# Patient Record
Sex: MALE | Race: WHITE | NOT HISPANIC OR LATINO | Employment: OTHER | URBAN - METROPOLITAN AREA
[De-identification: names, ages, dates, MRNs, and addresses within clinical notes are randomized per-mention and may not be internally consistent; named-entity substitution may affect disease eponyms.]

---

## 2017-02-26 DIAGNOSIS — Z76.0 ENCOUNTER FOR MEDICATION REFILL: Primary | ICD-10-CM

## 2017-02-27 ENCOUNTER — TELEPHONE (OUTPATIENT)
Dept: DERMATOLOGY | Facility: CLINIC | Age: 57
End: 2017-02-27

## 2017-02-27 NOTE — TELEPHONE ENCOUNTER
Pending Prescriptions:                       Disp   Refills    zolpidem (AMBIEN) 10 MG tablet [Pharmacy M*30 tab*         Sig: TAKE ONE TABLET BY MOUTH NIGHTLY AT BEDTIME AS NEEDED    Last OV 12/21/16  BMP 12/22/16

## 2017-02-27 NOTE — TELEPHONE ENCOUNTER
Reason for Call:  Other call back    Detailed comments: Wife is calling about the MOHS procedure scheduled for her huband 3/2.  The pt is saying that the spot on the top of his hand has healed over and is questioning whether he needs the appt.  He doesn't want to pay for the appt if nothing is done.  Please call the wife back.    Phone Number Patient can be reached at: Other phone number: [phone]  Carmina, wife     Best Time: anytime    Can we leave a detailed message on this number? YES    Call taken on 2/27/2017 at 9:17 AM by JUANITO BOWERS

## 2017-02-28 DIAGNOSIS — I25.83 CORONARY ARTERY DISEASE DUE TO LIPID RICH PLAQUE: ICD-10-CM

## 2017-02-28 DIAGNOSIS — I10 ESSENTIAL HYPERTENSION: ICD-10-CM

## 2017-02-28 DIAGNOSIS — I25.10 CORONARY ARTERY DISEASE DUE TO LIPID RICH PLAQUE: ICD-10-CM

## 2017-02-28 RX ORDER — ZOLPIDEM TARTRATE 10 MG/1
TABLET ORAL
Qty: 30 TABLET | Refills: 0 | Status: SHIPPED | OUTPATIENT
Start: 2017-02-28 | End: 2017-03-18

## 2017-02-28 RX ORDER — METOPROLOL SUCCINATE 50 MG/1
50 TABLET, EXTENDED RELEASE ORAL 2 TIMES DAILY
Qty: 180 TABLET | Refills: 3 | Status: SHIPPED | OUTPATIENT
Start: 2017-02-28 | End: 2017-04-25

## 2017-02-28 RX ORDER — LISINOPRIL 10 MG/1
10 TABLET ORAL DAILY
Qty: 90 TABLET | Refills: 3 | Status: SHIPPED | OUTPATIENT
Start: 2017-02-28 | End: 2017-12-11

## 2017-03-02 ENCOUNTER — OFFICE VISIT (OUTPATIENT)
Dept: DERMATOLOGY | Facility: CLINIC | Age: 57
End: 2017-03-02
Payer: COMMERCIAL

## 2017-03-02 VITALS — SYSTOLIC BLOOD PRESSURE: 130 MMHG | DIASTOLIC BLOOD PRESSURE: 75 MMHG | OXYGEN SATURATION: 98 % | HEART RATE: 63 BPM

## 2017-03-02 DIAGNOSIS — C44.619 BASAL CELL CARCINOMA OF LEFT HAND: ICD-10-CM

## 2017-03-02 DIAGNOSIS — Z85.828 HISTORY OF SKIN CANCER: Primary | ICD-10-CM

## 2017-03-02 DIAGNOSIS — L82.1 SK (SEBORRHEIC KERATOSIS): ICD-10-CM

## 2017-03-02 DIAGNOSIS — L81.4 LENTIGO: ICD-10-CM

## 2017-03-02 PROCEDURE — 17311 MOHS 1 STAGE H/N/HF/G: CPT | Performed by: DERMATOLOGY

## 2017-03-02 PROCEDURE — 99243 OFF/OP CNSLTJ NEW/EST LOW 30: CPT | Mod: 25 | Performed by: DERMATOLOGY

## 2017-03-02 NOTE — PROGRESS NOTES
Vikash Burgos is a 57 year old year old male patient here today in consultation for basal cell carcinoma on left hand by Dr. Ospina.  He has a hx of non-melanoma skin cancer.  Patient states this has been present for a while.  Location l hand.  Patient reports the following symptoms:  growing .  Patient reports the following previous treatments none.  Patient reports the following modifying factors none.  Associated symptoms: none.  Patient has no other skin complaints today.  Remainder of the HPI, Meds, PMH, Allergies, FH, and SH was reviewed in chart.    Pertinent Hx:   Non-melanoma skin cancer   Past Medical History   Diagnosis Date     Basal cell carcinoma      Carotid stenosis, left      Coronary artery disease      4 vessel bypass November 2010; LIMA ->LAD, SVG-> OM3, SVG -> D2, SVG -> PDA     Diabetes mellitus (H) 2005     neuropathy     Generalized anxiety disorder 5/2/2014     Hepatitis C, chronic (H) 2005     Hyperlipidemia LDL goal < 70      Hypertension      Liver diseases      9/15 Liver is 10 cm in span without left lobe enlargement     Persistent microalbuminuria associated with type 2 diabetes mellitus (H) 5/6/2015       Past Surgical History   Procedure Laterality Date     Hernia repair, inguinal rt/lt       left     Arthroscopy knee rt/lt       left     Coronary artery bypass  11/18/201     Coronary artery bypass grafting x 4 with placement of the left internal mammary artery to the distal midportion of the left anterior descending artery, saphenous vein graft from aorta to the third obtuse marginal branch of circumflex coronary artery, saphenous vein graft from aorta to the second diagonal branch, saphenous vein graft from aorta to the posterior descending artery.     Esophagoscopy, gastroscopy, duodenoscopy (egd), combined  10/31/2011     Procedure:COMBINED ESOPHAGOSCOPY, GASTROSCOPY, DUODENOSCOPY (EGD); Surgeon:ALONSO ALDANA; Location: GI     Colonoscopy       Heart cath coronary  angiogram w/lv gram  9-11-10     CV Surgery recommended     Heart cath coronary angiogram w/lv gram  2-28-14     Medical management        Family History   Problem Relation Age of Onset     C.A.D. Father      CANCER Father      bladder     Heart Surgery Father      bypass surgery     HEART DISEASE Mother        Social History     Social History     Marital status:      Spouse name: N/A     Number of children: N/A     Years of education: N/A     Occupational History     Not on file.     Social History Main Topics     Smoking status: Former Smoker     Packs/day: 0.50     Years: 12.00     Quit date: 1/26/2005     Smokeless tobacco: Never Used     Alcohol use No     Drug use: No     Sexual activity: Not on file     Other Topics Concern     Caffeine Concern Yes     2 cups coffee per day     Special Diet Yes     low carb diet, low sugar     Exercise Yes     treadmill 40 minutes, walk, daily, bike 30 minutes every other day     Social History Narrative       Outpatient Encounter Prescriptions as of 3/2/2017   Medication Sig Dispense Refill     zolpidem (AMBIEN) 10 MG tablet TAKE ONE TABLET BY MOUTH NIGHTLY AT BEDTIME AS NEEDED 30 tablet 0     metoprolol (TOPROL-XL) 50 MG 24 hr tablet Take 1 tablet (50 mg) by mouth 2 times daily 180 tablet 3     lisinopril (PRINIVIL/ZESTRIL) 10 MG tablet Take 1 tablet (10 mg) by mouth daily 90 tablet 3     LEVEMIR FLEXTOUCH 100 UNIT/ML injection INJECT 41 UNITS SUBCUTANEOUSLY ONCE A DAY 45 mL 0     DULoxetine (CYMBALTA) 60 MG EC capsule Take 1 capsule (60 mg) by mouth daily 30 capsule 1     cyanocobalamin (VITAMIN B12) 1000 MCG/ML injection Inject 1 mL into the muscle once One time injection per Dr. PENN       atorvastatin (LIPITOR) 20 MG tablet TAKE ONE TABLET BY MOUTH ONE TIME DAILY 90 tablet 2     insulin pen needle (B-D U/F) 31G X 8 MM TEST TWICE DAILY AS DIRECTED 100 each 6     insulin detemir (LEVEMIR FLEXTOUCH) 100 UNIT/ML soln INJECT 41 UNITS SUBCUTANEOUSLY ONCE DAILY. 15 mL 3      insulin pen needle (BD HEIKE U/F) 32G X 4 MM Use 1 daily or as directed. 100 each prn     VITAMIN D, CHOLECALCIFEROL, PO Take 1,000 Units by mouth daily       aspirin 81 MG tablet Take 1 tablet by mouth daily.       No facility-administered encounter medications on file as of 3/2/2017.              Review Of Systems  Skin: As above  Eyes: negative  Ears/Nose/Throat: negative  Respiratory: No shortness of breath, dyspnea on exertion, cough, or hemoptysis  Cardiovascular: negative  Gastrointestinal: negative  Genitourinary: negative  Musculoskeletal: negative  Neurologic: negative  Psychiatric: negative  Hematologic/Lymphatic/Immunologic: negative  Endocrine: negative      O:   NAD, WDWN, Alert & Oriented, Mood & Affect wnl, Vitals stable   Here today alone   /75  Pulse 63  SpO2 98%   General appearance rufina ii   Vitals stable    Alert, oriented and in no acute distress      Stuck on papules and brown macules on trunk and ext    L dorsal hand 1cm red plaque           The remainder of expanded problem focused exam was unremarkable; the following areas were examined:  scalp/hair, conjunctiva/lids, face, neck, lips, chest, digits/nails, RUE, LUE.      Eyes: Conjunctivae/lids:Normal     ENT: Lips, buccal mucosa, tongue: normal    MSK:Normal    Cardiovascular: peripheral edema none    Pulm: Breathing Normal    Lymph Nodes: No Head and Neck Lymphadenopathy     Neuro/Psych: Orientation:Normal; Mood/Affect:Normal      A/P:  1. Hx of non-melanoma skin cancer, seborrheic keratosis, lentigo  2.  l hand basal cell carcinoma   BENIGN LESIONS DISCUSSED WITH PATIENT:  I discussed the specifics of tumor, prognosis, and genetics of benign lesions.  I explained that treatment of these lesions would be purely cosmetic and not medically neccessary.  I discussed with patient different removal options including excision, cautery and /or laser.      Nature and genetics of benign skin lesions dicussed with patient.  Signs and  Symptoms of skin cancer discussed with patient.  Patient encouraged to perform monthly skin exams.  UV precautions reviewed with patient.  Skin care regimen reviewed with patient: Eliminate harsh soaps, i.e. Dial, zest, irsih spring; Mild soaps such as Cetaphil or Dove sensitive skin, avoid hot or cold showers, aggressive use of emollients including vanicream, cetaphil or cerave discussed with patient.    Risks of non-melanoma skin cancer discussed with patient   Return to clinic 6 months      PROCEDURE NOTE  L dorsal hand basal cell carcinoma   MOHS:   Location    After PGACAC discussed with patient, decision for Mohs surgery was made. Indication for Mohs was Location. Patient confirmed the site with Dr. Chávez.  After anesthesia with LEC, the tumor was excised using standard Mohs technique in 1 stages(s).  CLEAR MARGINS OBTAINED and Final defect size was 1.5 cm.       REPAIR SECOND INTENT: We discussed the options for wound management in full with the patient including risks/benefits/possible outcomes. Decision made to allow the wound to heal by second intention. EBL minimal; complications none; wound care routine.  The patient was discharged in good condition and will return in one month or prn for wound evaluation.

## 2017-03-02 NOTE — NURSING NOTE
Surgical Office Location:  [facility]  [address] [place] [id number]

## 2017-03-02 NOTE — MR AVS SNAPSHOT
After Visit Summary   3/2/2017    Vikash Burgos    MRN: [id number]           Patient Information     Date Of Birth          1960        Visit Information        Provider Department      3/2/2017 7:00 AM Bony Chávez MD [facility]        Today's Diagnoses     History of skin cancer    -  1    Basal cell carcinoma of left hand        SK (seborrheic keratosis)        Lentigo          Care Instructions    Open Wound Care     For HAND        ? No strenuous activity for 48 hours    ? Take Tylenol as needed for discomfort.                                                .         ? Do not drink alcoholic beverages for 48 hours.    ? Keep the pressure bandage in place for 24 hours. If the bandage becomes blood tinged or loose, reinforce it with gauze and tape.        (Refer to the reverse side of this page for management of bleeding).    ? Remove bandage in 24 hours and begin wound care as follows:     1. Clean area with tap water using a Q tip or gauze pad, (shower / bathe normally)  2. Dry wound with Q tip or gauze pad  3. Apply Aquaphor, Vaseline, Polysporin or Bacitracin Ointment with a Q tip    Do NOT use Neosporin Ointment *  4. Cover the wound with a band-aid or nonstick gauze pad and paper tape.  5. Repeat wound care once a day until wound is completely healed.    It is an old wives tale that a wound heals better when it is exposed to air and allowed to dry out. The wound will heal faster with a better cosmetic result if it is kept moist with ointment and covered with a bandage.  Do not let the wound dry out.      Supplies Needed:                Qtips or gauze pads                Polysporin or Bacitracin Ointment                Bandaids or nonstick gauze pads and paper tape    Wound care kits and brown paper tape are available for purchase at   the pharmacy.    BLEEDIN. Use tightly rolled up gauze or cloth to apply direct pressure over the bandage for  20   minutes.  2. Reapply pressure for an additional 20 minutes if necessary  3. Call the office or go to the nearest emergency room if pressure fails to stop the bleeding.  4. Use additional gauze and tape to maintain pressure once the bleeding has stopped.  5. Begin wound care 24 hours after surgery as directed.                  WOUND HEALING    1. One week after surgery a pink / red halo will form around the outside of the wound.   This is new skin.  2. The center of the wound will appear yellowish white and produce some drainage.  3. The pink halo will slowly migrate in toward the center of the wound until the wound is covered with new shiny pink skin.  4. There will be no more drainage when the wound is completely healed.  5. It will take six months to one year for the redness to fade.  6. The scar may be itchy, tight and sensitive to extreme temperatures for a year after the surgery.  7. Massaging the area several times a day for several minutes after the wound is completely healed will help the scar soften and normalize faster. Begin massage only after healing is complete.      In case of emergency call: Dr Chávez: [phone]     [facility]: [phone]            Follow-ups after your visit        Your next 10 appointments already scheduled     Mar 10, 2017  7:45 AM CST   LAB with  LAB    Health Lab ([facility])    [address] [place] [id number] [phone]           Patient must bring picture ID.  Patient should be prepared to give a urine specimen  Please do not eat 10-12 hours before your appointment if you are coming in fasting for labs on lipids, cholesterol, or glucose (sugar).  Pregnant women should follow their Care Team instructions. Water with medications is okay. Do not drink coffee or other fluids.   If you have concerns about taking  your medications, please ask at office or if scheduling via [employer], send a message by  clicking on Secure Messaging, Message Your Care Team.            Mar 10, 2017  8:30 AM CST   US ABDOMEN COMPLETE with UCUS1   [facility] US ([facility])    [address] [place] [address]  [facility] [id number] [phone]           Please bring a list of your medicines (including vitamins, minerals and over-the-counter drugs). Also, tell your doctor about any allergies you may have. Wear comfortable clothes and leave your valuables at home.  Adults: No eating or drinking for 8 hours before the exam. You may take medicine with a small sip of water.  Children: - Children 6+ years: No food or drink for 6 hours before exam. - Children 1-5 years: No food or drink for 4 hours before exam. - Infants, breast-fed: may have breast milk up to 2 hours before exam. - Infants, formula: may have bottle until 4 hours before exam.  Please call the Imaging Department at your exam site with any questions.            Mar 10, 2017  9:30 AM CST   (Arrive by 9:15 AM)   Return Liver Transplant with Kevin Bedolla MD   [facility] Hepatology ([facility])    [address] [place] [id number] [phone]              Who to contact     If you have questions or need follow up information about today's clinic visit or your schedule please contact [facility] directly at [phone].  Normal or non-critical lab and imaging results will be communicated to you by MyChart, letter or phone within 4 business days after the clinic has received the results. If you do not hear from us within 7 days, please contact the clinic through MyChart or phone. If you have a critical or abnormal lab result, we will notify you by phone as soon as possible.  Submit refill requests through [employer] or call your pharmacy and they will forward the refill request to us. Please allow 3 business days for your refill to be completed.          Additional  Information About Your Visit        [email or web address] Information     [employer] gives you secure access to your electronic health record. If you see a primary care provider, you can also send messages to your care team and make appointments. If you have questions, please call your primary care clinic.  If you do not have a primary care provider, please call [phone] and they will assist you.        Care EveryWhere ID     This is your Care EveryWhere ID. This could be used by other organizations to access your [facility] medical records  [id number]        Your Vitals Were     Pulse Pulse Oximetry                63 98%           Blood Pressure from Last 3 Encounters:   03/02/17 130/75   12/22/16 110/64   12/21/16 114/70    Weight from Last 3 Encounters:   12/22/16 88.5 kg (195 lb)   12/21/16 88.5 kg (195 lb)   09/09/16 88.4 kg (194 lb 14.4 oz)              We Performed the Following     MOHS HEAD/NCK/HND/FT/GEN 1ST STAGE UP T0 5 BLOCKS        Primary Care Provider Office Phone # Fax #    Jace Mayo [phone]       [facility] [address] [facility] [address]        Thank you!     Thank you for choosing [facility]  for your care. Our goal is always to provide you with excellent care. Hearing back from our patients is one way we can continue to improve our services. Please take a few minutes to complete the written survey that you may receive in the mail after your visit with us. Thank you!             Your Updated Medication List - Protect others around you: Learn how to safely use, store and throw away your medicines at www.disposemymeds.org.          This list is accurate as of: 3/2/17  7:55 AM.  Always use your most recent med list.                   Brand Name Dispense Instructions for use    aspirin 81 MG tablet      Take 1 tablet by mouth daily.       atorvastatin 20 MG tablet    LIPITOR    90 tablet    TAKE ONE TABLET BY MOUTH ONE TIME DAILY        cyanocobalamin 1000 MCG/ML injection    VITAMIN B12     Inject 1 mL into the muscle once One time injection per Dr. PENN       DULoxetine 60 MG EC capsule    CYMBALTA    30 capsule    Take 1 capsule (60 mg) by mouth daily       * insulin detemir 100 UNIT/ML injection    LEVEMIR FLEXTOUCH    15 mL    INJECT 41 UNITS SUBCUTANEOUSLY ONCE DAILY.       * LEVEMIR FLEXTOUCH 100 UNIT/ML injection   Generic drug:  insulin detemir     45 mL    INJECT 41 UNITS SUBCUTANEOUSLY ONCE A DAY       * insulin pen needle 32G X 4 MM    BD HEIKE U/F    100 each    Use 1 daily or as directed.       * insulin pen needle 31G X 8 MM    B-D U/F    100 each    TEST TWICE DAILY AS DIRECTED       lisinopril 10 MG tablet    PRINIVIL/ZESTRIL    90 tablet    Take 1 tablet (10 mg) by mouth daily       metoprolol 50 MG 24 hr tablet    TOPROL-XL    180 tablet    Take 1 tablet (50 mg) by mouth 2 times daily       VITAMIN D (CHOLECALCIFEROL) PO      Take 1,000 Units by mouth daily       zolpidem 10 MG tablet    AMBIEN    30 tablet    TAKE ONE TABLET BY MOUTH NIGHTLY AT BEDTIME AS NEEDED       * Notice:  This list has 4 medication(s) that are the same as other medications prescribed for you. Read the directions carefully, and ask your doctor or other care provider to review them with you.

## 2017-03-02 NOTE — PATIENT INSTRUCTIONS
Open Wound Care     For HAND        ? No strenuous activity for 48 hours    ? Take Tylenol as needed for discomfort.                                                .         ? Do not drink alcoholic beverages for 48 hours.    ? Keep the pressure bandage in place for 24 hours. If the bandage becomes blood tinged or loose, reinforce it with gauze and tape.        (Refer to the reverse side of this page for management of bleeding).    ? Remove bandage in 24 hours and begin wound care as follows:     1. Clean area with tap water using a Q tip or gauze pad, (shower / bathe normally)  2. Dry wound with Q tip or gauze pad  3. Apply Aquaphor, Vaseline, Polysporin or Bacitracin Ointment with a Q tip    Do NOT use Neosporin Ointment *  4. Cover the wound with a band-aid or nonstick gauze pad and paper tape.  5. Repeat wound care once a day until wound is completely healed.    It is an old wives tale that a wound heals better when it is exposed to air and allowed to dry out. The wound will heal faster with a better cosmetic result if it is kept moist with ointment and covered with a bandage.  Do not let the wound dry out.      Supplies Needed:                Qtips or gauze pads                Polysporin or Bacitracin Ointment                Bandaids or nonstick gauze pads and paper tape    Wound care kits and brown paper tape are available for purchase at   the pharmacy.    BLEEDIN. Use tightly rolled up gauze or cloth to apply direct pressure over the bandage for 20   minutes.  2. Reapply pressure for an additional 20 minutes if necessary  3. Call the office or go to the nearest emergency room if pressure fails to stop the bleeding.  4. Use additional gauze and tape to maintain pressure once the bleeding has stopped.  5. Begin wound care 24 hours after surgery as directed.                  WOUND HEALING    1. One week after surgery a pink / red halo will form around the outside of the wound.   This is new skin.  2. The  center of the wound will appear yellowish white and produce some drainage.  3. The pink halo will slowly migrate in toward the center of the wound until the wound is covered with new shiny pink skin.  4. There will be no more drainage when the wound is completely healed.  5. It will take six months to one year for the redness to fade.  6. The scar may be itchy, tight and sensitive to extreme temperatures for a year after the surgery.  7. Massaging the area several times a day for several minutes after the wound is completely healed will help the scar soften and normalize faster. Begin massage only after healing is complete.      In case of emergency call: Dr Chávez: [phone]     [facility]: [phone]

## 2017-03-02 NOTE — NURSING NOTE
Initial /75  Pulse 63  SpO2 98% Estimated body mass index is 26.45 kg/(m^2) as calculated from the following:    Height as of 12/22/16: 1.829 m (6').    Weight as of 12/22/16: 88.5 kg (195 lb). .

## 2017-03-10 ENCOUNTER — OFFICE VISIT (OUTPATIENT)
Dept: GASTROENTEROLOGY | Facility: CLINIC | Age: 57
End: 2017-03-10
Attending: INTERNAL MEDICINE
Payer: COMMERCIAL

## 2017-03-10 VITALS
SYSTOLIC BLOOD PRESSURE: 126 MMHG | DIASTOLIC BLOOD PRESSURE: 88 MMHG | WEIGHT: 193 LBS | HEART RATE: 69 BPM | HEIGHT: 72 IN | OXYGEN SATURATION: 100 % | TEMPERATURE: 97.4 F | BODY MASS INDEX: 26.14 KG/M2

## 2017-03-10 DIAGNOSIS — K74.60 CIRRHOSIS OF LIVER WITHOUT ASCITES, UNSPECIFIED HEPATIC CIRRHOSIS TYPE (H): ICD-10-CM

## 2017-03-10 DIAGNOSIS — K74.60 CIRRHOSIS OF LIVER WITHOUT ASCITES, UNSPECIFIED HEPATIC CIRRHOSIS TYPE (H): Primary | ICD-10-CM

## 2017-03-10 LAB
AFP SERPL-MCNC: 3.4 UG/L (ref 0–8)
ALBUMIN SERPL-MCNC: 4.1 G/DL (ref 3.4–5)
ALP SERPL-CCNC: 64 U/L (ref 40–150)
ALT SERPL W P-5'-P-CCNC: 29 U/L (ref 0–70)
ANION GAP SERPL CALCULATED.3IONS-SCNC: 9 MMOL/L (ref 3–14)
AST SERPL W P-5'-P-CCNC: 14 U/L (ref 0–45)
BILIRUB DIRECT SERPL-MCNC: 0.3 MG/DL (ref 0–0.2)
BILIRUB SERPL-MCNC: 1.4 MG/DL (ref 0.2–1.3)
BUN SERPL-MCNC: 17 MG/DL (ref 7–30)
CALCIUM SERPL-MCNC: 9.2 MG/DL (ref 8.5–10.1)
CHLORIDE SERPL-SCNC: 104 MMOL/L (ref 94–109)
CO2 SERPL-SCNC: 27 MMOL/L (ref 20–32)
CREAT SERPL-MCNC: 0.94 MG/DL (ref 0.66–1.25)
ERYTHROCYTE [DISTWIDTH] IN BLOOD BY AUTOMATED COUNT: 12.7 % (ref 10–15)
GFR SERPL CREATININE-BSD FRML MDRD: 83 ML/MIN/1.7M2
GLUCOSE SERPL-MCNC: 168 MG/DL (ref 70–99)
HCT VFR BLD AUTO: 46.3 % (ref 40–53)
HGB BLD-MCNC: 15.9 G/DL (ref 13.3–17.7)
INR PPP: 1.08 (ref 0.86–1.14)
MCH RBC QN AUTO: 31 PG (ref 26.5–33)
MCHC RBC AUTO-ENTMCNC: 34.3 G/DL (ref 31.5–36.5)
MCV RBC AUTO: 90 FL (ref 78–100)
PLATELET # BLD AUTO: 171 10E9/L (ref 150–450)
POTASSIUM SERPL-SCNC: 5 MMOL/L (ref 3.4–5.3)
PROT SERPL-MCNC: 7.4 G/DL (ref 6.8–8.8)
RBC # BLD AUTO: 5.13 10E12/L (ref 4.4–5.9)
SODIUM SERPL-SCNC: 139 MMOL/L (ref 133–144)
WBC # BLD AUTO: 5.2 10E9/L (ref 4–11)

## 2017-03-10 PROCEDURE — 80048 BASIC METABOLIC PNL TOTAL CA: CPT | Performed by: INTERNAL MEDICINE

## 2017-03-10 PROCEDURE — 99212 OFFICE O/P EST SF 10 MIN: CPT | Mod: ZF

## 2017-03-10 PROCEDURE — 85610 PROTHROMBIN TIME: CPT | Performed by: INTERNAL MEDICINE

## 2017-03-10 PROCEDURE — 85027 COMPLETE CBC AUTOMATED: CPT | Performed by: INTERNAL MEDICINE

## 2017-03-10 PROCEDURE — 82105 ALPHA-FETOPROTEIN SERUM: CPT | Performed by: INTERNAL MEDICINE

## 2017-03-10 PROCEDURE — 36415 COLL VENOUS BLD VENIPUNCTURE: CPT | Performed by: INTERNAL MEDICINE

## 2017-03-10 PROCEDURE — 80076 HEPATIC FUNCTION PANEL: CPT | Performed by: INTERNAL MEDICINE

## 2017-03-10 ASSESSMENT — PAIN SCALES - GENERAL: PAINLEVEL: NO PAIN (0)

## 2017-03-10 NOTE — NURSING NOTE
Chief Complaint   Patient presents with     RECHECK     Cirrhosis of Liver with ascites   Pt roomed, vitals, meds, and allergies reviewed with pt. Pt ready for provider.  Lazaro Hernández, CMA

## 2017-03-10 NOTE — MR AVS SNAPSHOT
After Visit Summary   3/10/2017    Vikash Burgos    MRN: [id number]           Patient Information     Date Of Birth          1960        Visit Information        Provider Department      3/10/2017 9:30 AM Kevin Bedolla MD [facility] Hepatology        Today's Diagnoses     Cirrhosis of liver without ascites, unspecified hepatic cirrhosis type (H)    -  1       Follow-ups after your visit        Follow-up notes from your care team     Return in about 6 months (around 9/10/2017).      Your next 10 appointments already scheduled     Sep 15, 2017  6:45 AM CDT   Lab with  LAB   [facility] Lab ([facility])    [address] [place] [address] [phone]            Sep 15, 2017  7:00 AM CDT   US ABDOMEN COMPLETE with US[address] [place] [address] Imaging Center US ([facility])    [address] [place] [address] [phone]           Please bring a list of your medicines (including vitamins, minerals and over-the-counter drugs). Also, tell your doctor about any allergies you may have. Wear comfortable clothes and leave your valuables at home.  Adults: No eating or drinking for 8 hours before the exam. You may take medicine with a small sip of water.  Children: - Children 6+ years: No food or drink for 6 hours before exam. - Children 1-5 years: No food or drink for 4 hours before exam. - Infants, breast-fed: may have breast milk up to 2 hours before exam. - Infants, formula: may have bottle until 4 hours before exam.  Please call the Imaging Department at your exam site with any questions.            Sep 15, 2017  8:15 AM CDT   (Arrive by 8:00 AM)   Return Liver Transplant with Kevin Bedolla MD   [facility] Hepatology ([facility])    [address] [place] [address] [phone]              Future tests that were ordered for you today     Open Standing Orders         Priority Remaining Interval Expires Ordered    US abdomen complete [ATC443] Routine 2/2 Every 6 Months 4/9/2018 3/10/2017          Open Future Orders        Priority Expected Expires Ordered     Hepatic Panel [LAB20] Routine 9/6/2017 4/9/2018 3/10/2017    Basic metabolic panel [LAB15] Routine 9/6/2017 4/9/2018 3/10/2017    CBC with platelets [CLE160] Routine 9/6/2017 4/9/2018 3/10/2017    INR [EUR7311] Routine 9/6/2017 4/9/2018 3/10/2017    AFP tumor marker [RNM359] Routine 9/6/2017 4/9/2018 3/10/2017            Who to contact     If you have questions or need follow up information about today's clinic visit or your schedule please contact [facility] HEPATOLOGY directly at [phone].  Normal or non-critical lab and imaging results will be communicated to you by [employer], letter or phone within 4 business days after the clinic has received the results. If you do not hear from us within 7 days, please contact the clinic through [employer] or phone. If you have a critical or abnormal lab result, we will notify you by phone as soon as possible.  Submit refill requests through [employer] or call your pharmacy and they will forward the refill request to us. Please allow 3 business days for your refill to be completed.          Additional Information About Your Visit        [employer] Information     [employer] gives you secure access to your electronic health record. If you see a primary care provider, you can also send messages to your care team and make appointments. If you have questions, please call your primary care clinic.  If you do not have a primary care provider, please call [phone] and they will assist you.        Care EveryWhere ID     This is your Care EveryWhere ID. This could be used by other organizations to access your [facility] medical records  [id number]        Your Vitals Were     Pulse Temperature Height Pulse Oximetry BMI (Body Mass Index)       69 97.4  F (36.3  C) (Oral) 1.829 m (6') 100% 26.18  kg/m2        Blood Pressure from Last 3 Encounters:   03/10/17 126/88   03/02/17 130/75   12/22/16 110/64    Weight from Last 3 Encounters:   03/10/17 87.5 kg (193 lb)   12/22/16 88.5 kg (195 lb)   12/21/16 88.5 kg (195 lb)              We Performed the Following     Schedule follow up appointments        Primary Care Provider Office Phone # Fax #    Jace Mayo [phone]       [facility] [address] [facility] [address]        Thank you!     Thank you for choosing [facility] HEPATOLOGY  for your care. Our goal is always to provide you with excellent care. Hearing back from our patients is one way we can continue to improve our services. Please take a few minutes to complete the written survey that you may receive in the mail after your visit with us. Thank you!             Your Updated Medication List - Protect others around you: Learn how to safely use, store and throw away your medicines at www.disposemymeds.org.          This list is accurate as of: 3/10/17 10:13 AM.  Always use your most recent med list.                   Brand Name Dispense Instructions for use    aspirin 81 MG tablet      Take 1 tablet by mouth daily.       atorvastatin 20 MG tablet    LIPITOR    90 tablet    TAKE ONE TABLET BY MOUTH ONE TIME DAILY       cyanocobalamin 1000 MCG/ML injection    VITAMIN B12     Inject 1 mL into the muscle once One time injection per Dr. PENN       DULoxetine 60 MG EC capsule    CYMBALTA    30 capsule    Take 1 capsule (60 mg) by mouth daily       * insulin detemir 100 UNIT/ML injection    LEVEMIR FLEXTOUCH    15 mL    INJECT 41 UNITS SUBCUTANEOUSLY ONCE DAILY.       * LEVEMIR FLEXTOUCH 100 UNIT/ML injection   Generic drug:  insulin detemir     45 mL    INJECT 41 UNITS SUBCUTANEOUSLY ONCE A DAY       * insulin pen needle 32G X 4 MM    BD HEIKE U/F    100 each    Use 1 daily or as directed.       * insulin pen needle 31G X 8 MM    B-D U/F    100 each    TEST TWICE DAILY AS  DIRECTED       lisinopril 10 MG tablet    PRINIVIL/ZESTRIL    90 tablet    Take 1 tablet (10 mg) by mouth daily       metoprolol 50 MG 24 hr tablet    TOPROL-XL    180 tablet    Take 1 tablet (50 mg) by mouth 2 times daily       VITAMIN D (CHOLECALCIFEROL) PO      Take 1,000 Units by mouth daily       zolpidem 10 MG tablet    AMBIEN    30 tablet    TAKE ONE TABLET BY MOUTH NIGHTLY AT BEDTIME AS NEEDED       * Notice:  This list has 4 medication(s) that are the same as other medications prescribed for you. Read the directions carefully, and ask your doctor or other care provider to review them with you.

## 2017-03-10 NOTE — LETTER
3/10/2017      RE: Vikash Burgos  [address]  [place] [address]       HISTORY OF PRESENT ILLNESS:  I had the pleasure of seeing Vikash Burgos for followup in the Liver Clinic at the [facility] on 03/10/2017.  Mr. Burgos returns for followup of cirrhosis caused by nonalcoholic fatty liver disease.        He is feeling fairly well at this point in time.  He does still complain of some mild right upper quadrant discomfort but this is really unchanged.  He denies any itching, skin rash or fatigue.  He does have some difficulty sleeping at night.  He denies any increased abdominal girth or lower extremity edema.  He denies any fevers or chills, cough or shortness of breath.  He denies any nausea, vomiting, diarrhea or constipation.  He has not had any gastrointestinal bleeding and no overt signs of encephalopathy.  In fact, he has not been hospitalized since he was last seen.        The only thing new with regard to his diabetes is he now has some retinopathy and is going to be getting some laser treatments.  He also recently had a basal cell cancer removed from his hand.       Current Outpatient Prescriptions   Medication     zolpidem (AMBIEN) 10 MG tablet     metoprolol (TOPROL-XL) 50 MG 24 hr tablet     lisinopril (PRINIVIL/ZESTRIL) 10 MG tablet     LEVEMIR FLEXTOUCH 100 UNIT/ML injection     DULoxetine (CYMBALTA) 60 MG EC capsule     cyanocobalamin (VITAMIN B12) 1000 MCG/ML injection     atorvastatin (LIPITOR) 20 MG tablet     insulin pen needle (B-D U/F) 31G X 8 MM     insulin detemir (LEVEMIR FLEXTOUCH) 100 UNIT/ML soln     insulin pen needle (BD HEIKE U/F) 32G X 4 MM     VITAMIN D, CHOLECALCIFEROL, PO     aspirin 81 MG tablet     No current facility-administered medications for this visit.      B/P: 126/88, T: 97.4, P: 69, R: Data Unavailable    HEENT exam shows no scleral icterus and no temporal muscle wasting.  Chest is clear.  Abdominal exam shows no  increase in girth.  No masses or tenderness to palpation are present.  His liver is 10 cm in span without left lobe enlargement.  No spleen tip is palpable.  Extremity exam shows no edema.  Skin exam shows the open sore from his MOHS surgery for his basal cell and otherwise there are no skin abnormalities.  Neurologic exam shows no asterixis.       Recent Results (from the past 168 hour(s))    Hepatic Panel [LAB20]    Collection Time: 03/10/17  7:24 AM   Result Value Ref Range    Bilirubin Direct 0.3 (H) 0.0 - 0.2 mg/dL    Bilirubin Total 1.4 (H) 0.2 - 1.3 mg/dL    Albumin 4.1 3.4 - 5.0 g/dL    Protein Total 7.4 6.8 - 8.8 g/dL    Alkaline Phosphatase 64 40 - 150 U/L    ALT 29 0 - 70 U/L    AST 14 0 - 45 U/L   Basic metabolic panel [LAB15]    Collection Time: 03/10/17  7:24 AM   Result Value Ref Range    Sodium 139 133 - 144 mmol/L    Potassium 5.0 3.4 - 5.3 mmol/L    Chloride 104 94 - 109 mmol/L    Carbon Dioxide 27 20 - 32 mmol/L    Anion Gap 9 3 - 14 mmol/L    Glucose 168 (H) 70 - 99 mg/dL    Urea Nitrogen 17 7 - 30 mg/dL    Creatinine 0.94 0.66 - 1.25 mg/dL    GFR Estimate 83 >60 mL/min/1.7m2    GFR Estimate If Black >90   GFR Calc   >60 mL/min/1.7m2    Calcium 9.2 8.5 - 10.1 mg/dL   CBC with platelets [SGU863]    Collection Time: 03/10/17  7:24 AM   Result Value Ref Range    WBC 5.2 4.0 - 11.0 10e9/L    RBC Count 5.13 4.4 - 5.9 10e12/L    Hemoglobin 15.9 13.3 - 17.7 g/dL    Hematocrit 46.3 40.0 - 53.0 %    MCV 90 78 - 100 fl    MCH 31.0 26.5 - 33.0 pg    MCHC 34.3 31.5 - 36.5 g/dL    RDW 12.7 10.0 - 15.0 %    Platelet Count 171 150 - 450 10e9/L   INR [[id number]]    Collection Time: 03/10/17  7:24 AM   Result Value Ref Range    INR 1.08 0.86 - 1.14      I did review his ultrasound, which shows no mass lesions and no ascites and no significant change.      My impression is that Mr. Hilliard is doing well.  He has extremely well-compensated cirrhosis caused by nonalcoholic fatty liver disease and his  liver enzymes are normal, which indicates he probably has little activity with his disease.  He is up-to-date with regard to vaccines and cancer screening.  I will not be making any change to his medical regimen.  I will simply see him back in the clinic again in 6 months.      Thank you very much for allowing me to participate in the care of this patient.  If you have any questions regarding my recommendations, please do not hesitate to contact me.       Kevin Bedolla MD      Professor of Medicine  [employer]      Executive Medical Director, Solid Organ Transplant Program  [facility]

## 2017-03-10 NOTE — PROGRESS NOTES
HISTORY OF PRESENT ILLNESS:  I had the pleasure of seeing Vikash Burgos for followup in the Liver Clinic at the [facility] on 03/10/2017.  Mr. Burgos returns for followup of cirrhosis caused by nonalcoholic fatty liver disease.        He is feeling fairly well at this point in time.  He does still complain of some mild right upper quadrant discomfort but this is really unchanged.  He denies any itching, skin rash or fatigue.  He does have some difficulty sleeping at night.  He denies any increased abdominal girth or lower extremity edema.  He denies any fevers or chills, cough or shortness of breath.  He denies any nausea, vomiting, diarrhea or constipation.  He has not had any gastrointestinal bleeding and no overt signs of encephalopathy.  In fact, he has not been hospitalized since he was last seen.        The only thing new with regard to his diabetes is he now has some retinopathy and is going to be getting some laser treatments.  He also recently had a basal cell cancer removed from his hand.       Current Outpatient Prescriptions   Medication     zolpidem (AMBIEN) 10 MG tablet     metoprolol (TOPROL-XL) 50 MG 24 hr tablet     lisinopril (PRINIVIL/ZESTRIL) 10 MG tablet     LEVEMIR FLEXTOUCH 100 UNIT/ML injection     DULoxetine (CYMBALTA) 60 MG EC capsule     cyanocobalamin (VITAMIN B12) 1000 MCG/ML injection     atorvastatin (LIPITOR) 20 MG tablet     insulin pen needle (B-D U/F) 31G X 8 MM     insulin detemir (LEVEMIR FLEXTOUCH) 100 UNIT/ML soln     insulin pen needle (BD HEIKE U/F) 32G X 4 MM     VITAMIN D, CHOLECALCIFEROL, PO     aspirin 81 MG tablet     No current facility-administered medications for this visit.      B/P: 126/88, T: 97.4, P: 69, R: Data Unavailable    HEENT exam shows no scleral icterus and no temporal muscle wasting.  Chest is clear.  Abdominal exam shows no increase in girth.  No masses or tenderness to palpation are present.  His liver is 10 cm in span  without left lobe enlargement.  No spleen tip is palpable.  Extremity exam shows no edema.  Skin exam shows the open sore from his MOHS surgery for his basal cell and otherwise there are no skin abnormalities.  Neurologic exam shows no asterixis.       Recent Results (from the past 168 hour(s))    Hepatic Panel [LAB20]    Collection Time: 03/10/17  7:24 AM   Result Value Ref Range    Bilirubin Direct 0.3 (H) 0.0 - 0.2 mg/dL    Bilirubin Total 1.4 (H) 0.2 - 1.3 mg/dL    Albumin 4.1 3.4 - 5.0 g/dL    Protein Total 7.4 6.8 - 8.8 g/dL    Alkaline Phosphatase 64 40 - 150 U/L    ALT 29 0 - 70 U/L    AST 14 0 - 45 U/L   Basic metabolic panel [LAB15]    Collection Time: 03/10/17  7:24 AM   Result Value Ref Range    Sodium 139 133 - 144 mmol/L    Potassium 5.0 3.4 - 5.3 mmol/L    Chloride 104 94 - 109 mmol/L    Carbon Dioxide 27 20 - 32 mmol/L    Anion Gap 9 3 - 14 mmol/L    Glucose 168 (H) 70 - 99 mg/dL    Urea Nitrogen 17 7 - 30 mg/dL    Creatinine 0.94 0.66 - 1.25 mg/dL    GFR Estimate 83 >60 mL/min/1.7m2    GFR Estimate If Black >90   GFR Calc   >60 mL/min/1.7m2    Calcium 9.2 8.5 - 10.1 mg/dL   CBC with platelets [[id number]]    Collection Time: 03/10/17  7:24 AM   Result Value Ref Range    WBC 5.2 4.0 - 11.0 10e9/L    RBC Count 5.13 4.4 - 5.9 10e12/L    Hemoglobin 15.9 13.3 - 17.7 g/dL    Hematocrit 46.3 40.0 - 53.0 %    MCV 90 78 - 100 fl    MCH 31.0 26.5 - 33.0 pg    MCHC 34.3 31.5 - 36.5 g/dL    RDW 12.7 10.0 - 15.0 %    Platelet Count 171 150 - 450 10e9/L   INR [[id number]]    Collection Time: 03/10/17  7:24 AM   Result Value Ref Range    INR 1.08 0.86 - 1.14      I did review his ultrasound, which shows no mass lesions and no ascites and no significant change.      My impression is that Mr. Hilliard is doing well.  He has extremely well-compensated cirrhosis caused by nonalcoholic fatty liver disease and his liver enzymes are normal, which indicates he probably has little activity with his disease.  He is  up-to-date with regard to vaccines and cancer screening.  I will not be making any change to his medical regimen.  I will simply see him back in the clinic again in 6 months.      Thank you very much for allowing me to participate in the care of this patient.  If you have any questions regarding my recommendations, please do not hesitate to contact me.       Kevin Bedolla MD      Professor of Medicine  [employer]      Executive Medical Director, Solid Organ Transplant Program  [facility]

## 2017-04-25 ENCOUNTER — TELEPHONE (OUTPATIENT)
Dept: CARDIOLOGY | Facility: CLINIC | Age: 57
End: 2017-04-25

## 2017-04-25 DIAGNOSIS — I10 ESSENTIAL HYPERTENSION: ICD-10-CM

## 2017-04-25 DIAGNOSIS — I25.83 CORONARY ARTERY DISEASE DUE TO LIPID RICH PLAQUE: ICD-10-CM

## 2017-04-25 DIAGNOSIS — I25.10 CORONARY ARTERY DISEASE DUE TO LIPID RICH PLAQUE: ICD-10-CM

## 2017-04-25 RX ORDER — METOPROLOL SUCCINATE 100 MG/1
50 TABLET, EXTENDED RELEASE ORAL 2 TIMES DAILY
Qty: 90 TABLET | Refills: 1 | Status: SHIPPED | OUTPATIENT
Start: 2017-04-25 | End: 2017-11-03

## 2017-04-25 NOTE — TELEPHONE ENCOUNTER
Request from Pharmacy stating PA needed for quantity exception for Metoprolol succinate 50 mg tablets 1 tablet twice daily.   Reviewed with Dr. Roberts, Metoprolol succinate tablet to be changed to 100 mg tablets and take 1/2 tablet twice daily.   New prescription e scribed. Reviewed tablets size and directions  with patient's wife.

## 2017-08-03 DIAGNOSIS — Z76.0 ENCOUNTER FOR MEDICATION REFILL: ICD-10-CM

## 2017-08-03 NOTE — TELEPHONE ENCOUNTER
atorvastatin (LIPITOR) 20 MG tablet; LAST OV: 2/21/2016    Component      Latest Ref Rng & Units 12/22/2016   Cholesterol      <200 mg/dL 106   Triglycerides      <150 mg/dL 94   HDL Cholesterol      >39 mg/dL 35 (L)   LDL Cholesterol Calculated      <100 mg/dL 52   Non HDL Cholesterol      <130 mg/dL 71

## 2017-08-04 RX ORDER — ATORVASTATIN CALCIUM 20 MG/1
TABLET, FILM COATED ORAL
Qty: 90 TABLET | Refills: 2 | Status: SHIPPED | OUTPATIENT
Start: 2017-08-04 | End: 2017-09-15

## 2017-08-31 ENCOUNTER — TELEPHONE (OUTPATIENT)
Dept: GASTROENTEROLOGY | Facility: CLINIC | Age: 57
End: 2017-08-31

## 2017-08-31 NOTE — TELEPHONE ENCOUNTER
Patient contacted and reminded of upcoming appointment.  Patient confirmed they will be attending.  Patient instructed to bring updated medications list to appointment.  Instructed pt to arrive an hour and a half to two hours prior to appt time for labs.  Lazaro Hernández, CMA

## 2017-09-15 ENCOUNTER — OFFICE VISIT (OUTPATIENT)
Dept: GASTROENTEROLOGY | Facility: CLINIC | Age: 57
End: 2017-09-15
Attending: INTERNAL MEDICINE
Payer: COMMERCIAL

## 2017-09-15 ENCOUNTER — TELEPHONE (OUTPATIENT)
Dept: GASTROENTEROLOGY | Facility: CLINIC | Age: 57
End: 2017-09-15

## 2017-09-15 VITALS
TEMPERATURE: 97.8 F | DIASTOLIC BLOOD PRESSURE: 77 MMHG | BODY MASS INDEX: 26.01 KG/M2 | HEIGHT: 72 IN | WEIGHT: 192 LBS | OXYGEN SATURATION: 98 % | SYSTOLIC BLOOD PRESSURE: 138 MMHG | HEART RATE: 68 BPM

## 2017-09-15 DIAGNOSIS — K74.60 CIRRHOSIS OF LIVER WITHOUT ASCITES, UNSPECIFIED HEPATIC CIRRHOSIS TYPE (H): ICD-10-CM

## 2017-09-15 DIAGNOSIS — K74.60 CIRRHOSIS OF LIVER WITHOUT ASCITES, UNSPECIFIED HEPATIC CIRRHOSIS TYPE (H): Primary | ICD-10-CM

## 2017-09-15 LAB
AFP SERPL-MCNC: <1.5 UG/L (ref 0–8)
ALBUMIN SERPL-MCNC: 4 G/DL (ref 3.4–5)
ALP SERPL-CCNC: 60 U/L (ref 40–150)
ALT SERPL W P-5'-P-CCNC: 36 U/L (ref 0–70)
ANION GAP SERPL CALCULATED.3IONS-SCNC: 6 MMOL/L (ref 3–14)
AST SERPL W P-5'-P-CCNC: 16 U/L (ref 0–45)
BILIRUB DIRECT SERPL-MCNC: 0.4 MG/DL (ref 0–0.2)
BILIRUB SERPL-MCNC: 1.7 MG/DL (ref 0.2–1.3)
BUN SERPL-MCNC: 15 MG/DL (ref 7–30)
CALCIUM SERPL-MCNC: 9.1 MG/DL (ref 8.5–10.1)
CHLORIDE SERPL-SCNC: 104 MMOL/L (ref 94–109)
CO2 SERPL-SCNC: 28 MMOL/L (ref 20–32)
CREAT SERPL-MCNC: 0.94 MG/DL (ref 0.66–1.25)
ERYTHROCYTE [DISTWIDTH] IN BLOOD BY AUTOMATED COUNT: 12.6 % (ref 10–15)
GFR SERPL CREATININE-BSD FRML MDRD: 83 ML/MIN/1.7M2
GLUCOSE SERPL-MCNC: 169 MG/DL (ref 70–99)
HCT VFR BLD AUTO: 44 % (ref 40–53)
HGB BLD-MCNC: 15.3 G/DL (ref 13.3–17.7)
INR PPP: 1.08 (ref 0.86–1.14)
MCH RBC QN AUTO: 31.1 PG (ref 26.5–33)
MCHC RBC AUTO-ENTMCNC: 34.8 G/DL (ref 31.5–36.5)
MCV RBC AUTO: 89 FL (ref 78–100)
PLATELET # BLD AUTO: 150 10E9/L (ref 150–450)
POTASSIUM SERPL-SCNC: 4.7 MMOL/L (ref 3.4–5.3)
PROT SERPL-MCNC: 7.5 G/DL (ref 6.8–8.8)
RBC # BLD AUTO: 4.92 10E12/L (ref 4.4–5.9)
SODIUM SERPL-SCNC: 138 MMOL/L (ref 133–144)
WBC # BLD AUTO: 5.9 10E9/L (ref 4–11)

## 2017-09-15 PROCEDURE — 25000128 H RX IP 250 OP 636: Mod: ZF | Performed by: INTERNAL MEDICINE

## 2017-09-15 PROCEDURE — G0009 ADMIN PNEUMOCOCCAL VACCINE: HCPCS | Mod: ZF

## 2017-09-15 PROCEDURE — 85027 COMPLETE CBC AUTOMATED: CPT | Performed by: INTERNAL MEDICINE

## 2017-09-15 PROCEDURE — 80076 HEPATIC FUNCTION PANEL: CPT | Performed by: INTERNAL MEDICINE

## 2017-09-15 PROCEDURE — 36415 COLL VENOUS BLD VENIPUNCTURE: CPT | Performed by: INTERNAL MEDICINE

## 2017-09-15 PROCEDURE — 82105 ALPHA-FETOPROTEIN SERUM: CPT | Performed by: INTERNAL MEDICINE

## 2017-09-15 PROCEDURE — 99212 OFFICE O/P EST SF 10 MIN: CPT | Mod: 25,ZF

## 2017-09-15 PROCEDURE — 80048 BASIC METABOLIC PNL TOTAL CA: CPT | Performed by: INTERNAL MEDICINE

## 2017-09-15 PROCEDURE — 85610 PROTHROMBIN TIME: CPT | Performed by: INTERNAL MEDICINE

## 2017-09-15 PROCEDURE — 90670 PCV13 VACCINE IM: CPT | Mod: ZF | Performed by: INTERNAL MEDICINE

## 2017-09-15 RX ORDER — AMLODIPINE BESYLATE AND ATORVASTATIN CALCIUM 10; 20 MG/1; MG/1
1 TABLET, FILM COATED ORAL DAILY
COMMUNITY
End: 2018-02-07

## 2017-09-15 RX ADMIN — PNEUMOCOCCAL 13-VALENT CONJUGATE VACCINE 0.5 ML: 2.2; 2.2; 2.2; 2.2; 2.2; 4.4; 2.2; 2.2; 2.2; 2.2; 2.2; 2.2; 2.2 INJECTION, SUSPENSION INTRAMUSCULAR at 08:48

## 2017-09-15 ASSESSMENT — PAIN SCALES - GENERAL: PAINLEVEL: NO PAIN (0)
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
## 2017-09-15 NOTE — NURSING NOTE
Chief Complaint   Patient presents with     RECHECK     Post Liver TXP   Pt roomed, vitals, meds, and allergies reviewed with pt. Pt ready for provider.  Lazaro Hernández, CMA

## 2017-11-03 DIAGNOSIS — I10 ESSENTIAL HYPERTENSION: ICD-10-CM

## 2017-11-03 DIAGNOSIS — I25.10 CORONARY ARTERY DISEASE DUE TO LIPID RICH PLAQUE: ICD-10-CM

## 2017-11-03 DIAGNOSIS — I25.83 CORONARY ARTERY DISEASE DUE TO LIPID RICH PLAQUE: ICD-10-CM

## 2017-11-03 RX ORDER — METOPROLOL SUCCINATE 100 MG/1
50 TABLET, EXTENDED RELEASE ORAL 2 TIMES DAILY
Qty: 90 TABLET | Refills: 0 | Status: SHIPPED | OUTPATIENT
Start: 2017-11-03 | End: 2018-02-07

## 2017-11-20 ENCOUNTER — OFFICE VISIT (OUTPATIENT)
Dept: FAMILY MEDICINE | Facility: CLINIC | Age: 57
End: 2017-11-20

## 2017-11-20 VITALS
TEMPERATURE: 97.6 F | WEIGHT: 202 LBS | HEART RATE: 61 BPM | DIASTOLIC BLOOD PRESSURE: 82 MMHG | SYSTOLIC BLOOD PRESSURE: 130 MMHG | BODY MASS INDEX: 27.4 KG/M2 | OXYGEN SATURATION: 98 %

## 2017-11-20 DIAGNOSIS — E11.49 DM TYPE 2 CAUSING NEUROLOGICAL DISEASE (H): Primary | ICD-10-CM

## 2017-11-20 DIAGNOSIS — J06.9 VIRAL UPPER RESPIRATORY TRACT INFECTION: ICD-10-CM

## 2017-11-20 PROCEDURE — 99213 OFFICE O/P EST LOW 20 MIN: CPT | Performed by: FAMILY MEDICINE

## 2017-11-20 PROCEDURE — 36415 COLL VENOUS BLD VENIPUNCTURE: CPT | Performed by: FAMILY MEDICINE

## 2017-11-20 RX ORDER — ATORVASTATIN CALCIUM 20 MG/1
20 TABLET, FILM COATED ORAL DAILY
Refills: 2 | COMMUNITY
Start: 2017-11-03 | End: 2018-04-28

## 2017-11-20 RX ORDER — FLUOROURACIL 50 MG/G
CREAM TOPICAL
Refills: 4 | COMMUNITY
Start: 2017-11-08 | End: 2018-08-21

## 2017-11-20 RX ORDER — CHLORHEXIDINE GLUCONATE ORAL RINSE 1.2 MG/ML
15 SOLUTION DENTAL 2 TIMES DAILY PRN
Refills: 5 | COMMUNITY
Start: 2017-11-02 | End: 2024-05-28

## 2017-11-20 NOTE — PROGRESS NOTES
7 days of runny nose, mostly NP cough and initial sore throat. No fever or chills. No SOB or chest pain.  Nonsmoker.  ROS: no nausea or vomiting  OBJECTIVE: /82  Pulse 61  Temp 97.6  F (36.4  C) (Oral)  Wt 91.6 kg (202 lb)  SpO2 98%  BMI 27.4 kg/m2   NAD except fatigue. TM's OK. Turbinates red and swollen. Pharynx injected. No nodes. Lungs are clear, and cor RRR.   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

## 2017-11-21 LAB — HBA1C MFR BLD: 7.2 % (ref 4.8–5.6)

## 2017-11-21 NOTE — PROGRESS NOTES
Kelton,  The A1c is orbiting 7, so good. Let me know if you have questions, otherwise see you early next year.  Dr. Mayo

## 2017-12-05 ENCOUNTER — TELEPHONE (OUTPATIENT)
Dept: FAMILY MEDICINE | Facility: CLINIC | Age: 57
End: 2017-12-05

## 2017-12-11 DIAGNOSIS — I25.10 CORONARY ARTERY DISEASE DUE TO LIPID RICH PLAQUE: ICD-10-CM

## 2017-12-11 DIAGNOSIS — I25.83 CORONARY ARTERY DISEASE DUE TO LIPID RICH PLAQUE: ICD-10-CM

## 2017-12-11 DIAGNOSIS — I10 ESSENTIAL HYPERTENSION: ICD-10-CM

## 2017-12-11 RX ORDER — LISINOPRIL 10 MG/1
10 TABLET ORAL DAILY
Qty: 30 TABLET | Refills: 0 | Status: SHIPPED | OUTPATIENT
Start: 2017-12-11 | End: 2018-01-08

## 2017-12-18 ENCOUNTER — TELEPHONE (OUTPATIENT)
Dept: FAMILY MEDICINE | Facility: CLINIC | Age: 57
End: 2017-12-18

## 2017-12-18 DIAGNOSIS — R05.9 COUGH: Primary | ICD-10-CM

## 2017-12-18 RX ORDER — CEFPROZIL 500 MG/1
500 TABLET, FILM COATED ORAL 2 TIMES DAILY
Qty: 20 TABLET | Refills: 0 | Status: SHIPPED | OUTPATIENT
Start: 2017-12-18 | End: 2018-02-07

## 2017-12-18 NOTE — TELEPHONE ENCOUNTER
Call from patient that he saw the Dr in Nov and has not really gotten better.  Cough is now worse.  Wants an antibiotic.  Is in [place] and will be gone for the rest of Dec.  Per Dr Alcantara-on call- patient can have rx for Cefzil 500 mg TID for 10 days.   Called and let patient know. Sent rx to [facility] pharmacy.

## 2018-01-08 DIAGNOSIS — I25.10 CORONARY ARTERY DISEASE DUE TO LIPID RICH PLAQUE: ICD-10-CM

## 2018-01-08 DIAGNOSIS — I25.83 CORONARY ARTERY DISEASE DUE TO LIPID RICH PLAQUE: ICD-10-CM

## 2018-01-08 DIAGNOSIS — I10 ESSENTIAL HYPERTENSION: ICD-10-CM

## 2018-01-08 RX ORDER — LISINOPRIL 10 MG/1
10 TABLET ORAL DAILY
Qty: 30 TABLET | Refills: 0 | Status: SHIPPED | OUTPATIENT
Start: 2018-01-08 | End: 2018-02-05

## 2018-01-12 ENCOUNTER — PRE VISIT (OUTPATIENT)
Dept: CARDIOLOGY | Facility: CLINIC | Age: 58
End: 2018-01-12

## 2018-02-05 DIAGNOSIS — I10 ESSENTIAL HYPERTENSION: ICD-10-CM

## 2018-02-05 DIAGNOSIS — I25.10 CORONARY ARTERY DISEASE DUE TO LIPID RICH PLAQUE: ICD-10-CM

## 2018-02-05 DIAGNOSIS — I25.83 CORONARY ARTERY DISEASE DUE TO LIPID RICH PLAQUE: ICD-10-CM

## 2018-02-05 RX ORDER — LISINOPRIL 10 MG/1
10 TABLET ORAL DAILY
Qty: 30 TABLET | Refills: 0 | Status: SHIPPED | OUTPATIENT
Start: 2018-02-05 | End: 2018-03-05

## 2018-02-06 DIAGNOSIS — I25.10 CORONARY ARTERY DISEASE INVOLVING NATIVE CORONARY ARTERY OF NATIVE HEART WITHOUT ANGINA PECTORIS: Chronic | ICD-10-CM

## 2018-02-06 LAB
ANION GAP SERPL CALCULATED.3IONS-SCNC: 15.7 MMOL/L (ref 6–17)
BUN SERPL-MCNC: 12 MG/DL (ref 7–30)
CALCIUM SERPL-MCNC: 9.7 MG/DL (ref 8.5–10.5)
CHLORIDE SERPL-SCNC: 101 MMOL/L (ref 98–107)
CHOLEST SERPL-MCNC: 105 MG/DL
CO2 SERPL-SCNC: 26 MMOL/L (ref 23–29)
CREAT SERPL-MCNC: 0.95 MG/DL (ref 0.7–1.3)
GFR SERPL CREATININE-BSD FRML MDRD: 82 ML/MIN/1.7M2
GLUCOSE SERPL-MCNC: 192 MG/DL (ref 70–105)
HDLC SERPL-MCNC: 36 MG/DL
LDLC SERPL CALC-MCNC: 44 MG/DL
NONHDLC SERPL-MCNC: 69 MG/DL
POTASSIUM SERPL-SCNC: 4.7 MMOL/L (ref 3.5–5.1)
SODIUM SERPL-SCNC: 138 MMOL/L (ref 136–145)
TRIGL SERPL-MCNC: 127 MG/DL

## 2018-02-06 PROCEDURE — 80061 LIPID PANEL: CPT | Performed by: INTERNAL MEDICINE

## 2018-02-06 PROCEDURE — 80048 BASIC METABOLIC PNL TOTAL CA: CPT | Performed by: INTERNAL MEDICINE

## 2018-02-06 PROCEDURE — 36415 COLL VENOUS BLD VENIPUNCTURE: CPT | Performed by: INTERNAL MEDICINE

## 2018-02-07 ENCOUNTER — OFFICE VISIT (OUTPATIENT)
Dept: CARDIOLOGY | Facility: CLINIC | Age: 58
End: 2018-02-07
Attending: INTERNAL MEDICINE
Payer: COMMERCIAL

## 2018-02-07 VITALS
HEIGHT: 72 IN | SYSTOLIC BLOOD PRESSURE: 128 MMHG | BODY MASS INDEX: 26.95 KG/M2 | HEART RATE: 64 BPM | WEIGHT: 199 LBS | DIASTOLIC BLOOD PRESSURE: 76 MMHG

## 2018-02-07 DIAGNOSIS — I25.10 CORONARY ARTERY DISEASE INVOLVING NATIVE CORONARY ARTERY OF NATIVE HEART WITHOUT ANGINA PECTORIS: Chronic | ICD-10-CM

## 2018-02-07 DIAGNOSIS — I25.10 CORONARY ARTERY DISEASE DUE TO LIPID RICH PLAQUE: ICD-10-CM

## 2018-02-07 DIAGNOSIS — R07.89 ATYPICAL CHEST PAIN: Primary | Chronic | ICD-10-CM

## 2018-02-07 DIAGNOSIS — I25.83 CORONARY ARTERY DISEASE DUE TO LIPID RICH PLAQUE: ICD-10-CM

## 2018-02-07 DIAGNOSIS — E78.5 HYPERLIPIDEMIA WITH TARGET LDL LESS THAN 70: ICD-10-CM

## 2018-02-07 DIAGNOSIS — R06.09 DOE (DYSPNEA ON EXERTION): ICD-10-CM

## 2018-02-07 DIAGNOSIS — I10 BENIGN ESSENTIAL HYPERTENSION: ICD-10-CM

## 2018-02-07 DIAGNOSIS — I10 ESSENTIAL HYPERTENSION: ICD-10-CM

## 2018-02-07 PROCEDURE — 99214 OFFICE O/P EST MOD 30 MIN: CPT | Performed by: INTERNAL MEDICINE

## 2018-02-07 RX ORDER — METOPROLOL SUCCINATE 100 MG/1
50 TABLET, EXTENDED RELEASE ORAL 2 TIMES DAILY
Qty: 90 TABLET | Refills: 3 | Status: SHIPPED | OUTPATIENT
Start: 2018-02-07 | End: 2018-03-14

## 2018-02-07 NOTE — LETTER
2/7/2018      Jace Mayo MD  [address]  [facility] [address]      RE: Vikash Reevesmackenzie       Dear Colleague,    I had the pleasure of seeing Vikash Burgos in the [facility].    Service Date: 02/07/2018      HISTORY OF PRESENT ILLNESS:  Kelton is a very nice 58-year-old gentleman with past medical history significant for coronary artery bypass grafting x4 in 2010 by Dr. Marshal Pruett.  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  He does not like having a T-shirt on and does not like having a seatbelt across his chest.  It does wax and wane from time to time and has no relationship to physical activity.        His angiogram demonstrates severe diffuse diabetic native coronary artery disease.  He followed at the [place] for a while and due to persistent chest pain and underwent angiography in 2014 demonstrating all grafts to be widely patent and again demonstrating his diffuse underlying native coronary artery disease.  Stress nuclear scan at that time was also negative for ischemia.        After frustration, he came to meet me 2 years ago at which time I unexplained how the chest pain is a neuropathic pain.  He also has neuropathic pain in his leg related to the harvest of his saphenous vein graft and ultimately he gets this, although he comes in again today and goes through the same complaint and dissertation, but clearly it is unchanged as it has been over the years.      He continues to ride his bike 9 miles 3 times a day when in [place].  He is spending quite a bit of time up in [place] as his son has moved to [place] as a .  Up there, he will do 45 minutes on his recumbent bike 3 times a day.  He states on the flat, he never has any symptoms, but if he goes for ride here in [place] and is up and down hills, he notes he was more short of  breath when he does the hills.  He does not have a gear bike intentionally because he wants to work harder, but states his symptoms previously were also shortness of breath with running.  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  These were his only symptoms; he never had chest pain syndrome.      He is frustrated because he has not lost any weight.  He notes no side effects or problems from a regular medical regimen.      ASSESSMENT AND PLAN:  Kelton has no clear anginal symptoms, although his dyspnea on exertion going up hills may be an anginal equivalent, or could also be chronotropic incompetence, given the fact that he is on Toprol 50 mg twice a day with a resting heart rate of 64.  My first step is to have him go through his day with a Holter monitor on to see what his heart rate does.  I have him to do his treadmill, add a slope to it and see if we can determine whether he has chronotropic incompetence.  Once we sort that out, I will adjust his beta blockers accordingly, we will see how his symptoms do and eventually I will do a stress test with a stress echo or stress nuclear scan again to evaluate for underlying ischemia.      Blood pressure is very well controlled at 128/76 with a pulse of 61.      Weight is 199 pounds, which is actually down 3 pounds from November, but up 7 pounds from last fall.      Despite this, I have pointed out to him that his cholesterol profile continues to get better every year.  Total cholesterol is 105, HDL up to 36, which is highest he has ever had.  LDL is 44, which is the lowest he has ever had and triglycerides are 127.      We reviewed his medications and will continue them as is.      I will follow up on the studies.  If they are okay, we will plan on seeing him back in 1 year.  If his stress test is significantly  abnormal, we may have to do a repeat angiogram.         BRITT DUNLAP MD, [facility]             D: 2018   T: 2018   MT: LATOSHA      Name:     IHSAN SANCHEZ   MRN:      [id number]        Account:      [id number]   :      1960           Service Date: 2018      Document: [id number]         Outpatient Encounter Prescriptions as of 2018   Medication Sig Dispense Refill     lisinopril (PRINIVIL/ZESTRIL) 10 MG tablet Take 1 tablet (10 mg) by mouth daily 30 tablet 0     zolpidem (AMBIEN) 10 MG tablet TAKE 1 TABLET BY MOUTH NIGHTLY AT BEDTIME AS NEEDED 30 tablet 3     atorvastatin (LIPITOR) 20 MG tablet Take 20 mg by mouth daily  2     fluorouracil (EFUDEX) 5 % cream APPLY TO THE AFFECTED AREAS OF THE FACE TWICE A DAY FOR 2 WEEKS.  4     chlorhexidine (PERIDEX) 0.12 % solution SWISH 1/2 OZ FOR 30 SECONDS THEN SPIT TWICE A DAY  5     [DISCONTINUED] metoprolol (TOPROL-XL) 100 MG 24 hr tablet Take 0.5 tablets (50 mg) by mouth 2 times daily 90 tablet 0     B-D U/F 31G X 8 MM insulin pen needle TEST TWICE DAILY AS DIRECTED 100 each 6     cyanocobalamin (VITAMIN B12) 1000 MCG/ML injection Inject 1 mL into the muscle once One time injection per Dr. PENN       insulin detemir (LEVEMIR FLEXTOUCH) 100 UNIT/ML soln INJECT 41 UNITS SUBCUTANEOUSLY ONCE DAILY. 15 mL 3     insulin pen needle (BD HEIKE U/F) 32G X 4 MM Use 1 daily or as directed. 100 each prn     VITAMIN D, CHOLECALCIFEROL, PO Take 1,000 Units by mouth daily       aspirin 81 MG tablet Take 1 tablet by mouth daily.       [DISCONTINUED] cefPROZIL (CEFZIL) 500 MG tablet Take 1 tablet (500 mg) by mouth 2 times daily 20 tablet 0     [DISCONTINUED] amLODIPine-atorvastatin (CADUET) 10-20 MG per tablet Take 1 tablet by mouth daily       [DISCONTINUED] LEVEMIR FLEXTOUCH 100 UNIT/ML injection INJECT 41 UNITS SUBCUTANEOUSLY ONCE A DAY 45 mL 1     [DISCONTINUED] DULoxetine (CYMBALTA) 60 MG EC capsule Take 1 capsule (60 mg) by mouth daily 30 capsule 1      No facility-administered encounter medications on file as of 2/7/2018.        Again, thank you for allowing me to participate in the care of your patient.      Sincerely,    Zeb Roberts MD     [facility]

## 2018-02-07 NOTE — MR AVS SNAPSHOT
After Visit Summary   2/7/2018    Vikash Burgos    MRN: [id number]           Patient Information     Date Of Birth          1960        Visit Information        Provider Department      2/7/2018 10:15 AM Zeb Roberts MD [facility]   [place]        Today's Diagnoses     Atypical chest pain    -  1    Coronary artery disease involving native coronary artery of native heart without angina pectoris        Benign essential hypertension        Hyperlipidemia with target LDL less than 70        ALEXANDER (dyspnea on exertion)           Follow-ups after your visit        Additional Services     Follow-Up with Cardiologist                 Your next 10 appointments already scheduled     Feb 19, 2018  1:15 PM CST   [facility]   [facility]    [address] [place] [address] [phone]            Feb 20, 2018  9:00 AM CST   Holter Monitor with KP   [facility] Radiology - [facility] Heart Imaging ([facility])    [address]  [place] [address]   [phone]            Mar 08, 2018   Procedure with Angel Luis Ndiaye MD   [facility] [place] Endoscopy ([facility])    [address]  [place] [address] [phone]           The [facility] is located on the corner of [facility] and [facility] on the [facility]. It is easily accessible from virtually any point in the [facility] area, via [address] and [address].            Mar 09, 2018  6:45 AM CST   Lab with  LAB    Health Lab ([facility])    [address] [place] [id number] [phone]            Mar 09, 2018  7:00 AM CST   US ABDOMEN COMPLETE with [address] [place] [address] Imaging Center US ([facility])    [address] [place] [address]  [id number] [phone]           Please bring a list of your medicines (including vitamins, minerals and over-the-counter drugs). Also, tell your doctor about any allergies you may have. Wear comfortable clothes and leave your valuables at home.  Adults: No eating or drinking for 8 hours before the exam. You may take medicine with a small sip of water.  Children: - Children 6+ years: No food or drink for 6 hours before exam. - Children 1-5 years: No food or drink for 4 hours before exam. - Infants, breast-fed: may have breast milk up to 2 hours before exam. - Infants, formula: may have bottle until 4 hours before exam.  Please call the Imaging Department at your exam site with any questions.            Mar 09, 2018  8:15 AM CST   (Arrive by 8:00 AM)   Return Liver Transplant with Kevin Bedolla MD   [facility] Hepatology ([facility])    [address] [place] [address]  [address] [place] [id number] [phone]              Future tests that were ordered for you today     Open Future Orders        Priority Expected Expires Ordered    Basic metabolic panel Routine 2/7/2019 2/8/2019 2/7/2018    Lipid Profile Routine 2/7/2019 2/8/2019 2/7/2018    Follow-Up with Cardiologist Routine 2/7/2019 2/8/2019 2/7/2018    Holter Monitor 24 hour - Adult Routine 2/7/2018 2/7/2019 2/7/2018            Who to contact     If you have questions or need follow up information about today's clinic visit or your schedule please contact [facility] HEART [facility] directly at [phone].  Normal or non-critical lab and imaging results will be communicated to you by MyChart, letter or phone within 4 business days after the clinic has received the results. If you do not hear from us within 7 days, please contact the clinic through MyChart or phone. If you have a critical or abnormal lab result, we will notify you by phone as soon as possible.  Submit refill requests through [employer] or call your  "pharmacy and they will forward the refill request to us. Please allow 3 business days for your refill to be completed.          Additional Information About Your Visit        MyChart Information     [employer] gives you secure access to your electronic health record. If you see a primary care provider, you can also send messages to your care team and make appointments. If you have questions, please call your primary care clinic.  If you do not have a primary care provider, please call [phone] and they will assist you.        Care EveryWhere ID     This is your Care EveryWhere ID. This could be used by other organizations to access your [facility] medical records  [id number]        Your Vitals Were     Pulse Height BMI (Body Mass Index)             64 1.829 m (6' 0.01\") 26.98 kg/m2          Blood Pressure from Last 3 Encounters:   02/07/18 128/76   11/20/17 130/82   09/15/17 138/77    Weight from Last 3 Encounters:   02/07/18 90.3 kg (199 lb)   11/20/17 91.6 kg (202 lb)   09/15/17 87.1 kg (192 lb)              We Performed the Following     Follow-Up with Cardiologist        Primary Care Provider Office Phone # Fax #    Jace Mayo [phone] [address] [facility] [address]        Equal Access to Services     [facility]: Hadii aad ku hadasho Soomaali, waaxda luqadaha, qaybta kaalmada adeegyada, waxay idiin hayaan melissa kharaemily la'shira . So [facility] [phone].    ATENCIÓN: Si habla español, tiene a stubbs disposición servicios gratuitos de asistencia lingüística. Llame al [phone].    We comply with applicable federal civil rights laws and [place] laws. We do not discriminate on the basis of race, color, national origin, age, disability, sex, sexual orientation, or gender identity.            Thank you!     Thank you for choosing [facility]  for your care. Our goal is always to provide you with excellent care. Hearing back from our " patients is one way we can continue to improve our services. Please take a few minutes to complete the written survey that you may receive in the mail after your visit with us. Thank you!             Your Updated Medication List - Protect others around you: Learn how to safely use, store and throw away your medicines at www.disposemymeds.org.          This list is accurate as of 2/7/18 11:00 AM.  Always use your most recent med list.                   Brand Name Dispense Instructions for use Diagnosis    aspirin 81 MG tablet      Take 1 tablet by mouth daily.        atorvastatin 20 MG tablet    LIPITOR     Take 20 mg by mouth daily        chlorhexidine 0.12 % solution    PERIDEX     SWISH 1/2 OZ FOR 30 SECONDS THEN SPIT TWICE A DAY        cyanocobalamin 1000 MCG/ML injection    VITAMIN B12     Inject 1 mL into the muscle once One time injection per Dr. PENN        fluorouracil 5 % cream    EFUDEX     APPLY TO THE AFFECTED AREAS OF THE FACE TWICE A DAY FOR 2 WEEKS.        insulin detemir 100 UNIT/ML injection    LEVEMIR FLEXTOUCH    15 mL    INJECT 41 UNITS SUBCUTANEOUSLY ONCE DAILY.    Encounter for medication refill       * insulin pen needle 32G X 4 MM    BD HEIKE U/F    100 each    Use 1 daily or as directed.    Diabetes mellitus (H)       * B-D U/F 31G X 8 MM   Generic drug:  insulin pen needle     100 each    TEST TWICE DAILY AS DIRECTED    Encounter for medication refill       lisinopril 10 MG tablet    PRINIVIL/ZESTRIL    30 tablet    Take 1 tablet (10 mg) by mouth daily    Essential hypertension, Coronary artery disease due to lipid rich plaque       metoprolol succinate 100 MG 24 hr tablet    TOPROL-XL    90 tablet    Take 0.5 tablets (50 mg) by mouth 2 times daily    Coronary artery disease due to lipid rich plaque, Essential hypertension       VITAMIN D (CHOLECALCIFEROL) PO      Take 1,000 Units by mouth daily        zolpidem 10 MG tablet    AMBIEN    30 tablet    TAKE 1 TABLET BY MOUTH NIGHTLY AT BEDTIME AS  NEEDED    Encounter for medication refill       * Notice:  This list has 2 medication(s) that are the same as other medications prescribed for you. Read the directions carefully, and ask your doctor or other care provider to review them with you.

## 2018-02-07 NOTE — LETTER
2/7/2018    Jace Mayo MD  [address]  [facility] [address]    RE: Vikash Burgos       Dear Colleague,    I had the pleasure of seeing Vikash Burgos in the [facility].    HPI and Plan:   See dictation    Orders Placed This Encounter   Procedures     Basic metabolic panel     Lipid Profile     Follow-Up with Cardiologist     Holter Monitor 24 hour - Adult       No orders of the defined types were placed in this encounter.      Medications Discontinued During This Encounter   Medication Reason     amLODIPine-atorvastatin (CADUET) 10-20 MG per tablet Discontinued by another Health Care Provider     cefPROZIL (CEFZIL) 500 MG tablet Discontinued by another Health Care Provider     DULoxetine (CYMBALTA) 60 MG EC capsule Discontinued by another Health Care Provider     LEVEMIR FLEXTOUCH 100 UNIT/ML injection Erroneous Entry         Encounter Diagnoses   Name Primary?     Coronary artery disease involving native coronary artery of native heart without angina pectoris      Atypical chest pain Yes     Benign essential hypertension      Hyperlipidemia with target LDL less than 70      ALEXANDER (dyspnea on exertion)        CURRENT MEDICATIONS:  Current Outpatient Prescriptions   Medication Sig Dispense Refill     lisinopril (PRINIVIL/ZESTRIL) 10 MG tablet Take 1 tablet (10 mg) by mouth daily 30 tablet 0     zolpidem (AMBIEN) 10 MG tablet TAKE 1 TABLET BY MOUTH NIGHTLY AT BEDTIME AS NEEDED 30 tablet 3     atorvastatin (LIPITOR) 20 MG tablet Take 20 mg by mouth daily  2     fluorouracil (EFUDEX) 5 % cream APPLY TO THE AFFECTED AREAS OF THE FACE TWICE A DAY FOR 2 WEEKS.  4     chlorhexidine (PERIDEX) 0.12 % solution SWISH 1/2 OZ FOR 30 SECONDS THEN SPIT TWICE A DAY  5     metoprolol (TOPROL-XL) 100 MG 24 hr tablet Take 0.5 tablets (50 mg) by mouth 2 times daily 90 tablet 0     B-D U/F 31G X 8 MM insulin pen needle TEST TWICE DAILY AS DIRECTED 100 each 6     cyanocobalamin (VITAMIN  B12) 1000 MCG/ML injection Inject 1 mL into the muscle once One time injection per Dr. PENN       insulin detemir (LEVEMIR FLEXTOUCH) 100 UNIT/ML soln INJECT 41 UNITS SUBCUTANEOUSLY ONCE DAILY. 15 mL 3     insulin pen needle (BD HEIKE U/F) 32G X 4 MM Use 1 daily or as directed. 100 each prn     VITAMIN D, CHOLECALCIFEROL, PO Take 1,000 Units by mouth daily       aspirin 81 MG tablet Take 1 tablet by mouth daily.       [DISCONTINUED] LEVEMIR FLEXTOUCH 100 UNIT/ML injection INJECT 41 UNITS SUBCUTANEOUSLY ONCE A DAY 45 mL 1       ALLERGIES     Allergies   Allergen Reactions     Atorvastatin Calcium Cough     cough       PAST MEDICAL HISTORY:  Past Medical History:   Diagnosis Date     Basal cell carcinoma      Carotid stenosis, left      Coronary artery disease     4 vessel bypass November 2010; LIMA ->LAD, SVG-> OM3, SVG -> D2, SVG -> PDA     Diabetes mellitus (H) 2005    neuropathy     Generalized anxiety disorder 5/2/2014     Hepatitis C, chronic (H) 2005     Hyperlipidemia LDL goal < 70      Hypertension      Liver diseases     9/15 Liver is 10 cm in span without left lobe enlargement     Persistent microalbuminuria associated with type 2 diabetes mellitus (H) 5/6/2015       PAST SURGICAL HISTORY:  Past Surgical History:   Procedure Laterality Date     ARTHROSCOPY KNEE RT/LT      left     COLONOSCOPY       CORONARY ARTERY BYPASS  11/18/201    Coronary artery bypass grafting x 4 with placement of the left internal mammary artery to the distal midportion of the left anterior descending artery, saphenous vein graft from aorta to the third obtuse marginal branch of circumflex coronary artery, saphenous vein graft from aorta to the second diagonal branch, saphenous vein graft from aorta to the posterior descending artery.     ESOPHAGOSCOPY, GASTROSCOPY, DUODENOSCOPY (EGD), COMBINED  10/31/2011    Procedure:COMBINED ESOPHAGOSCOPY, GASTROSCOPY, DUODENOSCOPY (EGD); Surgeon:ALONSO ALDANA; Location: GI     HEART CATH  "CORONARY ANGIOGRAM W/LV GRAM  9-11-10    CV Surgery recommended     HEART CATH CORONARY ANGIOGRAM W/LV GRAM  2-28-14    Medical management     HERNIA REPAIR, INGUINAL RT/LT      left       FAMILY HISTORY:  Family History   Problem Relation Age of Onset     C.A.D. Father      CANCER Father      bladder     Heart Surgery Father      bypass surgery     HEART DISEASE Mother        SOCIAL HISTORY:  Social History     Social History     Marital status:      Spouse name: N/A     Number of children: N/A     Years of education: N/A     Social History Main Topics     Smoking status: Former Smoker     Packs/day: 0.50     Years: 12.00     Quit date: 1/26/2005     Smokeless tobacco: Never Used     Alcohol use No     Drug use: No     Sexual activity: Not Asked     Other Topics Concern     Caffeine Concern Yes     2 cups coffee per day     Special Diet Yes     low carb diet, low sugar     Exercise Yes     treadmill 40 minutes, walk, daily, bike 30 minutes every other day     Social History Narrative       Review of Systems:  Skin:  Positive for   baso cell being treated by derm    Eyes:  Positive for glasses right eye retnal neuropathy beginning stages   ENT:  Negative      Respiratory:  Positive for dyspnea on exertion sob upon walking on higher elevation    Cardiovascular:    lightheadedness;Positive for;chest pain;fatigue shooting pain in the abiola   Gastroenterology: Negative      Genitourinary:  Negative      Musculoskeletal:  Positive for arthritis    Neurologic:  Positive for numbness or tingling of feet;numbness or tingling of hands neuropathy in feet and left hand   Psychiatric:  Negative      Heme/Lymph/Imm:  Negative      Endocrine:  Positive for diabetes      Physical Exam:  Vitals: /76  Pulse 64  Ht 1.829 m (6' 0.01\")  Wt 90.3 kg (199 lb)  BMI 26.98 kg/m2    Constitutional:  cooperative, alert and oriented, well developed, well nourished, in no acute distress        Skin:  warm and dry to the touch, no " apparent skin lesions or masses noted          Head:  normocephalic, no masses or lesions        Eyes:  pupils equal and round;conjunctivae and lids unremarkable;sclera white;no xanthalasma;no nystagmus        Lymph:      ENT:  no pallor or cyanosis, dentition good        Neck:  carotid pulses are full and equal bilaterally;no carotid bruit        Respiratory:  normal breath sounds, clear to auscultation, normal A-P diameter, normal symmetry, normal respiratory excursion, no use of accessory muscles         Cardiac: regular rhythm;normal S1 and S2;no S3 or S4;no murmurs, gallops or rubs detected                pulses full and equal                                        GI:           Extremities and Muscular Skeletal:  no edema;no spinal abnormalities noted;normal muscle strength and tone              Neurological:  no gross motor deficits        Psych:  affect appropriate, oriented to time, person and place        CC  Zeb Roberts MD  [address] [place] [phone]                Thank you for allowing me to participate in the care of your patient.      Sincerely,     Zeb Roberts MD     [facility]    cc:   Zeb Roberts MD  [address] [address]

## 2018-02-07 NOTE — PROGRESS NOTES
HPI and Plan:   See dictation    Orders Placed This Encounter   Procedures     Basic metabolic panel     Lipid Profile     Follow-Up with Cardiologist     Holter Monitor 24 hour - Adult       No orders of the defined types were placed in this encounter.      Medications Discontinued During This Encounter   Medication Reason     amLODIPine-atorvastatin (CADUET) 10-20 MG per tablet Discontinued by another Health Care Provider     cefPROZIL (CEFZIL) 500 MG tablet Discontinued by another Health Care Provider     DULoxetine (CYMBALTA) 60 MG EC capsule Discontinued by another Health Care Provider     LEVEMIR FLEXTOUCH 100 UNIT/ML injection Erroneous Entry         Encounter Diagnoses   Name Primary?     Coronary artery disease involving native coronary artery of native heart without angina pectoris      Atypical chest pain Yes     Benign essential hypertension      Hyperlipidemia with target LDL less than 70      ALEXANDER (dyspnea on exertion)        CURRENT MEDICATIONS:  Current Outpatient Prescriptions   Medication Sig Dispense Refill     lisinopril (PRINIVIL/ZESTRIL) 10 MG tablet Take 1 tablet (10 mg) by mouth daily 30 tablet 0     zolpidem (AMBIEN) 10 MG tablet TAKE 1 TABLET BY MOUTH NIGHTLY AT BEDTIME AS NEEDED 30 tablet 3     atorvastatin (LIPITOR) 20 MG tablet Take 20 mg by mouth daily  2     fluorouracil (EFUDEX) 5 % cream APPLY TO THE AFFECTED AREAS OF THE FACE TWICE A DAY FOR 2 WEEKS.  4     chlorhexidine (PERIDEX) 0.12 % solution SWISH 1/2 OZ FOR 30 SECONDS THEN SPIT TWICE A DAY  5     metoprolol (TOPROL-XL) 100 MG 24 hr tablet Take 0.5 tablets (50 mg) by mouth 2 times daily 90 tablet 0     B-D U/F 31G X 8 MM insulin pen needle TEST TWICE DAILY AS DIRECTED 100 each 6     cyanocobalamin (VITAMIN B12) 1000 MCG/ML injection Inject 1 mL into the muscle once One time injection per Dr. PENN       insulin detemir (LEVEMIR FLEXTOUCH) 100 UNIT/ML soln INJECT 41 UNITS SUBCUTANEOUSLY ONCE DAILY. 15 mL 3     insulin pen needle (BD  HEIKE U/F) 32G X 4 MM Use 1 daily or as directed. 100 each prn     VITAMIN D, CHOLECALCIFEROL, PO Take 1,000 Units by mouth daily       aspirin 81 MG tablet Take 1 tablet by mouth daily.       [DISCONTINUED] LEVEMIR FLEXTOUCH 100 UNIT/ML injection INJECT 41 UNITS SUBCUTANEOUSLY ONCE A DAY 45 mL 1       ALLERGIES     Allergies   Allergen Reactions     Atorvastatin Calcium Cough     cough       PAST MEDICAL HISTORY:  Past Medical History:   Diagnosis Date     Basal cell carcinoma      Carotid stenosis, left      Coronary artery disease     4 vessel bypass November 2010; LIMA ->LAD, SVG-> OM3, SVG -> D2, SVG -> PDA     Diabetes mellitus (H) 2005    neuropathy     Generalized anxiety disorder 5/2/2014     Hepatitis C, chronic (H) 2005     Hyperlipidemia LDL goal < 70      Hypertension      Liver diseases     9/15 Liver is 10 cm in span without left lobe enlargement     Persistent microalbuminuria associated with type 2 diabetes mellitus (H) 5/6/2015       PAST SURGICAL HISTORY:  Past Surgical History:   Procedure Laterality Date     ARTHROSCOPY KNEE RT/LT      left     COLONOSCOPY       CORONARY ARTERY BYPASS  11/18/201    Coronary artery bypass grafting x 4 with placement of the left internal mammary artery to the distal midportion of the left anterior descending artery, saphenous vein graft from aorta to the third obtuse marginal branch of circumflex coronary artery, saphenous vein graft from aorta to the second diagonal branch, saphenous vein graft from aorta to the posterior descending artery.     ESOPHAGOSCOPY, GASTROSCOPY, DUODENOSCOPY (EGD), COMBINED  10/31/2011    Procedure:COMBINED ESOPHAGOSCOPY, GASTROSCOPY, DUODENOSCOPY (EGD); Surgeon:ALONSO ALDANA; Location: GI     HEART CATH CORONARY ANGIOGRAM W/LV GRAM  9-11-10    CV Surgery recommended     HEART CATH CORONARY ANGIOGRAM W/LV GRAM  2-28-14    Medical management     HERNIA REPAIR, INGUINAL RT/LT      left       FAMILY HISTORY:  Family History   Problem  "Relation Age of Onset     CHARLES Father      CANCER Father      bladder     Heart Surgery Father      bypass surgery     HEART DISEASE Mother        SOCIAL HISTORY:  Social History     Social History     Marital status:      Spouse name: N/A     Number of children: N/A     Years of education: N/A     Social History Main Topics     Smoking status: Former Smoker     Packs/day: 0.50     Years: 12.00     Quit date: 1/26/2005     Smokeless tobacco: Never Used     Alcohol use No     Drug use: No     Sexual activity: Not Asked     Other Topics Concern     Caffeine Concern Yes     2 cups coffee per day     Special Diet Yes     low carb diet, low sugar     Exercise Yes     treadmill 40 minutes, walk, daily, bike 30 minutes every other day     Social History Narrative       Review of Systems:  Skin:  Positive for   baso cell being treated by derm    Eyes:  Positive for glasses right eye retnal neuropathy beginning stages   ENT:  Negative      Respiratory:  Positive for dyspnea on exertion sob upon walking on higher elevation    Cardiovascular:    lightheadedness;Positive for;chest pain;fatigue shooting pain in the abiola   Gastroenterology: Negative      Genitourinary:  Negative      Musculoskeletal:  Positive for arthritis    Neurologic:  Positive for numbness or tingling of feet;numbness or tingling of hands neuropathy in feet and left hand   Psychiatric:  Negative      Heme/Lymph/Imm:  Negative      Endocrine:  Positive for diabetes      Physical Exam:  Vitals: /76  Pulse 64  Ht 1.829 m (6' 0.01\")  Wt 90.3 kg (199 lb)  BMI 26.98 kg/m2    Constitutional:  cooperative, alert and oriented, well developed, well nourished, in no acute distress        Skin:  warm and dry to the touch, no apparent skin lesions or masses noted          Head:  normocephalic, no masses or lesions        Eyes:  pupils equal and round;conjunctivae and lids unremarkable;sclera white;no xanthalasma;no nystagmus        Lymph:      ENT:  no " pallor or cyanosis, dentition good        Neck:  carotid pulses are full and equal bilaterally;no carotid bruit        Respiratory:  normal breath sounds, clear to auscultation, normal A-P diameter, normal symmetry, normal respiratory excursion, no use of accessory muscles         Cardiac: regular rhythm;normal S1 and S2;no S3 or S4;no murmurs, gallops or rubs detected                pulses full and equal                                        GI:           Extremities and Muscular Skeletal:  no edema;no spinal abnormalities noted;normal muscle strength and tone              Neurological:  no gross motor deficits        Psych:  affect appropriate, oriented to time, person and place        CC  Zeb Roberts MD  [address]  [place] [id number]

## 2018-02-08 ENCOUNTER — TRANSFERRED RECORDS (OUTPATIENT)
Dept: FAMILY MEDICINE | Facility: CLINIC | Age: 58
End: 2018-02-08

## 2018-02-09 NOTE — PROGRESS NOTES
Service Date: 02/07/2018      HISTORY OF PRESENT ILLNESS:  Kelton is a very nice 58-year-old gentleman with past medical history significant for coronary artery bypass grafting x4 in 2010 by Dr. Marshal Pruett.  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  He does not like having a T-shirt on and does not like having a seatbelt across his chest.  It does wax and wane from time to time and has no relationship to physical activity.        His angiogram demonstrates severe diffuse diabetic native coronary artery disease.  He followed at the [facility] for a while and due to persistent chest pain and underwent angiography in 2014 demonstrating all grafts to be widely patent and again demonstrating his diffuse underlying native coronary artery disease.  Stress nuclear scan at that time was also negative for ischemia.        After frustration, he came to meet me 2 years ago at which time I unexplained how the chest pain is a neuropathic pain.  He also has neuropathic pain in his leg related to the harvest of his saphenous vein graft and ultimately he gets this, although he comes in again today and goes through the same complaint and dissertation, but clearly it is unchanged as it has been over the years.      He continues to ride his bike 9 miles 3 times a day when in [place].  He is spending quite a bit of time up in [place] as his son has moved to [place] as a .  Up there, he will do 45 minutes on his recumbent bike 3 times a day.  He states on the flat, he never has any symptoms, but if he goes for ride here in [place] and is up and down [place], he notes he was more short of breath when he does the hills.  He does not have a gear bike intentionally because he wants to work harder, but states his symptoms previously were also shortness of breath with running.  The example he gave is that [place]  [place] and his boss was 13 floors up, he would run up to 13 floors in less than a minute and then he started realizing he could only get to the 11th floor.  These were his only symptoms; he never had chest pain syndrome.      He is frustrated because he has not lost any weight.  He notes no side effects or problems from a regular medical regimen.      ASSESSMENT AND PLAN:  Kelton has no clear anginal symptoms, although his dyspnea on exertion going up hills may be an anginal equivalent, or could also be chronotropic incompetence, given the fact that he is on Toprol 50 mg twice a day with a resting heart rate of 64.  My first step is to have him go through his day with a Holter monitor on to see what his heart rate does.  I have him to do his treadmill, add a slope to it and see if we can determine whether he has chronotropic incompetence.  Once we sort that out, I will adjust his beta blockers accordingly, we will see how his symptoms do and eventually I will do a stress test with a stress echo or stress nuclear scan again to evaluate for underlying ischemia.      Blood pressure is very well controlled at 128/76 with a pulse of 61.      Weight is 199 pounds, which is actually down 3 pounds from November, but up 7 pounds from last fall.      Despite this, I have pointed out to him that his cholesterol profile continues to get better every year.  Total cholesterol is 105, HDL up to 36, which is highest he has ever had.  LDL is 44, which is the lowest he has ever had and triglycerides are 127.      We reviewed his medications and will continue them as is.      I will follow up on the studies.  If they are okay, we will plan on seeing him back in 1 year.  If his stress test is significantly abnormal, we may have to do a repeat angiogram.         BRITT DUNLAP MD, [facility]             D: 02/07/2018   T: 02/09/2018   MT: LATOSHA      Name:     IHSAN SANCHEZ   MRN:      [id number]        Account:      [id number]    :      1960           Service Date: 2018      Document: [id number]

## 2018-02-12 ENCOUNTER — TRANSFERRED RECORDS (OUTPATIENT)
Dept: FAMILY MEDICINE | Facility: CLINIC | Age: 58
End: 2018-02-12

## 2018-02-12 DIAGNOSIS — Z76.0 ENCOUNTER FOR MEDICATION REFILL: ICD-10-CM

## 2018-02-28 ENCOUNTER — TELEPHONE (OUTPATIENT)
Dept: GASTROENTEROLOGY | Facility: CLINIC | Age: 58
End: 2018-02-28

## 2018-03-01 ENCOUNTER — HOSPITAL ENCOUNTER (OUTPATIENT)
Dept: CARDIOLOGY | Facility: CLINIC | Age: 58
Discharge: HOME | End: 2018-03-01
Attending: INTERNAL MEDICINE | Admitting: INTERNAL MEDICINE
Payer: COMMERCIAL

## 2018-03-01 DIAGNOSIS — I25.10 CORONARY ARTERY DISEASE INVOLVING NATIVE CORONARY ARTERY OF NATIVE HEART WITHOUT ANGINA PECTORIS: Chronic | ICD-10-CM

## 2018-03-01 PROCEDURE — 93227 XTRNL ECG REC<48 HR R&I: CPT | Performed by: INTERNAL MEDICINE

## 2018-03-01 PROCEDURE — 93226 XTRNL ECG REC<48 HR SCAN A/R: CPT

## 2018-03-05 ENCOUNTER — TELEPHONE (OUTPATIENT)
Dept: CARDIOLOGY | Facility: CLINIC | Age: 58
End: 2018-03-05

## 2018-03-05 DIAGNOSIS — I25.83 CORONARY ARTERY DISEASE DUE TO LIPID RICH PLAQUE: ICD-10-CM

## 2018-03-05 DIAGNOSIS — I10 ESSENTIAL HYPERTENSION: ICD-10-CM

## 2018-03-05 DIAGNOSIS — I25.10 CORONARY ARTERY DISEASE INVOLVING NATIVE CORONARY ARTERY OF NATIVE HEART WITHOUT ANGINA PECTORIS: Primary | ICD-10-CM

## 2018-03-05 DIAGNOSIS — I25.10 CORONARY ARTERY DISEASE DUE TO LIPID RICH PLAQUE: ICD-10-CM

## 2018-03-05 RX ORDER — LISINOPRIL 10 MG/1
10 TABLET ORAL DAILY
Qty: 90 TABLET | Refills: 3 | Status: SHIPPED | OUTPATIENT
Start: 2018-03-05 | End: 2019-02-27

## 2018-03-05 NOTE — TELEPHONE ENCOUNTER
Attempted to reach patient to review holter results and Dr. Roberts's recommendation for nuclear study off BB.  Left message for patient to call back

## 2018-03-05 NOTE — TELEPHONE ENCOUNTER
Holter monitor results noted 3/5/18:  Sinus with 1st degree AV block and occasional episodes of 2nd degree AV block, type 1 Wenckebach. 84 isolated PVCs. 1624 supraventricular ectopics. 1612 of these were isolated PACs. There were 6 pairs. 28 pauses greater than 2.0 seconds. Longest pause was 2.265 seconds. These pauses were a results of 2nd degree AV block, type 1 Wenckebach. Patient event button was pressed 18 times. Rhythm during these times was sinus with 1st degree AV block and frequent PACs, HR ranging from  BPM.   Will message Dr. Roberts for review.

## 2018-03-05 NOTE — TELEPHONE ENCOUNTER
Spoke with patient, he is ready to set up the exercise nuclear study, knows to hold metoprolol the day before and day of testing. Contacted scheduling to reach out to patient and set up testing.

## 2018-03-08 ENCOUNTER — HOSPITAL ENCOUNTER (OUTPATIENT)
Facility: CLINIC | Age: 58
Discharge: HOME | End: 2018-03-08
Attending: INTERNAL MEDICINE | Admitting: INTERNAL MEDICINE
Payer: COMMERCIAL

## 2018-03-08 ENCOUNTER — SURGERY (OUTPATIENT)
Age: 58
End: 2018-03-08

## 2018-03-08 VITALS
SYSTOLIC BLOOD PRESSURE: 153 MMHG | HEART RATE: 58 BPM | OXYGEN SATURATION: 96 % | DIASTOLIC BLOOD PRESSURE: 90 MMHG | RESPIRATION RATE: 13 BRPM

## 2018-03-08 LAB
GLUCOSE BLDC GLUCOMTR-MCNC: 175 MG/DL (ref 70–99)
UPPER GI ENDOSCOPY: NORMAL

## 2018-03-08 PROCEDURE — 25000125 ZZHC RX 250: Performed by: INTERNAL MEDICINE

## 2018-03-08 PROCEDURE — G0500 MOD SEDAT ENDO SERVICE >5YRS: HCPCS | Performed by: INTERNAL MEDICINE

## 2018-03-08 PROCEDURE — 25000128 H RX IP 250 OP 636: Performed by: INTERNAL MEDICINE

## 2018-03-08 PROCEDURE — 82962 GLUCOSE BLOOD TEST: CPT

## 2018-03-08 PROCEDURE — 43235 EGD DIAGNOSTIC BRUSH WASH: CPT | Performed by: INTERNAL MEDICINE

## 2018-03-08 RX ORDER — FENTANYL CITRATE 50 UG/ML
INJECTION, SOLUTION INTRAMUSCULAR; INTRAVENOUS PRN
Status: DISCONTINUED | OUTPATIENT
Start: 2018-03-08 | End: 2018-03-08 | Stop reason: HOSPADM

## 2018-03-08 RX ORDER — ONDANSETRON 2 MG/ML
4 INJECTION INTRAMUSCULAR; INTRAVENOUS
Status: COMPLETED | OUTPATIENT
Start: 2018-03-08 | End: 2018-03-08

## 2018-03-08 RX ADMIN — MIDAZOLAM 1 MG: 1 INJECTION INTRAMUSCULAR; INTRAVENOUS at 08:08

## 2018-03-08 RX ADMIN — MIDAZOLAM 2 MG: 1 INJECTION INTRAMUSCULAR; INTRAVENOUS at 08:01

## 2018-03-08 RX ADMIN — FENTANYL CITRATE 100 MCG: 50 INJECTION, SOLUTION INTRAMUSCULAR; INTRAVENOUS at 08:01

## 2018-03-08 RX ADMIN — ONDANSETRON 4 MG: 2 INJECTION INTRAMUSCULAR; INTRAVENOUS at 08:41

## 2018-03-08 RX ADMIN — FENTANYL CITRATE 50 MCG: 50 INJECTION, SOLUTION INTRAMUSCULAR; INTRAVENOUS at 08:08

## 2018-03-08 RX ADMIN — BENZOCAINE 1 APPLICATOR: 220 SPRAY, METERED PERIODONTAL at 08:01

## 2018-03-09 ENCOUNTER — RADIANT APPOINTMENT (OUTPATIENT)
Dept: ULTRASOUND IMAGING | Facility: CLINIC | Age: 58
End: 2018-03-09
Attending: INTERNAL MEDICINE
Payer: COMMERCIAL

## 2018-03-09 ENCOUNTER — OFFICE VISIT (OUTPATIENT)
Dept: GASTROENTEROLOGY | Facility: CLINIC | Age: 58
End: 2018-03-09
Attending: INTERNAL MEDICINE
Payer: COMMERCIAL

## 2018-03-09 ENCOUNTER — DOCUMENTATION ONLY (OUTPATIENT)
Dept: CARDIOLOGY | Facility: CLINIC | Age: 58
End: 2018-03-09

## 2018-03-09 VITALS
DIASTOLIC BLOOD PRESSURE: 75 MMHG | HEART RATE: 58 BPM | OXYGEN SATURATION: 99 % | BODY MASS INDEX: 26.17 KG/M2 | SYSTOLIC BLOOD PRESSURE: 147 MMHG | HEIGHT: 72 IN | WEIGHT: 193.2 LBS | TEMPERATURE: 97.6 F

## 2018-03-09 DIAGNOSIS — K74.60 CIRRHOSIS OF LIVER WITHOUT ASCITES, UNSPECIFIED HEPATIC CIRRHOSIS TYPE (H): ICD-10-CM

## 2018-03-09 DIAGNOSIS — I25.10 CORONARY ARTERY DISEASE INVOLVING NATIVE CORONARY ARTERY OF NATIVE HEART WITHOUT ANGINA PECTORIS: Chronic | ICD-10-CM

## 2018-03-09 DIAGNOSIS — I10 BENIGN ESSENTIAL HYPERTENSION: ICD-10-CM

## 2018-03-09 DIAGNOSIS — K74.60 CIRRHOSIS OF LIVER WITHOUT ASCITES, UNSPECIFIED HEPATIC CIRRHOSIS TYPE (H): Primary | ICD-10-CM

## 2018-03-09 DIAGNOSIS — E78.2 MIXED HYPERLIPIDEMIA: Primary | ICD-10-CM

## 2018-03-09 LAB
AFP SERPL-MCNC: <1.5 UG/L (ref 0–8)
ALBUMIN SERPL-MCNC: 4.2 G/DL (ref 3.4–5)
ALP SERPL-CCNC: 70 U/L (ref 40–150)
ALT SERPL W P-5'-P-CCNC: 38 U/L (ref 0–70)
ANION GAP SERPL CALCULATED.3IONS-SCNC: 4 MMOL/L (ref 3–14)
AST SERPL W P-5'-P-CCNC: 22 U/L (ref 0–45)
BILIRUB DIRECT SERPL-MCNC: 0.3 MG/DL (ref 0–0.2)
BILIRUB SERPL-MCNC: 1.5 MG/DL (ref 0.2–1.3)
BUN SERPL-MCNC: 16 MG/DL (ref 7–30)
CALCIUM SERPL-MCNC: 9.3 MG/DL (ref 8.5–10.1)
CHLORIDE SERPL-SCNC: 101 MMOL/L (ref 94–109)
CHOLEST SERPL-MCNC: 92 MG/DL
CO2 SERPL-SCNC: 30 MMOL/L (ref 20–32)
CREAT SERPL-MCNC: 0.9 MG/DL (ref 0.66–1.25)
ERYTHROCYTE [DISTWIDTH] IN BLOOD BY AUTOMATED COUNT: 12.9 % (ref 10–15)
GFR SERPL CREATININE-BSD FRML MDRD: 87 ML/MIN/1.7M2
GLUCOSE SERPL-MCNC: 162 MG/DL (ref 70–99)
HCT VFR BLD AUTO: 45 % (ref 40–53)
HDLC SERPL-MCNC: 40 MG/DL
HGB BLD-MCNC: 15.3 G/DL (ref 13.3–17.7)
INR PPP: 1.08 (ref 0.86–1.14)
LDLC SERPL CALC-MCNC: 36 MG/DL
MCH RBC QN AUTO: 30.8 PG (ref 26.5–33)
MCHC RBC AUTO-ENTMCNC: 34 G/DL (ref 31.5–36.5)
MCV RBC AUTO: 91 FL (ref 78–100)
NONHDLC SERPL-MCNC: 53 MG/DL
PLATELET # BLD AUTO: 160 10E9/L (ref 150–450)
POTASSIUM SERPL-SCNC: 4.4 MMOL/L (ref 3.4–5.3)
PROT SERPL-MCNC: 7.6 G/DL (ref 6.8–8.8)
RBC # BLD AUTO: 4.96 10E12/L (ref 4.4–5.9)
SODIUM SERPL-SCNC: 136 MMOL/L (ref 133–144)
TRIGL SERPL-MCNC: 82 MG/DL
WBC # BLD AUTO: 6.2 10E9/L (ref 4–11)

## 2018-03-09 PROCEDURE — G0463 HOSPITAL OUTPT CLINIC VISIT: HCPCS | Mod: ZF

## 2018-03-09 PROCEDURE — 80061 LIPID PANEL: CPT | Performed by: INTERNAL MEDICINE

## 2018-03-09 PROCEDURE — 36415 COLL VENOUS BLD VENIPUNCTURE: CPT | Performed by: INTERNAL MEDICINE

## 2018-03-09 PROCEDURE — 80076 HEPATIC FUNCTION PANEL: CPT | Performed by: INTERNAL MEDICINE

## 2018-03-09 PROCEDURE — 82105 ALPHA-FETOPROTEIN SERUM: CPT | Performed by: INTERNAL MEDICINE

## 2018-03-09 PROCEDURE — 85027 COMPLETE CBC AUTOMATED: CPT | Performed by: INTERNAL MEDICINE

## 2018-03-09 PROCEDURE — 80048 BASIC METABOLIC PNL TOTAL CA: CPT | Performed by: INTERNAL MEDICINE

## 2018-03-09 PROCEDURE — 85610 PROTHROMBIN TIME: CPT | Performed by: INTERNAL MEDICINE

## 2018-03-09 ASSESSMENT — PAIN SCALES - GENERAL: PAINLEVEL: NO PAIN (0)

## 2018-03-09 NOTE — LETTER
3/9/2018      RE: Vikash Burgos  [address]  [place] [address]       I had the pleasure of seeing Vikash Burgos for followup in the Liver Clinic at the [facility] on 03/09/2018.  Mr. Burgos returns for followup of cirrhosis caused by nonalcoholic fatty liver disease.        For the most part, he is doing well.  He denies any abdominal pain, itching or skin rash.  He has mild fatigue.  He denies any increased abdominal girth or lower extremity edema.  He denies any fevers or chills, cough or shortness of breath.  He denies any nausea or vomiting, diarrhea or constipation.  His appetite has been good, and his weight is actually down 10 pounds, which he disputes because he does not think he has actually lost any weight.  While he has been trying to do so, he has not had much luck.  There, otherwise, have been no other events since he was last seen.  He did have an upper GI endoscopy that showed no esophageal varices, and the plan is to follow up in 3 years.     Current Outpatient Prescriptions   Medication     lisinopril (PRINIVIL/ZESTRIL) 10 MG tablet     insulin detemir (LEVEMIR FLEXTOUCH) 100 UNIT/ML injection     metoprolol succinate (TOPROL-XL) 100 MG 24 hr tablet     atorvastatin (LIPITOR) 20 MG tablet     fluorouracil (EFUDEX) 5 % cream     chlorhexidine (PERIDEX) 0.12 % solution     B-D U/F 31G X 8 MM insulin pen needle     cyanocobalamin (VITAMIN B12) 1000 MCG/ML injection     insulin pen needle (BD HEIKE U/F) 32G X 4 MM     VITAMIN D, CHOLECALCIFEROL, PO     aspirin 81 MG tablet     No current facility-administered medications for this visit.      B/P: 147/75, T: 97.6, P: 58, R: Data Unavailable    HEENT exam shows no scleral icterus or temporal muscle wasting.  His chest is clear.  His abdominal exam shows no increase in girth.  No masses or tenderness to palpation are present.  His liver is 10 cm in span without left lobe enlargement.  No spleen tip is  palpable, and extremity exam shows no edema.  Skin exam shows no stigmata of chronic liver disease.  Neurologic exam shows no asterixis.     Recent Results (from the past 168 hour(s))   UPPER GI ENDOSCOPY    Collection Time: 03/08/18  7:12 AM   Result Value Ref Range    Upper GI Endoscopy       [facility], [address] [place] [address] [phone]     Endoscopy Department  _______________________________________________________________________________  Patient Name: Vikash Burgos        Procedure Date: 3/8/2018 7:12 AM  MRN: [id number]                       Account Number: [id number]  YOB: 1960               Admit Type: Outpatient  Age: 58                               Room: [facility]  Gender: Male                          Note Status: Finalized  Attending MD: Angel Luis Mo MD        Total Sedation Time:   _______________________________________________________________________________     Procedure:           Upper GI endoscopy  Indications:         Cirrhosis rule out esophageal varices  Providers:           Angel Luis Mo MD, Reji Andrade RN  Referring MD:        Kevin Bedolla MD  Medicines:           Midazolam 3 mg IV, Fentanyl 150 micrograms IV  Complications:       No immediate complications.  _______________________________________ ________________________________________  Procedure:           Pre-Anesthesia Assessment:                       - Prior to the procedure, a History and Physical was                        performed, and patient medications and allergies were                        reviewed. The patient is competent. The risks and                        benefits of the procedure and the sedation options and                        risks were discussed with the patient. All questions                        were answered and informed consent was obtained. Patient                        identification and proposed procedure were verified by                         the physician and the nurse in the pre-procedure area in                        the procedure room. Mental Status Examination: alert and                        oriented. Airway Examination: normal oropharyngeal                        airway and neck mobility. Respiratory Examination: clear                        to auscultation. CV Examination: nor mal. Prophylactic                        Antibiotics: The patient does not require prophylactic                        antibiotics. Prior Anticoagulants: The patient has taken                        no previous anticoagulant or antiplatelet agents. ASA                        Grade Assessment: II - A patient with mild systemic                        disease. After reviewing the risks and benefits, the                        patient was deemed in satisfactory condition to undergo                        the procedure. The anesthesia plan was to use moderate                        sedation / analgesia (conscious sedation). Immediately                        prior to administration of medications, the patient was                        re-assessed for adequacy to receive sedatives. The heart                        rate, respiratory rate, oxygen saturations, blood                        pressure, adequacy of pulmonary ventilation, and                        response to care were monitored th roughout the                        procedure. The physical status of the patient was                        re-assessed after the procedure.                       After obtaining informed consent, the endoscope was                        passed under direct vision. Throughout the procedure,                        the patient's blood pressure, pulse, and oxygen                        saturations were monitored continuously. The Endoscope                        was introduced through the mouth, and advanced to the                        second part of duodenum. The upper GI endoscopy was                         accomplished without difficulty. The patient tolerated                        the procedure well.                                                                                   Findings:       The examined esophagus was normal.       There is no endoscopic evidence of varices in the entire esophagus.       The entire examined stomach was normal.       There is no endoscopic ofe dence of portal hypertension gastropathy in        the entire examined stomach.       The examined duodenum was normal.                                                                                   Moderate Sedation:       Moderate (conscious) sedation was administered by the endoscopy nurse        and supervised by the endoscopist. The following parameters were        monitored: oxygen saturation, heart rate, blood pressure, and response        to care. Total physician intraservice time was 13 minutes.  Impression:          - Normal esophagus.                       - Normal stomach.                       - Normal examined duodenum.                       - No specimens collected.  Recommendation:      - Discharge patient to home.                       - Resume previous diet.                       - Repeat upper endoscopy in 3 years for screening                        purposes.                                                                                     ________________  Gee rozina Mo MD  3/8/2018 8:19:19 AM  I was physically present for the entire viewing portion of the exam.  __________________________  Signature of teaching physician  [id number] Mo MD  Number of Addenda: 0    Note Initiated On: 3/8/2018 7:12 AM  Scope In:  Scope Out:     Glucose by meter    Collection Time: 03/08/18  8:48 AM   Result Value Ref Range    Glucose 175 (H) 70 - 99 mg/dL   Hepatic Panel [LAB20]    Collection Time: 03/09/18  6:34 AM   Result Value Ref Range    Bilirubin Direct 0.3 (H) 0.0 - 0.2 mg/dL    Bilirubin Total  1.5 (H) 0.2 - 1.3 mg/dL    Albumin 4.2 3.4 - 5.0 g/dL    Protein Total 7.6 6.8 - 8.8 g/dL    Alkaline Phosphatase 70 40 - 150 U/L    ALT 38 0 - 70 U/L    AST 22 0 - 45 U/L   Basic metabolic panel [LAB15]    Collection Time: 03/09/18  6:34 AM   Result Value Ref Range    Sodium 136 133 - 144 mmol/L    Potassium 4.4 3.4 - 5.3 mmol/L    Chloride 101 94 - 109 mmol/L    Carbon Dioxide 30 20 - 32 mmol/L    Anion Gap 4 3 - 14 mmol/L    Glucose 162 (H) 70 - 99 mg/dL    Urea Nitrogen 16 7 - 30 mg/dL    Creatinine 0.90 0.66 - 1.25 mg/dL    GFR Estimate 87 >60 mL/min/1.7m2    GFR Estimate If Black >90 >60 mL/min/1.7m2    Calcium 9.3 8.5 - 10.1 mg/dL   CBC with platelets [KFS535]    Collection Time: 03/09/18  6:34 AM   Result Value Ref Range    WBC 6.2 4.0 - 11.0 10e9/L    RBC Count 4.96 4.4 - 5.9 10e12/L    Hemoglobin 15.3 13.3 - 17.7 g/dL    Hematocrit 45.0 40.0 - 53.0 %    MCV 91 78 - 100 fl    MCH 30.8 26.5 - 33.0 pg    MCHC 34.0 31.5 - 36.5 g/dL    RDW 12.9 10.0 - 15.0 %    Platelet Count 160 150 - 450 10e9/L   INR [[id number]]    Collection Time: 03/09/18  6:34 AM   Result Value Ref Range    INR 1.08 0.86 - 1.14   Lipid Profile    Collection Time: 03/09/18  6:34 AM   Result Value Ref Range    Cholesterol 92 <200 mg/dL    Triglycerides 82 <150 mg/dL    HDL Cholesterol 40 >39 mg/dL    LDL Cholesterol Calculated 36 <100 mg/dL    Non HDL Cholesterol 53 <130 mg/dL      I did review his ultrasound which shows some focal fatty sparing, but no other abnormalities.      My impression is that Mr. Burgos has cirrhosis caused by nonalcoholic fatty liver disease.  He is doing very well at this point in time.  I will not be making any change to his medical regimen.  I will simply see him back in the clinic again in 6 months with repeat ultrasound and blood work.  He is otherwise up-to-date with regard to vaccines, variceal and cancer screening.      Thank you very much for allowing me to participate in the care of this patient.  If you  have any questions regarding my recommendations, please do not hesitate to contact me.       Kevin Bedlola MD      Professor of Medicine  [employer]      Executive Medical Director, Solid Organ Transplant Program  [facility]

## 2018-03-09 NOTE — LETTER
3/9/2018       RE: Vikash Burgos  [address]  [place] [address]     Dear Colleague,    Thank you for referring your patient, Vikash Burgos, to the [facility] HEPATOLOGY at [facility]. Please see a copy of my visit note below.    I had the pleasure of seeing Vikash Burgos for followup in the Liver Clinic at the [facility] on 03/09/2018.  Mr. Burgos returns for followup of cirrhosis caused by nonalcoholic fatty liver disease.        For the most part, he is doing well.  He denies any abdominal pain, itching or skin rash.  He has mild fatigue.  He denies any increased abdominal girth or lower extremity edema.  He denies any fevers or chills, cough or shortness of breath.  He denies any nausea or vomiting, diarrhea or constipation.  His appetite has been good, and his weight is actually down 10 pounds, which he disputes because he does not think he has actually lost any weight.  While he has been trying to do so, he has not had much luck.  There, otherwise, have been no other events since he was last seen.  He did have an upper GI endoscopy that showed no esophageal varices, and the plan is to follow up in 3 years.     Current Outpatient Prescriptions   Medication     lisinopril (PRINIVIL/ZESTRIL) 10 MG tablet     insulin detemir (LEVEMIR FLEXTOUCH) 100 UNIT/ML injection     metoprolol succinate (TOPROL-XL) 100 MG 24 hr tablet     atorvastatin (LIPITOR) 20 MG tablet     fluorouracil (EFUDEX) 5 % cream     chlorhexidine (PERIDEX) 0.12 % solution     B-D U/F 31G X 8 MM insulin pen needle     cyanocobalamin (VITAMIN B12) 1000 MCG/ML injection     insulin pen needle (BD HEIKE U/F) 32G X 4 MM     VITAMIN D, CHOLECALCIFEROL, PO     aspirin 81 MG tablet     No current facility-administered medications for this visit.      B/P: 147/75, T: 97.6, P: 58, R: Data Unavailable    HEENT exam shows no scleral icterus or temporal muscle  wasting.  His chest is clear.  His abdominal exam shows no increase in girth.  No masses or tenderness to palpation are present.  His liver is 10 cm in span without left lobe enlargement.  No spleen tip is palpable, and extremity exam shows no edema.  Skin exam shows no stigmata of chronic liver disease.  Neurologic exam shows no asterixis.     Recent Results (from the past 168 hour(s))   UPPER GI ENDOSCOPY    Collection Time: 03/08/18  7:12 AM   Result Value Ref Range    Upper GI Endoscopy       [facility], [place]  [address] [place] [address] [phone]     Endoscopy Department  _______________________________________________________________________________  Patient Name: Vikash Burgos        Procedure Date: 3/8/2018 7:12 AM  MRN: [id number]                       Account Number: [id number]  YOB: 1960               Admit Type: Outpatient  Age: 58                               Room: [facility]  Gender: Male                          Note Status: Finalized  Attending MD: Angel Luis Mo MD        Total Sedation Time:   _______________________________________________________________________________     Procedure:           Upper GI endoscopy  Indications:         Cirrhosis rule out esophageal varices  Providers:           Angel Luis Mo MD, Reji Andrade RN  Referring MD:        Kevin Bedolla MD  Medicines:           Midazolam 3 mg IV, Fentanyl 150 micrograms IV  Complications:       No immediate complications.  _______________________________________ ________________________________________  Procedure:           Pre-Anesthesia Assessment:                       - Prior to the procedure, a History and Physical was                        performed, and patient medications and allergies were                        reviewed. The patient is competent. The risks and                        benefits of the procedure and the sedation options and                        risks were discussed with the  patient. All questions                        were answered and informed consent was obtained. Patient                        identification and proposed procedure were verified by                        the physician and the nurse in the pre-procedure area in                        the procedure room. Mental Status Examination: alert and                        oriented. Airway Examination: normal oropharyngeal                        airway and neck mobility. Respiratory Examination: clear                        to auscultation. CV Examination: nor mal. Prophylactic                        Antibiotics: The patient does not require prophylactic                        antibiotics. Prior Anticoagulants: The patient has taken                        no previous anticoagulant or antiplatelet agents. ASA                        Grade Assessment: II - A patient with mild systemic                        disease. After reviewing the risks and benefits, the                        patient was deemed in satisfactory condition to undergo                        the procedure. The anesthesia plan was to use moderate                        sedation / analgesia (conscious sedation). Immediately                        prior to administration of medications, the patient was                        re-assessed for adequacy to receive sedatives. The heart                        rate, respiratory rate, oxygen saturations, blood                        pressure, adequacy of pulmonary ventilation, and                        response to care were monitored th roughout the                        procedure. The physical status of the patient was                        re-assessed after the procedure.                       After obtaining informed consent, the endoscope was                        passed under direct vision. Throughout the procedure,                        the patient's blood pressure, pulse, and oxygen                         saturations were monitored continuously. The Endoscope                        was introduced through the mouth, and advanced to the                        second part of duodenum. The upper GI endoscopy was                        accomplished without difficulty. The patient tolerated                        the procedure well.                                                                                   Findings:       The examined esophagus was normal.       There is no endoscopic evidence of varices in the entire esophagus.       The entire examined stomach was normal.       There is no endoscopic ofe dence of portal hypertension gastropathy in        the entire examined stomach.       The examined duodenum was normal.                                                                                   Moderate Sedation:       Moderate (conscious) sedation was administered by the endoscopy nurse        and supervised by the endoscopist. The following parameters were        monitored: oxygen saturation, heart rate, blood pressure, and response        to care. Total physician intraservice time was 13 minutes.  Impression:          - Normal esophagus.                       - Normal stomach.                       - Normal examined duodenum.                       - No specimens collected.  Recommendation:      - Discharge patient to home.                       - Resume previous diet.                       - Repeat upper endoscopy in 3 years for screening                        purposes.                                                                                     ________________  Gee rozina Mo MD  3/8/2018 8:19:19 AM  I was physically present for the entire viewing portion of the exam.  __________________________  Signature of teaching physician  [id number] Mo MD  Number of Addenda: 0    Note Initiated On: 3/8/2018 7:12 AM  Scope In:  Scope Out:     Glucose by meter    Collection Time: 03/08/18  8:48 AM    Result Value Ref Range    Glucose 175 (H) 70 - 99 mg/dL   Hepatic Panel [LAB20]    Collection Time: 03/09/18  6:34 AM   Result Value Ref Range    Bilirubin Direct 0.3 (H) 0.0 - 0.2 mg/dL    Bilirubin Total 1.5 (H) 0.2 - 1.3 mg/dL    Albumin 4.2 3.4 - 5.0 g/dL    Protein Total 7.6 6.8 - 8.8 g/dL    Alkaline Phosphatase 70 40 - 150 U/L    ALT 38 0 - 70 U/L    AST 22 0 - 45 U/L   Basic metabolic panel [LAB15]    Collection Time: 03/09/18  6:34 AM   Result Value Ref Range    Sodium 136 133 - 144 mmol/L    Potassium 4.4 3.4 - 5.3 mmol/L    Chloride 101 94 - 109 mmol/L    Carbon Dioxide 30 20 - 32 mmol/L    Anion Gap 4 3 - 14 mmol/L    Glucose 162 (H) 70 - 99 mg/dL    Urea Nitrogen 16 7 - 30 mg/dL    Creatinine 0.90 0.66 - 1.25 mg/dL    GFR Estimate 87 >60 mL/min/1.7m2    GFR Estimate If Black >90 >60 mL/min/1.7m2    Calcium 9.3 8.5 - 10.1 mg/dL   CBC with platelets [UIJ834]    Collection Time: 03/09/18  6:34 AM   Result Value Ref Range    WBC 6.2 4.0 - 11.0 10e9/L    RBC Count 4.96 4.4 - 5.9 10e12/L    Hemoglobin 15.3 13.3 - 17.7 g/dL    Hematocrit 45.0 40.0 - 53.0 %    MCV 91 78 - 100 fl    MCH 30.8 26.5 - 33.0 pg    MCHC 34.0 31.5 - 36.5 g/dL    RDW 12.9 10.0 - 15.0 %    Platelet Count 160 150 - 450 10e9/L   INR [[id number]]    Collection Time: 03/09/18  6:34 AM   Result Value Ref Range    INR 1.08 0.86 - 1.14   Lipid Profile    Collection Time: 03/09/18  6:34 AM   Result Value Ref Range    Cholesterol 92 <200 mg/dL    Triglycerides 82 <150 mg/dL    HDL Cholesterol 40 >39 mg/dL    LDL Cholesterol Calculated 36 <100 mg/dL    Non HDL Cholesterol 53 <130 mg/dL      I did review his ultrasound which shows some focal fatty sparing, but no other abnormalities.      My impression is that Mr. Burgos has cirrhosis caused by nonalcoholic fatty liver disease.  He is doing very well at this point in time.  I will not be making any change to his medical regimen.  I will simply see him back in the clinic again in 6 months with  repeat ultrasound and blood work.  He is otherwise up-to-date with regard to vaccines, variceal and cancer screening.      Thank you very much for allowing me to participate in the care of this patient.  If you have any questions regarding my recommendations, please do not hesitate to contact me.       Kevin Bedolla MD      Professor of Medicine  [employer]      Executive Medical Director, Solid Organ Transplant Program  [facility]

## 2018-03-09 NOTE — MR AVS SNAPSHOT
After Visit Summary   3/9/2018    Vikash Burgos    MRN: [id number]           Patient Information     Date Of Birth          1960        Visit Information        Provider Department      3/9/2018 8:15 AM Kevin Bedolla MD  [facility]        Today's Diagnoses     Cirrhosis of liver without ascites, unspecified hepatic cirrhosis type (H)    -  1       Follow-ups after your visit        Your next 10 appointments already scheduled     Mar 13, 2018  8:00 AM CDT   NM SH CV MPI MULT RST ST 1 DAY with SCINM1   [facility] CV Nuclear Medicine (Cardiovascular Imaging at [facility])    [address]  [address]  [facility] [address] [phone]           For a ONE day exam: Allow 3-4 hours for test. For a TWO day exam: Allow 2 hours PER day for test.  You may need to stop some medicines before the test. Follow your doctor s orders. - If you take a beta blocker: Follow your doctor s specific instructions on taking it prior to and on the day of your exam. - If you take Aggrenox or dipyridamole (Persantine, Permole), stop taking it 48 hours before your test. - If you take Viagra, Cialis or Levitra, stop taking it 48 hours before your test. - If you take theophylline or aminophylline, stop taking it 12 hours before your test.  For patients with diabetes: - If you take insulin, call your diabetes care team. Ask if you should take a 1/2 dose the morning of your test. - If you take diabetes medicine by mouth, don t take it on the morning of your test. Bring it with you to take after the test. (If you have questions, call your diabetes care team.)  Do not take nitrates on the day of your test. Do not wear your Nitro-Patch.  Stop all caffeine 12 hours before the test. This includes coffee, tea, soda pop, chocolate and certain medicines (such as Anacin, Excedrin and NoDoz). Also avoid decaf coffee and tea, as these contain small amounts of caffeine.  No alcohol, smoking or  other tobacco for 12 hours before the test.  Stop eating 3 hours before the test. You may drink water.  Please wear a loose two-piece outfit. If you will have an exercise test, bring rubber-soled walking shoes.  When you arrive, please tell us if you: - Have diabetes - Are breastfeeding - May be pregnant - Have a pacemaker of ICD (implantable defibrillator).  Please call your Imaging Department at your exam site with any questions.            Mar 19, 2018  2:15 PM CDT   PHYSICAL with Jace Mayo MD   [facility] ([facility])    [address] [place] [address] [phone]            Sep 07, 2018  7:15 AM CDT   Lab with  LAB   [facility] ([facility])    [address] [place] [address] [phone]            Sep 07, 2018  7:30 AM CDT   US ABDOMEN COMPLETE with UCUS2   [facility] US ([facility])    [address] [place] [address] [phone]           Please bring a list of your medicines (including vitamins, minerals and over-the-counter drugs). Also, tell your doctor about any allergies you may have. Wear comfortable clothes and leave your valuables at home.  Adults: No eating or drinking for 8 hours before the exam. You may take medicine with a small sip of water.  Children: - Children 6+ years: No food or drink for 6 hours before exam. - Children 1-5 years: No food or drink for 4 hours before exam. - Infants, breast-fed: may have breast milk up to 2 hours before exam. - Infants, formula: may have bottle until 4 hours before exam.  Please call the Imaging Department at your exam site with any questions.            Sep 07, 2018  8:30 AM CDT   (Arrive by 8:15 AM)   Return Liver Transplant with Kevin Bedolla MD   [facility] Hepatology ([facility])    [address] [place] [address]  [address] [place] [address] [phone]               Future tests that were ordered for you today     Open Standing Orders        Priority Remaining Interval Expires Ordered    US abdomen complete [CON917] Routine 2/2 Every 6 Months 3/9/2019 3/9/2018          Open Future Orders        Priority Expected Expires Ordered    Hepatic Panel [LAB20] Routine 9/5/2018 3/9/2019 3/9/2018    Basic metabolic panel [LAB15] Routine 9/5/2018 3/9/2019 3/9/2018    CBC with platelets [CSI039] Routine 9/5/2018 3/9/2019 3/9/2018    INR [ABX8866] Routine 9/5/2018 3/9/2019 3/9/2018    AFP tumor marker [KYA045] Routine 9/5/2018 3/9/2019 3/9/2018    Basic metabolic panel Routine 2/7/2019 3/10/2019 3/9/2018    Lipid Profile Routine 2/7/2019 3/9/2019 3/9/2018    ALT Routine 2/7/2019 3/9/2019 3/9/2018            Who to contact     If you have questions or need follow up information about today's clinic visit or your schedule please contact [facility] HEPAT[facility] directly at [phone].  Normal or non-critical lab and imaging results will be communicated to you by [email or web address], letter or phone within 4 business days after the clinic has received the results. If you do not hear from us within 7 days, please contact the clinic through VarVeet or phone. If you have a critical or abnormal lab result, we will notify you by phone as soon as possible.  Submit refill requests through [employer]FIN[employer] or call your pharmacy and they will forward the refill request to us. Please allow 3 business days for your refill to be completed.          Additional Information About Your Visit        [email or web address] Information     [employer] gives you secure access to your electronic health record. If you see a primary care provider, you can also send messages to your care team and make appointments. If you have questions, please call your primary care clinic.  If you do not have a primary care provider, please call [phone] and they will assist you.        Care EveryWhere ID     This is your Care EveryWhere ID. This could be used  by other organizations to access your [place] medical records  [id number]        Your Vitals Were     Pulse Temperature Height Pulse Oximetry BMI (Body Mass Index)       58 97.6  F (36.4  C) (Oral) 1.829 m (6') 99% 26.2 kg/m2        Blood Pressure from Last 3 Encounters:   03/09/18 147/75   03/08/18 153/90   02/07/18 128/76    Weight from Last 3 Encounters:   03/09/18 87.6 kg (193 lb 3.2 oz)   02/07/18 90.3 kg (199 lb)   11/20/17 91.6 kg (202 lb)              We Performed the Following     Schedule follow up appointments        Primary Care Provider Office Phone # Fax #    Jace Mayo [phone] [address] [facility] [address]        Equal Access to Services     [facility]: Hadii ben wasserman hadasho Soomaali, waaxda luqadaha, qaybta kaalmada adeegyada, navdeep nuñez hayshira eng . So [facility] [phone].    ATENCIÓN: Si habla español, tiene a stubbs disposición servicios gratuitos de asistencia lingüística. Llame al [phone].    We comply with applicable federal civil rights laws and [place] laws. We do not discriminate on the basis of race, color, national origin, age, disability, sex, sexual orientation, or gender identity.            Thank you!     Thank you for choosing [facility] HEPATOLOG[facility]  for your care. Our goal is always to provide you with excellent care. Hearing back from our patients is one way we can continue to improve our services. Please take a few minutes to complete the written survey that you may receive in the mail after your visit with us. Thank you!             Your Updated Medication List - Protect others around you: Learn how to safely use, store and throw away your medicines at www.disposemymeds.org.          This list is accurate as of 3/9/18  8:52 AM.  Always use your most recent med list.                   Brand Name Dispense Instructions for use Diagnosis    aspirin 81 MG tablet      Take 1 tablet by mouth daily.        atorvastatin 20  MG tablet    LIPITOR     Take 20 mg by mouth daily        chlorhexidine 0.12 % solution    PERIDEX     SWISH 1/2 OZ FOR 30 SECONDS THEN SPIT TWICE A DAY        cyanocobalamin 1000 MCG/ML injection    VITAMIN B12     Inject 1 mL into the muscle once One time injection per Dr. PENN        fluorouracil 5 % cream    EFUDEX     APPLY TO THE AFFECTED AREAS OF THE FACE TWICE A DAY FOR 2 WEEKS.        insulin detemir 100 UNIT/ML injection    LEVEMIR FLEXTOUCH    15 mL    INJECT 41 UNITS SUBCUTANEOUSLY ONCE DAILY.    Encounter for medication refill       * insulin pen needle 32G X 4 MM    BD HEIKE U/F    100 each    Use 1 daily or as directed.    Diabetes mellitus (H)       * B-D U/F 31G X 8 MM   Generic drug:  insulin pen needle     100 each    TEST TWICE DAILY AS DIRECTED    Encounter for medication refill       lisinopril 10 MG tablet    PRINIVIL/ZESTRIL    90 tablet    Take 1 tablet (10 mg) by mouth daily    Essential hypertension, Coronary artery disease due to lipid rich plaque       metoprolol succinate 100 MG 24 hr tablet    TOPROL-XL    90 tablet    Take 0.5 tablets (50 mg) by mouth 2 times daily    Coronary artery disease due to lipid rich plaque, Essential hypertension       VITAMIN D (CHOLECALCIFEROL) PO      Take 1,000 Units by mouth daily        * Notice:  This list has 2 medication(s) that are the same as other medications prescribed for you. Read the directions carefully, and ask your doctor or other care provider to review them with you.

## 2018-03-09 NOTE — PROGRESS NOTES
I had the pleasure of seeing Vikash Burgos for followup in the Liver Clinic at the [facility] on 03/09/2018.  Mr. Burgos returns for followup of cirrhosis caused by nonalcoholic fatty liver disease.        For the most part, he is doing well.  He denies any abdominal pain, itching or skin rash.  He has mild fatigue.  He denies any increased abdominal girth or lower extremity edema.  He denies any fevers or chills, cough or shortness of breath.  He denies any nausea or vomiting, diarrhea or constipation.  His appetite has been good, and his weight is actually down 10 pounds, which he disputes because he does not think he has actually lost any weight.  While he has been trying to do so, he has not had much luck.  There, otherwise, have been no other events since he was last seen.  He did have an upper GI endoscopy that showed no esophageal varices, and the plan is to follow up in 3 years.     Current Outpatient Prescriptions   Medication     lisinopril (PRINIVIL/ZESTRIL) 10 MG tablet     insulin detemir (LEVEMIR FLEXTOUCH) 100 UNIT/ML injection     metoprolol succinate (TOPROL-XL) 100 MG 24 hr tablet     atorvastatin (LIPITOR) 20 MG tablet     fluorouracil (EFUDEX) 5 % cream     chlorhexidine (PERIDEX) 0.12 % solution     B-D U/F 31G X 8 MM insulin pen needle     cyanocobalamin (VITAMIN B12) 1000 MCG/ML injection     insulin pen needle (BD HEIKE U/F) 32G X 4 MM     VITAMIN D, CHOLECALCIFEROL, PO     aspirin 81 MG tablet     No current facility-administered medications for this visit.      B/P: 147/75, T: 97.6, P: 58, R: Data Unavailable    HEENT exam shows no scleral icterus or temporal muscle wasting.  His chest is clear.  His abdominal exam shows no increase in girth.  No masses or tenderness to palpation are present.  His liver is 10 cm in span without left lobe enlargement.  No spleen tip is palpable, and extremity exam shows no edema.  Skin exam shows no stigmata of chronic liver  disease.  Neurologic exam shows no asterixis.     Recent Results (from the past 168 hour(s))   UPPER GI ENDOSCOPY    Collection Time: 03/08/18  7:12 AM   Result Value Ref Range    Upper GI Endoscopy       [facility], [address] [place] [address] [phone]     Endoscopy Department  _______________________________________________________________________________  Patient Name: Vikash Burgos        Procedure Date: 3/8/2018 7:12 AM  MRN: [id number]                       Account Number: [id number]  YOB: 1960               Admit Type: Outpatient  Age: 58                               Room: [facility]  Gender: Male                          Note Status: Finalized  Attending MD: Angel Luis Mo MD        Total Sedation Time:   _______________________________________________________________________________     Procedure:           Upper GI endoscopy  Indications:         Cirrhosis rule out esophageal varices  Providers:           Angel Luis Mo MD, Reji Andrade RN  Referring MD:        Kevin Bedolla MD  Medicines:           Midazolam 3 mg IV, Fentanyl 150 micrograms IV  Complications:       No immediate complications.  _______________________________________ ________________________________________  Procedure:           Pre-Anesthesia Assessment:                       - Prior to the procedure, a History and Physical was                        performed, and patient medications and allergies were                        reviewed. The patient is competent. The risks and                        benefits of the procedure and the sedation options and                        risks were discussed with the patient. All questions                        were answered and informed consent was obtained. Patient                        identification and proposed procedure were verified by                        the physician and the nurse in the pre-procedure area in                        the procedure  room. Mental Status Examination: alert and                        oriented. Airway Examination: normal oropharyngeal                        airway and neck mobility. Respiratory Examination: clear                        to auscultation. CV Examination: nor mal. Prophylactic                        Antibiotics: The patient does not require prophylactic                        antibiotics. Prior Anticoagulants: The patient has taken                        no previous anticoagulant or antiplatelet agents. ASA                        Grade Assessment: II - A patient with mild systemic                        disease. After reviewing the risks and benefits, the                        patient was deemed in satisfactory condition to undergo                        the procedure. The anesthesia plan was to use moderate                        sedation / analgesia (conscious sedation). Immediately                        prior to administration of medications, the patient was                        re-assessed for adequacy to receive sedatives. The heart                        rate, respiratory rate, oxygen saturations, blood                        pressure, adequacy of pulmonary ventilation, and                        response to care were monitored th roughout the                        procedure. The physical status of the patient was                        re-assessed after the procedure.                       After obtaining informed consent, the endoscope was                        passed under direct vision. Throughout the procedure,                        the patient's blood pressure, pulse, and oxygen                        saturations were monitored continuously. The Endoscope                        was introduced through the mouth, and advanced to the                        second part of duodenum. The upper GI endoscopy was                        accomplished without difficulty. The patient tolerated                         the procedure well.                                                                                   Findings:       The examined esophagus was normal.       There is no endoscopic evidence of varices in the entire esophagus.       The entire examined stomach was normal.       There is no endoscopic ofe dence of portal hypertension gastropathy in        the entire examined stomach.       The examined duodenum was normal.                                                                                   Moderate Sedation:       Moderate (conscious) sedation was administered by the endoscopy nurse        and supervised by the endoscopist. The following parameters were        monitored: oxygen saturation, heart rate, blood pressure, and response        to care. Total physician intraservice time was 13 minutes.  Impression:          - Normal esophagus.                       - Normal stomach.                       - Normal examined duodenum.                       - No specimens collected.  Recommendation:      - Discharge patient to home.                       - Resume previous diet.                       - Repeat upper endoscopy in 3 years for screening                        purposes.                                                                                     ________________  Gee rozina Mo MD  3/8/2018 8:19:19 AM  I was physically present for the entire viewing portion of the exam.  __________________________  Signature of teaching physician  [id number] Mo MD  Number of Addenda: 0    Note Initiated On: 3/8/2018 7:12 AM  Scope In:  Scope Out:     Glucose by meter    Collection Time: 03/08/18  8:48 AM   Result Value Ref Range    Glucose 175 (H) 70 - 99 mg/dL   Hepatic Panel [LAB20]    Collection Time: 03/09/18  6:34 AM   Result Value Ref Range    Bilirubin Direct 0.3 (H) 0.0 - 0.2 mg/dL    Bilirubin Total 1.5 (H) 0.2 - 1.3 mg/dL    Albumin 4.2 3.4 - 5.0 g/dL    Protein Total 7.6 6.8 - 8.8 g/dL     Alkaline Phosphatase 70 40 - 150 U/L    ALT 38 0 - 70 U/L    AST 22 0 - 45 U/L   Basic metabolic panel [LAB15]    Collection Time: 03/09/18  6:34 AM   Result Value Ref Range    Sodium 136 133 - 144 mmol/L    Potassium 4.4 3.4 - 5.3 mmol/L    Chloride 101 94 - 109 mmol/L    Carbon Dioxide 30 20 - 32 mmol/L    Anion Gap 4 3 - 14 mmol/L    Glucose 162 (H) 70 - 99 mg/dL    Urea Nitrogen 16 7 - 30 mg/dL    Creatinine 0.90 0.66 - 1.25 mg/dL    GFR Estimate 87 >60 mL/min/1.7m2    GFR Estimate If Black >90 >60 mL/min/1.7m2    Calcium 9.3 8.5 - 10.1 mg/dL   CBC with platelets [QCR962]    Collection Time: 03/09/18  6:34 AM   Result Value Ref Range    WBC 6.2 4.0 - 11.0 10e9/L    RBC Count 4.96 4.4 - 5.9 10e12/L    Hemoglobin 15.3 13.3 - 17.7 g/dL    Hematocrit 45.0 40.0 - 53.0 %    MCV 91 78 - 100 fl    MCH 30.8 26.5 - 33.0 pg    MCHC 34.0 31.5 - 36.5 g/dL    RDW 12.9 10.0 - 15.0 %    Platelet Count 160 150 - 450 10e9/L   INR [UFC9947]    Collection Time: 03/09/18  6:34 AM   Result Value Ref Range    INR 1.08 0.86 - 1.14   Lipid Profile    Collection Time: 03/09/18  6:34 AM   Result Value Ref Range    Cholesterol 92 <200 mg/dL    Triglycerides 82 <150 mg/dL    HDL Cholesterol 40 >39 mg/dL    LDL Cholesterol Calculated 36 <100 mg/dL    Non HDL Cholesterol 53 <130 mg/dL      I did review his ultrasound which shows some focal fatty sparing, but no other abnormalities.      My impression is that Mr. Burgos has cirrhosis caused by nonalcoholic fatty liver disease.  He is doing very well at this point in time.  I will not be making any change to his medical regimen.  I will simply see him back in the clinic again in 6 months with repeat ultrasound and blood work.  He is otherwise up-to-date with regard to vaccines, variceal and cancer screening.      Thank you very much for allowing me to participate in the care of this patient.  If you have any questions regarding my recommendations, please do not hesitate to contact me.        Kevin Bedolla MD      Professor of Medicine  [employer]      Executive Medical Director, Solid Organ Transplant Program  [facility]

## 2018-03-09 NOTE — NURSING NOTE
Chief Complaint   Patient presents with     RECHECK     Cirrhosis of Liver/Fatty Liver Disease   Pt roomed, vitals, meds, and allergies reviewed with pt. Pt ready for provider.  Lazaro Hernández, CMA

## 2018-03-09 NOTE — PROGRESS NOTES
Lipid panel and BMP intended for February 2019 drawn at another clinic today, 3/9/18. Orders replaced.

## 2018-03-13 ENCOUNTER — HOSPITAL ENCOUNTER (OUTPATIENT)
Dept: CARDIOLOGY | Facility: CLINIC | Age: 58
Discharge: HOME | End: 2018-03-13
Attending: INTERNAL MEDICINE | Admitting: INTERNAL MEDICINE
Payer: COMMERCIAL

## 2018-03-13 DIAGNOSIS — I25.10 CORONARY ARTERY DISEASE INVOLVING NATIVE CORONARY ARTERY OF NATIVE HEART WITHOUT ANGINA PECTORIS: ICD-10-CM

## 2018-03-13 PROCEDURE — 34300033 ZZH RX 343: Performed by: INTERNAL MEDICINE

## 2018-03-13 PROCEDURE — A9502 TC99M TETROFOSMIN: HCPCS | Performed by: INTERNAL MEDICINE

## 2018-03-13 PROCEDURE — 78452 HT MUSCLE IMAGE SPECT MULT: CPT

## 2018-03-13 PROCEDURE — 78452 HT MUSCLE IMAGE SPECT MULT: CPT | Mod: 26 | Performed by: INTERNAL MEDICINE

## 2018-03-13 PROCEDURE — 93018 CV STRESS TEST I&R ONLY: CPT | Performed by: INTERNAL MEDICINE

## 2018-03-13 PROCEDURE — 93016 CV STRESS TEST SUPVJ ONLY: CPT | Performed by: INTERNAL MEDICINE

## 2018-03-13 RX ADMIN — TETROFOSMIN 3.6 MCI.: 1.38 INJECTION, POWDER, LYOPHILIZED, FOR SOLUTION INTRAVENOUS at 08:26

## 2018-03-13 RX ADMIN — TETROFOSMIN 9.4 MCI.: 1.38 INJECTION, POWDER, LYOPHILIZED, FOR SOLUTION INTRAVENOUS at 09:48

## 2018-03-14 ENCOUNTER — DOCUMENTATION ONLY (OUTPATIENT)
Dept: CARDIOLOGY | Facility: CLINIC | Age: 58
End: 2018-03-14

## 2018-03-14 DIAGNOSIS — I25.10 CORONARY ARTERY DISEASE DUE TO LIPID RICH PLAQUE: ICD-10-CM

## 2018-03-14 DIAGNOSIS — I10 BENIGN ESSENTIAL HYPERTENSION: Primary | ICD-10-CM

## 2018-03-14 DIAGNOSIS — I25.83 CORONARY ARTERY DISEASE DUE TO LIPID RICH PLAQUE: ICD-10-CM

## 2018-03-14 DIAGNOSIS — I10 ESSENTIAL HYPERTENSION: ICD-10-CM

## 2018-03-14 RX ORDER — CHLORTHALIDONE 25 MG/1
25 TABLET ORAL DAILY
Qty: 90 TABLET | Refills: 3 | Status: SHIPPED | OUTPATIENT
Start: 2018-03-14 | End: 2018-03-28

## 2018-03-14 RX ORDER — METOPROLOL SUCCINATE 100 MG/1
50 TABLET, EXTENDED RELEASE ORAL DAILY
Qty: 1 TABLET | Refills: 0 | COMMUNITY
Start: 2018-03-14 | End: 2018-05-14 | Stop reason: DRUGHIGH

## 2018-03-14 NOTE — TELEPHONE ENCOUNTER
No significant area of ischemia noted on his stress nuclear scan. Try starting chlorthalidone 25 mg daily. DC around for a follow-up appointment or as he return to [place]. If so bring him in and about 2-4 weeks with a BMP and a BNP

## 2018-03-14 NOTE — PROGRESS NOTES
"Nuclear study 3/13/18 noted, will message Dr. Roberts to review    Per Dr. Roberts's recommendation \"No significant area of ischemia noted on his stress nuclear scan. Try starting chlorthalidone 25 mg daily. If he is around for a follow-up appointment (unless he returns to [place]). If so bring him in and about 2-4 weeks with a BMP and a BNP\"    Contacted patient with Dr. Robrets's recommendations. Patient states he is willing to try the chlorthalidone 25mg daily. Rx escripted. Patient will be in [place] for another month and is willing to set up REAGAN visit with labs. He will call back to set this up.    Patient also asks about his toprol XL 100mg (1/2 tab) BID. He had discussed decreasing this dose at the Feb visit and wonders now that he is post-stress test if Dr. Roberts is willing to cut back on the dose.  Will message Dr. Roberts to review    Per Dr. Roberts's recommendation \"He can try half a tablet once a day of his metoprolol 100 and we'll see how he feels.\"  Contacted patient with Dr. Roberts's recommendation to decrease toprol XL to 50mg qAM. Patient verbalized understanding and agreed with plan. Med list updated.  "

## 2018-03-19 ENCOUNTER — OFFICE VISIT (OUTPATIENT)
Dept: FAMILY MEDICINE | Facility: CLINIC | Age: 58
End: 2018-03-19

## 2018-03-19 VITALS
HEART RATE: 64 BPM | SYSTOLIC BLOOD PRESSURE: 120 MMHG | WEIGHT: 192.2 LBS | OXYGEN SATURATION: 98 % | DIASTOLIC BLOOD PRESSURE: 78 MMHG | BODY MASS INDEX: 26.07 KG/M2 | RESPIRATION RATE: 16 BRPM

## 2018-03-19 DIAGNOSIS — I25.10 CORONARY ARTERY DISEASE INVOLVING NATIVE CORONARY ARTERY OF NATIVE HEART WITHOUT ANGINA PECTORIS: Primary | Chronic | ICD-10-CM

## 2018-03-19 DIAGNOSIS — E11.49 DM TYPE 2 CAUSING NEUROLOGICAL DISEASE (H): ICD-10-CM

## 2018-03-19 PROBLEM — R06.09 DOE (DYSPNEA ON EXERTION): Status: RESOLVED | Noted: 2018-02-07 | Resolved: 2018-03-19

## 2018-03-19 PROCEDURE — 99213 OFFICE O/P EST LOW 20 MIN: CPT | Performed by: FAMILY MEDICINE

## 2018-03-19 RX ORDER — CEFPROZIL 500 MG/1
TABLET, FILM COATED ORAL
Refills: 0 | COMMUNITY
Start: 2017-12-19 | End: 2018-03-19

## 2018-03-19 RX ORDER — ZOLPIDEM TARTRATE 10 MG/1
TABLET ORAL
Refills: 3 | COMMUNITY
Start: 2017-12-04 | End: 2018-08-21

## 2018-03-19 NOTE — MR AVS SNAPSHOT
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   o   Discuss preventive care.    o   Review your medicines if your doctor has prescribed any.     Have a cholesterol test every 5 years, or more frequently if you are at risk for high cholesterol/heart disease.     Have a diabetes test (fasting glucose) every three years. If you are at risk for diabetes, you should have this test more often.     Have a colonoscopy at age 50, or have a yearly FIT test (stool test). These exams will check for colon cancer.      Talk with your health care provider about whether or not a prostate cancer screening test (PSA) is right for you.    You should be tested each year for STDs (sexually transmitted diseases), if you re at risk.     Shots: Get a flu shot each year. Get a tetanus shot every 10 years.     Nutrition:    Eat at least 5 servings of fruits and vegetables daily.     Eat whole-grain bread, whole-wheat pasta and brown rice instead of white grains and rice.     Talk to your provider about Calcium and Vitamin D.     Lifestyle    Exercise for at least 150 minutes a week (30 minutes a day, 5 days a week). This will help you control your weight and prevent disease.     Limit alcohol to one drink per day.     No smoking.     Wear sunscreen to prevent skin cancer.     See your dentist every six months for an exam and cleaning.     See your eye  doctor every 1 to 2 years.            Follow-ups after your visit        Your next 10 appointments already scheduled     Apr 04, 2018 11:30 AM CDT   LAB with [facility]   [facility] AT [place] ([facility])    [address] [place] [address]  [place]  [place] [address]   [phone]           Please do not eat 10-12 hours before your appointment if you are coming in fasting for labs on lipids, cholesterol, or glucose (sugar). This does not apply to pregnant women. Water, hot tea and black coffee (with nothing added) are okay. Do not drink other fluids, diet soda or chew gum.            Apr 04, 2018 12:30 PM CDT   Return Visit with Johana Sewell PA-C   [facility] ([facility])    [address] [place], [place] [id number]  [place] [address] [phone]            Sep 07, 2018  7:15 AM CDT   Lab with [facility]    [facility] ([facility])    [address] [place] [id number] [phone]            Sep 07, 2018  7:30 AM CDT   US ABDOMEN COMPLETE with UCUS2   [facility] ([facility])    [address] [place] [id number] [phone]           Please bring a list of your medicines (including vitamins, minerals and over-the-counter drugs). Also, tell your doctor about any allergies you may have. Wear comfortable clothes and leave your valuables at home.  Adults: No eating or drinking for 8 hours before the exam. You may take medicine with a small sip of water.  Children: - Children 6+ years: No food or drink for 6 hours before exam. - Children 1-5 years: No food or drink for 4 hours before exam. - Infants, breast-fed: may have breast milk up to 2 hours before exam. - Infants, formula: may have bottle until 4 hours before exam.  Please call the Imaging Department at your exam site with any questions.            Sep 07, 2018  8:30  AM CDT   (Arrive by 8:15 AM)   Return Liver Transplant with Kevin Bedolla MD   [facility] ([facility] [place])    [address]  [address]  [facility] [address] [phone]              Who to contact     If you have questions or need follow up information about today's clinic visit or your schedule please contact [facility] directly at [phone].  Normal or non-critical lab and imaging results will be communicated to you by MeetDoctorhart, letter or phone within 4 business days after the clinic has received the results. If you do not hear from us within 7 days, please contact the clinic through Zuvvu or phone. If you have a critical or abnormal lab result, we will notify you by phone as soon as possible.  Submit refill requests through Zuvvu or call your pharmacy and they will forward the refill request to us. Please allow 3 business days for your refill to be completed.          Additional Information About Your Visit        Zuvvu Information     Zuvvu gives you secure access to your electronic health record. If you see a primary care provider, you can also send messages to your care team and make appointments. If you have questions, please call your primary care clinic.  If you do not have a primary care provider, please call [phone] and they will assist you.        Care EveryWhere ID     This is your Care EveryWhere ID. This could be used by other organizations to access your Exchange medical records  [id number]        Your Vitals Were     Pulse Respirations Pulse Oximetry BMI (Body Mass Index)          64 16 98% 26.07 kg/m2         Blood Pressure from Last 3 Encounters:   03/19/18 120/78   03/09/18 147/75   03/08/18 153/90    Weight from Last 3 Encounters:   03/19/18 87.2 kg (192 lb 3.2 oz)   03/09/18 87.6 kg (193 lb 3.2 oz)   02/07/18 90.3 kg (199 lb)              Today, you had the following     No orders found for display         Today's  Medication Changes          These changes are accurate as of 3/19/18  5:19 PM.  If you have any questions, ask your nurse or doctor.               Stop taking these medicines if you haven't already. Please contact your care team if you have questions.     cefPROZIL 500 MG tablet   Commonly known as:  CEFZIL   Stopped by:  Jace Mayo MD                    Primary Care Provider Office Phone # Fax #    Jace Mayo [phone] [address] [facility] [address]        Equal Access to Services     [facility]: Hadii aad ku hadasho Soomaali, waaxda luqadaha, qaybta kaalmada adeegyada, waxay idiin hayaan adeeg kharaemily eng . So [facility] [phone].    ATENCIÓN: Si habla español, tiene a stubbs disposición servicios gratuitos de asistencia lingüística. [facility] [phone].    We comply with applicable federal civil rights laws and [place] laws. We do not discriminate on the basis of race, color, national origin, age, disability, sex, sexual orientation, or gender identity.            Thank you!     Thank you for choosing [facility]  for your care. Our goal is always to provide you with excellent care. Hearing back from our patients is one way we can continue to improve our services. Please take a few minutes to complete the written survey that you may receive in the mail after your visit with us. Thank you!             Your Updated Medication List - Protect others around you: Learn how to safely use, store and throw away your medicines at www.disposemymeds.org.          This list is accurate as of 3/19/18  5:19 PM.  Always use your most recent med list.                   Brand Name Dispense Instructions for use Diagnosis    aspirin 81 MG tablet      Take 1 tablet by mouth daily.        atorvastatin 20 MG tablet    LIPITOR     Take 20 mg by mouth daily        chlorhexidine 0.12 % solution    PERIDEX     SWISH 1/2 OZ FOR 30 SECONDS THEN SPIT TWICE A DAY         chlorthalidone 25 MG tablet    HYGROTON    90 tablet    Take 1 tablet (25 mg) by mouth daily    Benign essential hypertension       cyanocobalamin 1000 MCG/ML injection    VITAMIN B12     Inject 1 mL into the muscle once One time injection per Dr. PENN        fluorouracil 5 % cream    EFUDEX     APPLY TO THE AFFECTED AREAS OF THE FACE TWICE A DAY FOR 2 WEEKS.        insulin detemir 100 UNIT/ML injection    LEVEMIR FLEXTOUCH    15 mL    INJECT 41 UNITS SUBCUTANEOUSLY ONCE DAILY.    Encounter for medication refill       * insulin pen needle 32G X 4 MM    BD HEIKE U/F    100 each    Use 1 daily or as directed.    Diabetes mellitus (H)       * B-D U/F 31G X 8 MM   Generic drug:  insulin pen needle     100 each    TEST TWICE DAILY AS DIRECTED    Encounter for medication refill       lisinopril 10 MG tablet    PRINIVIL/ZESTRIL    90 tablet    Take 1 tablet (10 mg) by mouth daily    Essential hypertension, Coronary artery disease due to lipid rich plaque       metoprolol succinate 100 MG 24 hr tablet    TOPROL-XL    1 tablet    Take 0.5 tablets (50 mg) by mouth daily    Coronary artery disease due to lipid rich plaque, Essential hypertension       VITAMIN D (CHOLECALCIFEROL) PO      Take 1,000 Units by mouth daily        zolpidem 10 MG tablet    AMBIEN     TAKE 1 TABLET BY MOUTH AT BEDTIME AS NEEED        * Notice:  This list has 2 medication(s) that are the same as other medications prescribed for you. Read the directions carefully, and ask your doctor or other care provider to review them with you.

## 2018-03-19 NOTE — PROGRESS NOTES
Kelton is here to review his cardiac history, and my opinion of his prognosis. He had CABG at age 50, DM 2 with initial poor control, and resultant neuropathy in the left leg, as well as some retinal issues. He had a very stressful job until jail 2.5 years ago. His DM2 has been in much better control since.   Current Outpatient Prescriptions   Medication     zolpidem (AMBIEN) 10 MG tablet     chlorthalidone (HYGROTON) 25 MG tablet     metoprolol succinate (TOPROL-XL) 100 MG 24 hr tablet     lisinopril (PRINIVIL/ZESTRIL) 10 MG tablet     insulin detemir (LEVEMIR FLEXTOUCH) 100 UNIT/ML injection     atorvastatin (LIPITOR) 20 MG tablet     chlorhexidine (PERIDEX) 0.12 % solution     B-D U/F 31G X 8 MM insulin pen needle     cyanocobalamin (VITAMIN B12) 1000 MCG/ML injection     insulin pen needle (BD HEIKE U/F) 32G X 4 MM     VITAMIN D, CHOLECALCIFEROL, PO     aspirin 81 MG tablet     fluorouracil (EFUDEX) 5 % cream     No current facility-administered medications for this visit.      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 He just did 12 minutes on a exercise stress test. His last A1c was 7.2, and his chronic microalbuminuria has resolved.   Plan: he is frustrated about not losing weight. I suggested he may try a ketogenic diet, being careful to monitor  sugars.    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

## 2018-03-19 NOTE — PROGRESS NOTES
"  SUBJECTIVE:   CC: Vikash Burgos is an 58 year old male who presents for preventative health visit.     Healthy Habits:    Do you get at least three servings of calcium containing foods daily (dairy, green leafy vegetables, etc.)? {YES/NO, DAIRY INTAKE:169824::\"yes\"}    Amount of exercise or daily activities, outside of work: {AMOUNT EXERCISE:434782}    Problems taking medications regularly {Yes /No default:882668::\"No\"}    Medication side effects: {Yes /No default.:146983::\"No\"}    Have you had an eye exam in the past two years? {YESNOBLANK:891962}    Do you see a dentist twice per year? {YESNOBLANK:202028}    Do you have sleep apnea, excessive snoring or daytime drowsiness?{YESNOBLANK:956575}  {Outside tests to abstract? :030700}     {additional problems to add (Optional):137807}    Today's PHQ-2 Score:   PHQ-2 ( 1999 [employer]) 3/9/2018   Q1: Little interest or pleasure in doing things 0   Q2: Feeling down, depressed or hopeless 0   PHQ-2 Score 0     {PHQ-2 LOOK IN ASSESSMENTS :663601}  Abuse: Current or Past(Physical, Sexual or Emotional)- {YES/NO/NA:903880}  Do you feel safe in your environment - {YES/NO/NA:610711}    Social History   Substance Use Topics     Smoking status: Former Smoker     Packs/day: 0.50     Years: 12.00     Quit date: 1/26/2005     Smokeless tobacco: Never Used     Alcohol use No      If you drink alcohol do you typically have >3 drinks per day or >7 drinks per week? {ETOH :422935}                      Last PSA: No results found for: PSA    Reviewed orders with patient. Reviewed health maintenance and updated orders accordingly - {Yes/No:466700::\"Yes\"}  {Chronicprobdata (Optional):579867}    Reviewed and updated as needed this visit by clinical staff         Reviewed and updated as needed this visit by Provider        {HISTORY OPTIONS (Optional):972959}    ROS:  {MALE ROS-adult preventive care package:305621::\"C: NEGATIVE for fever, chills, change in weight\",\"I: NEGATIVE for worrisome " "rashes, moles or lesions\",\"E: NEGATIVE for vision changes or irritation\",\"ENT: NEGATIVE for ear, mouth and throat problems\",\"R: NEGATIVE for significant cough or SOB\",\"CV: NEGATIVE for chest pain, palpitations or peripheral edema\",\"GI: NEGATIVE for nausea, abdominal pain, heartburn, or change in bowel habits\",\" male: negative for dysuria, hematuria, decreased urinary stream, erectile dysfunction, urethral discharge\",\"M: NEGATIVE for significant arthralgias or myalgia\",\"N: NEGATIVE for weakness, dizziness or paresthesias\",\"P: NEGATIVE for changes in mood or affect\"}    OBJECTIVE:   There were no vitals taken for this visit.  EXAM:  {Exam Choices:790403}    ASSESSMENT/PLAN:   {Diag Picklist:147908}    COUNSELING:  {MALE COUNSELING MESSAGES:324089::\"Reviewed preventive health counseling, as reflected in patient instructions\"}    {BP Counseling- Complete if BP >= 120/80  (Optional):657029}   reports that he quit smoking about 13 years ago. He has a 6.00 pack-year smoking history. He has never used smokeless tobacco.  {Tobacco Cessation -- Complete if patient is a smoker (Optional):476024}  Estimated body mass index is 26.2 kg/(m^2) as calculated from the following:    Height as of 3/9/18: 1.829 m (6').    Weight as of 3/9/18: 87.6 kg (193 lb 3.2 oz).   {Weight Management Plan (ACO) Complete if BMI is abnormal-  Ages 18-64  BMI >24.9.  Age 65+ with BMI <23 or >30 (Optional):517338}    Counseling Resources:  ATP IV Guidelines  Pooled Cohorts Equation Calculator  FRAX Risk Assessment  ICSI Preventive Guidelines  Dietary Guidelines for Americans, 2010  USDA's MyPlate  ASA Prophylaxis  Lung CA Screening    Jace Mayo MD  [facility]  "

## 2018-03-26 ENCOUNTER — DOCUMENTATION ONLY (OUTPATIENT)
Dept: CARDIOLOGY | Facility: CLINIC | Age: 58
End: 2018-03-26

## 2018-03-26 NOTE — PROGRESS NOTES
Patient's wife stopped at Team 2 desk with update: states patient tried the chlorthalidone for a few days. However, he felt lightheaded most of the time and had very poor balance. Wife states patient fell into the shelving at the grocery store and she had to grab him multiple times at home to prevent a fall. Patient's BP readings while using the chlorthalidone were in the 122-132/70-76 range. He did not check a BP immediately following a balance episode as she states he was lightheaded all the time.  Patient decided to stop the chlorthalidone and restarted his metoprolol at the same dose as previously. He move up his REAGAN visit to 3/28/18 to discuss further.  Will message Dr. Roberts to review

## 2018-03-28 ENCOUNTER — OFFICE VISIT (OUTPATIENT)
Dept: CARDIOLOGY | Facility: CLINIC | Age: 58
End: 2018-03-28
Attending: INTERNAL MEDICINE
Payer: COMMERCIAL

## 2018-03-28 VITALS
BODY MASS INDEX: 25.73 KG/M2 | HEIGHT: 72 IN | DIASTOLIC BLOOD PRESSURE: 68 MMHG | WEIGHT: 190 LBS | HEART RATE: 60 BPM | SYSTOLIC BLOOD PRESSURE: 118 MMHG

## 2018-03-28 DIAGNOSIS — I10 BENIGN ESSENTIAL HYPERTENSION: ICD-10-CM

## 2018-03-28 DIAGNOSIS — R68.89 EXERCISE INTOLERANCE: Primary | ICD-10-CM

## 2018-03-28 DIAGNOSIS — I25.10 CORONARY ARTERY DISEASE INVOLVING NATIVE CORONARY ARTERY OF NATIVE HEART WITHOUT ANGINA PECTORIS: ICD-10-CM

## 2018-03-28 DIAGNOSIS — I10 ESSENTIAL HYPERTENSION: ICD-10-CM

## 2018-03-28 LAB
ANION GAP SERPL CALCULATED.3IONS-SCNC: 12.7 MMOL/L (ref 6–17)
BUN SERPL-MCNC: 19 MG/DL (ref 7–30)
CALCIUM SERPL-MCNC: 9.7 MG/DL (ref 8.5–10.5)
CHLORIDE SERPL-SCNC: 99 MMOL/L (ref 98–107)
CO2 SERPL-SCNC: 27 MMOL/L (ref 23–29)
CREAT SERPL-MCNC: 0.96 MG/DL (ref 0.7–1.3)
GFR SERPL CREATININE-BSD FRML MDRD: 80 ML/MIN/1.7M2
GLUCOSE SERPL-MCNC: 182 MG/DL (ref 70–105)
NT-PROBNP SERPL-MCNC: 118 PG/ML (ref 0–125)
POTASSIUM SERPL-SCNC: 4.7 MMOL/L (ref 3.5–5.1)
SODIUM SERPL-SCNC: 134 MMOL/L (ref 136–145)

## 2018-03-28 PROCEDURE — 99214 OFFICE O/P EST MOD 30 MIN: CPT | Performed by: PHYSICIAN ASSISTANT

## 2018-03-28 PROCEDURE — 83880 ASSAY OF NATRIURETIC PEPTIDE: CPT | Performed by: INTERNAL MEDICINE

## 2018-03-28 PROCEDURE — 80048 BASIC METABOLIC PNL TOTAL CA: CPT | Performed by: INTERNAL MEDICINE

## 2018-03-28 PROCEDURE — 36415 COLL VENOUS BLD VENIPUNCTURE: CPT | Performed by: INTERNAL MEDICINE

## 2018-03-28 NOTE — PATIENT INSTRUCTIONS
Today's Plan:   1) Your heart monitor and stress study are normal.   2) Try decreasing metoprolol to 75 mg (25 mg in the morning and 50 mg in the evening). Give this a week and call us if your symptoms are not better.   3) Check your blood pressure daily.     If you have questions or concerns please call my nurse team at [phone].     Scheduling phone number: [phone]  Reminder: Please bring in all current medications, over the counter supplements and vitamin bottles to your next appointment.    It was a pleasure seeing you today!     Johana Sewell  3/28/2018

## 2018-03-28 NOTE — PROGRESS NOTES
Primary Cardiologist: Dr. Roberts    History of Present Illness:   This is a pleasant 58-year-old gentleman who presents to cardiology clinic for one-month follow-up.  His past medical history is notable for coronary artery disease (CABG ×4 in 2010 with subsequent persistent chest discomfort for which he underwent coronary angiogram in 2014 demonstrating all grafts to be widely patent with diffuse underlying native coronary artery disease, his chest discomfort was felt to be neuropathic in etiology), hypertension, hyperlipidemia, and diabetes mellitus type 2 (insulin dependent).    He returns to clinic today to discuss the results of recent nuclear stress study, Holter monitor, and medication adjustments.  He was seen by Dr. Cowart for routine follow-up a month ago at which time he had reports of out of proportion shortness of breath on inclined surfaces and exercise intolerance.  He had no chest discomfort, palpitations, lightheadedness, or dizziness.  It was felt that he may have chronotropic incompetence.  He was set up for Holter monitor which showed no evidence of chronotropic incompetence.  He was noted to have low burden PVCs and his principal rhythm was first-degree AV block with intermittent second-degree AV block.  Nuclear stress study showed 2 defects 1 very small slightly reversible distal lateral apical defect consistent with small area of possible mild ischemia and second area with small partly reversible inferior inferolateral defect at the base consistent with possible mild ischemia which could suggest significant diaphragm attenuation or bowel uptake.  Overall there were no significant areas of ischemia or infarct noted.  During exercise his resting heart rate is 64 bpm went up to 153 bpm (off of beta-blockers).  He reported that he had no significant shortness of breath or chest discomfort during  the exercise portion.    These results were communicated to the patient.  Given patient's persistent  shortness of breath and exercise intolerance he was recommended to do a trial of reduced dose of metoprolol.  And he was started on chlorthalidone 25 mg daily.  Unfortunately a few days after initiating chlorthalidone he had significant lightheadedness dizziness and feeling off balance.  He discontinued this medication on his own and increase his metoprolol back to 50 mg twice daily.  He feels much better today though he continues to have slight shortness of breath.  No other symptoms.    Assessment and plan:  This is a pleasant 58-year-old gentleman who presents to cardiology clinic for one-month follow-up.  His past medical history is notable for coronary artery disease (CABG ×4 in 2010 with subsequent persistent chest discomfort for which he underwent coronary angiogram in 2014 demonstrating all grafts to be widely patent with diffuse underlying native coronary artery disease, his chest discomfort was felt to be neuropathic in etiology), hypertension, hyperlipidemia, and diabetes mellitus type 2 (insulin dependent).    We review the results of his recent studies in great detail.  We will continue without chlorthalidone at this time.  I suspect this because possible hypertension or electrolytes imbalance such as hyponatremia.  His basic metabolic panel does show borderline hyponatremia.  He discontinued chlorthalidone 3 days ago.  Overall his studies do not show any abnormalities.  We did talk about possibly doing a trial of low-dose metoprolol to see if this improves his exercise intolerance.  We will try 75 mg daily (25 mg in the morning and 50 mg in the evening daily).  He will contact us if  his blood pressure goes above 135/85 consistently.  Of note his N-terminal BNP was completely normal.  I am hopeful that with the decrease in metoprolol his exercise intolerance will improve.    Thank you for allowing me to participate in the care of this pleasant patient today.        This note was completed in part using  [employer] voice recognition software. Although reviewed after completion, some word and grammatical errors may occur.    Orders this Visit:  No orders of the defined types were placed in this encounter.    No orders of the defined types were placed in this encounter.    Medications Discontinued During This Encounter   Medication Reason     chlorthalidone (HYGROTON) 25 MG tablet Stopped by Patient         Encounter Diagnosis   Name Primary?     Benign essential hypertension        CURRENT MEDICATIONS:  Current Outpatient Prescriptions   Medication Sig Dispense Refill     zolpidem (AMBIEN) 10 MG tablet TAKE 1 TABLET BY MOUTH AT BEDTIME AS NEEED  3     metoprolol succinate (TOPROL-XL) 100 MG 24 hr tablet Take 0.5 tablets (50 mg) by mouth daily 1 tablet 0     lisinopril (PRINIVIL/ZESTRIL) 10 MG tablet Take 1 tablet (10 mg) by mouth daily 90 tablet 3     insulin detemir (LEVEMIR FLEXTOUCH) 100 UNIT/ML injection INJECT 41 UNITS SUBCUTANEOUSLY ONCE DAILY. 15 mL 3     atorvastatin (LIPITOR) 20 MG tablet Take 20 mg by mouth daily  2     fluorouracil (EFUDEX) 5 % cream APPLY TO THE AFFECTED AREAS OF THE FACE TWICE A DAY FOR 2 WEEKS.  4     chlorhexidine (PERIDEX) 0.12 % solution SWISH 1/2 OZ FOR 30 SECONDS THEN SPIT TWICE A DAY  5     B-D U/F 31G X 8 MM insulin pen needle TEST TWICE DAILY AS DIRECTED 100 each 6     cyanocobalamin (VITAMIN B12) 1000 MCG/ML injection Inject 1 mL into the muscle once One time injection per Dr. PENN       insulin pen needle (BD HEIKE U/F) 32G X 4 MM Use 1 daily or as directed. 100 each prn     VITAMIN D, CHOLECALCIFEROL, PO Take 1,000 Units by mouth daily       aspirin 81 MG tablet Take 1 tablet by mouth daily.         ALLERGIES     Allergies   Allergen Reactions     Chlorthalidone Nausea and Vomiting     dizziness     Penicillins Rash     Rash with PCN only. Patient has taken amoxicillin with no rash.       PAST MEDICAL HISTORY:  Past Medical History:   Diagnosis Date     Basal cell carcinoma       Carotid stenosis, left      Coronary artery disease     4 vessel bypass November 2010; LIMA ->LAD, SVG-> OM3, SVG -> D2, SVG -> PDA     Diabetes mellitus (H) 2005    neuropathy     Generalized anxiety disorder 5/2/2014     Hepatitis C, chronic (H) 2005     Hyperlipidemia LDL goal < 70      Hypertension      Liver diseases     9/15 Liver is 10 cm in span without left lobe enlargement     Persistent microalbuminuria associated with type 2 diabetes mellitus (H) 5/6/2015       PAST SURGICAL HISTORY:  Past Surgical History:   Procedure Laterality Date     ARTHROSCOPY KNEE RT/LT      left     COLONOSCOPY       CORONARY ARTERY BYPASS  11/18/201    Coronary artery bypass grafting x 4 with placement of the left internal mammary artery to the distal midportion of the left anterior descending artery, saphenous vein graft from aorta to the third obtuse marginal branch of circumflex coronary artery, saphenous vein graft from aorta to the second diagonal branch, saphenous vein graft from aorta to the posterior descending artery.     ESOPHAGOSCOPY, GASTROSCOPY, DUODENOSCOPY (EGD), COMBINED  10/31/2011    Procedure:COMBINED ESOPHAGOSCOPY, GASTROSCOPY, DUODENOSCOPY (EGD); Surgeon:ALONSO ALDANA; Location: GI     ESOPHAGOSCOPY, GASTROSCOPY, DUODENOSCOPY (EGD), COMBINED N/A 3/8/2018    Procedure: COMBINED ESOPHAGOSCOPY, GASTROSCOPY, DUODENOSCOPY (EGD);  EGD;  Surgeon: Angel Luis Justice MD;  Location: [facility]     HEART CATH CORONARY ANGIOGRAM W/LV GRAM  9-11-10    CV Surgery recommended     HEART CATH CORONARY ANGIOGRAM W/LV GRAM  2-28-14    Medical management     HERNIA REPAIR, INGUINAL RT/LT      left       FAMILY HISTORY:  Family History   Problem Relation Age of Onset     C.A.D. Father      CANCER Father      bladder     Heart Surgery Father      bypass surgery     HEART DISEASE Mother        SOCIAL HISTORY:  Social History     Social History     Marital status:      Spouse name: N/A     Number of children: N/A      Years of education: N/A     Social History Main Topics     Smoking status: Former Smoker     Packs/day: 0.50     Years: 12.00     Quit date: 1/26/2005     Smokeless tobacco: Never Used     Alcohol use No     Drug use: No     Sexual activity: Not Asked     Other Topics Concern     Caffeine Concern Yes     2 cups coffee per day     Special Diet Yes     low carb diet, low sugar     Exercise Yes     treadmill 40 minutes, walk, daily, bike 30 minutes every other day     Social History Narrative       Review of Systems:  Skin:  Positive for     Eyes:  Positive for glasses  ENT:  Negative    Respiratory:  Positive for dyspnea on exertion  Cardiovascular:    lightheadedness;Positive for;chest pain;fatigue  Gastroenterology: Negative    Genitourinary:  Negative    Musculoskeletal:  Positive for arthritis  Neurologic:  Positive for numbness or tingling of feet;numbness or tingling of hands  Psychiatric:  Negative    Heme/Lymph/Imm:  Negative    Endocrine:  Positive for diabetes    Physical Exam:  Vitals: /68  Pulse 60  Ht 1.829 m (6')  Wt 86.2 kg (190 lb)  BMI 25.77 kg/m2     GEN:  NAD  NECK: No JVD  C/V:  Regular rate and rhythm, no murmur, rub or gallop.  RESP: Clear to auscultation bilaterally without wheezing, rales, or rhonchi.  GI: Abdomen soft, nontender, nondistended.   EXTREM: No LE edema.   NEURO: Alert and oriented, cooperative. No obvious focal deficits.   PSYCH: Normal affect.  SKIN: Warm and dry.       Recent Lab Results:  LIPID RESULTS:  Lab Results   Component Value Date    CHOL 92 03/09/2018    HDL 40 03/09/2018    LDL 36 03/09/2018    TRIG 82 03/09/2018    CHOLHDLRATIO 3.4 08/02/2013       LIVER ENZYME RESULTS:  Lab Results   Component Value Date    AST 22 03/09/2018    ALT 38 03/09/2018       CBC RESULTS:  Lab Results   Component Value Date    WBC 6.2 03/09/2018    RBC 4.96 03/09/2018    HGB 15.3 03/09/2018    HCT 45.0 03/09/2018    MCV 91 03/09/2018    MCH 30.8 03/09/2018    MCHC 34.0 03/09/2018     RDW 12.9 03/09/2018     03/09/2018       BMP RESULTS:  Lab Results   Component Value Date     (L) 03/28/2018    POTASSIUM 4.7 03/28/2018    CHLORIDE 99 03/28/2018    CO2 27 03/28/2018    ANIONGAP 12.7 03/28/2018     (H) 03/28/2018    BUN 19 03/28/2018    CR 0.96 03/28/2018    GFRESTIMATED 80 03/28/2018    GFRESTBLACK >90 03/28/2018    LIA 9.7 03/28/2018        A1C RESULTS:  Lab Results   Component Value Date    A1C 7.2 (H) 11/20/2017       INR RESULTS:  Lab Results   Component Value Date    INR 1.08 03/09/2018    INR 1.08 09/15/2017           Johana Sewell PA-C   March 28, 2018

## 2018-03-28 NOTE — MR AVS SNAPSHOT
After Visit Summary   3/28/2018    Vikash Burgos    MRN: [id number]           Patient Information     Date Of Birth          1960        Visit Information        Provider Department      3/28/2018 1:30 PM Johana Sewell PA-C [facility]   [place]        Today's Diagnoses     Benign essential hypertension          Care Instructions    Today's Plan:   1) Your heart monitor and stress study are normal.   2) Try decreasing metoprolol to 75 mg (25 mg in the morning and 50 mg in the evening). Give this a week and call us if your symptoms are not better.   3) Check your blood pressure daily.     If you have questions or concerns please call my nurse team at [phone].     Scheduling phone number: [phone]  Reminder: Please bring in all current medications, over the counter supplements and vitamin bottles to your next appointment.    It was a pleasure seeing you today!     Johana Sewell  3/28/2018              Follow-ups after your visit        Your next 10 appointments already scheduled     Sep 07, 2018  7:15 AM CDT   Lab with  LAB   [facility] ([facility])    [address] [place] [id number] [phone]            Sep 07, 2018  7:30 AM CDT   US ABDOMEN COMPLETE with [address] [facility] ([facility])    [address] [place] [id number] [phone]           Please bring a list of your medicines (including vitamins, minerals and over-the-counter drugs). Also, tell your doctor about any allergies you may have. Wear comfortable clothes and leave your valuables at home.  Adults: No eating or drinking for 8 hours before the exam. You may take medicine with a small sip of water.  Children: - Children 6+ years: No food or drink for 6 hours before exam. - Children 1-5 years: No food or drink for 4 hours before exam. - Infants,  breast-fed: may have breast milk up to 2 hours before exam. - Infants, formula: may have bottle until 4 hours before exam.  Please call the Imaging Department at your exam site with any questions.            Sep 07, 2018  8:30 AM CDT   (Arrive by 8:15 AM)   Return Liver Transplant with Kevin Bedolla MD   [facility] ([facility])    [address] [place] [address]  [address]  [facility] [id number] [phone]              Who to contact     If you have questions or need follow up information about today's clinic visit or your schedule please contact [facility] directly at [phone].  Normal or non-critical lab and imaging results will be communicated to you by MyChart, letter or phone within 4 business days after the clinic has received the results. If you do not hear from us within 7 days, please contact the clinic through [employer] or phone. If you have a critical or abnormal lab result, we will notify you by phone as soon as possible.  Submit refill requests through [employer] or call your pharmacy and they will forward the refill request to us. Please allow 3 business days for your refill to be completed.          Additional Information About Your Visit        [employer] Information     [employer] gives you secure access to your electronic health record. If you see a primary care provider, you can also send messages to your care team and make appointments. If you have questions, please call your primary care clinic.  If you do not have a primary care provider, please call [phone] and they will assist you.        Care EveryWhere ID     This is your Care EveryWhere ID. This could be used by other organizations to access your [facility] medical records  [id number]        Your Vitals Were     Pulse Height BMI (Body Mass Index)             60 1.829 m (6') 25.77 kg/m2          Blood Pressure from Last 3 Encounters:   03/28/18 118/68   03/19/18 120/78    03/09/18 147/75    Weight from Last 3 Encounters:   03/28/18 86.2 kg (190 lb)   03/19/18 87.2 kg (192 lb 3.2 oz)   03/09/18 87.6 kg (193 lb 3.2 oz)              We Performed the Following     Follow-Up with Cardiac Advanced Practice Provider        Primary Care Provider Office Phone # Fax #    Jace Mayo [phone] [address] [facility] [place] [address]        Equal Access to Services     [facility] CampusLUCIA : Hadii aad ku hadasho Soomaali, waaxda luqadaha, qaybta kaalmada adeegyada, waxay idiin hayaan adeeg kharash laFrankiaan . So [facility] [phone].    ATENCIÓN: Si habla español, tiene a stubbs disposición servicios gratuitos de asistencia lingüística. Valentinaame al [phone].    We comply with applicable federal civil rights laws and [place] laws. We do not discriminate on the basis of race, color, national origin, age, disability, sex, sexual orientation, or gender identity.            Thank you!     Thank you for choosing [facility]  for your care. Our goal is always to provide you with excellent care. Hearing back from our patients is one way we can continue to improve our services. Please take a few minutes to complete the written survey that you may receive in the mail after your visit with us. Thank you!             Your Updated Medication List - Protect others around you: Learn how to safely use, store and throw away your medicines at www.disposemymeds.org.          This list is accurate as of 3/28/18  2:07 PM.  Always use your most recent med list.                   Brand Name Dispense Instructions for use Diagnosis    aspirin 81 MG tablet      Take 1 tablet by mouth daily.        atorvastatin 20 MG tablet    LIPITOR     Take 20 mg by mouth daily        chlorhexidine 0.12 % solution    PERIDEX     SWISH 1/2 OZ FOR 30 SECONDS THEN SPIT TWICE A DAY        cyanocobalamin 1000 MCG/ML injection    VITAMIN B12     Inject 1 mL into the muscle once One  time injection per Dr. PENN        fluorouracil 5 % cream    EFUDEX     APPLY TO THE AFFECTED AREAS OF THE FACE TWICE A DAY FOR 2 WEEKS.        insulin detemir 100 UNIT/ML injection    LEVEMIR FLEXTOUCH    15 mL    INJECT 41 UNITS SUBCUTANEOUSLY ONCE DAILY.    Encounter for medication refill       * insulin pen needle 32G X 4 MM    BD HEIKE U/F    100 each    Use 1 daily or as directed.    Diabetes mellitus (H)       * B-D U/F 31G X 8 MM   Generic drug:  insulin pen needle     100 each    TEST TWICE DAILY AS DIRECTED    Encounter for medication refill       lisinopril 10 MG tablet    PRINIVIL/ZESTRIL    90 tablet    Take 1 tablet (10 mg) by mouth daily    Essential hypertension, Coronary artery disease due to lipid rich plaque       metoprolol succinate 100 MG 24 hr tablet    TOPROL-XL    1 tablet    Take 0.5 tablets (50 mg) by mouth daily    Coronary artery disease due to lipid rich plaque, Essential hypertension       VITAMIN D (CHOLECALCIFEROL) PO      Take 1,000 Units by mouth daily        zolpidem 10 MG tablet    AMBIEN     TAKE 1 TABLET BY MOUTH AT BEDTIME AS NEEED        * Notice:  This list has 2 medication(s) that are the same as other medications prescribed for you. Read the directions carefully, and ask your doctor or other care provider to review them with you.

## 2018-03-28 NOTE — LETTER
3/28/2018    Jace Mayo MD  [address]  [facility] [address]    RE: Guthrie R Loretta       Dear Colleague,    I had the pleasure of seeing Vikashgila Burgos in the [facility].    Primary Cardiologist: Dr. Roberts    History of Present Illness:   This is a pleasant 58-year-old gentleman who presents to cardiology clinic for one-month follow-up.  His past medical history is notable for coronary artery disease (CABG ×4 in 2010 with subsequent persistent chest discomfort for which he underwent coronary angiogram in 2014 demonstrating all grafts to be widely patent with diffuse underlying native coronary artery disease, his chest discomfort was felt to be neuropathic in etiology), hypertension, hyperlipidemia, and diabetes mellitus type 2 (insulin dependent).    He returns to clinic today to discuss the results of recent nuclear stress study, Holter monitor, and medication adjustments.  He was seen by Dr. Cowart for routine follow-up a month ago at which time he had reports of out of proportion shortness of breath on inclined surfaces and exercise intolerance.  He had no chest discomfort, palpitations, lightheadedness, or dizziness.  It was felt that he may have chronotropic incompetence.  He was set up for Holter monitor which showed no evidence of chronotropic incompetence.  He was noted to have low burden PVCs and his principal rhythm was first-degree AV block with intermittent second-degree AV block.  Nuclear stress study showed 2 defects 1 very small slightly reversible distal lateral apical defect consistent with small area of possible mild ischemia and second area with small partly reversible inferior inferolateral defect at the base consistent with possible mild ischemia which could suggest significant diaphragm attenuation or bowel uptake.  Overall there were no significant areas of ischemia or infarct noted.  During exercise his resting heart rate is 64 bpm  went up to 153 bpm (off of beta-blockers).  He reported that he had no significant shortness of breath or chest discomfort during  the exercise portion.    These results were communicated to the patient.  Given patient's persistent shortness of breath and exercise intolerance he was recommended to do a trial of reduced dose of metoprolol.  And he was started on chlorthalidone 25 mg daily.  Unfortunately a few days after initiating chlorthalidone he had significant lightheadedness dizziness and feeling off balance.  He discontinued this medication on his own and increase his metoprolol back to 50 mg twice daily.  He feels much better today though he continues to have slight shortness of breath.  No other symptoms.    Assessment and plan:  This is a pleasant 58-year-old gentleman who presents to cardiology clinic for one-month follow-up.  His past medical history is notable for coronary artery disease (CABG ×4 in 2010 with subsequent persistent chest discomfort for which he underwent coronary angiogram in 2014 demonstrating all grafts to be widely patent with diffuse underlying native coronary artery disease, his chest discomfort was felt to be neuropathic in etiology), hypertension, hyperlipidemia, and diabetes mellitus type 2 (insulin dependent).    We review the results of his recent studies in great detail.  We will continue without chlorthalidone at this time.  I suspect this because possible hypertension or electrolytes imbalance such as hyponatremia.  His basic metabolic panel does show borderline hyponatremia.  He discontinued chlorthalidone 3 days ago.  Overall his studies do not show any abnormalities.  We did talk about possibly doing a trial of low-dose metoprolol to see if this improves his exercise intolerance.  We will try 75 mg daily (25 mg in the morning and 50 mg in the evening daily).  He will contact us if  his blood pressure goes above 135/85 consistently.  Of note his N-terminal BNP was  completely normal.  I am hopeful that with the decrease in metoprolol his exercise intolerance will improve.    Thank you for allowing me to participate in the care of this pleasant patient today.        This note was completed in part using Dragon voice recognition software. Although reviewed after completion, some word and grammatical errors may occur.    Orders this Visit:  No orders of the defined types were placed in this encounter.    No orders of the defined types were placed in this encounter.    Medications Discontinued During This Encounter   Medication Reason     chlorthalidone (HYGROTON) 25 MG tablet Stopped by Patient         Encounter Diagnosis   Name Primary?     Benign essential hypertension        CURRENT MEDICATIONS:  Current Outpatient Prescriptions   Medication Sig Dispense Refill     zolpidem (AMBIEN) 10 MG tablet TAKE 1 TABLET BY MOUTH AT BEDTIME AS NEEED  3     metoprolol succinate (TOPROL-XL) 100 MG 24 hr tablet Take 0.5 tablets (50 mg) by mouth daily 1 tablet 0     lisinopril (PRINIVIL/ZESTRIL) 10 MG tablet Take 1 tablet (10 mg) by mouth daily 90 tablet 3     insulin detemir (LEVEMIR FLEXTOUCH) 100 UNIT/ML injection INJECT 41 UNITS SUBCUTANEOUSLY ONCE DAILY. 15 mL 3     atorvastatin (LIPITOR) 20 MG tablet Take 20 mg by mouth daily  2     fluorouracil (EFUDEX) 5 % cream APPLY TO THE AFFECTED AREAS OF THE FACE TWICE A DAY FOR 2 WEEKS.  4     chlorhexidine (PERIDEX) 0.12 % solution SWISH 1/2 OZ FOR 30 SECONDS THEN SPIT TWICE A DAY  5     B-D U/F 31G X 8 MM insulin pen needle TEST TWICE DAILY AS DIRECTED 100 each 6     cyanocobalamin (VITAMIN B12) 1000 MCG/ML injection Inject 1 mL into the muscle once One time injection per Dr. PENN       insulin pen needle (BD HEIKE U/F) 32G X 4 MM Use 1 daily or as directed. 100 each prn     VITAMIN D, CHOLECALCIFEROL, PO Take 1,000 Units by mouth daily       aspirin 81 MG tablet Take 1 tablet by mouth daily.         ALLERGIES     Allergies   Allergen Reactions      Chlorthalidone Nausea and Vomiting     dizziness     Penicillins Rash     Rash with PCN only. Patient has taken amoxicillin with no rash.       PAST MEDICAL HISTORY:  Past Medical History:   Diagnosis Date     Basal cell carcinoma      Carotid stenosis, left      Coronary artery disease     4 vessel bypass November 2010; LIMA ->LAD, SVG-> OM3, SVG -> D2, SVG -> PDA     Diabetes mellitus (H) 2005    neuropathy     Generalized anxiety disorder 5/2/2014     Hepatitis C, chronic (H) 2005     Hyperlipidemia LDL goal < 70      Hypertension      Liver diseases     9/15 Liver is 10 cm in span without left lobe enlargement     Persistent microalbuminuria associated with type 2 diabetes mellitus (H) 5/6/2015       PAST SURGICAL HISTORY:  Past Surgical History:   Procedure Laterality Date     ARTHROSCOPY KNEE RT/LT      left     COLONOSCOPY       CORONARY ARTERY BYPASS  11/18/201    Coronary artery bypass grafting x 4 with placement of the left internal mammary artery to the distal midportion of the left anterior descending artery, saphenous vein graft from aorta to the third obtuse marginal branch of circumflex coronary artery, saphenous vein graft from aorta to the second diagonal branch, saphenous vein graft from aorta to the posterior descending artery.     ESOPHAGOSCOPY, GASTROSCOPY, DUODENOSCOPY (EGD), COMBINED  10/31/2011    Procedure:COMBINED ESOPHAGOSCOPY, GASTROSCOPY, DUODENOSCOPY (EGD); Surgeon:ALONSO ALDANA; Location: GI     ESOPHAGOSCOPY, GASTROSCOPY, DUODENOSCOPY (EGD), COMBINED N/A 3/8/2018    Procedure: COMBINED ESOPHAGOSCOPY, GASTROSCOPY, DUODENOSCOPY (EGD);  EGD;  Surgeon: Angel Luis Justice MD;  Location:  GI     HEART CATH CORONARY ANGIOGRAM W/LV GRAM  9-11-10    CV Surgery recommended     HEART CATH CORONARY ANGIOGRAM W/LV GRAM  2-28-14    Medical management     HERNIA REPAIR, INGUINAL RT/LT      left       FAMILY HISTORY:  Family History   Problem Relation Age of Onset     C.A.D. Father       CANCER Father      bladder     Heart Surgery Father      bypass surgery     HEART DISEASE Mother        SOCIAL HISTORY:  Social History     Social History     Marital status:      Spouse name: N/A     Number of children: N/A     Years of education: N/A     Social History Main Topics     Smoking status: Former Smoker     Packs/day: 0.50     Years: 12.00     Quit date: 1/26/2005     Smokeless tobacco: Never Used     Alcohol use No     Drug use: No     Sexual activity: Not Asked     Other Topics Concern     Caffeine Concern Yes     2 cups coffee per day     Special Diet Yes     low carb diet, low sugar     Exercise Yes     treadmill 40 minutes, walk, daily, bike 30 minutes every other day     Social History Narrative       Review of Systems:  Skin:  Positive for     Eyes:  Positive for glasses  ENT:  Negative    Respiratory:  Positive for dyspnea on exertion  Cardiovascular:    lightheadedness;Positive for;chest pain;fatigue  Gastroenterology: Negative    Genitourinary:  Negative    Musculoskeletal:  Positive for arthritis  Neurologic:  Positive for numbness or tingling of feet;numbness or tingling of hands  Psychiatric:  Negative    Heme/Lymph/Imm:  Negative    Endocrine:  Positive for diabetes    Physical Exam:  Vitals: /68  Pulse 60  Ht 1.829 m (6')  Wt 86.2 kg (190 lb)  BMI 25.77 kg/m2     GEN:  NAD  NECK: No JVD  C/V:  Regular rate and rhythm, no murmur, rub or gallop.  RESP: Clear to auscultation bilaterally without wheezing, rales, or rhonchi.  GI: Abdomen soft, nontender, nondistended.   EXTREM: No LE edema.   NEURO: Alert and oriented, cooperative. No obvious focal deficits.   PSYCH: Normal affect.  SKIN: Warm and dry.       Recent Lab Results:  LIPID RESULTS:  Lab Results   Component Value Date    CHOL 92 03/09/2018    HDL 40 03/09/2018    LDL 36 03/09/2018    TRIG 82 03/09/2018    CHOLHDLRATIO 3.4 08/02/2013       LIVER ENZYME RESULTS:  Lab Results   Component Value Date    AST 22  03/09/2018    ALT 38 03/09/2018       CBC RESULTS:  Lab Results   Component Value Date    WBC 6.2 03/09/2018    RBC 4.96 03/09/2018    HGB 15.3 03/09/2018    HCT 45.0 03/09/2018    MCV 91 03/09/2018    MCH 30.8 03/09/2018    MCHC 34.0 03/09/2018    RDW 12.9 03/09/2018     03/09/2018       BMP RESULTS:  Lab Results   Component Value Date     (L) 03/28/2018    POTASSIUM 4.7 03/28/2018    CHLORIDE 99 03/28/2018    CO2 27 03/28/2018    ANIONGAP 12.7 03/28/2018     (H) 03/28/2018    BUN 19 03/28/2018    CR 0.96 03/28/2018    GFRESTIMATED 80 03/28/2018    GFRESTBLACK >90 03/28/2018    LIA 9.7 03/28/2018        A1C RESULTS:  Lab Results   Component Value Date    A1C 7.2 (H) 11/20/2017       INR RESULTS:  Lab Results   Component Value Date    INR 1.08 03/09/2018    INR 1.08 09/15/2017       Thank you for allowing me to participate in the care of your patient.    Sincerely,     Johana Sewell PA-C     [facility]

## 2018-05-14 ENCOUNTER — TELEPHONE (OUTPATIENT)
Dept: CARDIOLOGY | Facility: CLINIC | Age: 58
End: 2018-05-14

## 2018-05-14 DIAGNOSIS — I10 BENIGN ESSENTIAL HYPERTENSION: Primary | ICD-10-CM

## 2018-05-14 RX ORDER — METOPROLOL SUCCINATE 50 MG/1
TABLET, EXTENDED RELEASE ORAL
Qty: 135 TABLET | Refills: 1 | Status: SHIPPED | OUTPATIENT
Start: 2018-05-14 | End: 2018-05-15

## 2018-05-14 RX ORDER — METOPROLOL SUCCINATE 50 MG/1
TABLET, EXTENDED RELEASE ORAL
Qty: 135 TABLET | Refills: 1 | Status: SHIPPED | OUTPATIENT
Start: 2018-05-14 | End: 2018-05-14

## 2018-05-14 NOTE — TELEPHONE ENCOUNTER
Wife stopped by Clinic and requested a new tablet size for metoprolol 75 mg daily. New Prescription e scribed.

## 2018-05-15 ENCOUNTER — TELEPHONE (OUTPATIENT)
Dept: CARDIOLOGY | Facility: CLINIC | Age: 58
End: 2018-05-15

## 2018-05-15 DIAGNOSIS — I10 BENIGN ESSENTIAL HYPERTENSION: ICD-10-CM

## 2018-05-15 RX ORDER — METOPROLOL SUCCINATE 50 MG/1
TABLET, EXTENDED RELEASE ORAL
Qty: 135 TABLET | Refills: 1 | Status: SHIPPED | OUTPATIENT
Start: 2018-05-15 | End: 2018-08-06

## 2018-05-15 RX ORDER — METOPROLOL SUCCINATE 50 MG/1
TABLET, EXTENDED RELEASE ORAL
Qty: 135 TABLET | Refills: 1 | Status: SHIPPED | OUTPATIENT
Start: 2018-05-15 | End: 2018-05-15

## 2018-06-11 DIAGNOSIS — E11.49 DM TYPE 2 CAUSING NEUROLOGICAL DISEASE (H): Primary | ICD-10-CM

## 2018-06-11 RX ORDER — INSULIN DETEMIR 100 [IU]/ML
INJECTION, SOLUTION SUBCUTANEOUS
Qty: 15 ML | Refills: 0 | Status: SHIPPED | OUTPATIENT
Start: 2018-06-11 | End: 2018-08-21

## 2018-06-11 NOTE — TELEPHONE ENCOUNTER
LEVEMIR FLEXTOUCH 100 UNIT/ML injection   LAST  Med check 3/19/18   last labs(for RX) 11/20/17  Next  appt scheduled =  None- due  Nikia Marshall MA    Lab Results   Component Value Date    A1C 7.2 11/20/2017    A1C 6.9 12/21/2016    A1C 7.3 05/13/2016    A1C 8.7 11/06/2015    A1C 9.4 07/31/2015

## 2018-06-12 ENCOUNTER — TELEPHONE (OUTPATIENT)
Dept: FAMILY MEDICINE | Facility: CLINIC | Age: 58
End: 2018-06-12

## 2018-06-12 ENCOUNTER — TRANSFERRED RECORDS (OUTPATIENT)
Dept: FAMILY MEDICINE | Facility: CLINIC | Age: 58
End: 2018-06-12

## 2018-06-18 DIAGNOSIS — M79.662 PAIN OF LEFT LOWER LEG: Primary | ICD-10-CM

## 2018-06-20 ENCOUNTER — HOSPITAL ENCOUNTER (OUTPATIENT)
Dept: ULTRASOUND IMAGING | Facility: CLINIC | Age: 58
Discharge: HOME | End: 2018-06-20
Attending: NURSE PRACTITIONER | Admitting: NURSE PRACTITIONER
Payer: COMMERCIAL

## 2018-06-20 ENCOUNTER — RADIANT APPOINTMENT (OUTPATIENT)
Dept: VASCULAR ULTRASOUND | Facility: CLINIC | Age: 58
End: 2018-06-20
Attending: NURSE PRACTITIONER
Payer: COMMERCIAL

## 2018-06-20 DIAGNOSIS — M79.662 PAIN OF LEFT LOWER LEG: ICD-10-CM

## 2018-06-20 PROCEDURE — 93970 EXTREMITY STUDY: CPT | Mod: 26 | Performed by: INTERNAL MEDICINE

## 2018-06-20 PROCEDURE — 93922 UPR/L XTREMITY ART 2 LEVELS: CPT

## 2018-08-06 DIAGNOSIS — I10 BENIGN ESSENTIAL HYPERTENSION: ICD-10-CM

## 2018-08-06 RX ORDER — METOPROLOL SUCCINATE 50 MG/1
TABLET, EXTENDED RELEASE ORAL
Qty: 135 TABLET | Refills: 2 | Status: SHIPPED | OUTPATIENT
Start: 2018-08-06 | End: 2019-01-23

## 2018-08-21 ENCOUNTER — OFFICE VISIT (OUTPATIENT)
Dept: FAMILY MEDICINE | Facility: CLINIC | Age: 58
End: 2018-08-21

## 2018-08-21 VITALS
BODY MASS INDEX: 26.31 KG/M2 | DIASTOLIC BLOOD PRESSURE: 78 MMHG | WEIGHT: 194 LBS | SYSTOLIC BLOOD PRESSURE: 132 MMHG | HEART RATE: 66 BPM

## 2018-08-21 DIAGNOSIS — I10 BENIGN ESSENTIAL HYPERTENSION: ICD-10-CM

## 2018-08-21 DIAGNOSIS — Z79.4 TYPE 2 DIABETES MELLITUS WITH LEFT EYE AFFECTED BY RETINOPATHY AND MACULAR EDEMA, WITH LONG-TERM CURRENT USE OF INSULIN, UNSPECIFIED RETINOPATHY SEVERITY (H): ICD-10-CM

## 2018-08-21 DIAGNOSIS — I25.10 CORONARY ARTERY DISEASE INVOLVING NATIVE CORONARY ARTERY OF NATIVE HEART WITHOUT ANGINA PECTORIS: Chronic | ICD-10-CM

## 2018-08-21 DIAGNOSIS — E08.42 DIABETIC POLYNEUROPATHY ASSOCIATED WITH DIABETES MELLITUS DUE TO UNDERLYING CONDITION (H): ICD-10-CM

## 2018-08-21 DIAGNOSIS — Z12.11 SPECIAL SCREENING FOR MALIGNANT NEOPLASMS, COLON: Primary | ICD-10-CM

## 2018-08-21 DIAGNOSIS — F51.01 PRIMARY INSOMNIA: ICD-10-CM

## 2018-08-21 DIAGNOSIS — E11.311 TYPE 2 DIABETES MELLITUS WITH LEFT EYE AFFECTED BY RETINOPATHY AND MACULAR EDEMA, WITH LONG-TERM CURRENT USE OF INSULIN, UNSPECIFIED RETINOPATHY SEVERITY (H): ICD-10-CM

## 2018-08-21 DIAGNOSIS — E11.49 DM TYPE 2 CAUSING NEUROLOGICAL DISEASE (H): ICD-10-CM

## 2018-08-21 LAB
A/C RATIO MG/G: <30 MG/G
ALBUMIN (URINE) MG/L: 10 MG/L
INTERPRETATION: NORMAL
URINE CREATININE MG/DL - QUEST: 50 MG/DL

## 2018-08-21 PROCEDURE — 36415 COLL VENOUS BLD VENIPUNCTURE: CPT | Performed by: FAMILY MEDICINE

## 2018-08-21 PROCEDURE — 99214 OFFICE O/P EST MOD 30 MIN: CPT | Performed by: FAMILY MEDICINE

## 2018-08-21 PROCEDURE — 82570 ASSAY OF URINE CREATININE: CPT | Performed by: FAMILY MEDICINE

## 2018-08-21 PROCEDURE — 82044 UR ALBUMIN SEMIQUANTITATIVE: CPT | Performed by: FAMILY MEDICINE

## 2018-08-21 RX ORDER — MULTIVIT-MIN/IRON/FOLIC ACID/K 18-600-40
1000 CAPSULE ORAL DAILY
COMMUNITY

## 2018-08-21 RX ORDER — ZOLPIDEM TARTRATE 10 MG/1
10 TABLET ORAL
Qty: 30 TABLET | Refills: 0 | Status: SHIPPED | OUTPATIENT
Start: 2018-08-21 | End: 2018-11-27

## 2018-08-21 NOTE — MR AVS SNAPSHOT
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     Needed:  No  Language:  English    Patient will be contacted to schedule procedure.     Please be aware that coverage of these services is subject to the terms and limitations of your health insurance plan.  Call member services at your health plan with any benefit or coverage questions.  Any procedures must be performed at a [facility] facility OR coordinated by your clinic's referral office.    Please bring the following with you to your appointment:    (1) Any X-Rays, CTs or MRIs which have been performed.  Contact the facility where they were done to arrange for  prior to your scheduled appointment.    (2) List of current medications   (3) This referral request   (4) Any documents/labs given to you for this referral                  Your next 10 appointments already scheduled      Sep 07, 2018  7:15 AM CDT   Lab with  LAB   [facility] ([facility])    [address]  [address] [facility] [address] [phone]            Sep 07, 2018  7:30 AM CDT   US ABDOMEN COMPLETE with UCUS2   [facility] ([facility])    [address]  [address] [facility] [id number] [phone]           Please bring a list of your medicines (including vitamins, minerals and over-the-counter drugs). Also, tell your doctor about any allergies you may have. Wear comfortable clothes and leave your valuables at home.  Adults: No eating or drinking for 8 hours before the exam. You may take medicine with a small sip of water.  Children: - Infants, breast-fed: may have breast milk up to 2 hours before exam. - Infants, formula: may have bottle until 4 hours before exam. - Children 1-5 years: No food or drink for 4 hours before exam. - Children 6 -12 years: No food or drink for 6 hours before exam. - Children over 12 years: No food or drink for 8 hours before exam. - J Tube Fed: No need to stop feedings.  Please call the Imaging Department at your exam site with any questions.            Sep 07, 2018  8:30 AM CDT   (Arrive by 8:15 AM)   Return Liver Transplant with Kevin Bedolla MD   [facility] ([facility])    [address]  [address]  [facility] [id number] [phone]              Who to contact     If you have questions or need follow up information about today's clinic visit or your schedule please contact [facility] directly at [phone].  Normal or non-critical lab and imaging results will be communicated to you by MyChart, letter or phone within 4 business days after the clinic has received the results. If you do not hear from us within 7 days, please contact the clinic through MyChart or phone. If you have a critical or abnormal lab result, we will notify you by  phone as soon as possible.  Submit refill requests through [employer] or call your pharmacy and they will forward the refill request to us. Please allow 3 business days for your refill to be completed.          Additional Information About Your Visit        [employer] Information     [employer] gives you secure access to your electronic health record. If you see a primary care provider, you can also send messages to your care team and make appointments. If you have questions, please call your primary care clinic.  If you do not have a primary care provider, please call [phone] and they will assist you.        Care EveryWhere ID     This is your Care EveryWhere ID. This could be used by other organizations to access your [place] medical records  [id number]        Your Vitals Were     Pulse BMI (Body Mass Index)                66 26.31 kg/m2           Blood Pressure from Last 3 Encounters:   08/21/18 132/78   03/28/18 118/68   03/19/18 120/78    Weight from Last 3 Encounters:   08/21/18 88 kg (194 lb)   03/28/18 86.2 kg (190 lb)   03/19/18 87.2 kg (192 lb 3.2 oz)              We Performed the Following     GASTROENTEROLOGY ADULT REF PROCEDURE ONLY     Hemoglobin A1C ([facility])     Microalbumin ([facility])     TSH ([facility])     VENOUS COLLECTION          Today's Medication Changes          These changes are accurate as of 8/21/18 11:59 PM.  If you have any questions, ask your nurse or doctor.               These medicines have changed or have updated prescriptions.        Dose/Directions    cyanocobalamin 1000 MCG Subl   This may have changed:  Another medication with the same name was removed. Continue taking this medication, and follow the directions you see here.   Changed by:  Ana Olvera MD        Dose:  1000 mcg   Place 1,000 mcg under the tongue daily   Refills:  0       insulin detemir 100 UNIT/ML injection   Commonly known as:  LEVEMIR FLEXTOUCH   This may have changed:  See the new instructions.   Used  for:  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)   Changed by:  Ana Olvera MD        Dose:  43 Units   Inject 43 Units Subcutaneous At Bedtime   Quantity:  15 mL   Refills:  3       Vitamin D (Cholecalciferol) 1000 units Tabs   This may have changed:  Another medication with the same name was removed. Continue taking this medication, and follow the directions you see here.   Changed by:  Ana Olvera MD        Dose:  1000 Units   Take 1,000 Units by mouth daily   Refills:  0       zolpidem 10 MG tablet   Commonly known as:  AMBIEN   This may have changed:  See the new instructions.   Used for:  Primary insomnia   Changed by:  Ana Olvera MD        Dose:  10 mg   Take 1 tablet (10 mg) by mouth nightly as needed for sleep   Quantity:  30 tablet   Refills:  0            Where to get your medicines      These medications were sent to [facility] [address] [place] [facility] [address]  [facility] [address]     Phone:  [phone]     insulin detemir 100 UNIT/ML injection         Some of these will need a paper prescription and others can be bought over the counter.  Ask your nurse if you have questions.     Bring a paper prescription for each of these medications     zolpidem 10 MG tablet                Primary Care Provider Office Phone # Fax #    Jace Mayo [phone] [address]  [facility] [address]        Equal Access to Services     [facility] AH: Hadii aad ku hadasho Soomaali, waaxda luqadaha, qaybta kaalmada adeegyada, waxay savannah hayaan melissa eng . So [facility] [phone].    ATENCIÓN: Si habla español, tiene a stubbs disposición servicios gratuitos de asistencia lingüística. Llame al [phone].    We comply with applicable federal civil rights laws and [place] laws. We do not discriminate on the basis of race, color, national origin, age, disability, sex, sexual orientation, or gender  identity.            Thank you!     Thank you for choosing [facility]  for your care. Our goal is always to provide you with excellent care. Hearing back from our patients is one way we can continue to improve our services. Please take a few minutes to complete the written survey that you may receive in the mail after your visit with us. Thank you!             Your Updated Medication List - Protect others around you: Learn how to safely use, store and throw away your medicines at www.disposemymeds.org.          This list is accurate as of 8/21/18 11:59 PM.  Always use your most recent med list.                   Brand Name Dispense Instructions for use Diagnosis    aspirin 81 MG tablet      Take 1 tablet by mouth daily.        atorvastatin 20 MG tablet    LIPITOR    90 tablet    TAKE ONE TABLET BY MOUTH ONE TIME DAILY    Hypercholesteremia       chlorhexidine 0.12 % solution    PERIDEX     SWISH 1/2 OZ FOR 30 SECONDS THEN SPIT TWICE A DAY        cyanocobalamin 1000 MCG Subl      Place 1,000 mcg under the tongue daily        insulin detemir 100 UNIT/ML injection    LEVEMIR FLEXTOUCH    15 mL    Inject 43 Units Subcutaneous At Bedtime    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)       * insulin pen needle 32G X 4 MM    BD HEIKE U/F    100 each    Use 1 daily or as directed.    Diabetes mellitus (H)       * B-D U/F 31G X 8 MM   Generic drug:  insulin pen needle     100 each    TEST TWICE DAILY AS DIRECTED    Encounter for medication refill       lisinopril 10 MG tablet    PRINIVIL/ZESTRIL    90 tablet    Take 1 tablet (10 mg) by mouth daily    Essential hypertension, Coronary artery disease due to lipid rich plaque       metoprolol succinate 50 MG 24 hr tablet    TOPROL-XL    135 tablet    Take 25 mg every AM (1/2 tablet) and 1 tablet in PM. (total of 75 mg daily)    Benign essential hypertension       Vitamin D (Cholecalciferol) 1000 units Tabs      Take 1,000 Units by mouth daily        zolpidem 10 MG  tablet    AMBIEN    30 tablet    Take 1 tablet (10 mg) by mouth nightly as needed for sleep    Primary insomnia       * Notice:  This list has 2 medication(s) that are the same as other medications prescribed for you. Read the directions carefully, and ask your doctor or other care provider to review them with you.

## 2018-08-21 NOTE — PROGRESS NOTES
Problem(s) Oriented visit        SUBJECTIVE:                                                    Vikash Burgos is a 58 year old male who presents to clinic today for diabetes check. He is s/p 4 vessel bypass surgery in 2010. He is s/p treatment for hepatitis C in 2006. He is on transplant list for liver, followed by [facility] transplant center. He is very careful of exposure to anything that could affect his liver. He gets exercise with biking 20-30 miles daily. This helps his pain from greater saphenous harvest, but not entirely. His goes tingle and irritate, but is better with wearing socks and shoes. He also gets symptoms in his fingers.     For his diabetes he takes Levemir 41 units daily and diet control. His typical am fasting blood sugars are 130. He reports occasions of sensation of low blood sugar if he is particularly active. He tries to hydrate well routinely. He is on daily ASA. He is getting annual eye  Exams and does have retinopathy.     He takes zolpidem occasionally for sleep. He takes 1/3-1/2 about once weekly.    Problem list, Medication list, Allergies, and Medical/Social/Surgical histories reviewed in [facility] and updated as appropriate.   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)     Family history of malignant melanoma of skin     Diabetic polyneuropathy associated with diabetes mellitus due to underlying condition (H)     Past Surgical History:   Procedure Laterality Date     ARTHROSCOPY KNEE RT/LT      left     COLONOSCOPY        CORONARY ARTERY BYPASS  11/18/201    Coronary artery bypass grafting x 4 with placement of the left internal mammary artery to the distal midportion of the left anterior descending artery, saphenous vein graft from aorta to the third obtuse marginal branch of circumflex coronary artery, saphenous vein graft from aorta to the second diagonal branch, saphenous vein graft from aorta to the posterior descending artery.     ESOPHAGOSCOPY, GASTROSCOPY, DUODENOSCOPY (EGD), COMBINED  10/31/2011    Procedure:COMBINED ESOPHAGOSCOPY, GASTROSCOPY, DUODENOSCOPY (EGD); Surgeon:ALONSO ALDANA; Location: GI     ESOPHAGOSCOPY, GASTROSCOPY, DUODENOSCOPY (EGD), COMBINED N/A 3/8/2018    Procedure: COMBINED ESOPHAGOSCOPY, GASTROSCOPY, DUODENOSCOPY (EGD);  EGD;  Surgeon: Angel Luis Justice MD;  Location:  GI     HEART CATH CORONARY ANGIOGRAM W/LV GRAM  9-11-10    CV Surgery recommended     HEART CATH CORONARY ANGIOGRAM W/LV GRAM  2-28-14    Medical management     HERNIA REPAIR, INGUINAL RT/LT      left       Social History   Substance Use Topics     Smoking status: Former Smoker     Packs/day: 0.50     Years: 12.00     Quit date: 1/26/2005     Smokeless tobacco: Never Used     Alcohol use No     Family History   Problem Relation Age of Onset     C.A.D. Father      Cancer Father      bladder     Heart Surgery Father      bypass surgery     HEART DISEASE Mother            OBJECTIVE:                                                    /78  Pulse 66  Wt 88 kg (194 lb)  BMI 26.31 kg/m2  Body mass index is 26.31 kg/(m^2).   GENERAL APPEARANCE: Alert, no acute distress  NECK: No adenopathy,masses or thyromegaly  RESP: lungs clear to auscultation   CV: normal rate, regular rhythm, no murmur or gallop  MS: extremities normal, no peripheral edema  MS: foot exam normal DP and PT pulses, no trophic changes or ulcerative lesions and abnormal monofilament exam-no sensation in the heels and right dorsal great toe.  NEURO: Alert,  oriented, speech and mentation normal  PSYCH: mentation appears normal, affect and mood normal   Labs Resulted Today:   Results for orders placed or performed in visit on 06/20/18   US Venous Competency Bilateral    Narrative    Impression    1. No evidence of lower extremity DVT bilaterally.  2. Competent right great saphenous vein.   3. Mildly incompetent left great saphenous vein at sapheno femoral  junction. Left GSV not visualized from proximal thigh to mid calf  (?prior surgery/stripping)  4. Competent bilateral small saphenous veins.    EXAM DESCRIPTION AND FINDINGS:  A noninvasive lower extremity venous ultrasound exam was performed  using duplex imaging with spectral and color Doppler.  The visualized segments of the right and left lower extremities deep  veins demonstrated  normal spontaneous antegrade flow, normal  respiratory phasicity, normal cardiac pulsatility, and normal  augmentation. No signs of deep venous thrombosis were observed in the  visualized veins of the right lower extremity.  Right GSV measures 5.9 mm at SF junction to 1.8 mm at the ankle.Right  GSV appears competent throughout its course.  Left GSV measures 6.8 mm at SF junction. It is not visualized from  proximal thigh to mid calf. Mild reflux noted at SF junction (0.6  seconds).  Competent bilateral small saphenous veins.  1.1 seconds reflux noted in left common femoral vein.    STUDY QUALITY: Excellent    CHRISTINE KHAN MD     ASSESSMENT/PLAN:                                                        Vikash was seen today for recheck and diabetes.    Diagnoses and all orders for this visit:    Special screening for malignant neoplasms, colon  -     GASTROENTEROLOGY ADULT REF PROCEDURE ONLY for screening colonoscopy.    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    Benign essential hypertension  -     VENOUS COLLECTION  Continue metoprolol and lisinopril.    Diabetic polyneuropathy associated with diabetes mellitus due to underlying condition (H)  At this point he doesn't desire treatment for his neuropathy. He takes good care of his feet.     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 He is encouraged to take a snack along when taking part in vigorous physical activity.      There are no Patient Instructions on file for this visit.    The following health maintenance items are reviewed in Epic and correct as of today:  Health Maintenance   Topic Date Due     COLON CANCER SCREEN (SYSTEM ASSIGNED)  02/07/2010     ADVANCE DIRECTIVE PLANNING Q5 YRS  02/07/2015     TETANUS IMMUNIZATION (SYSTEM ASSIGNED)  08/15/2015     MICROALBUMIN Q1 YEAR  05/13/2017     FOOT EXAM Q1 YEAR  12/21/2017     TSH W/ FREE T4 REFLEX Q2 YEAR  05/13/2018     A1C Q6 MO  05/20/2018     INFLUENZA VACCINE (1) 09/01/2018     EYE EXAM Q1 YEAR  02/12/2019     LIPID MONITORING Q1 YEAR  03/09/2019     PHQ-2 Q1 YR  03/09/2019     CREATININE Q1 YEAR  03/28/2019     PNEUMOVAX 1X HI RISK PATIENT < 65 (NO IB MSG)  Completed     HIV SCREEN (SYSTEM ASSIGNED)  Completed     HEPATITIS C SCREENING  Completed       Ana Olvera MD  [facility]  Family Practice  [facility]  [phone]    For any issues my office # is [phone]

## 2018-08-22 LAB
HBA1C MFR BLD: 6.6 % (ref 4.8–5.6)
TSH BLD-ACNC: 1.17 UIU/ML (ref 0.45–4.5)

## 2018-09-06 ENCOUNTER — PRE VISIT (OUTPATIENT)
Dept: GASTROENTEROLOGY | Facility: CLINIC | Age: 58
End: 2018-09-06

## 2018-09-06 NOTE — PROGRESS NOTES
Was the patient contacted by phone and reminded of the upcoming visit? Confirmed appointment with brandon Rodas (consent to communicate on file)    Was the patient instructed to bring a current list of all medications to the appointment or instructed to bring in all medication bottles? Yes     Were outside records requested? asked if seen outside of [facility] to hand carry records with to visit.    Was the patient instructed to arrive prior to the appointment time to have ordered labs drawn? Yes     Were the needed lab orders placed? Yes    Maritza Dodson [facility]  9/6/2018 11:53 AM

## 2018-09-07 ENCOUNTER — RADIANT APPOINTMENT (OUTPATIENT)
Dept: ULTRASOUND IMAGING | Facility: CLINIC | Age: 58
End: 2018-09-07
Attending: INTERNAL MEDICINE
Payer: COMMERCIAL

## 2018-09-07 ENCOUNTER — OFFICE VISIT (OUTPATIENT)
Dept: GASTROENTEROLOGY | Facility: CLINIC | Age: 58
End: 2018-09-07
Attending: INTERNAL MEDICINE
Payer: COMMERCIAL

## 2018-09-07 VITALS
OXYGEN SATURATION: 98 % | HEIGHT: 72 IN | WEIGHT: 192.6 LBS | HEART RATE: 58 BPM | TEMPERATURE: 97.5 F | DIASTOLIC BLOOD PRESSURE: 76 MMHG | BODY MASS INDEX: 26.09 KG/M2 | SYSTOLIC BLOOD PRESSURE: 139 MMHG

## 2018-09-07 DIAGNOSIS — K74.60 CIRRHOSIS OF LIVER WITHOUT ASCITES, UNSPECIFIED HEPATIC CIRRHOSIS TYPE (H): ICD-10-CM

## 2018-09-07 DIAGNOSIS — K74.60 CIRRHOSIS OF LIVER WITHOUT ASCITES, UNSPECIFIED HEPATIC CIRRHOSIS TYPE (H): Primary | ICD-10-CM

## 2018-09-07 LAB
AFP SERPL-MCNC: <1.5 UG/L (ref 0–8)
ALBUMIN SERPL-MCNC: 4.1 G/DL (ref 3.4–5)
ALP SERPL-CCNC: 57 U/L (ref 40–150)
ALT SERPL W P-5'-P-CCNC: 46 U/L (ref 0–70)
ANION GAP SERPL CALCULATED.3IONS-SCNC: 5 MMOL/L (ref 3–14)
AST SERPL W P-5'-P-CCNC: 27 U/L (ref 0–45)
BILIRUB DIRECT SERPL-MCNC: 0.4 MG/DL (ref 0–0.2)
BILIRUB SERPL-MCNC: 1.5 MG/DL (ref 0.2–1.3)
BUN SERPL-MCNC: 14 MG/DL (ref 7–30)
CALCIUM SERPL-MCNC: 9.2 MG/DL (ref 8.5–10.1)
CHLORIDE SERPL-SCNC: 105 MMOL/L (ref 94–109)
CO2 SERPL-SCNC: 29 MMOL/L (ref 20–32)
CREAT SERPL-MCNC: 0.85 MG/DL (ref 0.66–1.25)
ERYTHROCYTE [DISTWIDTH] IN BLOOD BY AUTOMATED COUNT: 12.9 % (ref 10–15)
GFR SERPL CREATININE-BSD FRML MDRD: >90 ML/MIN/1.7M2
GLUCOSE SERPL-MCNC: 157 MG/DL (ref 70–99)
HCT VFR BLD AUTO: 44.4 % (ref 40–53)
HGB BLD-MCNC: 15.3 G/DL (ref 13.3–17.7)
INR PPP: 1.06 (ref 0.86–1.14)
MCH RBC QN AUTO: 30.8 PG (ref 26.5–33)
MCHC RBC AUTO-ENTMCNC: 34.5 G/DL (ref 31.5–36.5)
MCV RBC AUTO: 89 FL (ref 78–100)
PLATELET # BLD AUTO: 141 10E9/L (ref 150–450)
POTASSIUM SERPL-SCNC: 4.5 MMOL/L (ref 3.4–5.3)
PROT SERPL-MCNC: 7.5 G/DL (ref 6.8–8.8)
RBC # BLD AUTO: 4.97 10E12/L (ref 4.4–5.9)
SODIUM SERPL-SCNC: 139 MMOL/L (ref 133–144)
WBC # BLD AUTO: 5.5 10E9/L (ref 4–11)

## 2018-09-07 PROCEDURE — 80076 HEPATIC FUNCTION PANEL: CPT | Performed by: INTERNAL MEDICINE

## 2018-09-07 PROCEDURE — 36415 COLL VENOUS BLD VENIPUNCTURE: CPT | Performed by: INTERNAL MEDICINE

## 2018-09-07 PROCEDURE — 85610 PROTHROMBIN TIME: CPT | Performed by: INTERNAL MEDICINE

## 2018-09-07 PROCEDURE — 82105 ALPHA-FETOPROTEIN SERUM: CPT | Performed by: INTERNAL MEDICINE

## 2018-09-07 PROCEDURE — G0463 HOSPITAL OUTPT CLINIC VISIT: HCPCS | Mod: ZF

## 2018-09-07 PROCEDURE — 80048 BASIC METABOLIC PNL TOTAL CA: CPT | Performed by: INTERNAL MEDICINE

## 2018-09-07 PROCEDURE — 85027 COMPLETE CBC AUTOMATED: CPT | Performed by: INTERNAL MEDICINE

## 2018-09-07 ASSESSMENT — PAIN SCALES - GENERAL: PAINLEVEL: NO PAIN (0)

## 2018-09-07 NOTE — MR AVS SNAPSHOT
After Visit Summary   9/7/2018    Vikash Burgos    MRN: [id number]           Patient Information     Date Of Birth          1960        Visit Information        Provider Department      9/7/2018 8:30 AM Kevin Bedolla MD [facility] Hepatology        Today's Diagnoses     Cirrhosis of liver without ascites, unspecified hepatic cirrhosis type (H)    -  1       Follow-ups after your visit        Follow-up notes from your care team     Return in about 6 months (around 3/7/2019).      Your next 10 appointments already scheduled     Mar 08, 2019  7:30 AM CST   Lab with  LAB   [facility] Lab ([facility])    [address] [place] [address] [id number] [phone]            Mar 08, 2019  7:45 AM CST   US ABDOMEN COMPLETE with [address] [place] [address] Imaging Center US ([facility])    [address] [place] [address] [id number] [phone]           Please bring a list of your medicines (including vitamins, minerals and over-the-counter drugs). Also, tell your doctor about any allergies you may have. Wear comfortable clothes and leave your valuables at home.  Adults: No eating or drinking for 8 hours before the exam. You may take medicine with a small sip of water.  Children: - Infants, breast-fed: may have breast milk up to 2 hours before exam. - Infants, formula: may have bottle until 4 hours before exam. - Children 1-5 years: No food or drink for 4 hours before exam. - Children 6 -12 years: No food or drink for 6 hours before exam. - Children over 12 years: No food or drink for 8 hours before exam. - J Tube Fed: No need to stop feedings.  Please call the Imaging Department at your exam site with any questions.            Mar 08, 2019  8:45 AM CST   (Arrive by 8:30 AM)   Return Liver Transplant with Kevin Bedolla MD   [facility] Hepatology ([facility])    [address] [place] [address]  [address]  [facility]  [id number] [phone]              Future tests that were ordered for you today     Open Standing Orders        Priority Remaining Interval Expires Ordered    US abdomen complete [XLP116] Repeat 2/2 Every 6 Months 9/7/2019 9/7/2018          Open Future Orders        Priority Expected Expires Ordered    US Abdomen Complete Routine  9/7/2019 9/7/2018    US Abdomen Complete Routine  9/7/2019 9/7/2018    Hepatic Panel [LAB20] Routine 3/6/2019 9/7/2019 9/7/2018    Basic metabolic panel [LAB15] Routine 3/6/2019 9/7/2019 9/7/2018    CBC with platelets [ZJC556] Routine 3/6/2019 9/7/2019 9/7/2018    INR [FDI8156] Routine 3/6/2019 9/7/2019 9/7/2018    AFP tumor marker [IJG549] Routine 3/6/2019 9/7/2019 9/7/2018            Who to contact     If you have questions or need follow up information about today's clinic visit or your schedule please contact [facility] HEPATOLOGY directly at [phone].  Normal or non-critical lab and imaging results will be communicated to you by [employer], letter or phone within 4 business days after the clinic has received the results. If you do not hear from us within 7 days, please contact the clinic through [email or web address] or phone. If you have a critical or abnormal lab result, we will notify you by phone as soon as possible.  Submit refill requests through [email or web address] or call your pharmacy and they will forward the refill request to us. Please allow 3 business days for your refill to be completed.          Additional Information About Your Visit        [email or web address] Information     [email or web address] gives you secure access to your electronic health record. If you see a primary care provider, you can also send messages to your care team and make appointments. If you have questions, please call your primary care clinic.  If you do not have a primary care provider, please call [phone] and they will assist you.        Care EveryWhere ID     This is your Care EveryWhere ID. This could be used by other organizations to  access your [facility] medical records  [id number]        Your Vitals Were     Pulse Temperature Height Pulse Oximetry BMI (Body Mass Index)       58 97.5  F (36.4  C) (Oral) 1.829 m (6') 98% 26.12 kg/m2        Blood Pressure from Last 3 Encounters:   09/07/18 139/76   08/21/18 132/78   03/28/18 118/68    Weight from Last 3 Encounters:   09/07/18 87.4 kg (192 lb 9.6 oz)   08/21/18 88 kg (194 lb)   03/28/18 86.2 kg (190 lb)              We Performed the Following     Schedule follow up appointments        Primary Care Provider Office Phone # Fax #    Jace Mayo [phone] [address] [facility] [address]        Equal Access to Services     [facility] Hospital[facility]UCIA : Hadii aad ku hadasho Soomaali, waaxda luqadaha, qaybta kaalmada adeegyada, navdeep nuñez hayshira eng . So [facility] [phone].    ATENCIÓN: Si habla español, tiene a stubbs disposición servicios gratuitos de asistencia lingüística. Alethea al [phone].    We comply with applicable federal civil rights laws and [place] laws. We do not discriminate on the basis of race, color, national origin, age, disability, sex, sexual orientation, or gender identity.            Thank you!     Thank you for choosing [facility] HEPATOLOGY  for your care. Our goal is always to provide you with excellent care. Hearing back from our patients is one way we can continue to improve our services. Please take a few minutes to complete the written survey that you may receive in the mail after your visit with us. Thank you!             Your Updated Medication List - Protect others around you: Learn how to safely use, store and throw away your medicines at www.disposemymeds.org.          This list is accurate as of 9/7/18  8:32 AM.  Always use your most recent med list.                   Brand Name Dispense Instructions for use Diagnosis    aspirin 81 MG tablet      Take 1 tablet by mouth daily.        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    Diabetes mellitus (H)       * B-D U/F 31G X 8 MM   Generic drug:  insulin pen needle     100 each    TEST TWICE DAILY AS DIRECTED    Encounter for medication refill       lisinopril 10 MG tablet    PRINIVIL/ZESTRIL    90 tablet    Take 1 tablet (10 mg) by mouth daily    Essential hypertension, Coronary artery disease due to lipid rich plaque       metoprolol succinate 50 MG 24 hr tablet    TOPROL-XL    135 tablet    Take 25 mg every AM (1/2 tablet) and 1 tablet in PM. (total of 75 mg daily)    Benign essential hypertension       Vitamin D (Cholecalciferol) 1000 units Tabs      Take 1,000 Units by mouth daily        zolpidem 10 MG tablet    AMBIEN    30 tablet    Take 1 tablet (10 mg) by mouth nightly as needed for sleep    Primary insomnia       * Notice:  This list has 2 medication(s) that are the same as other medications prescribed for you. Read the directions carefully, and ask your doctor or other care provider to review them with you.

## 2018-09-07 NOTE — PROGRESS NOTES
HISTORY OF PRESENT ILLNESS:  I had the pleasure of seeing Vikash Burgos for followup in the Liver Clinic at the [facility] on 09/07/2018.  Mr. Burgos returns for followup of cirrhosis caused by nonalcoholic fatty liver disease.      He is doing well at this visit.  He denies any abdominal pain, itching or skin rash or fatigue.  He denies any fevers or chills, cough or shortness of breath.  He denies any nausea or vomiting, diarrhea or constipation.  His appetite has been good, and his weight has been stable.      He denies any increased abdominal girth or lower extremity edema.  He has not had any gastrointestinal bleeding or any overt signs of hepatic encephalopathy.  There have been no other new events since he was last seen.     Current Outpatient Prescriptions   Medication     aspirin 81 MG tablet     atorvastatin (LIPITOR) 20 MG tablet     B-D U/F 31G X 8 MM insulin pen needle     chlorhexidine (PERIDEX) 0.12 % solution     cyanocobalamin 1000 MCG SUBL     insulin detemir (LEVEMIR FLEXTOUCH) 100 UNIT/ML injection     insulin pen needle (BD HEIKE U/F) 32G X 4 MM     lisinopril (PRINIVIL/ZESTRIL) 10 MG tablet     metoprolol succinate (TOPROL-XL) 50 MG 24 hr tablet     Vitamin D, Cholecalciferol, 1000 units TABS     zolpidem (AMBIEN) 10 MG tablet     No current facility-administered medications for this visit.      B/P: 139/76, T: 97.5, P: 58, R: Data Unavailable    In general he appears quite well.  HEENT exam shows no scleral icterus or temporal muscle wasting.  Chest is clear.  Abdominal exam shows no increase in girth.  No masses or tenderness to palpation are present.  His liver is 10 cm in span without left lobe enlargement.  No spleen tip is palpable.  Extremity exam shows no edema.  Skin exam shows no stigmata of chronic liver disease.  Neurologic exam shows no asterixis.     Recent Results (from the past 168 hour(s))   Hepatic Panel [LAB20]    Collection Time: 09/07/18   6:29 AM   Result Value Ref Range    Bilirubin Direct 0.4 (H) 0.0 - 0.2 mg/dL    Bilirubin Total 1.5 (H) 0.2 - 1.3 mg/dL    Albumin 4.1 3.4 - 5.0 g/dL    Protein Total 7.5 6.8 - 8.8 g/dL    Alkaline Phosphatase 57 40 - 150 U/L    ALT 46 0 - 70 U/L    AST 27 0 - 45 U/L   Basic metabolic panel [LAB15]    Collection Time: 09/07/18  6:29 AM   Result Value Ref Range    Sodium 139 133 - 144 mmol/L    Potassium 4.5 3.4 - 5.3 mmol/L    Chloride 105 94 - 109 mmol/L    Carbon Dioxide 29 20 - 32 mmol/L    Anion Gap 5 3 - 14 mmol/L    Glucose 157 (H) 70 - 99 mg/dL    Urea Nitrogen 14 7 - 30 mg/dL    Creatinine 0.85 0.66 - 1.25 mg/dL    GFR Estimate >90 >60 mL/min/1.7m2    GFR Estimate If Black >90 >60 mL/min/1.7m2    Calcium 9.2 8.5 - 10.1 mg/dL   CBC with platelets [LQO989]    Collection Time: 09/07/18  6:29 AM   Result Value Ref Range    WBC 5.5 4.0 - 11.0 10e9/L    RBC Count 4.97 4.4 - 5.9 10e12/L    Hemoglobin 15.3 13.3 - 17.7 g/dL    Hematocrit 44.4 40.0 - 53.0 %    MCV 89 78 - 100 fl    MCH 30.8 26.5 - 33.0 pg    MCHC 34.5 31.5 - 36.5 g/dL    RDW 12.9 10.0 - 15.0 %    Platelet Count 141 (L) 150 - 450 10e9/L   INR [ECC7535]    Collection Time: 09/07/18  6:29 AM   Result Value Ref Range    INR 1.06 0.86 - 1.14      My impression is that Mr. Burgos is doing well.  His ultrasound shows no changes and his liver function is excellent at this point in time.  He is up-to-date with regard to vaccines and cancer screening.  His last endoscopy was just 6 months ago and showed no esophageal varices.  I think we can probably wait for another 2-1/2 years before considering repeating that.      I will not make any changes in his medical regimen.  I will see him back in the clinic in 6 months.      Thank you very much for allowing me to participate in the care of this patient.  If you have any questions regarding my recommendations, please do not hesitate to contact me.       Kevin Bedolla MD      Professor of Medicine  [facility]  [employer] [employer]      Executive Medical Director, Solid Organ Transplant Program  [facility]

## 2018-09-07 NOTE — NURSING NOTE
Chief Complaint   Patient presents with     RECHECK     Cirrhosis of liver without ascites   Pt roomed, vitals, meds, and allergies reviewed with pt. Pt ready for provider.  Lazaro Hernández, CMA

## 2018-09-07 NOTE — LETTER
9/7/2018       RE: Vikash Burgos  [address]  [place] [address]     Dear Colleague,    Thank you for referring your patient, Vikash Burgos, to the [facility] HEPATOLOGY at [facility]. Please see a copy of my visit note below.    HISTORY OF PRESENT ILLNESS:  I had the pleasure of seeing Vikash Burgos for followup in the Liver Clinic at the [facility] on 09/07/2018.  Mr. Burgos returns for followup of cirrhosis caused by nonalcoholic fatty liver disease.      He is doing well at this visit.  He denies any abdominal pain, itching or skin rash or fatigue.  He denies any fevers or chills, cough or shortness of breath.  He denies any nausea or vomiting, diarrhea or constipation.  His appetite has been good, and his weight has been stable.      He denies any increased abdominal girth or lower extremity edema.  He has not had any gastrointestinal bleeding or any overt signs of hepatic encephalopathy.  There have been no other new events since he was last seen.     Current Outpatient Prescriptions   Medication     aspirin 81 MG tablet     atorvastatin (LIPITOR) 20 MG tablet     B-D U/F 31G X 8 MM insulin pen needle     chlorhexidine (PERIDEX) 0.12 % solution     cyanocobalamin 1000 MCG SUBL     insulin detemir (LEVEMIR FLEXTOUCH) 100 UNIT/ML injection     insulin pen needle (BD HEIKE U/F) 32G X 4 MM     lisinopril (PRINIVIL/ZESTRIL) 10 MG tablet     metoprolol succinate (TOPROL-XL) 50 MG 24 hr tablet     Vitamin D, Cholecalciferol, 1000 units TABS     zolpidem (AMBIEN) 10 MG tablet     No current facility-administered medications for this visit.      B/P: 139/76, T: 97.5, P: 58, R: Data Unavailable    In general he appears quite well.  HEENT exam shows no scleral icterus or temporal muscle wasting.  Chest is clear.  Abdominal exam shows no increase in girth.  No masses or tenderness to palpation are present.  His liver is 10 cm in  span without left lobe enlargement.  No spleen tip is palpable.  Extremity exam shows no edema.  Skin exam shows no stigmata of chronic liver disease.  Neurologic exam shows no asterixis.     Recent Results (from the past 168 hour(s))   Hepatic Panel [LAB20]    Collection Time: 09/07/18  6:29 AM   Result Value Ref Range    Bilirubin Direct 0.4 (H) 0.0 - 0.2 mg/dL    Bilirubin Total 1.5 (H) 0.2 - 1.3 mg/dL    Albumin 4.1 3.4 - 5.0 g/dL    Protein Total 7.5 6.8 - 8.8 g/dL    Alkaline Phosphatase 57 40 - 150 U/L    ALT 46 0 - 70 U/L    AST 27 0 - 45 U/L   Basic metabolic panel [LAB15]    Collection Time: 09/07/18  6:29 AM   Result Value Ref Range    Sodium 139 133 - 144 mmol/L    Potassium 4.5 3.4 - 5.3 mmol/L    Chloride 105 94 - 109 mmol/L    Carbon Dioxide 29 20 - 32 mmol/L    Anion Gap 5 3 - 14 mmol/L    Glucose 157 (H) 70 - 99 mg/dL    Urea Nitrogen 14 7 - 30 mg/dL    Creatinine 0.85 0.66 - 1.25 mg/dL    GFR Estimate >90 >60 mL/min/1.7m2    GFR Estimate If Black >90 >60 mL/min/1.7m2    Calcium 9.2 8.5 - 10.1 mg/dL   CBC with platelets [UQT552]    Collection Time: 09/07/18  6:29 AM   Result Value Ref Range    WBC 5.5 4.0 - 11.0 10e9/L    RBC Count 4.97 4.4 - 5.9 10e12/L    Hemoglobin 15.3 13.3 - 17.7 g/dL    Hematocrit 44.4 40.0 - 53.0 %    MCV 89 78 - 100 fl    MCH 30.8 26.5 - 33.0 pg    MCHC 34.5 31.5 - 36.5 g/dL    RDW 12.9 10.0 - 15.0 %    Platelet Count 141 (L) 150 - 450 10e9/L   INR [[id number]]    Collection Time: 09/07/18  6:29 AM   Result Value Ref Range    INR 1.06 0.86 - 1.14      My impression is that Mr. Burgos is doing well.  His ultrasound shows no changes and his liver function is excellent at this point in time.  He is up-to-date with regard to vaccines and cancer screening.  His last endoscopy was just 6 months ago and showed no esophageal varices.  I think we can probably wait for another 2-1/2 years before considering repeating that.      I will not make any changes in his medical regimen.  I  will see him back in the clinic in 6 months.      Thank you very much for allowing me to participate in the care of this patient.  If you have any questions regarding my recommendations, please do not hesitate to contact me.       Kevin Bedolla MD      Professor of Medicine  [employer] [employer]      Executive Medical Director, Solid Organ Transplant Program  [facility]

## 2018-09-07 NOTE — LETTER
9/7/2018      RE: Vikash Burgos  [address]  [place] [address]       HISTORY OF PRESENT ILLNESS:  I had the pleasure of seeing Vikash Burgos for followup in the Liver Clinic at the [facility] on 09/07/2018.  Mr. Burgos returns for followup of cirrhosis caused by nonalcoholic fatty liver disease.      He is doing well at this visit.  He denies any abdominal pain, itching or skin rash or fatigue.  He denies any fevers or chills, cough or shortness of breath.  He denies any nausea or vomiting, diarrhea or constipation.  His appetite has been good, and his weight has been stable.      He denies any increased abdominal girth or lower extremity edema.  He has not had any gastrointestinal bleeding or any overt signs of hepatic encephalopathy.  There have been no other new events since he was last seen.     Current Outpatient Prescriptions   Medication     aspirin 81 MG tablet     atorvastatin (LIPITOR) 20 MG tablet     B-D U/F 31G X 8 MM insulin pen needle     chlorhexidine (PERIDEX) 0.12 % solution     cyanocobalamin 1000 MCG SUBL     insulin detemir (LEVEMIR FLEXTOUCH) 100 UNIT/ML injection     insulin pen needle (BD HEIKE U/F) 32G X 4 MM     lisinopril (PRINIVIL/ZESTRIL) 10 MG tablet     metoprolol succinate (TOPROL-XL) 50 MG 24 hr tablet     Vitamin D, Cholecalciferol, 1000 units TABS     zolpidem (AMBIEN) 10 MG tablet     No current facility-administered medications for this visit.      B/P: 139/76, T: 97.5, P: 58, R: Data Unavailable    In general he appears quite well.  HEENT exam shows no scleral icterus or temporal muscle wasting.  Chest is clear.  Abdominal exam shows no increase in girth.  No masses or tenderness to palpation are present.  His liver is 10 cm in span without left lobe enlargement.  No spleen tip is palpable.  Extremity exam shows no edema.  Skin exam shows no stigmata of chronic liver disease.  Neurologic exam shows no asterixis.     Recent  Results (from the past 168 hour(s))   Hepatic Panel [LAB20]    Collection Time: 09/07/18  6:29 AM   Result Value Ref Range    Bilirubin Direct 0.4 (H) 0.0 - 0.2 mg/dL    Bilirubin Total 1.5 (H) 0.2 - 1.3 mg/dL    Albumin 4.1 3.4 - 5.0 g/dL    Protein Total 7.5 6.8 - 8.8 g/dL    Alkaline Phosphatase 57 40 - 150 U/L    ALT 46 0 - 70 U/L    AST 27 0 - 45 U/L   Basic metabolic panel [LAB15]    Collection Time: 09/07/18  6:29 AM   Result Value Ref Range    Sodium 139 133 - 144 mmol/L    Potassium 4.5 3.4 - 5.3 mmol/L    Chloride 105 94 - 109 mmol/L    Carbon Dioxide 29 20 - 32 mmol/L    Anion Gap 5 3 - 14 mmol/L    Glucose 157 (H) 70 - 99 mg/dL    Urea Nitrogen 14 7 - 30 mg/dL    Creatinine 0.85 0.66 - 1.25 mg/dL    GFR Estimate >90 >60 mL/min/1.7m2    GFR Estimate If Black >90 >60 mL/min/1.7m2    Calcium 9.2 8.5 - 10.1 mg/dL   CBC with platelets [PDD646]    Collection Time: 09/07/18  6:29 AM   Result Value Ref Range    WBC 5.5 4.0 - 11.0 10e9/L    RBC Count 4.97 4.4 - 5.9 10e12/L    Hemoglobin 15.3 13.3 - 17.7 g/dL    Hematocrit 44.4 40.0 - 53.0 %    MCV 89 78 - 100 fl    MCH 30.8 26.5 - 33.0 pg    MCHC 34.5 31.5 - 36.5 g/dL    RDW 12.9 10.0 - 15.0 %    Platelet Count 141 (L) 150 - 450 10e9/L   INR [[id number]]    Collection Time: 09/07/18  6:29 AM   Result Value Ref Range    INR 1.06 0.86 - 1.14      My impression is that Mr. Burgos is doing well.  His ultrasound shows no changes and his liver function is excellent at this point in time.  He is up-to-date with regard to vaccines and cancer screening.  His last endoscopy was just 6 months ago and showed no esophageal varices.  I think we can probably wait for another 2-1/2 years before considering repeating that.      I will not make any changes in his medical regimen.  I will see him back in the clinic in 6 months.      Thank you very much for allowing me to participate in the care of this patient.  If you have any questions regarding my recommendations, please do not  hesitate to contact me.       Kevin Bedolla MD      Professor of Medicine  [employer]      Executive Medical Director, Solid Organ Transplant Program  [facility]

## 2018-11-13 DIAGNOSIS — E11.49 DM TYPE 2 CAUSING NEUROLOGICAL DISEASE (H): Primary | ICD-10-CM

## 2018-11-13 RX ORDER — PEN NEEDLE, DIABETIC 31 GX5/16"
NEEDLE, DISPOSABLE MISCELLANEOUS
Qty: 100 EACH | Refills: 2 | Status: SHIPPED | OUTPATIENT
Start: 2018-11-13 | End: 2019-04-18

## 2018-11-13 NOTE — TELEPHONE ENCOUNTER
B-D U/F 31G X 8 MM insulin pen needle   LAST  Med check 8/21/18   last labs(for RX) 8/21/18  Next  appt scheduled =  none  Nikia Marshall MA    Lab Results   Component Value Date    A1C 6.6 08/21/2018    A1C 7.2 11/20/2017    A1C 6.9 12/21/2016    A1C 7.3 05/13/2016    A1C 8.7 11/06/2015

## 2018-11-27 DIAGNOSIS — F51.01 PRIMARY INSOMNIA: ICD-10-CM

## 2018-11-27 RX ORDER — ZOLPIDEM TARTRATE 10 MG/1
TABLET ORAL
Qty: 30 TABLET | Refills: 0 | Status: SHIPPED | OUTPATIENT
Start: 2018-11-27 | End: 2019-10-22

## 2019-01-07 ENCOUNTER — PRE VISIT (OUTPATIENT)
Dept: CARDIOLOGY | Facility: CLINIC | Age: 59
End: 2019-01-07

## 2019-01-21 DIAGNOSIS — I25.10 CORONARY ARTERY DISEASE INVOLVING NATIVE CORONARY ARTERY OF NATIVE HEART WITHOUT ANGINA PECTORIS: Primary | Chronic | ICD-10-CM

## 2019-01-21 DIAGNOSIS — E78.2 MIXED HYPERLIPIDEMIA: ICD-10-CM

## 2019-01-21 NOTE — PROGRESS NOTES
Orders for BMP, lipids & ALT not visible to lab, re-entered per Dr Roberts. OV w/ lab draw on 1/23/19.

## 2019-01-23 ENCOUNTER — OFFICE VISIT (OUTPATIENT)
Dept: CARDIOLOGY | Facility: CLINIC | Age: 59
End: 2019-01-23
Attending: INTERNAL MEDICINE
Payer: COMMERCIAL

## 2019-01-23 ENCOUNTER — DOCUMENTATION ONLY (OUTPATIENT)
Dept: CARDIOLOGY | Facility: CLINIC | Age: 59
End: 2019-01-23

## 2019-01-23 VITALS
HEART RATE: 54 BPM | HEIGHT: 72 IN | WEIGHT: 194.2 LBS | BODY MASS INDEX: 26.3 KG/M2 | SYSTOLIC BLOOD PRESSURE: 138 MMHG | DIASTOLIC BLOOD PRESSURE: 78 MMHG

## 2019-01-23 DIAGNOSIS — I25.10 CORONARY ARTERY DISEASE INVOLVING NATIVE CORONARY ARTERY OF NATIVE HEART WITHOUT ANGINA PECTORIS: Chronic | ICD-10-CM

## 2019-01-23 DIAGNOSIS — E78.2 MIXED HYPERLIPIDEMIA: Primary | ICD-10-CM

## 2019-01-23 DIAGNOSIS — I65.22 ATHEROSCLEROSIS OF LEFT CAROTID ARTERY: Chronic | ICD-10-CM

## 2019-01-23 DIAGNOSIS — I10 BENIGN ESSENTIAL HYPERTENSION: ICD-10-CM

## 2019-01-23 DIAGNOSIS — E78.2 MIXED HYPERLIPIDEMIA: ICD-10-CM

## 2019-01-23 DIAGNOSIS — E78.6 HDL DEFICIENCY: ICD-10-CM

## 2019-01-23 LAB
ALT SERPL W P-5'-P-CCNC: 18 U/L (ref 5–30)
ANION GAP SERPL CALCULATED.3IONS-SCNC: 14.8 MMOL/L (ref 6–17)
BUN SERPL-MCNC: 18 MG/DL (ref 7–30)
CALCIUM SERPL-MCNC: 10.3 MG/DL (ref 8.5–10.5)
CHLORIDE SERPL-SCNC: 101 MMOL/L (ref 98–107)
CHOLEST SERPL-MCNC: 104 MG/DL
CO2 SERPL-SCNC: 27 MMOL/L (ref 23–29)
CREAT SERPL-MCNC: 1.11 MG/DL (ref 0.7–1.3)
GFR SERPL CREATININE-BSD FRML MDRD: 68 ML/MIN/{1.73_M2}
GLUCOSE SERPL-MCNC: 221 MG/DL (ref 70–105)
HDLC SERPL-MCNC: 35 MG/DL
LDLC SERPL CALC-MCNC: 51 MG/DL
NONHDLC SERPL-MCNC: 69 MG/DL
POTASSIUM SERPL-SCNC: 4.8 MMOL/L (ref 3.5–5.1)
SODIUM SERPL-SCNC: 138 MMOL/L (ref 136–145)
TRIGL SERPL-MCNC: 89 MG/DL

## 2019-01-23 PROCEDURE — 80061 LIPID PANEL: CPT | Performed by: INTERNAL MEDICINE

## 2019-01-23 PROCEDURE — 84460 ALANINE AMINO (ALT) (SGPT): CPT | Performed by: INTERNAL MEDICINE

## 2019-01-23 PROCEDURE — 99214 OFFICE O/P EST MOD 30 MIN: CPT | Performed by: INTERNAL MEDICINE

## 2019-01-23 PROCEDURE — 36415 COLL VENOUS BLD VENIPUNCTURE: CPT | Performed by: INTERNAL MEDICINE

## 2019-01-23 PROCEDURE — 80048 BASIC METABOLIC PNL TOTAL CA: CPT | Performed by: INTERNAL MEDICINE

## 2019-01-23 RX ORDER — NEBIVOLOL 10 MG/1
10 TABLET ORAL DAILY
Qty: 90 TABLET | Refills: 3 | Status: SHIPPED | OUTPATIENT
Start: 2019-01-23 | End: 2019-05-23

## 2019-01-23 ASSESSMENT — MIFFLIN-ST. JEOR: SCORE: 1738.89

## 2019-01-23 NOTE — LETTER
1/23/2019      Ana Olvera MD  [address] [place] [address]      RE: Vikash Burgos       Dear Colleague,    I had the pleasure of seeing Vikash Burgos in the [facility].    Service Date: 01/23/2019      CLINIC NOTE      HISTORY OF PRESENT ILLNESS:  Kelton is a very nice 58-year-old gentleman with past medical history significant for coronary artery bypass grafting x4 in 2010 by Dr. Marshal Pruett.  At that time, he was also noted to have moderate carotid disease.  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  He does not like having a T-shirt on and does not like having a seatbelt across his chest.  It does wax and wane from time to time and has no relationship to physical activity.      His angiogram demonstrated severe diffuse diabetic native coronary artery disease.  A subsequent angiogram at the [facility] in 2014 again showed his diffuse native vessel disease and all bypass grafts were widely patent.  Stress nuclear scan was also negative for ischemia.      Kelton is a Temple about his exercise.  As a matter of fact, he exercises up to 3 times a day.  When in [place], he will ride his bike 9 miles 3 times a day.  He states he does get muscle aches and pains after he works out but has no chest, arm, neck, jaw or shoulder discomfort.  He has no lightheadedness, dizziness, syncope or near syncope.  He states he is very compulsive about his diet and frustrated that he cannot lose weight.  He notes no side effects or problems with his current medical regimen.      ASSESSMENT AND PLAN:  Kelton has no clear anginal symptoms.  I suspect that most of his pains are musculoskeletal in origin.      He has done some research and states he would like to try going on Bystolic, as he wants to see how that affects his sugars.  He is frustrated that his sugars tend  to run high.  We talked about Bystolic, how it might be an advantage for him but cost may be a consideration.  Nonetheless, we will start him on 10 mg of Bystolic every day.  We will see what his blood pressure is.  I have asked him to call me if his blood pressure runs high.  If so, we will bump it up to 20 mg.  If the cost is prohibitive, I have also asked him to call me and we will go back to metoprolol.      I congratulated him on his exercise regimen and encouraged him to continue to do so.  I have told him despite the fact that he is not losing weight, he is also not gaining weight and that he should continue this.  His body mass index is 26.4 with clothes on.  His weight is 194 pounds.  I have told him without clothes on, his body mass index may be in the normal range which is quite uncommon in the [place] with only 30% of Americans having normal body weight.      Blood pressure initially was elevated at 145/80.  He states at home his blood pressure is always in the 120 range.  Blood pressure today on my recheck comes back at 138/78.  As stated, we will have to follow this up with the change to Bystolic to see if the dose is correct.      Fasting lipid profile is a backslide from last year but still excellent.  Total cholesterol is 104, HDL is 35, LDL is 51 and triglycerides are 89.      Basic metabolic profile is excellent.  Creatinine of 1.1, BUN of 18, potassium of 4.8.  Sugars are high at 221.  In talking to him, he does drink a lot a Propel and Gatorade and Powerade.  I have told him he should drink more water.  He states that if he drinks water, he feels weak after his workouts but I have told him this is a source of sugar for him and it is probably driving his glucose up.      We talked about looking at his carotids again and we have not looked at them since 2012.  We decided when he returns next year, we will relook at his carotids.      Thank you for allowing me to participate in his care.       Zeb Dunlap MD, [facility]         ZEB DUNLAP MD, [facility]             D: 2019   T: 2019   MT: ERIC      Name:     IHSAN SANCHEZ   MRN:      [id number]        Account:      [id number]   :      1960           Service Date: 2019      Document: [id number]         Outpatient Encounter Medications as of 2019   Medication Sig Dispense Refill     aspirin 81 MG tablet Take 1 tablet by mouth daily.       atorvastatin (LIPITOR) 20 MG tablet TAKE ONE TABLET BY MOUTH ONE TIME DAILY 90 tablet 3     chlorhexidine (PERIDEX) 0.12 % solution SWISH 1/2 OZ FOR 30 SECONDS THEN SPIT TWICE A DAY  5     cyanocobalamin 1000 MCG SUBL Place 1,000 mcg under the tongue daily       insulin detemir (LEVEMIR FLEXTOUCH) 100 UNIT/ML injection Inject 43 Units Subcutaneous At Bedtime 15 mL 3     lisinopril (PRINIVIL/ZESTRIL) 10 MG tablet Take 1 tablet (10 mg) by mouth daily 90 tablet 3     nebivolol (BYSTOLIC) 10 MG tablet Take 1 tablet (10 mg) by mouth daily 90 tablet 3     Vitamin D, Cholecalciferol, 1000 units TABS Take 1,000 Units by mouth daily       zolpidem (AMBIEN) 10 MG tablet TAKE 1 TAB ORALLY AS NEEDED FOR SLEEP 30 tablet 0     B-D U/F 31G X 8 MM insulin pen needle TEST TWICE DAILY AS DIRECTED 100 each 2     insulin pen needle (BD HEIKE U/F) 32G X 4 MM Use 1 daily or as directed. 100 each prn     [DISCONTINUED] metoprolol succinate (TOPROL-XL) 50 MG 24 hr tablet Take 25 mg every AM (1/2 tablet) and 1 tablet in PM. (total of 75 mg daily) 135 tablet 2     No facility-administered encounter medications on file as of 2019.        Again, thank you for allowing me to participate in the care of your patient.      Sincerely,    Zeb Dunlap MD     [facility]

## 2019-01-23 NOTE — LETTER
1/23/2019    Ana Olvera MD  [address] [place] [address]    RE: Vikash Burgos       Dear Colleague,    I had the pleasure of seeing Vikash Burgos in the [facility] Heart Care Clinic.    HPI and Plan:   See dictation    Orders Placed This Encounter   Procedures     US Carotid Bilateral     Basic metabolic panel     Lipid Profile     Follow-Up with Cardiologist       Orders Placed This Encounter   Medications     nebivolol (BYSTOLIC) 10 MG tablet     Sig: Take 1 tablet (10 mg) by mouth daily     Dispense:  90 tablet     Refill:  3       Medications Discontinued During This Encounter   Medication Reason     metoprolol succinate (TOPROL-XL) 50 MG 24 hr tablet          Encounter Diagnoses   Name Primary?     Coronary artery disease involving native coronary artery of native heart without angina pectoris      Mixed hyperlipidemia Yes     Benign essential hypertension      Atherosclerosis of left carotid artery      HDL deficiency        CURRENT MEDICATIONS:  Current Outpatient Medications   Medication Sig Dispense Refill     aspirin 81 MG tablet Take 1 tablet by mouth daily.       atorvastatin (LIPITOR) 20 MG tablet TAKE ONE TABLET BY MOUTH ONE TIME DAILY 90 tablet 3     chlorhexidine (PERIDEX) 0.12 % solution SWISH 1/2 OZ FOR 30 SECONDS THEN SPIT TWICE A DAY  5     cyanocobalamin 1000 MCG SUBL Place 1,000 mcg under the tongue daily       insulin detemir (LEVEMIR FLEXTOUCH) 100 UNIT/ML injection Inject 43 Units Subcutaneous At Bedtime 15 mL 3     lisinopril (PRINIVIL/ZESTRIL) 10 MG tablet Take 1 tablet (10 mg) by mouth daily 90 tablet 3     nebivolol (BYSTOLIC) 10 MG tablet Take 1 tablet (10 mg) by mouth daily 90 tablet 3     Vitamin D, Cholecalciferol, 1000 units TABS Take 1,000 Units by mouth daily       zolpidem (AMBIEN) 10 MG tablet TAKE 1 TAB ORALLY AS NEEDED FOR SLEEP 30 tablet 0     B-D U/F 31G X 8 MM insulin pen needle TEST TWICE DAILY AS DIRECTED 100 each 2      insulin pen needle (BD HEIKE U/F) 32G X 4 MM Use 1 daily or as directed. 100 each prn       ALLERGIES     Allergies   Allergen Reactions     Chlorthalidone Nausea and Vomiting     dizziness     Penicillins Rash     Rash with PCN only. Patient has taken amoxicillin with no rash.       PAST MEDICAL HISTORY:  Past Medical History:   Diagnosis Date     Basal cell carcinoma      Carotid stenosis, left      Coronary artery disease     4 vessel bypass November 2010; LIMA ->LAD, SVG-> OM3, SVG -> D2, SVG -> PDA     Diabetes mellitus (H) 2005    neuropathy     Generalized anxiety disorder 5/2/2014     Hepatitis C, chronic (H) 2005     Hyperlipidemia LDL goal < 70      Hypertension      Liver diseases     9/15 Liver is 10 cm in span without left lobe enlargement     Persistent microalbuminuria associated with type 2 diabetes mellitus (H) 5/6/2015       PAST SURGICAL HISTORY:  Past Surgical History:   Procedure Laterality Date     ARTHROSCOPY KNEE RT/LT      left     COLONOSCOPY       CORONARY ARTERY BYPASS  11/18/201    Coronary artery bypass grafting x 4 with placement of the left internal mammary artery to the distal midportion of the left anterior descending artery, saphenous vein graft from aorta to the third obtuse marginal branch of circumflex coronary artery, saphenous vein graft from aorta to the second diagonal branch, saphenous vein graft from aorta to the posterior descending artery.     ESOPHAGOSCOPY, GASTROSCOPY, DUODENOSCOPY (EGD), COMBINED  10/31/2011    Procedure:COMBINED ESOPHAGOSCOPY, GASTROSCOPY, DUODENOSCOPY (EGD); Surgeon:ALONSO ALDANA; Location: GI     ESOPHAGOSCOPY, GASTROSCOPY, DUODENOSCOPY (EGD), COMBINED N/A 3/8/2018    Procedure: COMBINED ESOPHAGOSCOPY, GASTROSCOPY, DUODENOSCOPY (EGD);  EGD;  Surgeon: Angel Luis Justice MD;  Location: [facility]     HEART CATH CORONARY ANGIOGRAM W/LV GRAM  9-11-10    CV Surgery recommended     HEART CATH CORONARY ANGIOGRAM W/LV GRAM  2-28-14    Medical  management     HERNIA REPAIR, INGUINAL RT/LT      left       FAMILY HISTORY:  Family History   Problem Relation Age of Onset     C.A.D. Father      Cancer Father         bladder     Heart Surgery Father         bypass surgery     Heart Disease Mother        SOCIAL HISTORY:  Social History     Socioeconomic History     Marital status:      Spouse name: None     Number of children: None     Years of education: None     Highest education level: None   Social Needs     Financial resource strain: None     Food insecurity - worry: None     Food insecurity - inability: None     Transportation needs - medical: None     Transportation needs - non-medical: None   Occupational History     None   Tobacco Use     Smoking status: Former Smoker     Packs/day: 0.50     Years: 12.00     Pack years: 6.00     Last attempt to quit: 2005     Years since quittin.0     Smokeless tobacco: Never Used   Substance and Sexual Activity     Alcohol use: No     Alcohol/week: 0.0 oz     Drug use: No     Sexual activity: None   Other Topics Concern     Parent/sibling w/ CABG, MI or angioplasty before 65F 55M? Not Asked      Service Not Asked     Blood Transfusions Not Asked     Caffeine Concern Yes     Comment: 2 cups coffee per day     Occupational Exposure Not Asked     Hobby Hazards Not Asked     Sleep Concern Not Asked     Stress Concern Not Asked     Weight Concern Not Asked     Special Diet Yes     Comment: low carb diet, low sugar     Back Care Not Asked     Exercise Yes     Comment: treadmill 40 minutes, walk, daily, bike 30 minutes every other day     Bike Helmet Not Asked     Seat Belt Not Asked     Self-Exams Not Asked   Social History Narrative     None       Review of Systems:  Skin:  Positive for   baso cell being treated by derm    Eyes:  Positive for glasses right eye retnal neuropathy beginning stages   ENT:  Negative      Respiratory:  Negative       Cardiovascular:    Positive  for;dizziness;lightheadedness occ.  Gastroenterology: Negative      Genitourinary:  Negative      Musculoskeletal:  Negative      Neurologic:  Positive for numbness or tingling of feet;numbness or tingling of hands neuropathy in feet and left hand   Psychiatric:  Negative      Heme/Lymph/Imm:  Positive for allergies    Endocrine:  Positive for diabetes      Physical Exam:  Vitals: /80   Pulse 54   Ht 1.829 m (6')   Wt 88.1 kg (194 lb 3.2 oz)   BMI 26.34 kg/m       Constitutional:  cooperative, alert and oriented, well developed, well nourished, in no acute distress        Skin:  warm and dry to the touch, no apparent skin lesions or masses noted          Head:  normocephalic, no masses or lesions        Eyes:  pupils equal and round;conjunctivae and lids unremarkable;sclera white;no xanthalasma;no nystagmus        Lymph:      ENT:  no pallor or cyanosis, dentition good        Neck:  carotid pulses are full and equal bilaterally;no carotid bruit        Respiratory:  normal breath sounds, clear to auscultation, normal A-P diameter, normal symmetry, normal respiratory excursion, no use of accessory muscles         Cardiac: regular rhythm;normal S1 and S2;no S3 or S4;no murmurs, gallops or rubs detected                pulses full and equal                                        GI:           Extremities and Muscular Skeletal:  no edema;no spinal abnormalities noted;normal muscle strength and tone              Neurological:  no gross motor deficits        Psych:  affect appropriate, oriented to time, person and place Anxious      CC  Zeb Roberts MD  [address]  [place] [address]                Thank you for allowing me to participate in the care of your patient.      Sincerely,     Zeb Roberts MD     [facility]    cc:   Zeb Roberts MD  [address]  [place] [address]

## 2019-01-23 NOTE — PROGRESS NOTES
Patient requested samples of bystolic 10mg daily to try before purchasing his new med.  3 bottles margoth [id number], exp 4/2020 to  for .

## 2019-01-23 NOTE — PROGRESS NOTES
Service Date: 01/23/2019      CLINIC NOTE      HISTORY OF PRESENT ILLNESS:  Kelton is a very nice 58-year-old gentleman with past medical history significant for coronary artery bypass grafting x4 in 2010 by Dr. Marshal Pruett.  At that time, he was also noted to have moderate carotid disease.  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  He does not like having a T-shirt on and does not like having a seatbelt across his chest.  It does wax and wane from time to time and has no relationship to physical activity.      His angiogram demonstrated severe diffuse diabetic native coronary artery disease.  A subsequent angiogram at the [facility] in 2014 again showed his diffuse native vessel disease and all bypass grafts were widely patent.  Stress nuclear scan was also negative for ischemia.      Kelton is a Evangelical about his exercise.  As a matter of fact, he exercises up to 3 times a day.  When in [place], he will ride his bike 9 miles 3 times a day.  He states he does get muscle aches and pains after he works out but has no chest, arm, neck, jaw or shoulder discomfort.  He has no lightheadedness, dizziness, syncope or near syncope.  He states he is very compulsive about his diet and frustrated that he cannot lose weight.  He notes no side effects or problems with his current medical regimen.      ASSESSMENT AND PLAN:  Kelton has no clear anginal symptoms.  I suspect that most of his pains are musculoskeletal in origin.      He has done some research and states he would like to try going on Bystolic, as he wants to see how that affects his sugars.  He is frustrated that his sugars tend to run high.  We talked about Bystolic, how it might be an advantage for him but cost may be a consideration.  Nonetheless, we will start him on 10 mg of Bystolic every day.  We will see what his blood pressure is.  I have asked him to  call me if his blood pressure runs high.  If so, we will bump it up to 20 mg.  If the cost is prohibitive, I have also asked him to call me and we will go back to metoprolol.      I congratulated him on his exercise regimen and encouraged him to continue to do so.  I have told him despite the fact that he is not losing weight, he is also not gaining weight and that he should continue this.  His body mass index is 26.4 with clothes on.  His weight is 194 pounds.  I have told him without clothes on, his body mass index may be in the normal range which is quite uncommon in the [place] with only 30% of Americans having normal body weight.      Blood pressure initially was elevated at 145/80.  He states at home his blood pressure is always in the 120 range.  Blood pressure today on my recheck comes back at 138/78.  As stated, we will have to follow this up with the change to Bystolic to see if the dose is correct.      Fasting lipid profile is a backslide from last year but still excellent.  Total cholesterol is 104, HDL is 35, LDL is 51 and triglycerides are 89.      Basic metabolic profile is excellent.  Creatinine of 1.1, BUN of 18, potassium of 4.8.  Sugars are high at 221.  In talking to him, he does drink a lot a Propel and Gatorade and Powerade.  I have told him he should drink more water.  He states that if he drinks water, he feels weak after his workouts but I have told him this is a source of sugar for him and it is probably driving his glucose up.      We talked about looking at his carotids again and we have not looked at them since .  We decided when he returns next year, we will relook at his carotids.      Thank you for allowing me to participate in his care.      Zeb Dunlap MD, [facility]         ZEB DUNLAP MD, FACC             D: 2019   T: 2019   MT: ERIC      Name:     IHSAN SANCHEZ   MRN:      [id number]        Account:      [id number]   :      1960            Service Date: 01/23/2019      Document: [id number]

## 2019-02-04 DIAGNOSIS — E11.49 DM TYPE 2 CAUSING NEUROLOGICAL DISEASE (H): ICD-10-CM

## 2019-02-27 DIAGNOSIS — I25.10 CORONARY ARTERY DISEASE DUE TO LIPID RICH PLAQUE: ICD-10-CM

## 2019-02-27 DIAGNOSIS — I25.83 CORONARY ARTERY DISEASE DUE TO LIPID RICH PLAQUE: ICD-10-CM

## 2019-02-27 DIAGNOSIS — I10 ESSENTIAL HYPERTENSION: ICD-10-CM

## 2019-02-27 RX ORDER — LISINOPRIL 10 MG/1
10 TABLET ORAL DAILY
Qty: 90 TABLET | Refills: 3 | Status: SHIPPED | OUTPATIENT
Start: 2019-02-27 | End: 2020-01-23

## 2019-04-11 NOTE — TELEPHONE ENCOUNTER
He can try half a tablet once a day of his metoprolol 100 and we'll see how he feels.   Chest pain R/O ACS vs angina  Hx of HTN, ASHD, CAD, sp MI, sp stents 2014  Hx of Hyperlipidemia  Hx of DM II, d neuropathy  Hx of MS, gait instability  Hx of OA, DDD , DJD  Hx of depression    EKG reviewed no acute changes  CE negative  stress test  ECHO  tele  Cardio consult  cont all home meds

## 2019-04-18 DIAGNOSIS — E11.49 DM TYPE 2 CAUSING NEUROLOGICAL DISEASE (H): ICD-10-CM

## 2019-04-19 ENCOUNTER — OFFICE VISIT (OUTPATIENT)
Dept: GASTROENTEROLOGY | Facility: CLINIC | Age: 59
End: 2019-04-19
Attending: INTERNAL MEDICINE
Payer: COMMERCIAL

## 2019-04-19 ENCOUNTER — ANCILLARY PROCEDURE (OUTPATIENT)
Dept: ULTRASOUND IMAGING | Facility: CLINIC | Age: 59
End: 2019-04-19
Attending: INTERNAL MEDICINE
Payer: COMMERCIAL

## 2019-04-19 VITALS
TEMPERATURE: 97.7 F | BODY MASS INDEX: 26.17 KG/M2 | SYSTOLIC BLOOD PRESSURE: 133 MMHG | DIASTOLIC BLOOD PRESSURE: 75 MMHG | HEART RATE: 56 BPM | OXYGEN SATURATION: 95 % | HEIGHT: 72 IN | WEIGHT: 193.2 LBS

## 2019-04-19 DIAGNOSIS — I25.10 CORONARY ARTERY DISEASE INVOLVING NATIVE CORONARY ARTERY OF NATIVE HEART WITHOUT ANGINA PECTORIS: Chronic | ICD-10-CM

## 2019-04-19 DIAGNOSIS — K74.60 CIRRHOSIS OF LIVER WITHOUT ASCITES, UNSPECIFIED HEPATIC CIRRHOSIS TYPE (H): ICD-10-CM

## 2019-04-19 DIAGNOSIS — K74.60 CIRRHOSIS OF LIVER WITHOUT ASCITES, UNSPECIFIED HEPATIC CIRRHOSIS TYPE (H): Primary | ICD-10-CM

## 2019-04-19 LAB
AFP SERPL-MCNC: 2 UG/L (ref 0–8)
ALBUMIN SERPL-MCNC: 3.9 G/DL (ref 3.4–5)
ALP SERPL-CCNC: 69 U/L (ref 40–150)
ALT SERPL W P-5'-P-CCNC: 52 U/L (ref 0–70)
ANION GAP SERPL CALCULATED.3IONS-SCNC: 5 MMOL/L (ref 3–14)
AST SERPL W P-5'-P-CCNC: 20 U/L (ref 0–45)
BILIRUB DIRECT SERPL-MCNC: 0.3 MG/DL (ref 0–0.2)
BILIRUB SERPL-MCNC: 1.1 MG/DL (ref 0.2–1.3)
BUN SERPL-MCNC: 13 MG/DL (ref 7–30)
CALCIUM SERPL-MCNC: 9.5 MG/DL (ref 8.5–10.1)
CHLORIDE SERPL-SCNC: 104 MMOL/L (ref 94–109)
CHOLEST SERPL-MCNC: 102 MG/DL
CO2 SERPL-SCNC: 28 MMOL/L (ref 20–32)
CREAT SERPL-MCNC: 0.86 MG/DL (ref 0.66–1.25)
ERYTHROCYTE [DISTWIDTH] IN BLOOD BY AUTOMATED COUNT: 12.8 % (ref 10–15)
GFR SERPL CREATININE-BSD FRML MDRD: >90 ML/MIN/{1.73_M2}
GLUCOSE SERPL-MCNC: 181 MG/DL (ref 70–99)
HCT VFR BLD AUTO: 44.5 % (ref 40–53)
HDLC SERPL-MCNC: 35 MG/DL
HGB BLD-MCNC: 15.1 G/DL (ref 13.3–17.7)
INR PPP: 1.06 (ref 0.86–1.14)
LDLC SERPL CALC-MCNC: 48 MG/DL
MCH RBC QN AUTO: 30.8 PG (ref 26.5–33)
MCHC RBC AUTO-ENTMCNC: 33.9 G/DL (ref 31.5–36.5)
MCV RBC AUTO: 91 FL (ref 78–100)
NONHDLC SERPL-MCNC: 67 MG/DL
PLATELET # BLD AUTO: 148 10E9/L (ref 150–450)
POTASSIUM SERPL-SCNC: 4.6 MMOL/L (ref 3.4–5.3)
PROT SERPL-MCNC: 7.3 G/DL (ref 6.8–8.8)
RBC # BLD AUTO: 4.9 10E12/L (ref 4.4–5.9)
SODIUM SERPL-SCNC: 137 MMOL/L (ref 133–144)
TRIGL SERPL-MCNC: 96 MG/DL
WBC # BLD AUTO: 5.8 10E9/L (ref 4–11)

## 2019-04-19 PROCEDURE — 80048 BASIC METABOLIC PNL TOTAL CA: CPT | Performed by: INTERNAL MEDICINE

## 2019-04-19 PROCEDURE — 85027 COMPLETE CBC AUTOMATED: CPT | Performed by: INTERNAL MEDICINE

## 2019-04-19 PROCEDURE — G0463 HOSPITAL OUTPT CLINIC VISIT: HCPCS | Mod: ZF

## 2019-04-19 PROCEDURE — 85610 PROTHROMBIN TIME: CPT | Performed by: INTERNAL MEDICINE

## 2019-04-19 PROCEDURE — 80076 HEPATIC FUNCTION PANEL: CPT | Performed by: INTERNAL MEDICINE

## 2019-04-19 PROCEDURE — 80061 LIPID PANEL: CPT | Performed by: INTERNAL MEDICINE

## 2019-04-19 PROCEDURE — 82105 ALPHA-FETOPROTEIN SERUM: CPT | Performed by: INTERNAL MEDICINE

## 2019-04-19 PROCEDURE — 36415 COLL VENOUS BLD VENIPUNCTURE: CPT | Performed by: INTERNAL MEDICINE

## 2019-04-19 ASSESSMENT — PAIN SCALES - GENERAL: PAINLEVEL: NO PAIN (0)

## 2019-04-19 ASSESSMENT — MIFFLIN-ST. JEOR: SCORE: 1729.35

## 2019-04-19 NOTE — LETTER
4/19/2019      RE: Vikash Burgos  [address]  [place] [address]       I had the pleasure of seeing Vikash Burgos for followup in the Liver Clinic at the [facility] on 04/19/2019.  Mr. Burgos returns for followup of cirrhosis caused by nonalcoholic fatty liver disease.      For the most part he is doing fairly well.  His major complaints are that of fatigue and some difficulty sleeping at night.  He has found that if he concentrates his sleep towards the early morning hours and sleeps into the morning that he feels more rested.  He also does complain of some right upper quadrant pain which has been there for many years, and I attribute it to the fatty liver disease.      He denies any itching or skin rash.  He denies any increased abdominal girth or lower extremity edema.  He has not had any gastrointestinal bleeding or any overt signs of hepatic encephalopathy.  He denies any fevers or chills, cough or shortness of breath.  He denies any nausea, vomiting, diarrhea or constipation.  His appetite has been good.  His weight has been stable.  There have been no other new events since he was last seen.     Current Outpatient Medications   Medication     aspirin 81 MG tablet     atorvastatin (LIPITOR) 20 MG tablet     B-D U/F 31G X 8 MM insulin pen needle     chlorhexidine (PERIDEX) 0.12 % solution     cyanocobalamin 1000 MCG SUBL     insulin detemir (LEVEMIR FLEXTOUCH) 100 UNIT/ML pen     insulin pen needle (BD HEIKE U/F) 32G X 4 MM     lisinopril (PRINIVIL/ZESTRIL) 10 MG tablet     nebivolol (BYSTOLIC) 10 MG tablet     Vitamin D, Cholecalciferol, 1000 units TABS     zolpidem (AMBIEN) 10 MG tablet     No current facility-administered medications for this visit.      /75   Pulse 56   Temp 97.7  F (36.5  C) (Oral)   Ht 1.829 m (6')   Wt 87.6 kg (193 lb 3.2 oz)   SpO2 95%   BMI 26.20 kg/m       PHYSICAL EXAMINATION:  In general he looks well.  HEENT exam  shows no scleral icterus or temporal muscle wasting.  Chest is clear.  Abdominal exam shows no increase in girth.  No masses or tenderness to palpation are present.  His liver is 10 cm in span without left lobe enlargement.  No spleen tip is palpable.  Extremity exam shows no edema.  Skin exam shows no stigmata of chronic liver disease.  Neurologic exam shows no asterixis.      Recent Results (from the past 168 hour(s))   Lipid Profile    Collection Time: 04/19/19  6:48 AM   Result Value Ref Range    Cholesterol 102 <200 mg/dL    Triglycerides 96 <150 mg/dL    HDL Cholesterol 35 (L) >39 mg/dL    LDL Cholesterol Calculated 48 <100 mg/dL    Non HDL Cholesterol 67 <130 mg/dL   Basic metabolic panel    Collection Time: 04/19/19  6:48 AM   Result Value Ref Range    Sodium 137 133 - 144 mmol/L    Potassium 4.6 3.4 - 5.3 mmol/L    Chloride 104 94 - 109 mmol/L    Carbon Dioxide 28 20 - 32 mmol/L    Anion Gap 5 3 - 14 mmol/L    Glucose 181 (H) 70 - 99 mg/dL    Urea Nitrogen 13 7 - 30 mg/dL    Creatinine 0.86 0.66 - 1.25 mg/dL    GFR Estimate >90 >60 mL/min/[1.73_m2]    GFR Estimate If Black >90 >60 mL/min/[1.73_m2]    Calcium 9.5 8.5 - 10.1 mg/dL   AFP tumor marker [MUB811]    Collection Time: 04/19/19  6:48 AM   Result Value Ref Range    Alpha Fetoprotein 2.0 0 - 8 ug/L   INR [SCZ2723]    Collection Time: 04/19/19  6:48 AM   Result Value Ref Range    INR 1.06 0.86 - 1.14   CBC with platelets [VEJ206]    Collection Time: 04/19/19  6:48 AM   Result Value Ref Range    WBC 5.8 4.0 - 11.0 10e9/L    RBC Count 4.90 4.4 - 5.9 10e12/L    Hemoglobin 15.1 13.3 - 17.7 g/dL    Hematocrit 44.5 40.0 - 53.0 %    MCV 91 78 - 100 fl    MCH 30.8 26.5 - 33.0 pg    MCHC 33.9 31.5 - 36.5 g/dL    RDW 12.8 10.0 - 15.0 %    Platelet Count 148 (L) 150 - 450 10e9/L   Hepatic Panel [LAB20]    Collection Time: 04/19/19  6:48 AM   Result Value Ref Range    Bilirubin Direct 0.3 (H) 0.0 - 0.2 mg/dL    Bilirubin Total 1.1 0.2 - 1.3 mg/dL    Albumin 3.9 3.4  - 5.0 g/dL    Protein Total 7.3 6.8 - 8.8 g/dL    Alkaline Phosphatase 69 40 - 150 U/L    ALT 52 0 - 70 U/L    AST 20 0 - 45 U/L      IMAGING:  He did have an ultrasound today that shows a cirrhotic-appearing liver.  He does have an area of focal fatty sparing near the gallbladder fossa that is unchanged in size.  No ascites is noted and there are no other abnormalities in any other organs as well.      IMPRESSION:  My impression is that Mr. Burgos has cirrhosis caused by nonalcoholic fatty liver disease.  His disease is extremely well compensated at this point in time.  He is up-to-date with regard to variceal screening and vaccinations.  He is up-to-date with regard to other cancer screening as well.  I will not be making any change to his medical regimen.  I will see him back in the clinic again in 6 months.       Kevin Bedolla MD      Professor of Medicine  [employer]      Executive Medical Director, Solid Organ Transplant Program  [facility]     Thank you for allowing me to participate in the care of this patient.  If you have any questions regarding my recommendations, please do not hesitate to contact me.     Kevin Bedolla MD

## 2019-04-19 NOTE — PROGRESS NOTES
I had the pleasure of seeing Vikash Burgos for followup in the Liver Clinic at the [facility] on 04/19/2019.  Mr. Burgos returns for followup of cirrhosis caused by nonalcoholic fatty liver disease.      For the most part he is doing fairly well.  His major complaints are that of fatigue and some difficulty sleeping at night.  He has found that if he concentrates his sleep towards the early morning hours and sleeps into the morning that he feels more rested.  He also does complain of some right upper quadrant pain which has been there for many years, and I attribute it to the fatty liver disease.      He denies any itching or skin rash.  He denies any increased abdominal girth or lower extremity edema.  He has not had any gastrointestinal bleeding or any overt signs of hepatic encephalopathy.  He denies any fevers or chills, cough or shortness of breath.  He denies any nausea, vomiting, diarrhea or constipation.  His appetite has been good.  His weight has been stable.  There have been no other new events since he was last seen.     Current Outpatient Medications   Medication     aspirin 81 MG tablet     atorvastatin (LIPITOR) 20 MG tablet     B-D U/F 31G X 8 MM insulin pen needle     chlorhexidine (PERIDEX) 0.12 % solution     cyanocobalamin 1000 MCG SUBL     insulin detemir (LEVEMIR FLEXTOUCH) 100 UNIT/ML pen     insulin pen needle (BD HEIKE U/F) 32G X 4 MM     lisinopril (PRINIVIL/ZESTRIL) 10 MG tablet     nebivolol (BYSTOLIC) 10 MG tablet     Vitamin D, Cholecalciferol, 1000 units TABS     zolpidem (AMBIEN) 10 MG tablet     No current facility-administered medications for this visit.      /75   Pulse 56   Temp 97.7  F (36.5  C) (Oral)   Ht 1.829 m (6')   Wt 87.6 kg (193 lb 3.2 oz)   SpO2 95%   BMI 26.20 kg/m      PHYSICAL EXAMINATION:  In general he looks well.  HEENT exam shows no scleral icterus or temporal muscle wasting.  Chest is clear.  Abdominal exam shows no  increase in girth.  No masses or tenderness to palpation are present.  His liver is 10 cm in span without left lobe enlargement.  No spleen tip is palpable.  Extremity exam shows no edema.  Skin exam shows no stigmata of chronic liver disease.  Neurologic exam shows no asterixis.      Recent Results (from the past 168 hour(s))   Lipid Profile    Collection Time: 04/19/19  6:48 AM   Result Value Ref Range    Cholesterol 102 <200 mg/dL    Triglycerides 96 <150 mg/dL    HDL Cholesterol 35 (L) >39 mg/dL    LDL Cholesterol Calculated 48 <100 mg/dL    Non HDL Cholesterol 67 <130 mg/dL   Basic metabolic panel    Collection Time: 04/19/19  6:48 AM   Result Value Ref Range    Sodium 137 133 - 144 mmol/L    Potassium 4.6 3.4 - 5.3 mmol/L    Chloride 104 94 - 109 mmol/L    Carbon Dioxide 28 20 - 32 mmol/L    Anion Gap 5 3 - 14 mmol/L    Glucose 181 (H) 70 - 99 mg/dL    Urea Nitrogen 13 7 - 30 mg/dL    Creatinine 0.86 0.66 - 1.25 mg/dL    GFR Estimate >90 >60 mL/min/[1.73_m2]    GFR Estimate If Black >90 >60 mL/min/[1.73_m2]    Calcium 9.5 8.5 - 10.1 mg/dL   AFP tumor marker [TDC478]    Collection Time: 04/19/19  6:48 AM   Result Value Ref Range    Alpha Fetoprotein 2.0 0 - 8 ug/L   INR [YAK3380]    Collection Time: 04/19/19  6:48 AM   Result Value Ref Range    INR 1.06 0.86 - 1.14   CBC with platelets [ZTI664]    Collection Time: 04/19/19  6:48 AM   Result Value Ref Range    WBC 5.8 4.0 - 11.0 10e9/L    RBC Count 4.90 4.4 - 5.9 10e12/L    Hemoglobin 15.1 13.3 - 17.7 g/dL    Hematocrit 44.5 40.0 - 53.0 %    MCV 91 78 - 100 fl    MCH 30.8 26.5 - 33.0 pg    MCHC 33.9 31.5 - 36.5 g/dL    RDW 12.8 10.0 - 15.0 %    Platelet Count 148 (L) 150 - 450 10e9/L   Hepatic Panel [LAB20]    Collection Time: 04/19/19  6:48 AM   Result Value Ref Range    Bilirubin Direct 0.3 (H) 0.0 - 0.2 mg/dL    Bilirubin Total 1.1 0.2 - 1.3 mg/dL    Albumin 3.9 3.4 - 5.0 g/dL    Protein Total 7.3 6.8 - 8.8 g/dL    Alkaline Phosphatase 69 40 - 150 U/L    ALT  52 0 - 70 U/L    AST 20 0 - 45 U/L      IMAGING:  He did have an ultrasound today that shows a cirrhotic-appearing liver.  He does have an area of focal fatty sparing near the gallbladder fossa that is unchanged in size.  No ascites is noted and there are no other abnormalities in any other organs as well.      IMPRESSION:  My impression is that Mr. Burgos has cirrhosis caused by nonalcoholic fatty liver disease.  His disease is extremely well compensated at this point in time.  He is up-to-date with regard to variceal screening and vaccinations.  He is up-to-date with regard to other cancer screening as well.  I will not be making any change to his medical regimen.  I will see him back in the clinic again in 6 months.       Kevin Bedolla MD      Professor of Medicine  [employer]      Executive Medical Director, Solid Organ Transplant Program  [facility]     Thank you for allowing me to participate in the care of this patient.  If you have any questions regarding my recommendations, please do not hesitate to contact me.

## 2019-05-03 DIAGNOSIS — E78.00 HYPERCHOLESTEREMIA: ICD-10-CM

## 2019-05-06 RX ORDER — ATORVASTATIN CALCIUM 20 MG/1
20 TABLET, FILM COATED ORAL DAILY
Qty: 90 TABLET | Refills: 3 | Status: SHIPPED | OUTPATIENT
Start: 2019-05-06 | End: 2020-01-23 | Stop reason: ALTCHOICE

## 2019-05-23 DIAGNOSIS — I10 BENIGN ESSENTIAL HYPERTENSION: ICD-10-CM

## 2019-05-23 RX ORDER — NEBIVOLOL 10 MG/1
10 TABLET ORAL DAILY
Qty: 90 TABLET | Refills: 3 | Status: SHIPPED | OUTPATIENT
Start: 2019-05-23 | End: 2020-01-23

## 2019-06-12 DIAGNOSIS — E11.49 DM TYPE 2 CAUSING NEUROLOGICAL DISEASE (H): ICD-10-CM

## 2019-07-30 DIAGNOSIS — E11.49 DM TYPE 2 CAUSING NEUROLOGICAL DISEASE (H): ICD-10-CM

## 2019-08-27 ENCOUNTER — DOCUMENTATION ONLY (OUTPATIENT)
Dept: CARDIOLOGY | Facility: CLINIC | Age: 59
End: 2019-08-27

## 2019-08-27 NOTE — PROGRESS NOTES
Zeb Roberts MD Anderson, Christy CASTELLON RN 25 minutes ago (12:50 PM)         From a cardiac standpoint the Bystolic is probably beneficial.  But his heart disease is quite stable and I would defer to Dr. Bedolla if he thinks Bystolic is contraindicated.        Called patient to discuss, left message to call back. Spoke to patient's wife, Carmina, to review Dr Roberts's recommendation to discuss w/ Dr Bedolla regarding use of bystolic. Carmina verbalized understanding, agreeable to plan. Carmina states he has an appointment with Dr Bedolla in about a month, will discuss then.

## 2019-08-27 NOTE — PROGRESS NOTES
Message from patient's spouse:  Good Morning Team 2,   I have a question for you. My  (Kelton Burgos) is a pt of Dr. Roberts's. He switched to Bystolic earlier this year after having his annual f/u with Dr. Roberts. As he received a reminder e-mail the other day about picking up his prescription at the pharmacy, he read about people with liver disease or diabetes should not take this drug. Just want to see if he should be taking this. Obviously we know all drugs have side effects, etc., but my  does have stage IV liver disease and is being followed by Dr. Kevin Bedolla at the [facility] every 6 months.     Thanks for your help,     Carmina     Will message Dr. Roberts to review

## 2019-08-27 NOTE — TELEPHONE ENCOUNTER
From a cardiac standpoint the Bystolic is probably beneficial.  But his heart disease is quite stable and I would defer to Dr. Bedolla if he thinks Bystolic is contraindicated.

## 2019-09-03 ENCOUNTER — OFFICE VISIT (OUTPATIENT)
Dept: FAMILY MEDICINE | Facility: CLINIC | Age: 59
End: 2019-09-03

## 2019-09-03 VITALS
BODY MASS INDEX: 26.45 KG/M2 | WEIGHT: 195 LBS | DIASTOLIC BLOOD PRESSURE: 78 MMHG | HEART RATE: 60 BPM | SYSTOLIC BLOOD PRESSURE: 130 MMHG | OXYGEN SATURATION: 98 % | RESPIRATION RATE: 16 BRPM

## 2019-09-03 DIAGNOSIS — E08.42 DIABETIC POLYNEUROPATHY ASSOCIATED WITH DIABETES MELLITUS DUE TO UNDERLYING CONDITION (H): ICD-10-CM

## 2019-09-03 DIAGNOSIS — B18.2 CHRONIC HEPATITIS C WITHOUT HEPATIC COMA (H): ICD-10-CM

## 2019-09-03 DIAGNOSIS — E78.6 HDL DEFICIENCY: ICD-10-CM

## 2019-09-03 DIAGNOSIS — E78.2 MIXED HYPERLIPIDEMIA: ICD-10-CM

## 2019-09-03 DIAGNOSIS — E11.49 DM TYPE 2 CAUSING NEUROLOGICAL DISEASE (H): ICD-10-CM

## 2019-09-03 DIAGNOSIS — I10 BENIGN ESSENTIAL HYPERTENSION: ICD-10-CM

## 2019-09-03 DIAGNOSIS — Z12.5 SCREENING FOR PROSTATE CANCER: ICD-10-CM

## 2019-09-03 DIAGNOSIS — K76.0 FATTY LIVER DISEASE, NONALCOHOLIC: ICD-10-CM

## 2019-09-03 DIAGNOSIS — Z23 NEED FOR TD VACCINE: ICD-10-CM

## 2019-09-03 PROCEDURE — 99207 C FOOT EXAM  NO CHARGE: CPT | Performed by: FAMILY MEDICINE

## 2019-09-03 PROCEDURE — 82570 ASSAY OF URINE CREATININE: CPT | Performed by: FAMILY MEDICINE

## 2019-09-03 PROCEDURE — 82044 UR ALBUMIN SEMIQUANTITATIVE: CPT | Performed by: FAMILY MEDICINE

## 2019-09-03 PROCEDURE — 36415 COLL VENOUS BLD VENIPUNCTURE: CPT | Performed by: FAMILY MEDICINE

## 2019-09-03 PROCEDURE — 90715 TDAP VACCINE 7 YRS/> IM: CPT | Performed by: FAMILY MEDICINE

## 2019-09-03 PROCEDURE — 99214 OFFICE O/P EST MOD 30 MIN: CPT | Mod: 25 | Performed by: FAMILY MEDICINE

## 2019-09-03 PROCEDURE — 90471 IMMUNIZATION ADMIN: CPT | Performed by: FAMILY MEDICINE

## 2019-09-03 NOTE — PROGRESS NOTES
"Problem(s) Oriented visit        SUBJECTIVE:                                                    Vikash Burgos is a 59 year old male who presents to clinic today for recheck of DM. He complains of intermittent neuropathy, mostly in his toes. He had been on Lyrica in the past, but wasn't sure if it helped so he stopped. He does not have the best diet at this time. He did not bring in his glucose log, but does note that his numbers are \"all over the board.\" He does bike a lot. Morning fastings are typically 130s. He had prior dizziness with metformin but is willing to try it again if needed.     He has history of hepatitis C. He has received treatment and it is considered \"undetectable.\"    Problem list, Medication list, Allergies, and Medical/Social/Surgical histories reviewed in EPIC and updated as appropriate.   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)     Family history of malignant melanoma of skin     Diabetic polyneuropathy associated with diabetes mellitus due to underlying condition (H)     HDL deficiency     Past Surgical History:   Procedure Laterality Date     ARTHROSCOPY KNEE RT/LT      left     COLONOSCOPY       CORONARY ARTERY BYPASS  11/18/201    Coronary artery bypass grafting x 4 with placement of the left internal mammary artery to the distal midportion of the left anterior descending artery, saphenous vein graft from aorta to the third obtuse marginal branch of circumflex coronary artery, saphenous vein graft from aorta to " the second diagonal branch, saphenous vein graft from aorta to the posterior descending artery.     ESOPHAGOSCOPY, GASTROSCOPY, DUODENOSCOPY (EGD), COMBINED  10/31/2011    Procedure:COMBINED ESOPHAGOSCOPY, GASTROSCOPY, DUODENOSCOPY (EGD); Surgeon:ALONSO ALDANA; Location: GI     ESOPHAGOSCOPY, GASTROSCOPY, DUODENOSCOPY (EGD), COMBINED N/A 3/8/2018    Procedure: COMBINED ESOPHAGOSCOPY, GASTROSCOPY, DUODENOSCOPY (EGD);  EGD;  Surgeon: Angel Luis Justice MD;  Location:  GI     HEART CATH CORONARY ANGIOGRAM W/LV GRAM  9--10    CV Surgery recommended     HEART CATH CORONARY ANGIOGRAM W/LV GRAM  14    Medical management     HERNIA REPAIR, INGUINAL RT/LT      left       Social History     Tobacco Use     Smoking status: Former Smoker     Packs/day: 0.50     Years: 12.00     Pack years: 6.00     Last attempt to quit: 2005     Years since quittin.6     Smokeless tobacco: Never Used   Substance Use Topics     Alcohol use: No     Alcohol/week: 0.0 oz     Family History   Problem Relation Age of Onset     C.A.D. Father      Cancer Father         bladder     Heart Surgery Father         bypass surgery     Heart Disease Mother            OBJECTIVE:                                                    /78   Pulse 60   Resp 16   Wt 88.5 kg (195 lb)   SpO2 98%   BMI 26.45 kg/m    Body mass index is 26.45 kg/m .   GENERAL APPEARANCE: Alert, no acute distress  NECK: No adenopathy,masses or thyromegaly, no bruit appreciated  RESP: lungs clear to auscultation   CV: normal rate, regular rhythm, no murmur or gallop  MS: foot exam normal DP and PT pulses, no trophic changes or ulcerative lesions and abnormal monofilament exam-normal on the left foot, absent sensation on the plantar surface of the right great toe and distal foot.  NEURO: Alert, oriented, speech and mentation normal  PSYCH: mentation appears normal, affect and mood normal   Labs Resulted Today:   Results for orders placed or performed in  visit on 04/19/19   US Abdomen Complete    Narrative    Exam: US ABDOMEN COMPLETE, 4/19/2019 7:31 AM    Indication: Patient at high risk for HCC. Cirrhosis of liver without  ascites, unspecified hepatic cirrhosis type (H)    Comparison: 9/7/2018    Technique: The abdomen was scanned in the standard fashion with  specialized ultrasound transducer(s) using both gray scale and limited  color/spectral Doppler techniques.    Findings:    Liver: Nodular liver surface with coarsened hepatic echotexture and  diffusely increased parenchymal echogenicity. There is a 1.7 cm  hypoechoic area adjacent to gallbladder fossa, best seen on the cine  clips. No mass or intrahepatic biliary ductal dilatation. Previously  identified hepatic cyst is no longer visualized. The main portal vein  is patent with antegrade flow.    US visualization score: A - No or minimal limitations    Gallbladder: The gallbladder is well distended and of normal  morphology. There is no wall thickening, pericholecystic fluid, or  cholelithiasis.    Bile Ducts: Both the intra- and extrahepatic biliary system are of  normal caliber. The common bile duct measures 4 mm in diameter.    Pancreas: Visualized portions of the head and body of the pancreas are  unremarkable.     Kidneys: Both kidneys are of normal echotexture, without mass nor  hydronephrosis.  The craniocaudal dimensions are: right- 11.8 cm,  left- 12.3 cm.    Spleen: The spleen is borderline enlarged, measuring 13.1 cm in  sagittal dimension.    Aorta and IVC: The visualized portions of the aorta and IVC are  unremarkable; distal aorta was obscured by overlying bowel gas. The  aorta measures 1.8 cm in diameter and the IVC measures 1.5 cm in  diameter.    Fluid: No evidence of ascites or pleural effusions.      Impression    Impression:  1. Cirrhotic configuration liver with increased parenchymal  echogenicity, possibly representing superimposed hepatic steatosis.  There is a 1.7 cm hypoechoic area  adjacent to the gallbladder fossa  which is more conspicuous than ultrasound 9/7/2018, favored to  represent focal fatty sparing.   a. LI-RADS US Category: US-2 Subthreshold: Observation(s) detected  that may warrant short-term US surveillance  b. Recommend short-term repeat surveillance US in 3-6 months.  2. Borderline splenomegaly.    *Recommendations above based on LI-RADS? v2017:  https://www.acr.org/Clinical-Resources/Reporting-and-Data-Systems/LI-R  DS/Ultrasound-LI-RADS-v2017    I have personally reviewed the examination and initial interpretation  and I agree with the findings.    TASHIA SAMAYOA MD     ASSESSMENT/PLAN:                                                        Vikash was seen today for diabetes.    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 He will go up by two units weekly until am fastings are <120 but never less than 70. Continue diabetic diet and regular exercise.     Diabetic polyneuropathy associated with diabetes mellitus due to underlying condition (H)  -     Comp. Metabolic Panel (14) ([facility])  -     VENOUS COLLECTION    HDL deficiency / Mixed hyperlipidemia  -     Lipid Panel ([facility])  -     VENOUS COLLECTION  Continue atorvastatin and low saturated fat diet.    Benign essential hypertension  -     Comp. Metabolic Panel (14) ([facility])  -     VENOUS COLLECTION  Continue Bystolic, lisinopril, and low salt diet.    Fatty liver disease, nonalcoholic / Chronic hepatitis C without hepatic coma (H)  -     Comp. Metabolic Panel (14) ([facility])  -     VENOUS COLLECTION  Continue atorvastatin, keep tabs on LFTs.    Need for Td vaccine  -     TDAP  VACCINE  -     ADMIN 1st VACCINE  -     VENOUS COLLECTION    Screening for prostate cancer  -     PSA Serum ([facility])  -     VENOUS COLLECTION  Follow up pending PSA.      There are no Patient Instructions on file for this visit.    The following health maintenance items are reviewed in Epic and correct as of today:  Health Maintenance   Topic Date Due     PREVENTIVE CARE VISIT  1960     ADVANCE CARE PLANNING  1960     COLONOSCOPY  02/07/1970     ZOSTER IMMUNIZATION (1 of 2) 02/07/2010     PHQ-2  01/01/2019     A1C  02/21/2019     MICROALBUMIN  08/21/2019     INFLUENZA VACCINE (1) 09/01/2019     BMP  04/19/2020     LIPID  04/19/2020     EYE EXAM  04/26/2020     TSH W/FREE T4 REFLEX  08/21/2020     DIABETIC FOOT EXAM  09/03/2020     DTAP/TDAP/TD IMMUNIZATION (3 - Td) 09/03/2029     HIV SCREENING  Completed     IPV IMMUNIZATION  Aged Out     MENINGITIS IMMUNIZATION  Aged Out       Ana Olvera MD  [facility]  Family Practice  [facility]  [phone]    For any issues my office # is [phone]

## 2019-09-04 LAB
ALBUMIN SERPL-MCNC: 4.6 G/DL (ref 3.5–5.5)
ALBUMIN/GLOB SERPL: 2.2 {RATIO} (ref 1.2–2.2)
ALP SERPL-CCNC: 57 IU/L (ref 39–117)
ALT SERPL-CCNC: 32 IU/L (ref 0–44)
AST SERPL-CCNC: 23 IU/L (ref 0–40)
BILIRUB SERPL-MCNC: 1.4 MG/DL (ref 0–1.2)
BUN SERPL-MCNC: 15 MG/DL (ref 6–24)
BUN/CREATININE RATIO: 14 (ref 9–20)
CALCIUM SERPL-MCNC: 9.9 MG/DL (ref 8.7–10.2)
CHLORIDE SERPLBLD-SCNC: 101 MMOL/L (ref 96–106)
CHOLEST SERPL-MCNC: 109 MG/DL (ref 100–199)
CREAT SERPL-MCNC: 1.05 MG/DL (ref 0.76–1.27)
EGFR IF AFRICN AM: 89 ML/MIN/1.73
EGFR IF NONAFRICN AM: 77 ML/MIN/1.73
GLOBULIN, TOTAL: 2.1 G/DL (ref 1.5–4.5)
GLUCOSE SERPL-MCNC: 185 MG/DL (ref 65–99)
HBA1C MFR BLD: 7.2 % (ref 4.8–5.6)
HDLC SERPL-MCNC: 39 MG/DL
LDL/HDL RATIO: 1.4 RATIO (ref 0–3.6)
LDLC SERPL CALC-MCNC: 55 MG/DL (ref 0–99)
POTASSIUM SERPL-SCNC: 5.7 MMOL/L (ref 3.5–5.2)
PROT SERPL-MCNC: 6.7 G/DL (ref 6–8.5)
PSA NG/ML: 1 NG/ML (ref 0–4)
SODIUM SERPL-SCNC: 140 MMOL/L (ref 134–144)
TOTAL CO2: 24 MMOL/L (ref 20–29)
TRIGL SERPL-MCNC: 74 MG/DL (ref 0–149)
VLDLC SERPL CALC-MCNC: 15 MG/DL (ref 5–40)

## 2019-09-27 DIAGNOSIS — E11.49 DM TYPE 2 CAUSING NEUROLOGICAL DISEASE (H): ICD-10-CM

## 2019-09-27 RX ORDER — INSULIN DETEMIR 100 [IU]/ML
INJECTION, SOLUTION SUBCUTANEOUS
Qty: 15 ML | Refills: 1 | Status: SHIPPED | OUTPATIENT
Start: 2019-09-27 | End: 2019-11-04

## 2019-09-29 ENCOUNTER — HEALTH MAINTENANCE LETTER (OUTPATIENT)
Age: 59
End: 2019-09-29

## 2019-10-21 ENCOUNTER — TELEPHONE (OUTPATIENT)
Dept: GASTROENTEROLOGY | Facility: CLINIC | Age: 59
End: 2019-10-21

## 2019-10-21 NOTE — TELEPHONE ENCOUNTER
Left message for pt reminding them of upcoming appointment.  Instructed pt to bring updated medications list.  Sara Tate, CMA

## 2019-10-22 DIAGNOSIS — F51.01 PRIMARY INSOMNIA: ICD-10-CM

## 2019-10-22 RX ORDER — ZOLPIDEM TARTRATE 10 MG/1
TABLET ORAL
Qty: 30 TABLET | Refills: 0 | Status: SHIPPED | OUTPATIENT
Start: 2019-10-22 | End: 2020-03-03

## 2019-10-25 ENCOUNTER — OFFICE VISIT (OUTPATIENT)
Dept: GASTROENTEROLOGY | Facility: CLINIC | Age: 59
End: 2019-10-25
Attending: INTERNAL MEDICINE
Payer: COMMERCIAL

## 2019-10-25 ENCOUNTER — ANCILLARY PROCEDURE (OUTPATIENT)
Dept: ULTRASOUND IMAGING | Facility: CLINIC | Age: 59
End: 2019-10-25
Attending: INTERNAL MEDICINE
Payer: COMMERCIAL

## 2019-10-25 VITALS
SYSTOLIC BLOOD PRESSURE: 131 MMHG | WEIGHT: 193.6 LBS | HEART RATE: 55 BPM | TEMPERATURE: 97.6 F | OXYGEN SATURATION: 98 % | BODY MASS INDEX: 26.22 KG/M2 | DIASTOLIC BLOOD PRESSURE: 72 MMHG | HEIGHT: 72 IN

## 2019-10-25 DIAGNOSIS — K74.60 CIRRHOSIS OF LIVER WITHOUT ASCITES, UNSPECIFIED HEPATIC CIRRHOSIS TYPE (H): ICD-10-CM

## 2019-10-25 DIAGNOSIS — K74.60 CIRRHOSIS OF LIVER WITHOUT ASCITES, UNSPECIFIED HEPATIC CIRRHOSIS TYPE (H): Primary | ICD-10-CM

## 2019-10-25 LAB
AFP SERPL-MCNC: <1.5 UG/L (ref 0–8)
ALBUMIN SERPL-MCNC: 4.1 G/DL (ref 3.4–5)
ALP SERPL-CCNC: 70 U/L (ref 40–150)
ALT SERPL W P-5'-P-CCNC: 41 U/L (ref 0–70)
ANION GAP SERPL CALCULATED.3IONS-SCNC: 5 MMOL/L (ref 3–14)
AST SERPL W P-5'-P-CCNC: 18 U/L (ref 0–45)
BILIRUB DIRECT SERPL-MCNC: 0.3 MG/DL (ref 0–0.2)
BILIRUB SERPL-MCNC: 1.1 MG/DL (ref 0.2–1.3)
BUN SERPL-MCNC: 14 MG/DL (ref 7–30)
CALCIUM SERPL-MCNC: 9.3 MG/DL (ref 8.5–10.1)
CHLORIDE SERPL-SCNC: 102 MMOL/L (ref 94–109)
CO2 SERPL-SCNC: 30 MMOL/L (ref 20–32)
CREAT SERPL-MCNC: 0.92 MG/DL (ref 0.66–1.25)
ERYTHROCYTE [DISTWIDTH] IN BLOOD BY AUTOMATED COUNT: 12.5 % (ref 10–15)
GFR SERPL CREATININE-BSD FRML MDRD: >90 ML/MIN/{1.73_M2}
GLUCOSE SERPL-MCNC: 172 MG/DL (ref 70–99)
HCT VFR BLD AUTO: 46.7 % (ref 40–53)
HGB BLD-MCNC: 15.9 G/DL (ref 13.3–17.7)
INR PPP: 1.06 (ref 0.86–1.14)
MCH RBC QN AUTO: 30.6 PG (ref 26.5–33)
MCHC RBC AUTO-ENTMCNC: 34 G/DL (ref 31.5–36.5)
MCV RBC AUTO: 90 FL (ref 78–100)
PLATELET # BLD AUTO: 151 10E9/L (ref 150–450)
POTASSIUM SERPL-SCNC: 4.8 MMOL/L (ref 3.4–5.3)
PROT SERPL-MCNC: 7.6 G/DL (ref 6.8–8.8)
RBC # BLD AUTO: 5.2 10E12/L (ref 4.4–5.9)
SODIUM SERPL-SCNC: 137 MMOL/L (ref 133–144)
WBC # BLD AUTO: 5.3 10E9/L (ref 4–11)

## 2019-10-25 PROCEDURE — 85610 PROTHROMBIN TIME: CPT | Performed by: INTERNAL MEDICINE

## 2019-10-25 PROCEDURE — 25000128 H RX IP 250 OP 636: Mod: ZF | Performed by: INTERNAL MEDICINE

## 2019-10-25 PROCEDURE — 90682 RIV4 VACC RECOMBINANT DNA IM: CPT | Mod: ZF | Performed by: INTERNAL MEDICINE

## 2019-10-25 PROCEDURE — 36415 COLL VENOUS BLD VENIPUNCTURE: CPT | Performed by: INTERNAL MEDICINE

## 2019-10-25 PROCEDURE — G0463 HOSPITAL OUTPT CLINIC VISIT: HCPCS | Mod: 25,ZF

## 2019-10-25 PROCEDURE — 82105 ALPHA-FETOPROTEIN SERUM: CPT | Performed by: INTERNAL MEDICINE

## 2019-10-25 PROCEDURE — 80076 HEPATIC FUNCTION PANEL: CPT | Performed by: INTERNAL MEDICINE

## 2019-10-25 PROCEDURE — G0008 ADMIN INFLUENZA VIRUS VAC: HCPCS | Mod: ZF

## 2019-10-25 PROCEDURE — 80048 BASIC METABOLIC PNL TOTAL CA: CPT | Performed by: INTERNAL MEDICINE

## 2019-10-25 PROCEDURE — 85027 COMPLETE CBC AUTOMATED: CPT | Performed by: INTERNAL MEDICINE

## 2019-10-25 RX ADMIN — INFLUENZA A VIRUS A/BRISBANE/02/2018 (H1N1) RECOMBINANT HEMAGGLUTININ ANTIGEN, INFLUENZA A VIRUS A/KANSAS/14/2017 (H3N2) RECOMBINANT HEMAGGLUTININ ANTIGEN, INFLUENZA B VIRUS B/PHUKET/3073/2013 RECOMBINANT HEMAGGLUTININ ANTIGEN, AND INFLUENZA B VIRUS B/MARYLAND/15/2016 RECOMBINANT HEMAGGLUTININ ANTIGEN 0.5 ML: 45; 45; 45; 45 INJECTION INTRAMUSCULAR at 10:58

## 2019-10-25 ASSESSMENT — MIFFLIN-ST. JEOR: SCORE: 1731.16

## 2019-10-25 ASSESSMENT — PAIN SCALES - GENERAL: PAINLEVEL: NO PAIN (0)

## 2019-10-25 NOTE — LETTER
10/25/2019       RE: Vikash Burgos  [address]  [place] [address]     Dear Colleague,    Thank you for referring your patient, Vikash Burgos, to the [facility]PAT[facility] at [facility]. Please see a copy of my visit note below.    HISTORY OF PRESENT ILLNESS:  I had the pleasure of seeing Kelton Burgos for followup in the Liver Clinic at the [facility] on 10/25/2019.  Mr. Burgos returns for followup of cirrhosis caused by nonalcoholic fatty liver disease.      For the most part, he is doing quite well.  He denies any abdominal pain, itching or skin rash or fatigue.  He denies any increased abdominal girth or lower extremity edema.  He has not had any gastrointestinal bleeding or any overt signs of hepatic encephalopathy.  He denies any fevers or chills, cough or shortness of breath.  He denies any nausea or vomiting, diarrhea or constipation.  His appetite has been good, and his weight has been actually quite stable.  His last upper GI endoscopy was a year and a half ago and showed no evidence of varices at that time.     Current Outpatient Medications   Medication     aspirin 81 MG tablet     atorvastatin (LIPITOR) 20 MG tablet     chlorhexidine (PERIDEX) 0.12 % solution     cyanocobalamin 1000 MCG SUBL     insulin pen needle (B-D U/F) 31G X 8 MM miscellaneous     insulin pen needle (BD HEIKE U/F) 32G X 4 MM     LEVEMIR FLEXTOUCH 100 UNIT/ML pen     lisinopril (PRINIVIL/ZESTRIL) 10 MG tablet     nebivolol (BYSTOLIC) 10 MG tablet     Vitamin D, Cholecalciferol, 1000 units TABS     zolpidem (AMBIEN) 10 MG tablet     No current facility-administered medications for this visit.      /72   Pulse 55   Temp 97.6  F (36.4  C) (Oral)   Ht 1.829 m (6')   Wt 87.8 kg (193 lb 9.6 oz)   SpO2 98%   BMI 26.26 kg/m       PHYSICAL EXAMINATION:  In general, he looks quite well.  HEENT exam shows no scleral icterus or temporal  muscle wasting.  Chest is clear.  Abdominal exam shows no increase in girth.  No masses or tenderness to palpation are present.  His liver is 10 cm in span without left lobe enlargement.  No spleen tip is palpable, and extremity exam shows no edema.  Skin exam shows no stigmata of chronic liver disease.  Neurologic exam shows no asterixis.     Recent Results (from the past 168 hour(s))   AFP tumor marker [ATG004]    Collection Time: 10/25/19  6:48 AM   Result Value Ref Range    Alpha Fetoprotein <1.5 0 - 8 ug/L   INR [ALO6001]    Collection Time: 10/25/19  6:48 AM   Result Value Ref Range    INR 1.06 0.86 - 1.14   CBC with platelets [[id number]]    Collection Time: 10/25/19  6:48 AM   Result Value Ref Range    WBC 5.3 4.0 - 11.0 10e9/L    RBC Count 5.20 4.4 - 5.9 10e12/L    Hemoglobin 15.9 13.3 - 17.7 g/dL    Hematocrit 46.7 40.0 - 53.0 %    MCV 90 78 - 100 fl    MCH 30.6 26.5 - 33.0 pg    MCHC 34.0 31.5 - 36.5 g/dL    RDW 12.5 10.0 - 15.0 %    Platelet Count 151 150 - 450 10e9/L   Basic metabolic panel [LAB15]    Collection Time: 10/25/19  6:48 AM   Result Value Ref Range    Sodium 137 133 - 144 mmol/L    Potassium 4.8 3.4 - 5.3 mmol/L    Chloride 102 94 - 109 mmol/L    Carbon Dioxide 30 20 - 32 mmol/L    Anion Gap 5 3 - 14 mmol/L    Glucose 172 (H) 70 - 99 mg/dL    Urea Nitrogen 14 7 - 30 mg/dL    Creatinine 0.92 0.66 - 1.25 mg/dL    GFR Estimate >90 >60 mL/min/[1.73_m2]    GFR Estimate If Black >90 >60 mL/min/[1.73_m2]    Calcium 9.3 8.5 - 10.1 mg/dL   Hepatic Panel [LAB20]    Collection Time: 10/25/19  6:48 AM   Result Value Ref Range    Bilirubin Direct 0.3 (H) 0.0 - 0.2 mg/dL    Bilirubin Total 1.1 0.2 - 1.3 mg/dL    Albumin 4.1 3.4 - 5.0 g/dL    Protein Total 7.6 6.8 - 8.8 g/dL    Alkaline Phosphatase 70 40 - 150 U/L    ALT 41 0 - 70 U/L    AST 18 0 - 45 U/L      IMAGING:  He did have an ultrasound exam as well which shows no mass lesions and the liver does  show a cirrhotic-appearing liver with some increased  echogenicity consistent with ongoing fatty liver disease.  No ascites was seen.      IMPRESSION:  My impression is Mr. Burgos has cirrhosis caused by nonalcoholic fatty liver disease.  His disease remains very well compensated at this point in time.  We will order an endoscopy when he comes back in 6 months.  He will get a flu shot today.  He is otherwise up to date with regard to vaccines and other cancer screening.      Thank you very much for allowing me to participate in the care of this patient.  If you have any questions regarding my recommendations, please do not hesitate to contact me.       Kevin Bedolla MD      Professor of Medicine  [facility] [employer] [facility] [employer] [facility] [employer]      Executive Medical Director, Solid Organ Transplant Program  [facility]     Again, thank you for allowing me to participate in the care of your patient.      Sincerely,    Kevin Bedolla MD

## 2019-10-25 NOTE — LETTER
10/25/2019      RE: Vikash Burgos  [address]  [place] [address]       HISTORY OF PRESENT ILLNESS:  I had the pleasure of seeing Kelton Burgos for followup in the Liver Clinic at the [facility] on 10/25/2019.  Mr. Burgos returns for followup of cirrhosis caused by nonalcoholic fatty liver disease.      For the most part, he is doing quite well.  He denies any abdominal pain, itching or skin rash or fatigue.  He denies any increased abdominal girth or lower extremity edema.  He has not had any gastrointestinal bleeding or any overt signs of hepatic encephalopathy.  He denies any fevers or chills, cough or shortness of breath.  He denies any nausea or vomiting, diarrhea or constipation.  His appetite has been good, and his weight has been actually quite stable.  His last upper GI endoscopy was a year and a half ago and showed no evidence of varices at that time.     Current Outpatient Medications   Medication     aspirin 81 MG tablet     atorvastatin (LIPITOR) 20 MG tablet     chlorhexidine (PERIDEX) 0.12 % solution     cyanocobalamin 1000 MCG SUBL     insulin pen needle (B-D U/F) 31G X 8 MM miscellaneous     insulin pen needle (BD HEIKE U/F) 32G X 4 MM     LEVEMIR FLEXTOUCH 100 UNIT/ML pen     lisinopril (PRINIVIL/ZESTRIL) 10 MG tablet     nebivolol (BYSTOLIC) 10 MG tablet     Vitamin D, Cholecalciferol, 1000 units TABS     zolpidem (AMBIEN) 10 MG tablet     No current facility-administered medications for this visit.      /72   Pulse 55   Temp 97.6  F (36.4  C) (Oral)   Ht 1.829 m (6')   Wt 87.8 kg (193 lb 9.6 oz)   SpO2 98%   BMI 26.26 kg/m       PHYSICAL EXAMINATION:  In general, he looks quite well.  HEENT exam shows no scleral icterus or temporal muscle wasting.  Chest is clear.  Abdominal exam shows no increase in girth.  No masses or tenderness to palpation are present.  His liver is 10 cm in span without left lobe enlargement.  No spleen tip is  palpable, and extremity exam shows no edema.  Skin exam shows no stigmata of chronic liver disease.  Neurologic exam shows no asterixis.     Recent Results (from the past 168 hour(s))   AFP tumor marker [[id number]]    Collection Time: 10/25/19  6:48 AM   Result Value Ref Range    Alpha Fetoprotein <1.5 0 - 8 ug/L   INR [[id number]]    Collection Time: 10/25/19  6:48 AM   Result Value Ref Range    INR 1.06 0.86 - 1.14   CBC with platelets [[id number]]    Collection Time: 10/25/19  6:48 AM   Result Value Ref Range    WBC 5.3 4.0 - 11.0 10e9/L    RBC Count 5.20 4.4 - 5.9 10e12/L    Hemoglobin 15.9 13.3 - 17.7 g/dL    Hematocrit 46.7 40.0 - 53.0 %    MCV 90 78 - 100 fl    MCH 30.6 26.5 - 33.0 pg    MCHC 34.0 31.5 - 36.5 g/dL    RDW 12.5 10.0 - 15.0 %    Platelet Count 151 150 - 450 10e9/L   Basic metabolic panel [LAB15]    Collection Time: 10/25/19  6:48 AM   Result Value Ref Range    Sodium 137 133 - 144 mmol/L    Potassium 4.8 3.4 - 5.3 mmol/L    Chloride 102 94 - 109 mmol/L    Carbon Dioxide 30 20 - 32 mmol/L    Anion Gap 5 3 - 14 mmol/L    Glucose 172 (H) 70 - 99 mg/dL    Urea Nitrogen 14 7 - 30 mg/dL    Creatinine 0.92 0.66 - 1.25 mg/dL    GFR Estimate >90 >60 mL/min/[1.73_m2]    GFR Estimate If Black >90 >60 mL/min/[1.73_m2]    Calcium 9.3 8.5 - 10.1 mg/dL   Hepatic Panel [LAB20]    Collection Time: 10/25/19  6:48 AM   Result Value Ref Range    Bilirubin Direct 0.3 (H) 0.0 - 0.2 mg/dL    Bilirubin Total 1.1 0.2 - 1.3 mg/dL    Albumin 4.1 3.4 - 5.0 g/dL    Protein Total 7.6 6.8 - 8.8 g/dL    Alkaline Phosphatase 70 40 - 150 U/L    ALT 41 0 - 70 U/L    AST 18 0 - 45 U/L      IMAGING:  He did have an ultrasound exam as well which shows no mass lesions and the liver does  show a cirrhotic-appearing liver with some increased echogenicity consistent with ongoing fatty liver disease.  No ascites was seen.      IMPRESSION:  My impression is Mr. Burgos has cirrhosis caused by nonalcoholic fatty liver disease.  His disease remains  very well compensated at this point in time.  We will order an endoscopy when he comes back in 6 months.  He will get a flu shot today.  He is otherwise up to date with regard to vaccines and other cancer screening.      Thank you very much for allowing me to participate in the care of this patient.  If you have any questions regarding my recommendations, please do not hesitate to contact me.       Kevin Bedolla MD      Professor of Medicine  [facility] [employer]      Executive Medical Director, Solid Organ Transplant Program  [facility]     Kevin Bedolla MD

## 2019-10-26 NOTE — PROGRESS NOTES
HISTORY OF PRESENT ILLNESS:  I had the pleasure of seeing Kelton Burgos for followup in the Liver Clinic at the [facility] on 10/25/2019.  Mr. Burgos returns for followup of cirrhosis caused by nonalcoholic fatty liver disease.      For the most part, he is doing quite well.  He denies any abdominal pain, itching or skin rash or fatigue.  He denies any increased abdominal girth or lower extremity edema.  He has not had any gastrointestinal bleeding or any overt signs of hepatic encephalopathy.  He denies any fevers or chills, cough or shortness of breath.  He denies any nausea or vomiting, diarrhea or constipation.  His appetite has been good, and his weight has been actually quite stable.  His last upper GI endoscopy was a year and a half ago and showed no evidence of varices at that time.     Current Outpatient Medications   Medication     aspirin 81 MG tablet     atorvastatin (LIPITOR) 20 MG tablet     chlorhexidine (PERIDEX) 0.12 % solution     cyanocobalamin 1000 MCG SUBL     insulin pen needle (B-D U/F) 31G X 8 MM miscellaneous     insulin pen needle (BD HEIKE U/F) 32G X 4 MM     LEVEMIR FLEXTOUCH 100 UNIT/ML pen     lisinopril (PRINIVIL/ZESTRIL) 10 MG tablet     nebivolol (BYSTOLIC) 10 MG tablet     Vitamin D, Cholecalciferol, 1000 units TABS     zolpidem (AMBIEN) 10 MG tablet     No current facility-administered medications for this visit.      /72   Pulse 55   Temp 97.6  F (36.4  C) (Oral)   Ht 1.829 m (6')   Wt 87.8 kg (193 lb 9.6 oz)   SpO2 98%   BMI 26.26 kg/m      PHYSICAL EXAMINATION:  In general, he looks quite well.  HEENT exam shows no scleral icterus or temporal muscle wasting.  Chest is clear.  Abdominal exam shows no increase in girth.  No masses or tenderness to palpation are present.  His liver is 10 cm in span without left lobe enlargement.  No spleen tip is palpable, and extremity exam shows no edema.  Skin exam shows no stigmata of chronic liver  disease.  Neurologic exam shows no asterixis.     Recent Results (from the past 168 hour(s))   AFP tumor marker [[id number]]    Collection Time: 10/25/19  6:48 AM   Result Value Ref Range    Alpha Fetoprotein <1.5 0 - 8 ug/L   INR [[id number]]    Collection Time: 10/25/19  6:48 AM   Result Value Ref Range    INR 1.06 0.86 - 1.14   CBC with platelets [[id number]]    Collection Time: 10/25/19  6:48 AM   Result Value Ref Range    WBC 5.3 4.0 - 11.0 10e9/L    RBC Count 5.20 4.4 - 5.9 10e12/L    Hemoglobin 15.9 13.3 - 17.7 g/dL    Hematocrit 46.7 40.0 - 53.0 %    MCV 90 78 - 100 fl    MCH 30.6 26.5 - 33.0 pg    MCHC 34.0 31.5 - 36.5 g/dL    RDW 12.5 10.0 - 15.0 %    Platelet Count 151 150 - 450 10e9/L   Basic metabolic panel [LAB15]    Collection Time: 10/25/19  6:48 AM   Result Value Ref Range    Sodium 137 133 - 144 mmol/L    Potassium 4.8 3.4 - 5.3 mmol/L    Chloride 102 94 - 109 mmol/L    Carbon Dioxide 30 20 - 32 mmol/L    Anion Gap 5 3 - 14 mmol/L    Glucose 172 (H) 70 - 99 mg/dL    Urea Nitrogen 14 7 - 30 mg/dL    Creatinine 0.92 0.66 - 1.25 mg/dL    GFR Estimate >90 >60 mL/min/[1.73_m2]    GFR Estimate If Black >90 >60 mL/min/[1.73_m2]    Calcium 9.3 8.5 - 10.1 mg/dL   Hepatic Panel [LAB20]    Collection Time: 10/25/19  6:48 AM   Result Value Ref Range    Bilirubin Direct 0.3 (H) 0.0 - 0.2 mg/dL    Bilirubin Total 1.1 0.2 - 1.3 mg/dL    Albumin 4.1 3.4 - 5.0 g/dL    Protein Total 7.6 6.8 - 8.8 g/dL    Alkaline Phosphatase 70 40 - 150 U/L    ALT 41 0 - 70 U/L    AST 18 0 - 45 U/L      IMAGING:  He did have an ultrasound exam as well which shows no mass lesions and the liver does  show a cirrhotic-appearing liver with some increased echogenicity consistent with ongoing fatty liver disease.  No ascites was seen.      IMPRESSION:  My impression is Mr. Burgos has cirrhosis caused by nonalcoholic fatty liver disease.  His disease remains very well compensated at this point in time.  We will order an endoscopy when he comes  back in 6 months.  He will get a flu shot today.  He is otherwise up to date with regard to vaccines and other cancer screening.      Thank you very much for allowing me to participate in the care of this patient.  If you have any questions regarding my recommendations, please do not hesitate to contact me.       Kevin Bedolla MD      Professor of Medicine  [facility] [employer]      Executive Medical Director, Solid Organ Transplant Program  [facility]

## 2019-11-04 DIAGNOSIS — E11.49 DM TYPE 2 CAUSING NEUROLOGICAL DISEASE (H): ICD-10-CM

## 2020-01-02 ENCOUNTER — PRE VISIT (OUTPATIENT)
Dept: CARDIOLOGY | Facility: CLINIC | Age: 60
End: 2020-01-02

## 2020-01-09 ENCOUNTER — OFFICE VISIT (OUTPATIENT)
Dept: FAMILY MEDICINE | Facility: CLINIC | Age: 60
End: 2020-01-09
Payer: COMMERCIAL

## 2020-01-09 VITALS
TEMPERATURE: 97.1 F | HEART RATE: 58 BPM | SYSTOLIC BLOOD PRESSURE: 141 MMHG | WEIGHT: 197 LBS | HEIGHT: 72 IN | BODY MASS INDEX: 26.68 KG/M2 | OXYGEN SATURATION: 96 % | DIASTOLIC BLOOD PRESSURE: 72 MMHG

## 2020-01-09 DIAGNOSIS — Z00.00 ROUTINE GENERAL MEDICAL EXAMINATION AT A HEALTH CARE FACILITY: Primary | ICD-10-CM

## 2020-01-09 DIAGNOSIS — E08.42 DIABETIC POLYNEUROPATHY ASSOCIATED WITH DIABETES MELLITUS DUE TO UNDERLYING CONDITION (H): ICD-10-CM

## 2020-01-09 DIAGNOSIS — I10 BENIGN ESSENTIAL HYPERTENSION: ICD-10-CM

## 2020-01-09 DIAGNOSIS — E11.49 DM TYPE 2 CAUSING NEUROLOGICAL DISEASE (H): ICD-10-CM

## 2020-01-09 DIAGNOSIS — E78.2 MIXED HYPERLIPIDEMIA: ICD-10-CM

## 2020-01-09 DIAGNOSIS — K76.0 FATTY LIVER DISEASE, NONALCOHOLIC: ICD-10-CM

## 2020-01-09 DIAGNOSIS — I65.22 ATHEROSCLEROSIS OF LEFT CAROTID ARTERY: Chronic | ICD-10-CM

## 2020-01-09 DIAGNOSIS — I25.10 CORONARY ARTERY DISEASE INVOLVING NATIVE CORONARY ARTERY OF NATIVE HEART WITHOUT ANGINA PECTORIS: Chronic | ICD-10-CM

## 2020-01-09 PROCEDURE — 99204 OFFICE O/P NEW MOD 45 MIN: CPT | Performed by: INTERNAL MEDICINE

## 2020-01-09 ASSESSMENT — MIFFLIN-ST. JEOR: SCORE: 1746.59

## 2020-01-09 NOTE — PROGRESS NOTES
"SUBJECTIVE:   CC: Vikash Burgos is an 59 year old male who presents for preventative health visit.     HPI  {Add if <65 person on Medicare  - Required Questions (Optional):621924}  {Outside tests to abstract? :597018}    {additional problems to add (Optional):157578}    Today's PHQ-2 Score:   PHQ-2 (  [employer]) 2018   Q1: Little interest or pleasure in doing things 0   Q2: Feeling down, depressed or hopeless 0   PHQ-2 Score 0       Abuse: Current or Past(Physical, Sexual or Emotional)- { :727903}  Do you feel safe in your environment? { :159489}    Have you ever done Advance Care Planning? (For example, a Health Directive, POLST, or a discussion with a medical provider or your loved ones about your wishes): { :615532}    Social History     Tobacco Use     Smoking status: Former Smoker     Packs/day: 0.50     Years: 12.00     Pack years: 6.00     Last attempt to quit: 2005     Years since quittin.9     Smokeless tobacco: Never Used   Substance Use Topics     Alcohol use: No     Alcohol/week: 0.0 standard drinks     {Rooming Staff- Complete this question if Prescreen response is not shown below for today's visit. If you drink alcohol do you typically have >3 drinks per day or >7 drinks per week? (Optional):380956}    No flowsheet data found.{add AUDIT responses (Optional) (A score of 7 for adult men is an indication of hazardous drinking; a score of 8 or more is an indication of an alcohol use disorder.  A score of 7 or more for adult women is an indication of hazardous drinking or an alchohol use disorder):508449}    Last PSA: No results found for: PSA    Reviewed orders with patient. Reviewed health maintenance and updated orders accordingly - { :882675::\"Yes\"}  {Chronicprobdata (optional):062913}    Reviewed and updated as needed this visit by clinical staff         Reviewed and updated as needed this visit by Provider        {HISTORY OPTIONS (Optional):555495}    Review of Systems  {MALE ROS " "(Optional):499830::\"CONSTITUTIONAL: NEGATIVE for fever, chills, change in weight\",\"INTEGUMENTARY/SKIN: NEGATIVE for worrisome rashes, moles or lesions\",\"EYES: NEGATIVE for vision changes or irritation\",\"ENT: NEGATIVE for ear, mouth and throat problems\",\"RESP: NEGATIVE for significant cough or SOB\",\"CV: NEGATIVE for chest pain, palpitations or peripheral edema\",\"GI: NEGATIVE for nausea, abdominal pain, heartburn, or change in bowel habits\",\" male: negative for dysuria, hematuria, decreased urinary stream, erectile dysfunction, urethral discharge\",\"MUSCULOSKELETAL: NEGATIVE for significant arthralgias or myalgia\",\"NEURO: NEGATIVE for weakness, dizziness or paresthesias\",\"PSYCHIATRIC: NEGATIVE for changes in mood or affect\"}    OBJECTIVE:   There were no vitals taken for this visit.    Physical Exam  {Exam Choices (Optional):828441}    {Diagnostic Test Results (Optional):404038::\"Diagnostic Test Results:\",\"Labs reviewed in Epic\"}    ASSESSMENT/PLAN:   {Diag Picklist:368685}    COUNSELING:   {MALE COUNSELING MESSAGES:196886::\"Reviewed preventive health counseling, as reflected in patient instructions\"}    Estimated body mass index is 26.26 kg/m  as calculated from the following:    Height as of 10/25/19: 1.829 m (6').    Weight as of 10/25/19: 87.8 kg (193 lb 9.6 oz).     {Weight Management Plan (ACO) Complete if BMI is abnormal-  Ages 18-64  BMI >24.9.  Age 65+ with BMI <23 or >30 (Optional):838283}     reports that he quit smoking about 14 years ago. He has a 6.00 pack-year smoking history. He has never used smokeless tobacco.  {Tobacco Cessation -- Complete if patient is a smoker (Optional):402921}    Counseling Resources:  ATP IV Guidelines  Pooled Cohorts Equation Calculator  FRAX Risk Assessment  ICSI Preventive Guidelines  Dietary Guidelines for Americans, 2010  USDA's MyPlate  ASA Prophylaxis  Lung CA Screening    Jailyn Medel MD  [facility]  "

## 2020-01-09 NOTE — PROGRESS NOTES
"Subjective     Vikash Burgos is a 59 year old male who presents to clinic today for the following health issues:    HPI   New Patient/Transfer of Care    Presenting to [facility] ,   Has insomnia on occasional ambien  Has chronic diabetes complicated by peripheral neuropathy that he considers moderate to severe more in LLE,  he attributes to vein harvesting for his CABG,  Follows with GI liver clinic for h/o of non alcoholic fatty liver disease.he states he was put off the transplant list, his GI symptoms are stable   Has chronic pain in his legs from polyneuropathy more so in left leg, he describes moderate to severe pain, he was put on lyrica in past, pain can affect his functional activities  He denies any chest pain, dyspnea, palpitations, presyncope or syncope, no leg edema or claudication per se.    He has history of hepatitis C. He has received treatment and it is considered \"undetectable.\"    He does bike a lot; When in [place], he will ride his bike 9 miles 3 times a day.    past medical history significant for coronary artery bypass grafting x4 in 2010;  noted to have moderate carotid disease.    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)     Family history of malignant melanoma of skin     Diabetic polyneuropathy associated with diabetes mellitus due to underlying condition (H)     HDL deficiency     Past Surgical History:   Procedure Laterality Date     ARTHROSCOPY KNEE RT/LT      left     COLONOSCOPY       CORONARY ARTERY " BYPASS      Coronary artery bypass grafting x 4 with placement of the left internal mammary artery to the distal midportion of the left anterior descending artery, saphenous vein graft from aorta to the third obtuse marginal branch of circumflex coronary artery, saphenous vein graft from aorta to the second diagonal branch, saphenous vein graft from aorta to the posterior descending artery.     ESOPHAGOSCOPY, GASTROSCOPY, DUODENOSCOPY (EGD), COMBINED  10/31/2011    Procedure:COMBINED ESOPHAGOSCOPY, GASTROSCOPY, DUODENOSCOPY (EGD); Surgeon:ALONSO ALDANA; Location:[facility]     ESOPHAGOSCOPY, GASTROSCOPY, DUODENOSCOPY (EGD), COMBINED N/A 3/8/2018    Procedure: COMBINED ESOPHAGOSCOPY, GASTROSCOPY, DUODENOSCOPY (EGD);  EGD;  Surgeon: Angel Luis Justice MD;  Location: [facility]     HEART CATH CORONARY ANGIOGRAM W/LV GRAM  9-11-10    CV Surgery recommended     HEART CATH CORONARY ANGIOGRAM W/LV GRAM  2-28-14    Medical management     HERNIA REPAIR, INGUINAL RT/LT      left       Social History     Tobacco Use     Smoking status: Former Smoker     Packs/day: 0.50     Years: 12.00     Pack years: 6.00     Last attempt to quit: 2005     Years since quittin.9     Smokeless tobacco: Never Used   Substance Use Topics     Alcohol use: No     Alcohol/week: 0.0 standard drinks     Family History   Problem Relation Age of Onset     C.A.D. Father      Cancer Father         bladder     Heart Surgery Father         bypass surgery     Heart Disease Mother          Current Outpatient Medications   Medication Sig Dispense Refill     aspirin 81 MG tablet Take 1 tablet by mouth daily.       atorvastatin (LIPITOR) 20 MG tablet Take 1 tablet (20 mg) by mouth daily 90 tablet 3     chlorhexidine (PERIDEX) 0.12 % solution SWISH 1/2 OZ FOR 30 SECONDS THEN SPIT TWICE A DAY  5     cyanocobalamin 1000 MCG SUBL Place 1,000 mcg under the tongue daily       insulin detemir (LEVEMIR FLEXTOUCH) 100 UNIT/ML pen INJECT 50 UNITS  SUBCUTANEOUS AT BEDTIME 15 mL 1     lisinopril (PRINIVIL/ZESTRIL) 10 MG tablet Take 1 tablet (10 mg) by mouth daily 90 tablet 3     nebivolol (BYSTOLIC) 10 MG tablet Take 1 tablet (10 mg) by mouth daily 90 tablet 3     Vitamin D, Cholecalciferol, 1000 units TABS Take 1,000 Units by mouth daily       zolpidem (AMBIEN) 10 MG tablet TAKE 1 TAB ORALLY AS NEEDED FOR SLEEP 30 tablet 0     insulin pen needle (B-D U/F) 31G X 8 MM miscellaneous Use one pen needles daily or as directed. 100 each 3     insulin pen needle (BD HEIKE U/F) 32G X 4 MM Use 1 daily or as directed. 100 each prn     Allergies   Allergen Reactions     Chlorthalidone Nausea and Vomiting     dizziness     Penicillins Rash     Rash with PCN only. Patient has taken amoxicillin with no rash.     Recent Labs   Lab Test 10/25/19  0648 09/03/19  0941 04/19/19  0648 01/23/19  0727  08/21/18  1014  11/20/17  1700   A1C  --  7.2*  --   --   --  6.6*  --  7.2*   LDL  --  55 48 51  --   --    < >  --    HDL  --  39* 35* 35*  --   --    < >  --    TRIG  --  74 96 89  --   --    < >  --    ALT 41 32 52 18   < >  --    < >  --    CR 0.92 1.05 0.86 1.11   < >  --    < >  --    GFRESTIMATED >90  --  >90 68   < >  --    < >  --    GFRESTBLACK >90  --  >90 82   < >  --    < >  --    POTASSIUM 4.8 5.7* 4.6 4.8   < >  --    < >  --     < > = values in this interval not displayed.      BP Readings from Last 3 Encounters:   01/09/20 (!) 141/72   10/25/19 131/72   09/03/19 130/78    Wt Readings from Last 3 Encounters:   01/09/20 89.4 kg (197 lb)   10/25/19 87.8 kg (193 lb 9.6 oz)   09/03/19 88.5 kg (195 lb)            6/2018 US [facility]                                                                    IMPRESSION: Normal ABIs bilaterally without evidence of arterial  insufficiency      5/2014  CT Chest   IMPRESSION  Impression:  1. Postsurgical changes of median sternotomy and coronary artery  bypass grafting, no findings are demonstrated in chest to explain the  patient's chest wall  pain.  2. Multiple bilateral subcentimeter pulmonary nodules which measure up  to 3 mm. Per Fleischner Society criteria, no followup is necessary in  a low risk patient. In a high-risk patient, followup recommended in 12  months.  3. Partially visualized degenerative changes of the thoracic spine.      Reviewed and updated as needed this visit by Provider  Tobacco  Problems  Med Hx  Surg Hx  Fam Hx  Soc Hx        Review of Systems   ROS COMP: Constitutional, HEENT, cardiovascular, pulmonary, GI, , musculoskeletal, neuro, skin, endocrine and psych systems are negative, except as otherwise noted.      Objective    BP (!) 141/72   Pulse 58   Temp 97.1  F (36.2  C) (Oral)   Ht 1.829 m (6')   Wt 89.4 kg (197 lb)   SpO2 96%   BMI 26.72 kg/m    Body mass index is 26.72 kg/m .  Physical Exam   GENERAL: healthy, alert and no distress  EYES: Eyes grossly normal to inspection, PERRL and conjunctivae and sclerae normal  NECK: no adenopathy, no asymmetry, masses, or scars and thyroid normal to palpation  RESP: lungs clear to auscultation - no rales, rhonchi or wheezes  CV: regular rate and rhythm, normal S1 S2, no S3 or S4, no murmur, click or rub, no peripheral edema and peripheral pulses strong  ABDOMEN: soft, nontender, no hepatosplenomegaly, no masses and bowel sounds normal  MS: no gross musculoskeletal defects noted, no edema  SKIN: no suspicious lesions or rashes  NEURO: Normal strength and tone, mentation intact and speech normal  PSYCH: mentation appears normal, affect normal/bright    Diagnostic Test Results:  Labs reviewed in Epic        Assessment & Plan   Problem List Items Addressed This Visit        Nervous and Auditory    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)    Relevant Orders    AMBULATORY ADULT DIABETES EDUCATOR REFERRAL (Completed)    Diabetic polyneuropathy associated with diabetes mellitus due to underlying condition (H)    Relevant Orders    PAIN MANAGEMENT REFERRAL       Digestive     Fatty liver disease, nonalcoholic       Endocrine    Mixed hyperlipidemia       Circulatory    Coronary artery disease involving native coronary artery of native heart without angina pectoris (Chronic)    Atherosclerosis of left carotid artery (Chronic)    Benign essential hypertension      Other Visit Diagnoses     Routine general medical examination at a health care facility    -  Primary         No change in medications,   Reviewed labs.   continue to follow with Endo and see DE.  Referral to pain clinic.    BMI:   Estimated body mass index is 26.72 kg/m  as calculated from the following:    Height as of this encounter: 1.829 m (6').    Weight as of this encounter: 89.4 kg (197 lb).   Weight management plan: Discussed healthy diet and exercise guidelines        MEDICATIONS:  Continue current medications without change  Work on weight loss  Regular exercise  See Patient Instructions  Return in about 2 months (around 3/9/2020).    Jailyn Medel MD  [facility]

## 2020-01-09 NOTE — PROGRESS NOTES
"  3  SUBJECTIVE:   CC: Vikash Burgos is an 59 year old male who presents for preventive health visit.     Healthy Habits:    Do you get at least three servings of calcium containing foods daily (dairy, green leafy vegetables, etc.)? { :038346::\"yes\"}    Amount of exercise or daily activities, outside of work: { :982665}    Problems taking medications regularly { :810034::\"No\"}    Medication side effects: { :384302::\"No\"}    Have you had an eye exam in the past two years? { :445613}    Do you see a dentist twice per year? { :216187}    Do you have sleep apnea, excessive snoring or daytime drowsiness?{ :513772}  {Outside tests to abstract? :332719}    {additional problems to add (Optional):459193}    Today's PHQ-2 Score:   PHQ-2 (  [employer]) 2018 3/9/2018   Q1: Little interest or pleasure in doing things 0 0   Q2: Feeling down, depressed or hopeless 0 0   PHQ-2 Score 0 0     {PHQ-2 LOOK IN ASSESSMENTS (Optional) :328276}  Abuse: Current or Past(Physical, Sexual or Emotional)- {YES/NO/NA:268617}  Do you feel safe in your environment? {YES/NO/NA:420278}    Have you ever done Advance Care Planning? (For example, a Health Directive, POLST, or a discussion with a medical provider or your loved ones about your wishes): { :464231}    Social History     Tobacco Use     Smoking status: Former Smoker     Packs/day: 0.50     Years: 12.00     Pack years: 6.00     Last attempt to quit: 2005     Years since quittin.9     Smokeless tobacco: Never Used   Substance Use Topics     Alcohol use: No     Alcohol/week: 0.0 standard drinks     If you drink alcohol do you typically have >3 drinks per day or >7 drinks per week? {ETOH :213672}                      Last PSA: No results found for: PSA    Reviewed orders with patient. Reviewed health maintenance and updated orders accordingly - {Yes/No:119895::\"Yes\"}  {Chronicprobdata (Optional):837687}    Reviewed and updated as needed this visit by clinical staff     " "    Reviewed and updated as needed this visit by Provider        {HISTORY OPTIONS (Optional):074857}    ROS:  { :143593::\"CONSTITUTIONAL: NEGATIVE for fever, chills, change in weight\",\"INTEGUMENTARY/SKIN: NEGATIVE for worrisome rashes, moles or lesions\",\"EYES: NEGATIVE for vision changes or irritation\",\"ENT: NEGATIVE for ear, mouth and throat problems\",\"RESP: NEGATIVE for significant cough or SOB\",\"CV: NEGATIVE for chest pain, palpitations or peripheral edema\",\"GI: NEGATIVE for nausea, abdominal pain, heartburn, or change in bowel habits\",\" male: negative for dysuria, hematuria, decreased urinary stream, erectile dysfunction, urethral discharge\",\"MUSCULOSKELETAL: NEGATIVE for significant arthralgias or myalgia\",\"NEURO: NEGATIVE for weakness, dizziness or paresthesias\",\"PSYCHIATRIC: NEGATIVE for changes in mood or affect\"}    OBJECTIVE:   There were no vitals taken for this visit.  EXAM:  {Exam Choices:623429}    {Diagnostic Test Results (Optional):319444::\"Diagnostic Test Results:\",\"Labs reviewed in Epic\"}    ASSESSMENT/PLAN:   {Diag Picklist:020884}    COUNSELING:  {MALE COUNSELING MESSAGES:224026::\"Reviewed preventive health counseling, as reflected in patient instructions\"}    Estimated body mass index is 26.26 kg/m  as calculated from the following:    Height as of 10/25/19: 1.829 m (6').    Weight as of 10/25/19: 87.8 kg (193 lb 9.6 oz).    {Weight Management Plan (ACO) Complete if BMI is abnormal-  Ages 18-64  BMI >24.9.  Age 65+ with BMI <23 or >30 (Optional):189912}     reports that he quit smoking about 14 years ago. He has a 6.00 pack-year smoking history. He has never used smokeless tobacco.  {Tobacco Cessation -- Complete if patient is a smoker (Optional):473419}    Counseling Resources:  ATP IV Guidelines  Pooled Cohorts Equation Calculator  FRAX Risk Assessment  ICSI Preventive Guidelines  Dietary Guidelines for Americans, 2010  USDA's MyPlate  ASA Prophylaxis  Lung CA Screening    Jailyn Medel, " MD  [facility]

## 2020-01-13 ENCOUNTER — TELEPHONE (OUTPATIENT)
Dept: FAMILY MEDICINE | Facility: CLINIC | Age: 60
End: 2020-01-13

## 2020-01-13 NOTE — TELEPHONE ENCOUNTER
Diabetes Education Scheduling Outreach #1:    Call to patient to schedule. Left message with phone number to call to schedule.    Plan for 2nd outreach attempt within 1 week.    Mercedes Sweet  [facility] [facility]  Diabetes and Nutrition Scheduling

## 2020-01-20 ENCOUNTER — HOSPITAL ENCOUNTER (OUTPATIENT)
Dept: ULTRASOUND IMAGING | Facility: CLINIC | Age: 60
Discharge: HOME | End: 2020-01-20
Attending: INTERNAL MEDICINE | Admitting: INTERNAL MEDICINE
Payer: COMMERCIAL

## 2020-01-20 DIAGNOSIS — I65.22 ATHEROSCLEROSIS OF LEFT CAROTID ARTERY: Chronic | ICD-10-CM

## 2020-01-20 PROCEDURE — 93880 EXTRACRANIAL BILAT STUDY: CPT

## 2020-01-20 PROCEDURE — 93880 EXTRACRANIAL BILAT STUDY: CPT | Mod: 26 | Performed by: INTERNAL MEDICINE

## 2020-01-22 NOTE — TELEPHONE ENCOUNTER
Diabetes Education Scheduling Outreach #2:    Call to patient to schedule. Left message with phone number to call to schedule.    Mercedes Sweet  [place] [facility]  Diabetes and Nutrition Scheduling

## 2020-01-23 ENCOUNTER — OFFICE VISIT (OUTPATIENT)
Dept: CARDIOLOGY | Facility: CLINIC | Age: 60
End: 2020-01-23
Attending: INTERNAL MEDICINE
Payer: COMMERCIAL

## 2020-01-23 VITALS
HEIGHT: 72 IN | WEIGHT: 195.6 LBS | BODY MASS INDEX: 26.49 KG/M2 | SYSTOLIC BLOOD PRESSURE: 132 MMHG | HEART RATE: 60 BPM | DIASTOLIC BLOOD PRESSURE: 84 MMHG

## 2020-01-23 DIAGNOSIS — I10 BENIGN ESSENTIAL HYPERTENSION: ICD-10-CM

## 2020-01-23 DIAGNOSIS — E78.6 HDL DEFICIENCY: ICD-10-CM

## 2020-01-23 DIAGNOSIS — I25.10 CORONARY ARTERY DISEASE INVOLVING NATIVE CORONARY ARTERY OF NATIVE HEART WITHOUT ANGINA PECTORIS: Primary | ICD-10-CM

## 2020-01-23 DIAGNOSIS — I65.22 ATHEROSCLEROSIS OF LEFT CAROTID ARTERY: ICD-10-CM

## 2020-01-23 DIAGNOSIS — E78.2 MIXED HYPERLIPIDEMIA: ICD-10-CM

## 2020-01-23 PROCEDURE — 99214 OFFICE O/P EST MOD 30 MIN: CPT | Performed by: INTERNAL MEDICINE

## 2020-01-23 RX ORDER — ATORVASTATIN CALCIUM 20 MG/1
20 TABLET, FILM COATED ORAL DAILY
Qty: 90 TABLET | Refills: 3 | Status: CANCELLED | OUTPATIENT
Start: 2020-01-23

## 2020-01-23 RX ORDER — ROSUVASTATIN CALCIUM 20 MG/1
20 TABLET, COATED ORAL DAILY
Qty: 90 TABLET | Refills: 3 | Status: SHIPPED | OUTPATIENT
Start: 2020-01-23 | End: 2021-01-12

## 2020-01-23 RX ORDER — NEBIVOLOL 5 MG/1
5 TABLET ORAL DAILY
Qty: 90 TABLET | Refills: 3 | Status: SHIPPED | OUTPATIENT
Start: 2020-01-23 | End: 2021-01-11

## 2020-01-23 RX ORDER — LISINOPRIL 10 MG/1
10 TABLET ORAL DAILY
Qty: 90 TABLET | Refills: 3 | Status: SHIPPED | OUTPATIENT
Start: 2020-01-23 | End: 2020-05-06

## 2020-01-23 ASSESSMENT — MIFFLIN-ST. JEOR: SCORE: 1740.24

## 2020-01-23 NOTE — LETTER
1/23/2020      Ana Olvera MD  [address] [place] [address]      RE: Vikash Burgos       Dear Colleague,    I had the pleasure of seeing Vikash Burgos in the [facility].    Service Date: 01/23/2020      HISTORY OF PRESENT ILLNESS:  Mr. Burgos is a very nice, 59-year-old gentleman with a past medical history significant for coronary artery bypass grafting x4 in 2010 by Dr. Marshal Pruett.  At that time, he was also noted to have moderate carotid disease.  He had a persistent chest pain syndrome secondary to harvesting his LIMA.  He has hypersensitivity, worse with touch.  He does not like wearing the seat belt across his chest and does not like having a T shirt on.  It has no relationship to physical activity.      His angiogram demonstrated severe diffuse diabetic vessels.  Subsequent angiogram at the [facility] in 2014 again demonstrated his diffuse native vessel disease, but all bypass grafts were widely patent.  Stress nuclear scan appeared to be negative for ischemia.      Kelton has always been Adventism about his exercise.  Oftentimes he will be exercising 3 times a day.  He states work has gotten a little bit too chaotic, and most times he will only exercise twice a day.  When he is down in [place], he will ride his bike 9 miles 3 times a day.  He does get muscle aches and pains after he works out, but no chest, arm, neck, jaw or shoulder discomfort.  Last year we switched to Bystolic because he had read how it had less of an effect on the diabetes.  He comes back this year, and he states he thinks he is more fatigued.  He says when he gets on the treadmill in the afternoon, sometimes he will get lightheaded and fatigued.  He has no chest, arm, neck, jaw or shoulder discomfort.  He does not have the shortness of breath he had 10 years ago.      ASSESSMENT AND PLAN:  It is difficult to sort out whether Kelton's symptoms might be an anginal equivalent,  although they are clearly different from 10 years ago.  At this time, we will try just going down on his Bystolic to see if it is a beta-blocker side effect.  If it is coronary artery disease, I suspect his symptoms may get worse, and any doubt if this is not clear, I would probably consider a stress echocardiogram.  For now, we will just decrease the Bystolic and see if that takes care of his symptoms.      Blood pressure today is 132/84 with a pulse of 60.  If decreasing the Bystolic results in his blood pressure going up, then I would increase his lisinopril.      He has no symptoms at this time to suggest heart failure or any significant arrhythmia.      We did follow up on his carotids, and a carotid ultrasound demonstrates his right carotid to be 50%-69%, but his left carotid appears to be over 69%.  I will set up a CT angiogram to follow this up.  If he does have significant carotid disease on the left side, I will refer him to Vascular Surgery.      I congratulated him on his exercise regimen and encouraged him to continue to do so.      Weight is 195 pounds, which is unchanged for him.  His body mass index is 26.5 with clothes on.      Fasting lipid profile has backslid a little bit with his decreased exercise.  Total cholesterol is still excellent at 109, HDL deficiency is 39, LDL is 55 up from 48, and triglycerides are 74.  I have recommended we switch him from atorvastatin 40 to rosuvastatin 20.  This would be better at raising HDL and further lowering LDL.  We talked about the IMPROVE-IT, FOURIER and ODYSSEY trials.  At this time, I will try to drive his LDL below 50 and maybe even more towards 35.  I would consider increasing his rosuvastatin to 40 and also consider adding Zetia down the road.  We will have to watch out for his muscle aches and pains, although I think at this time they are truly just musculoskeletal and not a statin side effect.      Basic metabolic profile from October is outstanding.   Creatinine is 0.92 with a BUN of 14, potassium of 4.8 and sodium of 137.      I reviewed his medications and refilled them.  We will follow up on all the above studies in 1 month with my REAGAN.  I will see him back in 1 year, obviously sooner if appropriate.         BRITT DUNLAP MD, [facility]             D: 2020   T: 2020   MT: fidel      Name:     IHSAN SANCHEZ   MRN:      [id number]        Account:      [id number]   :      1960           Service Date: 2020      Document: [id number]         Outpatient Encounter Medications as of 2020   Medication Sig Dispense Refill     aspirin 81 MG tablet Take 1 tablet by mouth daily.       chlorhexidine (PERIDEX) 0.12 % solution SWISH 1/2 OZ FOR 30 SECONDS THEN SPIT TWICE A DAY  5     cyanocobalamin 1000 MCG SUBL Place 1,000 mcg under the tongue daily       insulin pen needle (B-D U/F) 31G X 8 MM miscellaneous Use one pen needles daily or as directed. 100 each 3     insulin pen needle (BD HEIKE U/F) 32G X 4 MM Use 1 daily or as directed. 100 each prn     lisinopril (PRINIVIL/ZESTRIL) 10 MG tablet Take 1 tablet (10 mg) by mouth daily 90 tablet 3     nebivolol (BYSTOLIC) 5 MG tablet Take 1 tablet (5 mg) by mouth daily 90 tablet 3     rosuvastatin (CRESTOR) 20 MG tablet Take 1 tablet (20 mg) by mouth daily 90 tablet 3     Vitamin D, Cholecalciferol, 1000 units TABS Take 1,000 Units by mouth daily       zolpidem (AMBIEN) 10 MG tablet TAKE 1 TAB ORALLY AS NEEDED FOR SLEEP 30 tablet 0     [DISCONTINUED] insulin detemir (LEVEMIR FLEXTOUCH) 100 UNIT/ML pen INJECT 50 UNITS SUBCUTANEOUS AT BEDTIME 15 mL 1     [DISCONTINUED] atorvastatin (LIPITOR) 20 MG tablet Take 1 tablet (20 mg) by mouth daily 90 tablet 3     [DISCONTINUED] lisinopril (PRINIVIL/ZESTRIL) 10 MG tablet Take 1 tablet (10 mg) by mouth daily 90 tablet 3     [DISCONTINUED] nebivolol (BYSTOLIC) 10 MG tablet Take 1 tablet (10 mg) by mouth daily 90 tablet 3     No facility-administered  encounter medications on file as of 1/23/2020.        Again, thank you for allowing me to participate in the care of your patient.      Sincerely,    Zeb Roberts MD     [facility]

## 2020-01-23 NOTE — PROGRESS NOTES
Service Date: 01/23/2020      HISTORY OF PRESENT ILLNESS:  Mr. Burgos is a very nice, 59-year-old gentleman with a past medical history significant for coronary artery bypass grafting x4 in 2010 by Dr. Marshal Pruett.  At that time, he was also noted to have moderate carotid disease.  He had a persistent chest pain syndrome secondary to harvesting his LIMA.  He has hypersensitivity, worse with touch.  He does not like wearing the seat belt across his chest and does not like having a T shirt on.  It has no relationship to physical activity.      His angiogram demonstrated severe diffuse diabetic vessels.  Subsequent angiogram at the [facility] in 2014 again demonstrated his diffuse native vessel disease, but all bypass grafts were widely patent.  Stress nuclear scan appeared to be negative for ischemia.      Kelton has always been Christianity about his exercise.  Oftentimes he will be exercising 3 times a day.  He states work has gotten a little bit too chaotic, and most times he will only exercise twice a day.  When he is down in [place], he will ride his bike 9 miles 3 times a day.  He does get muscle aches and pains after he works out, but no chest, arm, neck, jaw or shoulder discomfort.  Last year we switched to Bystolic because he had read how it had less of an effect on the diabetes.  He comes back this year, and he states he thinks he is more fatigued.  He says when he gets on the treadmill in the afternoon, sometimes he will get lightheaded and fatigued.  He has no chest, arm, neck, jaw or shoulder discomfort.  He does not have the shortness of breath he had 10 years ago.      ASSESSMENT AND PLAN:  It is difficult to sort out whether Alyssas symptoms might be an anginal equivalent, although they are clearly different from 10 years ago.  At this time, we will try just going down on his Bystolic to see if it is a beta-blocker side effect.  If it is coronary artery disease, I suspect his symptoms may get worse, and  any doubt if this is not clear, I would probably consider a stress echocardiogram.  For now, we will just decrease the Bystolic and see if that takes care of his symptoms.      Blood pressure today is 132/84 with a pulse of 60.  If decreasing the Bystolic results in his blood pressure going up, then I would increase his lisinopril.      He has no symptoms at this time to suggest heart failure or any significant arrhythmia.      We did follow up on his carotids, and a carotid ultrasound demonstrates his right carotid to be 50%-69%, but his left carotid appears to be over 69%.  I will set up a CT angiogram to follow this up.  If he does have significant carotid disease on the left side, I will refer him to Vascular Surgery.      I congratulated him on his exercise regimen and encouraged him to continue to do so.      Weight is 195 pounds, which is unchanged for him.  His body mass index is 26.5 with clothes on.      Fasting lipid profile has backslid a little bit with his decreased exercise.  Total cholesterol is still excellent at 109, HDL deficiency is 39, LDL is 55 up from 48, and triglycerides are 74.  I have recommended we switch him from atorvastatin 40 to rosuvastatin 20.  This would be better at raising HDL and further lowering LDL.  We talked about the IMPROVE-IT, FOURIER and ODYSSEY trials.  At this time, I will try to drive his LDL below 50 and maybe even more towards 35.  I would consider increasing his rosuvastatin to 40 and also consider adding Zetia down the road.  We will have to watch out for his muscle aches and pains, although I think at this time they are truly just musculoskeletal and not a statin side effect.      Basic metabolic profile from October is outstanding.  Creatinine is 0.92 with a BUN of 14, potassium of 4.8 and sodium of 137.      I reviewed his medications and refilled them.  We will follow up on all the above studies in 1 month with my REAGAN.  I will see him back in 1 year, obviously  sooner if appropriate.         BRITT DUNLAP MD, FACC             D: 2020   T: 2020   MT: fidel      Name:     IHSAN SANCHEZ   MRN:      [id number]        Account:      [id number]   :      1960           Service Date: 2020      Document: [id number]

## 2020-01-23 NOTE — PROGRESS NOTES
HPI and Plan:   See dictation    Orders Placed This Encounter   Procedures     CT Neck Angio w/o & w Contrast     Lipid Profile     ALT     Basic metabolic panel     Lipid Profile     Follow-Up with Cardiac Advanced Practice Provider     Follow-Up with Cardiologist       Orders Placed This Encounter   Medications     lisinopril (PRINIVIL/ZESTRIL) 10 MG tablet     Sig: Take 1 tablet (10 mg) by mouth daily     Dispense:  90 tablet     Refill:  3     nebivolol (BYSTOLIC) 5 MG tablet     Sig: Take 1 tablet (5 mg) by mouth daily     Dispense:  90 tablet     Refill:  3     rosuvastatin (CRESTOR) 20 MG tablet     Sig: Take 1 tablet (20 mg) by mouth daily     Dispense:  90 tablet     Refill:  3       Medications Discontinued During This Encounter   Medication Reason     atorvastatin (LIPITOR) 20 MG tablet Alternate therapy     lisinopril (PRINIVIL/ZESTRIL) 10 MG tablet Reorder     nebivolol (BYSTOLIC) 10 MG tablet Reorder         Encounter Diagnoses   Name Primary?     Coronary artery disease involving native coronary artery of native heart without angina pectoris Yes     Atherosclerosis of left carotid artery      Mixed hyperlipidemia      HDL deficiency      Benign essential hypertension        CURRENT MEDICATIONS:  Current Outpatient Medications   Medication Sig Dispense Refill     aspirin 81 MG tablet Take 1 tablet by mouth daily.       chlorhexidine (PERIDEX) 0.12 % solution SWISH 1/2 OZ FOR 30 SECONDS THEN SPIT TWICE A DAY  5     cyanocobalamin 1000 MCG SUBL Place 1,000 mcg under the tongue daily       insulin detemir (LEVEMIR FLEXTOUCH) 100 UNIT/ML pen INJECT 50 UNITS SUBCUTANEOUS AT BEDTIME 15 mL 1     insulin pen needle (B-D U/F) 31G X 8 MM miscellaneous Use one pen needles daily or as directed. 100 each 3     insulin pen needle (BD HEIKE U/F) 32G X 4 MM Use 1 daily or as directed. 100 each prn     lisinopril (PRINIVIL/ZESTRIL) 10 MG tablet Take 1 tablet (10 mg) by mouth daily 90 tablet 3     nebivolol (BYSTOLIC) 5  MG tablet Take 1 tablet (5 mg) by mouth daily 90 tablet 3     rosuvastatin (CRESTOR) 20 MG tablet Take 1 tablet (20 mg) by mouth daily 90 tablet 3     Vitamin D, Cholecalciferol, 1000 units TABS Take 1,000 Units by mouth daily       zolpidem (AMBIEN) 10 MG tablet TAKE 1 TAB ORALLY AS NEEDED FOR SLEEP 30 tablet 0       ALLERGIES     Allergies   Allergen Reactions     Chlorthalidone Nausea and Vomiting     dizziness     Penicillins Rash     Rash with PCN only. Patient has taken amoxicillin with no rash.       PAST MEDICAL HISTORY:  Past Medical History:   Diagnosis Date     Basal cell carcinoma      Carotid stenosis, left      Coronary artery disease     4 vessel bypass November 2010; LIMA ->LAD, SVG-> OM3, SVG -> D2, SVG -> PDA     Diabetes mellitus (H) 2005    neuropathy     Generalized anxiety disorder 5/2/2014     Hepatitis C, chronic (H) 2005     Hyperlipidemia LDL goal < 70      Hypertension      Liver diseases     9/15 Liver is 10 cm in span without left lobe enlargement     Persistent microalbuminuria associated with type 2 diabetes mellitus (H) 5/6/2015       PAST SURGICAL HISTORY:  Past Surgical History:   Procedure Laterality Date     ARTHROSCOPY KNEE RT/LT      left     COLONOSCOPY       CORONARY ARTERY BYPASS  11/18/201    Coronary artery bypass grafting x 4 with placement of the left internal mammary artery to the distal midportion of the left anterior descending artery, saphenous vein graft from aorta to the third obtuse marginal branch of circumflex coronary artery, saphenous vein graft from aorta to the second diagonal branch, saphenous vein graft from aorta to the posterior descending artery.     ESOPHAGOSCOPY, GASTROSCOPY, DUODENOSCOPY (EGD), COMBINED  10/31/2011    Procedure:COMBINED ESOPHAGOSCOPY, GASTROSCOPY, DUODENOSCOPY (EGD); Surgeon:ALONSO ALDANA; Location: GI     ESOPHAGOSCOPY, GASTROSCOPY, DUODENOSCOPY (EGD), COMBINED N/A 3/8/2018    Procedure: COMBINED ESOPHAGOSCOPY, GASTROSCOPY,  DUODENOSCOPY (EGD);  EGD;  Surgeon: Angel Luis Justice MD;  Location: [facility]     HEART CATH CORONARY ANGIOGRAM W/LV GRAM  9-11-10    CV Surgery recommended     HEART CATH CORONARY ANGIOGRAM W/LV GRAM  2-28-14    Medical management     HERNIA REPAIR, INGUINAL RT/LT      left       FAMILY HISTORY:  Family History   Problem Relation Age of Onset     C.A.D. Father      Cancer Father         bladder     Heart Surgery Father         bypass surgery     Heart Disease Mother        SOCIAL HISTORY:  Social History     Socioeconomic History     Marital status:      Spouse name: None     Number of children: None     Years of education: None     Highest education level: None   Occupational History     None   Social Needs     Financial resource strain: None     Food insecurity:     Worry: None     Inability: None     Transportation needs:     Medical: None     Non-medical: None   Tobacco Use     Smoking status: Former Smoker     Packs/day: 0.50     Years: 12.00     Pack years: 6.00     Types: Cigarettes     Start date: 1993     Last attempt to quit: 1/26/2005     Years since quitting: 15.0     Smokeless tobacco: Never Used   Substance and Sexual Activity     Alcohol use: No     Alcohol/week: 0.0 standard drinks     Drug use: No     Sexual activity: None   Lifestyle     Physical activity:     Days per week: None     Minutes per session: None     Stress: None   Relationships     Social connections:     Talks on phone: None     Gets together: None     Attends Jain service: None     Active member of club or organization: None     Attends meetings of clubs or organizations: None     Relationship status: None     Intimate partner violence:     Fear of current or ex partner: None     Emotionally abused: None     Physically abused: None     Forced sexual activity: None   Other Topics Concern     Parent/sibling w/ CABG, MI or angioplasty before 65F 55M? Not Asked      Service Not Asked     Blood Transfusions Not  Asked     Caffeine Concern Yes     Comment: 2 cups coffee per day     Occupational Exposure Not Asked     Hobby Hazards Not Asked     Sleep Concern Not Asked     Stress Concern Not Asked     Weight Concern Not Asked     Special Diet Yes     Comment: low carb diet, low sugar     Back Care Not Asked     Exercise Yes     Comment: treadmill 40 minutes, walk, daily, bike 30 minutes every other day     Bike Helmet Not Asked     Seat Belt Not Asked     Self-Exams Not Asked   Social History Narrative     None       Review of Systems:  Skin:  Negative       Eyes:  Positive for glasses right eye retnal neuropathy beginning stages   ENT:  Negative      Respiratory:  Positive for   activity decreased in the afternoon since starting Bystolic   Cardiovascular:    Positive for;dizziness;lightheadedness occ.  Gastroenterology: Negative      Genitourinary:  Negative      Musculoskeletal:  Positive for joint pain increasing  Neurologic:  Positive for numbness or tingling of feet;numbness or tingling of hands neuropathy in feet and left hand   Psychiatric:  Negative      Heme/Lymph/Imm:  Negative      Endocrine:  Positive for diabetes      Physical Exam:  Vitals: /84   Pulse 60   Ht 1.829 m (6')   Wt 88.7 kg (195 lb 9.6 oz)   BMI 26.53 kg/m      Constitutional:  cooperative, alert and oriented, well developed, well nourished, in no acute distress        Skin:  warm and dry to the touch, no apparent skin lesions or masses noted          Head:  normocephalic, no masses or lesions        Eyes:  pupils equal and round, conjunctivae and lids unremarkable, sclera white, no xanthalasma, EOMS intact, no nystagmus        Lymph:      ENT:  no pallor or cyanosis, dentition good        Neck:  carotid pulses are full and equal bilaterally;no carotid bruit        Respiratory:  normal breath sounds, clear to auscultation, normal A-P diameter, normal symmetry, normal respiratory excursion, no use of accessory muscles         Cardiac:  regular rhythm;normal S1 and S2;no S3 or S4;no murmurs, gallops or rubs detected                pulses full and equal                                        GI:           Extremities and Muscular Skeletal:  no edema;no spinal abnormalities noted;normal muscle strength and tone              Neurological:  no gross motor deficits        Psych:  affect appropriate, oriented to time, person and place Anxious      CC  Zeb Roberts MD  [address]  [place] [id number]

## 2020-01-28 ENCOUNTER — DOCUMENTATION ONLY (OUTPATIENT)
Dept: CARDIOLOGY | Facility: CLINIC | Age: 60
End: 2020-01-28

## 2020-01-28 ENCOUNTER — TELEPHONE (OUTPATIENT)
Dept: OTHER | Facility: CLINIC | Age: 60
End: 2020-01-28

## 2020-01-28 ENCOUNTER — HOSPITAL ENCOUNTER (OUTPATIENT)
Dept: CT IMAGING | Facility: CLINIC | Age: 60
Discharge: HOME | End: 2020-01-28
Attending: INTERNAL MEDICINE | Admitting: INTERNAL MEDICINE
Payer: COMMERCIAL

## 2020-01-28 DIAGNOSIS — I65.22 ATHEROSCLEROSIS OF LEFT CAROTID ARTERY: ICD-10-CM

## 2020-01-28 DIAGNOSIS — I65.22 ATHEROSCLEROSIS OF LEFT CAROTID ARTERY: Primary | Chronic | ICD-10-CM

## 2020-01-28 LAB
CREAT BLD-MCNC: 0.9 MG/DL (ref 0.66–1.25)
GFR SERPL CREATININE-BSD FRML MDRD: 86 ML/MIN/{1.73_M2}

## 2020-01-28 PROCEDURE — 25000128 H RX IP 250 OP 636: Performed by: INTERNAL MEDICINE

## 2020-01-28 PROCEDURE — 82565 ASSAY OF CREATININE: CPT

## 2020-01-28 PROCEDURE — 70496 CT ANGIOGRAPHY HEAD: CPT

## 2020-01-28 PROCEDURE — 25000125 ZZHC RX 250: Performed by: INTERNAL MEDICINE

## 2020-01-28 RX ORDER — IOPAMIDOL 755 MG/ML
70 INJECTION, SOLUTION INTRAVASCULAR ONCE
Status: COMPLETED | OUTPATIENT
Start: 2020-01-28 | End: 2020-01-28

## 2020-01-28 RX ADMIN — SODIUM CHLORIDE 100 ML: 9 INJECTION, SOLUTION INTRAVENOUS at 07:16

## 2020-01-28 RX ADMIN — IOPAMIDOL 70 ML: 755 INJECTION, SOLUTION INTRAVENOUS at 07:16

## 2020-01-28 NOTE — TELEPHONE ENCOUNTER
"Pt referred to [facility] by Dr. Roberts for left carotid stenosis.    1/28/20 CTA head/neck available in Epic:  \"1. High-grade, greater than 70% weblike stenosis at the left carotid bifurcation. The stenosis according to my calculations is approximately 75% but it could be greater.  2. The stenosis at the right carotid bifurcation is less than 50%.\"    Pt needs to be scheduled for consult with Vascular Surgery.  Patient is scheduled to see Dr. Stubbs on 1/29/20 at 0845.    BENJY MeierN RN    "

## 2020-01-29 ENCOUNTER — OFFICE VISIT (OUTPATIENT)
Dept: OTHER | Facility: CLINIC | Age: 60
End: 2020-01-29
Attending: SURGERY
Payer: COMMERCIAL

## 2020-01-29 ENCOUNTER — TELEPHONE (OUTPATIENT)
Dept: CARDIOLOGY | Facility: CLINIC | Age: 60
End: 2020-01-29

## 2020-01-29 VITALS — DIASTOLIC BLOOD PRESSURE: 78 MMHG | SYSTOLIC BLOOD PRESSURE: 128 MMHG | HEART RATE: 56 BPM

## 2020-01-29 DIAGNOSIS — I25.10 CORONARY ARTERY DISEASE INVOLVING NATIVE CORONARY ARTERY OF NATIVE HEART WITHOUT ANGINA PECTORIS: Primary | Chronic | ICD-10-CM

## 2020-01-29 DIAGNOSIS — I65.22 ATHEROSCLEROSIS OF LEFT CAROTID ARTERY: Chronic | ICD-10-CM

## 2020-01-29 DIAGNOSIS — E11.49 DM TYPE 2 CAUSING NEUROLOGICAL DISEASE (H): ICD-10-CM

## 2020-01-29 DIAGNOSIS — I65.22 ASYMPTOMATIC STENOSIS OF LEFT CAROTID ARTERY: Primary | ICD-10-CM

## 2020-01-29 PROCEDURE — 99203 OFFICE O/P NEW LOW 30 MIN: CPT | Mod: ZP | Performed by: SURGERY

## 2020-01-29 PROCEDURE — G0463 HOSPITAL OUTPT CLINIC VISIT: HCPCS

## 2020-01-29 ASSESSMENT — ENCOUNTER SYMPTOMS
NUMBNESS: 1
PARESTHESIAS: 1

## 2020-01-29 NOTE — TELEPHONE ENCOUNTER
Spoke with patient to review Dr. Roberts's recommendation for nuclear study on meds -he is taking bystolic. Patient verbalized understanding and agreed with plan. Connected patient with scheduling.

## 2020-01-29 NOTE — PROGRESS NOTES
[facility] VASCULAR [facility] INITIAL VASCULAR SURGERY CONSULT    Impression:   1.  Focal, weblike 75% asymptomatic left carotid stenosis.  Minimal right-sided disease.    2.  Coronary artery disease status post CABG in 2010    Plan:   I had a lengthy discussion with Kelton and his wife regarding the natural history of asymptomatic carotid disease.  We discussed the specifics of the ACAS trial comparing best medical management versus prophylactic carotid endarterectomy.  I recommend left carotid endarterectomy with patch angioplasty.    I discussed the specifics of the procedure including potential complications and the anticipated postoperative course.  I quoted him my personal perioperative stroke rate of less than 1% for asymptomatic lesions.  All of their questions were answered and they verbalized full understanding and a desire to proceed.    He does mention a rather constant right upper arm pain which has been increasing in intensity and severity over the last several months.  It is not exacerbated by significant physical exercise.  I highly doubt that this is in any way cardiac related.  Nevertheless, I have encouraged him to relay this information to his cardiologist, Dr. Roberts.  He will need cardiac clearance prior to left carotid endarterectomy.  He may continue his aspirin uninterrupted.          HPI:   Vikash Burgos is a 59-year-old right-handed gentleman with metabolic syndrome and a significant cardiac history.  He is status post CABG x4 in 2010.  He also has significant nonalcoholic liver disease.  He has been followed over the years for an asymptomatic left carotid stenosis.  Recent imaging demonstrates a severe focal weblike band of approximately 75% involving the proximal left ICA.  He has no personal history of stroke, TIA, or amaurosis.  He has had an aunt and an uncle suffer strokes.    He is a retired  who remains extremely physically active.  He exercises  religiously and avidly.      CURRENT MEDICATIONS  aspirin 81 MG tablet, Take 1 tablet by mouth daily.  chlorhexidine (PERIDEX) 0.12 % solution, SWISH 1/2 OZ FOR 30 SECONDS THEN SPIT TWICE A DAY  cyanocobalamin 1000 MCG SUBL, Place 1,000 mcg under the tongue daily  insulin detemir (LEVEMIR FLEXTOUCH) 100 UNIT/ML pen, INJECT 50 UNITS SUBCUTANEOUS AT BEDTIME  insulin pen needle (B-D U/F) 31G X 8 MM miscellaneous, Use one pen needles daily or as directed.  insulin pen needle (BD HEIKE U/F) 32G X 4 MM, Use 1 daily or as directed.  lisinopril (PRINIVIL/ZESTRIL) 10 MG tablet, Take 1 tablet (10 mg) by mouth daily  nebivolol (BYSTOLIC) 5 MG tablet, Take 1 tablet (5 mg) by mouth daily  rosuvastatin (CRESTOR) 20 MG tablet, Take 1 tablet (20 mg) by mouth daily  Vitamin D, Cholecalciferol, 1000 units TABS, Take 1,000 Units by mouth daily  zolpidem (AMBIEN) 10 MG tablet, TAKE 1 TAB ORALLY AS NEEDED FOR SLEEP    No current facility-administered medications on file prior to visit.         PAST MEDICAL HISTORY  Past Medical History:   Diagnosis Date     Basal cell carcinoma      Carotid stenosis, left      Coronary artery disease     4 vessel bypass November 2010; LIMA ->LAD, SVG-> OM3, SVG -> D2, SVG -> PDA     Diabetes mellitus (H) 2005    neuropathy     Generalized anxiety disorder 5/2/2014     Hepatitis C, chronic (H) 2005     Hyperlipidemia LDL goal < 70      Hypertension      Liver diseases     9/15 Liver is 10 cm in span without left lobe enlargement     Persistent microalbuminuria associated with type 2 diabetes mellitus (H) 5/6/2015         PAST SURGICAL HISTORY:  Past Surgical History:   Procedure Laterality Date     ARTHROSCOPY KNEE RT/LT      left     COLONOSCOPY       CORONARY ARTERY BYPASS  11/18/201    Coronary artery bypass grafting x 4 with placement of the left internal mammary artery to the distal midportion of the left anterior descending artery, saphenous vein graft from aorta to the third obtuse marginal branch  of circumflex coronary artery, saphenous vein graft from aorta to the second diagonal branch, saphenous vein graft from aorta to the posterior descending artery.     ESOPHAGOSCOPY, GASTROSCOPY, DUODENOSCOPY (EGD), COMBINED  10/31/2011    Procedure:COMBINED ESOPHAGOSCOPY, GASTROSCOPY, DUODENOSCOPY (EGD); Surgeon:ALONSO ALDANA; Location: GI     ESOPHAGOSCOPY, GASTROSCOPY, DUODENOSCOPY (EGD), COMBINED N/A 3/8/2018    Procedure: COMBINED ESOPHAGOSCOPY, GASTROSCOPY, DUODENOSCOPY (EGD);  EGD;  Surgeon: Angel Luis Justice MD;  Location:  GI     HEART CATH CORONARY ANGIOGRAM W/LV GRAM  9-11-10    CV Surgery recommended     HEART CATH CORONARY ANGIOGRAM W/LV GRAM  2-28-14    Medical management     HERNIA REPAIR, INGUINAL RT/LT      left       ALLERGIES     Allergies   Allergen Reactions     Chlorthalidone Nausea and Vomiting     dizziness     Penicillins Rash     Rash with PCN only. Patient has taken amoxicillin with no rash.       FAMILY HISTORY  Family History   Problem Relation Age of Onset     C.A.D. Father      Cancer Father         bladder     Heart Surgery Father         bypass surgery     Heart Disease Mother        SOCIAL HISTORY  Social History     Tobacco Use     Smoking status: Former Smoker     Packs/day: 0.50     Years: 12.00     Pack years: 6.00     Types: Cigarettes     Start date: 1993     Last attempt to quit: 1/26/2005     Years since quitting: 15.0     Smokeless tobacco: Never Used   Substance Use Topics     Alcohol use: No     Alcohol/week: 0.0 standard drinks     Drug use: No       ROS:   Review of Systems   Musculoskeletal:        See HPI-constant, significant right upper arm pain.  This is not worsened by activity.   Neurological: Positive for numbness and paresthesias.   All other systems reviewed and are negative.        EXAM:  There were no vitals taken for this visit.  Physical Exam  Vitals signs and nursing note reviewed.   Constitutional:       Appearance: Normal appearance.   HENT:       Head: Normocephalic and atraumatic.      Nose: Nose normal.   Eyes:      General: No scleral icterus.     Extraocular Movements: Extraocular movements intact.      Pupils: Pupils are equal, round, and reactive to light.   Neck:      Musculoskeletal: Normal range of motion. No neck rigidity.   Cardiovascular:      Pulses: Normal pulses.   Musculoskeletal: Normal range of motion.   Skin:     General: Skin is warm and dry.   Neurological:      General: No focal deficit present.      Mental Status: He is alert and oriented to person, place, and time. Mental status is at baseline.   Psychiatric:         Mood and Affect: Mood normal.         Behavior: Behavior normal.         Thought Content: Thought content normal.         Judgment: Judgment normal.       Labs:  LIPID RESULTS:  Lab Results   Component Value Date    CHOL 109 09/03/2019    HDL 39 (L) 09/03/2019    LDL 55 09/03/2019    TRIG 74 09/03/2019    CHOLHDLRATIO 3.4 08/02/2013       CBC RESULTS:  Lab Results   Component Value Date    WBC 5.3 10/25/2019    RBC 5.20 10/25/2019    HGB 15.9 10/25/2019    HCT 46.7 10/25/2019    MCV 90 10/25/2019    MCH 30.6 10/25/2019    MCHC 34.0 10/25/2019    RDW 12.5 10/25/2019     10/25/2019       BMP RESULTS:  Lab Results   Component Value Date     10/25/2019    POTASSIUM 4.8 10/25/2019    CHLORIDE 102 10/25/2019    CO2 30 10/25/2019    ANIONGAP 5 10/25/2019     (H) 10/25/2019    BUN 14 10/25/2019    CR 0.92 10/25/2019    GFRESTIMATED 86 01/28/2020    GFRESTBLACK >90 01/28/2020    LIA 9.3 10/25/2019        A1C RESULTS:  Lab Results   Component Value Date    A1C 7.2 (H) 09/03/2019         Imaging:  Recent Results (from the past 24 hour(s))   CTA Head Neck with Contrast    Narrative    CTA  HEAD/NECK WITH CONTRAST January 28, 2020 8:31 AM     HISTORY: Carotid stenosis, known, follow up. Atherosclerosis of left  carotid artery.    TECHNIQUE: Precontrast localizing scans were followed by CT  angiography with an  injection of  70mL Isovue-370 IV contrast with  scans through the head and neck. Images were transferred to a separate  3-D workstation where multiplanar reformations and 3-D images were  created. Estimates of carotid stenoses are made relative to the distal  internal carotid artery diameters except as noted. Radiation dose for  this scan was reduced using automated exposure control, adjustment of  the mA and/or kV according to patient size, or iterative  reconstruction technique.    COMPARISON: Carotid ultrasound dated 1/20/2020.    FINDINGS: Estimates of stenosis at the carotid bifurcations are  relative to the distal internal carotids.    ARCH: Not included on these images.    NECK CTA:  Right Carotid: Calcified atherosclerotic plaque is seen within the  common carotid artery. No stenosis is identified. Calcified  atherosclerotic plaque at the right carotid bifurcation. No  significant stenosis. The internal carotid artery is tortuous.     Left Carotid: Calcified atherosclerotic plaque is seen in the left  common carotid artery. No significant stenosis. Calcified and  noncalcified atherosclerotic plaque is seen at the left carotid  bifurcation. There is a high-grade weblike stenosis at the left  carotid bifurcation. This makes determination of the residual lumen  difficult. According to my calculations, the residual lumen is about  1.1 mm in diameter. The more distal internal carotid measures  approximately 4.5 mm in diameter. The stenosis is calculated at  approximately 75%. Internal carotid artery is tortuous.    Vertebrals: The vertebral arteries are normal.     HEAD CTA:  Anterior Circulation: No occluded vessels are seen.    Posterior Circulation: The basilar artery and its branches appear  normal.     MISC: None.      Impression    IMPRESSION:   1. High-grade, greater than 70% weblike stenosis at the left carotid  bifurcation. The stenosis according to my calculations is  approximately 75% but it could be  greater.  2. The stenosis at the right carotid bifurcation is less than 50%.  3. Intracranial images are unremarkable. No significant intracranial  stenosis. No occluded intracranial vessels.    BETTYE HOROWITZ MD     I have also reviewed a bilateral carotid ultrasound performed on 1/20/2020.    Total face-to-face time was 30 minutes, greater than 50% spent providing counseling and education.        Semaj Stubbs MD

## 2020-01-29 NOTE — NURSING NOTE
Vikash Burgos is a 59 year old male who presents for:  Chief Complaint   Patient presents with     Consult     New - ref by Dr. Roberts for left carotid stenosis; CTA head/neck in Epic        Vitals:    Vitals:    01/29/20 0834 01/29/20 0835   BP: 124/73 128/78   BP Location: Right arm Left arm   Patient Position: Chair Chair   Cuff Size: Adult Regular Adult Regular   Pulse:  56       BMI:  Estimated body mass index is 26.53 kg/m  as calculated from the following:    Height as of 1/23/20: 6' (1.829 m).    Weight as of 1/23/20: 195 lb 9.6 oz (88.7 kg).    Pain Score:  Data Unavailable        Lisa Florentino MA

## 2020-01-29 NOTE — NURSING NOTE
Patient Education    Procedure: Left carotid endarterectomy with EEG  Diagnosis: Left carotid artery stenoss  Anticoagulation Instruction: continue ASA  Pre-Operative Physical Exam: You need to have a pre-op physical exam within 30 days of your procedure. Your procedure may be cancelled if you do not have a current History and Physical. Call your PCP's office to schedule.  Allergies:  Updated in Epic  Bowel Prep: n/a  Post Procedure Education: [facility] patient post-procedure fact sheet reviewed with patient.    Learner(s):patient and significant other  Method: Listening, Reading  Barriers to Learning:No Barrier  Outcome: Patient did verbalize understanding of above education.    Nikole Vivas, BENJYN, RN  [facility]

## 2020-01-29 NOTE — TELEPHONE ENCOUNTER
Call from patient, he was seen today by Dr. Stubbs in Vascular Surgery. While a left carotid endarterectomy with patch angioplasty is recommended, Dr. Stubbs has not scheduled this yet. He deferred the patient back to Dr. Roberts to discuss right arm pain and get cardiac clearance.  Patient states he has noticed pain in his right arm for about a year. He notes this is getting worse over time. Patient states he thinks he needs an ultrasound of this arm and would like to have it asap.  He did not mention this at his last OV due to time constraints. He is seeing [facility] next week to check his shoulder.  Offered patient an OV with Dr. Roberts on 2/17/2020 for his clearance visit. This may be moved to an earlier visit with another cardiologist depending on testing ordered by Dr. Roberts.    Will message Dr. Roberts to review

## 2020-01-30 ENCOUNTER — PRE VISIT (OUTPATIENT)
Dept: CARDIOLOGY | Facility: CLINIC | Age: 60
End: 2020-01-30

## 2020-01-31 ENCOUNTER — HOSPITAL ENCOUNTER (OUTPATIENT)
Dept: CARDIOLOGY | Facility: CLINIC | Age: 60
Discharge: HOME | End: 2020-01-31
Attending: INTERNAL MEDICINE | Admitting: INTERNAL MEDICINE
Payer: COMMERCIAL

## 2020-01-31 ENCOUNTER — TELEPHONE (OUTPATIENT)
Dept: CARDIOLOGY | Facility: CLINIC | Age: 60
End: 2020-01-31

## 2020-01-31 VITALS
HEIGHT: 72 IN | OXYGEN SATURATION: 97 % | WEIGHT: 195 LBS | HEART RATE: 54 BPM | SYSTOLIC BLOOD PRESSURE: 120 MMHG | DIASTOLIC BLOOD PRESSURE: 60 MMHG | BODY MASS INDEX: 26.41 KG/M2

## 2020-01-31 DIAGNOSIS — I25.10 CORONARY ARTERY DISEASE INVOLVING NATIVE CORONARY ARTERY OF NATIVE HEART WITHOUT ANGINA PECTORIS: Chronic | ICD-10-CM

## 2020-01-31 LAB
CV STRESS MAX HR HE: 144
RATE PRESSURE PRODUCT: NORMAL
STRESS ANGINA INDEX: 0
STRESS ECHO BASELINE DIASTOLIC HE: 78
STRESS ECHO BASELINE HR: 58
STRESS ECHO BASELINE SYSTOLIC BP: 148
STRESS ECHO CALCULATED PERCENT HR: 89 %
STRESS ECHO LAST STRESS DIASTOLIC BP: 86
STRESS ECHO LAST STRESS SYSTOLIC BP: 160
STRESS ECHO POST ESTIMATED WORKLOAD: 13 METS
STRESS ECHO POST EXERCISE DUR MIN: 13 MIN
STRESS ECHO POST EXERCISE DUR SEC: 0 SEC
STRESS ECHO TARGET HR: 161

## 2020-01-31 PROCEDURE — 78452 HT MUSCLE IMAGE SPECT MULT: CPT | Mod: 26 | Performed by: INTERNAL MEDICINE

## 2020-01-31 PROCEDURE — 93016 CV STRESS TEST SUPVJ ONLY: CPT | Performed by: INTERNAL MEDICINE

## 2020-01-31 PROCEDURE — 93017 CV STRESS TEST TRACING ONLY: CPT

## 2020-01-31 PROCEDURE — 34300033 ZZH RX 343: Performed by: INTERNAL MEDICINE

## 2020-01-31 PROCEDURE — 93018 CV STRESS TEST I&R ONLY: CPT | Performed by: INTERNAL MEDICINE

## 2020-01-31 PROCEDURE — A9502 TC99M TETROFOSMIN: HCPCS | Performed by: INTERNAL MEDICINE

## 2020-01-31 RX ADMIN — TETROFOSMIN 3.6 MCI.: 1.38 INJECTION, POWDER, LYOPHILIZED, FOR SOLUTION INTRAVENOUS at 08:14

## 2020-01-31 RX ADMIN — TETROFOSMIN 9.96 MCI.: 1.38 INJECTION, POWDER, LYOPHILIZED, FOR SOLUTION INTRAVENOUS at 10:16

## 2020-01-31 ASSESSMENT — MIFFLIN-ST. JEOR: SCORE: 1737.51

## 2020-01-31 NOTE — TELEPHONE ENCOUNTER
Dr. Roberts OV 2- for cardiac clearance. For carotid surgery.    Nuclear stress test 1-  The nuclear stress test is abnormal.     There is a small area of mild ischemia in the inferoapical segment(s) of the left ventricle. There are no moderate or large areas of ischemia identified on this study.     Left ventricular function is normal, 59%.     A prior study was conducted on 3/13/2018.  The previously noted basal inferior wall defect is not appreciated on the current study.    Dr. Roberts to review.

## 2020-02-03 ENCOUNTER — TRANSFERRED RECORDS (OUTPATIENT)
Dept: FAMILY MEDICINE | Facility: CLINIC | Age: 60
End: 2020-02-03

## 2020-02-03 ENCOUNTER — TRANSFERRED RECORDS (OUTPATIENT)
Dept: HEALTH INFORMATION MANAGEMENT | Facility: CLINIC | Age: 60
End: 2020-02-03

## 2020-02-03 DIAGNOSIS — I65.22 LEFT CAROTID STENOSIS: Primary | ICD-10-CM

## 2020-02-03 NOTE — TELEPHONE ENCOUNTER
Attempted to contact patient to discuss slightly positive nuclear study and Dr. Roberts's recommendation for imdur 15mg daily and moving appointment forward. Left message for patient to call back.

## 2020-02-03 NOTE — TELEPHONE ENCOUNTER
Does his arm pain sound anginal? Start imdur 15 daily and see if that helps. We can move him up to a locked spot if one is available. Stress nuc is positive but not bad.  Thanks

## 2020-02-04 ENCOUNTER — OFFICE VISIT (OUTPATIENT)
Dept: CARDIOLOGY | Facility: CLINIC | Age: 60
End: 2020-02-04
Payer: COMMERCIAL

## 2020-02-04 VITALS
SYSTOLIC BLOOD PRESSURE: 167 MMHG | HEART RATE: 55 BPM | BODY MASS INDEX: 26.76 KG/M2 | WEIGHT: 197.6 LBS | HEIGHT: 72 IN | DIASTOLIC BLOOD PRESSURE: 77 MMHG | OXYGEN SATURATION: 98 %

## 2020-02-04 DIAGNOSIS — I10 BENIGN ESSENTIAL HYPERTENSION: ICD-10-CM

## 2020-02-04 DIAGNOSIS — E78.6 HDL DEFICIENCY: ICD-10-CM

## 2020-02-04 DIAGNOSIS — I65.22 LEFT CAROTID STENOSIS: ICD-10-CM

## 2020-02-04 DIAGNOSIS — R94.39 ABNORMAL CARDIOVASCULAR STRESS TEST: Primary | ICD-10-CM

## 2020-02-04 DIAGNOSIS — E78.2 MIXED HYPERLIPIDEMIA: ICD-10-CM

## 2020-02-04 DIAGNOSIS — M79.601 PAIN OF RIGHT UPPER EXTREMITY: ICD-10-CM

## 2020-02-04 DIAGNOSIS — I25.10 CORONARY ARTERY DISEASE INVOLVING NATIVE CORONARY ARTERY OF NATIVE HEART WITHOUT ANGINA PECTORIS: Chronic | ICD-10-CM

## 2020-02-04 PROCEDURE — 93000 ELECTROCARDIOGRAM COMPLETE: CPT | Performed by: INTERNAL MEDICINE

## 2020-02-04 PROCEDURE — 99214 OFFICE O/P EST MOD 30 MIN: CPT | Performed by: INTERNAL MEDICINE

## 2020-02-04 ASSESSMENT — MIFFLIN-ST. JEOR: SCORE: 1749.31

## 2020-02-04 NOTE — LETTER
2/4/2020    Ana Olvera MD  [address] [place] [address]    RE: Vikash Burgos       Dear Colleague,    I had the pleasure of seeing Vikash Burgos in the [facility].    HPI and Plan:   See dictation    Orders Placed This Encounter   Procedures     EKG 12-lead complete w/read - Clinics (performed today)       No orders of the defined types were placed in this encounter.      There are no discontinued medications.      Encounter Diagnoses   Name Primary?     Abnormal cardiovascular stress test Yes     Pain of right upper extremity      Coronary artery disease involving native coronary artery of native heart without angina pectoris      Mixed hyperlipidemia      HDL deficiency      Left carotid stenosis      Benign essential hypertension        CURRENT MEDICATIONS:  Current Outpatient Medications   Medication Sig Dispense Refill     aspirin 81 MG tablet Take 1 tablet by mouth daily.       chlorhexidine (PERIDEX) 0.12 % solution SWISH 1/2 OZ FOR 30 SECONDS THEN SPIT TWICE A DAY  5     cyanocobalamin 1000 MCG SUBL Place 1,000 mcg under the tongue daily       insulin detemir (LEVEMIR PEN) 100 UNIT/ML pen NJECT 50 UNITS SUBCUTANEOUS AT BEDTIME 15 mL 1     insulin pen needle (B-D U/F) 31G X 8 MM miscellaneous Use one pen needles daily or as directed. 100 each 3     insulin pen needle (BD HEIKE U/F) 32G X 4 MM Use 1 daily or as directed. 100 each prn     lisinopril (PRINIVIL/ZESTRIL) 10 MG tablet Take 1 tablet (10 mg) by mouth daily 90 tablet 3     nebivolol (BYSTOLIC) 5 MG tablet Take 1 tablet (5 mg) by mouth daily 90 tablet 3     rosuvastatin (CRESTOR) 20 MG tablet Take 1 tablet (20 mg) by mouth daily 90 tablet 3     Vitamin D, Cholecalciferol, 1000 units TABS Take 1,000 Units by mouth daily       zolpidem (AMBIEN) 10 MG tablet TAKE 1 TAB ORALLY AS NEEDED FOR SLEEP 30 tablet 0       ALLERGIES     Allergies   Allergen Reactions     Chlorthalidone Nausea and Vomiting      dizziness     Penicillins Rash     Rash with PCN only. Patient has taken amoxicillin with no rash.       PAST MEDICAL HISTORY:  Past Medical History:   Diagnosis Date     Basal cell carcinoma      Carotid stenosis, left      Coronary artery disease     4 vessel bypass November 2010; LIMA ->LAD, SVG-> OM3, SVG -> D2, SVG -> PDA     Diabetes mellitus (H) 2005    neuropathy     Generalized anxiety disorder 5/2/2014     Hepatitis C, chronic (H) 2005     Hyperlipidemia LDL goal < 70      Hypertension      Liver diseases     9/15 Liver is 10 cm in span without left lobe enlargement     Persistent microalbuminuria associated with type 2 diabetes mellitus (H) 5/6/2015       PAST SURGICAL HISTORY:  Past Surgical History:   Procedure Laterality Date     ARTHROSCOPY KNEE RT/LT      left     COLONOSCOPY       CORONARY ARTERY BYPASS  11/18/201    Coronary artery bypass grafting x 4 with placement of the left internal mammary artery to the distal midportion of the left anterior descending artery, saphenous vein graft from aorta to the third obtuse marginal branch of circumflex coronary artery, saphenous vein graft from aorta to the second diagonal branch, saphenous vein graft from aorta to the posterior descending artery.     ESOPHAGOSCOPY, GASTROSCOPY, DUODENOSCOPY (EGD), COMBINED  10/31/2011    Procedure:COMBINED ESOPHAGOSCOPY, GASTROSCOPY, DUODENOSCOPY (EGD); Surgeon:ALONSO ALDANA; Location: GI     ESOPHAGOSCOPY, GASTROSCOPY, DUODENOSCOPY (EGD), COMBINED N/A 3/8/2018    Procedure: COMBINED ESOPHAGOSCOPY, GASTROSCOPY, DUODENOSCOPY (EGD);  EGD;  Surgeon: Angel Luis Justice MD;  Location:  GI     HEART CATH CORONARY ANGIOGRAM W/LV GRAM  9-11-10    CV Surgery recommended     HEART CATH CORONARY ANGIOGRAM W/LV GRAM  2-28-14    Medical management     HERNIA REPAIR, INGUINAL RT/LT      left       FAMILY HISTORY:  Family History   Problem Relation Age of Onset     C.A.D. Father      Cancer Father         bladder      Heart Surgery Father         bypass surgery     Heart Disease Mother        SOCIAL HISTORY:  Social History     Socioeconomic History     Marital status:      Spouse name: None     Number of children: None     Years of education: None     Highest education level: None   Occupational History     None   Social Needs     Financial resource strain: None     Food insecurity:     Worry: None     Inability: None     Transportation needs:     Medical: None     Non-medical: None   Tobacco Use     Smoking status: Former Smoker     Packs/day: 0.50     Years: 12.00     Pack years: 6.00     Types: Cigarettes     Start date: 1993     Last attempt to quit: 1/26/2005     Years since quitting: 15.0     Smokeless tobacco: Never Used   Substance and Sexual Activity     Alcohol use: No     Alcohol/week: 0.0 standard drinks     Drug use: No     Sexual activity: None   Lifestyle     Physical activity:     Days per week: None     Minutes per session: None     Stress: None   Relationships     Social connections:     Talks on phone: None     Gets together: None     Attends Islam service: None     Active member of club or organization: None     Attends meetings of clubs or organizations: None     Relationship status: None     Intimate partner violence:     Fear of current or ex partner: None     Emotionally abused: None     Physically abused: None     Forced sexual activity: None   Other Topics Concern     Parent/sibling w/ CABG, MI or angioplasty before 65F 55M? Not Asked      Service Not Asked     Blood Transfusions Not Asked     Caffeine Concern Yes     Comment: 2 cups coffee per day     Occupational Exposure Not Asked     Hobby Hazards Not Asked     Sleep Concern Not Asked     Stress Concern Not Asked     Weight Concern Not Asked     Special Diet Yes     Comment: low carb diet, low sugar     Back Care Not Asked     Exercise Yes     Comment: treadmill 40 minutes, walk, daily, bike 30 minutes every other day     Bike  Helmet Not Asked     Seat Belt Not Asked     Self-Exams Not Asked   Social History Narrative     None       Review of Systems:  Skin:  Negative   baso cell being treated by derm    Eyes:  Positive for glasses right eye retnal neuropathy beginning stages   ENT:  Negative      Respiratory:  Positive for dyspnea on exertion activity decreased in the afternoon since starting Bystolic   Cardiovascular:  Negative   occ.  Gastroenterology: Negative heartburn food choice   Genitourinary:  Negative      Musculoskeletal:  Positive for joint pain increasing  Neurologic:  Positive for numbness or tingling of feet;numbness or tingling of hands neuropathy in feet and left hand   Psychiatric:  Negative      Heme/Lymph/Imm:  Negative allergies    Endocrine:  Positive for diabetes      Physical Exam:  Vitals: BP (!) 167/77   Pulse 55   Ht 1.829 m (6')   Wt 89.6 kg (197 lb 9.6 oz)   SpO2 98%   BMI 26.80 kg/m       Constitutional:  cooperative, alert and oriented, well developed, well nourished, in no acute distress        Skin:  warm and dry to the touch, no apparent skin lesions or masses noted          Head:  normocephalic, no masses or lesions        Eyes:  pupils equal and round, conjunctivae and lids unremarkable, sclera white, no xanthalasma, EOMS intact, no nystagmus        Lymph:      ENT:  no pallor or cyanosis, dentition good        Neck:  carotid pulses are full and equal bilaterally;no carotid bruit        Respiratory:  normal breath sounds, clear to auscultation, normal A-P diameter, normal symmetry, normal respiratory excursion, no use of accessory muscles         Cardiac: regular rhythm;normal S1 and S2;no S3 or S4       LUSB;systolic ejection murmur;grade 1 holosystolic murmur;apical;grade 1      pulses full and equal                                        GI:           Extremities and Muscular Skeletal:  no edema;no spinal abnormalities noted;normal muscle strength and tone              Neurological:  no gross  motor deficits        Psych:  affect appropriate, oriented to time, person and place Anxious      CC  No referring provider defined for this encounter.                Thank you for allowing me to participate in the care of your patient.      Sincerely,     Zeb Roberts MD     [facility]    cc:   No referring provider defined for this encounter.

## 2020-02-04 NOTE — LETTER
2/4/2020      Ana Olvera MD  [address] [place] [address]      RE: Vikash Burgos       Dear Colleague,    I had the pleasure of seeing Vikash Burgos in the [facility].    Service Date: 02/04/2020      HISTORY OF PRESENT ILLNESS:  Mr. Burgos is a very nice 59-year-old gentleman who I just today realized is  to one of our schedulers, Carmina.      His past cardiac history is significant for coronary artery bypass grafting x4 in 2010 by Dr. Marshal Pruett.  At that time he was also noted to have moderate carotid disease.  He also had persistent chest pain syndrome secondary to harvesting of his LIMA.  He has hypersensitivity, worse with touch.  As a matter of fact, he did not like wearing his seatbelt across his chest and did not even like having a T-shirt on.  The discomfort had no relationship to physical activity.        His angiogram demonstrated severe diffuse diabetic vessels.  Subsequent angiogram at the [place] in 2014 again demonstrated diffuse native vessel disease.  All of his bypass grafts were widely patent.      Kelton has always been Religion about his exercise.  Often times, he will exercise 3 times a day, work has gotten a bit chaotic, so he is not exercising as much as he normally does, but still exercises daily and most days twice a day.  He states when he is down in [place] he will ride his bike 9 miles 3 times a day.      Last year, we switched to Bystolic because he read how it would affect his diabetes.  When I saw him earlier this year, he thought he was a bit more fatigued.  He states occasionally he gets lightheaded on his treadmill.  He states symptoms are not at all like he had prior to his bypass.  He has no exertional chest, arm, neck, jaw or shoulder discomfort.  Given his fatigue and lightheadedness, we thought we would try to decrease his Bystolic even further.      We did do a followup on his carotid and carotid  Doppler ultrasound showed that velocities had increased significantly on the left.  Ultimately, a CT scan showed the stenosis appears to be in the range of 75% or worse.  I sent him to see Dr. Devyn Stubbs in consultation and Dr. Stubbs recommended a patch surgery, but referred him back for evaluation of his right arm pain of which Kelton had not mentioned at all to me.  We did set him up for a stress nuclear scan that was done in the interim.      Kelton returns to clinic stating that he does have right arm pain, but when he starts describing it it is quite positional.  He states if he runs on the treadmill with his arms in a neutral position he does not have any problems.  If he rotates his arm outward, this will cause pain.  He also notes when he rolls over in bed and lies on the right side this makes the pain worse.  Otherwise, it has no relationship to his workouts, climbing stairs, doing chores of daily living.  It is not better with rest.  He is going to see Orthopedic Surgery.  He had an MRI done earlier today, which he is going to follow up on.  As a matter of fact, he states he was late coming out of his MRI and ran over to our office today to make this appointment.      ASSESSMENT AND PLAN:  Clinically, Kelton does not appear to be having any anginal symptoms.  His arm pain sounds very musculoskeletal in origin.  On his stress nuclear scan, he was able to go  13 minutes.  He had no EKG changes, no symptoms.  He had a small area of mild ischemia affecting the inferoapical segment of the left ventricle.  There was no moderate or large sized areas of ischemia.  Ejection fraction is 59%.  In comparison to his 2018 stress test, a previously noted basilar inferior defect is no longer present.  At this time, I think he can proceed with his carotid surgery without any further testing or any further treatment.  It does not appear that he is having angina, heart failure, or any significant arrhythmias.      Blood pressure is  quite high today at 167/77 with a pulse of 55, but as stated, he states he ran over for this appointment.  We did adjust his Bystolic as noted above.  He is already scheduled to come back in 2 weeks to follow up with an REAGAN on his blood pressure.  I am not going to make any adjustments today, but we may need to make further adjustments down the road.      I congratulated him on his exercise regimen and encouraged him to continue to do so.      He states he thinks Orthopedics is going to recommend possibly shoulder surgery and elbow surgery and I have told him again his carotid surgery is a higher risk surgery as long as he does not change in symptom that he can proceed with shoulder or elbow surgery if indicated.      We will keep our followup as previously scheduled.         BRITT DUNLAP MD, [facility]             D: 2020   T: 2020   MT: TRACY      Name:     IHSAN SANCHEZ   MRN:      [id number]        Account:      [id number]   :      1960           Service Date: 2020      Document: [id number]         Outpatient Encounter Medications as of 2020   Medication Sig Dispense Refill     aspirin 81 MG tablet Take 1 tablet by mouth daily.       chlorhexidine (PERIDEX) 0.12 % solution SWISH 1/2 OZ FOR 30 SECONDS THEN SPIT TWICE A DAY  5     cyanocobalamin 1000 MCG SUBL Place 1,000 mcg under the tongue daily       insulin detemir (LEVEMIR PEN) 100 UNIT/ML pen NJECT 50 UNITS SUBCUTANEOUS AT BEDTIME 15 mL 1     insulin pen needle (B-D U/F) 31G X 8 MM miscellaneous Use one pen needles daily or as directed. 100 each 3     insulin pen needle (BD HEIKE U/F) 32G X 4 MM Use 1 daily or as directed. 100 each prn     lisinopril (PRINIVIL/ZESTRIL) 10 MG tablet Take 1 tablet (10 mg) by mouth daily 90 tablet 3     nebivolol (BYSTOLIC) 5 MG tablet Take 1 tablet (5 mg) by mouth daily 90 tablet 3     rosuvastatin (CRESTOR) 20 MG tablet Take 1 tablet (20 mg) by mouth daily 90 tablet 3     Vitamin D,  Cholecalciferol, 1000 units TABS Take 1,000 Units by mouth daily       zolpidem (AMBIEN) 10 MG tablet TAKE 1 TAB ORALLY AS NEEDED FOR SLEEP 30 tablet 0     No facility-administered encounter medications on file as of 2/4/2020.        Again, thank you for allowing me to participate in the care of your patient.      Sincerely,    Zeb Roberts MD     [facility]

## 2020-02-04 NOTE — PROGRESS NOTES
HPI and Plan:   See dictation    Orders Placed This Encounter   Procedures     EKG 12-lead complete w/read - Clinics (performed today)       No orders of the defined types were placed in this encounter.      There are no discontinued medications.      Encounter Diagnoses   Name Primary?     Abnormal cardiovascular stress test Yes     Pain of right upper extremity      Coronary artery disease involving native coronary artery of native heart without angina pectoris      Mixed hyperlipidemia      HDL deficiency      Left carotid stenosis      Benign essential hypertension        CURRENT MEDICATIONS:  Current Outpatient Medications   Medication Sig Dispense Refill     aspirin 81 MG tablet Take 1 tablet by mouth daily.       chlorhexidine (PERIDEX) 0.12 % solution SWISH 1/2 OZ FOR 30 SECONDS THEN SPIT TWICE A DAY  5     cyanocobalamin 1000 MCG SUBL Place 1,000 mcg under the tongue daily       insulin detemir (LEVEMIR PEN) 100 UNIT/ML pen NJECT 50 UNITS SUBCUTANEOUS AT BEDTIME 15 mL 1     insulin pen needle (B-D U/F) 31G X 8 MM miscellaneous Use one pen needles daily or as directed. 100 each 3     insulin pen needle (BD HEIKE U/F) 32G X 4 MM Use 1 daily or as directed. 100 each prn     lisinopril (PRINIVIL/ZESTRIL) 10 MG tablet Take 1 tablet (10 mg) by mouth daily 90 tablet 3     nebivolol (BYSTOLIC) 5 MG tablet Take 1 tablet (5 mg) by mouth daily 90 tablet 3     rosuvastatin (CRESTOR) 20 MG tablet Take 1 tablet (20 mg) by mouth daily 90 tablet 3     Vitamin D, Cholecalciferol, 1000 units TABS Take 1,000 Units by mouth daily       zolpidem (AMBIEN) 10 MG tablet TAKE 1 TAB ORALLY AS NEEDED FOR SLEEP 30 tablet 0       ALLERGIES     Allergies   Allergen Reactions     Chlorthalidone Nausea and Vomiting     dizziness     Penicillins Rash     Rash with PCN only. Patient has taken amoxicillin with no rash.       PAST MEDICAL HISTORY:  Past Medical History:   Diagnosis Date     Basal cell carcinoma      Carotid stenosis, left       Coronary artery disease     4 vessel bypass November 2010; LIMA ->LAD, SVG-> OM3, SVG -> D2, SVG -> PDA     Diabetes mellitus (H) 2005    neuropathy     Generalized anxiety disorder 5/2/2014     Hepatitis C, chronic (H) 2005     Hyperlipidemia LDL goal < 70      Hypertension      Liver diseases     9/15 Liver is 10 cm in span without left lobe enlargement     Persistent microalbuminuria associated with type 2 diabetes mellitus (H) 5/6/2015       PAST SURGICAL HISTORY:  Past Surgical History:   Procedure Laterality Date     ARTHROSCOPY KNEE RT/LT      left     COLONOSCOPY       CORONARY ARTERY BYPASS  11/18/201    Coronary artery bypass grafting x 4 with placement of the left internal mammary artery to the distal midportion of the left anterior descending artery, saphenous vein graft from aorta to the third obtuse marginal branch of circumflex coronary artery, saphenous vein graft from aorta to the second diagonal branch, saphenous vein graft from aorta to the posterior descending artery.     ESOPHAGOSCOPY, GASTROSCOPY, DUODENOSCOPY (EGD), COMBINED  10/31/2011    Procedure:COMBINED ESOPHAGOSCOPY, GASTROSCOPY, DUODENOSCOPY (EGD); Surgeon:ALONSO ALDANA; Location: GI     ESOPHAGOSCOPY, GASTROSCOPY, DUODENOSCOPY (EGD), COMBINED N/A 3/8/2018    Procedure: COMBINED ESOPHAGOSCOPY, GASTROSCOPY, DUODENOSCOPY (EGD);  EGD;  Surgeon: Angel Luis Justice MD;  Location:  GI     HEART CATH CORONARY ANGIOGRAM W/LV GRAM  9-11-10    CV Surgery recommended     HEART CATH CORONARY ANGIOGRAM W/LV GRAM  2-28-14    Medical management     HERNIA REPAIR, INGUINAL RT/LT      left       FAMILY HISTORY:  Family History   Problem Relation Age of Onset     C.A.D. Father      Cancer Father         bladder     Heart Surgery Father         bypass surgery     Heart Disease Mother        SOCIAL HISTORY:  Social History     Socioeconomic History     Marital status:      Spouse name: None     Number of children: None     Years of  education: None     Highest education level: None   Occupational History     None   Social Needs     Financial resource strain: None     Food insecurity:     Worry: None     Inability: None     Transportation needs:     Medical: None     Non-medical: None   Tobacco Use     Smoking status: Former Smoker     Packs/day: 0.50     Years: 12.00     Pack years: 6.00     Types: Cigarettes     Start date: 1993     Last attempt to quit: 1/26/2005     Years since quitting: 15.0     Smokeless tobacco: Never Used   Substance and Sexual Activity     Alcohol use: No     Alcohol/week: 0.0 standard drinks     Drug use: No     Sexual activity: None   Lifestyle     Physical activity:     Days per week: None     Minutes per session: None     Stress: None   Relationships     Social connections:     Talks on phone: None     Gets together: None     Attends Latter day service: None     Active member of club or organization: None     Attends meetings of clubs or organizations: None     Relationship status: None     Intimate partner violence:     Fear of current or ex partner: None     Emotionally abused: None     Physically abused: None     Forced sexual activity: None   Other Topics Concern     Parent/sibling w/ CABG, MI or angioplasty before 65F 55M? Not Asked      Service Not Asked     Blood Transfusions Not Asked     Caffeine Concern Yes     Comment: 2 cups coffee per day     Occupational Exposure Not Asked     Hobby Hazards Not Asked     Sleep Concern Not Asked     Stress Concern Not Asked     Weight Concern Not Asked     Special Diet Yes     Comment: low carb diet, low sugar     Back Care Not Asked     Exercise Yes     Comment: treadmill 40 minutes, walk, daily, bike 30 minutes every other day     Bike Helmet Not Asked     Seat Belt Not Asked     Self-Exams Not Asked   Social History Narrative     None       Review of Systems:  Skin:  Negative   baso cell being treated by derm    Eyes:  Positive for glasses right eye retnal  neuropathy beginning stages   ENT:  Negative      Respiratory:  Positive for dyspnea on exertion activity decreased in the afternoon since starting Bystolic   Cardiovascular:  Negative   occ.  Gastroenterology: Negative heartburn food choice   Genitourinary:  Negative      Musculoskeletal:  Positive for joint pain increasing  Neurologic:  Positive for numbness or tingling of feet;numbness or tingling of hands neuropathy in feet and left hand   Psychiatric:  Negative      Heme/Lymph/Imm:  Negative allergies    Endocrine:  Positive for diabetes      Physical Exam:  Vitals: BP (!) 167/77   Pulse 55   Ht 1.829 m (6')   Wt 89.6 kg (197 lb 9.6 oz)   SpO2 98%   BMI 26.80 kg/m      Constitutional:  cooperative, alert and oriented, well developed, well nourished, in no acute distress        Skin:  warm and dry to the touch, no apparent skin lesions or masses noted          Head:  normocephalic, no masses or lesions        Eyes:  pupils equal and round, conjunctivae and lids unremarkable, sclera white, no xanthalasma, EOMS intact, no nystagmus        Lymph:      ENT:  no pallor or cyanosis, dentition good        Neck:  carotid pulses are full and equal bilaterally;no carotid bruit        Respiratory:  normal breath sounds, clear to auscultation, normal A-P diameter, normal symmetry, normal respiratory excursion, no use of accessory muscles         Cardiac: regular rhythm;normal S1 and S2;no S3 or S4       LUSB;systolic ejection murmur;grade 1 holosystolic murmur;apical;grade 1      pulses full and equal                                        GI:           Extremities and Muscular Skeletal:  no edema;no spinal abnormalities noted;normal muscle strength and tone              Neurological:  no gross motor deficits        Psych:  affect appropriate, oriented to time, person and place Anxious      CC  No referring provider defined for this encounter.

## 2020-02-04 NOTE — PROGRESS NOTES
Service Date: 02/04/2020      HISTORY OF PRESENT ILLNESS:  Mr. Burgos is a very nice 59-year-old gentleman who I just today realized is  to one of our schedulers, Carmina.      His past cardiac history is significant for coronary artery bypass grafting x4 in 2010 by Dr. Marshal Pruett.  At that time he was also noted to have moderate carotid disease.  He also had persistent chest pain syndrome secondary to harvesting of his LIMA.  He has hypersensitivity, worse with touch.  As a matter of fact, he did not like wearing his seatbelt across his chest and did not even like having a T-shirt on.  The discomfort had no relationship to physical activity.        His angiogram demonstrated severe diffuse diabetic vessels.  Subsequent angiogram at the [facility] in 2014 again demonstrated diffuse native vessel disease.  All of his bypass grafts were widely patent.      Kelton has always been Mandaen about his exercise.  Often times, he will exercise 3 times a day, work has gotten a bit chaotic, so he is not exercising as much as he normally does, but still exercises daily and most days twice a day.  He states when he is down in [place] he will ride his bike 9 miles 3 times a day.      Last year, we switched to Bystolic because he read how it would affect his diabetes.  When I saw him earlier this year, he thought he was a bit more fatigued.  He states occasionally he gets lightheaded on his treadmill.  He states symptoms are not at all like he had prior to his bypass.  He has no exertional chest, arm, neck, jaw or shoulder discomfort.  Given his fatigue and lightheadedness, we thought we would try to decrease his Bystolic even further.      We did do a followup on his carotid and carotid Doppler ultrasound showed that velocities had increased significantly on the left.  Ultimately, a CT scan showed the stenosis appears to be in the range of 75% or worse.  I sent him to see Dr. Devyn Stubbs in consultation and Dr. Stubbs  recommended a patch surgery, but referred him back for evaluation of his right arm pain of which Kelton had not mentioned at all to me.  We did set him up for a stress nuclear scan that was done in the interim.      Kelton returns to clinic stating that he does have right arm pain, but when he starts describing it it is quite positional.  He states if he runs on the treadmill with his arms in a neutral position he does not have any problems.  If he rotates his arm outward, this will cause pain.  He also notes when he rolls over in bed and lies on the right side this makes the pain worse.  Otherwise, it has no relationship to his workouts, climbing stairs, doing chores of daily living.  It is not better with rest.  He is going to see Orthopedic Surgery.  He had an MRI done earlier today, which he is going to follow up on.  As a matter of fact, he states he was late coming out of his MRI and ran over to our office today to make this appointment.      ASSESSMENT AND PLAN:  Clinically, Kelton does not appear to be having any anginal symptoms.  His arm pain sounds very musculoskeletal in origin.  On his stress nuclear scan, he was able to go  13 minutes.  He had no EKG changes, no symptoms.  He had a small area of mild ischemia affecting the inferoapical segment of the left ventricle.  There was no moderate or large sized areas of ischemia.  Ejection fraction is 59%.  In comparison to his 2018 stress test, a previously noted basilar inferior defect is no longer present.  At this time, I think he can proceed with his carotid surgery without any further testing or any further treatment.  It does not appear that he is having angina, heart failure, or any significant arrhythmias.      Blood pressure is quite high today at 167/77 with a pulse of 55, but as stated, he states he ran over for this appointment.  We did adjust his Bystolic as noted above.  He is already scheduled to come back in 2 weeks to follow up with an [facility] on his  blood pressure.  I am not going to make any adjustments today, but we may need to make further adjustments down the road.      I congratulated him on his exercise regimen and encouraged him to continue to do so.      He states he thinks Orthopedics is going to recommend possibly shoulder surgery and elbow surgery and I have told him again his carotid surgery is a higher risk surgery as long as he does not change in symptom that he can proceed with shoulder or elbow surgery if indicated.      We will keep our followup as previously scheduled.         BRITT DUNLAP MD, [facility]             D: 2020   T: 2020   MT: TRACY      Name:     IHSAN SANCHEZ   MRN:      [id number]        Account:      [id number]   :      1960           Service Date: 2020      Document: [id number]

## 2020-02-07 ENCOUNTER — TELEPHONE (OUTPATIENT)
Dept: OTHER | Facility: CLINIC | Age: 60
End: 2020-02-07

## 2020-02-07 ENCOUNTER — TRANSFERRED RECORDS (OUTPATIENT)
Dept: FAMILY MEDICINE | Facility: CLINIC | Age: 60
End: 2020-02-07

## 2020-02-07 LAB — RETINOPATHY: NORMAL

## 2020-02-07 NOTE — TELEPHONE ENCOUNTER
Type of surgery: LEFT CAROTID ENDARTERECTOMY WITH EEG  Location of surgery: [facility]  Date and time of surgery: 2/21/20 @ 8:00 AM  Surgeon: DR. THOMAS  Pre-Op Appt Date: PT TO SCHEDULE  Post-Op Appt Date: PT TO SCHEDULE   Packet sent out: Yes  Pre-cert/Authorization completed:  Yes  Date: 2/7/20

## 2020-02-10 ENCOUNTER — TELEPHONE (OUTPATIENT)
Dept: CARDIOLOGY | Facility: CLINIC | Age: 60
End: 2020-02-10

## 2020-02-11 ENCOUNTER — TRANSFERRED RECORDS (OUTPATIENT)
Dept: HEALTH INFORMATION MANAGEMENT | Facility: CLINIC | Age: 60
End: 2020-02-11

## 2020-02-13 ENCOUNTER — TELEPHONE (OUTPATIENT)
Dept: CARDIOLOGY | Facility: CLINIC | Age: 60
End: 2020-02-13

## 2020-02-13 NOTE — TELEPHONE ENCOUNTER
Message to Dr Charbel GUEVARA to push out f/up until after carotid patch surgery per patient/wife request? Pt's BP was high at visit on 2/4/20 and pt was advised to f/up in 2wks regarding this. Bystolic was decreased at OV 1/23/20.     Pt has been check BP at home in the morning, recordings as below:   127/77 (pulse 57)   130/75 (pulse 56)   123/68 (pulse 59)   138/80 (pulse 60)

## 2020-02-13 NOTE — TELEPHONE ENCOUNTER
Message to patient's wife, Carmina, OK to cancel f/up 2/17/20 w/ Dr Roberts, can push out until after carotid surgery. OK to schedule with REAGAN. FLP/ALT also due.

## 2020-02-14 ENCOUNTER — TELEPHONE (OUTPATIENT)
Dept: OTHER | Facility: CLINIC | Age: 60
End: 2020-02-14

## 2020-02-14 NOTE — TELEPHONE ENCOUNTER
"Pt scheduled for left CEA on 2/21/20 with Dr. Stubbs.     Pt called, left vm to report cleared by Dr. Roberts for surgery. Hx of \"right arm problems, not associated with the heart, went to [facility], they think it a frozen shoulder or rotator cuff symptoms\".    Pt concerned about having to lay on back for long period of time due to right arm pain.     Inquiring if may take ibuprofen/advil before and after surgery.     Also would like to discuss pre op surgery orders/education.    Jennie Hernández, BENJYN, RN  [facility] [place]     " Yes

## 2020-02-18 ENCOUNTER — TELEPHONE (OUTPATIENT)
Dept: GASTROENTEROLOGY | Facility: CLINIC | Age: 60
End: 2020-02-18

## 2020-02-18 NOTE — TELEPHONE ENCOUNTER
Call back to pt's wife, Carmina, who reports patient is having a procedure this Friday and was wondering if Dr. Bedolla would be ok with patient taken Toradol short-term afterwards.    Per Dr. Bedolla he is fine with Toradol short term. Pt's wife updated.     Flores Palmer LPN  Hepatology Clinic    -------  [facility] Call Center    Phone Message    May a detailed message be left on voicemail: yes     Reason for Call: Other: Patient's wife called said the patient has a surgery coming up, she has some questions about a pain medication and if it is okay to take.  Patient surgery is on Friday, wife said the patient is inquiring about after surgery pain meds.    Action Taken: Other: [facility] Hepatology    Travel Screening: Not Applicable

## 2020-02-18 NOTE — TELEPHONE ENCOUNTER
I called pt back, left vm noting we will update Dr. Stubbs of his concerns.     Encouraged pt to call back to discuss with RN the pre op instructions/education and verify with us he has received pre op packet in the mail.     Routing to SACHIN Agustin for f/u with Dr. Stubbs.     Jennie Hernández, BSN, RN  [facility] [place]

## 2020-02-19 NOTE — TELEPHONE ENCOUNTER
"Pt returned call.  Discussed his concerns re: pain relief.  He reports he doesn't \"do well\" with oxycodone and states toradol has worked previously.  I advised pt to discuss with the anesthesia staff the morning of the procedure regarding pain control, as well as Dr. Stubbs for post operative pain management.  Per Dr. Stubbs, pt may continue to take ibuprofen.  Pt verbalized understanding and had no further questions.    Nikole Vivas, BENJYN, RN-[facility] [place]    "

## 2020-02-19 NOTE — TELEPHONE ENCOUNTER
Called pt to discuss his concerns.  LVM with nurse triage number for patient to call back to discuss further.  Will also try again at a later time.  Nikole Vivas, BENJYN, RN-[facility] [place]

## 2020-02-20 ENCOUNTER — DOCUMENTATION ONLY (OUTPATIENT)
Dept: OTHER | Facility: CLINIC | Age: 60
End: 2020-02-20

## 2020-02-21 ENCOUNTER — APPOINTMENT (OUTPATIENT)
Dept: CT IMAGING | Facility: CLINIC | Age: 60
DRG: 039 | End: 2020-02-21
Attending: SURGERY
Payer: COMMERCIAL

## 2020-02-21 ENCOUNTER — ANESTHESIA (OUTPATIENT)
Dept: SURGERY | Facility: CLINIC | Age: 60
DRG: 039 | End: 2020-02-21
Payer: COMMERCIAL

## 2020-02-21 ENCOUNTER — APPOINTMENT (OUTPATIENT)
Dept: SURGERY | Facility: PHYSICIAN GROUP | Age: 60
End: 2020-02-21
Payer: COMMERCIAL

## 2020-02-21 ENCOUNTER — SURGERY (OUTPATIENT)
Age: 60
End: 2020-02-21
Payer: COMMERCIAL

## 2020-02-21 ENCOUNTER — ANESTHESIA EVENT (OUTPATIENT)
Dept: SURGERY | Facility: CLINIC | Age: 60
DRG: 039 | End: 2020-02-21
Payer: COMMERCIAL

## 2020-02-21 ENCOUNTER — HOSPITAL ENCOUNTER (INPATIENT)
Facility: CLINIC | Age: 60
LOS: 3 days | Discharge: HOME | DRG: 039 | End: 2020-02-24
Attending: SURGERY | Admitting: SURGERY
Payer: COMMERCIAL

## 2020-02-21 DIAGNOSIS — I65.22 LEFT CAROTID STENOSIS: ICD-10-CM

## 2020-02-21 DIAGNOSIS — I65.22 LEFT CAROTID ARTERY STENOSIS: Primary | ICD-10-CM

## 2020-02-21 DIAGNOSIS — Z98.890 HISTORY OF LEFT-SIDED CAROTID ENDARTERECTOMY: ICD-10-CM

## 2020-02-21 LAB
ABO + RH BLD: NORMAL
ABO + RH BLD: NORMAL
ANION GAP SERPL CALCULATED.3IONS-SCNC: 4 MMOL/L (ref 3–14)
BLD GP AB SCN SERPL QL: NORMAL
BLOOD BANK CMNT PATIENT-IMP: NORMAL
BUN SERPL-MCNC: 18 MG/DL (ref 7–30)
CALCIUM SERPL-MCNC: 9.3 MG/DL (ref 8.5–10.1)
CHLORIDE SERPL-SCNC: 104 MMOL/L (ref 94–109)
CHOLEST SERPL-MCNC: 101 MG/DL
CO2 SERPL-SCNC: 25 MMOL/L (ref 20–32)
CREAT SERPL-MCNC: 0.83 MG/DL (ref 0.66–1.25)
GFR SERPL CREATININE-BSD FRML MDRD: >90 ML/MIN/{1.73_M2}
GLUCOSE BLDC GLUCOMTR-MCNC: 208 MG/DL (ref 70–99)
GLUCOSE BLDC GLUCOMTR-MCNC: 240 MG/DL (ref 70–99)
GLUCOSE BLDC GLUCOMTR-MCNC: 243 MG/DL (ref 70–99)
GLUCOSE SERPL-MCNC: 184 MG/DL (ref 70–99)
HBA1C MFR BLD: 7.4 % (ref 0–5.6)
HDLC SERPL-MCNC: 36 MG/DL
HGB BLD-MCNC: 15.5 G/DL (ref 13.3–17.7)
LDLC SERPL CALC-MCNC: 47 MG/DL
NONHDLC SERPL-MCNC: 65 MG/DL
PLATELET # BLD AUTO: 144 10E9/L (ref 150–450)
POTASSIUM SERPL-SCNC: 4.8 MMOL/L (ref 3.4–5.3)
SODIUM SERPL-SCNC: 133 MMOL/L (ref 133–144)
SPECIMEN EXP DATE BLD: NORMAL
TRIGL SERPL-MCNC: 90 MG/DL

## 2020-02-21 PROCEDURE — 85049 AUTOMATED PLATELET COUNT: CPT | Performed by: SURGERY

## 2020-02-21 PROCEDURE — 03UL0KZ SUPPLEMENT LEFT INTERNAL CAROTID ARTERY WITH NONAUTOLOGOUS TISSUE SUBSTITUTE, OPEN APPROACH: ICD-10-PCS | Performed by: SURGERY

## 2020-02-21 PROCEDURE — 27210794 ZZH OR GENERAL SUPPLY STERILE: Performed by: SURGERY

## 2020-02-21 PROCEDURE — 80048 BASIC METABOLIC PNL TOTAL CA: CPT | Performed by: SURGERY

## 2020-02-21 PROCEDURE — 25000128 H RX IP 250 OP 636: Performed by: SURGERY

## 2020-02-21 PROCEDURE — 25000566 ZZH SEVOFLURANE, EA 15 MIN: Performed by: SURGERY

## 2020-02-21 PROCEDURE — 27211022 ZZHC OR IOM SUPPLIES OPNP: Performed by: SURGERY

## 2020-02-21 PROCEDURE — 25000125 ZZHC RX 250: Performed by: NURSE ANESTHETIST, CERTIFIED REGISTERED

## 2020-02-21 PROCEDURE — 80061 LIPID PANEL: CPT | Performed by: SURGERY

## 2020-02-21 PROCEDURE — 25800030 ZZH RX IP 258 OP 636: Performed by: SURGERY

## 2020-02-21 PROCEDURE — 25000128 H RX IP 250 OP 636: Performed by: NURSE ANESTHETIST, CERTIFIED REGISTERED

## 2020-02-21 PROCEDURE — 95940 IONM IN OPERATNG ROOM 15 MIN: CPT | Performed by: SURGERY

## 2020-02-21 PROCEDURE — 99291 CRITICAL CARE FIRST HOUR: CPT | Performed by: NURSE PRACTITIONER

## 2020-02-21 PROCEDURE — 86901 BLOOD TYPING SEROLOGIC RH(D): CPT | Performed by: SURGERY

## 2020-02-21 PROCEDURE — 25000125 ZZHC RX 250: Performed by: SURGERY

## 2020-02-21 PROCEDURE — 86900 BLOOD TYPING SEROLOGIC ABO: CPT | Performed by: SURGERY

## 2020-02-21 PROCEDURE — 00000146 ZZHCL STATISTIC GLUCOSE BY METER IP

## 2020-02-21 PROCEDURE — 25000128 H RX IP 250 OP 636: Performed by: ANESTHESIOLOGY

## 2020-02-21 PROCEDURE — 83036 HEMOGLOBIN GLYCOSYLATED A1C: CPT | Performed by: SURGERY

## 2020-02-21 PROCEDURE — 03CL0ZZ EXTIRPATION OF MATTER FROM LEFT INTERNAL CAROTID ARTERY, OPEN APPROACH: ICD-10-PCS | Performed by: SURGERY

## 2020-02-21 PROCEDURE — 25800030 ZZH RX IP 258 OP 636: Performed by: STUDENT IN AN ORGANIZED HEALTH CARE EDUCATION/TRAINING PROGRAM

## 2020-02-21 PROCEDURE — 25000131 ZZH RX MED GY IP 250 OP 636 PS 637: Performed by: PHYSICIAN ASSISTANT

## 2020-02-21 PROCEDURE — 0042T CT HEAD PERFUSION WITH CONTRAST: CPT

## 2020-02-21 PROCEDURE — 37000009 ZZH ANESTHESIA TECHNICAL FEE, EACH ADDTL 15 MIN: Performed by: SURGERY

## 2020-02-21 PROCEDURE — 86850 RBC ANTIBODY SCREEN: CPT | Performed by: SURGERY

## 2020-02-21 PROCEDURE — 70496 CT ANGIOGRAPHY HEAD: CPT

## 2020-02-21 PROCEDURE — 35301 RECHANNELING OF ARTERY: CPT | Mod: LT | Performed by: SURGERY

## 2020-02-21 PROCEDURE — 37000008 ZZH ANESTHESIA TECHNICAL FEE, 1ST 30 MIN: Performed by: SURGERY

## 2020-02-21 PROCEDURE — 25000128 H RX IP 250 OP 636: Performed by: STUDENT IN AN ORGANIZED HEALTH CARE EDUCATION/TRAINING PROGRAM

## 2020-02-21 PROCEDURE — 12000000 ZZH R&B MED SURG/OB

## 2020-02-21 PROCEDURE — 70450 CT HEAD/BRAIN W/O DYE: CPT

## 2020-02-21 PROCEDURE — 85018 HEMOGLOBIN: CPT | Performed by: SURGERY

## 2020-02-21 PROCEDURE — 99223 1ST HOSP IP/OBS HIGH 75: CPT | Performed by: INTERNAL MEDICINE

## 2020-02-21 PROCEDURE — 71000012 ZZH RECOVERY PHASE 1 LEVEL 1 FIRST HR: Performed by: SURGERY

## 2020-02-21 PROCEDURE — 25800030 ZZH RX IP 258 OP 636: Performed by: ANESTHESIOLOGY

## 2020-02-21 PROCEDURE — 36000093 ZZH SURGERY LEVEL 4 1ST 30 MIN: Performed by: SURGERY

## 2020-02-21 PROCEDURE — 4A10X4G MONITORING OF CENTRAL NERVOUS ELECTRICAL ACTIVITY, INTRAOPERATIVE, EXTERNAL APPROACH: ICD-10-PCS | Performed by: SURGERY

## 2020-02-21 PROCEDURE — 40000170 ZZH STATISTIC PRE-PROCEDURE ASSESSMENT II: Performed by: SURGERY

## 2020-02-21 PROCEDURE — C1763 CONN TISS, NON-HUMAN: HCPCS | Performed by: SURGERY

## 2020-02-21 PROCEDURE — 25800030 ZZH RX IP 258 OP 636: Performed by: NURSE ANESTHETIST, CERTIFIED REGISTERED

## 2020-02-21 PROCEDURE — 25000132 ZZH RX MED GY IP 250 OP 250 PS 637: Performed by: STUDENT IN AN ORGANIZED HEALTH CARE EDUCATION/TRAINING PROGRAM

## 2020-02-21 PROCEDURE — 36000063 ZZH SURGERY LEVEL 4 EA 15 ADDTL MIN: Performed by: SURGERY

## 2020-02-21 DEVICE — IMP PATCH PERICARDIUM PHOTOFIX BOVINE 0.8X8CM PFP0.8X8: Type: IMPLANTABLE DEVICE | Site: CAROTID | Status: FUNCTIONAL

## 2020-02-21 RX ORDER — IOPAMIDOL 755 MG/ML
120 INJECTION, SOLUTION INTRAVASCULAR ONCE
Status: COMPLETED | OUTPATIENT
Start: 2020-02-21 | End: 2020-02-21

## 2020-02-21 RX ORDER — FENTANYL CITRATE 50 UG/ML
INJECTION, SOLUTION INTRAMUSCULAR; INTRAVENOUS PRN
Status: DISCONTINUED | OUTPATIENT
Start: 2020-02-21 | End: 2020-02-21

## 2020-02-21 RX ORDER — OXYCODONE HYDROCHLORIDE 5 MG/1
5-10 TABLET ORAL
Status: DISCONTINUED | OUTPATIENT
Start: 2020-02-21 | End: 2020-02-24 | Stop reason: HOSPADM

## 2020-02-21 RX ORDER — NICOTINE POLACRILEX 4 MG
15-30 LOZENGE BUCCAL
Status: DISCONTINUED | OUTPATIENT
Start: 2020-02-21 | End: 2020-02-21

## 2020-02-21 RX ORDER — DEXTROSE MONOHYDRATE, SODIUM CHLORIDE, AND POTASSIUM CHLORIDE 50; 1.49; 4.5 G/1000ML; G/1000ML; G/1000ML
INJECTION, SOLUTION INTRAVENOUS CONTINUOUS
Status: DISCONTINUED | OUTPATIENT
Start: 2020-02-21 | End: 2020-02-21

## 2020-02-21 RX ORDER — ONDANSETRON 2 MG/ML
4 INJECTION INTRAMUSCULAR; INTRAVENOUS EVERY 30 MIN PRN
Status: DISCONTINUED | OUTPATIENT
Start: 2020-02-21 | End: 2020-02-21 | Stop reason: HOSPADM

## 2020-02-21 RX ORDER — AMOXICILLIN 250 MG
2 CAPSULE ORAL 2 TIMES DAILY
Status: DISCONTINUED | OUTPATIENT
Start: 2020-02-21 | End: 2020-02-24 | Stop reason: HOSPADM

## 2020-02-21 RX ORDER — IBUPROFEN 200 MG
200-400 TABLET ORAL 3 TIMES DAILY PRN
COMMUNITY
End: 2020-05-06 | Stop reason: ALTCHOICE

## 2020-02-21 RX ORDER — HEPARIN SODIUM 5000 [USP'U]/.5ML
5000 INJECTION, SOLUTION INTRAVENOUS; SUBCUTANEOUS EVERY 8 HOURS
Status: DISCONTINUED | OUTPATIENT
Start: 2020-02-22 | End: 2020-02-24 | Stop reason: HOSPADM

## 2020-02-21 RX ORDER — ACETAMINOPHEN 325 MG/1
975 TABLET ORAL EVERY 8 HOURS
Status: DISCONTINUED | OUTPATIENT
Start: 2020-02-21 | End: 2020-02-24 | Stop reason: HOSPADM

## 2020-02-21 RX ORDER — KETOROLAC TROMETHAMINE 30 MG/ML
30 INJECTION, SOLUTION INTRAMUSCULAR; INTRAVENOUS EVERY 6 HOURS PRN
Status: DISCONTINUED | OUTPATIENT
Start: 2020-02-21 | End: 2020-02-24 | Stop reason: HOSPADM

## 2020-02-21 RX ORDER — MAGNESIUM HYDROXIDE 1200 MG/15ML
LIQUID ORAL PRN
Status: DISCONTINUED | OUTPATIENT
Start: 2020-02-21 | End: 2020-02-21 | Stop reason: HOSPADM

## 2020-02-21 RX ORDER — SODIUM CHLORIDE, SODIUM LACTATE, POTASSIUM CHLORIDE, CALCIUM CHLORIDE 600; 310; 30; 20 MG/100ML; MG/100ML; MG/100ML; MG/100ML
INJECTION, SOLUTION INTRAVENOUS CONTINUOUS
Status: DISCONTINUED | OUTPATIENT
Start: 2020-02-21 | End: 2020-02-21 | Stop reason: HOSPADM

## 2020-02-21 RX ORDER — ONDANSETRON 4 MG/1
4 TABLET, ORALLY DISINTEGRATING ORAL EVERY 30 MIN PRN
Status: DISCONTINUED | OUTPATIENT
Start: 2020-02-21 | End: 2020-02-21 | Stop reason: HOSPADM

## 2020-02-21 RX ORDER — ONDANSETRON 2 MG/ML
INJECTION INTRAMUSCULAR; INTRAVENOUS PRN
Status: DISCONTINUED | OUTPATIENT
Start: 2020-02-21 | End: 2020-02-21

## 2020-02-21 RX ORDER — NALOXONE HYDROCHLORIDE 0.4 MG/ML
.1-.4 INJECTION, SOLUTION INTRAMUSCULAR; INTRAVENOUS; SUBCUTANEOUS
Status: DISCONTINUED | OUTPATIENT
Start: 2020-02-21 | End: 2020-02-24 | Stop reason: HOSPADM

## 2020-02-21 RX ORDER — ONDANSETRON 2 MG/ML
4 INJECTION INTRAMUSCULAR; INTRAVENOUS EVERY 6 HOURS PRN
Status: DISCONTINUED | OUTPATIENT
Start: 2020-02-21 | End: 2020-02-22

## 2020-02-21 RX ORDER — LABETALOL HYDROCHLORIDE 5 MG/ML
10 INJECTION, SOLUTION INTRAVENOUS
Status: DISCONTINUED | OUTPATIENT
Start: 2020-02-21 | End: 2020-02-21 | Stop reason: HOSPADM

## 2020-02-21 RX ORDER — AMOXICILLIN 250 MG
1 CAPSULE ORAL 2 TIMES DAILY
Status: DISCONTINUED | OUTPATIENT
Start: 2020-02-21 | End: 2020-02-24 | Stop reason: HOSPADM

## 2020-02-21 RX ORDER — LABETALOL HYDROCHLORIDE 5 MG/ML
10 INJECTION, SOLUTION INTRAVENOUS EVERY 10 MIN PRN
Status: DISCONTINUED | OUTPATIENT
Start: 2020-02-21 | End: 2020-02-24 | Stop reason: HOSPADM

## 2020-02-21 RX ORDER — NICOTINE POLACRILEX 4 MG
15-30 LOZENGE BUCCAL
Status: DISCONTINUED | OUTPATIENT
Start: 2020-02-21 | End: 2020-02-24 | Stop reason: HOSPADM

## 2020-02-21 RX ORDER — LIDOCAINE HYDROCHLORIDE 20 MG/ML
INJECTION, SOLUTION INFILTRATION; PERINEURAL PRN
Status: DISCONTINUED | OUTPATIENT
Start: 2020-02-21 | End: 2020-02-21

## 2020-02-21 RX ORDER — ROSUVASTATIN CALCIUM 20 MG/1
20 TABLET, COATED ORAL EVERY EVENING
Status: DISCONTINUED | OUTPATIENT
Start: 2020-02-21 | End: 2020-02-24 | Stop reason: HOSPADM

## 2020-02-21 RX ORDER — DEXAMETHASONE SODIUM PHOSPHATE 4 MG/ML
INJECTION, SOLUTION INTRA-ARTICULAR; INTRALESIONAL; INTRAMUSCULAR; INTRAVENOUS; SOFT TISSUE PRN
Status: DISCONTINUED | OUTPATIENT
Start: 2020-02-21 | End: 2020-02-21

## 2020-02-21 RX ORDER — FENTANYL CITRATE 50 UG/ML
25-50 INJECTION, SOLUTION INTRAMUSCULAR; INTRAVENOUS EVERY 5 MIN PRN
Status: DISCONTINUED | OUTPATIENT
Start: 2020-02-21 | End: 2020-02-21 | Stop reason: HOSPADM

## 2020-02-21 RX ORDER — ONDANSETRON 4 MG/1
4 TABLET, ORALLY DISINTEGRATING ORAL EVERY 6 HOURS PRN
Status: DISCONTINUED | OUTPATIENT
Start: 2020-02-21 | End: 2020-02-22

## 2020-02-21 RX ORDER — NEBIVOLOL 5 MG/1
5 TABLET ORAL DAILY
Status: DISCONTINUED | OUTPATIENT
Start: 2020-02-22 | End: 2020-02-24 | Stop reason: HOSPADM

## 2020-02-21 RX ORDER — PROTAMINE SULFATE 10 MG/ML
INJECTION, SOLUTION INTRAVENOUS PRN
Status: DISCONTINUED | OUTPATIENT
Start: 2020-02-21 | End: 2020-02-21

## 2020-02-21 RX ORDER — NITROGLYCERIN 5 MG/ML
VIAL (ML) INTRAVENOUS PRN
Status: DISCONTINUED | OUTPATIENT
Start: 2020-02-21 | End: 2020-02-21

## 2020-02-21 RX ORDER — DEXTROSE MONOHYDRATE 25 G/50ML
25-50 INJECTION, SOLUTION INTRAVENOUS
Status: DISCONTINUED | OUTPATIENT
Start: 2020-02-21 | End: 2020-02-21

## 2020-02-21 RX ORDER — VITAMIN B COMPLEX
1000 TABLET ORAL DAILY
Status: DISCONTINUED | OUTPATIENT
Start: 2020-02-22 | End: 2020-02-24 | Stop reason: HOSPADM

## 2020-02-21 RX ORDER — CLINDAMYCIN PHOSPHATE 900 MG/50ML
900 INJECTION, SOLUTION INTRAVENOUS
Status: COMPLETED | OUTPATIENT
Start: 2020-02-21 | End: 2020-02-21

## 2020-02-21 RX ORDER — ASPIRIN 81 MG/1
81 TABLET ORAL DAILY
Status: DISCONTINUED | OUTPATIENT
Start: 2020-02-22 | End: 2020-02-24 | Stop reason: HOSPADM

## 2020-02-21 RX ORDER — NITROGLYCERIN 0.4 MG/1
0.4 TABLET SUBLINGUAL EVERY 5 MIN PRN
Status: DISCONTINUED | OUTPATIENT
Start: 2020-02-21 | End: 2020-02-24 | Stop reason: HOSPADM

## 2020-02-21 RX ORDER — ZOLPIDEM TARTRATE 5 MG/1
5-10 TABLET ORAL
Status: DISCONTINUED | OUTPATIENT
Start: 2020-02-21 | End: 2020-02-24 | Stop reason: HOSPADM

## 2020-02-21 RX ORDER — PROCHLORPERAZINE MALEATE 10 MG
10 TABLET ORAL EVERY 6 HOURS PRN
Status: DISCONTINUED | OUTPATIENT
Start: 2020-02-21 | End: 2020-02-22

## 2020-02-21 RX ORDER — NALOXONE HYDROCHLORIDE 0.4 MG/ML
.1-.4 INJECTION, SOLUTION INTRAMUSCULAR; INTRAVENOUS; SUBCUTANEOUS
Status: DISCONTINUED | OUTPATIENT
Start: 2020-02-21 | End: 2020-02-21

## 2020-02-21 RX ORDER — EPHEDRINE SULFATE 50 MG/ML
INJECTION, SOLUTION INTRAMUSCULAR; INTRAVENOUS; SUBCUTANEOUS PRN
Status: DISCONTINUED | OUTPATIENT
Start: 2020-02-21 | End: 2020-02-21

## 2020-02-21 RX ORDER — HEPARIN SODIUM 1000 [USP'U]/ML
INJECTION, SOLUTION INTRAVENOUS; SUBCUTANEOUS PRN
Status: DISCONTINUED | OUTPATIENT
Start: 2020-02-21 | End: 2020-02-21

## 2020-02-21 RX ORDER — SODIUM CHLORIDE 9 MG/ML
INJECTION, SOLUTION INTRAVENOUS CONTINUOUS PRN
Status: DISCONTINUED | OUTPATIENT
Start: 2020-02-21 | End: 2020-02-21

## 2020-02-21 RX ORDER — ASPIRIN 600 MG/1
600 SUPPOSITORY RECTAL ONCE
Status: COMPLETED | OUTPATIENT
Start: 2020-02-21 | End: 2020-02-21

## 2020-02-21 RX ORDER — ACETAMINOPHEN 325 MG/1
650 TABLET ORAL EVERY 4 HOURS PRN
Status: DISCONTINUED | OUTPATIENT
Start: 2020-02-24 | End: 2020-02-24 | Stop reason: HOSPADM

## 2020-02-21 RX ORDER — LISINOPRIL 10 MG/1
10 TABLET ORAL EVERY EVENING
Status: DISCONTINUED | OUTPATIENT
Start: 2020-02-21 | End: 2020-02-24 | Stop reason: HOSPADM

## 2020-02-21 RX ORDER — PROPOFOL 10 MG/ML
INJECTION, EMULSION INTRAVENOUS PRN
Status: DISCONTINUED | OUTPATIENT
Start: 2020-02-21 | End: 2020-02-21

## 2020-02-21 RX ORDER — HYDROMORPHONE HYDROCHLORIDE 1 MG/ML
.3-.5 INJECTION, SOLUTION INTRAMUSCULAR; INTRAVENOUS; SUBCUTANEOUS EVERY 5 MIN PRN
Status: DISCONTINUED | OUTPATIENT
Start: 2020-02-21 | End: 2020-02-21 | Stop reason: HOSPADM

## 2020-02-21 RX ORDER — LIDOCAINE 40 MG/G
CREAM TOPICAL
Status: DISCONTINUED | OUTPATIENT
Start: 2020-02-21 | End: 2020-02-24 | Stop reason: HOSPADM

## 2020-02-21 RX ORDER — LIDOCAINE 40 MG/G
CREAM TOPICAL
Status: DISCONTINUED | OUTPATIENT
Start: 2020-02-21 | End: 2020-02-21

## 2020-02-21 RX ORDER — DEXTROSE MONOHYDRATE 25 G/50ML
25-50 INJECTION, SOLUTION INTRAVENOUS
Status: DISCONTINUED | OUTPATIENT
Start: 2020-02-21 | End: 2020-02-24 | Stop reason: HOSPADM

## 2020-02-21 RX ADMIN — HEPARIN SODIUM 8000 UNITS: 1000 INJECTION INTRAVENOUS; SUBCUTANEOUS at 09:18

## 2020-02-21 RX ADMIN — HEPARIN SODIUM 505 ML: 1000 INJECTION INTRAVENOUS; SUBCUTANEOUS at 08:37

## 2020-02-21 RX ADMIN — FENTANYL CITRATE 50 MCG: 50 INJECTION, SOLUTION INTRAMUSCULAR; INTRAVENOUS at 08:04

## 2020-02-21 RX ADMIN — ROCURONIUM BROMIDE 10 MG: 10 INJECTION INTRAVENOUS at 09:47

## 2020-02-21 RX ADMIN — LIDOCAINE HYDROCHLORIDE 100 MG: 20 INJECTION, SOLUTION INFILTRATION; PERINEURAL at 08:04

## 2020-02-21 RX ADMIN — PROTAMINE SULFATE 10 MG: 10 INJECTION, SOLUTION INTRAVENOUS at 11:01

## 2020-02-21 RX ADMIN — Medication 5 MG: at 08:48

## 2020-02-21 RX ADMIN — Medication 10 MG: at 09:20

## 2020-02-21 RX ADMIN — ACETAMINOPHEN 975 MG: 325 TABLET, FILM COATED ORAL at 15:10

## 2020-02-21 RX ADMIN — ROCURONIUM BROMIDE 50 MG: 10 INJECTION INTRAVENOUS at 08:04

## 2020-02-21 RX ADMIN — Medication 10 MG: at 09:23

## 2020-02-21 RX ADMIN — INSULIN DETEMIR 30 UNITS: 100 INJECTION, SOLUTION SUBCUTANEOUS at 23:38

## 2020-02-21 RX ADMIN — SODIUM CHLORIDE 1000 ML: 900 IRRIGANT IRRIGATION at 08:37

## 2020-02-21 RX ADMIN — IOPAMIDOL 120 ML: 755 INJECTION, SOLUTION INTRAVENOUS at 19:03

## 2020-02-21 RX ADMIN — ONDANSETRON 4 MG: 2 INJECTION INTRAMUSCULAR; INTRAVENOUS at 10:47

## 2020-02-21 RX ADMIN — ASPIRIN 600 MG: 600 SUPPOSITORY RECTAL at 12:02

## 2020-02-21 RX ADMIN — MIDAZOLAM HYDROCHLORIDE 1 MG: 1 INJECTION, SOLUTION INTRAMUSCULAR; INTRAVENOUS at 07:14

## 2020-02-21 RX ADMIN — Medication 5 MG: at 08:38

## 2020-02-21 RX ADMIN — NITROGLYCERIN 20 MCG: 5 INJECTION, SOLUTION INTRAVENOUS at 09:39

## 2020-02-21 RX ADMIN — PHENYLEPHRINE HYDROCHLORIDE 0.25 MCG/KG/MIN: 10 INJECTION INTRAVENOUS at 08:49

## 2020-02-21 RX ADMIN — KETOROLAC TROMETHAMINE 30 MG: 30 INJECTION, SOLUTION INTRAMUSCULAR at 15:10

## 2020-02-21 RX ADMIN — CLINDAMYCIN PHOSPHATE 900 MG: 900 INJECTION, SOLUTION INTRAVENOUS at 08:16

## 2020-02-21 RX ADMIN — HEPARIN SODIUM 2000 UNITS: 1000 INJECTION INTRAVENOUS; SUBCUTANEOUS at 10:19

## 2020-02-21 RX ADMIN — SUGAMMADEX 200 MG: 100 INJECTION, SOLUTION INTRAVENOUS at 11:37

## 2020-02-21 RX ADMIN — SODIUM CHLORIDE, POTASSIUM CHLORIDE, SODIUM LACTATE AND CALCIUM CHLORIDE: 600; 310; 30; 20 INJECTION, SOLUTION INTRAVENOUS at 11:36

## 2020-02-21 RX ADMIN — SODIUM CHLORIDE: 9 INJECTION, SOLUTION INTRAVENOUS at 08:09

## 2020-02-21 RX ADMIN — PROTAMINE SULFATE 10 MG: 10 INJECTION, SOLUTION INTRAVENOUS at 11:00

## 2020-02-21 RX ADMIN — LABETALOL HYDROCHLORIDE 10 MG: 5 INJECTION INTRAVENOUS at 18:49

## 2020-02-21 RX ADMIN — PROTAMINE SULFATE 10 MG: 10 INJECTION, SOLUTION INTRAVENOUS at 10:55

## 2020-02-21 RX ADMIN — NITROGLYCERIN 20 MCG: 5 INJECTION, SOLUTION INTRAVENOUS at 08:42

## 2020-02-21 RX ADMIN — NITROGLYCERIN 10 MCG: 5 INJECTION, SOLUTION INTRAVENOUS at 08:40

## 2020-02-21 RX ADMIN — PROTAMINE SULFATE 10 MG: 10 INJECTION, SOLUTION INTRAVENOUS at 10:57

## 2020-02-21 RX ADMIN — KETOROLAC TROMETHAMINE 30 MG: 30 INJECTION, SOLUTION INTRAMUSCULAR at 21:05

## 2020-02-21 RX ADMIN — NITROGLYCERIN 20 MCG: 5 INJECTION, SOLUTION INTRAVENOUS at 09:20

## 2020-02-21 RX ADMIN — MIDAZOLAM 1 MG: 1 INJECTION INTRAMUSCULAR; INTRAVENOUS at 08:00

## 2020-02-21 RX ADMIN — Medication 1000 MCG: at 15:09

## 2020-02-21 RX ADMIN — DEXMEDETOMIDINE HYDROCHLORIDE 0.5 MCG/KG/HR: 100 INJECTION, SOLUTION INTRAVENOUS at 08:14

## 2020-02-21 RX ADMIN — HYDROMORPHONE HYDROCHLORIDE 0.5 MG: 1 INJECTION, SOLUTION INTRAMUSCULAR; INTRAVENOUS; SUBCUTANEOUS at 09:40

## 2020-02-21 RX ADMIN — NITROGLYCERIN 10 MCG: 5 INJECTION, SOLUTION INTRAVENOUS at 09:37

## 2020-02-21 RX ADMIN — PROPOFOL 180 MG: 10 INJECTION, EMULSION INTRAVENOUS at 08:04

## 2020-02-21 RX ADMIN — ROCURONIUM BROMIDE 10 MG: 10 INJECTION INTRAVENOUS at 10:23

## 2020-02-21 RX ADMIN — SODIUM CHLORIDE 100 ML: 9 INJECTION, SOLUTION INTRAVENOUS at 19:03

## 2020-02-21 RX ADMIN — NITROGLYCERIN 20 MCG: 5 INJECTION, SOLUTION INTRAVENOUS at 11:43

## 2020-02-21 RX ADMIN — FENTANYL CITRATE 50 MCG: 50 INJECTION, SOLUTION INTRAMUSCULAR; INTRAVENOUS at 08:39

## 2020-02-21 RX ADMIN — ROCURONIUM BROMIDE 20 MG: 10 INJECTION INTRAVENOUS at 09:15

## 2020-02-21 RX ADMIN — DEXAMETHASONE SODIUM PHOSPHATE 4 MG: 4 INJECTION, SOLUTION INTRA-ARTICULAR; INTRALESIONAL; INTRAMUSCULAR; INTRAVENOUS; SOFT TISSUE at 08:46

## 2020-02-21 RX ADMIN — SODIUM CHLORIDE, POTASSIUM CHLORIDE, SODIUM LACTATE AND CALCIUM CHLORIDE: 600; 310; 30; 20 INJECTION, SOLUTION INTRAVENOUS at 06:43

## 2020-02-21 RX ADMIN — POTASSIUM CHLORIDE, DEXTROSE MONOHYDRATE AND SODIUM CHLORIDE: 150; 5; 450 INJECTION, SOLUTION INTRAVENOUS at 15:14

## 2020-02-21 ASSESSMENT — ACTIVITIES OF DAILY LIVING (ADL)
ADLS_ACUITY_SCORE: 13
ADLS_ACUITY_SCORE: 12

## 2020-02-21 ASSESSMENT — MIFFLIN-ST. JEOR: SCORE: 1721.18

## 2020-02-21 NOTE — ANESTHESIA CARE TRANSFER NOTE
Patient: Vikash Burgos    Procedure(s):  LEFT CAROTID ENDARTERECTOMY WITH EEG    Diagnosis: Left carotid stenosis [I65.22]  Diagnosis Additional Information: No value filed.    Anesthesia Type:   General, ETT     Note:  Airway :Face Mask  Patient transferred to:PACU  Comments: Transferred to PACU, spontaneous respirations, 10L oxygen via facemask.  All monitors and alarms on and functioning, VSS.  Patient awake, comfortable.  Report to PACU RN.Handoff Report: Identifed the Patient, Identified the Reponsible Provider, Reviewed the pertinent medical history, Discussed the surgical course, Reviewed Intra-OP anesthesia mangement and issues during anesthesia, Set expectations for post-procedure period and Allowed opportunity for questions and acknowledgement of understanding      Vitals: (Last set prior to Anesthesia Care Transfer)    CRNA VITALS  2/21/2020 1119 - 2/21/2020 1157      2/21/2020             Pulse:  78    ART BP:  171/59    ART Mean:  94    SpO2:  99 %    Resp Rate (set):  10                Electronically Signed By: STEW Avery CRNA  February 21, 2020  11:57 AM

## 2020-02-21 NOTE — CONSULTS
[facility]    Vascular Medicine Consultation     Attestation: I have examined the patient independently of Michaelle Waddell PA-C and agree with the examination and plan as delineated below.    Eloy Zepeda MD      Date of Admission:  2/21/2020  Date of Consult (When I saw the patient): 02/21/20    Assessment & Plan   1. Asymptomatic high grade left carotid artery stenosis s/p left carotid endarterectomy 2/21/10    Post-operative cares per Dr. Stubbs/Vascular Surgery. Aspirin will be resumed.     2. Type 2 Diabetes Mellitus    His A1C this admission is noted to be 7.4% while on Levemir 50 units at bedtime. He is very active and works out daily, sometimes multiple times. He did develop some dizziness with Metformin in the past. Given his carotid and coronary disease history, it is recommended that his diabetes be treated slightly more aggressively to an A1C of less than 7.0%. While in the hospital his sugars will be monitored closely and he will be put on high insulin resistance sliding scale insulin as needed. Levemir will be split into 2 equal doses and increased slightly to 30 units BID. He should then have his A1C repeated in 3 months through his primary care provider.     3. Hyperlipidemia    His lipid panel this admission indicates that his lipids are well controlled (LDL 47) while on Crestor 20 mg daily. He should continue the same.     4. CAD s/p CABG in 2010    He is presently asymptomatic. He has had some arm pain, but this was evaluated by his Cardiologist, Dr. Roberts, and felt to be more musculoskeletal related as his stress test was fine. Continue current medical management.       Reason for Consult   Reason for consult: Asked by Dr. Stubbs to evaluate vascular risk factors and assist with diabetes management in this 60 year old male with a history of diabetes, hyperlipidemia, CAD, and carotid stenosis now presenting for a left carotid endarterectomy for high grade asymptomatic  left carotid stenosis.     Primary Care Physician   Jailyn Medel      History of Present Illness   Vikash Burgos is a 60 year old male with a history of diabetes, hepatitis C (treated), hyperlipidemia, CAD s/p CABG and known carotid stenosis which has been followed over time. His most recent carotid ultrasound on 1/20/20 showed an increase in left carotid artery velocities.A CTA confirmed a web-like 75% stenosis on the left. He was evaluated by Dr. Stubbs of Vascular Surgery and a left carotid endarterectomy was recommended, for which he presented today. He has been asymptomatic in regards to this.     Past Medical History   Past Medical History:   Diagnosis Date     Basal cell carcinoma      Carotid stenosis, left      Coronary artery disease     4 vessel bypass November 2010; LIMA ->LAD, SVG-> OM3, SVG -> D2, SVG -> PDA     Diabetes mellitus (H) 2005    neuropathy     Generalized anxiety disorder 5/2/2014     Hepatitis C, chronic (H) 2005     Hyperlipidemia LDL goal < 70      Hypertension      Liver diseases     9/15 Liver is 10 cm in span without left lobe enlargement     Persistent microalbuminuria associated with type 2 diabetes mellitus (H) 5/6/2015       Past Surgical History   Past Surgical History:   Procedure Laterality Date     ARTHROSCOPY KNEE RT/LT      left     COLONOSCOPY       CORONARY ARTERY BYPASS  11/18/201    Coronary artery bypass grafting x 4 with placement of the left internal mammary artery to the distal midportion of the left anterior descending artery, saphenous vein graft from aorta to the third obtuse marginal branch of circumflex coronary artery, saphenous vein graft from aorta to the second diagonal branch, saphenous vein graft from aorta to the posterior descending artery.     ESOPHAGOSCOPY, GASTROSCOPY, DUODENOSCOPY (EGD), COMBINED  10/31/2011    Procedure:COMBINED ESOPHAGOSCOPY, GASTROSCOPY, DUODENOSCOPY (EGD); Surgeon:ALONSO ALDANA; Location: GI     ESOPHAGOSCOPY,  GASTROSCOPY, DUODENOSCOPY (EGD), COMBINED N/A 3/8/2018    Procedure: COMBINED ESOPHAGOSCOPY, GASTROSCOPY, DUODENOSCOPY (EGD);  EGD;  Surgeon: Angel Luis Justice MD;  Location:  GI     HEART CATH CORONARY ANGIOGRAM W/LV GRAM  9-11-10    CV Surgery recommended     HEART CATH CORONARY ANGIOGRAM W/LV GRAM  2-28-14    Medical management     HERNIA REPAIR, INGUINAL RT/LT      left       Prior to Admission Medications   Prior to Admission Medications   Prescriptions Last Dose Informant Patient Reported? Taking?   Vitamin D, Cholecalciferol, 1000 units TABS 2/21/2020 at 0430 Self Yes Yes   Sig: Take 1,000 Units by mouth daily   aspirin 81 MG tablet 2/21/2020 at 0430 Self Yes Yes   Sig: Take 1 tablet by mouth daily.   chlorhexidine (PERIDEX) 0.12 % solution more than a month at prn Self Yes Yes   Sig: Take 15 mLs by mouth 2 times daily as needed    cyanocobalamin 1000 MCG SUBL 2/21/2020 at 0430 Self Yes Yes   Sig: Place 1,000 mcg under the tongue daily   ibuprofen 200 MG PO tablet 2/20/2020 at prn Self Yes Yes   Sig: Take 200-400 mg by mouth 3 times daily as needed for mild pain   insulin detemir (LEVEMIR PEN) 100 UNIT/ML pen 2/20/2020 at pm Self No Yes   Sig: NJECT 50 UNITS SUBCUTANEOUS AT BEDTIME   insulin pen needle (B-D U/F) 31G X 8 MM miscellaneous   No No   Sig: Use one pen needles daily or as directed.   insulin pen needle (BD HEIKE U/F) 32G X 4 MM   No No   Sig: Use 1 daily or as directed.   lisinopril (PRINIVIL/ZESTRIL) 10 MG tablet 2/20/2020 at pm Self No Yes   Sig: Take 1 tablet (10 mg) by mouth daily   nebivolol (BYSTOLIC) 5 MG tablet 2/21/2020 at 0430 Self No Yes   Sig: Take 1 tablet (5 mg) by mouth daily   rosuvastatin (CRESTOR) 20 MG tablet 2/20/2020 at pm Self No Yes   Sig: Take 1 tablet (20 mg) by mouth daily   zolpidem (AMBIEN) 10 MG tablet more than a month at prn Self No Yes   Sig: TAKE 1 TAB ORALLY AS NEEDED FOR SLEEP   Patient taking differently: Take 5-10 mg by mouth nightly as needed TAKE 1 TAB  ORALLY AS NEEDED FOR SLEEP      Facility-Administered Medications: None     Allergies   Allergies   Allergen Reactions     Chlorthalidone Nausea and Vomiting     dizziness     Penicillins Rash     Rash with PCN only. Patient has taken amoxicillin with no rash.       Social History   Vikash Burgos  reports that he quit smoking about 15 years ago. His smoking use included cigarettes. He started smoking about 27 years ago. He has a 6.00 pack-year smoking history. He has never used smokeless tobacco. He reports current alcohol use. He reports that he does not use drugs.    Family History   Family History   Problem Relation Age of Onset     C.A.D. Father      Cancer Father         bladder     Heart Surgery Father         bypass surgery     Heart Disease Mother        Review of Systems   The 10 point Review of Systems is negative other than noted in the HPI or here.    Physical Exam   Temp: 98.1  F (36.7  C) Temp src: Skin BP: (!) 156/85 Pulse: 51 Heart Rate: 51 Resp: 14 SpO2: 98 % O2 Device: None (Room air)    Vital Signs with Ranges  Temp:  [98.1  F (36.7  C)] 98.1  F (36.7  C)  Pulse:  [51-56] 51  Heart Rate:  [51-56] 51  Resp:  [14-16] 14  BP: (156)/(85) 156/85  SpO2:  [98 %] 98 %  196 lbs 0 oz    Constitutional: awake, alert, cooperative, no apparent distress, and appears stated age  Eyes: Lids and lashes normal, pupils equal, round and reactive to light, extra ocular muscles intact, sclera clear, conjunctiva normal  ENT: normocepalic, without obvious abnormality, oropharynx pink and moist  Hematologic / Lymphatic: no lymphadenopathy  Respiratory: No increased work of breathing, good air exchange, clear to auscultation bilaterally, no crackles or wheezing  Cardiovascular: regular rate and rhythm, normal S1 and S2 and no murmur noted  GI: Normal bowel sounds, soft, non-distended, non-tender  Skin: no redness, warmth, or swelling, no rashes and left neck incision dressed, c/d/i.   Musculoskeletal: There is no  redness, warmth, or swelling of the joints.  Full range of motion noted.  Motor strength is 5 out of 5 all extremities bilaterally.  Tone is normal.  Neurologic: Awake, alert, oriented to name, place and time.  Cranial nerves II-XII are grossly intact. Voice is muffled a bit.  Motor is 5 out of 5 bilaterally.    Neuropsychiatric:  Normal affect, memory, insight.       Data   Most Recent 3 CBC's:  Recent Labs   Lab Test 02/21/20 0640 10/25/19  0648 04/19/19 0648 09/07/18  0629   WBC  --  5.3 5.8 5.5   HGB 15.5 15.9 15.1 15.3   MCV  --  90 91 89   PLT  --  151 148* 141*     Most Recent 3 BMP's:  Recent Labs   Lab Test 02/21/20 0640 10/25/19  0648 09/03/19  0941 04/19/19  0648    137 140 137   POTASSIUM 4.8 4.8 5.7* 4.6   CHLORIDE 104 102 101 104   CO2 25 30  --  28   BUN 18 14 15  14 13   CR 0.83 0.92 1.05 0.86   ANIONGAP 4 5  --  5   LIA 9.3 9.3 9.9 9.5   * 172* 185* 181*     Most Recent 3 INR's:  Recent Labs   Lab Test 10/25/19  0648 04/19/19 0648 09/07/18  0629   INR 1.06 1.06 1.06     Most Recent Cholesterol Panel:  Recent Labs   Lab Test 02/21/20  0640   CHOL 101   LDL 47   HDL 36*   TRIG 90     Most Recent Hemoglobin A1c:  Recent Labs   Lab Test 02/21/20 0640   A1C 7.4*

## 2020-02-21 NOTE — PROVIDER NOTIFICATION
Dr. Stubbs paged: Patient requesting Toradol instead of narcotics.     1345: Verbal order given for q6h PRN 30 mg Toradol via OR RN from Dr. Stubbs.

## 2020-02-21 NOTE — PROGRESS NOTES
Medication History Completed by Medication Scribe  Admission medication history interview status for the 2/21/2020  admission is complete. See EPIC admission navigator for prior to admission medications     Medication history sources: Patient, Surescripts and H&P  Medication history source reliability: Good  Adherence assessment: N/A Not Observed    Significant changes made to the medication list:  None      Additional medication history information:   None    Medication reconciliation completed by provider prior to medication history? No    Time spent in this activity: 35 minutes      Prior to Admission medications    Medication Sig Last Dose Taking? Auth Provider   aspirin 81 MG tablet Take 1 tablet by mouth daily. 2/21/2020 at 0430 Yes Reported, Patient   chlorhexidine (PERIDEX) 0.12 % solution Take 15 mLs by mouth 2 times daily as needed  more than a month at prn Yes Reported, Patient   cyanocobalamin 1000 MCG SUBL Place 1,000 mcg under the tongue daily 2/21/2020 at 0430 Yes Reported, Patient   ibuprofen 200 MG PO tablet Take 200-400 mg by mouth 3 times daily as needed for mild pain 2/20/2020 at prn Yes Reported, Patient   insulin detemir (LEVEMIR PEN) 100 UNIT/ML pen NJECT 50 UNITS SUBCUTANEOUS AT BEDTIME 2/20/2020 at pm Yes Ana Olvera MD   lisinopril (PRINIVIL/ZESTRIL) 10 MG tablet Take 1 tablet (10 mg) by mouth daily 2/20/2020 at pm Yes Zeb Roberts MD   nebivolol (BYSTOLIC) 5 MG tablet Take 1 tablet (5 mg) by mouth daily 2/21/2020 at 0430 Yes Zeb Roberts MD   rosuvastatin (CRESTOR) 20 MG tablet Take 1 tablet (20 mg) by mouth daily 2/20/2020 at pm Yes Zeb Roberts MD   Vitamin D, Cholecalciferol, 1000 units TABS Take 1,000 Units by mouth daily 2/21/2020 at 0430 Yes Reported, Patient   zolpidem (AMBIEN) 10 MG tablet TAKE 1 TAB ORALLY AS NEEDED FOR SLEEP  Patient taking differently: Take 5-10 mg by mouth nightly as needed TAKE 1 TAB ORALLY AS NEEDED FOR SLEEP more than a  month at prn Yes AlekseyUriah MD   insulin pen needle (B-D U/F) 31G X 8 MM miscellaneous Use one pen needles daily or as directed.   Ana Olvera MD   insulin pen needle (BD HEIKE U/F) 32G X 4 MM Use 1 daily or as directed.   Jace Mayo MD

## 2020-02-21 NOTE — CONSULTS
HOSPITALIST CONSULT CHART CHECK:  2/21/2020      Hospitalist service was consulted for cross coverage only. We will peripherally follow and chart check throughout the week.        - For vascular medical concerns during business hours M-F, call the [facility] at [phone] to have the rounding/on call Vascular Medicine (NOT VASCULAR SURGERY) MD paged.      - After business hours M-F, for medical concerns on this patient, please page hospitalist staff.      - For vascular surgical questions, please page the appropriate surgeon (primary vascular surgeon or on call vascular surgeon) based upon the time of day.        STEW Michaels [facility]  Hospitalist Service  February 21, 2020 2:07 PM     Pager: [phone] (7a - 6p)

## 2020-02-21 NOTE — BRIEF OP NOTE
[facility]    Brief Operative Note    Pre-operative diagnosis: Left carotid stenosis [I65.22]  Post-operative diagnosis Same as pre-operative diagnosis    Procedure: Procedure(s):  LEFT CAROTID ENDARTERECTOMY WITH EEG  Surgeon: Surgeon(s) and Role:     * Semaj Stubbs MD - Primary  Anesthesia: General   Estimated blood loss: 50 mL  Drains: None  Specimens:   ID Type Source Tests Collected by Time Destination   1 : CAROTID PLAQUE Other (specify in comments) Artery, Carotid OR DOCUMENTATION ONLY Semaj Stubbs MD 2/21/2020  9:48 AM      Findings:   Focal plaque noted at the bifurcation. Patch angioplasty performed with hemostatic suture line. .  Complications: None.  Implants:   Implant Name Type Inv. Item Serial No.  Lot No. LRB No. Used   IMP PATCH PERICARDIUM PHOTOFIX BOVINE 0.8X8CM [id number] Bone/Tissue/Biologic IMP PATCH PERICARDIUM PHOTOFIX BOVINE 0.8X8CM [id number]  [facility] [id number] Left 1

## 2020-02-21 NOTE — OP NOTE
Procedure Date: 02/21/2020      PREOPERATIVE DIAGNOSIS:  Asymptomatic 75% left carotid stenosis.      POSTOPERATIVE DIAGNOSIS:  Asymptomatic 75% left carotid stenosis.      PROCEDURE PERFORMED:  Left carotid endarterectomy with bovine pericardial patch angioplasty.      SURGEON:  Semaj Stubbs MD      :  Elpidio Chung MD      ASSISTANT:  Merari King NP.  Mrs. King's assistance was critical in this case to provide adequate exposure of the distal internal carotid artery.  She also assisted in the performance of the endarterectomy and performance of the patch angioplasty.  She assisted with closure of the wound.  Her help in this case decreased the total operative time.      ANESTHESIA:  General endotracheal anesthesia.      ESTIMATED BLOOD LOSS:  50 mL.      OPERATIVE INDICATIONS:  This patient is a 60-year-old gentleman with multiple atherosclerotic risk factors, who has been followed over the years for a slowly progressing left carotid stenosis.  Recent ultrasound and carotid CTA show a shelf-like, approximately 75% plaque at the origin of the left internal carotid.  The contralateral side is disease-free.  He has no history of stroke or TIA.  After a discussion as to the merits of ongoing medical management versus endarterectomy, he has opted to proceed with carotid endarterectomy.      OPERATIVE FINDINGS:  There was a fairly focal 75%-80% irregular plaque involving the origin of the left internal carotid and extending for about 1.5 cm above that point.  Distal to the plaque, the internal carotid was somewhat small in size, but soft and disease-free.  At the completion of this procedure, this patient did follow commands with all 4 extremities.      DESCRIPTION OF TECHNIQUE:  After informed consent was obtained, the patient was brought to the operating room and placed on the table in a supine position.  General endotracheal anesthesia was achieved without incident.  His left neck was prepped and  draped in the usual sterile fashion.  I began with an oblique left-sided sternocleidomastoid incision.  Dissection proceeded sharply downward to isolate the mid cervical left common carotid at a level just above the omohyoid muscle.  The vagus nerve was identified posterior to the carotid and carefully avoided throughout the remainder of this dissection.  Dissection continued cephalad to the carotid bifurcation.  The superior thyroidal and external carotid arteries were dissected free and encircled with vessel loops.  Dissection continued distally up the internal carotid.  A large crossing facial vein was divided between silk ties.  The hypoglossal nerve was identified and carefully avoided throughout the remainder of this dissection.  We continued our exposure of the distal internal carotid until the vessel was soft and obviously disease free.  It was encircled with a vessel loop.  The patient was given 8000 units of intravenous heparin.  After a 5-minute circulation time, we performed a test clamping of the carotid and there were no EEG changes.  An arteriotomy was made on the mid cervical common carotid and extended up the internal carotid to a point above the level of the disease.  Both the proximal and distal intima were scored with a Dalton blade.  The endarterectomy plane was developed.  We obtained excellent distal and proximal taper points.  We performed eversion endarterectomy of the external carotid.  The plaque was removed en bloc from the field.  Residual shards of fibrinous debris were removed from the endarterectomized surface until I was fully satisfied with its quality.  A bovine pericardial patch was selected and trimmed to an appropriate length.  We proceeded with patch angioplasty using running 6-0 Prolene suture.  This was performed circumferentially.  On multiple occasions, the carotid lumen was copiously irrigated with heparinized saline.  Prior to placing the final stitches in the patch  angioplasty suture line, all clamps and vessel loops were briefly released to flush any debris out of the endarterectomized lumen.  The lumen was again irrigated with heparinized saline and observed to be clear.  I placed the remaining stitches in the patch angioplasty suture line.  Flow was preferentially directed up the external carotid for several heartbeats before relinquishing control of the distal internal carotid.  Immediately noted was a palpable pulse in the internal carotid distal to our patch.  I interrogated all vessels with a sterile handheld Doppler and excellent signals were noted throughout.  The patient had been re-bolused with an additional 2000 units of heparin during the case.  Surgicel gauze and gentle compression were held over the incision for 15 minutes.  There was still some mild oozing and he was given 40 mg of intravenous protamine.  After an additional 10 minutes, we had satisfactory hemostasis.  Closure then proceeded utilizing interrupted absorbable suture to reapproximate deep cervical fascia.  Platysma was closed with a running 3-0 Vicryl suture.  Skin was closed with a 4-0 Monocryl running subcuticular stitch.  Steri-Strips and a sterile dressing were applied.  Final sponge and needle count were reported as correct.        The patient tolerated the procedure without incident.  He was observed to follow commands with all 4 extremities.  He was subsequently transported, extubated and hemodynamically stable to the recovery room.  In the recovery room, he is noted to have moderate left tongue deviation.  Again, he follows commands with all 4 extremities.         LAURA THOMAS MD             D: 2020   T: 2020   MT: SYLVESTER      Name:     IHSAN SANCHEZ   MRN:      [id number]        Account:        [id number]   :      1960           Procedure Date: 2020      Document: [id number]

## 2020-02-21 NOTE — ANESTHESIA PROCEDURE NOTES
ARTERIAL LINE PROCEDURE NOTE:   Pre-Procedure  Performed by Serafin Lara MD  Location: pre-op      Pre-Anesthestic Checklist: patient identified, IV checked, risks and benefits discussed, informed consent, monitors and equipment checked and pre-op evaluation    Timeout  Correct Patient: Yes   Correct Procedure: Yes   Correct Site: Yes   Correct Laterality: N/A   Correct Position: Yes   Site Marked: N/A   .   Procedure Documentation  Procedure: arterial line    Supine  Insertion Site:radial.Skin infiltrated with mL of 1% lidocaine. Injection technique: Seldinger Technique and ultrasound guided  .  .  Patient Prep/Sterile Barriers; chlorhexidine gluconate and isopropyl alcohol, patient draped    Assessment/Narrative    Catheter: 20 gauge, 12 cm     Secured by suture  Tegaderm dressing used.        Comments:  No complications.

## 2020-02-21 NOTE — ANESTHESIA POSTPROCEDURE EVALUATION
Patient: Vikash Reevesmackenzie    Procedure(s):  LEFT CAROTID ENDARTERECTOMY WITH EEG    Diagnosis:Left carotid stenosis [I65.22]  Diagnosis Additional Information: No value filed.    Anesthesia Type:  General, ETT    Note:  Anesthesia Post Evaluation    Patient location during evaluation: PACU  Patient participation: Able to fully participate in evaluation  Level of consciousness: awake  Pain management: adequate  Airway patency: patent  Cardiovascular status: acceptable  Respiratory status: acceptable  Hydration status: acceptable  PONV: none     Anesthetic complications: None          Last vitals:  Vitals:    02/21/20 1210 02/21/20 1220 02/21/20 1230   BP:      Pulse:      Resp: 13 11 12   Temp: 37.1  C (98.8  F) 37.2  C (99  F) 37.2  C (99  F)   SpO2: 99% 97% 97%         Electronically Signed By: MARK GALVEZ MD  February 21, 2020  12:45 PM

## 2020-02-21 NOTE — ANESTHESIA PREPROCEDURE EVALUATION
Anesthesia Pre-Procedure Evaluation    Patient: Vikash Burgos   MRN: [id number] : 1960          Preoperative Diagnosis: Left carotid stenosis [I65.22]    Procedure(s):  LEFT CAROTID ENDARTERECTOMY WITH EEG    Past Medical History:   Diagnosis Date     Basal cell carcinoma      Carotid stenosis, left      Coronary artery disease     4 vessel bypass 2010; LIMA ->LAD, SVG-> OM3, SVG -> D2, SVG -> PDA     Diabetes mellitus (H)     neuropathy     Generalized anxiety disorder 2014     Hepatitis C, chronic (H)      Hyperlipidemia LDL goal < 70      Hypertension      Liver diseases     9/15 Liver is 10 cm in span without left lobe enlargement     Persistent microalbuminuria associated with type 2 diabetes mellitus (H) 2015     Past Surgical History:   Procedure Laterality Date     ARTHROSCOPY KNEE RT/LT      left     COLONOSCOPY       CORONARY ARTERY BYPASS      Coronary artery bypass grafting x 4 with placement of the left internal mammary artery to the distal midportion of the left anterior descending artery, saphenous vein graft from aorta to the third obtuse marginal branch of circumflex coronary artery, saphenous vein graft from aorta to the second diagonal branch, saphenous vein graft from aorta to the posterior descending artery.     ESOPHAGOSCOPY, GASTROSCOPY, DUODENOSCOPY (EGD), COMBINED  10/31/2011    Procedure:COMBINED ESOPHAGOSCOPY, GASTROSCOPY, DUODENOSCOPY (EGD); Surgeon:ALONSO ALDANA; Location: GI     ESOPHAGOSCOPY, GASTROSCOPY, DUODENOSCOPY (EGD), COMBINED N/A 3/8/2018    Procedure: COMBINED ESOPHAGOSCOPY, GASTROSCOPY, DUODENOSCOPY (EGD);  EGD;  Surgeon: Angel Luis Justice MD;  Location:  GI     HEART CATH CORONARY ANGIOGRAM W/LV GRAM  --10    CV Surgery recommended     HEART CATH CORONARY ANGIOGRAM W/LV GRAM  -14    Medical management     HERNIA REPAIR, INGUINAL RT/LT      left       Anesthesia Evaluation     .             ROS/MED  HX    ENT/Pulmonary:      (-) sleep apnea   Neurologic:       Cardiovascular:     (+) Dyslipidemia, hypertension-Peripheral Vascular Disease-- Carotid Stenosis, CAD, -CABG-date: 2010;, . : . . . :. .       METS/Exercise Tolerance:     Hematologic:         Musculoskeletal:         GI/Hepatic:     (+) hepatitis type C,      (-) GERD   Renal/Genitourinary:         Endo:     (+) type II DM Using insulin Diabetic complications: neuropathy cardiac problems, .      Psychiatric:     (+) psychiatric history anxiety      Infectious Disease:         Malignancy:         Other:                          Physical Exam  Normal systems: cardiovascular, pulmonary and dental    Airway   Mallampati: I  TM distance: >3 FB  Neck ROM: full    Dental     Cardiovascular   Rhythm and rate: regular      Pulmonary    breath sounds clear to auscultation            Lab Results   Component Value Date    WBC 5.3 10/25/2019    HGB 15.9 10/25/2019    HCT 46.7 10/25/2019     10/25/2019    CRP <0.3 05/01/2015     10/25/2019    POTASSIUM 4.8 10/25/2019    CHLORIDE 102 10/25/2019    CO2 30 10/25/2019    BUN 14 10/25/2019    CR 0.92 10/25/2019     (H) 10/25/2019    LIA 9.3 10/25/2019    PHOS 3.2 01/16/2007    MAG 2.0 11/24/2010    ALBUMIN 4.1 10/25/2019    PROTTOTAL 7.6 10/25/2019    ALT 41 10/25/2019    AST 18 10/25/2019    GGT 65 01/15/2008    ALKPHOS 70 10/25/2019    BILITOTAL 1.1 10/25/2019    MORA <9 (L) 11/19/2010    PTT 32 11/19/2010    INR 1.06 10/25/2019    TSH 0.88 01/15/2008    T4 0.76 10/16/2006       Preop Vitals  BP Readings from Last 3 Encounters:   02/21/20 (!) 156/85   02/04/20 (!) 167/77   01/31/20 120/60    Pulse Readings from Last 3 Encounters:   02/21/20 56   02/04/20 55   01/31/20 54      Resp Readings from Last 3 Encounters:   02/21/20 16   09/03/19 16   03/19/18 16    SpO2 Readings from Last 3 Encounters:   02/21/20 98%   02/04/20 98%   01/31/20 97%      Temp Readings from Last 1 Encounters:   02/21/20 36.7  C  "(98.1  F) (Skin)    Ht Readings from Last 1 Encounters:   02/21/20 1.803 m (5' 11\")      Wt Readings from Last 1 Encounters:   02/21/20 88.9 kg (196 lb)    Estimated body mass index is 27.34 kg/m  as calculated from the following:    Height as of this encounter: 1.803 m (5' 11\").    Weight as of this encounter: 88.9 kg (196 lb).       Anesthesia Plan      History & Physical Review  History and physical reviewed and following examination; no interval change.    ASA Status:  3 .    NPO Status:  > 8 hours    Plan for General and ETT with Intravenous induction. Maintenance will be Balanced.    PONV prophylaxis:  Ondansetron (or other 5HT-3) and Dexamethasone or Solumedrol       Postoperative Care  Postoperative pain management:  IV analgesics.      Consents  Anesthetic plan, risks, benefits and alternatives discussed with:  Patient..                 MARK GALVEZ MD  "

## 2020-02-22 ENCOUNTER — APPOINTMENT (OUTPATIENT)
Dept: MRI IMAGING | Facility: CLINIC | Age: 60
DRG: 039 | End: 2020-02-22
Attending: NURSE PRACTITIONER
Payer: COMMERCIAL

## 2020-02-22 ENCOUNTER — APPOINTMENT (OUTPATIENT)
Dept: SPEECH THERAPY | Facility: CLINIC | Age: 60
DRG: 039 | End: 2020-02-22
Attending: SURGERY
Payer: COMMERCIAL

## 2020-02-22 LAB
GLUCOSE BLDC GLUCOMTR-MCNC: 102 MG/DL (ref 70–99)
GLUCOSE BLDC GLUCOMTR-MCNC: 109 MG/DL (ref 70–99)
GLUCOSE BLDC GLUCOMTR-MCNC: 122 MG/DL (ref 70–99)
GLUCOSE BLDC GLUCOMTR-MCNC: 160 MG/DL (ref 70–99)
GLUCOSE BLDC GLUCOMTR-MCNC: 165 MG/DL (ref 70–99)
GLUCOSE BLDC GLUCOMTR-MCNC: 202 MG/DL (ref 70–99)

## 2020-02-22 PROCEDURE — 25800029 ZZH RX IP 258 OP 250: Performed by: STUDENT IN AN ORGANIZED HEALTH CARE EDUCATION/TRAINING PROGRAM

## 2020-02-22 PROCEDURE — 00000146 ZZHCL STATISTIC GLUCOSE BY METER IP

## 2020-02-22 PROCEDURE — 99233 SBSQ HOSP IP/OBS HIGH 50: CPT | Performed by: INTERNAL MEDICINE

## 2020-02-22 PROCEDURE — 12000000 ZZH R&B MED SURG/OB

## 2020-02-22 PROCEDURE — 99221 1ST HOSP IP/OBS SF/LOW 40: CPT | Performed by: NURSE PRACTITIONER

## 2020-02-22 PROCEDURE — 25000128 H RX IP 250 OP 636: Performed by: SURGERY

## 2020-02-22 PROCEDURE — 25000132 ZZH RX MED GY IP 250 OP 250 PS 637: Performed by: NURSE PRACTITIONER

## 2020-02-22 PROCEDURE — 25000128 H RX IP 250 OP 636: Performed by: STUDENT IN AN ORGANIZED HEALTH CARE EDUCATION/TRAINING PROGRAM

## 2020-02-22 PROCEDURE — 92610 EVALUATE SWALLOWING FUNCTION: CPT | Mod: GN | Performed by: SPEECH-LANGUAGE PATHOLOGIST

## 2020-02-22 PROCEDURE — 25000131 ZZH RX MED GY IP 250 OP 636 PS 637: Performed by: PHYSICIAN ASSISTANT

## 2020-02-22 PROCEDURE — 25000132 ZZH RX MED GY IP 250 OP 250 PS 637: Performed by: STUDENT IN AN ORGANIZED HEALTH CARE EDUCATION/TRAINING PROGRAM

## 2020-02-22 PROCEDURE — 92526 ORAL FUNCTION THERAPY: CPT | Mod: GN | Performed by: SPEECH-LANGUAGE PATHOLOGIST

## 2020-02-22 PROCEDURE — 70551 MRI BRAIN STEM W/O DYE: CPT

## 2020-02-22 RX ORDER — LIDOCAINE 4 G/G
1 PATCH TOPICAL
Status: DISCONTINUED | OUTPATIENT
Start: 2020-02-22 | End: 2020-02-24 | Stop reason: HOSPADM

## 2020-02-22 RX ORDER — SODIUM CHLORIDE 450 MG/100ML
INJECTION, SOLUTION INTRAVENOUS CONTINUOUS
Status: DISCONTINUED | OUTPATIENT
Start: 2020-02-22 | End: 2020-02-24 | Stop reason: HOSPADM

## 2020-02-22 RX ORDER — METHOCARBAMOL 500 MG/1
500 TABLET, FILM COATED ORAL ONCE
Status: COMPLETED | OUTPATIENT
Start: 2020-02-22 | End: 2020-02-22

## 2020-02-22 RX ADMIN — METHOCARBAMOL 500 MG: 500 TABLET, FILM COATED ORAL at 00:35

## 2020-02-22 RX ADMIN — HEPARIN SODIUM 5000 UNITS: 5000 INJECTION, SOLUTION INTRAVENOUS; SUBCUTANEOUS at 06:45

## 2020-02-22 RX ADMIN — KETOROLAC TROMETHAMINE 30 MG: 30 INJECTION, SOLUTION INTRAMUSCULAR at 15:13

## 2020-02-22 RX ADMIN — INSULIN DETEMIR 30 UNITS: 100 INJECTION, SOLUTION SUBCUTANEOUS at 09:31

## 2020-02-22 RX ADMIN — HEPARIN SODIUM 5000 UNITS: 5000 INJECTION, SOLUTION INTRAVENOUS; SUBCUTANEOUS at 21:41

## 2020-02-22 RX ADMIN — KETOROLAC TROMETHAMINE 30 MG: 30 INJECTION, SOLUTION INTRAMUSCULAR at 21:39

## 2020-02-22 RX ADMIN — ACETAMINOPHEN 975 MG: 325 TABLET, FILM COATED ORAL at 00:50

## 2020-02-22 RX ADMIN — HEPARIN SODIUM 5000 UNITS: 5000 INJECTION, SOLUTION INTRAVENOUS; SUBCUTANEOUS at 15:13

## 2020-02-22 RX ADMIN — SODIUM CHLORIDE: 4.5 INJECTION, SOLUTION INTRAVENOUS at 15:13

## 2020-02-22 RX ADMIN — KETOROLAC TROMETHAMINE 30 MG: 30 INJECTION, SOLUTION INTRAMUSCULAR at 03:04

## 2020-02-22 RX ADMIN — KETOROLAC TROMETHAMINE 30 MG: 30 INJECTION, SOLUTION INTRAMUSCULAR at 09:11

## 2020-02-22 ASSESSMENT — ACTIVITIES OF DAILY LIVING (ADL)
ADLS_ACUITY_SCORE: 13
ADLS_ACUITY_SCORE: 13
ADLS_ACUITY_SCORE: 14

## 2020-02-22 ASSESSMENT — MIFFLIN-ST. JEOR: SCORE: 1734.13

## 2020-02-22 NOTE — PROGRESS NOTES
Pt back from second MRI attempt. Robaxin and tylenol given prior to help with pain, pt did not tolerate MRI well.

## 2020-02-22 NOTE — CONSULTS
[facility]    Stroke Telephone Note    I was called by Dimitri Vargas on 02/21/20 at 1851 regarding patient Vikash Burgos. The patient is a 60 year old male with past medical history of type 2 DM, HLD, and CAD s/p CABG who presented for a planned left carotid endarterectomy for an asymptomatic severe left carotid stenosis.  Per report patient had been neurologically intact around 1700 and then when nursing went in later this evening noted the patient to have a right facial droop, right sided weakness, and decreased sensation on the right side.  He was taken to CT which showed no acute findings and CTA did not show an LVO.    Stroke Code Data  (for stroke code without tele)  Stroke code activated 02/21/20   1851   First stroke provider response         Last known normal 02/21/20   1700   Time of discovery   (or onset of symptoms) 02/21/20       Head CT read by me 02/21/20   1904   Was stroke code de-escalated? Yes 02/21/20 1950  other (see comments) no TPA due to recent CEA today, no endovascular due to no LVO     TPA Treatment   Not given due to major surgery / trauma in past 14 days.    Endovascular Treatment  Not initiated due to absence of proximal vessel occlusion    Impression  Ischemic Stroke due to undetermined etiology     Recommendations  Acute Ischemic Stroke (without tPA) Recommendations  - Permissive HTN however due to recent CEA will continue SBP < 180  - Daily aspirin 81 mg for secondary stroke prevention which patient is already receiving  - Statin: LDL goal 40-70, continue rosuvastatin 20 mg daily  - MRI Stroke Protocol (MRI Brain without and with IV contrast)  - Telemetry, EKG  - Bedside Glucose Monitoring  - A1c, Lipid Panel, Troponin x 3  - PT/OT/SLP  - Stroke Education  - Euthermia, Euglycemia    My recommendations are based on the information provided on the phone by Dimitri Vargas.  Stroke team will formally consult in AM.    Pamela Scruggs MD   Neurocritical Care    Text  Page ([phone])

## 2020-02-22 NOTE — PLAN OF CARE
IMC status. VSS, did not meet PRN parameters for hypertension. A&O x4. Speech slightly slurred. Left tongue deviation. Right facial droop, right facial numbness, right pronator drift, right foot numbness. Unable to lift RLE off of bed. Weak dorsi/plantar flexion on RLE. Right  weak-to-moderate (improved overnight). Ataxic finger-to-nose on right. Bilateral UE tremors at times. Right eye closed, deviates up. Kept patient NPO due to facial droop and tongue deviation. Dimitri MD ordered robaxin, this was given crushed in applesauce. C/o pain in neck, pt declined narcotics. Toradol given with minimal relief. Dressing intact with dried drainage. Voiding adequately per urinal. Attempted MRI x2, pt and wife very frustrated with MRI process being delayed (pt unable to tolerate laying flat for first attempt), house supervisor up to speak with patient and wife. Will continue to monitor.     0615: neuro assessment much improved. Pt able to open right eye, pupil brisk and reactive. Right  strong, smooth finger-to-nose. Facial numbness remains on right. Still unable to lift RLE off of bed.

## 2020-02-22 NOTE — CONSULTS
"  [facility]    Stroke Consult Note    Reason for Consult:  \"Follow-up stroke code for right side weakness\"    Chief Complaint: No chief complaint on file.       HPI  Vikash Burgos is a 60 year old male with past medical history significant for DM2, HLD, CAD s/p CABG who underwent planned left carotid endaretectomy yesterday. He was reportedly at baseline at 1700 yesterday evening, stroke code was activated at approximately 1850 for new onset right facial droop, right side weakness, and decreased sensation to the right side. CTH was negative for acute pathology. CTA head/neck and CTP also unremarkable. Follow-up MRI brain negative for stroke.     Today he feels somewhat improved but remains fatigued and with right leg weakness.     Impression  RLE weakness & right eye ptosis - MRI brain negative for stroke and without other findings to explain patient presentation. Due to persistent symptoms, TIA is also unlikely. Possible that there is a non-organic component to his symptoms but unclear at this time.     Recommendations  - No further stroke evaluation is indicated  - If symptoms persist, could consider general neurology consult    Thank you for this consult. No further stroke evaluation is recommended, so we will sign off. Please contact us with any additional questions.    STEW Srinivasan, CNP  Neurology  02/22/2020 9:24 AM  To page stroke neurology after hours or on a subsequent day, click here: AMCOM  Choose \"On Call\" tab at top, then search dropdown box for \"Neurology Adult\" & press Enter, look for Neuro ICU/Stroke  _____________________________________________________    Past Medical History   Past Medical History:   Diagnosis Date     Basal cell carcinoma      Carotid stenosis, left      Coronary artery disease     4 vessel bypass November 2010; LIMA ->LAD, SVG-> OM3, SVG -> D2, SVG -> PDA     Diabetes mellitus (H) 2005    neuropathy     Generalized anxiety disorder 5/2/2014     " Hepatitis C, chronic (H) 2005     Hyperlipidemia LDL goal < 70      Hypertension      Liver diseases     9/15 Liver is 10 cm in span without left lobe enlargement     Persistent microalbuminuria associated with type 2 diabetes mellitus (H) 5/6/2015     Past Surgical History   Past Surgical History:   Procedure Laterality Date     ARTHROSCOPY KNEE RT/LT      left     COLONOSCOPY       CORONARY ARTERY BYPASS  11/18/201    Coronary artery bypass grafting x 4 with placement of the left internal mammary artery to the distal midportion of the left anterior descending artery, saphenous vein graft from aorta to the third obtuse marginal branch of circumflex coronary artery, saphenous vein graft from aorta to the second diagonal branch, saphenous vein graft from aorta to the posterior descending artery.     ESOPHAGOSCOPY, GASTROSCOPY, DUODENOSCOPY (EGD), COMBINED  10/31/2011    Procedure:COMBINED ESOPHAGOSCOPY, GASTROSCOPY, DUODENOSCOPY (EGD); Surgeon:ALONSO ALDANA; Location: GI     ESOPHAGOSCOPY, GASTROSCOPY, DUODENOSCOPY (EGD), COMBINED N/A 3/8/2018    Procedure: COMBINED ESOPHAGOSCOPY, GASTROSCOPY, DUODENOSCOPY (EGD);  EGD;  Surgeon: Angel Luis Justice MD;  Location:  GI     HEART CATH CORONARY ANGIOGRAM W/LV GRAM  9-11-10    CV Surgery recommended     HEART CATH CORONARY ANGIOGRAM W/LV GRAM  2-28-14    Medical management     HERNIA REPAIR, INGUINAL RT/LT      left     Medications   Home Meds  Prior to Admission medications    Medication Sig Start Date End Date Taking? Authorizing Provider   aspirin 81 MG tablet Take 1 tablet by mouth daily.   Yes Reported, Patient   chlorhexidine (PERIDEX) 0.12 % solution Take 15 mLs by mouth 2 times daily as needed  11/2/17  Yes Reported, Patient   cyanocobalamin 1000 MCG SUBL Place 1,000 mcg under the tongue daily   Yes Reported, Patient   ibuprofen 200 MG PO tablet Take 200-400 mg by mouth 3 times daily as needed for mild pain   Yes Reported, Patient   insulin detemir  (LEVEMIR PEN) 100 UNIT/ML pen NJECT 50 UNITS SUBCUTANEOUS AT BEDTIME 1/29/20  Yes Ana Olvera MD   lisinopril (PRINIVIL/ZESTRIL) 10 MG tablet Take 1 tablet (10 mg) by mouth daily 1/23/20  Yes Zeb Roberts MD   nebivolol (BYSTOLIC) 5 MG tablet Take 1 tablet (5 mg) by mouth daily 1/23/20  Yes Zeb Roberts MD   rosuvastatin (CRESTOR) 20 MG tablet Take 1 tablet (20 mg) by mouth daily 1/23/20  Yes Zeb Roberts MD   Vitamin D, Cholecalciferol, 1000 units TABS Take 1,000 Units by mouth daily   Yes Reported, Patient   zolpidem (AMBIEN) 10 MG tablet TAKE 1 TAB ORALLY AS NEEDED FOR SLEEP  Patient taking differently: Take 5-10 mg by mouth nightly as needed TAKE 1 TAB ORALLY AS NEEDED FOR SLEEP 10/22/19  Yes Uriah Ryder MD   insulin pen needle (B-D U/F) 31G X 8 MM miscellaneous Use one pen needles daily or as directed. 4/19/19   Ana Olvera MD   insulin pen needle (BD HEIKE U/F) 32G X 4 MM Use 1 daily or as directed. 3/13/15   Jace Mayo MD       Scheduled Meds    acetaminophen  975 mg Oral Q8H     aspirin  81 mg Oral Daily     cyanocobalamin  1,000 mcg Sublingual Daily     heparin ANTICOAGULANT  5,000 Units Subcutaneous Q8H     insulin aspart  1-10 Units Subcutaneous TID AC     insulin aspart  1-7 Units Subcutaneous At Bedtime     insulin detemir  30 Units Subcutaneous BID     lidocaine  1 patch Transdermal Q24h    And     lidocaine   Transdermal Q8H     lisinopril  10 mg Oral QPM     nebivolol  5 mg Oral Daily     rosuvastatin  20 mg Oral QPM     senna-docusate  1 tablet Oral BID    Or     senna-docusate  2 tablet Oral BID     sodium chloride (PF)  3 mL Intracatheter Q8H     Vitamin D3  1,000 Units Oral Daily       Infusion Meds    BETA BLOCKER NOT PRESCRIBED         PRN Meds  [START ON 2/24/2020] acetaminophen, sore throat lozenge, glucose **OR** dextrose **OR** glucagon, ketorolac, labetalol, lidocaine 4%, lidocaine (buffered or not buffered), naloxone,  nitroGLYcerin, oxyCODONE, BETA BLOCKER NOT PRESCRIBED, sodium chloride (PF), zolpidem    Allergies   Allergies   Allergen Reactions     Chlorthalidone Nausea and Vomiting     dizziness     Penicillins Rash     Rash with PCN only. Patient has taken amoxicillin with no rash.     Family History   Family History   Problem Relation Age of Onset     C.A.D. Father      Cancer Father         bladder     Heart Surgery Father         bypass surgery     Heart Disease Mother      Social History   Social History     Tobacco Use     Smoking status: Former Smoker     Packs/day: 0.50     Years: 12.00     Pack years: 6.00     Types: Cigarettes     Start date: 1993     Last attempt to quit: 1/26/2005     Years since quitting: 15.0     Smokeless tobacco: Never Used   Substance Use Topics     Alcohol use: Yes     Alcohol/week: 0.0 standard drinks     Comment: minimal     Drug use: No       Review of Systems   The 10 point Review of Systems is negative other than noted in the HPI or here.        PHYSICAL EXAMINATION   Temp:  [97.5  F (36.4  C)-99.1  F (37.3  C)] 97.9  F (36.6  C)  Pulse:  [59-84] 62  Heart Rate:  [62-86] 62  Resp:  [11-26] 16  BP: (126-217)/(62-99) 144/70  MAP:  [73 mmHg-85 mmHg] 73 mmHg  Arterial Line BP: (120-134)/(48-61) 122/50  SpO2:  [94 %-100 %] 97 %    Neurologic  Mental Status:  alert, follows commands, speech clear   Cranial Nerves:  visual fields intact, PERRL, EOMI with normal smooth pursuit, no dysarthria, right ptosis, tongue deviated to left  Motor:  normal muscle tone and bulk, no abnormal movements, ERROL/LLE strength 5/5, RLE: plantar/dorsiflexion 5/5, some effort against gravity but unable to elevate and positive Anthony, RUE without drift   Reflexes:  not assessed  Sensory:  light touch sensation intact and symmetric throughout upper and lower extremities, no extinction on double simultaneous stimulation   Coordination:  no obvious ataxia  Station/Gait:  deferred    Dysphagia Screen  Per  Nursing    Imaging  I personally reviewed all imaging; relevant findings per HPI.    Labs Data   CBC  Recent Labs   Lab 02/21/20  0640   HGB 15.5   *     Basic Metabolic Panel   Recent Labs   Lab 02/21/20  0640      POTASSIUM 4.8   CHLORIDE 104   CO2 25   BUN 18   CR 0.83   *   LIA 9.3     Liver Panel  Recent Labs   Lab Test 10/25/19  0648 09/03/19  0941 04/19/19  0648   PROTTOTAL 7.6 6.7 7.3   ALBUMIN 4.1 4.6 3.9   BILITOTAL 1.1 1.4* 1.1   ALKPHOS 70 57 69   AST 18 23 20   ALT 41 32 52     INR  Recent Labs   Lab Test 10/25/19  0648 04/19/19  0648 09/07/18  0629   INR 1.06 1.06 1.06      Lipid Profile  Recent Labs   Lab Test 02/21/20  0640 09/03/19  0941 04/19/19  0648  08/02/13  0724 08/17/12  0740   CHOL 101 109 102   < > 106 136   HDL 36* 39* 35*   < > 31* 29*   LDL 47 55 48   < > 61 71   TRIG 90 74 96   < > 72 177*   CHOLHDLRATIO  --   --   --   --  3.4 4.6    < > = values in this interval not displayed.     A1C  Recent Labs   Lab Test 02/21/20  0640 09/03/19  0941 08/21/18  1014   A1C 7.4* 7.2* 6.6*     Troponin Linh results for input(s): TROPI in the last 168 hours.       Stroke Code / Stroke Consult Data Data This was a non-emergent, non-tele stroke consult.    I have personally spent a total of 30 minutes providing care and consulting with this patient's medical providers today, with more than 50% of this time spent in consultation, coordination of care, and discussion with the patient and/or family regarding diagnostic results, prognosis, symptom management, risks and benefits of management options, and development of plan of care.

## 2020-02-22 NOTE — PLAN OF CARE
Discharge Planner SLP   Patient plan for discharge: Did not discuss  Current status: A bedside swallow evaluation was completed this pm.  Pt presents with moderate-severe aspiration risk at bedside.  Deficits/risk factors include recent endarterectomy, oral motor deficits including numbness of upper lip and inability to fully smile, pull of tongue to left with inability to move to right, delayed swallows with poor elevation, need for multiple swallows with minimal po, throat clearing and/or overt coughing with all po trials.  Suspect pharyngeal residue and aspiration at times.    Recommend proceeding with a video swallow study 2/23 am to fully assess pharyngeal phase deficits and aspiration risk.  Recommend NPO except for minimal thin/nectar liquids with very small sips and multiple swallows for comfort, hold if decreased respiratory status.    Educated pt, family, and RN on recommendations.  RN to obtain video swallow study orders from MD as able.  Barriers to return to prior living situation: Level of assist, weakness  Recommendations for discharge: To be determined after all evaluations completed  Rationale for recommendations: SLP swallow Tx after discharge to maximize swallow function for a least restrictive diet       Entered by: Chen Felix 02/22/2020 2:05 PM

## 2020-02-22 NOTE — PROGRESS NOTES
[facility]  Vascular Medicine Progress Note          Assessment and Plan:       1. Asymptomatic high grade left carotid artery stenosis s/p left carotid endarterectomy 2/21/10       POD1, had difficulty raising right leg last night but this is now improving. Negative stroke on MR.    Left tongue deviation.    Keep in house overnight to monitor neuro status given events of last evening.     2. Type 2 Diabetes Mellitus     His A1C this admission is noted to be 7.4% while on Levemir 50 units at bedtime.     Levemir increased to 30 BID.    ACs slightly elevated, monitor on high insulin resistance sliding scale insulin as needed.      3. Hyperlipidemia     His lipid panel this admission indicates that his lipids are well controlled (LDL 47) while on Crestor 20 mg daily. He should continue the same.      4. CAD s/p CABG in 2010     He is presently asymptomatic. Continue current medical management.                 Interval History:   doing well; no cp, sob, n/v/d, or abd pain. and RLE weakness now resolved              Review of Systems:   The 10 point Review of Systems is negative other than noted in the HPI             Medications:       acetaminophen  975 mg Oral Q8H     aspirin  81 mg Oral Daily     cyanocobalamin  1,000 mcg Sublingual Daily     heparin ANTICOAGULANT  5,000 Units Subcutaneous Q8H     insulin aspart  1-10 Units Subcutaneous TID AC     insulin aspart  1-7 Units Subcutaneous At Bedtime     insulin detemir  30 Units Subcutaneous BID     lidocaine  1 patch Transdermal Q24h    And     lidocaine   Transdermal Q8H     lisinopril  10 mg Oral QPM     nebivolol  5 mg Oral Daily     rosuvastatin  20 mg Oral QPM     senna-docusate  1 tablet Oral BID    Or     senna-docusate  2 tablet Oral BID     sodium chloride (PF)  3 mL Intracatheter Q8H     Vitamin D3  1,000 Units Oral Daily                  Physical Exam:     Patient Vitals for the past 24 hrs:   BP Temp Temp src Pulse Heart Rate Resp SpO2  Weight   02/22/20 0809 (!) 144/70 97.9  F (36.6  C) Oral 62 62 16 97 % --   02/22/20 0600 (!) 152/76 -- -- 64 66 16 98 % 90.2 kg (198 lb 13.7 oz)   02/22/20 0400 (!) 158/78 -- -- -- 63 21 98 % --   02/22/20 0322 (!) 161/81 -- -- -- 68 20 96 % --   02/22/20 0304 -- -- -- -- -- 20 -- --   02/22/20 0230 (!) 185/81 -- -- -- 78 20 95 % --   02/22/20 0215 (!) 179/85 -- -- -- 74 22 96 % --   02/22/20 0145 -- -- -- -- -- 16 -- --   02/22/20 0050 (!) 165/82 98.7  F (37.1  C) Oral -- 75 16 96 % --   02/21/20 2200 (!) 156/81 -- -- 75 74 12 95 % --   02/21/20 2100 (!) 150/77 -- Oral -- 69 15 96 % --   02/21/20 2030 (!) 170/74 99  F (37.2  C) Oral 77 75 16 97 % --   02/21/20 2000 (!) 179/88 -- -- 83 83 11 98 % --   02/21/20 1945 (!) 172/79 -- -- -- 80 -- -- --   02/21/20 1935 (!) 164/75 -- -- -- 79 -- -- --   02/21/20 1930 (!) 173/85 -- -- -- 84 -- -- --   02/21/20 1915 (!) 169/82 -- -- -- 86 22 -- --   02/21/20 1900 (!) 169/88 -- -- -- 80 -- -- --   02/21/20 1845 (!) 201/96 -- -- -- 83 26 97 % --   02/21/20 1843 (!) 217/99 98.6  F (37  C) Oral 84 79 21 97 % --   02/21/20 1840 -- -- -- -- 75 11 97 % --   02/21/20 1800 (!) 151/69 -- -- 63 63 14 97 % --   02/21/20 1600 (!) 141/70 -- -- 59 64 11 97 % --   02/21/20 1538 -- 97.5  F (36.4  C) Oral -- -- -- -- --   02/21/20 1400 (!) 143/67 -- -- 67 -- 14 95 % --   02/21/20 1354 138/70 -- -- 62 -- 14 94 % --   02/21/20 1346 (!) 156/75 97.9  F (36.6  C) Oral 71 -- 13 94 % --   02/21/20 1310 (!) 141/70 98.8  F (37.1  C) -- 64 69 13 98 % --   02/21/20 1300 131/73 98.8  F (37.1  C) -- 67 65 13 97 % --   02/21/20 1250 131/68 99  F (37.2  C) -- 69 65 13 97 % --   02/21/20 1240 126/62 98.8  F (37.1  C) -- -- 63 16 98 % --   02/21/20 1230 -- 99  F (37.2  C) -- -- 64 12 97 % --   02/21/20 1220 -- 99  F (37.2  C) -- -- 67 11 97 % --   02/21/20 1210 -- 98.8  F (37.1  C) -- -- 76 13 99 % --   02/21/20 1200 136/66 99.1  F (37.3  C) -- -- 69 12 99 % --   02/21/20 1153 136/66 -- -- 71 74 17 100 % --      Wt Readings from Last 4 Encounters:   02/22/20 90.2 kg (198 lb 13.7 oz)   02/04/20 89.6 kg (197 lb 9.6 oz)   01/31/20 88.5 kg (195 lb)   01/23/20 88.7 kg (195 lb 9.6 oz)       Intake/Output Summary (Last 24 hours) at 2/22/2020 1113  Last data filed at 2/22/2020 0800  Gross per 24 hour   Intake 2433.67 ml   Output 1925 ml   Net 508.67 ml     Constitutional: normal  Eyes: normal  ENT: normal  Neck: Supple, symmetrical, trachea midline, no adenopathy, thyroid symmetric, not enlarged and no tenderness, skin normal.  Hematologic / Lymphatic: normal  Back: normal  Lungs: No increased work of breathing, good air exchange, clear to auscultation bilaterally, no crackles or wheezing.  Cardiovascular: Regular rate and rhythm, normal S1 and S2, no S3 or S4, and no murmur noted.  Chest / Breast: normal  Abdomen: normal  Genitourinary: normal  Musculoskeletal: No redness, warmth, or swelling of the joints.  Full range of motion noted.  Motor strength is 5 out of 5 all extremities bilaterally.  Tone is normal.  Neurologic: left tongue deviation, other wise nonfocal  Neuropsychiatric: normal  Skin: normal           Data:     Results for orders placed or performed during the hospital encounter of 02/21/20 (from the past 24 hour(s))   Glucose by meter   Result Value Ref Range    Glucose 240 (H) 70 - 99 mg/dL   Hospitalist IP Consult: Patient to be seen: Routine - within 24 hours; Vascular Medicine cross coverage. Thanks!; Consultant may enter orders: Yes; Requesting provider? Other supervising provider; Name: Alix Hodge, APRN CNP     2/21/2020  2:07 PM  HOSPITALIST CONSULT CHART CHECK:  2/21/2020      Hospitalist service was consulted for cross coverage only. We   will peripherally follow and chart check throughout the week.        - For vascular medical concerns during business hours M-F, call   the [facility] at [phone] to have the   rounding/on call Vascular Medicine (NOT  VASCULAR SURGERY) MD   paged.      - After business hours M-F, for medical concerns on this patient,   please page hospitalist staff.      - For vascular surgical questions, please page the appropriate   surgeon (primary vascular surgeon or on call vascular surgeon)   based upon the time of day.        STEW Michaels [facility]  Hospitalist Service  February 21, 2020 2:07 PM     Pager: [phone] (7a - 6p)       Glucose by meter   Result Value Ref Range    Glucose 243 (H) 70 - 99 mg/dL   Glucose by meter   Result Value Ref Range    Glucose 208 (H) 70 - 99 mg/dL   CT Head w/o Contrast    Narrative    CT SCAN OF THE HEAD WITHOUT CONTRAST   2/21/2020 7:07 PM     HISTORY: code stroke, left carotid surgery, right-sided weakness.    TECHNIQUE:  Axial images of the head and coronal reformations without  IV contrast material. Radiation dose for this scan was reduced using  automated exposure control, adjustment of the mA and/or kV according  to patient size, or iterative reconstruction technique.    COMPARISON: None.    FINDINGS:     Intracranial contents: The ventricles are normal in size, shape and  configuration.  The brain parenchyma and subarachnoid spaces are  normal. There is no evidence of intracranial hemorrhage, mass, acute  infarct or anomaly.    Visualized orbits/sinuses/mastoids:  The visualized portions of the  sinuses and mastoids appear normal.    Osseous structures/soft tissues:  There is no evidence of trauma.      Impression    IMPRESSION: No acute pathology. No bleed, mass, or areas of acute  infarction identified.      BETTYE HOROWITZ MD   CTA Head Neck with Contrast    Narrative    CTA  HEAD NECK WITH CONTRAST  2/21/2020 7:13 PM     HISTORY: code stroke, left carotid surgery, right-sided weakness area    TECHNIQUE:  Precontrast localizing scans were followed by CT  angiography with an injection of 70 mL Isovue-370     IV contrast with  scans through the head and neck.  Images were transferred to  a  separate 3-D workstation where multiplanar reformations and 3-D images  were created.  Estimates of carotid stenoses are made relative to the  distal internal carotid artery diameters except as noted. Radiation  dose for this scan was reduced using automated exposure control,  adjustment of the mA and/or kV according to patient size, or iterative  reconstruction technique.    COMPARISON: None.    FINDINGS: Estimates of stenosis at the carotid bifurcations are  relative to the distal internal carotids.    ARCH: Normal anatomy    NECK CTA:  Right Carotid:  The right common carotid artery is normal.  Calcified  atherosclerotic plaque is seen at the right carotid bifurcation. No  stenosis..  The right internal carotid artery is negative.     Left Carotid:  The left common carotid artery is unremarkable.   Postoperative changes are seen at the left carotid bifurcation. The  weblike stenosis seen on the previous scan is no longer identified. No  significant stenosis is seen on the current study.. Internal carotid  artery is tortuous..      Vertebrals:  The vertebral arteries are normal.    HEAD CTA:  Anterior Circulation: No occluded vessels are seen. .    Posterior Circulation:  The basilar artery and its branches appear  normal.     MISC:: None.      Impression    IMPRESSION:   1. Postop change at the left carotid bifurcation. No residual stenosis  is seen at the left carotid bifurcation.  2. No significant stenosis right carotid bifurcation.  3. No occluded intracranial vessels are identified. No high-grade  intracranial vascular stenosis.      BETTYE HOROWITZ MD   CT Head Perfusion w Contrast    Narrative    CT BRAIN PERFUSION 2/21/2020 7:40 PM    HISTORY: code stroke, right-sided weakness, decreased sensation.    TECHNIQUE: Time sequential axial CT images of the head were acquired  during the administration of intravenous contrast 50 mL Isovue-370      . CTA images of the [place] as well as color perfusion  maps  of the brain were created from this time sequential axial source data.   Radiation dose for this scan was reduced using automated exposure  control, adjustment of the mA and/or kV according to patient size, or  iterative reconstruction technique.    COMPARISON: None.    FINDINGS: There are no focal or regional perfusion defects in the  brain.      Impression    IMPRESSION: Normal CT perfusion of the brain.    BETTYE HOROWITZ MD   Glucose by meter   Result Value Ref Range    Glucose 202 (H) 70 - 99 mg/dL   Glucose by meter   Result Value Ref Range    Glucose 165 (H) 70 - 99 mg/dL   MR Brain w/o Contrast    Narrative    EXAM: MR BRAIN W/O CONTRAST  LOCATION: [facility]  DATE/TIME: 2/22/2020 2:32 AM    INDICATION: Stroke, follow up  COMPARISON: Noncontrast CT head dated 02/21/2020. CT had perfusion dated 02/21/2020.  TECHNIQUE: Routine multiplanar multisequence head MRI without intravenous contrast.    FINDINGS:  INTRACRANIAL CONTENTS: No acute or subacute infarct. No mass, acute hemorrhage, or extra-axial fluid collections. A few scattered punctate foci susceptibly artifact are identified within the supratentorial brain, nonspecific, but possibly reflecting   amyloid angiopathy, chronic microhemorrhage, or even tiny cavernous malformations, among other etiologies. Normal brain parenchymal signal. Normal ventricles and sulci. Normal position of the cerebellar tonsils.     SELLA: No abnormality accounting for technique.    OSSEOUS STRUCTURES/SOFT TISSUES: Normal marrow signal. The major intracranial vascular flow voids are maintained.     ORBITS: No abnormality accounting for technique.     SINUSES/MASTOIDS: No paranasal sinus mucosal disease. No middle ear or mastoid effusion.       Impression    IMPRESSION:  1.  No acute intracranial abnormality. Specifically, no evidence of acute ischemic injury.    2.  Additional findings above.   Glucose by meter   Result Value Ref Range    Glucose 160 (H) 70 - 99  mg/dL

## 2020-02-22 NOTE — PROGRESS NOTES
VASCULAR SURGERY    Patient just examined by Neurology with his family and wife present.    Has 5/5 right leg plantar and dorsiflexion but trouble lifting up leg (was able to do better this AM).  Right eye ptosis is less.  Able to hold right arm up for count of ten with no drift.    Neuro does not feel he had a CVI especially with MRI normal findings.    Discussed with family.  PT to juana.      Jose Angeles MD

## 2020-02-22 NOTE — PROGRESS NOTES
02/22/20 1411   General Information   Onset Date 02/21/20   Start of Care Date 02/22/20   Referring Physician Dr. Angeles   Patient Profile Review/OT: Additional Occupational Profile Info See Profile for full history and prior level of function   Patient/Family Goals Statement To eat and drink safely ASAP   Swallowing Evaluation Bedside swallow evaluation   Behaviorial Observations Alert   Mode of current nutrition Oral diet   Type of oral diet Thin liquid;Regular  (RN reports pt coughing/choking with po today)   Respiratory Status Room air   Comments Per MD: Asymptomatic high grade left carotid artery stenosis s/p left carotid endarterectomy 2/21/10   Per Neuro: dora Burgos is a 60 year old male with past medical history significant for DM2, HLD, CAD s/p CABG who underwent planned left carotid endaretectomy yesterday. He was reportedly at baseline at 1700 yesterday evening, stroke code was activated at approximately 1850 for new onset right facial droop, right side weakness, and decreased sensation to the right side. CTH was negative for acute pathology. CTA head/neck and CTP also unremarkable. Follow-up MRI brain negative for stroke.      Left incision and swelling noted.   Clinical Swallow Evaluation   Dentition present and adequate   Oral Labial Strength and Mobility impaired retraction;impaired pursing;impaired coordination  (left droop at rest, but decreased right ROM during speech, )   Lingual Strength and Mobility impaired protrusion;impaired coordination;impaired right lateral movement  (Unable to pull tongue to right)   Velar Elevation impaired  (Pulls to right)   Laryngeal Function Cough;Throat clear;Swallow;Voicing initiated;Dry swallow palpated  (Poor elevation)   Oral Musculature Comments Numb upper lip Bilateral with inability to lift to show teeth with smile   Clinical Swallow Eval: Thin Liquid Texture Trial   Mode of Presentation, Thin Liquids cup   Volume of Liquid or Food Presented sips x 2    Oral Phase of Swallow Premature pharyngeal entry   Pharyngeal Phase of Swallow reduction in laryngeal movement;repeated swallows  (delay)   Diagnostic Statement overt extensive coughing x 1, throat clearing x 1   Clinical Swallow Eval: Nectar Thick Liquid Texture Trial   Mode of Presentation, Nectar spoon   Volume of Nectar Presented tsps x 2   Oral Phase, Nectar Premature pharyngeal entry   Pharyngeal Phase, Nectar reduction in laryngeal movement;repeated swallows  (delay)   Diagnostic Statement throat clearing, delayed coughing   Clinical Swallow Eval: Puree Solid Texture Trial   Mode of Presentation, Puree spoon   Volume of Puree Presented tsp x 1   Oral Phase, Puree Premature pharyngeal entry   Pharyngeal Phase, Puree reduction in laryngeal movement;repeated swallows  (delay)   Diagnostic Statement delayed coughing   Swallow Compensations   Swallow Compensations Reduce amounts;Pacing;Multiple swallow;Effortful swallow   Esophageal Phase of Swallow   Patient reports or presents with symptoms of esophageal dysphagia No   Swallow Eval: Clinical Impressions   Skilled Criteria for Therapy Intervention Skilled criteria met.  Treatment indicated.   Functional Assessment Scale (FAS) 2   Treatment Diagnosis mod-severe aspiration risk   Diet texture recommendations NPO  (see below)   Recommended Feeding/Eating Techniques   (see below)   Therapy Frequency Daily   Predicted Duration of Therapy Intervention (days/wks) 1 week   Anticipated Discharge Disposition   (to be determined)   Risks and Benefits of Treatment have been explained. Yes   Patient, family and/or staff in agreement with Plan of Care Yes   Clinical Impression Comments A bedside swallow evaluation was completed this pm.  Pt presents with moderate-severe aspiration risk at bedside.  Deficits/risk factors include recent endarterectomy, oral motor deficits including numbness of upper lip and inability to fully smile, pull of tongue to left with inability to  move to right, delayed swallows with poor elevation, need for multiple swallows with minimal po, throat clearing and/or overt coughing with all po trials.  Suspect pharyngeal residue and aspiration at times.  Recommend proceeding with a video swallow study 2/23 am to fully assess pharyngeal phase deficits and aspiration risk.  Recommend NPO except for minimal thin/nectar liquids with very small sips and multiple swallows for comfort, hold if decreased respiratory status.  Educated pt, family, and RN on recommendations.  RN to obtain video swallow study orders from MD as able.   Total Evaluation Time   Total Evaluation Time (Minutes) 15

## 2020-02-22 NOTE — PROGRESS NOTES
Vascular Surgery Progress Note:  POD#1    S: Eventful night.  Patient had difficulty lifting his right leg raising possibility of perioperative CVI.  However, this morning he is doing better.    At baseline discussing with family patient has a frozen right shoulder need surgical repair but was waiting for the carotid surgery.  Does have some issues with diabetic neuropathy of his feet.  Wife reports that when he is stressed and fatigued he develops ptosis of his right eyelid.    Patient is complaining of pain.  Hard to determine whether this is all surgical but may be related to his chronic other discomfort in his shoulder.  He is on IV Toradol and that is giving him some help.      O: Underwent evaluation last evening of MRI of his head which was completely normal.  CTA of the neck reveals a widely patent CEA with no issues.  Vitals:  BP  Min: 126/62  Max: 217/99  Temp  Av.7  F (37.1  C)  Min: 97.5  F (36.4  C)  Max: 99.1  F (37.3  C)  Pulse  Av.5  Min: 51  Max: 84  I/O last 3 completed shifts:  In: 2193.67 [P.O.:300; I.V.:1893.67]  Out:  [Urine:1875; Blood:50]    Physical Exam: Wife is present.  Patient does follow all commands.  He can lift his right leg off the bed.  Able to squeeze my hand with both arms but sore moving the right arm as described above.  Mild ptosis of the right eye.  No visual changes.    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  Nerve was well visualized and minimal manipulation thus should improve but may take a bit of time and this was discussed.      Assessment/Plan: Overall patient is doing well.  Events from last night all reviewed including images and discussed with patient and family.  Will discontinue IMC status to keep on a monitored bed.  Gradually increase activities.  Patient is not ready for discharge today but hopefully will morning and this is been  discussed.         Continue with IV Toradol.  Also Tylenol.     I also reviewed the situation with Dr. Stubbs who did see the patient late last evening at 2100 hrs.        Wm. Bridgette MD

## 2020-02-22 NOTE — PLAN OF CARE
Arrived from PACU at 1340. A&O x4. VSS on room air. Tele SD. CMS intact. Lungs clear. BS+, BM-, flatus-. Incision on left neck covered, dressing with scant marked drainage. Tolerating mod CHO diet. Denies N/V. . Voiding adequately. Toradol and Tylenol for pain. Bedrest w/commode.     Neurological change noted with last ordered frequent neuro check. Writer entered room around 1830 and noted patient could not open right eye, tongue deviation was slightly more to the left than initially noted, which was already slightly deviated to the left, new left sided facial droop, hand  on right was weaker, and finger to nose was sluggish and patient was missing his nose and RNs finger. His dorsiflexion and plantarflexion of the right foot was also notably weaker than previous assessment. Heel to shin also more sluggish. BP noted to be higher. RRT called. Please refer to NPs RRT note.

## 2020-02-22 NOTE — PROVIDER NOTIFICATION
Dr. Thurston paged: Speech therapy would like to schedule a swallow study for tomorrow after assessing patient. Per ST recommendation, she would make patient NPO with small sips for patients comfort. No meds.     1430: Dr. Thurston gave okay for swallow study tomorrow and for NPO orders, also would like 1/2 NS @75 for IV fluids. Writer will place orders.

## 2020-02-22 NOTE — CODE/RAPID RESPONSE
[facility]    RRT Note  2/21/2020   Time Called: 0637    RRT called for: Neuro changes    Assessment & Plan     Acute Neurological Event   -Right sided weakness, decreased sensation right sided, right facial droop, and Left tongue deviation  -Concern for possible acute stroke  --s/p left carotid endarterectomy 2/21/2020  I was called to evaluate the patient for new neurologic deficits noted by RN on his 1830 examination.  Nursing staff report the patient was neurologically intact at 1700.  At 1830 he was noted to have right-sided weakness activating an RRT. Initial vital signs 164/94, HR 80's sinus rhythm, RR 16, O2 saturations >95% on room air, blood glucose 208.  Upon my arrival, the patient has profound right-sided weakness, right lower extremity pronator drift, decreased sensation to right side, right-sided facial droop, left tongue deviation.  The patient speech is clear and fluent at time of initial assessment, vision is intact, mild photophobia, right eye difficult to open (the family reports this has been an issue baseline whenever the patient is tired), pupils equal reactive. NIHSS 5. Of note, the patient does have a frozen right shoulder this is right upper extremity strength has been weaker and he is unable to lift his right arm baseline.  The patient is status post left carotid endarterectomy postop day 0 thus he is not a candidate for TPA however would be appropriate for mechanical thrombectomy if needed thus code stroke activated. Patient did received labetalol 10mg IV x1 for /99.   Spoke with stroke neurology, Dr. Gonzalez, regarding imaging, deescalate code stroke. Plan to follow up with MRI brain with and without contrast. Stroke neurology is okay with continuation of ASA 81mg.   Dr. Stubbs updated by RN regarding neuro changes.    INTERVENTIONS:  -Blood glucose STAT  -Code stroke activation  -Labetalol 10mg IV now   -MRI brain w and w/o contrast   -Neuro stroke  consult  -Discontinue D5 1/2NS with 20Kcl    At the end of the RRT hemodynamically stable and will remain on station 33, plan for MRI.    Discussed with and defer further cares to Dr. Pamela Gonzalez, flying squad RN, and bedside RN.    Interval History     Vikash Burgos is a 60 year old male who was admitted on 2/21/2020 for left carotid endarterectomy.    Medical history significant for:   Past Medical History:   Diagnosis Date     Basal cell carcinoma      Carotid stenosis, left      Coronary artery disease     4 vessel bypass November 2010; LIMA ->LAD, SVG-> OM3, SVG -> D2, SVG -> PDA     Diabetes mellitus (H) 2005    neuropathy     Generalized anxiety disorder 5/2/2014     Hepatitis C, chronic (H) 2005     Hyperlipidemia LDL goal < 70      Hypertension      Liver diseases     9/15 Liver is 10 cm in span without left lobe enlargement     Persistent microalbuminuria associated with type 2 diabetes mellitus (H) 5/6/2015     Past Surgical History:   Procedure Laterality Date     ARTHROSCOPY KNEE RT/LT      left     COLONOSCOPY       CORONARY ARTERY BYPASS  11/18/201    Coronary artery bypass grafting x 4 with placement of the left internal mammary artery to the distal midportion of the left anterior descending artery, saphenous vein graft from aorta to the third obtuse marginal branch of circumflex coronary artery, saphenous vein graft from aorta to the second diagonal branch, saphenous vein graft from aorta to the posterior descending artery.     ESOPHAGOSCOPY, GASTROSCOPY, DUODENOSCOPY (EGD), COMBINED  10/31/2011    Procedure:COMBINED ESOPHAGOSCOPY, GASTROSCOPY, DUODENOSCOPY (EGD); Surgeon:ALONSO ALDANA; Location: GI     ESOPHAGOSCOPY, GASTROSCOPY, DUODENOSCOPY (EGD), COMBINED N/A 3/8/2018    Procedure: COMBINED ESOPHAGOSCOPY, GASTROSCOPY, DUODENOSCOPY (EGD);  EGD;  Surgeon: Angel Luis Justice MD;  Location:  GI     HEART CATH CORONARY ANGIOGRAM W/LV GRAM  9-11-10    CV Surgery recommended      HEART CATH CORONARY ANGIOGRAM W/LV GRAM  2-28-14    Medical management     HERNIA REPAIR, INGUINAL RT/LT      left       Code Status: Full Code    Allergies   Allergies   Allergen Reactions     Chlorthalidone Nausea and Vomiting     dizziness     Penicillins Rash     Rash with PCN only. Patient has taken amoxicillin with no rash.       Physical Exam   Vital Signs with Ranges:  Temp:  [97.5  F (36.4  C)-99.1  F (37.3  C)] 98.6  F (37  C)  Pulse:  [51-84] 84  Heart Rate:  [51-79] 79  Resp:  [11-21] 21  BP: (126-217)/(62-99) 217/99  MAP:  [73 mmHg-85 mmHg] 73 mmHg  Arterial Line BP: (120-134)/(48-61) 122/50  SpO2:  [94 %-100 %] 97 %  I/O last 3 completed shifts:  In: 1700 [I.V.:1700]  Out: 400 [Urine:350; Blood:50]    Constitutional: alert, cooperative, no acute distress  ENT: mucus membranes moist   Neck: Left carotid drsg, mild incisional edema  Pulmonary: clear to ausculation bilaterally, no increased work of breathing  Cardiovascular: regular rate and rhythm, S1, S2, no murmur, rub, or gallop  GI: soft, nontender  Skin/Integumen: left carotid drsg, no open areas or lesions  Neuro: right-sided weakness, right lower extremity pronator drift, decreased sensation to right side, right-sided facial droop, left tongue deviation, speech is clear and fluent, vision is intact, mild photophobia, right eye difficult to open   Psych:  calm  Extremities: right frozen shoulder    Data   Telemetry:  SR 80's    ABG:  -No lab results found in last 7 days.    Troponin:    No lab results found.    IMAGING: (X-ray/CT/MRI)   Recent Results (from the past 24 hour(s))   CT Head w/o Contrast    Narrative    CT SCAN OF THE HEAD WITHOUT CONTRAST   2/21/2020 7:07 PM     HISTORY: code stroke, left carotid surgery, right-sided weakness.    TECHNIQUE:  Axial images of the head and coronal reformations without  IV contrast material. Radiation dose for this scan was reduced using  automated exposure control, adjustment of the mA and/or kV  according  to patient size, or iterative reconstruction technique.    COMPARISON: None.    FINDINGS:     Intracranial contents: The ventricles are normal in size, shape and  configuration.  The brain parenchyma and subarachnoid spaces are  normal. There is no evidence of intracranial hemorrhage, mass, acute  infarct or anomaly.    Visualized orbits/sinuses/mastoids:  The visualized portions of the  sinuses and mastoids appear normal.    Osseous structures/soft tissues:  There is no evidence of trauma.      Impression    IMPRESSION: No acute pathology. No bleed, mass, or areas of acute  infarction identified.     CTA Head Neck with Contrast    Narrative    CTA  HEAD NECK WITH CONTRAST  2/21/2020 7:13 PM     HISTORY: code stroke, left carotid surgery, right-sided weakness area    TECHNIQUE:  Precontrast localizing scans were followed by CT  angiography with an injection of 70 mL Isovue-370     IV contrast with  scans through the head and neck.  Images were transferred to a  separate 3-D workstation where multiplanar reformations and 3-D images  were created.  Estimates of carotid stenoses are made relative to the  distal internal carotid artery diameters except as noted. Radiation  dose for this scan was reduced using automated exposure control,  adjustment of the mA and/or kV according to patient size, or iterative  reconstruction technique.    COMPARISON: None.    FINDINGS: Estimates of stenosis at the carotid bifurcations are  relative to the distal internal carotids.    ARCH: Normal anatomy    NECK CTA:  Right Carotid:  The right common carotid artery is normal.  Calcified  atherosclerotic plaque is seen at the right carotid bifurcation. No  stenosis..  The right internal carotid artery is negative.     Left Carotid:  The left common carotid artery is unremarkable.   Postoperative changes are seen at the left carotid bifurcation. The  weblike stenosis seen on the previous scan is no longer identified.  No  significant stenosis is seen on the current study.. Internal carotid  artery is tortuous..      Vertebrals:  The vertebral arteries are normal.    HEAD CTA:  Anterior Circulation: No occluded vessels are seen. .    Posterior Circulation:  The basilar artery and its branches appear  normal.     MISC:: None.      Impression    IMPRESSION:   1. Postop change at the left carotid bifurcation. No residual stenosis  is seen at the left carotid bifurcation.  2. No significant stenosis right carotid bifurcation.  3. No occluded intracranial vessels are identified. No high-grade  intracranial vascular stenosis.     CT Head Perfusion w Contrast    Narrative    CT BRAIN PERFUSION 2/21/2020 7:40 PM    HISTORY: code stroke, right-sided weakness, decreased sensation.    TECHNIQUE: Time sequential axial CT images of the head were acquired  during the administration of intravenous contrast 50 mL Isovue-370      . CTA images of the [place] as well as color perfusion maps  of the brain were created from this time sequential axial source data.   Radiation dose for this scan was reduced using automated exposure  control, adjustment of the mA and/or kV according to patient size, or  iterative reconstruction technique.    COMPARISON: None.    FINDINGS: There are no focal or regional perfusion defects in the  brain.      Impression    IMPRESSION: Normal CT perfusion of the brain.       CBC with Diff:  Recent Labs   Lab Test 02/21/20  0640 10/25/19  0648   WBC  --  5.3   HGB 15.5 15.9   MCV  --  90   * 151   INR  --  1.06        Lactic Acid:    No results found for: LACT        Comprehensive Metabolic Panel:  Recent Labs   Lab 02/21/20  0640      POTASSIUM 4.8   CHLORIDE 104   CO2 25   ANIONGAP 4   *   BUN 18   CR 0.83   GFRESTIMATED >90   GFRESTBLACK >90   LIA 9.3       INR:    Recent Labs   Lab Test 10/25/19  0648   INR 1.06         Time Spent on this Encounter   I spent 60 minutes of critical care time on  the unit/floor managing the care of Vikash Burgos. Upon evaluation, this patient had a high probability of imminent or life-threatening deterioration due to stroke-like symptoms, which required my direct attention, intervention, and personal management. 100% of my time was spent at the bedside counseling the patient and/or coordinating care regarding services listed in this note.    STEW Olivia CNP

## 2020-02-22 NOTE — PROGRESS NOTES
House officer, Alicia, notified that pt feels he may not be able to tolerate laying flat for MRI. Toradol given. Pt requested no narcotics. Writer asked about ativan and MD would avoid ativan due to neuro workup. Pt okay to continue with MRI without medication.

## 2020-02-22 NOTE — PROGRESS NOTES
HOSPITALIST CONSULT CHART CHECK:    Hospitalist service was consulted for after hours cross coverage only. We will peripherally follow and chart check.     - For medical concerns during business hours, call the [facility] at [phone] to have the rounding/on call Vascular Medicine (NOT VASCULAR SURGERY) MD paged.    - After business hours, for medical concerns on this patient, please page Hospitalist staff.    - For vascular surgical questions, please page the appropriate surgeon (primary vascular surgeon or on call vascular surgeon) based upon the time of day.     Alix Krishnan [facility]  Hospitalist - [place] [facility]  [phone]     Text Page

## 2020-02-23 ENCOUNTER — APPOINTMENT (OUTPATIENT)
Dept: PHYSICAL THERAPY | Facility: CLINIC | Age: 60
DRG: 039 | End: 2020-02-23
Attending: SURGERY
Payer: COMMERCIAL

## 2020-02-23 ENCOUNTER — APPOINTMENT (OUTPATIENT)
Dept: GENERAL RADIOLOGY | Facility: CLINIC | Age: 60
DRG: 039 | End: 2020-02-23
Attending: STUDENT IN AN ORGANIZED HEALTH CARE EDUCATION/TRAINING PROGRAM
Payer: COMMERCIAL

## 2020-02-23 ENCOUNTER — APPOINTMENT (OUTPATIENT)
Dept: SPEECH THERAPY | Facility: CLINIC | Age: 60
DRG: 039 | End: 2020-02-23
Attending: SURGERY
Payer: COMMERCIAL

## 2020-02-23 LAB
GLUCOSE BLDC GLUCOMTR-MCNC: 106 MG/DL (ref 70–99)
GLUCOSE BLDC GLUCOMTR-MCNC: 196 MG/DL (ref 70–99)

## 2020-02-23 PROCEDURE — 97161 PT EVAL LOW COMPLEX 20 MIN: CPT | Mod: GP | Performed by: PHYSICAL THERAPIST

## 2020-02-23 PROCEDURE — 97116 GAIT TRAINING THERAPY: CPT | Mod: GP | Performed by: PHYSICAL THERAPIST

## 2020-02-23 PROCEDURE — 00000146 ZZHCL STATISTIC GLUCOSE BY METER IP

## 2020-02-23 PROCEDURE — 12000000 ZZH R&B MED SURG/OB

## 2020-02-23 PROCEDURE — 25000131 ZZH RX MED GY IP 250 OP 636 PS 637: Performed by: PHYSICIAN ASSISTANT

## 2020-02-23 PROCEDURE — 92526 ORAL FUNCTION THERAPY: CPT | Mod: GN | Performed by: SPEECH-LANGUAGE PATHOLOGIST

## 2020-02-23 PROCEDURE — 92611 MOTION FLUOROSCOPY/SWALLOW: CPT | Mod: GN | Performed by: SPEECH-LANGUAGE PATHOLOGIST

## 2020-02-23 PROCEDURE — 25800029 ZZH RX IP 258 OP 250: Performed by: STUDENT IN AN ORGANIZED HEALTH CARE EDUCATION/TRAINING PROGRAM

## 2020-02-23 PROCEDURE — 74230 X-RAY XM SWLNG FUNCJ C+: CPT

## 2020-02-23 PROCEDURE — 25000128 H RX IP 250 OP 636: Performed by: SURGERY

## 2020-02-23 PROCEDURE — 99233 SBSQ HOSP IP/OBS HIGH 50: CPT | Performed by: INTERNAL MEDICINE

## 2020-02-23 PROCEDURE — 25000132 ZZH RX MED GY IP 250 OP 250 PS 637: Performed by: STUDENT IN AN ORGANIZED HEALTH CARE EDUCATION/TRAINING PROGRAM

## 2020-02-23 PROCEDURE — 25000128 H RX IP 250 OP 636: Performed by: STUDENT IN AN ORGANIZED HEALTH CARE EDUCATION/TRAINING PROGRAM

## 2020-02-23 RX ORDER — BARIUM SULFATE 400 MG/ML
SUSPENSION ORAL ONCE
Status: DISCONTINUED | OUTPATIENT
Start: 2020-02-23 | End: 2020-02-23 | Stop reason: CLARIF

## 2020-02-23 RX ORDER — OXYCODONE HYDROCHLORIDE 5 MG/1
5 TABLET ORAL EVERY 6 HOURS PRN
Qty: 10 TABLET | Refills: 0 | Status: SHIPPED | OUTPATIENT
Start: 2020-02-23 | End: 2020-05-06

## 2020-02-23 RX ORDER — BARIUM SULFATE 400 MG/ML
SUSPENSION ORAL ONCE
Status: COMPLETED | OUTPATIENT
Start: 2020-02-23 | End: 2020-02-23

## 2020-02-23 RX ADMIN — KETOROLAC TROMETHAMINE 30 MG: 30 INJECTION, SOLUTION INTRAMUSCULAR at 18:57

## 2020-02-23 RX ADMIN — SODIUM CHLORIDE: 4.5 INJECTION, SOLUTION INTRAVENOUS at 18:57

## 2020-02-23 RX ADMIN — LISINOPRIL 10 MG: 10 TABLET ORAL at 20:51

## 2020-02-23 RX ADMIN — SODIUM CHLORIDE: 4.5 INJECTION, SOLUTION INTRAVENOUS at 04:10

## 2020-02-23 RX ADMIN — HEPARIN SODIUM 5000 UNITS: 5000 INJECTION, SOLUTION INTRAVENOUS; SUBCUTANEOUS at 15:28

## 2020-02-23 RX ADMIN — KETOROLAC TROMETHAMINE 30 MG: 30 INJECTION, SOLUTION INTRAMUSCULAR at 04:10

## 2020-02-23 RX ADMIN — ACETAMINOPHEN 975 MG: 325 TABLET, FILM COATED ORAL at 15:28

## 2020-02-23 RX ADMIN — NEBIVOLOL HYDROCHLORIDE 5 MG: 5 TABLET ORAL at 15:27

## 2020-02-23 RX ADMIN — HEPARIN SODIUM 5000 UNITS: 5000 INJECTION, SOLUTION INTRAVENOUS; SUBCUTANEOUS at 06:33

## 2020-02-23 RX ADMIN — ASPIRIN 81 MG: 81 TABLET, DELAYED RELEASE ORAL at 15:27

## 2020-02-23 RX ADMIN — INSULIN DETEMIR 30 UNITS: 100 INJECTION, SOLUTION SUBCUTANEOUS at 09:07

## 2020-02-23 RX ADMIN — MELATONIN 1000 UNITS: at 15:28

## 2020-02-23 RX ADMIN — ROSUVASTATIN CALCIUM 20 MG: 20 TABLET, FILM COATED ORAL at 20:51

## 2020-02-23 RX ADMIN — Medication 1000 MCG: at 15:26

## 2020-02-23 RX ADMIN — INSULIN DETEMIR 30 UNITS: 100 INJECTION, SOLUTION SUBCUTANEOUS at 20:51

## 2020-02-23 RX ADMIN — BARIUM SULFATE 15 ML: 400 SUSPENSION ORAL at 10:21

## 2020-02-23 RX ADMIN — HEPARIN SODIUM 5000 UNITS: 5000 INJECTION, SOLUTION INTRAVENOUS; SUBCUTANEOUS at 22:16

## 2020-02-23 ASSESSMENT — ACTIVITIES OF DAILY LIVING (ADL)
ADLS_ACUITY_SCORE: 14

## 2020-02-23 NOTE — PROVIDER NOTIFICATION
Dr Shaw notified regarding scheduled levemir dose this evening while patient is NPO. Bgm 102. Order given to hold scheduled this evening.

## 2020-02-23 NOTE — PROGRESS NOTES
02/23/20 1148   General Information   Onset Date 02/21/20   Start of Care Date 02/22/20   Referring Physician Dr. Angeles   Patient Profile Review/OT: Additional Occupational Profile Info See Profile for full history and prior level of function   Patient/Family Goals Statement To eat and drink safely ASAP   Swallowing Evaluation Videofluoroscopic evaluation   Behaviorial Observations Alert   Mode of current nutrition NPO   Respiratory Status Room air   Comments Per MD note: Asymptomatic high grade left carotid artery stenosis s/p left carotid endarterectomy 2/21/10   Per Neuro: tiffany Burgos is a 60 year old male with past medical history significant for DM2, HLD, CAD s/p CABG who underwent planned left carotid endaretectomy yesterday. He was reportedly at baseline at 1700 yesterday evening, stroke code was activated at approximately 1850 for new onset right facial droop, right side weakness, and decreased sensation to the right side. CTH was negative for acute pathology. CTA head/neck and CTP also unremarkable. Follow-up MRI brain negative for stroke.     VFSS Eval: Radiology   Radiologist Dr. Carlos   Views Taken left lateral   Physical Location of Procedure [facility]   VFSS Eval: Thin Liquid Texture Trial   Mode of Presentation, Thin Liquid spoon;cup   Order of Presentation 4, 5, 6, 7   Preparatory Phase Poor bolus control   Oral Phase, Thin Liquid Premature pharyngeal entry   Pharyngeal Phase, Thin Liquid Delayed swallow reflex   Rosenbek's Penetration Aspiration Scale: Thin Liquid Trial Results 1 - no aspiration, contrast does not enter airway   Diagnostic Statement Mild weak base of tongue function, delay at times, decreased epiglottic closure, prominent UES with repeated reflux of barium to the pyriform sinuses, min residue in valleculae, mild to mild-mod residue in the pyriform sinuses, multiple swallows did not completely clear residue, pt produced slight coughing to residue in pyriforms/penetration and used  cued coughs to protect airway   VFSS Eval: Nectar Thick Liquid Texture Trial   Mode of Presentation, [place] spoon   Order of Presentation 1, 2, 3   Preparatory Phase Poor bolus control   Oral Phase, Nectar Premature pharyngeal entry;Residue in oral cavity   Pharyngeal Phase, Nectar Delayed swallow reflex   Rosenbek's Penetration Aspiration Scale: Nectar-Thick Liquid Trial Results 2 - contrast enters airway, remains above the vocal cords, no residue remains (penetration)   Diagnostic Statement Mild weak base of tongue function, delay at times, decreased epiglottic closure, prominent UES with repeated reflux of barium to the pyriform sinuses, min residue in valleculae, mild to mild-mod residue in the pyriform sinuses, flash penetration with nectar, multiple swallows did not completely clear residue, pt produced slight coughing to residue in pyriforms/penetration.     VFSS Eval: Puree Solid Texture Trial   Mode of Presentation, Puree spoon   Order of Presentation 8, 9   Preparatory Phase WFL   Oral Phase, Puree Residue in oral cavity   Pharyngeal Phase, Puree   (no delay)   Rosenbek's Penetration Aspiration Scale: Puree Food Trial Results 2 - contrast enters airway, remains above the vocal cords, no residue remains (penetration)   Diagnostic Statement Mild weak base of tongue function, decreased epiglottic closure, prominent UES with repeated reflux of barium to the pyriform sinuses, min residue in valleculae, mild to mild-mod residue in the pyriform sinuses, flash penetration on multiple swallows, multiple swallows did not completely clear residue, pt produced slight coughing to residue in pyriforms/penetration and coughed with cues to help protect airway   VFSS Eval: Semisolid Texture Trial   Order of Presentation Did not test due to residue in the pyriform sinsuses with puree/pudding   VFSS Eval: Solid Food Texture Trial   Order of Presentation Did not test due to residue in the pyriform sinsuses with puree/pudding    Swallow Compensations   Swallow Compensations Pacing;Reduce amounts;Multiple swallow;Supraglottic swallow;Effortful swallow   Esophageal Phase of Swallow   Patient reports or presents with symptoms of esophageal dysphagia No   Esophageal comments No hx; UES difficulty noted on today's study   Swallow Eval: Clinical Impressions   Skilled Criteria for Therapy Intervention Skilled criteria met.  Treatment indicated.   Functional Assessment Scale (FAS) 3   Dysphagia Outcome Severity Scale (ARGENIS) Level 3 - ARGENIS   Treatment Diagnosis moderate oral-pharyngeal dysphagia   Diet texture recommendations Dysphagia diet level 1;Thin liquids   Recommended Feeding/Eating Techniques   (see below)   Therapy Frequency 5x/week   Predicted Duration of Therapy Intervention (days/wks) 1 week   Anticipated Discharge Disposition home w/ outpatient services   Risks and Benefits of Treatment have been explained. Yes   Patient, family and/or staff in agreement with Plan of Care Yes   Clinical Impression Comments A video swallow study was completed this am.  Pt presents with moderate oral-pharyngeal dysphagia per study results.  Findings include oral residue, delayed swallows at times, mild weak base of tongue function, decreased epiglottic closure, and prominent UES with repeated reflux of barium to the pyriform sinuses.  Deficits resulted in min residue in valleculae, mild-mod to mod residue in the pyriform sinuses, and flash penetration with nectar and pudding.  No confirmed penetration with thin liquids with multiple swallow/coughing strategy.  Pt does cough to sensation of residue in the pyriforms and to penetration.  Pt is at risk for aspiration if safe swallow strategies are not used carefully.  Recommend a dysphagia diet level 1 and thin liquids with the following safe swallow strategies:   Sit at 90 degrees, remain upright for 30-60 minutes after eating, small sips, no straw, multiple hard swallows per bite/sip, cough between  swallows as needed, alternate textures, small snacks at a sitting, monitor lung status carefully.  Recommend OP clinical SLP swallow Tx follow up to assess diet tolerance, train strategies, complete exercises, and assess safety for upgrades.  RN to provide dysphagia diet level 1 and 2 handouts for home for slow upgrade if significant improvement noted at home.  If deficits do not resolve, consider GI consult for dilation of UES.   Total Evaluation Time   Total Evaluation Time (Minutes) 15

## 2020-02-23 NOTE — PROGRESS NOTES
[facility]  Vascular Medicine Progress Note          Assessment and Plan:       1. Asymptomatic high grade left carotid artery stenosis s/p left carotid endarterectomy 2/21/10       POD2, had difficulty raising right leg night of POD 0 but this is now improving. Negative stroke on MR.    Left tongue deviation.    DDL1 recommended by SLP. Outpt speech therapy recommended.     Cognitive skills not quite back to baseline. Keep in house unitl tomorrow to monitor neuro status given events of last 36 hours.     2. Type 2 Diabetes Mellitus     His A1C this admission is noted to be 7.4% while on Levemir 50 units at bedtime.     Levemir increased to 30 BID.    ACs slightly elevated, monitor on high insulin resistance sliding scale insulin as needed.      3. Hyperlipidemia     His lipid panel this admission indicates that his lipids are well controlled (LDL 47) while on Crestor 20 mg daily. He should continue the same.      4. CAD s/p CABG in 2010     He is presently asymptomatic. Continue current medical management.                 Interval History:   doing well; no cp, sob, n/v/d, or abd pain. and RLE weakness now resolved              Review of Systems:   The 10 point Review of Systems is negative other than noted in the HPI             Medications:       acetaminophen  975 mg Oral Q8H     aspirin  81 mg Oral Daily     cyanocobalamin  1,000 mcg Sublingual Daily     heparin ANTICOAGULANT  5,000 Units Subcutaneous Q8H     insulin aspart  1-10 Units Subcutaneous TID AC     insulin aspart  1-7 Units Subcutaneous At Bedtime     insulin detemir  30 Units Subcutaneous BID     lidocaine  1 patch Transdermal Q24h    And     lidocaine   Transdermal Q8H     lisinopril  10 mg Oral QPM     nebivolol  5 mg Oral Daily     rosuvastatin  20 mg Oral QPM     senna-docusate  1 tablet Oral BID    Or     senna-docusate  2 tablet Oral BID     sodium chloride (PF)  3 mL Intracatheter Q8H     Vitamin D3  1,000 Units Oral Daily                   Physical Exam:     Patient Vitals for the past 24 hrs:   BP Temp Temp src Pulse Heart Rate Resp SpO2   02/23/20 0814 (!) 143/75 98  F (36.7  C) Oral 63 59 16 97 %   02/23/20 0440 -- -- -- -- -- 16 --   02/23/20 0410 (!) 151/78 98.2  F (36.8  C) Oral -- 62 16 97 %   02/23/20 0045 (!) 162/76 98.3  F (36.8  C) Oral -- 64 16 95 %   02/22/20 2200 -- -- -- -- -- 16 --   02/22/20 2139 -- -- -- -- -- 16 --   02/22/20 1950 (!) 150/72 98.1  F (36.7  C) Oral -- 65 16 98 %   02/22/20 1556 (!) 149/81 98.2  F (36.8  C) Oral 70 66 16 96 %   02/22/20 1151 (!) 157/76 97.5  F (36.4  C) Oral 65 63 16 94 %     Wt Readings from Last 4 Encounters:   02/22/20 90.2 kg (198 lb 13.7 oz)   02/04/20 89.6 kg (197 lb 9.6 oz)   01/31/20 88.5 kg (195 lb)   01/23/20 88.7 kg (195 lb 9.6 oz)       Intake/Output Summary (Last 24 hours) at 2/22/2020 1113  Last data filed at 2/22/2020 0800  Gross per 24 hour   Intake 2433.67 ml   Output 1925 ml   Net 508.67 ml     Constitutional: normal  Eyes: normal  ENT: normal  Neck: Supple, symmetrical, trachea midline, no adenopathy, thyroid symmetric, not enlarged and no tenderness, skin normal.  Hematologic / Lymphatic: normal  Back: normal  Lungs: No increased work of breathing, good air exchange, clear to auscultation bilaterally, no crackles or wheezing.  Cardiovascular: Regular rate and rhythm, normal S1 and S2, no S3 or S4, and no murmur noted.  Chest / Breast: normal  Abdomen: normal  Genitourinary: normal  Musculoskeletal: No redness, warmth, or swelling of the joints.  Full range of motion noted.  Motor strength is 5 out of 5 all extremities bilaterally.  Tone is normal.  Neurologic: left tongue deviation, other wise nonfocal  Neuropsychiatric: normal  Skin: normal           Data:     Results for orders placed or performed during the hospital encounter of 02/21/20 (from the past 24 hour(s))   Glucose by meter   Result Value Ref Range    Glucose 109 (H) 70 - 99 mg/dL   Glucose by meter    Result Value Ref Range    Glucose 122 (H) 70 - 99 mg/dL   Glucose by meter   Result Value Ref Range    Glucose 102 (H) 70 - 99 mg/dL   Glucose by meter   Result Value Ref Range    Glucose 106 (H) 70 - 99 mg/dL   Glucose by meter   Result Value Ref Range    Glucose 196 (H) 70 - 99 mg/dL   XR Video Swallow with SLP or OT    Narrative    VIDEO SWALLOWING EVALUATION   2/23/2020 10:34 AM     HISTORY: Dysphagia.    COMPARISON: None.    FLUOROSCOPY TIME: 2.8 minutes.  SPOT IMAGES OR CINE RUNS: 9    FINDINGS:    Thin: Pooling in the piriform sinuses and, to a lesser extent in the  vallecula. Pooling in the proximal esophagus which is slightly dilated  identified. No definite obstructing mass or [place]ker diverticulum. The  dilated portion of the proximal esophagus may be acting as a reservoir  with subsequent cephalad reflux of the liquid in a retrograde fashion  cephalad-resultant intermittent episodes of flash penetration. There  appears to be some dysmotility of the upper esophagus as well. This  study was also correlated with prior axial data images from CTA of the  neck from February 21, 2020. No evidence for proximal esophageal mass  or diverticulum on that study. No definite aspiration. Cough elicited.    Nectar: Pooling in the piriform sinuses and proximal esophagus with  retrograde reflux and intermittent penetration. No definite  aspiration. Cough elicited.    Honey: Not administered.    Pudding: Pooling in the proximal esophagus with some retrograde reflux  and flash penetration. No definite aspiration. Cough elicited.    Semisolid: Not administered.    Solid: Not administered.    This study only includes the cervical esophagus.    JACQUI THAO MD

## 2020-02-23 NOTE — PLAN OF CARE
VSS. Tele SR with 1st degree AVB. A&O x4. Incision LISA. Toradol given for pain. Neuros unchanged. RLE strength improving. Right eye ptosis. Right cheek numbness. Left tongue deviation. NPO per speech eval. Plan for video swallow study today. Stood at bedside and took a few side steps. Assist of 1, gait belt and walker. Voiding per urinal. Flatus, no BM yet.

## 2020-02-23 NOTE — PLAN OF CARE
A&O x4. VSS ex HTN on room air. Tele SD w/1st degree AV block. CMS w/BLE numbness, tingling in left, and decreased, but improving, sensation on the right. Neuros w/slight slurring, right sided facial droop and cheek numbness, tongue deviation to the left, slow finger to nose on right, pronator drift, can't fully do heel to shin, and weak dorsiflexion and plantarflexion. Left side fully intact. Lungs diminished. BS+, BM-, flatus-. Left incision LISA w/steri-strips. NPO w/small sips of clears or nectar thick per ST, swallow study tomorrow. Denies N/V. No insulin needed today. Voiding adequately. Toradol for pain. Up w/1.

## 2020-02-23 NOTE — PROGRESS NOTES
02/23/20 1138   Quick Adds   Type of Visit Initial PT Evaluation   Living Environment   Lives With child(dejon), adult;spouse   Living Arrangements   ([facility])   Home Accessibility stairs to enter home;stairs within home   Number of Stairs, Main Entrance 2   Stair Railings, Main Entrance none   Number of Stairs, Within Home, Primary   (15 to basement but he doesn't go down there much. )   Transportation Anticipated family or friend will provide   Living Environment Comment Patient is activie and independent. He is retired.    Self-Care   Usual Activity Tolerance excellent   Regular Exercise Yes   Activity/Exercise Type biking;running/jogging   Exercise Amount/Frequency daily   Activity/Exercise/Self-Care Comment Bikes in summer and treadmill in winter.    Functional Level Prior   Ambulation 0-->independent   Transferring 0-->independent   Toileting 0-->independent   Bathing 0-->independent   Communication 0-->understands/communicates without difficulty   Swallowing 0-->swallows foods/liquids without difficulty   Cognition 0 - no cognition issues reported   Fall history within last six months no   Which of the above functional risks had a recent onset or change? none   General Information   Onset of Illness/Injury or Date of Surgery - Date 02/21/20   Referring Physician Jose Angeles   Patient/Family Goals Statement To go home today.    Pertinent History of Current Problem (include personal factors and/or comorbidities that impact the POC)  Asymptomatic high grade left carotid artery stenosis s/p left carotid endarterectomy 2/21/10. Patient had Right UE and LE weakness and facial droop night of surgery and code stroke called. MRI negative for stroke. Right UE and LE strength improving.    Precautions/Limitations fall precautions   General Observations Wife and daughter present and very supportive.    Cognitive Status Examination   Orientation orientation to person, place and time   Level of Consciousness alert    Follows Commands and Answers Questions 100% of the time   Personal Safety and Judgment intact   Cognitive Comment No specific test done but able to recall PLOF and follow all commands without delay.    Pain Assessment   Patient Currently in Pain No   Integumentary/Edema   Integumentary/Edema Comments Surgical left side of neck.    Posture    Posture Forward head position;Protracted shoulders;Kyphosis   Range of Motion (ROM)   ROM Comment Right shoulder limited by previously diagnosed frozen shoulder. Able to actively flex right shoulder to at least 100 degrees.    Strength   Strength Comments Strength Left UE and LE 5/5, left PF/DF 5/5(slow to move and has to concentrate more with this foot), quads 4/5 but shaky, right hip flex 3+/5. Bilateral  equal, right bicep 3+/5(shaky), right shoulder flex 3-/5 (limited by decreased ROM as well).    Bed Mobility   Bed Mobility Comments Supervision only for sup to sit.    Transfer Skills   Transfer Comments Sit to stand with CGA. No LOB. Stand to sit with supervison only in standard chair.    Gait   Gait Comments Gait without AD in room 10 feet without AD with close CGA.    Balance   Balance Comments Rhomberg without assist and able to hold for at least 10 seconds.    Coordination   Coordination Comments Right LE slow to respond but accurate with foot placement.    General Therapy Interventions   Planned Therapy Interventions gait training;strengthening;transfer training;other (see comments)   Intervention Comments Stairs   Clinical Impression   Criteria for Skilled Therapeutic Intervention yes, treatment indicated   PT Diagnosis Impaired functional independence   Influenced by the following impairments Right UE and LE weakness, delayed response with active movement with right LE.    Functional limitations due to impairments Needs assist for amb and stairs.    Clinical Presentation Stable/Uncomplicated   Clinical Decision Making (Complexity) Low complexity   Therapy  "Frequency Daily   Predicted Duration of Therapy Intervention (days/wks) 2 days   Anticipated Discharge Disposition Home  (May need OPPT. Will continue to assess. )   Risk & Benefits of therapy have been explained Yes   Patient, Family & other staff in agreement with plan of care Yes   [facility] TM \"6 Clicks\"   2016, Trustees of [facility], under license to [employer].  All rights reserved.   6 Clicks Short Forms Basic Mobility Inpatient Short Form   [facility]  \"6 Clicks\" V.2 Basic Mobility Inpatient Short Form   1. Turning from your back to your side while in a flat bed without using bedrails? 4 - None   2. Moving from lying on your back to sitting on the side of a flat bed without using bedrails? 4 - None   3. Moving to and from a bed to a chair (including a wheelchair)? 3 - A Little   4. Standing up from a chair using your arms (e.g., wheelchair, or bedside chair)? 3 - A Little   5. To walk in hospital room? 3 - A Little   6. Climbing 3-5 steps with a railing? 3 - A Little   Basic Mobility Raw Score (Score out of 24.Lower scores equate to lower levels of function) 20   Total Evaluation Time   Total Evaluation Time (Minutes) 16     "

## 2020-02-23 NOTE — PROGRESS NOTES
HOSPITALIST CHART CHECK:    Hospitalist service was consulted for after hours cross coverage only. We will peripherally follow and chart check.     - For medical concerns during business hours, call the [facility] at [phone] to have the rounding/on call Vascular Medicine (NOT VASCULAR SURGERY) MD paged.    - After business hours, for medical concerns on this patient, please page Hospitalist staff.    - For vascular surgical questions, please page the appropriate surgeon (primary vascular surgeon or on call vascular surgeon) based upon the time of day.     [facility]  Hospitalist - [place]AG[place]  [phone]     Text Page

## 2020-02-23 NOTE — PROGRESS NOTES
Vascular Surgery Progress Note:  POD#2   Left CEA    S: Feels much better today.  Much stronger though his right leg is still weak.      Very talkative.  Wife spent the night with him and is here presently.    O:   Vitals:  BP  Min: 144/70  Max: 162/76  Temp  Av  F (36.7  C)  Min: 97.5  F (36.4  C)  Max: 98.3  F (36.8  C)  Pulse  Av.7  Min: 62  Max: 70  I/O last 3 completed shifts:  In: 840 [P.O.:240; I.V.:600]  Out: 300 [Urine:300]    Physical Exam: Alert and appropriate.  Much more conversant today.                            Left neck incision=A    no swelling or ecchymosis                            Continued weak left tongue due to traction on CN XII                             Right eye ptosis has resolved.                             Easily lifts up right arm above shoulders even with shoulder                                                      problem                              Right thigh muscles are still somewhat weak.                                  However, 5/5 dorsi and plantar flexion    Assessment/Plan: Clinically better.  Still somewhat difficult to explain all of his postoperative symptoms with no obvious evidence of CVI.  This is again been discussed with patient and his wife.  Does have weakness of his tongue from traction on the nerve that will gradually resolve.  Is scheduled for an oral swallowing test this morning.  I discussed that this may take weeks before this resolves and caution with eating due to the weakness of the tongue.                         Still with some weakness.  Saw physical therapy and has a walker in his room.  May need outpatient physical therapy and this will be determined.  He does have his mother's walker at home that he could use as his symptoms improve and this is discussed.                             From vascular surgical standpoint patient may be discharged to home after being evaluated again by physical therapy and completing a swallow  study.      Wm. Bridgette MD

## 2020-02-23 NOTE — PROVIDER NOTIFICATION
Dr. Zepeda paged: Patients daughterReva is in the room, her number is [phone] for a call when you are available.

## 2020-02-23 NOTE — PLAN OF CARE
OT: orders received and chart reviewed. Attempted OT eval, however pt declined 2' fatigue, stating he had just woken up and isn't feeling well and needs to return to sleep as this was the first time he has slept in 3 days. Pt agreeable to OT evaluation tomorrow for cognitive eval (PT stated MD/pt's daughter have possible concerns w/ pt's cognition)

## 2020-02-23 NOTE — PROGRESS NOTES
Called by nursing concerned regarding patient who is now NPO and due to receive lantus, order submitted to hold lantus this evening.

## 2020-02-24 ENCOUNTER — TELEPHONE (OUTPATIENT)
Dept: OTHER | Facility: CLINIC | Age: 60
End: 2020-02-24

## 2020-02-24 VITALS
BODY MASS INDEX: 27.84 KG/M2 | SYSTOLIC BLOOD PRESSURE: 150 MMHG | OXYGEN SATURATION: 97 % | WEIGHT: 198.85 LBS | TEMPERATURE: 97.9 F | RESPIRATION RATE: 16 BRPM | HEIGHT: 71 IN | HEART RATE: 63 BPM | DIASTOLIC BLOOD PRESSURE: 82 MMHG

## 2020-02-24 LAB
GLUCOSE BLDC GLUCOMTR-MCNC: 105 MG/DL (ref 70–99)
GLUCOSE BLDC GLUCOMTR-MCNC: 119 MG/DL (ref 70–99)
GLUCOSE BLDC GLUCOMTR-MCNC: 156 MG/DL (ref 70–99)
PLATELET # BLD AUTO: 133 10E9/L (ref 150–450)

## 2020-02-24 PROCEDURE — 85049 AUTOMATED PLATELET COUNT: CPT | Performed by: STUDENT IN AN ORGANIZED HEALTH CARE EDUCATION/TRAINING PROGRAM

## 2020-02-24 PROCEDURE — 25000131 ZZH RX MED GY IP 250 OP 636 PS 637: Performed by: PHYSICIAN ASSISTANT

## 2020-02-24 PROCEDURE — 25000128 H RX IP 250 OP 636: Performed by: STUDENT IN AN ORGANIZED HEALTH CARE EDUCATION/TRAINING PROGRAM

## 2020-02-24 PROCEDURE — 36415 COLL VENOUS BLD VENIPUNCTURE: CPT | Performed by: STUDENT IN AN ORGANIZED HEALTH CARE EDUCATION/TRAINING PROGRAM

## 2020-02-24 PROCEDURE — 00000146 ZZHCL STATISTIC GLUCOSE BY METER IP

## 2020-02-24 PROCEDURE — 25000132 ZZH RX MED GY IP 250 OP 250 PS 637: Performed by: STUDENT IN AN ORGANIZED HEALTH CARE EDUCATION/TRAINING PROGRAM

## 2020-02-24 RX ADMIN — INSULIN DETEMIR 30 UNITS: 100 INJECTION, SOLUTION SUBCUTANEOUS at 08:56

## 2020-02-24 RX ADMIN — ACETAMINOPHEN 975 MG: 325 TABLET, FILM COATED ORAL at 08:26

## 2020-02-24 RX ADMIN — ASPIRIN 81 MG: 81 TABLET, DELAYED RELEASE ORAL at 08:26

## 2020-02-24 RX ADMIN — Medication 1000 MCG: at 08:26

## 2020-02-24 RX ADMIN — HEPARIN SODIUM 5000 UNITS: 5000 INJECTION, SOLUTION INTRAVENOUS; SUBCUTANEOUS at 05:51

## 2020-02-24 RX ADMIN — MELATONIN 1000 UNITS: at 08:26

## 2020-02-24 RX ADMIN — NEBIVOLOL HYDROCHLORIDE 5 MG: 5 TABLET ORAL at 08:26

## 2020-02-24 ASSESSMENT — ACTIVITIES OF DAILY LIVING (ADL)
ADLS_ACUITY_SCORE: 14

## 2020-02-24 NOTE — DISCHARGE INSTRUCTIONS
[facility]  Discharge Instructions    ACTIVITY    Take frequent, short walks and increase your activity gradually.      Avoid strenuous physical activity or heavy lifting greater than 15lbs. for 3-4 weeks.  You may climb stairs.    You may return to work/school when you are comfortable without any prescription pain medication and have been cleared by physical therapy.    WOUND CARE    You may shower.  Pat your incision dry and leave it open to air.  Re-apply dressing (Band-Aids or gauze/tape) as needed for comfort or drainage.    You may have steri-strips (looks like white tape) on your incision.  You may peel off the steri-strips 2 weeks after your surgery if they have not peeled off on their own.     Do not soak your incision in a tub or pool for 2 weeks.     Do not apply any lotions, creams, or ointments to your incision.    A ridge under your incision is normal and will gradually resolve.    DIET  Speech language pathology has recommended a dysphagia diet level 1 and thin liquids with the following safe swallow strategies:      Sit at 90 degrees, remain upright for 30-60 minutes after eating, small sips, no straw, multiple hard swallows per bite/sip, cough between swallows as needed, alternate textures, small snacks at a sitting, monitor lung status carefully.    PAIN    Expect some tenderness and discomfort at the incision site(s).  Use the prescribed pain medication at your discretion.  Expect gradual resolution of your pain over several days.    You may take ibuprofen with food (unless you have been told not to) instead of or in addition to your prescribed pain medication.  If you are taking Norco or Percocet, do not take any additional acetaminophen/APAP/Tylenol.      Do not drink alcohol or drive while you are taking pain medications.    You may apply ice to your incisions in 20 minute intervals as needed for the next 48 hours.  After that time, consider switching to heat if you  prefer.    EXPECTATIONS    Pain medications can cause constipation.  Limit use when possible.  Take over the counter stool softener/stimulant, such as Colace or Senna, 1-2 times a day with plenty of water.  You may take a mild over the counter laxative, such as Miralax or a suppository, as needed.      You may discontinue these medications once you are having regular bowel movements and/or are no longer taking your narcotic pain medication.      FOLLOW UP    Please call the [place] [facility] ([facility]) at [phone] to schedule a follow up appointment with Dr. Stubbs for 2 weeks after your surgery. This is also the number you can call for any vascular surgery questions or concerns.    CALL OUR OFFICE -[phone] IF YOU HAVE:     Chills or fever above 101 F.    Increased redness, warmth, or drainage at your incisions.    Significant bleeding.    Pain not relieved by your pain medication or rest.    Increasing pain after the first 48 hours.    Any other concerns or questions.

## 2020-02-24 NOTE — PLAN OF CARE
A&O x4. VSS on RA. R sided weakness. Lungs diminished. BS+, BM-, flatus+. Incisions CDI, closed w/ steri strips. Tolerating DD1 diet. Denies N/V. Voiding adequately. Scheduled tylenol for pain. Up SBA. Discharged instructions reviewed. Pt discharge home w/ PT and speech therapy. Pt and spouse verbalized understanding. PT refused oxycodone prescription

## 2020-02-24 NOTE — PLAN OF CARE
PT: Attempted PT session, pt going over paper for discharge. Reviewed PT disch recommendation.    Physical Therapy Discharge Summary    Reason for therapy discharge:    Discharged to home with outpatient therapy.    Progress towards therapy goal(s). See goals on Care Plan in [facility] electronic health record for goal details.  Goals partially met.  Barriers to achieving goals:   discharge from facility.    Therapy recommendation(s):    Continued therapy is recommended.  Rationale/Recommendations:   Patient currently needs min HHA on stairs and wife able to assist. Currently would benefit from OPPT to progress strength and speed of movement right LE but if improves as quickly from today to tomorrow as he did from yesterday to today may be served just as well with ex for home..

## 2020-02-24 NOTE — PROGRESS NOTES
HOSPITALIST CONSULT CHART CHECK:      Hospitalist service was consulted for cross coverage only. We will peripherally follow and chart check throughout the week.        - For vascular medical concerns during business hours M-F, call the [facility] at [phone] to have the rounding/on call Vascular Medicine (NOT VASCULAR SURGERY) MD paged.      - After business hours M-F, for medical concerns on this patient, please page hospitalist staff.      - For vascular surgical questions, please page the appropriate surgeon (primary vascular surgeon or on call vascular surgeon) based upon the time of day.     Lucia Avery), PAKotaC  Hospitalist Physician Assistant  2/24/2020 7:15 AM

## 2020-02-24 NOTE — PROGRESS NOTES
Speech Language Therapy Discharge Summary    Reason for therapy discharge:    Discharged to home with outpatient therapy.    Progress towards therapy goal(s). See goals on Care Plan in [facility] electronic health record for goal details.  Goals partially met.  Barriers to achieving goals:   discharge from facility.    Therapy recommendation(s):    Continued therapy is recommended.  Rationale/Recommendations:  see note below.    Discharge Planner SLP   Patient plan for discharge: Home  Current status: A video swallow study was completed this am.  Pt presents with moderate oral-pharyngeal dysphagia per study results.  Findings include oral residue, delayed swallows at times, mild weak base of tongue function, decreased epiglottic closure, and prominent UES with repeated reflux of barium to the pyriform sinuses.  Deficits resulted in min residue in valleculae, mild-mod to mod residue in the pyriform sinuses, and flash penetration with nectar and pudding.  No confirmed penetration with thin liquids with multiple swallow/coughing strategy.  Pt does cough to sensation of residue in the pyriforms and to penetration.  Pt is at risk for aspiration if safe swallow strategies are not used carefully.     Recommend a dysphagia diet level 1 and thin liquids with the following safe swallow strategies:      Sit at 90 degrees, remain upright for 30-60 minutes after eating, small sips, no straw, multiple hard swallows per bite/sip, cough between swallows as needed, alternate textures, small snacks at a sitting, monitor lung status carefully.     Recommend OP clinical SLP swallow Tx follow up to assess diet tolerance, train strategies, complete exercises, and assess safety for upgrades.  RN to provide dysphagia diet level 1 and 2 handouts for home for slow upgrade if significant improvement noted at home.     If deficits do not resolve, consider GI consult for dilation of UES.     Barriers to return to prior living situation: No SLP  barriers  Recommendations for discharge: Home with OP SLP swallow Tx  Rationale for recommendations: OP SLP swallow Tx to maximize safety for a regular diet

## 2020-02-24 NOTE — PROGRESS NOTES
Vascular Surgery Progress Note    S: Feeling stronger today.  Up walking with a walker.  Wife is here with him    O:   Vitals:  BP  Min: 142/87  Max: 166/88  Temp  Av.9  F (36.6  C)  Min: 97.8  F (36.6  C)  Max: 98  F (36.7  C)  Pulse  Av.3  Min: 61  Max: 69  I/O last 3 completed shifts:  In: 1200 [I.V.:1200]  Out: -     Physical Exam: Alert and appropriate.                            Left neck wound=A                           Deviation the left tongue persists--may take some time to resolve                            Still weak right leg.                Appreciate PT and OT eval's.  Clear to go home with wife.                       Assessment/Plan: Home today.  Plan outpatient physical therapy.                                 Follow-up with Dr. Stubbs in 2 weeks.      Wm. Bridgette MD

## 2020-02-24 NOTE — PLAN OF CARE
A&O x4. VSS on RA ex HTN. CMS bilateral numbness in LE and decreased sensation on right side. Neuros: slight slurring of speech, right sided facial droop with cheek numbness, tongue deviation to the left, right side pronator drift, slow heel to shin, weak dorsi/plantarflexion. LS dim. BS+, Flat+. Voiding adequately. Incision on left side of neck LISA with steri-strips. DD1 diet. No N/V. Scheduled Tylenol for pain. Up with 1. Tele: NSR with 1st degree AVB. Will continue to monitor.

## 2020-02-24 NOTE — DISCHARGE SUMMARY
Vascular Surgery Service Discharge Summary:     NAME: Vikash Burgos   MRN: [id number]   : 1960   PCP: Jailyn Medel    DATE OF ADMISSION: 2020       Procedure/Surgery Information   Procedure: Procedure(s):  LEFT CAROTID ENDARTERECTOMY WITH EEG   Surgeon(s): Surgeon(s) and Role:     * Semaj Stubbs MD - Primary   Specimens: ID Type Source Tests Collected by Time Destination   1 : CAROTID PLAQUE Other (specify in comments) Artery, Carotid OR DOCUMENTATION ONLY Semaj Stubbs MD 2020  9:48 AM             PRE/POSTOPERATIVE DIAGNOSES:    Asymptomatic 75% left carotid stenosis    PROCEDURES PERFORMED, 2020:   Left carotid endarterectomy with bovine pericardial patch angioplasty.     INTRAOPERATIVE FINDINGS:   There was a fairly focal 75%-80% irregular plaque involving the origin of the left internal carotid and extending for about 1.5 cm above that point.  Distal to the plaque, the internal carotid was somewhat small in size, but soft and disease-free.  At the completion of this procedure, this patient did follow commands with all 4 extremities.     POSTOPERATIVE COMPLICATIONS: None     DATE OF DISCHARGE: 2020    HOSPITAL COURSE: Vikash Burgos is a 60 year old male who was admitted on 2020 and underwent the above-named procedures.  He tolerated the procedure well and postoperatively was transferred to the general post-surgical unit, following commands x 4 at conclusion of operation.  A stroke code was called on POD0 due to left sided facial droop and weakness on R, there was no evidence of stroke on CT or MR. He was seen by neurology, unclear cause of neurologic symptoms and they think his symptoms will resolve with time. Prior to discharge, his pain was controlled well with oral pain medications.  He was able to perform ADLs and ambulate independently without difficulty, cleared for discharge to home by PT with outpatient PT, and had full return of bowel and bladder  "function.  On 2/24/2020, he was discharged to home in stable condition. He will continue a dysphagia 1 diet with specific instructions provided to patient and will have outpatient SLP.       Time Spent on this Encounter   I, Marshal Gutierrez MD, personally saw the patient today and spent less than or equal to 30 minutes discharging this patient.    Vascular Surgery Core Measures:   Continue Aspirin, Crestor    BP (!) 142/87 (BP Location: Left arm)   Pulse 61   Temp 97.9  F (36.6  C) (Oral)   Resp 16   Ht 1.803 m (5' 11\")   Wt 90.2 kg (198 lb 13.7 oz)   SpO2 97%   BMI 27.73 kg/m    Physical Exam:  General: Patient resting comfortably in bed, NAD.  Neuro: Alert and answering questions appropriately. PERRL. Shoulder shrug symmetric. Tongue deviates to left. , biceps, triceps, and plantar and dorsiflexion strength 4+/5 on right and 5/5 on left. SILT m/r/u/sp/dp/sural/saphenous distributions bilaterally.    HEENT: NCAT  Neck: Steris c/d/i, no significant swelling.   CV: RRR.  Pulm: Breathing non labored ORA.   Ext: No peripheral edema BLE.       PAST MEDICAL HISTORY:  Past Medical History:   Diagnosis Date     Basal cell carcinoma      Carotid stenosis, left      Coronary artery disease     4 vessel bypass November 2010; LIMA ->LAD, SVG-> OM3, SVG -> D2, SVG -> PDA     Diabetes mellitus (H) 2005    neuropathy     Generalized anxiety disorder 5/2/2014     Hepatitis C, chronic (H) 2005     Hyperlipidemia LDL goal < 70      Hypertension      Liver diseases     9/15 Liver is 10 cm in span without left lobe enlargement     Persistent microalbuminuria associated with type 2 diabetes mellitus (H) 5/6/2015       PAST SURGICAL HISTORY:  Past Surgical History:   Procedure Laterality Date     ARTHROSCOPY KNEE RT/LT      left     COLONOSCOPY       CORONARY ARTERY BYPASS  11/18/201    Coronary artery bypass grafting x 4 with placement of the left internal mammary artery to the distal midportion of the left anterior descending " artery, saphenous vein graft from aorta to the third obtuse marginal branch of circumflex coronary artery, saphenous vein graft from aorta to the second diagonal branch, saphenous vein graft from aorta to the posterior descending artery.     ESOPHAGOSCOPY, GASTROSCOPY, DUODENOSCOPY (EGD), COMBINED  10/31/2011    Procedure:COMBINED ESOPHAGOSCOPY, GASTROSCOPY, DUODENOSCOPY (EGD); Surgeon:ALONSO ALDANA; Location:[facility]     ESOPHAGOSCOPY, GASTROSCOPY, DUODENOSCOPY (EGD), COMBINED N/A 3/8/2018    Procedure: COMBINED ESOPHAGOSCOPY, GASTROSCOPY, DUODENOSCOPY (EGD);  EGD;  Surgeon: Angel Luis Justice MD;  Location: [facility]     HEART CATH CORONARY ANGIOGRAM W/LV GRAM  9-11-10    CV Surgery recommended     HEART CATH CORONARY ANGIOGRAM W/LV GRAM  2-28-14    Medical management     HERNIA REPAIR, INGUINAL RT/LT      left       Labs:  Recent Labs   Lab Test 02/21/20  0640 10/25/19  0648 04/19/19  0648   WBC  --  5.3 5.8   RBC  --  5.20 4.90   HGB 15.5 15.9 15.1   HCT  --  46.7 44.5   MCV  --  90 91   MCH  --  30.6 30.8   MCHC  --  34.0 33.9   * 151 148*     Recent Labs   Lab Test 02/21/20  0640 10/25/19  0648 09/03/19  0941   POTASSIUM 4.8 4.8 5.7*   CHLORIDE 104 102 101   BUN 18 14 15  14       DISCHARGE INSTRUCTIONS:  Discharge Orders      SPEECH THERAPY REFERRAL      PHYSICAL THERAPY REFERRAL      Reason for your hospital stay    Carotid stenosis     Follow-up and recommended labs and tests     Please call [phone] to setup a followup appointment with Dr Stubbs (Vascular Surgery) in 2-4 weeks, unless previously scheduled.     Activity    Important to keep blood pressure systolic (the top number) between 100-150 mmHg.  If you experience severe headaches, please call the clinic or return to the Emergency Department.     OK to shower.  Let regular soap and water run over the incision.   Pat dry.  Okay for a clean gauze as needed for drainage.  Avoid submerging the incision for 4 weeks.      Avoid heavy lifting  (less than 10 pounds) for 4 weeks.    Call if having a temperature of greater than 101.4 F, pain is not controlled by medications by mouth, unable to tolerate food or stay hydrated, or incision is red, warm to touch, or has thick yellow drainage.     Diet    Resume normal diet     Discharge Medications   Current Discharge Medication List      START taking these medications    Details   oxyCODONE (ROXICODONE) 5 MG tablet Take 1 tablet (5 mg) by mouth every 6 hours as needed for severe pain  Qty: 10 tablet, Refills: 0    Associated Diagnoses: Left carotid artery stenosis         CONTINUE these medications which have NOT CHANGED    Details   aspirin 81 MG tablet Take 1 tablet by mouth daily.      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)      lisinopril (PRINIVIL/ZESTRIL) 10 MG tablet Take 1 tablet (10 mg) by mouth daily  Qty: 90 tablet, Refills: 3      nebivolol (BYSTOLIC) 5 MG tablet Take 1 tablet (5 mg) by mouth daily  Qty: 90 tablet, Refills: 3    Associated Diagnoses: Benign essential hypertension      rosuvastatin (CRESTOR) 20 MG tablet Take 1 tablet (20 mg) by mouth daily  Qty: 90 tablet, Refills: 3    Associated Diagnoses: Coronary artery disease involving native coronary artery of native heart without angina pectoris      Vitamin D, Cholecalciferol, 1000 units TABS Take 1,000 Units by mouth daily      zolpidem (AMBIEN) 10 MG tablet TAKE 1 TAB ORALLY AS NEEDED FOR SLEEP  Qty: 30 tablet, Refills: 0    Associated Diagnoses: Primary insomnia      !! insulin pen needle (B-D U/F) 31G X 8 MM miscellaneous Use one pen  needles daily or as directed.  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 insulin pen needle (BD HEIKE U/F) 32G X 4 MM Use 1 daily or as directed.  Qty: 100 each, Refills: prn    Comments: Patient requested this specific pen needle.  Associated Diagnoses: Diabetes mellitus (H)       !! - Potential duplicate medications found. Please discuss with provider.        Allergies   Allergies   Allergen Reactions     Chlorthalidone Nausea and Vomiting     dizziness     Penicillins Rash     Rash with PCN only. Patient has taken amoxicillin with no rash.          Marshal Gutierrez MD  General Surgery, PGY4  Division of Vascular Surgery     STAFF: As above.  Patient examined and discussed with patient and wife.  Recommended physical therapy at home and this will be ordered.  Follow-up with Dr. Stubbs in 2 weeks.       Jose Angeles MD

## 2020-02-24 NOTE — PLAN OF CARE
OT: Attempted OT/cognitive eval. Pt going over discharge info with RN. Pt and family refused OT eval; recommended he go to OP OT for cognitive eval if unwilling to do it here.

## 2020-02-24 NOTE — PLAN OF CARE
A/O x4. VSS on RA, hypertensive at times. Denies pain, patient reports discomfort but tolerable. Tele NSR w/ first degree AVB. Neck incision with steri strips, WDL. Neuros: R sided facial sensation less than L side, L tongue deviation, R sided facial droop, R slight pronator drift, and slightly slurred speech. LS diminished. Voiding. BS+, flatus+. Up SBA. DD1 with thin liquid diet. Possible discharge home today.

## 2020-02-24 NOTE — PLAN OF CARE
A&O x4. VSS ex HTN on room air. Tele SR w/PACs and 1st degree AV block. CMS w/BLE numbness and tingling and decreased sensation on the right. Neuros w/slight slurring, right sided facial droop and cheek numbness, tongue deviation to the left, small pronator drift, slow heel to shin, and weak dorsiflexion and plantarflexion. Left side fully intact. Lungs diminished. BS+, BM-, flatus+. Left neck incision LISA w/steri-strips. DD1 diet. Denies N/V. BGs 196 and 156. Voiding adequately. Tylenol for pain. Up w/1.

## 2020-02-28 ENCOUNTER — TELEPHONE (OUTPATIENT)
Dept: CARDIOLOGY | Facility: CLINIC | Age: 60
End: 2020-02-28

## 2020-02-28 NOTE — TELEPHONE ENCOUNTER
----- Message from Helder Almonte sent at 2/28/2020  8:04 AM CST -----  BMP order needed per Tenisha Shah  J

## 2020-03-04 ENCOUNTER — DOCUMENTATION ONLY (OUTPATIENT)
Dept: CARDIOLOGY | Facility: CLINIC | Age: 60
End: 2020-03-04

## 2020-03-05 ENCOUNTER — TELEPHONE (OUTPATIENT)
Dept: FAMILY MEDICINE | Facility: CLINIC | Age: 60
End: 2020-03-05

## 2020-03-05 ENCOUNTER — HOSPITAL ENCOUNTER (OUTPATIENT)
Dept: PHYSICAL THERAPY | Facility: CLINIC | Age: 60
Setting detail: THERAPIES SERIES
End: 2020-03-05
Attending: INTERNAL MEDICINE
Payer: COMMERCIAL

## 2020-03-05 ENCOUNTER — HOSPITAL ENCOUNTER (OUTPATIENT)
Dept: SPEECH THERAPY | Facility: CLINIC | Age: 60
Setting detail: THERAPIES SERIES
End: 2020-03-05
Attending: INTERNAL MEDICINE
Payer: COMMERCIAL

## 2020-03-05 DIAGNOSIS — Z98.890 HISTORY OF LEFT-SIDED CAROTID ENDARTERECTOMY: ICD-10-CM

## 2020-03-05 PROCEDURE — 92526 ORAL FUNCTION THERAPY: CPT | Mod: GN | Performed by: STUDENT IN AN ORGANIZED HEALTH CARE EDUCATION/TRAINING PROGRAM

## 2020-03-05 PROCEDURE — 97110 THERAPEUTIC EXERCISES: CPT | Mod: GP,59 | Performed by: PHYSICAL THERAPIST

## 2020-03-05 PROCEDURE — 92610 EVALUATE SWALLOWING FUNCTION: CPT | Mod: GN | Performed by: STUDENT IN AN ORGANIZED HEALTH CARE EDUCATION/TRAINING PROGRAM

## 2020-03-05 PROCEDURE — 97162 PT EVAL MOD COMPLEX 30 MIN: CPT | Mod: GP | Performed by: PHYSICAL THERAPIST

## 2020-03-05 ASSESSMENT — 6 MINUTE WALK TEST (6MWT): TOTAL DISTANCE WALKED (FT): 910

## 2020-03-05 NOTE — PROGRESS NOTES
03/05/20 0900   Quick Adds   Type of Visit Initial OP PT Evaluation   General Information   Start of Care Date 03/05/20   Referring Physician Marshal Gutierrez MD   Order Date 02/24/20   Medical Diagnosis History of left-sided carotid endarterectomy Z98.890   Onset of illness/injury or Date of Surgery 02/21/20   Special Instructions Pt has yet to recieve clearance from doctor for cardio and resistance exercise, ask about sugar levels every visit (today 119)   Surgical/Medical history reviewed Yes   Pertinent history of current problem (include personal factors and/or comorbidities that impact the POC) Vikash Burgos is a 60yom who was admitted to the hospital on 2/21/2020 and underwent Left carotid endarterectomy with bovine pericardial patch angioplasty due to Asymptomatic 75% left carotid stenosis.  He tolerated the procedure well and postoperatively was transferred to the general post-surgical unit, following commands x 4 at conclusion of operation.  A stroke code was called on POD0 due to left sided facial droop and weakness on R, there was no evidence of stroke on CT or MRI. He was seen by neurology, unclear cause of neurologic symptoms and they think his symptoms will resolve with time.  He was able to perform ADLs and ambulate independently without difficulty, cleared for discharge to home by PT with outpatient PT.  On 2/24/2020, he was discharged to home in stable condition. He will continue a dysphagia 1 diet with specific instructions provided to patient and will have outpatient SLP.    Prior level of function comment independent   Living environment [place]   Home/Community Accessibility Comments 2 steps to get in and full flight to basement, has done stairs one at a time and uses rail and wall on other side   Patient/Family Goals Statement to beat me in arm wrestling!, R side back to normal, return to regular exercise   Fall Risk Screen   Fall screen completed by PT   Have you fallen 2 or more  times in the past year? Yes   Have you fallen and had an injury in the past year? No   Timed Up and Go score (seconds) 16.06   Is patient a fall risk? Yes   Fall screen comments 2 falls when his sugar levels were low   Pain   Patient currently in pain Yes   Pain location R arm   Pain rating 6/10   Pain description comment shooting with some tingling   Cognitive Status Examination   Orientation orientation to person, place and time   Posture   Posture Forward head position;Protracted shoulders   Strength   Strength Comments R/L: hip 3/4+, knee 3/5, ankle 3/5   Bed Mobility   Bed Mobility Comments independent   Transfer Skills   Transfer Comments with use of UEs on armrests   Gait   Gait Comments Decreased foot clearance, step length B, and gait speed.  With increased distance the pt demonstrated increased R LE ER, decreased toe clearance, and increased time in R swing   Gait Special Tests Functional Gait Assessment Score out of 30   Score out of 30 18/30   Gait Special Tests Six Minute Walk Test   Feet 910 Feet   Comments see gait for comments on quality   Balance Special Tests Modified CTSIB Conditions   Condition 1, seconds 23 Seconds   Condition 2, seconds 3 Seconds   Condition 4, seconds 0 Seconds   Condition 5, seconds 0 Seconds   Balance Special Tests Sit to Stand Reps in 30 Seconds   Reps in 30 seconds 4   Height standard chair   Comments max use of UEs on arm rests   Sensory Examination   Sensory Perception Comments Pt notes decreased sensation in R sided face   Planned Therapy Interventions   Planned Therapy Interventions balance training;gait training;neuromuscular re-education;strengthening;stretching;transfer training;manual therapy   Clinical Impression   Criteria for Skilled Therapeutic Interventions Met yes, treatment indicated   PT Diagnosis impaired transfers and ambulation   Influenced by the following impairments Decreased strength, impaired balance, and decrease muscular and cardiovascular  endurance   Functional limitations due to impairments Fall Risk, difficulty participating in regular exercise   Clinical Presentation Evolving/Changing   Clinical Presentation Rationale only 1 week since stroke like symptoms began and therefore neurological symproms are still changing   Clinical Decision Making (Complexity) Moderate complexity   Therapy Frequency 2 times/Week   Predicted Duration of Therapy Intervention (days/wks) 8 weeks   Risk & Benefits of therapy have been explained Yes   Patient, Family & other staff in agreement with plan of care Yes   Clinical Impression Comments Pt presents with stroke-like symptoms resulting in R sided weakness.  Fall risk determined through balance, strength, and walking measures.  He requires strengthening, balance training, and endurance training.  Recommend refferal to OT.   GOALS   PT Eval Goals 2;3;4   Goal 1   Goal Identifier [id number]   Goal Description Pt will walk 1060' in 6 minutes in order to demonstrate an improvement in endurance and gait speed   Target Date 05/04/20   Goal 2   Goal Identifier 30sec STS   Goal Description Able to perform 3 sit to stand transfers in 30 seconds without use of UEs in order to demonstrate an improvement in strength.   Target Date 05/04/20   Goal 3   Goal Identifier FGA   Goal Description Pt will score a >22/30 in order to demonstrate a decreased risk for falls.   Target Date 05/04/20   Goal 4   Goal Identifier TUG   Goal Description Pt will complete a timed up and go in <13.5 seconds in order to demonstrate a decreased risk for falls.   Target Date 05/04/20   Total Evaluation Time   PT Eval, Moderate Complexity Minutes (36772) 30

## 2020-03-05 NOTE — PROGRESS NOTES
"   [facility] OP SLP Clinical Swallow Evaluation  03/05/20 5883   General Information   Type Of Visit Initial   Start Of Care Date 03/05/20   Referring Physician Marshal Gutierrez MD   Orders Evaluate And Treat   Orders Comment Clinical swallow study, SLP recommending outpatient swallow therapy   Medical Diagnosis Hx of left-sided carotid endarterectomy   Onset Of Illness/injury Or Date Of Surgery 02/21/20  (surgery date)   Precautions/limitations Fall Precautions  (Pt also seeing PT)   Hearing WFL for 1:1 conversation in session   Pertinent History of Current Problem/OT: Additional Occupational Profile Info Per PT evaluation report from 3/5/2020: \"Vikash Burgos is a 60yom who was admitted to the hospital on 2/21/2020 and underwent Left carotid endarterectomy with bovine pericardial patch angioplasty due to Asymptomatic 75% left carotid stenosis.  He tolerated the procedure well and postoperatively was transferred to the general post-surgical unit, following commands x 4 at conclusion of operation.  A stroke code was called on POD0 due to left sided facial droop and weakness on R, there was no evidence of stroke on CT or MRI. He was seen by neurology, unclear cause of neurologic symptoms and they think his symptoms will resolve with time.  He was able to perform ADLs and ambulate independently without difficulty, cleared for discharge to home by PT with outpatient PT.  On 2/24/2020, he was discharged to home in stable condition. He will continue a dysphagia 1 diet with specific instructions provided to patient and will have outpatient SLP.\"  Pt reports some improvement in his symptoms since discharge with diligent use of dietary modifications and swallowing strategies.  He continues to feel like things get stuck in his throat when swallowing and will occasionally cough after swallowing.  Thicker liquids (e.g. milk, orange juice) and granular foods (e.g. rice) are most problematic.  Please " "see chart for additional PMH.   Respiratory Status Room air   Prior Level Of Function Swallowing   Prior Level Of Function Comment VFSS completed on 2/3/2020 with findings as follows: \"Pt presents with moderate oral-pharyngeal dysphagia per study results.  Findings include oral residue, delayed swallows at times, mild weak base of tongue function, decreased epiglottic closure, and prominent UES with repeated reflux of barium to the pyriform sinuses.  Deficits resulted in min residue in valleculae, mild-mod to mod residue in the pyriform sinuses, and flash penetration with nectar and pudding.\"  Pt currently consuming DD1 with thin liquids and use of swallow strategies/compensations at home.   Patient Role/employment History Employed   General Observations Pt was accompanied by his wife.   Patient/family Goals To be able to return to a normal diet, to have a safe swallow   Fall Risk Screen   Fall screen completed by PT   Clinical Swallow Evaluation   Oral Musculature anomalies present   Structural Abnormalities none present   Dentition other (see comments)  (Unable to assess d/t limited jaw ROM)   Mucosal Quality other (see comments)  (Unable to assess d/t pain with jaw opening)   Mandibular Strength and Mobility impaired  (Limited ROM and pain with opening)   Oral Labial Strength and Mobility impaired retraction;impaired pursing  (Reduced strength and ROM on right)   Lingual Strength and Mobility impaired protrusion;impaired anterior elevation;impaired left lateral movement;impaired right lateral movement;impaired coordination  (Limited strength/ROM on right, mildly weak on left)   Velar Elevation other (see comments)  (Unable to assess d/t limited jaw ROM)   Buccal Strength and Mobility impaired  (Right sided weakness and limited mobility)   Laryngeal Function Cough;Swallow;Voicing initiated  (Unable to palpate dry swallow d/t pain to light touch)   Oral Musculature Comments Pt also reporting facial numbness "   Additional Documentation Yes   Clinical Swallow Eval: Thin Liquid Texture Trial   Mode of Presentation, Thin Liquids cup;self-fed   Volume of Liquid or Food Presented Sips of water x2   Oral Phase of Swallow WFL   Pharyngeal Phase of Swallow throat clearing   Diagnostic Statement Delayed throat clear observed in 1/2 trials   Clinical Swallow Eval: Puree Solid Texture Trial   Mode of Presentation, Puree spoon;self-fed   Volume of Puree Presented 1/2 tsp applesauce x2   Oral Phase, Puree other (see comments)  (Unable to assess prescence of residue d/t limited jaw ROM)   Pharyngeal Phase, Puree impaired;coughing/choking;feeling of something stuck in throat   Successful Strategies Trialed During Procedure, Puree other (see comments)  (Sips of water, multiple swallows)   Diagnostic Statement Coughing observed in 2/2 trials, with pt reporting sensation of residue in throat.  Sensation clears with coughing, multiple swallows, and sips of water.   Clinical Swallow Eval: Semisolid Texture Trial   Mode of Presentation, Semisolid spoon;self-fed   Volume of Semisolid Food Presented Single piece of peach from fruit cocktail without juice x2   Oral Phase, Semisolid WFL   Pharyngeal Phase, Semisolid feeling of something stuck in throat   Diagnostic Statement Pt spontaneously taking a sip of water to clear residue.  Pt also intentionally throat clearing to check presence of residue.  No other s/sx of aspiration/penetration observed.   Swallow Compensations   Swallow Compensations Alternate viscosity of consistencies;Pacing;Reduce amounts;Multiple swallow   Educational Assessment   Barriers to Learning No barriers   Preferred Learning Style Listening;Reading;Demonstration;Pictures/video   Esophageal Phase of Swallow   Patient reports or presents with symptoms of esophageal dysphagia Yes   Esophageal comments Prominent UES with repeated reflux of barium to the pyriform sinuses observed on VFSS from 2/3/2020.   General Therapy  Interventions   Planned Therapy Interventions Dysphagia Treatment   Dysphagia treatment Oropharyngeal exercise training;Modified diet education;Instruction of safe swallow strategies   Swallow Eval: Clinical Impressions   Skilled Criteria for Therapy Intervention Skilled criteria met.  Treatment indicated.   Functional Assessment Scale (FAS) 3   Dysphagia Outcome Severity Scale (ARGENIS) Level 3 - ARGENIS   Treatment Diagnosis Moderate oropharyngeal dysphagia   Diet texture recommendations Dysphagia diet level 1;Thin liquids   Recommended Feeding/Eating Techniques alternate between small bites and sips of food/liquid;maintain upright posture during/after eating for 30 mins;small sips/bites;other (see comments);hard swallow w/ each bite or sip  (Multiple swallows, intentional cough PRN to clear residue )   Rehab Potential good, to achieve stated therapy goals   Predicted Duration of Therapy Intervention (days/wks) 1x/week for 4 weeks, then 1-2x/month for 2 months   Risks and Benefits of Treatment have been explained. Yes   Patient, family and/or staff in agreement with Plan of Care Yes   Clinical Impression Comments Mr. Burgos presents with moderate oropharyngeal dysphagia on clinical swallow evaluation today.  Symptoms are characterized by weakness and reduced ROM of the right oral musculature, coughing with puree consistency, and feeling a sensation of residue in the throat with thin liquids, purees, and semi-solids.  Pt is able to clear the sensation of something stuck in the throat with multiple swallows, sipping water, and voluntary coughing.  Pt continues to be at an elevated risk for aspiration/penetration based on today's evaluation results.  RECOMMEND: 1. DD1 with thin liquids and use of the safe swallowing techniques listed above.  2. Multiple times daily practice of oropharyngeal exercises to promote improved strength and ROM of the swallowing musculature, in order to improve safety with swallowing. 3.  Continued skilled swallowing therapy with SLP to assess diet tolerance, train strategies and exercises, and assess safety for diet upgrades.   Swallow Goals   SLP Swallow Goals 1;2   Swallow Goal 1   Goal Identifier Diet/Strategies   Goal Description Patient will independently implement safe swallowing techniques to tolerate a Dysphagia 3 diet with thin liquids without c/o coughing/choking across one week per patient report to improve safety of PO intake.   Target Date 06/03/20   Swallow Goal 2   Goal Identifier Exercises   Goal Description Patient will complete oropharyngeal exercises independently 2-3x daily, to increase strength and improve safety with PO intake.   Target Date 06/03/20   Total Session Time   SLP Eval: oral/pharyngeal swallow function, clinical minutes (49742) 20   Total Evaluation Time 40     Thank you for the referral of this patient.    Allison Alpers, B.A. (RITCHIE vasquez, CCC-SLP  Speech-Language Pathologist  Certificate of Vocology  [facility]  [phone]

## 2020-03-06 NOTE — TELEPHONE ENCOUNTER
Please notify patient, I dispensed a Weaning dose of Ambien down to 5 mg dose, due to non alcoholic fatty liver disease/cirrhosis, use only sparingly, I have emailed GI to clear him for use of such medication with underlying liver disease, I do no prescribe long term Ambien due to addictive potential.

## 2020-03-09 ENCOUNTER — HOSPITAL ENCOUNTER (OUTPATIENT)
Dept: PHYSICAL THERAPY | Facility: CLINIC | Age: 60
Setting detail: THERAPIES SERIES
End: 2020-03-09
Attending: INTERNAL MEDICINE
Payer: COMMERCIAL

## 2020-03-09 PROCEDURE — 97110 THERAPEUTIC EXERCISES: CPT | Mod: GP | Performed by: PHYSICAL THERAPIST

## 2020-03-09 PROCEDURE — 97112 NEUROMUSCULAR REEDUCATION: CPT | Mod: GP | Performed by: PHYSICAL THERAPIST

## 2020-03-11 ENCOUNTER — HOSPITAL ENCOUNTER (OUTPATIENT)
Dept: PHYSICAL THERAPY | Facility: CLINIC | Age: 60
Setting detail: THERAPIES SERIES
End: 2020-03-11
Attending: INTERNAL MEDICINE
Payer: COMMERCIAL

## 2020-03-11 ENCOUNTER — MEDICAL CORRESPONDENCE (OUTPATIENT)
Dept: HEALTH INFORMATION MANAGEMENT | Facility: CLINIC | Age: 60
End: 2020-03-11

## 2020-03-11 ENCOUNTER — OFFICE VISIT (OUTPATIENT)
Dept: OTHER | Facility: CLINIC | Age: 60
End: 2020-03-11
Attending: SURGERY
Payer: COMMERCIAL

## 2020-03-11 VITALS — SYSTOLIC BLOOD PRESSURE: 135 MMHG | HEART RATE: 61 BPM | DIASTOLIC BLOOD PRESSURE: 78 MMHG | TEMPERATURE: 97.5 F

## 2020-03-11 DIAGNOSIS — Z98.890 HISTORY OF LEFT-SIDED CAROTID ENDARTERECTOMY: ICD-10-CM

## 2020-03-11 DIAGNOSIS — Z09 SURGICAL FOLLOW-UP CARE: Primary | ICD-10-CM

## 2020-03-11 PROCEDURE — 99024 POSTOP FOLLOW-UP VISIT: CPT | Mod: ZP | Performed by: SURGERY

## 2020-03-11 PROCEDURE — G0463 HOSPITAL OUTPT CLINIC VISIT: HCPCS

## 2020-03-11 PROCEDURE — 97110 THERAPEUTIC EXERCISES: CPT | Mod: GP | Performed by: PHYSICAL THERAPIST

## 2020-03-13 ENCOUNTER — HOSPITAL ENCOUNTER (OUTPATIENT)
Dept: OCCUPATIONAL THERAPY | Facility: CLINIC | Age: 60
Setting detail: THERAPIES SERIES
End: 2020-03-13
Attending: PSYCHIATRY & NEUROLOGY
Payer: COMMERCIAL

## 2020-03-13 ENCOUNTER — CARE COORDINATION (OUTPATIENT)
Dept: CARDIOLOGY | Facility: CLINIC | Age: 60
End: 2020-03-13

## 2020-03-13 PROCEDURE — 97110 THERAPEUTIC EXERCISES: CPT | Mod: GO | Performed by: OCCUPATIONAL THERAPIST

## 2020-03-13 PROCEDURE — 97112 NEUROMUSCULAR REEDUCATION: CPT | Mod: GO | Performed by: OCCUPATIONAL THERAPIST

## 2020-03-13 PROCEDURE — 97165 OT EVAL LOW COMPLEX 30 MIN: CPT | Mod: GO | Performed by: OCCUPATIONAL THERAPIST

## 2020-03-13 NOTE — PROGRESS NOTES
Called pt to complete wellness screening prior to lab and OV on 3/17/20, no answer, LVM requesting return call.

## 2020-03-14 ENCOUNTER — TELEPHONE (OUTPATIENT)
Dept: FAMILY MEDICINE | Facility: CLINIC | Age: 60
End: 2020-03-14

## 2020-03-14 NOTE — TELEPHONE ENCOUNTER
----- Message -----  From: Kevin Bedolla MD  Sent: 3/8/2020   7:30 PM CDT  To: Jailyn Medel MD  Subject: RE: common patient with compensated non alco*    Yes.  He has very well compensated cirrhosis so I think it is safe.    Thanks for reaching out.    Wenceslao Bedolla  ----- Message -----  From: Jailyn Medel MD  Sent: 3/5/2020  12:06 PM CDT  To: Kevin Bedolla MD  Subject: common patient with compensated non alcohol *    Hi Dr Bedolla ,   We have a common patient, Patient has been taking Ambien with his compensated liver disease, is it safe for him to be on Ambien; he does take that occasionally for insomnia.?  Appreciate your feedback, any of his meds he is taking are contraindication for liver disease.?    As per [employer]:  Hepatic: Precipitation of hepatic encephalopathy in patients with hepatic impairment has been associated with MAIKEL agonists, such as zolpidem tartrate. Avoid use in patients with severe hepatic impairment [12][13].  Hepatic: Hepatic impairment; dosage reduction recommended    Thank you for feedback  Dr Medel

## 2020-03-16 NOTE — PROGRESS NOTES
Spoke with pt's wife, Carmina.  She reports pt has been checking BP multiple times per day, typically 120/60s-70s but sometimes up to 140s/90s during the night when he is up due to his insomnia. Carmina states pt is doing well and is comfortable rescheduling OV with CARLOS Florian to the end of April/early May if CARLOS Florian is OK with this.     Reviewed above information with CARLOS Florian who agrees that pt can be rescheduled to end of Apirl/early May and recommends pt check BP max of twice per day if he is feeling well (at least 2 hours after AM and PM meds) and can also check BP if he is not feeling well.  Also advised pt call if he is seeing consistent SBP >135.      Reviewed all above information with pt's wife, Carmina, she verbalized understanding and agrees to relay all recommendations to pt.      SACHIN Story 10:16 AM 3/16/2020

## 2020-03-16 NOTE — PROGRESS NOTES
Vikash Burgos is a 60-year-old gentleman with metabolic syndrome and prior CABG who is status post an asymptomatic left carotid endarterectomy on 2/21/2020.  He awoke from that procedure neurologically intact except for some moderate left tongue deviation.  Several hours later he had right eye ptosis with right-sided weakness.  A code stroke was called and he underwent neurology consultation.  MRI of the brain was negative for infarct.  CTA showed a widely patent left carotid artery with no associated abnormalities.  Neurology could not find an organic cause for the right-sided weakness and did not feel that he had suffered an infarct.  Swallow studies were abnormal and he was discharged home on a dysphasia diet.    He has subsequently been seen by Dr. Jean-Baptiste for outpatient neurologic follow-up.  Per the patient and his wife Dr. Jean-Baptiste believes that he may have suffered an infarct.  He is slated for an upcoming repeat MRI of the brain.  He continues to have difficulties with swallowing and ongoing issues with right-sided weakness.  He is undergoing speech therapy, occupational Therapy, and physical therapy.  He presents today for his first postoperative check.  Apart from these issues noted above he had no additional complaints.    Exam:  Well-developed male in no acute distress.  Blood pressure 135/78 with a pulse of 61.  His face is symmetric.  Persistent, moderate left tongue deviation.  Right hand  4/5.  Somewhat of a shuffling gait.    IMPRESSION:  3 weeks status post technically uncomplicated left carotid endarterectomy now with persistent left tongue deviation and right-sided weakness of uncertain etiology.  Prior imaging has demonstrated no evidence of infarct on MRI of the brain.  Carotid CTA was also unremarkable.    RECOMMENDATION:  I reviewed all the above with Kelton and his wife.  I showed them the actual images of the postoperative left carotid endarterectomy.  The etiology of his symptoms  remains unknown.  He has ongoing neurologic follow-up with Dr. Jean-Baptiste and a future repeat MRI of the brain.  He will continue with all forms of therapy.  He will continue his medical regimen which includes daily aspirin.  Vascular surgical follow-up will be with me in 3 months for a repeat left carotid ultrasound as part of my postoperative surveillance.    Devyn Stubbs MD

## 2020-03-18 ENCOUNTER — TRANSFERRED RECORDS (OUTPATIENT)
Dept: HEALTH INFORMATION MANAGEMENT | Facility: CLINIC | Age: 60
End: 2020-03-18

## 2020-03-18 NOTE — PROGRESS NOTES
"   03/13/20 1300   Quick Adds   Type of Visit Initial Outpatient Occupational Therapy Evaluation   General Information   Start Of Care Date 03/13/20   Referring Physician Marissa Weinberg PA-C   Orders Evaluate and treat as indicated   Other Orders PT and SLP   Orders Date 03/11/20   Medical Diagnosis R sided weakness, peripheral neuropathy   Onset of Illness/Injury or Date of Surgery 02/21/20   Surgical/Medical History Reviewed Yes   Additional Occupational Profile Info/Pertinent History of Current Problem Pt referred to OP OT from neurology clinic for R sided weakness.  Per chart, he was admitted to the hospital on 2/21/2020 and underwent Left carotid endarterectomy with bovine pericardial patch angioplasty due to asymptomatic 75% left carotid stenosis.  He tolerated the procedure well and postoperatively was transferred to the general post-surgical unit, following commands x 4 at conclusion of operation.  A stroke code was called on POD 0 due to left sided facial droop and weakness on R, there was no evidence of stroke on CT or MRI. He was seen by neurology, unclear cause of neurologic symptoms and they think his symptoms will resolve with time.   On 2/24/2020, he was discharged to home in stable condition.   Comments/Observations \"My mind is fine.  My R side is weak.  Each day is a better day.\"  Patient is having another MRI tomorrow.   Role/Living Environment   Current Community Support Housekeeping service   Patient role/Employment history Retired   Community/Avocational Activities In warmer months, bikes 30 miles a day.  Has a home in [place] for harrison.   Treadmill walking daily at 3.6 mph for 50 minutes/day.  Patient enjoys flying with his son who is a .  \"Golf is on hold for now.\"   Current Living Environment [facility]   Home/Community Accessibility Comments 2 steps to get in and full flight to basement, has done stairs one at a time and uses rail and wall on other side   Prior Level - Transfers Independent " "  Prior Level - Ambulation Independent   Prior Level - ADLS Independent   Patient/family Goals Statement To improve strength and coordination of R side   Pain   Pain comments Reports that pain in his R arm pain is much less after his carotid endarectomy.   Fall Risk Screen   Fall screen completed by PT   Cognitive Status Examination   Orientation Orientation to person, place and time   Level of Consciousness Alert   Follows Commands and Answers Questions 100% of the time;Able to follow multistep instructions   Personal Safety and Judgment Intact   Memory Intact   Cognitive Comment No concernes   Visual Perception   Visual Perception No deficits were identified   Sensation   Sensation Comments R side of his face, neck and R upper arm is numb, diminished light touch on R hand/fingers with c/o tingling.   Posture   Posture Forward head position;Protracted shoulders   Range of Motion (ROM)   ROM Quick Adds No deficits identified   ROM Comments WFLs, slow to get to full end range for RUE.  MRI of R shoulder which showed R impingement/frozen shoulder.     Strength   Strength Comments LUE 5/5; R shoulder flexion and abduction 3+/5, R elbow flexion 5/5, elbow extension 4/5,    Hand Strength   Hand Dominance Right   Left Hand  (pounds) 95 pounds   Right Hand  (pounds) 22 pounds   Left Lateral Pinch (pounds) 19 pounds   Right Lateral Pinch (pounds) 6 pounds   Left Three Point Pinch (pounds) 21 pounds   Right Three Point Pinch (pounds) 4 pounds   Hand Strength Comments R  and pinch strength fall more than 3 SD below the mean for his age group.   Coordination   Upper Extremity Coordination Right UE impaired   Left Hand, Nine Hole Peg Test (seconds) 22   Right Hand, Nine Hole Peg Test (seconds) 26   Coordination Comments \"It has affected my handwriting.\"  R hand FMC falls more than 2 SD below the mean for his age group.   Balance   Balance Comments See PT notes   Tub/Shower Transfer   Tub/Shower Transfer Comments " Walk in shower, grab bars, showe chair.   Bathing   Level of Lolita - Bathing independent   Bathing Comments Reports increased time to complete.   Upper Body Dressing   Level of Lolita: Dress Upper Body independent   Lower Body Dressing   Level of Lolita: Dress Lower Body independent   Toileting   Level of Lolita: Toilet independent   Grooming   Grooming Comments Has been using his R hand mostly, using electric toothbrush.   Eating/Self-Feeding   Level of Lolita: Eating independent   Eating/Self Feeding Comments Eats with his R but c/o difficulty holding a spoon/fork.    Instrumental Activities of Daily Living Assessment   IADL Assessment/Observations Patient is independent with all ADLs, is driving and shopping, doing medications with pill boxes and finances I'ly.     Adult OT Eval Goals   OT Eval Goals (Adult) 1;2;3;4    OT Goal 1   Goal Identifier 1-Shoulder AROM   Goal Description Patient to demonstrate independence with a shoulder strengthening HEP to increase independence with carrying groceries, lifting a gallon of milk, etc.   Target Date 05/16/20    OT Goal 2   Goal Identifier 2- Strength   Goal Description Patient to increase R  strength by 15# for improved ability to grasp items and hold a golf club.   Target Date 05/16/20    OT Goal 3   Goal Identifier 3-FMC   Goal Description Patient to demostrate improved FMC skills by completing the 9HPT in 23 seconds with his R hand for improve ease with buttoning, zipping and writing.   Target Date 05/16/20   OT Goal 4   Goal Identifier 4-Community Mobility   Goal Description Patient to demonstrate independence/safety with pre-driving assessments including LE reaction time/coordination for improved safety with community mobility/shopping/driivng.   Target Date 05/16/20   Clinical Impression   Criteria for Skilled Therapeutic Interventions Met Yes, treatment indicated   OT Diagnosis decreased independence with ADL/IADL    Influenced by the following impairments impaired strength and coordination   Assessment of Occupational Performance 1-3 Performance Deficits   Identified Performance Deficits ADL/IADL, cooking, leisure activities   Clinical Decision Making (Complexity) Low complexity   Therapy Frequency 1x/week   Predicted Duration of Therapy Intervention (days/wks) 4   Risks and Benefits of Treatment have been explained. Yes   Patient, Family & other staff in agreement with plan of care Yes   Education Assessment   Barriers To Learning No Barriers   Preferred Learning Style Reading;Listening;Demonstration;Pictures/video   Total Evaluation Time   OT Eval, Low Complexity Minutes (59082) 20

## 2020-03-19 DIAGNOSIS — I65.22 LEFT CAROTID ARTERY STENOSIS: Primary | ICD-10-CM

## 2020-04-05 ENCOUNTER — NURSE TRIAGE (OUTPATIENT)
Dept: NURSING | Facility: CLINIC | Age: 60
End: 2020-04-05

## 2020-04-05 ENCOUNTER — VIRTUAL VISIT (OUTPATIENT)
Dept: FAMILY MEDICINE | Facility: OTHER | Age: 60
End: 2020-04-05

## 2020-04-05 NOTE — PROGRESS NOTES
"Date: 2020 12:23:50  Clinician: STEW Stringer  Clinician NPI: [id number]  Patient: Kelton Burgos  Patient : 1960  Patient Address: [facility] [address], [place] [address]  Patient Phone: [phone]  Visit Protocol: URI  Patient Summary:  Kelton is a 60 year old ( : 1960 ) male who initiated a Visit for COVID-19 (Coronavirus) evaluation and screening. When asked the question \"Please sign me up to receive news, health information and promotions from [employer].\", Kelton responded \"No\".    Kelton states his symptoms started 1-2 days ago.   His symptoms consist of myalgia, a sore throat, a cough, nasal congestion, malaise, ear pain, chills, vomiting, nausea, and wheezing. Kelton also feels feverish but was unable to measure his temperature.   Symptom details     Nasal secretions: The color of his mucus is clear and white.    Cough: Kelton coughs a few times an hour and his cough is more bothersome at night. Phlegm comes into his throat when he coughs. He does not believe his cough is caused by post-nasal drip. The color of the phlegm is clear and white.     Sore throat: Kelton reports having moderate throat pain (4-6 on a 10 point pain scale), does not have exudate on his tonsils, and can swallow liquids. He is not sure if the lymph nodes in his neck are enlarged. A rash has not appeared on the skin since the sore throat started.     Wheezing: Kelton has not ever been diagnosed with asthma. The wheezing interferes with his normal daily activities.     Kelton denies having rhinitis, facial pain or pressure, diarrhea, teeth pain, and headache. He also denies taking antibiotic medication for the symptoms and having recent facial or sinus surgery in the past 60 days.   Precipitating events  Within the past week, Kelton has not been exposed to someone with strep throat. He has recently been exposed to someone with influenza. Kelton has not been in close contact with any high risk individuals.   Pertinent " COVID-19 (Coronavirus) information  Kelton has not traveled internationally or to the areas where COVID-19 (Coronavirus) is widespread, including cruise ship travel in the last 14 days before the start of his symptoms.   Kelton has not had a close contact with a laboratory-confirmed COVID-19 patient within 14 days of symptom onset. He also has not had a close contact with a suspected COVID-19 patient within 14 days of symptom onset.   Kelton does not work or volunteer as healthcare worker or a  and does not work or volunteer in a healthcare facility. He lives with a healthcare worker.   Triage Point(s) temporarily suspended for COVID-19 (Coronavirus) screening  Kelton reported the following symptoms which were previously protocol referral points. These protocol referral points have temporarily been removed for purposes of COVID-19 (Coronavirus) screening.     Difficulty breathing even when resting and can only speak in phrase(s)    Wheezing that keeps Kelton from doing daily activities     Pertinent medical history  Kelton does not need a return to work/school note.   Weight: 185 lbs   Kelton does not smoke or use smokeless tobacco.   Additional information as reported by the patient (free text): He had surgery on his left carotid artery six weeks ago where he also suffered a stroke. He has Type 2 Diabetes. He has Coronary Artery Disease. He lives in the same household with two healthcare workers.   Weight: 185 lbs    MEDICATIONS: rosuvastatin oral, Tomas Aspirin oral, Bystolic oral, Levemir U-100 Insulin subcutaneous, lisinopril oral, ALLERGIES: Penicillins  Clinician Response:  Dear Kelton,      Based on the information you have provided, you do have symptoms that are consistent with Coronavirus (COVID-19).  The coronavirus causes mild to severe respiratory illness with the most common symptoms including fever, cough and difficulty breathing. Unfortunately, many viruses cause similar symptoms and it can be  difficult to distinguish between viruses, especially in mild cases, so we are presuming that anyone with cough or fever has coronavirus at this time.  Coronavirus/COVID-19 has reached the point of community spread in [place], meaning that we are finding the virus in people with no known exposure risk for william the virus. Given the increasing commonness of coronavirus in the community we are no longer testing patients who are not critically ill.  If you are a health care worker, you should refer to your employee health office for instructions about testing and returning to work.  For everyone else who has cough or fever, you should assume you are infected with coronavirus. Since you will not be tested but have symptoms that may be consistent with coronavirus, the CDC recommends you stay in self-isolation until these three things have happened:    You have had no fever for at least 72 hours (that is three full days of no fever without the use of medicine that reduces fevers)    AND   Other symptoms have improved (for example, when your cough or shortness of breath have improved)   AND   At least 7 days have passed since your symptoms first appeared.   How to Isolate:   Isolate yourself at home.  Do Not allow any visitors  Do Not go to work or school  Do Not go to [employer],  centers, shopping, or other public places.  Do Not shake hands.  Avoid close contact with others (hugging, kissing).   Protect Others:   Cover Your Mouth and Nose with a mask, disposable tissue or wash cloth to avoid spreading germs to others.  Wash your hands and face frequently with soap and water.   We know it can be scary to hear that you might have COVID-19. Our team can help track your symptoms and make sure you are doing ok over the next two weeks using a program called [employer] to keep in touch. When you receive an email from In[employer], please consider enrolling in our monitoring program. There is no cost to you for  monitoring. Here is a URL where you can learn more: [email or web address]  Managing Symptoms:   At this time, we primarily recommend Tylenol (Acetaminophen) for fever or pain. If you have liver or kidney problems, contact your primary care provider for instructions on use of tylenol. Adults can take 650 mg (two 325 mg pills) by mouth every 4-6 hours as needed OR 1,000 mg (two 500 mg pills) every 8 hours as needed. MAXIMUM DAILY DOSE: 3,000mg. For children, refer to dosing on bottle based on age or weight.   If you develop significant shortness of breath that prevents you from doing normal activities, please call 911 or proceed to the nearest emergency room and alert them immediately that you have been in self-isolation for possible coronavirus.  If you have a higher risk medical condition such as cancer, heart failure, end stage renal disease on dialysis or have a transplant, please reach out to your specialist's clinic to advise them of your [facility] visit should you not improve within the next two days.  ---------------------------------------------   Medication information  Unless you are allergic to or are taking medications contraindicated to use, I recommend using the over-the-counter medication(s) below:   An antihistamine such as&nbsp;Benadryl, Claritin, or store brand.    Dextromethorphan      (Robitussin DM or store brand). This medication is a cough suppressant      that works by decreasing the feeling of needing to cough. Please follow      the instructions on the package.    Guaifenesin      + dextromethorphan (Robitussin DM, Mucinex DM, or store brand).    Saline      nasal spray (Ocean or store brand). Use 1-2 sprays in each nostril 3 times      a day as needed for congestion.    A      sinus irrigation kit such as&nbsp;Sinus Rinse, Neti Pot, SinuCleanse, or      store brand. Be sure to use sterile or previously boiled water to prevent      unwanted infections.    Oxymetazoline      (Afrin  or store brand) nasal spray. Use 1 spray in each nostril 2 times a      day for a maximum of 3 days. Using this medication more frequently or      longer than recommended may cause nasal congestion to reoccur or worsen.      Sinus pressure occurs when the tissues lining your sinuses become swollen      and inflamed. Afrin nasal spray decreases the swelling to provide the      quickest and most effective relief from sinus pressure.    Over-the-counter medications do not require a prescription. Ask the pharmacist if you have any questions.  Self care  The following tips will keep you as comfortable as possible while you recover:     Rest    Drink      plenty of water and other liquids    Take      a hot shower to loosen congestion    Use      throat lozenges    Gargle      with warm salt water (1/4 teaspoon of salt per 8 ounce glass of water)    Suck      on frozen items such as popsicles or ice cubes    Drink      hot tea with lemon and honey    Take      a spoonful of honey to reduce your cough           For more information about COVID19 and options for caring for yourself at home, please visit the CDC website at https://www.cdc.gov/coronavirus/2019-ncov/about/steps-when-sick.htmlFor more options for care at [facility], please visit our website at [email or web address]    COVID-19 (Coronavirus) General Information  With the increase in the number of COVID-19 (Coronavirus) cases, we understand you may have some questions. Below is some helpful information on COVID-19 (Coronavirus).  How can I protect myself and others from the COVID-19 (Coronavirus)?  Because there is currently no vaccine to prevent infection, the best way to protect yourself is to avoid being exposed to this virus. Put distance between yourself and other people if COVID-19 (Coronavirus) is spreading in your community. The virus is thought to spread mainly from person-to-person.     Between people who are in close  contact with one another (within about 6 about) for a prolonged period (10 minutes or longer).    Through respiratory droplets produced when an infected person coughs or sneezes.     The CDC recommends the following additional steps to protect yourself and others:     Wash your hands often with soap and water for at least 20 seconds, especially after blowing your nose, coughing, or sneezing; going to the bathroom; and before eating or preparing food.  Use an alcohol-based hand  that contains at least 60 percent alcohol if soap and water are not available.        Avoid touching your eyes, nose and mouth with unwashed hands.    Avoid close contact with people who are sick.    Stay home when you are sick.    Cover your cough or sneeze with a tissue, then throw the tissue in the trash.    Clean and disinfect frequently touched objects and surfaces.     You can help stop COVID-19 (Coronavirus) by knowing the signs and symptoms:     Fever    Cough    Shortness of breath     Contact your healthcare provider if   Develop symptoms   AND   Have been in close contact with a person known to have COVID-19 (Coronavirus) or live in or have recently traveled from an area with ongoing spread of COVID-19 (Coronavirus). Call ahead before you go to a doctor's office or emergency room. Tell them about your recent travel and your symptoms.   For the most up to date information, visit the CDC's website.  Self-monitoring  Self-monitoring means people should monitor themselves for fever by taking their temperatures twice a day and remain alert for a cough or difficulty breathing.  It is important to check your health two times each day for 14 days after a potential exposure to a person with COVID-19 (Coronavirus) or after travel from a location where COVID-19 (Coronavirus) is widespread. If you have been exposed to a person with COVID-19 (Coronavirus), it may take up to 14 days to know if you will get sick. Follow the steps below  to check and record your health.     Take your temperature with a thermometer twice a day, once in the morning and once in the evening, and watch for a cough or difficulty breathing for 14 days.    Write down your temperature and any COVID-19 symptoms you may have: feeling feverish, coughing, or difficulty breathing.    Stay home from work or school.    Do not take public transportation, taxis, or ride-shares.    Avoid crowded places (such as shopping centers and movie theaters) and limit your activities in public.    Keep your distance from others (about 6 feet or 2 meters).    If you get sick with fever, cough, or trouble breathing, contact your healthcare provider and tell them about your recent travel and/or your symptoms.    If you need to seek medical care for other reasons, such as dialysis, call ahead to your doctor and tell them about your recent travel.     Steps to help prevent the spread of COVID-19 (Coronavirus) if you are sick  If you are sick with COVID-19 (Coronavirus) or suspect you are infected with the virus that causes COVID-19 (Coronavirus), follow the steps below to help prevent the disease from spreading&nbsp;to people in your home and community.     Stay home except to get medical care. Home isolation may be started in consultation with your healthcare clinician.    Separate yourself from other people and animals in your home.    Call ahead before visiting your doctor if you have a medical appointment.    Wear a facemask when you are around other people.    Cover your cough and sneezes.    Clean your hands often.    Avoid sharing personal household items.    Clean and disinfect frequently touched objects and surfaces everyday.    You will need to have someone drop off medications or household supplies (if needed) at your house without coming inside or in contact with you or others living in your house.    Monitor your symptoms and seek prompt medical care if your illness is worsening (e.g.  "Difficulty breathing).    Discontinue home isolation only in consultation with your healthcare provider.     For more detailed and up to date information on what to do if you are sick, visit this link: What to Do If You Are Sick With COVID-19.  Do I need to be tested for COVID-19 (Coronavirus)?     Not everyone needs to be tested for COVID-19 (Coronavirus). Decisions on which patients receive testing will be based on the local spread of COVID-19 (Coronavirus) as well as the symptoms. Your healthcare provider will make the final decision on whether you should be tested.    In the meantime, if you have concerns that you may have been exposed, it is reasonable to practice \"social distancing.\"&nbsp; If you are ill with a cold or flu-like illness, please monitor your symptoms and call your healthcare provider if your symptoms worsen.    For more up to date information, visit this link: COVID-19 (Coronavirus) Frequently Asked Questions and Answers.      Diagnosis: Coronavirus infection, unspecified  Diagnosis ICD: B34.2  Prescription: albuterol sulfate (Ventolin HFA) 90 mcg/actuation inhalation HFA aerosol inhaler 1 60 inhalation canister (ventolin hfa or equivalent), 0 days supply. Inhale 2 puffs every 4 hours as needed for wheezing/shortness of breath. Refills: 0, Refill as needed: no, Allow substitutions: yes  Pharmacy: [id number] - [phone] - [address] [place] [address]  "

## 2020-04-05 NOTE — TELEPHONE ENCOUNTER
Patient wife and son calls to ask when patient should be evaluated in person or go to  ED.  They says overnight, patient started having sore throat, achy feeling, feversih, and shortness of breath. They said patient has not wanted to talke about his symptoms.  Patient was asleep so FNA was unable to triage.  FNA advised them to call back if patient wants to be triages, [email or web address], and to take him in in case of extreme shortness of breath.  Callers verbalizes understanding.      Reason for Disposition    [1] Caller is not with the adult (patient) AND [2] probable NON-URGENT symptoms    Additional Information    Negative: [1] Caller is not with the adult (patient) AND [2] reporting urgent symptoms    Negative: Lab result questions    Negative: Medication questions    Negative: Caller can't be reached by phone    Negative: Caller has already spoken to PCP or another triager    Negative: RN needs further essential information from caller in order to complete triage    Negative: Requesting regular office appointment    Negative: [1] Caller requesting NON-URGENT health information AND [2] PCP's office is the best resource    Negative: Health Information question, no triage required and triager able to answer question    Negative: General information question, no triage required and triager able to answer question    Negative: Question about upcoming scheduled test, no triage required and triager able to answer question    Protocols used: INFORMATION ONLY CALL-A-

## 2020-04-08 ENCOUNTER — TELEPHONE (OUTPATIENT)
Dept: FAMILY MEDICINE | Facility: CLINIC | Age: 60
End: 2020-04-08

## 2020-04-08 DIAGNOSIS — R05.9 COUGH: Primary | ICD-10-CM

## 2020-04-08 DIAGNOSIS — R06.2 WHEEZING: ICD-10-CM

## 2020-04-08 RX ORDER — ALBUTEROL SULFATE 90 UG/1
2 AEROSOL, METERED RESPIRATORY (INHALATION) EVERY 4 HOURS PRN
Qty: 8 G | Refills: 1 | Status: SHIPPED | OUTPATIENT
Start: 2020-04-08 | End: 2020-11-13

## 2020-04-08 NOTE — TELEPHONE ENCOUNTER
Refill given for albuterol as per patient request.  Advised patient to monitor your symptoms and seek prompt medical care if your illness is worsening (e.g. Difficulty breathing).

## 2020-04-08 NOTE — TELEPHONE ENCOUNTER
Reason for Call:  Other     Detailed comments: Pt is presumed positive for COIVID-19 by an [facility] doctor.  Patients wife, debra, calling to find out if Dr. Medel has any suggestions to care for his symptoms.  He prescribed albuterol sulfate inhaler that has been helping.      Phone Number Patient can be reached at: [phone]-debra wife     Best Time: any    Can we leave a detailed message on this number? YES    Call taken on 4/8/2020 at 11:19 AM by Shelley Marshall

## 2020-04-08 NOTE — TELEPHONE ENCOUNTER
"Per [facility] virtual visit for \"Presumed Covid infection\";   Will need refill of Albuterol inhaler eventually; pended for PCP review and sign.  ------------------------    Per report from spouse, patient is self-quarantined in their home and doing \"okay\".  Has periods of fever/sweat.  Cough under control, wheezing at times.  States the \"Albuterol inhaler is very helpful\".    They fear running out of the inhaler on the weekend.  Per [facility] instructions (below), was prescribed Albuterol X 1 inhaler.    Prescription: albuterol sulfate (Ventolin HFA) 90 mcg/actuation inhalation HFA aerosol inhaler 1 60 inhalation canister (ventolin hfa or equivalent), 0 days supply. Inhale 2 puffs every 4 hours as needed for wheezing/shortness of breath. Refills: 0, Refill as needed.  Allow substitutions: yes  [id number] - [phone] - [address] [place] [address]  "

## 2020-04-08 NOTE — TELEPHONE ENCOUNTER
Spouse notified and will monitor very closely for any changes in patient's condition.  To ED if any breathing difficulties.  She verbalizes understanding.      Rachel Brady RN on 4/8/2020 at 2:27 PM

## 2020-04-09 DIAGNOSIS — F51.01 PRIMARY INSOMNIA: ICD-10-CM

## 2020-04-09 RX ORDER — ZOLPIDEM TARTRATE 5 MG/1
TABLET ORAL
Qty: 10 TABLET | Refills: 0 | Status: SHIPPED | OUTPATIENT
Start: 2020-04-09 | End: 2020-05-19

## 2020-04-09 NOTE — TELEPHONE ENCOUNTER
Routing refill request to provider for review/approval because:  Drug not on the [facility] refill protocol   Please authorize if appropriate.  Thanks,  Marcia Terrazas RN

## 2020-04-09 NOTE — TELEPHONE ENCOUNTER
zolpidem (AMBIEN) 5 MG tablet  10 tablet  0  3/5/2020            Last Written Prescription Date:  03/05/2020  Last Fill Quantity: 10,   # refills: 0  Last Office Visit: 01/09/2020  Future Office visit:  Unknown  Next 5 appointments (look out 90 days)    May 06, 2020  7:30 AM CDT  Return Visit with STEW Gonzalez CNP  [facility] ([facility] [address] [place] [id number] [phone] OPT 2           Routing refill request to provider for review/approval because:  Drug not on the [facility], [facility] or [facility] refill protocol or controlled substance

## 2020-04-13 DIAGNOSIS — E11.49 DM TYPE 2 CAUSING NEUROLOGICAL DISEASE (H): ICD-10-CM

## 2020-04-13 RX ORDER — INSULIN DETEMIR 100 [IU]/ML
INJECTION, SOLUTION SUBCUTANEOUS
Qty: 18 ML | Refills: 0 | Status: SHIPPED | OUTPATIENT
Start: 2020-04-13 | End: 2020-04-13

## 2020-04-13 NOTE — TELEPHONE ENCOUNTER
Prescription for Levemir authorized by mistake.  Patient has changed clinics.  Called pharmacy to notify them and to have them resend prescription to new provider.

## 2020-04-15 DIAGNOSIS — E11.49 DM TYPE 2 CAUSING NEUROLOGICAL DISEASE (H): ICD-10-CM

## 2020-04-15 DIAGNOSIS — E08.42 DIABETIC POLYNEUROPATHY ASSOCIATED WITH DIABETES MELLITUS DUE TO UNDERLYING CONDITION (H): Primary | ICD-10-CM

## 2020-04-16 NOTE — TELEPHONE ENCOUNTER
"Refill request:    LEVEMIR FLEXTOUCH 100 unit/ml. Qty 15.0 ML  \"Injext 50 units subcutaneous at bedtime.\"  Listed as \"historical\" in chart from an external pharmacy.  "

## 2020-04-20 NOTE — TELEPHONE ENCOUNTER
Pending Prescriptions:                       Disp   Refills    insulin detemir (LEVEMIR PEN) 100 UNIT/ML*15 mL  5            Sig: Inject 50 Units Subcutaneous At Bedtime    Routing refill request to provider for review/approval because:  Medication is reported/historical    Pt was previously getting from [facility]  15 ml at a time, inject 50 units at bedtime.   Pended for review  Salome Rogers RN

## 2020-05-05 NOTE — PROGRESS NOTES
"Vikash Burgos is a 60 year old male who is being evaluated via a billable video visit.      The patient has been notified of following:     \"This video visit will be conducted via a call between you and your physician/provider. We have found that certain health care needs can be provided without the need for an in-person physical exam.  This service lets us provide the care you need with a video conversation.  If a prescription is necessary we can send it directly to your pharmacy.  If lab work is needed we can place an order for that and you can then stop by our lab to have the test done at a later time.    Video visits are billed at different rates depending on your insurance coverage.  Please reach out to your insurance provider with any questions.    If during the course of the call the physician/provider feels a video visit is not appropriate, you will not be charged for this service.\"    Patient has given verbal consent for Video visit? Yes    How would you like to obtain your AVS? Rob    Patient would like the video invitation sent by: Send to e-mail at: [email or web address]    Will anyone else be joining your video visit? No       VITALS BY PATIENT:  Weight- 190 lbs  BP- 125/75 'average'  HR- 59    Review Of Systems:  Skin: NEGATIVE  Eyes:Ears/Nose/Throat: NEGATIVE  Respiratory: NEGATIVE  Cardiovascular:NEGATIVE  Gastrointestinal: NEGATIVE  Genitourinary:NEGATIVE   Musculoskeletal: NEGATIVE  Neurologic: POSITIVE: mild headaches-numbmess right upper lip;face; right arm and right thigh  Psychiatric: NEGATIVE  Hematologic/Lymphatic/Immunologic: NEGATIVE  Endocrine:  POSITIVE:  Diabetes    HELEN Stokes    Video-Visit Details    Type of service:  Video Visit    History of Present Illness:    Kelton Burgos is a 60 year old gentleman who follows with Dr. Roberts. We are having a video visit today to follow up on hypertension. He is  to one of our schedulers.    Kelton has a complex history " including CABG X4  by Dr. Pruett in 2010 with LIMA to LAD, SVG to OM-3, SVG to diagonal and SVG to RCA. 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 He underwent a repeat coronary angiogram in 2014 at the [facility] and all native arteries had disease and grafts were patent. He has severe diffuse native diabetic vessels.    At the time he had CABG he had moderate carotid disease which progressed by ultrasound on the left; a CT confirmed 75% + stenosis. He was see by Dr.. Stubbs who recommended CEA; however Kelton reported some right arm pain to Dr. Stubbs and he was referred back to us. He underwent a nuclear stress test revealing a small area of inferoapical ischemia after 13 minutes on a treadmill. He was cleared for his carotid surgery. He saw Orthopedics for his right arm pain and it was determined to be shoulder related and if no improved post carotid surgery, injection would be considered.    His carotid ultrasound was done on 2-; surgery went well but he awakened with left tongue deviation and subsequent right eye ptosis and right sided weakness. Dysphagia was present. It was felt the tongue issue was from traction on the nerve intraop. He was seen by Neurology and no stroke was noted on MRI. He is followed by Dr. Jean-Baptiste who saw him and has been regaining function with therapy.A repeat MRI was attempted but he could not position his head appropriately for this post carotid. Neurology is quite certain he had a stroke in spite of the negative MRI.    Other history includes Hepatitis C with cirrhosis and fatty liver disease on ultrasound by Dr. Bedolla at the  of . He has an upcoming visit with Dr. Bedolla later in May. ALT/AST are normal. Platelets run slightly now at 133,00.    He changed to Bystolic in 2019 as the patient thought this would be better on his diabetes. His BP at Dr. Roberts's visit was borderline elevated. He also takes Lisinpril 10mg daily. A recheck of BP in March at the  vascular clinic was 135/78.    He was presumed COVID positive early April and did self quarantine in his house.    Potassium tends to run high normal at 4.8. BUN 18 Creatinine 0.83    Lipids 2-20: total cholesterol 101 HDL 36 LDL 47 TG 90; he continues on Crestor 20mg daily    He overall is doing well with residual upper lip, right sided face numbness and some numbness of right arm and leg.    His bp is controlled at home except at 0200 when he gets up at night. He notes 150 at those times. He takes Bystolic in the AM; and Lisinopril in the PM. During the daytime his BP's are controlled    He denies chest pain or anginal symptoms    He reports not sleeping well chronically; with his hypertension history and this, he would benefit from a sleep consult.    General Appearance:    no distress, normal body habitus, upright.    ENT/Mouth:    membranes moist, no nasal discharge or bleeding gums. Normal head shape, no evidence of injury or laceration.    EYES:    no scleral icterus, normal conjunctivae    Neck:    no evidence of thyromegaly.     Chest/Lungs:    No audible wheezing equal chest wall expansion. Non labored breathing. No cough.    Cardiovascular:    No evidence of elevated jugular venous pressure. No evidence of pitting edema bilaterally    Abdomen:    no evidence of abdominal distention. No observed jaundice.    Extremities:    no cyanosis or clubbing noted.    Skin:    no xanthelasma, normal skin collar. No evidence of facial lacerations.    Neurologic:    Mild right sided weakness; speech slow and slightly deliberate    Psychiatric:    alert and oriented x3, calm    Impression/Plan:    1. CAD with CABG  -no angina  -continue medical therapy  -Ef preserved    2. HTN-  Good control during day  -elevated at times when up at 0200 with chronic insomnia  -if BP at that time 160 or higher, take extra 5mg Lisinopril prn  -refer to sleep clinic    3. PAD s/p LCEA  -follow up with Dr. Stubbs    4. post CEA neuro  changes including dysphagia, right sided weakness with initial MRI negative  -symptoms improving  -follow up at Neurology    5. Suspected COVID Positive in April 6. DM    7. Dyslipidemia-controlled  LDL 47 HDL 36 TG 90  Continue Crestor 20mg daily      8. Hepatitis C and fatty liver disease    I will plan a follow up visit in 2 months  Video Start Time: 7:57AM  Video End Time:0825AM    Originating Location (pt. Location): Home    Distant Location (provider location):  Home  Platform used for Video Visit: STEW Goodrich CNP

## 2020-05-06 ENCOUNTER — VIRTUAL VISIT (OUTPATIENT)
Dept: CARDIOLOGY | Facility: CLINIC | Age: 60
End: 2020-05-06
Attending: INTERNAL MEDICINE
Payer: COMMERCIAL

## 2020-05-06 DIAGNOSIS — I10 BENIGN ESSENTIAL HYPERTENSION: Primary | ICD-10-CM

## 2020-05-06 DIAGNOSIS — I25.10 CORONARY ARTERY DISEASE INVOLVING NATIVE CORONARY ARTERY OF NATIVE HEART WITHOUT ANGINA PECTORIS: ICD-10-CM

## 2020-05-06 PROCEDURE — 99214 OFFICE O/P EST MOD 30 MIN: CPT | Mod: GT | Performed by: NURSE PRACTITIONER

## 2020-05-06 RX ORDER — LISINOPRIL 10 MG/1
TABLET ORAL
Qty: 135 TABLET | Refills: 3 | Status: SHIPPED | OUTPATIENT
Start: 2020-05-06 | End: 2021-05-05

## 2020-05-06 NOTE — PATIENT INSTRUCTIONS
Schedule sleep consult    Take extra 5mg lisinopril (1/2 pill) as needed daily for BP over 160    See me back in 2 months

## 2020-05-06 NOTE — LETTER
5/6/2020      RE: Vikash Burgos  [address]  [place] [address]       Dear Colleague,    Thank you for the opportunity to participate in the care of your patient, Vikash Burgos, at the [facility]. Please see a copy of my visit note below.    Kelton Burgos is a 60 year old gentleman who follows with Dr. Roberts. We are having a video visit today to follow up on hypertension. He is  to one of our schedulers.    Kelton has a complex history including CABG X4  by Dr. Pruett in 2010 with LIMA to LAD, SVG to OM-3, SVG to diagonal and SVG to RCA. 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 He underwent a repeat coronary angiogram in 2014 at the [facility] and all native arteries had disease and grafts were patent. He has severe diffuse native diabetic vessels.    At the time he had CABG he had moderate carotid disease which progressed by ultrasound on the left; a CT confirmed 75% + stenosis. He was see by Dr.. Stubbs who recommended CEA; however Kelton reported some right arm pain to Dr. Stubbs and he was referred back to us. He underwent a nuclear stress test revealing a small area of inferoapical ischemia after 13 minutes on a treadmill. He was cleared for his carotid surgery. He saw Orthopedics for his right arm pain and it was determined to be shoulder related and if no improved post carotid surgery, injection would be considered.    His carotid ultrasound was done on 2-; surgery went well but he awakened with left tongue deviation and subsequent right eye ptosis and right sided weakness. Dysphagia was present. It was felt the tongue issue was from traction on the nerve intraop. He was seen by Neurology and no stroke was noted on MRI. He is followed by Dr. Jean-Baptiste who saw him and has been regaining function with therapy.A repeat MRI was attempted but he could not position his  head appropriately for this post carotid. Neurology is quite certain he had a stroke in spite of the negative MRI.    Other history includes Hepatitis C with cirrhosis and fatty liver disease on ultrasound by Dr. Bedolla at the  of . He has an upcoming visit with Dr. Bedolla later in May. ALT/AST are normal. Platelets run slightly now at 133,00.    He changed to Bystolic in 2019 as the patient thought this would be better on his diabetes. His BP at Dr. Roberts's visit was borderline elevated. He also takes Lisinpril 10mg daily. A recheck of BP in March at the vascular clinic was 135/78.    He was presumed COVID positive early April and did self quarantine in his house.    Potassium tends to run high normal at 4.8. BUN 18 Creatinine 0.83    Lipids 2-20: total cholesterol 101 HDL 36 LDL 47 TG 90; he continues on Crestor 20mg daily    He overall is doing well with residual upper lip, right sided face numbness and some numbness of right arm and leg.    His bp is controlled at home except at 0200 when he gets up at night. He notes 150 at those times. He takes Bystolic in the AM; and Lisinopril in the PM. During the daytime his BP's are controlled    He denies chest pain or anginal symptoms    He reports not sleeping well chronically; with his hypertension history and this, he would benefit from a sleep consult.    General Appearance:    no distress, normal body habitus, upright.    ENT/Mouth:    membranes moist, no nasal discharge or bleeding gums. Normal head shape, no evidence of injury or laceration.    EYES:    no scleral icterus, normal conjunctivae    Neck:    no evidence of thyromegaly.     Chest/Lungs:    No audible wheezing equal chest wall expansion. Non labored breathing. No cough.    Cardiovascular:    No evidence of elevated jugular venous pressure. No evidence of pitting edema bilaterally    Abdomen:    no evidence of abdominal distention. No observed jaundice.    Extremities:    no cyanosis or clubbing  noted.    Skin:    no xanthelasma, normal skin collar. No evidence of facial lacerations.    Neurologic:    Mild right sided weakness; speech slow and slightly deliberate    Psychiatric:    alert and oriented x3, calm    Impression/Plan:    1. CAD with CABG  -no angina  -continue medical therapy  -Ef preserved    2. HTN-  Good control during day  -elevated at times when up at 0200 with chronic insomnia  -if BP at that time 160 or higher, take extra 5mg Lisinopril prn  -refer to sleep clinic    3. PAD s/p LCEA  -follow up with Dr. Stubbs    4. post CEA neuro changes including dysphagia, right sided weakness with initial MRI negative  -symptoms improving  -follow up at Neurology    5. Suspected COVID Positive in April    6. DM    7. Dyslipidemia-controlled  LDL 47 HDL 36 TG 90  Continue Crestor 20mg daily      8. Hepatitis C and fatty liver disease    I will plan a follow up visit in 2 months  Video Start Time: 7:57AM  Video End Time:0825AM    Originating Location (pt. Location): Home    Distant Location (provider location):  Home  Platform used for Video Visit: Kb    Please do not hesitate to contact me if you have any questions/concerns.     Sincerely,     Leny Snow, STEW CNP

## 2020-05-18 DIAGNOSIS — F51.01 PRIMARY INSOMNIA: ICD-10-CM

## 2020-05-18 NOTE — TELEPHONE ENCOUNTER
Controlled Substance Refill Request for   zolpidem (AMBIEN) 5 MG tablet  10 tablet  0  4/9/2020        Problem List Complete:  Yes    Last Written Prescription Date:  4/9/2020  Last Fill Quantity: 10,   # refills: 0    THE MOST RECENT OFFICE VISIT MUST BE WITHIN THE PAST 3 MONTHS. AT LEAST ONE FACE TO FACE VISIT MUST OCCUR EVERY 6 MONTHS. ADDITIONAL VISITS CAN BE VIRTUAL.  (THIS STATEMENT SHOULD BE DELETED.)    Last Office Visit with [facility] primary care provider: 1/9/2020    Future Office visit: unknown    Controlled substance agreement:   Encounter-Level CSA:    There are no encounter-level csa.     Patient-Level CSA:    There are no patient-level csa.         Last Urine Drug Screen: No results found for: CDAUT, No results found for: COMDAT, No results found for: THC13, PCP13, COC13, MAMP13, OPI13, AMP13, BZO13, TCA13, MTD13, BAR13, OXY13, PPX13, BUP13     Processing:  Rx to be electronically transmitted to pharmacy by provider     [email or web address]   checked in past 3 months?  No, route to RN

## 2020-05-19 DIAGNOSIS — F51.01 PRIMARY INSOMNIA: ICD-10-CM

## 2020-05-19 RX ORDER — ZOLPIDEM TARTRATE 5 MG/1
TABLET ORAL
Qty: 10 TABLET | Refills: 0 | OUTPATIENT
Start: 2020-05-19

## 2020-05-19 RX ORDER — ZOLPIDEM TARTRATE 5 MG/1
TABLET ORAL
Qty: 5 TABLET | Refills: 0 | Status: SHIPPED | OUTPATIENT
Start: 2020-05-19 | End: 2021-02-05

## 2020-05-19 NOTE — TELEPHONE ENCOUNTER
Routing refill request to provider for review/approval because:  Drug not on the [facility] refill protocol     BENJY LouN, RN  [employer]

## 2020-05-28 ENCOUNTER — TELEPHONE (OUTPATIENT)
Dept: SLEEP MEDICINE | Facility: CLINIC | Age: 60
End: 2020-05-28

## 2020-05-28 NOTE — TELEPHONE ENCOUNTER
Left a voice mail to schedule a consult. There is an order in the patients chart. Please schedule.    Silvana Castro

## 2020-06-25 NOTE — PROGRESS NOTES
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     ***    Patient denies jaundice, lower extremity edema, abdominal distension or confusion.  Patient also denies melena, hematochezia or hematemesis. Patient denies weight loss, fevers, sweats or chills.      Medical hx Surgical hx   Past Medical History:   Diagnosis Date     Basal cell carcinoma      Carotid stenosis, left      Coronary artery disease      Diabetes mellitus (H) 2005     Generalized anxiety disorder 5/2/2014     Hepatitis C, chronic (H) 2005     Hyperlipidemia LDL goal < 70      Hypertension      Liver diseases      Persistent microalbuminuria associated with type 2 diabetes mellitus (H) 5/6/2015      Past Surgical History:   Procedure Laterality Date     ARTHROSCOPY KNEE RT/LT      left     COLONOSCOPY       CORONARY ARTERY BYPASS  11/18/201    Coronary artery bypass grafting x 4 with placement of the left internal mammary artery to the distal midportion of the left anterior descending artery, saphenous vein graft from aorta to the third obtuse marginal branch of circumflex coronary artery, saphenous vein graft from aorta to the second diagonal branch, saphenous vein graft from aorta to the posterior descending artery.     ENDARTERECTOMY CAROTID Left 2/21/2020    Procedure: LEFT CAROTID ENDARTERECTOMY WITH EEG;  Surgeon: Semaj Stubbs MD;  Location:  OR     ESOPHAGOSCOPY, GASTROSCOPY, DUODENOSCOPY (EGD), COMBINED  10/31/2011    Procedure:COMBINED ESOPHAGOSCOPY, GASTROSCOPY, DUODENOSCOPY (EGD); Surgeon:ALONSO ALDANA; Location: GI     ESOPHAGOSCOPY, GASTROSCOPY, DUODENOSCOPY (EGD), COMBINED N/A 3/8/2018    Procedure: COMBINED ESOPHAGOSCOPY, GASTROSCOPY, DUODENOSCOPY (EGD);   EGD;  Surgeon: Angel Luis Justice MD;  Location: [facility] GI     HEART CATH CORONARY ANGIOGRAM W/LV GRAM  9-11-10    CV Surgery recommended     HEART CATH CORONARY ANGIOGRAM W/LV GRAM  2-28-14    Medical management     HERNIA REPAIR, INGUINAL RT/LT      left          Medications  Prior to Admission medications    Medication Sig Start Date End Date Taking? Authorizing Provider   albuterol (PROAIR HFA/PROVENTIL HFA/VENTOLIN HFA) 108 (90 Base) MCG/ACT inhaler Inhale 2 puffs into the lungs every 4 hours as needed for shortness of breath / dyspnea or wheezing 4/8/20   Jailyn Medel MD   aspirin 81 MG tablet Take 1 tablet by mouth daily.    Reported, Patient   chlorhexidine (PERIDEX) 0.12 % solution Take 15 mLs by mouth 2 times daily as needed  11/2/17   Reported, Patient   cyanocobalamin 1000 MCG SUBL Place 1,000 mcg under the tongue daily    Reported, Patient   insulin detemir (LEVEMIR PEN) 100 UNIT/ML pen Inject 50 Units Subcutaneous At Bedtime 4/20/20   Jailyn Medel MD   insulin pen needle (B-D U/F) 31G X 8 MM miscellaneous Use one pen needles daily or as directed. 4/19/19   Ana Olvera MD   insulin pen needle (BD HEIKE U/F) 32G X 4 MM Use 1 daily or as directed. 3/13/15   Jace Mayo MD   lisinopril (ZESTRIL) 10 MG tablet One tablet daily with extra 1/2 pill daily prn  BP over 160 5/6/20   Leny Snow M, APRN CNP   nebivolol (BYSTOLIC) 5 MG tablet Take 1 tablet (5 mg) by mouth daily 1/23/20   Zeb Roberts MD   rosuvastatin (CRESTOR) 20 MG tablet Take 1 tablet (20 mg) by mouth daily 1/23/20   Zeb Roberts MD   Vitamin D, Cholecalciferol, 1000 units TABS Take 1,000 Units by mouth daily    Reported, Patient   zolpidem (AMBIEN) 5 MG tablet TAKE 1 TAB ORALLY AS NEEDED FOR SLEEP 5/19/20   Jailyn Medel MD       Allergies  Allergies   Allergen Reactions     Chlorthalidone Nausea and Vomiting     dizziness     Penicillins Rash     Rash with PCN only. Patient has  taken amoxicillin with no rash.       Family hx Social hx   Family History   Problem Relation Age of Onset     C.A.D. Father      Cancer Father         bladder     Heart Surgery Father         bypass surgery     Heart Disease Mother       Social History     Tobacco Use     Smoking status: Former Smoker     Packs/day: 0.50     Years: 12.00     Pack years: 6.00     Types: Cigarettes     Start date: 1993     Last attempt to quit: 1/26/2005     Years since quitting: 15.4     Smokeless tobacco: Never Used   Substance Use Topics     Alcohol use: Yes     Alcohol/week: 0.0 standard drinks     Comment: minimal     Drug use: No          Review of systems  A 10-point review of systems was negative.    Examination  No vitals or exam, telephone visit    Laboratory  ***    Radiology  Abdominal US 10/2019:  Coarse hepatic echotexture, in keeping with patient's known cirrhosis.  Increased hepatic echogenicity suggests superimposed hepatic  steatosis.  No focal hepatic mass lesions suspected.    EGD 3/2018:  Impression:          - Normal esophagus.                        - Normal stomach.                        - Normal examined duodenum.   Recommendation:                         - Repeat upper endoscopy in 3 years for screening                        purposes.     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     #. Compensated cirrhosis 2/2 CHAU  MELD-Na: 13 (based on most updated labs)  - Hepatic Encephalopathy: None  - Ascites: None  - EV: EGD 3/2018 without esophageal or gastric varices    Plan  [ ] weight loss  [ ] metformin?  - EV screening: due 3/2021  - HCC Screening: recommend US + AFP q6 month, due now ***     Vaccinations  - Annual flu shot and pneumonia vaccine q5 years  - Hepatitis B***    Mary Quinn,  MD  Hepatology Fellow  [facility]  [phone]

## 2020-06-26 ENCOUNTER — ANCILLARY PROCEDURE (OUTPATIENT)
Dept: ULTRASOUND IMAGING | Facility: CLINIC | Age: 60
End: 2020-06-26
Attending: INTERNAL MEDICINE
Payer: COMMERCIAL

## 2020-06-26 ENCOUNTER — VIRTUAL VISIT (OUTPATIENT)
Dept: GASTROENTEROLOGY | Facility: CLINIC | Age: 60
End: 2020-06-26
Attending: INTERNAL MEDICINE
Payer: COMMERCIAL

## 2020-06-26 DIAGNOSIS — K74.60 CIRRHOSIS OF LIVER WITHOUT ASCITES, UNSPECIFIED HEPATIC CIRRHOSIS TYPE (H): ICD-10-CM

## 2020-06-26 DIAGNOSIS — K74.60 CIRRHOSIS OF LIVER WITHOUT ASCITES, UNSPECIFIED HEPATIC CIRRHOSIS TYPE (H): Primary | ICD-10-CM

## 2020-06-26 LAB
AFP SERPL-MCNC: <1.5 UG/L (ref 0–8)
ALBUMIN SERPL-MCNC: 4 G/DL (ref 3.4–5)
ALP SERPL-CCNC: 62 U/L (ref 40–150)
ALT SERPL W P-5'-P-CCNC: 48 U/L (ref 0–70)
ANION GAP SERPL CALCULATED.3IONS-SCNC: 3 MMOL/L (ref 3–14)
AST SERPL W P-5'-P-CCNC: 36 U/L (ref 0–45)
BILIRUB DIRECT SERPL-MCNC: 0.4 MG/DL (ref 0–0.2)
BILIRUB SERPL-MCNC: 1.4 MG/DL (ref 0.2–1.3)
BUN SERPL-MCNC: 16 MG/DL (ref 7–30)
CALCIUM SERPL-MCNC: 9.4 MG/DL (ref 8.5–10.1)
CHLORIDE SERPL-SCNC: 104 MMOL/L (ref 94–109)
CO2 SERPL-SCNC: 28 MMOL/L (ref 20–32)
CREAT SERPL-MCNC: 0.93 MG/DL (ref 0.66–1.25)
ERYTHROCYTE [DISTWIDTH] IN BLOOD BY AUTOMATED COUNT: 12.8 % (ref 10–15)
GFR SERPL CREATININE-BSD FRML MDRD: 89 ML/MIN/{1.73_M2}
GLUCOSE SERPL-MCNC: 154 MG/DL (ref 70–99)
HCT VFR BLD AUTO: 44.8 % (ref 40–53)
HGB BLD-MCNC: 15.1 G/DL (ref 13.3–17.7)
INR PPP: 1.13 (ref 0.86–1.14)
MCH RBC QN AUTO: 30.6 PG (ref 26.5–33)
MCHC RBC AUTO-ENTMCNC: 33.7 G/DL (ref 31.5–36.5)
MCV RBC AUTO: 91 FL (ref 78–100)
PLATELET # BLD AUTO: 165 10E9/L (ref 150–450)
POTASSIUM SERPL-SCNC: 5.2 MMOL/L (ref 3.4–5.3)
PROT SERPL-MCNC: 7.4 G/DL (ref 6.8–8.8)
RBC # BLD AUTO: 4.94 10E12/L (ref 4.4–5.9)
SODIUM SERPL-SCNC: 135 MMOL/L (ref 133–144)
WBC # BLD AUTO: 5.7 10E9/L (ref 4–11)

## 2020-06-26 PROCEDURE — 80048 BASIC METABOLIC PNL TOTAL CA: CPT | Performed by: INTERNAL MEDICINE

## 2020-06-26 PROCEDURE — 36415 COLL VENOUS BLD VENIPUNCTURE: CPT | Performed by: INTERNAL MEDICINE

## 2020-06-26 PROCEDURE — 80076 HEPATIC FUNCTION PANEL: CPT | Performed by: INTERNAL MEDICINE

## 2020-06-26 PROCEDURE — 85027 COMPLETE CBC AUTOMATED: CPT | Performed by: INTERNAL MEDICINE

## 2020-06-26 PROCEDURE — 82105 ALPHA-FETOPROTEIN SERUM: CPT | Performed by: INTERNAL MEDICINE

## 2020-06-26 PROCEDURE — 85610 PROTHROMBIN TIME: CPT | Performed by: INTERNAL MEDICINE

## 2020-06-26 ASSESSMENT — PAIN SCALES - GENERAL: PAINLEVEL: NO PAIN (0)

## 2020-06-26 NOTE — LETTER
"    6/26/2020         RE: Vikash Burgos  [address]  [place] [address]        Dear Colleague,    Thank you for referring your patient, Vikash Burgos, to the [facility] HEPATOLOGY. Please see a copy of my visit note below.    Vikash Burgos is a 60 year old male who is being evaluated via a billable telephone visit.      The patient has been notified of following:     \"This telephone visit will be conducted via a call between you and your physician/provider. We have found that certain health care needs can be provided without the need for a physical exam.  This service lets us provide the care you need with a short phone conversation.  If a prescription is necessary we can send it directly to your pharmacy.  If lab work is needed we can place an order for that and you can then stop by our lab to have the test done at a later time.    Telephone visits are billed at different rates depending on your insurance coverage. During this emergency period, for some insurers they may be billed the same as an in-person visit.  Please reach out to your insurance provider with any questions.    If during the course of the call the physician/provider feels a telephone visit is not appropriate, you will not be charged for this service.\"    Patient has given verbal consent for Telephone visit?  Yes    What phone number would you like to be contacted at? [phone]    How would you like to obtain your AVS? Rob    I had the pleasure of seeing Kelton Burgos for followup in the Liver Clinic at the [facility] on 10/25/2019.  Mr. Burgos returns for followup of cirrhosis caused by nonalcoholic fatty liver disease.      The major new event is that he had a stroke during a carotid endarterectomy.  The primary manifestations are right-sided weakness.  He did have imaging both a CT and MRI that did not reveal the distribution of ischemia or any bleed.  He is currently getting physical " therapy and is seeing a neurologist.  It sounds as though he has a fair amount of debility related to the stroke    From a liver disease standpoint, he is doing well.  He denies any abdominal pain, itching or skin rash or fatigue.  He denies any increased abdominal girth or lower extremity edema.  He has not had any gastrointestinal bleeding or any overt signs of hepatic encephalopathy.  He denies any fevers or chills, cough or shortness of breath.  He denies any nausea or vomiting, diarrhea or constipation.  His appetite has been improving.  He lost a fair amount of weight weight after the stroke but is now approaching his ideal weight.     Current Outpatient Medications   Medication     albuterol (PROAIR HFA/PROVENTIL HFA/VENTOLIN HFA) 108 (90 Base) MCG/ACT inhaler     aspirin 81 MG tablet     chlorhexidine (PERIDEX) 0.12 % solution     cyanocobalamin 1000 MCG SUBL     insulin detemir (LEVEMIR PEN) 100 UNIT/ML pen     insulin pen needle (B-D U/F) 31G X 8 MM miscellaneous     insulin pen needle (BD HEIKE U/F) 32G X 4 MM     lisinopril (ZESTRIL) 10 MG tablet     nebivolol (BYSTOLIC) 5 MG tablet     rosuvastatin (CRESTOR) 20 MG tablet     Vitamin D, Cholecalciferol, 1000 units TABS     zolpidem (AMBIEN) 5 MG tablet     No current facility-administered medications for this visit.      On physical examination, he clearly has some slurred speech.  His affect was somewhat depressed.    Recent Results (from the past 168 hour(s))   INR [[id number]]    Collection Time: 06/26/20  6:55 AM   Result Value Ref Range    INR 1.13 0.86 - 1.14   CBC with platelets [[id number]]    Collection Time: 06/26/20  6:55 AM   Result Value Ref Range    WBC 5.7 4.0 - 11.0 10e9/L    RBC Count 4.94 4.4 - 5.9 10e12/L    Hemoglobin 15.1 13.3 - 17.7 g/dL    Hematocrit 44.8 40.0 - 53.0 %    MCV 91 78 - 100 fl    MCH 30.6 26.5 - 33.0 pg    MCHC 33.7 31.5 - 36.5 g/dL    RDW 12.8 10.0 - 15.0 %    Platelet Count 165 150 - 450 10e9/L   Basic metabolic panel  [LAB15]    Collection Time: 06/26/20  6:55 AM   Result Value Ref Range    Sodium 135 133 - 144 mmol/L    Potassium 5.2 3.4 - 5.3 mmol/L    Chloride 104 94 - 109 mmol/L    Carbon Dioxide 28 20 - 32 mmol/L    Anion Gap 3 3 - 14 mmol/L    Glucose 154 (H) 70 - 99 mg/dL    Urea Nitrogen 16 7 - 30 mg/dL    Creatinine 0.93 0.66 - 1.25 mg/dL    GFR Estimate 89 >60 mL/min/[1.73_m2]    GFR Estimate If Black >90 >60 mL/min/[1.73_m2]    Calcium 9.4 8.5 - 10.1 mg/dL   Hepatic Panel [LAB20]    Collection Time: 06/26/20  6:55 AM   Result Value Ref Range    Bilirubin Direct 0.4 (H) 0.0 - 0.2 mg/dL    Bilirubin Total 1.4 (H) 0.2 - 1.3 mg/dL    Albumin 4.0 3.4 - 5.0 g/dL    Protein Total 7.4 6.8 - 8.8 g/dL    Alkaline Phosphatase 62 40 - 150 U/L    ALT 48 0 - 70 U/L    AST 36 0 - 45 U/L      His ultrasound shows a cirrhotic appearing liver without mass lesions or ascites.    My impression is Mr. Burgos has cirrhosis caused by nonalcoholic fatty liver disease.  His disease remains very well compensated at this point in time.      Obviously, his general state of health is dominated by his recent stroke.  He did have a number of tests performed by his neurologist, which I have asked him to forward to me so we can discuss them further.  I also encouraged him to continue with his physical therapy.  I will plan on seeing him back in the clinic in 6 months for repeat ultrasound and blood work.     Thank you very much for allowing me to participate in the care of this patient.  If you have any questions regarding my recommendations, please do not hesitate to contact me.         Kevin Bedolla MD      Professor of Medicine  [employer]      Executive Medical Director, Solid Organ Transplant Program  [facility]     Phone call duration: 15 minutes with 10 minutes for documentation.

## 2020-06-26 NOTE — PROGRESS NOTES
"Vikash Burgos is a 60 year old male who is being evaluated via a billable telephone visit.      The patient has been notified of following:     \"This telephone visit will be conducted via a call between you and your physician/provider. We have found that certain health care needs can be provided without the need for a physical exam.  This service lets us provide the care you need with a short phone conversation.  If a prescription is necessary we can send it directly to your pharmacy.  If lab work is needed we can place an order for that and you can then stop by our lab to have the test done at a later time.    Telephone visits are billed at different rates depending on your insurance coverage. During this emergency period, for some insurers they may be billed the same as an in-person visit.  Please reach out to your insurance provider with any questions.    If during the course of the call the physician/provider feels a telephone visit is not appropriate, you will not be charged for this service.\"    Patient has given verbal consent for Telephone visit?  Yes    What phone number would you like to be contacted at? [phone]    How would you like to obtain your AVS? Rob    I had the pleasure of seeing Kelton Burgos for followup in the Liver Clinic at the [facility] on 10/25/2019.  Mr. Burgos returns for followup of cirrhosis caused by nonalcoholic fatty liver disease.      The major new event is that he had a stroke during a carotid endarterectomy.  The primary manifestations are right-sided weakness.  He did have imaging both a CT and MRI that did not reveal the distribution of ischemia or any bleed.  He is currently getting physical therapy and is seeing a neurologist.  It sounds as though he has a fair amount of debility related to the stroke    From a liver disease standpoint, he is doing well.  He denies any abdominal pain, itching or skin rash or fatigue.  He denies any " increased abdominal girth or lower extremity edema.  He has not had any gastrointestinal bleeding or any overt signs of hepatic encephalopathy.  He denies any fevers or chills, cough or shortness of breath.  He denies any nausea or vomiting, diarrhea or constipation.  His appetite has been improving.  He lost a fair amount of weight weight after the stroke but is now approaching his ideal weight.     Current Outpatient Medications   Medication     albuterol (PROAIR HFA/PROVENTIL HFA/VENTOLIN HFA) 108 (90 Base) MCG/ACT inhaler     aspirin 81 MG tablet     chlorhexidine (PERIDEX) 0.12 % solution     cyanocobalamin 1000 MCG SUBL     insulin detemir (LEVEMIR PEN) 100 UNIT/ML pen     insulin pen needle (B-D U/F) 31G X 8 MM miscellaneous     insulin pen needle (BD HEIKE U/F) 32G X 4 MM     lisinopril (ZESTRIL) 10 MG tablet     nebivolol (BYSTOLIC) 5 MG tablet     rosuvastatin (CRESTOR) 20 MG tablet     Vitamin D, Cholecalciferol, 1000 units TABS     zolpidem (AMBIEN) 5 MG tablet     No current facility-administered medications for this visit.      On physical examination, he clearly has some slurred speech.  His affect was somewhat depressed.    Recent Results (from the past 168 hour(s))   INR [[id number]]    Collection Time: 06/26/20  6:55 AM   Result Value Ref Range    INR 1.13 0.86 - 1.14   CBC with platelets [[id number]]    Collection Time: 06/26/20  6:55 AM   Result Value Ref Range    WBC 5.7 4.0 - 11.0 10e9/L    RBC Count 4.94 4.4 - 5.9 10e12/L    Hemoglobin 15.1 13.3 - 17.7 g/dL    Hematocrit 44.8 40.0 - 53.0 %    MCV 91 78 - 100 fl    MCH 30.6 26.5 - 33.0 pg    MCHC 33.7 31.5 - 36.5 g/dL    RDW 12.8 10.0 - 15.0 %    Platelet Count 165 150 - 450 10e9/L   Basic metabolic panel [LAB15]    Collection Time: 06/26/20  6:55 AM   Result Value Ref Range    Sodium 135 133 - 144 mmol/L    Potassium 5.2 3.4 - 5.3 mmol/L    Chloride 104 94 - 109 mmol/L    Carbon Dioxide 28 20 - 32 mmol/L    Anion Gap 3 3 - 14 mmol/L    Glucose 154 (H)  70 - 99 mg/dL    Urea Nitrogen 16 7 - 30 mg/dL    Creatinine 0.93 0.66 - 1.25 mg/dL    GFR Estimate 89 >60 mL/min/[1.73_m2]    GFR Estimate If Black >90 >60 mL/min/[1.73_m2]    Calcium 9.4 8.5 - 10.1 mg/dL   Hepatic Panel [LAB20]    Collection Time: 06/26/20  6:55 AM   Result Value Ref Range    Bilirubin Direct 0.4 (H) 0.0 - 0.2 mg/dL    Bilirubin Total 1.4 (H) 0.2 - 1.3 mg/dL    Albumin 4.0 3.4 - 5.0 g/dL    Protein Total 7.4 6.8 - 8.8 g/dL    Alkaline Phosphatase 62 40 - 150 U/L    ALT 48 0 - 70 U/L    AST 36 0 - 45 U/L      His ultrasound shows a cirrhotic appearing liver without mass lesions or ascites.    My impression is Mr. Burgos has cirrhosis caused by nonalcoholic fatty liver disease.  His disease remains very well compensated at this point in time.      Obviously, his general state of health is dominated by his recent stroke.  He did have a number of tests performed by his neurologist, which I have asked him to forward to me so we can discuss them further.  I also encouraged him to continue with his physical therapy.  I will plan on seeing him back in the clinic in 6 months for repeat ultrasound and blood work.     Thank you very much for allowing me to participate in the care of this patient.  If you have any questions regarding my recommendations, please do not hesitate to contact me.         Kevin Bedolla MD      Professor of Medicine  [employer]      Executive Medical Director, Solid Organ Transplant Program  [facility]     Phone call duration: 15 minutes with 10 minutes for documentation.

## 2020-07-30 ENCOUNTER — HOSPITAL ENCOUNTER (OUTPATIENT)
Dept: SPEECH THERAPY | Facility: CLINIC | Age: 60
Setting detail: THERAPIES SERIES
End: 2020-07-30
Attending: INTERNAL MEDICINE
Payer: COMMERCIAL

## 2020-07-30 PROCEDURE — 92526 ORAL FUNCTION THERAPY: CPT | Mod: GN,GT | Performed by: SPEECH-LANGUAGE PATHOLOGIST

## 2020-07-30 NOTE — PROGRESS NOTES
Vikash Burgos is a 60 year old male who is being seen via a billable video visit.      Patient has given verbal consent for Video visit? Yes    Video Start Time: 8:11    Telehealth Visit Details    Type of Service:  Telehealth    Video End Time (time video stopped): 8:53    Originating Location (pt. location): Home    Additional Participants in Telehealth Visit: Spouse    Distant Location (provider location):  [facility] SPEECH THERAPY     Mode of Communication (Audio Visual or Audio Only):  audio and visual.     Loren Al, SLP  July 30, 2020

## 2020-07-30 NOTE — PROGRESS NOTES
Outpatient Speech Language Pathology Progress Note     Patient: Vikash Burgso  : 1960    Beginning/End Dates of Reporting Period:  3/5/2020 to 2020    Referring Provider: Marshal Gutierrez MD     Therapy Diagnosis: Moderate oropharyngeal dysphagia    Client Self Report:   Pt is a 60 y.o. male who completed an OP SLP Clinical Swallow Evaluation on 3/5/2020 following hospitalization d/t L carotid endarterectomy and stroke. Please see the initial evaluation report in Epic for more detailed information. Pt has not been seen by speech therapy since 3/5 d/t the pandemic.     Objective Measurements: None as Pt not seen from 3/5- d/t the pandemic.              Goals:  Goal Identifier Diet/Strategies   Goal Description Patient will independently implement safe swallowing techniques to tolerate a Dysphagia 3 diet with thin liquids without c/o coughing/choking across one week per patient report to improve safety of PO intake.   Target Date 20-Extend 2020   Date Met      Progress:     Goal Identifier Exercises   Goal Description Patient will complete oropharyngeal exercises independently 2-3x daily, to increase strength and improve safety with PO intake.   Target Date 20-Extend 2020   Date Met      Progress:     Progress Toward Goals:    Not assessed this period.    Plan:  Continue therapy per current plan of care.    Discharge:  No    Thank you for referring Kelton Burg[facility].  Please call Loren Al MA, SLP-CCC at [phone] or email [email or web address] with any questions or concerns.      Loren Al M.A., SLP-CCC  Speech-Language Pathologist

## 2020-08-14 ENCOUNTER — HOSPITAL ENCOUNTER (OUTPATIENT)
Dept: SPEECH THERAPY | Facility: CLINIC | Age: 60
Setting detail: THERAPIES SERIES
End: 2020-08-14
Attending: INTERNAL MEDICINE
Payer: COMMERCIAL

## 2020-08-14 PROCEDURE — 92526 ORAL FUNCTION THERAPY: CPT | Mod: GN,GT | Performed by: SPEECH-LANGUAGE PATHOLOGIST

## 2020-08-14 NOTE — PROGRESS NOTES
Vikash Burgos is a 60 year old male who is being seen via a billable video visit.      Patient has given verbal consent for Video visit? Yes    Video Start Time: 7:289    Telehealth Visit Details    Type of Service:  Telehealth    Video End Time (time video stopped): 8:07    Originating Location (pt. location): Home    Additional Participants in Telehealth Visit: None    Distant Location (provider location):  [facility] SPEECH THERAPY     Mode of Communication (Audio Visual or Audio Only):  audio and visual.     Loren Al, SLP  August 14, 2020

## 2020-08-20 DIAGNOSIS — E11.49 DM TYPE 2 CAUSING NEUROLOGICAL DISEASE (H): ICD-10-CM

## 2020-08-20 NOTE — TELEPHONE ENCOUNTER
Refill request:    INSULIN PEN NEEDLE-- BD UF 8 MM X 31 G    Summary: Use one pen needles daily or as directed.  Disp-100 each, R-3  E-Prescribe   Start: 4/19/2019  Ord/Sold: 4/19/2019

## 2020-08-21 RX ORDER — PEN NEEDLE, DIABETIC 31 GX5/16"
NEEDLE, DISPOSABLE MISCELLANEOUS
Qty: 100 EACH | Refills: 3 | Status: SHIPPED | OUTPATIENT
Start: 2020-08-21 | End: 2021-09-13

## 2020-08-21 NOTE — TELEPHONE ENCOUNTER
Last visit with Dr. Medel 1/9/2020, follow up 2 mos    Last Rx from provider:  Ana Olvera    PPI message sent to patient advising due for appt with PCP     Routing refill request to provider for review/approval because:  Last script not from PCP     Mercedes CHARLTON RN

## 2020-09-01 ENCOUNTER — HOSPITAL ENCOUNTER (OUTPATIENT)
Dept: ULTRASOUND IMAGING | Facility: CLINIC | Age: 60
Discharge: HOME | End: 2020-09-01
Attending: SURGERY | Admitting: SURGERY
Payer: COMMERCIAL

## 2020-09-01 DIAGNOSIS — I65.22 LEFT CAROTID ARTERY STENOSIS: ICD-10-CM

## 2020-09-01 PROCEDURE — 93882 EXTRACRANIAL UNI/LTD STUDY: CPT | Mod: LT

## 2020-09-02 ENCOUNTER — OFFICE VISIT (OUTPATIENT)
Dept: OTHER | Facility: CLINIC | Age: 60
End: 2020-09-02
Attending: SURGERY
Payer: COMMERCIAL

## 2020-09-02 VITALS — DIASTOLIC BLOOD PRESSURE: 83 MMHG | SYSTOLIC BLOOD PRESSURE: 159 MMHG | HEART RATE: 59 BPM | RESPIRATION RATE: 16 BRPM

## 2020-09-02 DIAGNOSIS — Z98.890 HISTORY OF LEFT-SIDED CAROTID ENDARTERECTOMY: Primary | ICD-10-CM

## 2020-09-02 PROCEDURE — 99213 OFFICE O/P EST LOW 20 MIN: CPT | Mod: ZP | Performed by: SURGERY

## 2020-09-02 PROCEDURE — G0463 HOSPITAL OUTPT CLINIC VISIT: HCPCS

## 2020-09-02 NOTE — PROGRESS NOTES
"Vikash Burgos is a 60 year old male who presents for:  Chief Complaint   Patient presents with     RECHECK      History of left carotid endarterectomy on 2/21/20; 3 month follow up to 3/11/20 appointment with Dr. Stubbs. AGUS carotid US done 9/1/2020. NV        Vitals:    Vitals:    09/02/20 0851   BP: (!) 159/83   BP Location: Left arm   Patient Position: Chair   Cuff Size: Adult Regular   Pulse: 59   Resp: 16       BMI:  Estimated body mass index is 27.73 kg/m  as calculated from the following:    Height as of 2/21/20: 5' 11\" (1.803 m).    Weight as of 2/22/20: 198 lb 13.7 oz (90.2 kg).    Pain Score:  Data Unavailable        Betsy Cavanaugh, [facility]    "

## 2020-09-11 ENCOUNTER — TELEPHONE (OUTPATIENT)
Dept: GASTROENTEROLOGY | Facility: CLINIC | Age: 60
End: 2020-09-11

## 2020-09-11 DIAGNOSIS — I65.22 ATHEROSCLEROSIS OF LEFT CAROTID ARTERY: ICD-10-CM

## 2020-09-11 DIAGNOSIS — K74.60 CIRRHOSIS OF LIVER WITHOUT ASCITES, UNSPECIFIED HEPATIC CIRRHOSIS TYPE (H): Primary | ICD-10-CM

## 2020-09-11 DIAGNOSIS — Z98.890 HISTORY OF LEFT-SIDED CAROTID ENDARTERECTOMY: Primary | ICD-10-CM

## 2020-09-11 NOTE — TELEPHONE ENCOUNTER
Per Dr. Bedolla pt to get ultrasound and labs, orders are in. Pt updated via original [employer] message.    Flores Palmer LPN  Hepatology Clinic    -----  Connected with pt's wife, Carmina, letting her know writer is waiting to hear back from Dr. Bedolla with a plan, pt also sent [employer] message they will keep an eye on in case Dr.f Bedolla reply's.    Flores Palmer LPN  Hepatology Clinic  ---------   Health Call Center    Phone Message    May a detailed message be left on voicemail: yes     Reason for Call: Other: Patient's wife called in and stated that the patient is having liver pain. They are calling in they have not heard back from anyone yet. Please call the patient on his cell asap. Thank you.      Action Taken: Message routed to:  [facility] hep    Travel Screening: Not Applicable

## 2020-09-11 NOTE — TELEPHONE ENCOUNTER
[facility] Call Center    Phone Message    May a detailed message be left on voicemail: yes     Reason for Call: Experiencing liver pain and requesting call back from nurse to discuss.     Action Taken: Message routed to:  [facility] Hepatology    Travel Screening: Not Applicable

## 2020-09-14 ENCOUNTER — HOSPITAL ENCOUNTER (OUTPATIENT)
Dept: LAB | Facility: CLINIC | Age: 60
Discharge: HOME | End: 2020-09-14
Admitting: INTERNAL MEDICINE
Payer: COMMERCIAL

## 2020-09-14 DIAGNOSIS — K74.60 CIRRHOSIS OF LIVER WITHOUT ASCITES, UNSPECIFIED HEPATIC CIRRHOSIS TYPE (H): ICD-10-CM

## 2020-09-14 LAB
ALBUMIN SERPL-MCNC: 4.1 G/DL (ref 3.4–5)
ALP SERPL-CCNC: 58 U/L (ref 40–150)
ALT SERPL W P-5'-P-CCNC: 38 U/L (ref 0–70)
ANION GAP SERPL CALCULATED.3IONS-SCNC: 1 MMOL/L (ref 3–14)
AST SERPL W P-5'-P-CCNC: 22 U/L (ref 0–45)
BILIRUB DIRECT SERPL-MCNC: 0.4 MG/DL (ref 0–0.2)
BILIRUB SERPL-MCNC: 1.8 MG/DL (ref 0.2–1.3)
BUN SERPL-MCNC: 15 MG/DL (ref 7–30)
CALCIUM SERPL-MCNC: 9.4 MG/DL (ref 8.5–10.1)
CHLORIDE SERPL-SCNC: 104 MMOL/L (ref 94–109)
CO2 SERPL-SCNC: 31 MMOL/L (ref 20–32)
CREAT SERPL-MCNC: 0.96 MG/DL (ref 0.66–1.25)
ERYTHROCYTE [DISTWIDTH] IN BLOOD BY AUTOMATED COUNT: 12.9 % (ref 10–15)
GFR SERPL CREATININE-BSD FRML MDRD: 85 ML/MIN/{1.73_M2}
GLUCOSE SERPL-MCNC: 187 MG/DL (ref 70–99)
HCT VFR BLD AUTO: 44.3 % (ref 40–53)
HGB BLD-MCNC: 15.5 G/DL (ref 13.3–17.7)
INR PPP: 1.11 (ref 0.86–1.14)
MCH RBC QN AUTO: 30.9 PG (ref 26.5–33)
MCHC RBC AUTO-ENTMCNC: 35 G/DL (ref 31.5–36.5)
MCV RBC AUTO: 88 FL (ref 78–100)
PLATELET # BLD AUTO: 161 10E9/L (ref 150–450)
POTASSIUM SERPL-SCNC: 5.2 MMOL/L (ref 3.4–5.3)
PROT SERPL-MCNC: 7.5 G/DL (ref 6.8–8.8)
RBC # BLD AUTO: 5.01 10E12/L (ref 4.4–5.9)
SODIUM SERPL-SCNC: 136 MMOL/L (ref 133–144)
WBC # BLD AUTO: 7 10E9/L (ref 4–11)

## 2020-09-14 PROCEDURE — 36415 COLL VENOUS BLD VENIPUNCTURE: CPT | Performed by: INTERNAL MEDICINE

## 2020-09-14 PROCEDURE — 80076 HEPATIC FUNCTION PANEL: CPT | Performed by: INTERNAL MEDICINE

## 2020-09-14 PROCEDURE — 85027 COMPLETE CBC AUTOMATED: CPT | Performed by: INTERNAL MEDICINE

## 2020-09-14 PROCEDURE — 85610 PROTHROMBIN TIME: CPT | Performed by: INTERNAL MEDICINE

## 2020-09-14 PROCEDURE — 80048 BASIC METABOLIC PNL TOTAL CA: CPT | Performed by: INTERNAL MEDICINE

## 2020-09-14 NOTE — TELEPHONE ENCOUNTER
Patient added to Dr. Cervantes schedule on 9/17/2020, pt aware.    Flores Palmer LPN  Hepatology Clinic      -------   Health Call Center    Phone Message    May a detailed message be left on voicemail: yes     Reason for Call: Other: Kelton calling to schedule his labs and an appointment with Dr. Bedolla for Tues or Wed.  He has an US scheduled for 9/15 and labs on 9/16.  He states that Dr. Bedolla told him to have him put on his schedule in the next few days and that Dr. Bedolla would make that work.  Please call him back to discuss scheduling options     Action Taken: Message routed to:  [facility]    Travel Screening: Not Applicable

## 2020-09-15 ENCOUNTER — ANCILLARY PROCEDURE (OUTPATIENT)
Dept: ULTRASOUND IMAGING | Facility: CLINIC | Age: 60
End: 2020-09-15
Attending: INTERNAL MEDICINE
Payer: COMMERCIAL

## 2020-09-15 DIAGNOSIS — K74.60 CIRRHOSIS OF LIVER WITHOUT ASCITES, UNSPECIFIED HEPATIC CIRRHOSIS TYPE (H): ICD-10-CM

## 2020-09-17 ENCOUNTER — OFFICE VISIT (OUTPATIENT)
Dept: GASTROENTEROLOGY | Facility: CLINIC | Age: 60
End: 2020-09-17
Attending: INTERNAL MEDICINE
Payer: COMMERCIAL

## 2020-09-17 VITALS
RESPIRATION RATE: 16 BRPM | HEIGHT: 71 IN | BODY MASS INDEX: 27.02 KG/M2 | WEIGHT: 193 LBS | HEART RATE: 67 BPM | SYSTOLIC BLOOD PRESSURE: 148 MMHG | OXYGEN SATURATION: 97 % | TEMPERATURE: 97.8 F | DIASTOLIC BLOOD PRESSURE: 87 MMHG

## 2020-09-17 DIAGNOSIS — I65.22 ATHEROSCLEROSIS OF LEFT CAROTID ARTERY: Chronic | ICD-10-CM

## 2020-09-17 DIAGNOSIS — K74.60 CIRRHOSIS OF LIVER WITHOUT ASCITES, UNSPECIFIED HEPATIC CIRRHOSIS TYPE (H): Primary | ICD-10-CM

## 2020-09-17 PROCEDURE — G0463 HOSPITAL OUTPT CLINIC VISIT: HCPCS | Mod: ZF

## 2020-09-17 RX ORDER — GABAPENTIN 300 MG/1
300 CAPSULE ORAL 3 TIMES DAILY
Qty: 90 CAPSULE | Refills: 4 | Status: SHIPPED | OUTPATIENT
Start: 2020-09-17 | End: 2022-05-11

## 2020-09-17 ASSESSMENT — PAIN SCALES - GENERAL: PAINLEVEL: MODERATE PAIN (5)

## 2020-09-17 ASSESSMENT — MIFFLIN-ST. JEOR: SCORE: 1707.31

## 2020-09-17 NOTE — PROGRESS NOTES
I had the pleasure of seeing Kelton Burgos for followup in the Liver Clinic at the [facility] on 09/17/2020.  Mr. Burgos returns for followup of cirrhosis caused by nonalcoholic fatty liver disease.  I asked him to come in early because he was complaining of worsening right upper quadrant pain.      He has had chronic right upper quadrant pain which was attributed to his fatty liver disease.  He is quite emphatic that this is different with intermittent sharp, stabbing and burning pains.  It is not related to meals.  It does not appear to be positional and is distinctly different than what he had before he had his stroke.      He denies any itching or skin rash.  He has only mild fatigue.  He denies any increased abdominal girth or lower extremity edema.  He denies any gastrointestinal bleeding or any overt signs of hepatic encephalopathy.      He denies any fevers or chills, cough or shortness of breath.  He denies any nausea or vomiting, diarrhea or constipation.  His appetite has been good.  His weight is down intentionally.      He does report that his diabetes has been under good control.     Current Outpatient Medications   Medication     albuterol (PROAIR HFA/PROVENTIL HFA/VENTOLIN HFA) 108 (90 Base) MCG/ACT inhaler     aspirin 81 MG tablet     chlorhexidine (PERIDEX) 0.12 % solution     cyanocobalamin 1000 MCG SUBL     gabapentin (NEURONTIN) 300 MG capsule     insulin detemir (LEVEMIR PEN) 100 UNIT/ML pen     insulin pen needle (B-D U/F) 31G X 8 MM miscellaneous     insulin pen needle (BD HEIKE U/F) 32G X 4 MM     lisinopril (ZESTRIL) 10 MG tablet     nebivolol (BYSTOLIC) 5 MG tablet     rosuvastatin (CRESTOR) 20 MG tablet     Vitamin D, Cholecalciferol, 1000 units TABS     zolpidem (AMBIEN) 5 MG tablet     No current facility-administered medications for this visit.      BP (!) 148/87 (BP Location: Right arm, Patient Position: Sitting, Cuff Size: Adult Large)   Pulse 67    "Temp 97.8  F (36.6  C) (Oral)   Resp 16   Ht 1.803 m (5' 10.98\")   Wt 87.5 kg (193 lb)   SpO2 97%   BMI 26.93 kg/m      HEENT:  Exam shows no scleral icterus or temporal muscle wasting.  His chest is clear.  His abdominal exam shows no increase in girth.  No masses or tenderness to palpation are present.  His liver is 10 cm in span without left lobe enlargement.  No spleen tip is palpable, and extremity exam shows no edema.  Skin exam shows no stigmata of chronic liver disease.  Neurologic exam shows some right-sided weakness related to his recent stroke.     Recent Results (from the past 168 hour(s))   INR    Collection Time: 09/14/20  8:56 AM   Result Value Ref Range    INR 1.11 0.86 - 1.14   Basic metabolic panel    Collection Time: 09/14/20  8:56 AM   Result Value Ref Range    Sodium 136 133 - 144 mmol/L    Potassium 5.2 3.4 - 5.3 mmol/L    Chloride 104 94 - 109 mmol/L    Carbon Dioxide 31 20 - 32 mmol/L    Anion Gap 1 (L) 3 - 14 mmol/L    Glucose 187 (H) 70 - 99 mg/dL    Urea Nitrogen 15 7 - 30 mg/dL    Creatinine 0.96 0.66 - 1.25 mg/dL    GFR Estimate 85 >60 mL/min/[1.73_m2]    GFR Estimate If Black >90 >60 mL/min/[1.73_m2]    Calcium 9.4 8.5 - 10.1 mg/dL   Hepatic panel    Collection Time: 09/14/20  8:56 AM   Result Value Ref Range    Bilirubin Direct 0.4 (H) 0.0 - 0.2 mg/dL    Bilirubin Total 1.8 (H) 0.2 - 1.3 mg/dL    Albumin 4.1 3.4 - 5.0 g/dL    Protein Total 7.5 6.8 - 8.8 g/dL    Alkaline Phosphatase 58 40 - 150 U/L    ALT 38 0 - 70 U/L    AST 22 0 - 45 U/L   CBC with platelets    Collection Time: 09/14/20  8:56 AM   Result Value Ref Range    WBC 7.0 4.0 - 11.0 10e9/L    RBC Count 5.01 4.4 - 5.9 10e12/L    Hemoglobin 15.5 13.3 - 17.7 g/dL    Hematocrit 44.3 40.0 - 53.0 %    MCV 88 78 - 100 fl    MCH 30.9 26.5 - 33.0 pg    MCHC 35.0 31.5 - 36.5 g/dL    RDW 12.9 10.0 - 15.0 %    Platelet Count 161 150 - 450 10e9/L      Exam: US ABDOMEN COMPLETE, 9/15/2020 10:45 AM     Indication: Patient at high risk " for HCC. Cirrhosis of liver without  ascites, unspecified hepatic cirrhosis type (H)     Comparison: 6/26/2020     Technique: The abdomen was scanned in the standard fashion with  specialized ultrasound transducer(s) using both gray scale and limited  color/spectral Doppler techniques.     Findings:     Liver:     The liver demonstrates coarsened, echogenic echotexture measuring 14.9  cm longitudinally. Relative hypoechogenicity in the gallbladder fossa,  favoring focal fatty sparing. No mass or intrahepatic biliary ductal  dilatation. The main portal vein is patent with antegrade flow, 1.3 cm  in diameter.     US visualization score: B - Moderate limitations     Gallbladder: The gallbladder is well distended and of normal  morphology. There is no wall thickening, pericholecystic fluid,  sonographic Mora's sign nor evidence for cholelithiasis.     Bile Ducts: Both the intra- and extrahepatic biliary system are of  normal caliber. The common bile duct measures 4 mm in diameter.     Pancreas: Visualized portions of the head and body of the pancreas are  unremarkable.      Kidneys: Both kidneys are of normal echotexture, without mass nor  hydronephrosis.  The craniocaudal dimensions are: right- 12 cm, left-  12.3 cm.     Spleen: The spleen is normal in size, measuring 12.6 cm in sagittal  dimension.     Aorta and IVC: The visualized portions of the aorta and IVC are  unremarkable.      Fluid: No evidence of ascites or pleural effusions.                                                              Impression:  1. Cirrhosis without focal liver lesion.  a. LI-RADS US Category: US-1 Negative: No US evidence of HCC.  b. Recommend continued surveillance US.    IMPRESSION:  My impression is that Mr. Burgos has cirrhosis caused by nonalcoholic fatty liver disease.  His ultrasound and blood work all completely unchanged.  I am not sure what this pain is, but it almost sounds neuropathic and perhaps it may relate to his  stroke.  He had a very unfortunate experience with the neurologist who was assigned to him post-discharge and I have sent in a Neurology referral here to address with him, first, his prognosis from his stroke and whether anything else can be done to try to improve that prognosis.  I will try gabapentin 300 mg 3 times a day to see if that produces any improvement in the pain.  He otherwise does have an appointment to see me again in 3 months.      Thank you very much for allowing me to participate in the care of this patient.  If you have any questions regarding my recommendations, please do not hesitate to contact me.        Kevin Bedolla MD      Professor of Medicine  [employer]      Executive Medical Director, Solid Organ Transplant Program  [facility]

## 2020-09-17 NOTE — LETTER
"    9/17/2020         RE: Vikash Burgos  [address]  [place] [address]        Dear Colleague,    Thank you for referring your patient, Vikash Burgos, to the [facility] HEPATOLOGY. Please see a copy of my visit note below.    Chief Complaint   Patient presents with     RECHECK     Follow-up fatty liver        Vital signs:  Temp: 97.8  F (36.6  C) Temp src: Oral BP: (!) 148/87 Pulse: 67   Resp: 16 SpO2: 97 %     Height: 180.3 cm (5' 10.98\") Weight: 87.5 kg (193 lb)  Estimated body mass index is 26.93 kg/m  as calculated from the following:    Height as of this encounter: 1.803 m (5' 10.98\").    Weight as of this encounter: 87.5 kg (193 lb).        Maritza Dodson, [facility]  9/17/2020 8:33 AM        I had the pleasure of seeing Kelton Burgos for followup in the Liver Clinic at the [facility] on 09/17/2020.  Mr. Burgos returns for followup of cirrhosis caused by nonalcoholic fatty liver disease.  I asked him to come in early because he was complaining of worsening right upper quadrant pain.      He has had chronic right upper quadrant pain which was attributed to his fatty liver disease.  He is quite emphatic that this is different with intermittent sharp, stabbing and burning pains.  It is not related to meals.  It does not appear to be positional and is distinctly different than what he had before he had his stroke.      He denies any itching or skin rash.  He has only mild fatigue.  He denies any increased abdominal girth or lower extremity edema.  He denies any gastrointestinal bleeding or any overt signs of hepatic encephalopathy.      He denies any fevers or chills, cough or shortness of breath.  He denies any nausea or vomiting, diarrhea or constipation.  His appetite has been good.  His weight is down intentionally.      He does report that his diabetes has been under good control.     Current Outpatient Medications   Medication     albuterol (PROAIR " "HFA/PROVENTIL HFA/VENTOLIN HFA) 108 (90 Base) MCG/ACT inhaler     aspirin 81 MG tablet     chlorhexidine (PERIDEX) 0.12 % solution     cyanocobalamin 1000 MCG SUBL     gabapentin (NEURONTIN) 300 MG capsule     insulin detemir (LEVEMIR PEN) 100 UNIT/ML pen     insulin pen needle (B-D U/F) 31G X 8 MM miscellaneous     insulin pen needle (BD HEIKE U/F) 32G X 4 MM     lisinopril (ZESTRIL) 10 MG tablet     nebivolol (BYSTOLIC) 5 MG tablet     rosuvastatin (CRESTOR) 20 MG tablet     Vitamin D, Cholecalciferol, 1000 units TABS     zolpidem (AMBIEN) 5 MG tablet     No current facility-administered medications for this visit.      BP (!) 148/87 (BP Location: Right arm, Patient Position: Sitting, Cuff Size: Adult Large)   Pulse 67   Temp 97.8  F (36.6  C) (Oral)   Resp 16   Ht 1.803 m (5' 10.98\")   Wt 87.5 kg (193 lb)   SpO2 97%   BMI 26.93 kg/m      HEENT:  Exam shows no scleral icterus or temporal muscle wasting.  His chest is clear.  His abdominal exam shows no increase in girth.  No masses or tenderness to palpation are present.  His liver is 10 cm in span without left lobe enlargement.  No spleen tip is palpable, and extremity exam shows no edema.  Skin exam shows no stigmata of chronic liver disease.  Neurologic exam shows some right-sided weakness related to his recent stroke.     Recent Results (from the past 168 hour(s))   INR    Collection Time: 09/14/20  8:56 AM   Result Value Ref Range    INR 1.11 0.86 - 1.14   Basic metabolic panel    Collection Time: 09/14/20  8:56 AM   Result Value Ref Range    Sodium 136 133 - 144 mmol/L    Potassium 5.2 3.4 - 5.3 mmol/L    Chloride 104 94 - 109 mmol/L    Carbon Dioxide 31 20 - 32 mmol/L    Anion Gap 1 (L) 3 - 14 mmol/L    Glucose 187 (H) 70 - 99 mg/dL    Urea Nitrogen 15 7 - 30 mg/dL    Creatinine 0.96 0.66 - 1.25 mg/dL    GFR Estimate 85 >60 mL/min/[1.73_m2]    GFR Estimate If Black >90 >60 mL/min/[1.73_m2]    Calcium 9.4 8.5 - 10.1 mg/dL   Hepatic panel    Collection " Time: 09/14/20  8:56 AM   Result Value Ref Range    Bilirubin Direct 0.4 (H) 0.0 - 0.2 mg/dL    Bilirubin Total 1.8 (H) 0.2 - 1.3 mg/dL    Albumin 4.1 3.4 - 5.0 g/dL    Protein Total 7.5 6.8 - 8.8 g/dL    Alkaline Phosphatase 58 40 - 150 U/L    ALT 38 0 - 70 U/L    AST 22 0 - 45 U/L   CBC with platelets    Collection Time: 09/14/20  8:56 AM   Result Value Ref Range    WBC 7.0 4.0 - 11.0 10e9/L    RBC Count 5.01 4.4 - 5.9 10e12/L    Hemoglobin 15.5 13.3 - 17.7 g/dL    Hematocrit 44.3 40.0 - 53.0 %    MCV 88 78 - 100 fl    MCH 30.9 26.5 - 33.0 pg    MCHC 35.0 31.5 - 36.5 g/dL    RDW 12.9 10.0 - 15.0 %    Platelet Count 161 150 - 450 10e9/L      Exam: US ABDOMEN COMPLETE, 9/15/2020 10:45 AM     Indication: Patient at high risk for HCC. Cirrhosis of liver without  ascites, unspecified hepatic cirrhosis type (H)     Comparison: 6/26/2020     Technique: The abdomen was scanned in the standard fashion with  specialized ultrasound transducer(s) using both gray scale and limited  color/spectral Doppler techniques.     Findings:     Liver:     The liver demonstrates coarsened, echogenic echotexture measuring 14.9  cm longitudinally. Relative hypoechogenicity in the gallbladder fossa,  favoring focal fatty sparing. No mass or intrahepatic biliary ductal  dilatation. The main portal vein is patent with antegrade flow, 1.3 cm  in diameter.     US visualization score: B - Moderate limitations     Gallbladder: The gallbladder is well distended and of normal  morphology. There is no wall thickening, pericholecystic fluid,  sonographic Mora's sign nor evidence for cholelithiasis.     Bile Ducts: Both the intra- and extrahepatic biliary system are of  normal caliber. The common bile duct measures 4 mm in diameter.     Pancreas: Visualized portions of the head and body of the pancreas are  unremarkable.      Kidneys: Both kidneys are of normal echotexture, without mass nor  hydronephrosis.  The craniocaudal dimensions are: right- 12  cm, left-  12.3 cm.     Spleen: The spleen is normal in size, measuring 12.6 cm in sagittal  dimension.     Aorta and IVC: The visualized portions of the aorta and IVC are  unremarkable.      Fluid: No evidence of ascites or pleural effusions.                                                              Impression:  1. Cirrhosis without focal liver lesion.  a. LI-RADS US Category: US-1 Negative: No US evidence of HCC.  b. Recommend continued surveillance US.    IMPRESSION:  My impression is that Mr. Burgos has cirrhosis caused by nonalcoholic fatty liver disease.  His ultrasound and blood work all completely unchanged.  I am not sure what this pain is, but it almost sounds neuropathic and perhaps it may relate to his stroke.  He had a very unfortunate experience with the neurologist who was assigned to him post-discharge and I have sent in a Neurology referral here to address with him, first, his prognosis from his stroke and whether anything else can be done to try to improve that prognosis.  I will try gabapentin 300 mg 3 times a day to see if that produces any improvement in the pain.  He otherwise does have an appointment to see me again in 3 months.      Thank you very much for allowing me to participate in the care of this patient.  If you have any questions regarding my recommendations, please do not hesitate to contact me.        Kevin Bedolla MD      Professor of Medicine  [employer]      Executive Medical Director, Solid Organ Transplant Program  [facility]

## 2020-09-17 NOTE — LETTER
"    9/17/2020         RE: Vikash Burgos  [address]  [place] [address]      Chief Complaint   Patient presents with     RECHECK     Follow-up fatty liver        Vital signs:  Temp: 97.8  F (36.6  C) Temp src: Oral BP: (!) 148/87 Pulse: 67   Resp: 16 SpO2: 97 %     Height: 180.3 cm (5' 10.98\") Weight: 87.5 kg (193 lb)  Estimated body mass index is 26.93 kg/m  as calculated from the following:    Height as of this encounter: 1.803 m (5' 10.98\").    Weight as of this encounter: 87.5 kg (193 lb).        Maritza Dodson, [facility]  9/17/2020 8:33 AM        I had the pleasure of seeing Kelton Burgos for followup in the Liver Clinic at the [facility] on 09/17/2020.  Mr. Burgos returns for followup of cirrhosis caused by nonalcoholic fatty liver disease.  I asked him to come in early because he was complaining of worsening right upper quadrant pain.      He has had chronic right upper quadrant pain which was attributed to his fatty liver disease.  He is quite emphatic that this is different with intermittent sharp, stabbing and burning pains.  It is not related to meals.  It does not appear to be positional and is distinctly different than what he had before he had his stroke.      He denies any itching or skin rash.  He has only mild fatigue.  He denies any increased abdominal girth or lower extremity edema.  He denies any gastrointestinal bleeding or any overt signs of hepatic encephalopathy.      He denies any fevers or chills, cough or shortness of breath.  He denies any nausea or vomiting, diarrhea or constipation.  His appetite has been good.  His weight is down intentionally.      He does report that his diabetes has been under good control.     Current Outpatient Medications   Medication     albuterol (PROAIR HFA/PROVENTIL HFA/VENTOLIN HFA) 108 (90 Base) MCG/ACT inhaler     aspirin 81 MG tablet     chlorhexidine (PERIDEX) 0.12 % solution     cyanocobalamin 1000 MCG " "SUBL     gabapentin (NEURONTIN) 300 MG capsule     insulin detemir (LEVEMIR PEN) 100 UNIT/ML pen     insulin pen needle (B-D U/F) 31G X 8 MM miscellaneous     insulin pen needle (BD HEIKE U/F) 32G X 4 MM     lisinopril (ZESTRIL) 10 MG tablet     nebivolol (BYSTOLIC) 5 MG tablet     rosuvastatin (CRESTOR) 20 MG tablet     Vitamin D, Cholecalciferol, 1000 units TABS     zolpidem (AMBIEN) 5 MG tablet     No current facility-administered medications for this visit.      BP (!) 148/87 (BP Location: Right arm, Patient Position: Sitting, Cuff Size: Adult Large)   Pulse 67   Temp 97.8  F (36.6  C) (Oral)   Resp 16   Ht 1.803 m (5' 10.98\")   Wt 87.5 kg (193 lb)   SpO2 97%   BMI 26.93 kg/m      HEENT:  Exam shows no scleral icterus or temporal muscle wasting.  His chest is clear.  His abdominal exam shows no increase in girth.  No masses or tenderness to palpation are present.  His liver is 10 cm in span without left lobe enlargement.  No spleen tip is palpable, and extremity exam shows no edema.  Skin exam shows no stigmata of chronic liver disease.  Neurologic exam shows some right-sided weakness related to his recent stroke.     Recent Results (from the past 168 hour(s))   INR    Collection Time: 09/14/20  8:56 AM   Result Value Ref Range    INR 1.11 0.86 - 1.14   Basic metabolic panel    Collection Time: 09/14/20  8:56 AM   Result Value Ref Range    Sodium 136 133 - 144 mmol/L    Potassium 5.2 3.4 - 5.3 mmol/L    Chloride 104 94 - 109 mmol/L    Carbon Dioxide 31 20 - 32 mmol/L    Anion Gap 1 (L) 3 - 14 mmol/L    Glucose 187 (H) 70 - 99 mg/dL    Urea Nitrogen 15 7 - 30 mg/dL    Creatinine 0.96 0.66 - 1.25 mg/dL    GFR Estimate 85 >60 mL/min/[1.73_m2]    GFR Estimate If Black >90 >60 mL/min/[1.73_m2]    Calcium 9.4 8.5 - 10.1 mg/dL   Hepatic panel    Collection Time: 09/14/20  8:56 AM   Result Value Ref Range    Bilirubin Direct 0.4 (H) 0.0 - 0.2 mg/dL    Bilirubin Total 1.8 (H) 0.2 - 1.3 mg/dL    Albumin 4.1 3.4 - " 5.0 g/dL    Protein Total 7.5 6.8 - 8.8 g/dL    Alkaline Phosphatase 58 40 - 150 U/L    ALT 38 0 - 70 U/L    AST 22 0 - 45 U/L   CBC with platelets    Collection Time: 09/14/20  8:56 AM   Result Value Ref Range    WBC 7.0 4.0 - 11.0 10e9/L    RBC Count 5.01 4.4 - 5.9 10e12/L    Hemoglobin 15.5 13.3 - 17.7 g/dL    Hematocrit 44.3 40.0 - 53.0 %    MCV 88 78 - 100 fl    MCH 30.9 26.5 - 33.0 pg    MCHC 35.0 31.5 - 36.5 g/dL    RDW 12.9 10.0 - 15.0 %    Platelet Count 161 150 - 450 10e9/L      Exam: US ABDOMEN COMPLETE, 9/15/2020 10:45 AM     Indication: Patient at high risk for HCC. Cirrhosis of liver without  ascites, unspecified hepatic cirrhosis type (H)     Comparison: 6/26/2020     Technique: The abdomen was scanned in the standard fashion with  specialized ultrasound transducer(s) using both gray scale and limited  color/spectral Doppler techniques.     Findings:     Liver:     The liver demonstrates coarsened, echogenic echotexture measuring 14.9  cm longitudinally. Relative hypoechogenicity in the gallbladder fossa,  favoring focal fatty sparing. No mass or intrahepatic biliary ductal  dilatation. The main portal vein is patent with antegrade flow, 1.3 cm  in diameter.     US visualization score: B - Moderate limitations     Gallbladder: The gallbladder is well distended and of normal  morphology. There is no wall thickening, pericholecystic fluid,  sonographic Mora's sign nor evidence for cholelithiasis.     Bile Ducts: Both the intra- and extrahepatic biliary system are of  normal caliber. The common bile duct measures 4 mm in diameter.     Pancreas: Visualized portions of the head and body of the pancreas are  unremarkable.      Kidneys: Both kidneys are of normal echotexture, without mass nor  hydronephrosis.  The craniocaudal dimensions are: right- 12 cm, left-  12.3 cm.     Spleen: The spleen is normal in size, measuring 12.6 cm in sagittal  dimension.     Aorta and IVC: The visualized portions of the  aorta and IVC are  unremarkable.      Fluid: No evidence of ascites or pleural effusions.                                                              Impression:  1. Cirrhosis without focal liver lesion.  a. LI-RADS US Category: US-1 Negative: No US evidence of HCC.  b. Recommend continued surveillance US.    IMPRESSION:  My impression is that Mr. Burgos has cirrhosis caused by nonalcoholic fatty liver disease.  His ultrasound and blood work all completely unchanged.  I am not sure what this pain is, but it almost sounds neuropathic and perhaps it may relate to his stroke.  He had a very unfortunate experience with the neurologist who was assigned to him post-discharge and I have sent in a Neurology referral here to address with him, first, his prognosis from his stroke and whether anything else can be done to try to improve that prognosis.  I will try gabapentin 300 mg 3 times a day to see if that produces any improvement in the pain.  He otherwise does have an appointment to see me again in 3 months.      Thank you very much for allowing me to participate in the care of this patient.  If you have any questions regarding my recommendations, please do not hesitate to contact me.        Kevin Bedolla MD      Professor of Medicine  [employer]      Executive Medical Director, Solid Organ Transplant Program  [facility]

## 2020-09-17 NOTE — PROGRESS NOTES
"Chief Complaint   Patient presents with     RECHECK     Follow-up fatty liver        Vital signs:  Temp: 97.8  F (36.6  C) Temp src: Oral BP: (!) 148/87 Pulse: 67   Resp: 16 SpO2: 97 %     Height: 180.3 cm (5' 10.98\") Weight: 87.5 kg (193 lb)  Estimated body mass index is 26.93 kg/m  as calculated from the following:    Height as of this encounter: 1.803 m (5' 10.98\").    Weight as of this encounter: 87.5 kg (193 lb).        Maritza Dodson, [facility]  9/17/2020 8:33 AM      "

## 2020-09-20 NOTE — TELEPHONE ENCOUNTER
RECORDS RECEIVED FROM:   APPT NOTES: Video confirmed, ph # [phone]  Hx Stroke, per Pt   Date of Appt: 9.21.20   NOTES (FOR ALL VISITS) STATUS DETAILS   OFFICE NOTE from referring provider Internal 9.17.20 Dr. Bedolla M [facility] Hepatology   OFFICE NOTE from other specialist Media  [facility]-Scanned to [facility]    DISCHARGE SUMMARY from hospital N/A    DISCHARGE REPORT from the ER N/A    OPERATIVE REPORT N/A    MEDICATION LIST Internal    IMAGING  (FOR ALL VISITS)     EMG N/A    EEG N/A    LUMBAR PUNCTURE N/A    CORNELL SCAN N/A    DEXA SCAN *NEUROSURGERY* N/A    ULTRASOUND (CAROTID BILAT) *VASCULAR* N/A    MRI (HEAD, NECK, SPINE) Internal 2.21.20 Brain   XRAY (SPINE) *NEUROSURGERY* N/A    CT (HEAD, NECK, SPINE) Internal 2.21.20 head  2.21.20 head and neck      Action 9.20.20 MJ   Action Taken Called pt to see when stroke was. No answer.

## 2020-09-21 ENCOUNTER — PRE VISIT (OUTPATIENT)
Dept: NEUROLOGY | Facility: CLINIC | Age: 60
End: 2020-09-21

## 2020-09-21 ENCOUNTER — VIRTUAL VISIT (OUTPATIENT)
Dept: NEUROLOGY | Facility: CLINIC | Age: 60
End: 2020-09-21
Payer: COMMERCIAL

## 2020-09-21 DIAGNOSIS — R10.9 RIGHT SIDED ABDOMINAL PAIN: ICD-10-CM

## 2020-09-21 DIAGNOSIS — I63.9 CEREBROVASCULAR ACCIDENT (CVA), UNSPECIFIED MECHANISM (H): Primary | ICD-10-CM

## 2020-09-21 NOTE — LETTER
9/21/2020       RE: Vikash Burgos  [address]  [place] [address]     Dear Colleague,    Thank you for referring your patient, Vikash Burgos, to the [facility] NEUROLOGY at [facility]. Please see a copy of my visit note below.    [facility] Neurology New Patient Visit    Vikash Burgos MRN# [id number]   Age: 60 year old YOB: 1960     Requesting physician: Jailyn Go     Reason for Consultation: history of stroke, question of pain symptoms      History of Presenting Symptoms:   Vikash Burgos is a 60 year old male who presents today for evaluation of stroke that occurred in February 2020 and residual symptoms.     On February 21, 2020 patient underwent an elective left CEA for asymptomatic carotid stenosis. Following the procedure patient woke up with right facial droop, slurred speech, right arm and leg weakness and sensory loss. Stroke code was activated. CTA head and neck showed no evidence of a large vessel occlusion. MRI brain was negative for stroke but he later follow-up with a neurologist who said he had a stroke.     He continues in physical therapy. He continues in speech therapy. Patient bikes about 30 miles a day. His strength is improved but not quite back to normal. He was lifting some bags last weekend and noticed it was harder with his right arm compared to his left arm. He still has tingling on the right side of his body, but no significant pain on the right side of his body. His tongue continues to deviate to the left side of his mouth.     Patient has stage IV liver disease from HCV. He is following in transplant hepatology. He recently saw Dr Bedolla for pain around his liver. He had a repeat ultrasound and lab work done. He is wondering if the pain around his liver could potentially be related to his old stroke. Pain is described as a sharp pain. Pain was present before the stroke, but now is a little worse. He  thinks he had a little bit of sensory loss on the right side of his chest and abdomen after the stroke. He was started on gabapentin a few days ago by Dr Bedolla and maybe feels a little better.       About 10 years ago patient had open heart surgery. He has been on aspirin and crestor since there. He follows with cardiology.         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)     Family history of malignant melanoma of skin     Diabetic polyneuropathy associated with diabetes mellitus due to underlying condition (H)     HDL deficiency     Left carotid stenosis     Pain of right upper extremity     Abnormal cardiovascular stress test     Left carotid artery stenosis     Past Medical History:   Diagnosis Date     Basal cell carcinoma      Carotid stenosis, left      Coronary artery disease     4 vessel bypass November 2010; LIMA ->LAD, SVG-> OM3, SVG -> D2, SVG -> PDA     Diabetes mellitus (H) 2005    neuropathy     Generalized anxiety disorder 5/2/2014     Hepatitis C, chronic (H) 2005     Hyperlipidemia LDL goal < 70      Hypertension      Liver diseases     9/15 Liver is 10 cm in span without left lobe enlargement     Persistent microalbuminuria associated with type 2 diabetes mellitus (H) 5/6/2015      Past Surgical History:     Past Surgical History:   Procedure Laterality Date     ARTHROSCOPY KNEE RT/LT      left     COLONOSCOPY       CORONARY ARTERY BYPASS  11/18/201    Coronary artery bypass grafting x 4 with placement of the left internal mammary artery to the distal midportion of the left anterior descending artery, saphenous vein graft from aorta to the third  obtuse marginal branch of circumflex coronary artery, saphenous vein graft from aorta to the second diagonal branch, saphenous vein graft from aorta to the posterior descending artery.     ENDARTERECTOMY CAROTID Left 2/21/2020    Procedure: LEFT CAROTID ENDARTERECTOMY WITH EEG;  Surgeon: Semaj Stubbs MD;  Location:  OR     ESOPHAGOSCOPY, GASTROSCOPY, DUODENOSCOPY (EGD), COMBINED  10/31/2011    Procedure:COMBINED ESOPHAGOSCOPY, GASTROSCOPY, DUODENOSCOPY (EGD); Surgeon:ALONSO ALDANA; Location: GI     ESOPHAGOSCOPY, GASTROSCOPY, DUODENOSCOPY (EGD), COMBINED N/A 3/8/2018    Procedure: COMBINED ESOPHAGOSCOPY, GASTROSCOPY, DUODENOSCOPY (EGD);  EGD;  Surgeon: Angel Luis Justice MD;  Location:  GI     HEART CATH CORONARY ANGIOGRAM W/LV GRAM  9-11-10    CV Surgery recommended     HEART CATH CORONARY ANGIOGRAM W/LV GRAM  2-28-14    Medical management     HERNIA REPAIR, INGUINAL RT/LT      left      Social History:     Social History     Tobacco Use     Smoking status: Former Smoker     Packs/day: 0.50     Years: 12.00     Pack years: 6.00     Types: Cigarettes     Start date: 1993     Last attempt to quit: 1/26/2005     Years since quitting: 15.6     Smokeless tobacco: Never Used   Substance Use Topics     Alcohol use: Yes     Alcohol/week: 0.0 standard drinks     Comment: minimal     Drug use: No      Family History:     Family History   Problem Relation Age of Onset     C.A.D. Father      Cancer Father         bladder     Heart Surgery Father         bypass surgery     Heart Disease Mother       Medications:     Current Outpatient Medications   Medication Sig     albuterol (PROAIR HFA/PROVENTIL HFA/VENTOLIN HFA) 108 (90 Base) MCG/ACT inhaler Inhale 2 puffs into the lungs every 4 hours as needed for shortness of breath / dyspnea or wheezing     aspirin 81 MG tablet Take 1 tablet by mouth daily.     chlorhexidine (PERIDEX) 0.12 % solution Take 15 mLs by mouth 2 times daily as needed      cyanocobalamin  1000 MCG SUBL Place 1,000 mcg under the tongue daily     gabapentin (NEURONTIN) 300 MG capsule Take 1 capsule (300 mg) by mouth 3 times daily     insulin detemir (LEVEMIR PEN) 100 UNIT/ML pen Inject 50 Units Subcutaneous At Bedtime     insulin pen needle (B-D U/F) 31G X 8 MM miscellaneous Use one pen needles daily or as directed.     insulin pen needle (BD HEIKE U/F) 32G X 4 MM Use 1 daily or as directed.     lisinopril (ZESTRIL) 10 MG tablet One tablet daily with extra 1/2 pill daily prn  BP over 160     nebivolol (BYSTOLIC) 5 MG tablet Take 1 tablet (5 mg) by mouth daily     rosuvastatin (CRESTOR) 20 MG tablet Take 1 tablet (20 mg) by mouth daily     Vitamin D, Cholecalciferol, 1000 units TABS Take 1,000 Units by mouth daily     zolpidem (AMBIEN) 5 MG tablet TAKE 1 TAB ORALLY AS NEEDED FOR SLEEP     No current facility-administered medications for this visit.       Allergies:     Allergies   Allergen Reactions     Chlorthalidone Nausea and Vomiting     dizziness     Penicillins Rash     Rash with PCN only. Patient has taken amoxicillin with no rash.      Review of Systems:   As noted above     Physical Exam:   Vitals: There were no vitals taken for this visit.   General: Seated comfortably in no acute distress.  HEENT: Neck supple with normal range of motion.   Skin: No rashes  Neurologic:     Mental Status: Fully alert, attentive and oriented. Normal memory and fund of knowledge. Language normal, speech slightly slow and slurred but fluent, no paraphasic errors.     Cranial Nerves: EOMI with normal smooth pursuit. Visual fields intact. Right upper and lower facial asymmetry compared to left, slightly inconsistent in severity. Hearing not formally tested but intact to conversation.  Speech slightly slurred as above. When sticking tongue out, tongue deviates to the left. He has difficulty moving tongue on the way to the right.      Motor: No tremors or other abnormal movements observed. Some bradykinesia in right  "arm finger taps compared to left. Upper and lower extremities at least antigravity. Drift noted in right arm compared to left. Able to rise from seated position without using arms.      Sensory:Negative Romberg.      Coordination: Finger-nose-finger without dysmetria.     Gait: Mildly slow, but otherwise normal, steady casual gait.         Data: Pertinent prior to visit   Imaging:  MRI brain 2/22/2020  IMPRESSION:  \"No acute intracranial abnormality. Specifically, no evidence of acute ischemic injury.\"  Personally reviewed and agree with above impression    CTA head and neck 2/21/2020  IMPRESSION:   \"1. Postop change at the left carotid bifurcation. No residual stenosis  is seen at the left carotid bifurcation.  2. No significant stenosis right carotid bifurcation.  3. No occluded intracranial vessels are identified. No high-grade  intracranial vascular stenosis.\"  Personally reviewed and agree with above impression    CUS 9/1/2020  IMPRESSION: 50-69% diameter stenosis of the left ICA relative to the  distal ICA diameter.    Procedures:  None    Laboratory:  2/2020 labs - LDL 47, A1c 7.4  TSH - 1.1 - in 2018         Assessment and Plan:   Assessment:  Vikash Burgos is a 60 year old male who presents today for evaluation of stroke that occurred in February 2020 and residual symptoms. Following left CEA in 2/2020 he developed right face, arm, leg weakness and numbness. Initial MRI was normal. He subsequently followed with Neurologist who thought that clinically patient had a stroke based off of his exam. On limited examination today he was right facial asymmetry (upper and lower), right arm drift, and left tongue deviation. Exact localization of stroke is difficult to pinpoint based off of current symptom array. Regarding question of pain around liver being related to previous stroke, we discussed that this is unlikely to be related. Pain was previously present before the stroke, but has been worse since. Typically " post stroke pain (as in Dejerine - Roussy syndrome) occurs in the area of previous sensory loss which was primarily the face, arm, and leg in patient's case. It is theoretically possible he had some sensory loss on the right thorax too, but it would be unusual to only develop post stroke pain in the only in the right abdomen. Nonetheless it is not unreasonable to do trial of gabapentin to see if it helps. We also discussed controlling stroke risk factors as well as continuing physical and speech therapy.      Plan:  - Continue trial of gabapentin   - LDL goal < 7, BP goal < 130/80  - Continue A1c control  - Continue PT and speech    Follow up in Neurology clinic in 8 weeks or earlier as needed should new concerns arise.    Again, thank you for allowing me to participate in the care of your patient.  Sincerely,    Danish Galicia MD   of Neurology  [facility]

## 2020-09-21 NOTE — LETTER
"9/21/2020       RE: Vikash Burgos  [address]  [place] [address]     Dear Colleague,    Thank you for referring your patient, Vikash Burgos, to the [facility] NEUROLOGY at [facility]. Please see a copy of my visit note below.    Vikash Burgos is a 60 year old male who is being evaluated via a billable video visit.      The patient has been notified of following:     \"This video visit will be conducted via a call between you and your physician/provider. We have found that certain health care needs can be provided without the need for an in-person physical exam.  This service lets us provide the care you need with a video conversation.  If a prescription is necessary we can send it directly to your pharmacy.  If lab work is needed we can place an order for that and you can then stop by our lab to have the test done at a later time.    Video visits are billed at different rates depending on your insurance coverage.  Please reach out to your insurance provider with any questions.    If during the course of the call the physician/provider feels a video visit is not appropriate, you will not be charged for this service.\"    Patient has given verbal consent for Video visit? Yes  How would you like to obtain your AVS? MyChart  If you are dropped from the video visit, the video invite should be resent to: Text to cell phone:   Will anyone else be joining your video visit? No      Video-Visit Details    Type of service:  Video Visit    Video Start Time: 1:10 PM  Video End Time: 1:51 PM    Originating Location (pt. Location): Home    Distant Location (provider location):  [facility] NEUROLOGY     Platform used for Video Visit: C2FO      [facility] Neurology New Patient Visit    Vikash Burgos MRN# [id number]   Age: 60 year old YOB: 1960     Requesting physician: Jailyn Go     Reason for Consultation: history of stroke, question of pain " symptoms        History of Presenting Symptoms:   Vikash Burgos is a 60 year old male who presents today for evaluation of stroke that occurred in February 2020 and residual symptoms.     On February 21, 2020 patient underwent an elective left CEA for asymptomatic carotid stenosis. Following the procedure patient woke up with right facial droop, slurred speech, right arm and leg weakness and sensory loss. Stroke code was activated. CTA head and neck showed no evidence of a large vessel occlusion. MRI brain was negative for stroke but he later follow-up with a neurologist who said he had a stroke.     He continues in physical therapy. He continues in speech therapy. Patient bikes about 30 miles a day. His strength is improved but not quite back to normal. He was lifting some bags last weekend and noticed it was harder with his right arm compared to his left arm. He still has tingling on the right side of his body, but no significant pain on the right side of his body. His tongue continues to deviate to the left side of his mouth.     Patient has stage IV liver disease from HCV. He is following in transplant hepatology. He recently saw Dr Bedolla for pain around his liver. He had a repeat ultrasound and lab work done. He is wondering if the pain around his liver could potentially be related to his old stroke. Pain is described as a sharp pain. Pain was present before the stroke, but now is a little worse. He thinks he had a little bit of sensory loss on the right side of his chest and abdomen after the stroke. He was started on gabapentin a few days ago by Dr Bedolla and maybe feels a little better.       About 10 years ago patient had open heart surgery. He has been on aspirin and crestor since there. He follows with cardiology.         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)     Family history of malignant melanoma of skin     Diabetic polyneuropathy associated with diabetes mellitus due to underlying condition (H)     HDL deficiency     Left carotid stenosis     Pain of right upper extremity     Abnormal cardiovascular stress test     Left carotid artery stenosis     Past Medical History:   Diagnosis Date     Basal cell carcinoma      Carotid stenosis, left      Coronary artery disease     4 vessel bypass November 2010; LIMA ->LAD, SVG-> OM3, SVG -> D2, SVG -> PDA     Diabetes mellitus (H) 2005    neuropathy     Generalized anxiety disorder 5/2/2014     Hepatitis C, chronic (H) 2005     Hyperlipidemia LDL goal < 70      Hypertension      Liver diseases     9/15 Liver is 10 cm in span without left lobe enlargement     Persistent microalbuminuria associated with type 2 diabetes mellitus (H) 5/6/2015        Past Surgical History:     Past Surgical History:   Procedure Laterality Date     ARTHROSCOPY KNEE RT/LT      left     COLONOSCOPY       CORONARY ARTERY BYPASS  11/18/201    Coronary artery bypass grafting x 4 with placement of the left internal mammary artery to the distal midportion of the left anterior descending artery, saphenous vein graft from aorta to the third obtuse marginal branch of circumflex coronary artery, saphenous vein graft from aorta to the second diagonal branch, saphenous vein graft from aorta to the posterior descending artery.     ENDARTERECTOMY CAROTID Left 2/21/2020    Procedure: LEFT CAROTID ENDARTERECTOMY WITH EEG;  Surgeon: Semaj Stubbs MD;  Location:  OR     ESOPHAGOSCOPY, GASTROSCOPY, DUODENOSCOPY (EGD), COMBINED  10/31/2011    Procedure:COMBINED ESOPHAGOSCOPY, GASTROSCOPY, DUODENOSCOPY (EGD); Surgeon:ALONSO ALDANA; Location: GI      ESOPHAGOSCOPY, GASTROSCOPY, DUODENOSCOPY (EGD), COMBINED N/A 3/8/2018    Procedure: COMBINED ESOPHAGOSCOPY, GASTROSCOPY, DUODENOSCOPY (EGD);  EGD;  Surgeon: Angel Luis Justice MD;  Location: [facility] GI     HEART CATH CORONARY ANGIOGRAM W/LV GRAM  9-11-10    CV Surgery recommended     HEART CATH CORONARY ANGIOGRAM W/LV GRAM  2-28-14    Medical management     HERNIA REPAIR, INGUINAL RT/LT      left        Social History:     Social History     Tobacco Use     Smoking status: Former Smoker     Packs/day: 0.50     Years: 12.00     Pack years: 6.00     Types: Cigarettes     Start date: 1993     Last attempt to quit: 1/26/2005     Years since quitting: 15.6     Smokeless tobacco: Never Used   Substance Use Topics     Alcohol use: Yes     Alcohol/week: 0.0 standard drinks     Comment: minimal     Drug use: No        Family History:     Family History   Problem Relation Age of Onset     C.A.D. Father      Cancer Father         bladder     Heart Surgery Father         bypass surgery     Heart Disease Mother         Medications:     Current Outpatient Medications   Medication Sig     albuterol (PROAIR HFA/PROVENTIL HFA/VENTOLIN HFA) 108 (90 Base) MCG/ACT inhaler Inhale 2 puffs into the lungs every 4 hours as needed for shortness of breath / dyspnea or wheezing     aspirin 81 MG tablet Take 1 tablet by mouth daily.     chlorhexidine (PERIDEX) 0.12 % solution Take 15 mLs by mouth 2 times daily as needed      cyanocobalamin 1000 MCG SUBL Place 1,000 mcg under the tongue daily     gabapentin (NEURONTIN) 300 MG capsule Take 1 capsule (300 mg) by mouth 3 times daily     insulin detemir (LEVEMIR PEN) 100 UNIT/ML pen Inject 50 Units Subcutaneous At Bedtime     insulin pen needle (B-D U/F) 31G X 8 MM miscellaneous Use one pen needles daily or as directed.     insulin pen needle (BD HEIKE U/F) 32G X 4 MM Use 1 daily or as directed.     lisinopril (ZESTRIL) 10 MG tablet One tablet daily with extra 1/2 pill daily prn  BP over 160      "nebivolol (BYSTOLIC) 5 MG tablet Take 1 tablet (5 mg) by mouth daily     rosuvastatin (CRESTOR) 20 MG tablet Take 1 tablet (20 mg) by mouth daily     Vitamin D, Cholecalciferol, 1000 units TABS Take 1,000 Units by mouth daily     zolpidem (AMBIEN) 5 MG tablet TAKE 1 TAB ORALLY AS NEEDED FOR SLEEP     No current facility-administered medications for this visit.         Allergies:     Allergies   Allergen Reactions     Chlorthalidone Nausea and Vomiting     dizziness     Penicillins Rash     Rash with PCN only. Patient has taken amoxicillin with no rash.        Review of Systems:   As noted above     Physical Exam:   Vitals: There were no vitals taken for this visit.   General: Seated comfortably in no acute distress.  HEENT: Neck supple with normal range of motion.   Skin: No rashes  Neurologic:     Mental Status: Fully alert, attentive and oriented. Normal memory and fund of knowledge. Language normal, speech slightly slow and slurred but fluent, no paraphasic errors.     Cranial Nerves: EOMI with normal smooth pursuit. Visual fields intact. Right upper and lower facial asymmetry compared to left, slightly inconsistent in severity. Hearing not formally tested but intact to conversation.  Speech slightly slurred as above. When sticking tongue out, tongue deviates to the left. He has difficulty moving tongue on the way to the right.      Motor: No tremors or other abnormal movements observed. Some bradykinesia in right arm finger taps compared to left. Upper and lower extremities at least antigravity. Drift noted in right arm compared to left. Able to rise from seated position without using arms.      Sensory:Negative Romberg.      Coordination: Finger-nose-finger without dysmetria.     Gait: Mildly slow, but otherwise normal, steady casual gait.         Data: Pertinent prior to visit   Imaging:  MRI brain 2/22/2020  IMPRESSION:  \"No acute intracranial abnormality. Specifically, no evidence of acute ischemic " "injury.\"  Personally reviewed and agree with above impression    CTA head and neck 2/21/2020  IMPRESSION:   \"1. Postop change at the left carotid bifurcation. No residual stenosis  is seen at the left carotid bifurcation.  2. No significant stenosis right carotid bifurcation.  3. No occluded intracranial vessels are identified. No high-grade  intracranial vascular stenosis.\"  Personally reviewed and agree with above impression    CUS 9/1/2020  IMPRESSION: 50-69% diameter stenosis of the left ICA relative to the  distal ICA diameter.    Procedures:  None    Laboratory:  2/2020 labs - LDL 47, A1c 7.4  TSH - 1.1 - in 2018         Assessment and Plan:   Assessment:  Vikash Burgos is a 60 year old male who presents today for evaluation of stroke that occurred in February 2020 and residual symptoms. Following left CEA in 2/2020 he developed right face, arm, leg weakness and numbness. Initial MRI was normal. He subsequently followed with Neurologist who thought that clinically patient had a stroke based off of his exam. On limited examination today he was right facial asymmetry (upper and lower), right arm drift, and left tongue deviation. Exact localization of stroke is difficult to pinpoint based off of current symptom array. Regarding question of pain around liver being related to previous stroke, we discussed that this is unlikely to be related. Pain was previously present before the stroke, but has been worse since. Typically post stroke pain (as in Dejerine - Roussy syndrome) occurs in the area of previous sensory loss which was primarily the face, arm, and leg in patient's case. It is theoretically possible he had some sensory loss on the right thorax too, but it would be unusual to only develop post stroke pain in the only in the right abdomen. Nonetheless it is not unreasonable to do trial of gabapentin to see if it helps. We also discussed controlling stroke risk factors as well as continuing physical and " speech therapy.      Plan:  - Continue trial of gabapentin   - LDL goal < 7, BP goal < 130/80  - Continue A1c control  - Continue PT and speech    Follow up in Neurology clinic in 8 weeks or earlier as needed should new concerns arise.    Danish Galicia MD   of Neurology  [facility]        Again, thank you for allowing me to participate in the care of your patient.      Sincerely,    Danish Galicia MD

## 2020-09-21 NOTE — PROGRESS NOTES
"Vikash Burgos is a 60 year old male who is being evaluated via a billable video visit.      The patient has been notified of following:     \"This video visit will be conducted via a call between you and your physician/provider. We have found that certain health care needs can be provided without the need for an in-person physical exam.  This service lets us provide the care you need with a video conversation.  If a prescription is necessary we can send it directly to your pharmacy.  If lab work is needed we can place an order for that and you can then stop by our lab to have the test done at a later time.    Video visits are billed at different rates depending on your insurance coverage.  Please reach out to your insurance provider with any questions.    If during the course of the call the physician/provider feels a video visit is not appropriate, you will not be charged for this service.\"    Patient has given verbal consent for Video visit? Yes  How would you like to obtain your AVS? MyChart  If you are dropped from the video visit, the video invite should be resent to: Text to cell phone:   Will anyone else be joining your video visit? No      Video-Visit Details    Type of service:  Video Visit    Video Start Time: 1:10 PM  Video End Time: 1:51 PM    Originating Location (pt. Location): Home    Distant Location (provider location):  [facility] NEUROLOGY     Platform used for Video Visit: [facility] Neurology New Patient Visit    Vikash Burgos MRN# [id number]   Age: 60 year old YOB: 1960     Requesting physician: Jailny Go     Reason for Consultation: history of stroke, question of pain symptoms        History of Presenting Symptoms:   Vikash Burgos is a 60 year old male who presents today for evaluation of stroke that occurred in February 2020 and residual symptoms.     On February 21, 2020 patient underwent an elective left CEA for asymptomatic carotid stenosis. " Following the procedure patient woke up with right facial droop, slurred speech, right arm and leg weakness and sensory loss. Stroke code was activated. CTA head and neck showed no evidence of a large vessel occlusion. MRI brain was negative for stroke but he later follow-up with a neurologist who said he had a stroke.     He continues in physical therapy. He continues in speech therapy. Patient bikes about 30 miles a day. His strength is improved but not quite back to normal. He was lifting some bags last weekend and noticed it was harder with his right arm compared to his left arm. He still has tingling on the right side of his body, but no significant pain on the right side of his body. His tongue continues to deviate to the left side of his mouth.     Patient has stage IV liver disease from HCV. He is following in transplant hepatology. He recently saw Dr Bedolla for pain around his liver. He had a repeat ultrasound and lab work done. He is wondering if the pain around his liver could potentially be related to his old stroke. Pain is described as a sharp pain. Pain was present before the stroke, but now is a little worse. He thinks he had a little bit of sensory loss on the right side of his chest and abdomen after the stroke. He was started on gabapentin a few days ago by Dr Bedolla and maybe feels a little better.       About 10 years ago patient had open heart surgery. He has been on aspirin and crestor since there. He follows with cardiology.         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)     Family history of malignant melanoma of skin     Diabetic  polyneuropathy associated with diabetes mellitus due to underlying condition (H)     HDL deficiency     Left carotid stenosis     Pain of right upper extremity     Abnormal cardiovascular stress test     Left carotid artery stenosis     Past Medical History:   Diagnosis Date     Basal cell carcinoma      Carotid stenosis, left      Coronary artery disease     4 vessel bypass November 2010; LIMA ->LAD, SVG-> OM3, SVG -> D2, SVG -> PDA     Diabetes mellitus (H) 2005    neuropathy     Generalized anxiety disorder 5/2/2014     Hepatitis C, chronic (H) 2005     Hyperlipidemia LDL goal < 70      Hypertension      Liver diseases     9/15 Liver is 10 cm in span without left lobe enlargement     Persistent microalbuminuria associated with type 2 diabetes mellitus (H) 5/6/2015        Past Surgical History:     Past Surgical History:   Procedure Laterality Date     ARTHROSCOPY KNEE RT/LT      left     COLONOSCOPY       CORONARY ARTERY BYPASS  11/18/201    Coronary artery bypass grafting x 4 with placement of the left internal mammary artery to the distal midportion of the left anterior descending artery, saphenous vein graft from aorta to the third obtuse marginal branch of circumflex coronary artery, saphenous vein graft from aorta to the second diagonal branch, saphenous vein graft from aorta to the posterior descending artery.     ENDARTERECTOMY CAROTID Left 2/21/2020    Procedure: LEFT CAROTID ENDARTERECTOMY WITH EEG;  Surgeon: Semaj Stubbs MD;  Location:  OR     ESOPHAGOSCOPY, GASTROSCOPY, DUODENOSCOPY (EGD), COMBINED  10/31/2011    Procedure:COMBINED ESOPHAGOSCOPY, GASTROSCOPY, DUODENOSCOPY (EGD); Surgeon:ALONSO ALDANA; Location: GI     ESOPHAGOSCOPY, GASTROSCOPY, DUODENOSCOPY (EGD), COMBINED N/A 3/8/2018    Procedure: COMBINED ESOPHAGOSCOPY, GASTROSCOPY, DUODENOSCOPY (EGD);  EGD;  Surgeon: Angel Luis Justice MD;  Location:  GI     HEART CATH CORONARY ANGIOGRAM W/LV GRAM  9-11-10    CV Surgery  recommended     HEART CATH CORONARY ANGIOGRAM W/LV GRAM  2-28-14    Medical management     HERNIA REPAIR, INGUINAL RT/LT      left        Social History:     Social History     Tobacco Use     Smoking status: Former Smoker     Packs/day: 0.50     Years: 12.00     Pack years: 6.00     Types: Cigarettes     Start date: 1993     Last attempt to quit: 1/26/2005     Years since quitting: 15.6     Smokeless tobacco: Never Used   Substance Use Topics     Alcohol use: Yes     Alcohol/week: 0.0 standard drinks     Comment: minimal     Drug use: No        Family History:     Family History   Problem Relation Age of Onset     C.A.D. Father      Cancer Father         bladder     Heart Surgery Father         bypass surgery     Heart Disease Mother         Medications:     Current Outpatient Medications   Medication Sig     albuterol (PROAIR HFA/PROVENTIL HFA/VENTOLIN HFA) 108 (90 Base) MCG/ACT inhaler Inhale 2 puffs into the lungs every 4 hours as needed for shortness of breath / dyspnea or wheezing     aspirin 81 MG tablet Take 1 tablet by mouth daily.     chlorhexidine (PERIDEX) 0.12 % solution Take 15 mLs by mouth 2 times daily as needed      cyanocobalamin 1000 MCG SUBL Place 1,000 mcg under the tongue daily     gabapentin (NEURONTIN) 300 MG capsule Take 1 capsule (300 mg) by mouth 3 times daily     insulin detemir (LEVEMIR PEN) 100 UNIT/ML pen Inject 50 Units Subcutaneous At Bedtime     insulin pen needle (B-D U/F) 31G X 8 MM miscellaneous Use one pen needles daily or as directed.     insulin pen needle (BD HEIKE U/F) 32G X 4 MM Use 1 daily or as directed.     lisinopril (ZESTRIL) 10 MG tablet One tablet daily with extra 1/2 pill daily prn  BP over 160     nebivolol (BYSTOLIC) 5 MG tablet Take 1 tablet (5 mg) by mouth daily     rosuvastatin (CRESTOR) 20 MG tablet Take 1 tablet (20 mg) by mouth daily     Vitamin D, Cholecalciferol, 1000 units TABS Take 1,000 Units by mouth daily     zolpidem (AMBIEN) 5 MG tablet TAKE 1 TAB  "ORALLY AS NEEDED FOR SLEEP     No current facility-administered medications for this visit.         Allergies:     Allergies   Allergen Reactions     Chlorthalidone Nausea and Vomiting     dizziness     Penicillins Rash     Rash with PCN only. Patient has taken amoxicillin with no rash.        Review of Systems:   As noted above     Physical Exam:   Vitals: There were no vitals taken for this visit.   General: Seated comfortably in no acute distress.  HEENT: Neck supple with normal range of motion.   Skin: No rashes  Neurologic:     Mental Status: Fully alert, attentive and oriented. Normal memory and fund of knowledge. Language normal, speech slightly slow and slurred but fluent, no paraphasic errors.     Cranial Nerves: EOMI with normal smooth pursuit. Visual fields intact. Right upper and lower facial asymmetry compared to left, slightly inconsistent in severity. Hearing not formally tested but intact to conversation.  Speech slightly slurred as above. When sticking tongue out, tongue deviates to the left. He has difficulty moving tongue on the way to the right.      Motor: No tremors or other abnormal movements observed. Some bradykinesia in right arm finger taps compared to left. Upper and lower extremities at least antigravity. Drift noted in right arm compared to left. Able to rise from seated position without using arms.      Sensory:Negative Romberg.      Coordination: Finger-nose-finger without dysmetria.     Gait: Mildly slow, but otherwise normal, steady casual gait.         Data: Pertinent prior to visit   Imaging:  MRI brain 2/22/2020  IMPRESSION:  \"No acute intracranial abnormality. Specifically, no evidence of acute ischemic injury.\"  Personally reviewed and agree with above impression    CTA head and neck 2/21/2020  IMPRESSION:   \"1. Postop change at the left carotid bifurcation. No residual stenosis  is seen at the left carotid bifurcation.  2. No significant stenosis right carotid bifurcation.  3. " "No occluded intracranial vessels are identified. No high-grade  intracranial vascular stenosis.\"  Personally reviewed and agree with above impression    CUS 9/1/2020  IMPRESSION: 50-69% diameter stenosis of the left ICA relative to the  distal ICA diameter.    Procedures:  None    Laboratory:  2/2020 labs - LDL 47, A1c 7.4  TSH - 1.1 - in 2018         Assessment and Plan:   Assessment:  Vikash Burgos is a 60 year old male who presents today for evaluation of stroke that occurred in February 2020 and residual symptoms. Following left CEA in 2/2020 he developed right face, arm, leg weakness and numbness. Initial MRI was normal. He subsequently followed with Neurologist who thought that clinically patient had a stroke based off of his exam. On limited examination today he was right facial asymmetry (upper and lower), right arm drift, and left tongue deviation. Exact localization of stroke is difficult to pinpoint based off of current symptom array. Regarding question of pain around liver being related to previous stroke, we discussed that this is unlikely to be related. Pain was previously present before the stroke, but has been worse since. Typically post stroke pain (as in Dejerine - Roussy syndrome) occurs in the area of previous sensory loss which was primarily the face, arm, and leg in patient's case. It is theoretically possible he had some sensory loss on the right thorax too, but it would be unusual to only develop post stroke pain in the only in the right abdomen. Nonetheless it is not unreasonable to do trial of gabapentin to see if it helps. We also discussed controlling stroke risk factors as well as continuing physical and speech therapy.      Plan:  - Continue trial of gabapentin   - LDL goal < 7, BP goal < 130/80  - Continue A1c control  - Continue PT and speech    Follow up in Neurology clinic in 8 weeks or earlier as needed should new concerns arise.    Danish Galicia MD   of " Neurology  [facility]

## 2020-10-17 DIAGNOSIS — E11.49 DM TYPE 2 CAUSING NEUROLOGICAL DISEASE (H): ICD-10-CM

## 2020-10-17 NOTE — TELEPHONE ENCOUNTER
Refill request:    LEVEMIR FLEXTOUCH 100 UNIT/ML    Summary: Inject 50 Units Subcutaneous At Bedtime, Disp-15 mL,R-5, E-Prescribe   Dose, Route, Frequency: 50 Units, Subcutaneous, AT BEDTIME  Start: 4/20/2020  Ord/Sold: 4/20/2020

## 2020-10-19 NOTE — TELEPHONE ENCOUNTER
Prescription approved per [facility] Refill Protocol.  Noy OBREGON RN

## 2020-10-20 NOTE — PROGRESS NOTES
"Outpatient Speech Language Pathology Discharge Note     Patient: Vikash Burgos  : 1960    Beginning/End Dates of Reporting Period:  2020 to 10/20/2020    Referring Provider: Marshal Gutierrez MD     Therapy Diagnosis: Moderate oropharyngeal dysphagia.     Client Self Report: Pt is a 60 y.o. male who completed an OP SLP clinical Swallow evaluation on 3/5/2020, please see the initial evaluation report in Epic for further information. At this time Pt has completed a total of 3 sessions and cancelled his remaining visits with no reason given. Last session completed 2020, Pt reported he was completing the recommended strengthening exercises 3-5x/day and felt that his tongue was \"thick.\"       Objective Measurements: Unable to determine if goals met as Pt chose to discontinue therapy.              Goals:  Goal Identifier Diet/Strategies   Goal Description Patient will independently implement safe swallowing techniques to tolerate a Dysphagia 3 diet with thin liquids without c/o coughing/choking across one week per patient report to improve safety of PO intake.   Target Date 20   Date Met      Progress:     Goal Identifier Exercises   Goal Description Patient will complete oropharyngeal exercises independently 2-3x daily, to increase strength and improve safety with PO intake.   Target Date 20   Date Met      Progress:       Progress Toward Goals:    Progress limited due to Pt choosing to discontinue therapy.     Plan:  Discharge from therapy.    Discharge: Yes     Reason for Discharge: Patient chooses to discontinue therapy.      Discharge Plan: Patient to continue home program.    Thank you for referring Vikash \"Kelton\" Loret[facility]  Please call Loren Al MA, SLP-CCC at [phone] or email [email or web address] with any questions or concerns.      Loren Al M.A., SLP-CCC  Speech-Language " Pathologist

## 2020-11-13 ENCOUNTER — OFFICE VISIT (OUTPATIENT)
Dept: FAMILY MEDICINE | Facility: CLINIC | Age: 60
End: 2020-11-13
Payer: COMMERCIAL

## 2020-11-13 VITALS
BODY MASS INDEX: 27.58 KG/M2 | TEMPERATURE: 97.3 F | WEIGHT: 197 LBS | SYSTOLIC BLOOD PRESSURE: 152 MMHG | HEART RATE: 58 BPM | DIASTOLIC BLOOD PRESSURE: 86 MMHG | OXYGEN SATURATION: 97 % | HEIGHT: 71 IN

## 2020-11-13 DIAGNOSIS — E11.9 TYPE 2 DIABETES MELLITUS WITHOUT COMPLICATION, UNSPECIFIED WHETHER LONG TERM INSULIN USE (H): ICD-10-CM

## 2020-11-13 DIAGNOSIS — Z12.11 SCREEN FOR COLON CANCER: Primary | ICD-10-CM

## 2020-11-13 LAB — HBA1C MFR BLD: 6.9 % (ref 0–5.6)

## 2020-11-13 PROCEDURE — 36415 COLL VENOUS BLD VENIPUNCTURE: CPT | Performed by: INTERNAL MEDICINE

## 2020-11-13 PROCEDURE — 99214 OFFICE O/P EST MOD 30 MIN: CPT | Performed by: INTERNAL MEDICINE

## 2020-11-13 PROCEDURE — 83036 HEMOGLOBIN GLYCOSYLATED A1C: CPT | Performed by: INTERNAL MEDICINE

## 2020-11-13 PROCEDURE — 82043 UR ALBUMIN QUANTITATIVE: CPT | Performed by: INTERNAL MEDICINE

## 2020-11-13 ASSESSMENT — MIFFLIN-ST. JEOR: SCORE: 1725.4

## 2020-11-13 NOTE — PATIENT INSTRUCTIONS
Kelton;  It was nice to meet you.  I be happy to follow you over the course of the upcoming years.    I am glad that your strength is improving as you move away from your stroke.    I would like to evaluate your hemoglobin A1c today.    You do have the indications of osteoarthritis.  I would recommend glucosamine/chondroitin supplements.  Typically doses 1500 mg of glucosamine and 1200 mg of chondroitin daily.  Take the medication for at least 4 weeks preferably 8 weeks.  If there is no improvement after 8 weeks they are very likely will not be any further benefit.    I would be happy to see you back in 2 months time    Best regards  Marshal

## 2020-11-13 NOTE — PROGRESS NOTES
SUBJECTIVE:   CC: Vikash Burgos is an 60 year old male who presents for preventative health visit.  Patient presents for preventative health care visit.    It also has extensive past medical history inclusive of carotid stenosis.  The patient has left-sided carotid stenosis and underwent a CEA unfortunately had a CVA in conjunction with this.  This is left him with a speech deficit as well as some weakness right-sided upper greater than lower.    The patient has been working with physical therapy and the right-sided strength has increased to roughly 45%.  He continues to improve.  His speech also is improving.  His medication regime has been stable lately.    Regarding diabetes reasonable control.  We will reassess this obviously today he does have a history of polyneuropathy in conjunction with his type 2 diabetes.  He has had some issues of right flank and upper quadrant pain.  The exact etiology of this has never been delineated.  Describes the pain is burning in sensation.    He does have a history of of fatty liver nonalcoholic in form.      Patient has been advised of split billing requirements and indicates understanding: Yes  Healthy Habits:     Getting at least 3 servings of Calcium per day:  Yes    Bi-annual eye exam:  Yes    Dental care twice a year:  Yes    Sleep apnea or symptoms of sleep apnea:  None    Diet:  Regular (no restrictions)    Frequency of exercise:  6-7 days/week    Duration of exercise:  45-60 minutes    Taking medications regularly:  Yes    Barriers to taking medications:  None    Medication side effects:  None    PHQ-2 Total Score: 0    Additional concerns today:  Yes              Today's PHQ-2 Score:   PHQ-2 ( 1999 [employer]) 11/13/2020   Q1: Little interest or pleasure in doing things 0   Q2: Feeling down, depressed or hopeless 0   PHQ-2 Score 0       Abuse: Current or Past(Physical, Sexual or Emotional)- No  Do you feel safe in your environment? Yes        Social History     Tobacco  Use     Smoking status: Former Smoker     Packs/day: 0.50     Years: 12.00     Pack years: 6.00     Types: Cigarettes     Start date: 1993     Quit date: 1/26/2005     Years since quitting: 15.8     Smokeless tobacco: Never Used   Substance Use Topics     Alcohol use: Yes     Alcohol/week: 0.0 standard drinks     Comment: minimal     If you drink alcohol do you typically have >3 drinks per day or >7 drinks per week? No    No flowsheet data found.    Last PSA: No results found for: PSA    Reviewed orders with patient. Reviewed health maintenance and updated orders accordingly - Yes  Lab work is in process    Reviewed and updated as needed this visit by clinical staff   Allergies  Meds              Reviewed and updated as needed this visit by Provider                Past Medical History:   Diagnosis Date     Basal cell carcinoma      Carotid stenosis, left      Cerebral infarction (H)      Coronary artery disease     4 vessel bypass November 2010; LIMA ->LAD, SVG-> OM3, SVG -> D2, SVG -> PDA     Diabetes mellitus (H) 2005    neuropathy     Generalized anxiety disorder 5/2/2014     Hepatitis C, chronic (H) 2005     Hyperlipidemia LDL goal < 70      Hypertension      Liver diseases     9/15 Liver is 10 cm in span without left lobe enlargement     Persistent microalbuminuria associated with type 2 diabetes mellitus (H) 5/6/2015        Review of Systems  CONSTITUTIONAL: NEGATIVE for fever, chills, change in weight  INTEGUMENTARY/SKIN: NEGATIVE for worrisome rashes, moles or lesions  EYES: NEGATIVE for vision changes or irritation  ENT: NEGATIVE for ear, mouth and throat problems  RESP: NEGATIVE for significant cough or SOB  CV: NEGATIVE for chest pain, palpitations or peripheral edema  GI: NEGATIVE for nausea, abdominal pain, heartburn, or change in bowel habits   male: negative for dysuria, hematuria, decreased urinary stream, erectile dysfunction, urethral discharge  MUSCULOSKELETAL: NEGATIVE for significant  "arthralgias or myalgia  NEURO: NEGATIVE for weakness, dizziness or paresthesias  PSYCHIATRIC: NEGATIVE for changes in mood or affect    OBJECTIVE:   There were no vitals taken for this visit.    Physical Exam  Alert patient no apparent distress lungs are clear  Heart regular rate and rhythm  Affect and mood appear appropriate.  No hopelessness or helplessness.    Diagnostic Test Results:  Labs reviewed in Epic    ASSESSMENT/PLAN:   Vikash was seen today for establish care and physical.    Diagnoses and all orders for this visit:    Screen for colon cancer  -     Fecal colorectal cancer screen FIT - Future (S+30); Future    Type 2 diabetes mellitus without complication, unspecified whether long term insulin use (H)  -     HEMOGLOBIN A1C  -     Albumin Random Urine Quantitative with Creat Ratio    Is a patient with a complicated past history as noted below.  He is improving regarding his strength in his speech pattern.  He continues to follow-up with rehabilitation in this regard.    His blood pressure is slightly elevated today.  He needs to continue to follow this and I will follow with him in the upcoming year.    Patient has been advised of split billing requirements and indicates understanding: No  COUNSELING:   Reviewed preventive health counseling, as reflected in patient instructions    Estimated body mass index is 26.93 kg/m  as calculated from the following:    Height as of 9/17/20: 1.803 m (5' 10.98\").    Weight as of 9/17/20: 87.5 kg (193 lb).     Patient's weight today is reasonable given his current medical issues.  He is cognizant of the importance of diet and exercise in regards to weight loss.    He reports that he quit smoking about 15 years ago. His smoking use included cigarettes. He started smoking about 27 years ago. He has a 6.00 pack-year smoking history. He has never used smokeless tobacco.      Counseling Resources:  ATP IV Guidelines  Pooled Cohorts Equation Calculator  FRAX Risk " Assessment  ICSI Preventive Guidelines  Dietary Guidelines for Americans, 2010  USDA's MyPlate  ASA Prophylaxis  Lung CA Screening    Marshal Cuevas MD  [facility]

## 2020-11-15 LAB
CREAT UR-MCNC: 41 MG/DL
MICROALBUMIN UR-MCNC: <5 MG/L
MICROALBUMIN/CREAT UR: NORMAL MG/G CR (ref 0–17)

## 2020-11-16 ENCOUNTER — DOCUMENTATION ONLY (OUTPATIENT)
Dept: CARE COORDINATION | Facility: CLINIC | Age: 60
End: 2020-11-16

## 2020-11-19 DIAGNOSIS — E11.49 DM TYPE 2 CAUSING NEUROLOGICAL DISEASE (H): ICD-10-CM

## 2020-11-20 RX ORDER — INSULIN DETEMIR 100 [IU]/ML
INJECTION, SOLUTION SUBCUTANEOUS
Qty: 99 ML | Refills: 11 | Status: SHIPPED | OUTPATIENT
Start: 2020-11-20 | End: 2021-12-08

## 2020-12-08 ENCOUNTER — TELEPHONE (OUTPATIENT)
Dept: GASTROENTEROLOGY | Facility: CLINIC | Age: 60
End: 2020-12-08

## 2020-12-14 ENCOUNTER — PRE VISIT (OUTPATIENT)
Dept: CARDIOLOGY | Facility: CLINIC | Age: 60
End: 2020-12-14

## 2020-12-15 ENCOUNTER — ANCILLARY PROCEDURE (OUTPATIENT)
Dept: ULTRASOUND IMAGING | Facility: CLINIC | Age: 60
End: 2020-12-15
Attending: INTERNAL MEDICINE
Payer: COMMERCIAL

## 2020-12-15 ENCOUNTER — VIRTUAL VISIT (OUTPATIENT)
Dept: GASTROENTEROLOGY | Facility: CLINIC | Age: 60
End: 2020-12-15
Attending: INTERNAL MEDICINE
Payer: COMMERCIAL

## 2020-12-15 DIAGNOSIS — K74.60 CIRRHOSIS OF LIVER WITHOUT ASCITES, UNSPECIFIED HEPATIC CIRRHOSIS TYPE (H): Primary | ICD-10-CM

## 2020-12-15 DIAGNOSIS — K74.60 CIRRHOSIS OF LIVER WITHOUT ASCITES, UNSPECIFIED HEPATIC CIRRHOSIS TYPE (H): ICD-10-CM

## 2020-12-15 LAB
AFP SERPL-MCNC: <1.5 UG/L (ref 0–8)
ALBUMIN SERPL-MCNC: 4.1 G/DL (ref 3.4–5)
ALP SERPL-CCNC: 66 U/L (ref 40–150)
ALT SERPL W P-5'-P-CCNC: 50 U/L (ref 0–70)
ANION GAP SERPL CALCULATED.3IONS-SCNC: 3 MMOL/L (ref 3–14)
AST SERPL W P-5'-P-CCNC: 22 U/L (ref 0–45)
BILIRUB DIRECT SERPL-MCNC: 0.4 MG/DL (ref 0–0.2)
BILIRUB SERPL-MCNC: 1.4 MG/DL (ref 0.2–1.3)
BUN SERPL-MCNC: 13 MG/DL (ref 7–30)
CALCIUM SERPL-MCNC: 9.4 MG/DL (ref 8.5–10.1)
CHLORIDE SERPL-SCNC: 105 MMOL/L (ref 94–109)
CO2 SERPL-SCNC: 30 MMOL/L (ref 20–32)
CREAT SERPL-MCNC: 0.89 MG/DL (ref 0.66–1.25)
ERYTHROCYTE [DISTWIDTH] IN BLOOD BY AUTOMATED COUNT: 12.9 % (ref 10–15)
GFR SERPL CREATININE-BSD FRML MDRD: >90 ML/MIN/{1.73_M2}
GLUCOSE SERPL-MCNC: 191 MG/DL (ref 70–99)
HCT VFR BLD AUTO: 46.3 % (ref 40–53)
HGB BLD-MCNC: 15.7 G/DL (ref 13.3–17.7)
INR PPP: 1.13 (ref 0.86–1.14)
MCH RBC QN AUTO: 30 PG (ref 26.5–33)
MCHC RBC AUTO-ENTMCNC: 33.9 G/DL (ref 31.5–36.5)
MCV RBC AUTO: 89 FL (ref 78–100)
PLATELET # BLD AUTO: 161 10E9/L (ref 150–450)
POTASSIUM SERPL-SCNC: 4.8 MMOL/L (ref 3.4–5.3)
PROT SERPL-MCNC: 7.7 G/DL (ref 6.8–8.8)
RBC # BLD AUTO: 5.23 10E12/L (ref 4.4–5.9)
SODIUM SERPL-SCNC: 138 MMOL/L (ref 133–144)
WBC # BLD AUTO: 5.8 10E9/L (ref 4–11)

## 2020-12-15 PROCEDURE — 80048 BASIC METABOLIC PNL TOTAL CA: CPT | Performed by: PATHOLOGY

## 2020-12-15 PROCEDURE — 99214 OFFICE O/P EST MOD 30 MIN: CPT | Mod: 95 | Performed by: INTERNAL MEDICINE

## 2020-12-15 PROCEDURE — 85027 COMPLETE CBC AUTOMATED: CPT | Performed by: PATHOLOGY

## 2020-12-15 PROCEDURE — 85610 PROTHROMBIN TIME: CPT | Performed by: PATHOLOGY

## 2020-12-15 PROCEDURE — 99000 SPECIMEN HANDLING OFFICE-LAB: CPT | Performed by: PATHOLOGY

## 2020-12-15 PROCEDURE — 80076 HEPATIC FUNCTION PANEL: CPT | Performed by: PATHOLOGY

## 2020-12-15 PROCEDURE — 76700 US EXAM ABDOM COMPLETE: CPT | Performed by: RADIOLOGY

## 2020-12-15 PROCEDURE — 36415 COLL VENOUS BLD VENIPUNCTURE: CPT | Performed by: PATHOLOGY

## 2020-12-15 PROCEDURE — 82105 ALPHA-FETOPROTEIN SERUM: CPT | Mod: 90 | Performed by: PATHOLOGY

## 2020-12-15 SDOH — HEALTH STABILITY: MENTAL HEALTH: HOW OFTEN DO YOU HAVE 6 OR MORE DRINKS ON ONE OCCASION?: NOT ASKED

## 2020-12-15 SDOH — HEALTH STABILITY: MENTAL HEALTH: HOW OFTEN DO YOU HAVE A DRINK CONTAINING ALCOHOL?: MONTHLY OR LESS

## 2020-12-15 SDOH — HEALTH STABILITY: MENTAL HEALTH: HOW MANY STANDARD DRINKS CONTAINING ALCOHOL DO YOU HAVE ON A TYPICAL DAY?: NOT ASKED

## 2020-12-15 ASSESSMENT — PAIN SCALES - GENERAL: PAINLEVEL: NO PAIN (0)

## 2020-12-15 NOTE — LETTER
"    12/15/2020         RE: Vikash Burgos  [address]  [place] [address]        Dear Colleague,    Thank you for referring your patient, Vikash Burgos, to the [facility] [place]. Please see a copy of my visit note below.    Doximity text    Vikash Burgos is a 60 year old male who is being evaluated via a billable video visit.      The patient has been notified of following:     \"This video visit will be conducted via a call between you and your physician/provider. We have found that certain health care needs can be provided without the need for an in-person physical exam.  This service lets us provide the care you need with a video conversation.  If a prescription is necessary we can send it directly to your pharmacy.  If lab work is needed we can place an order for that and you can then stop by our lab to have the test done at a later time.    Video visits are billed at different rates depending on your insurance coverage.  Please reach out to your insurance provider with any questions.    If during the course of the call the physician/provider feels a video visit is not appropriate, you will not be charged for this service.\"    Patient has given verbal consent for Video visit? Yes  How would you like to obtain your AVS? MyChart  If you are dropped from the video visit, the video invite should be resent to: Text to cell phone: [phone]  Will anyone else be joining your video visit? No        Video-Visit Details    I had the pleasure of seeing Kelton Burgos for followup in the Liver Clinic at the [facility] on December 15, 2020.  Mr. Burgos returns for followup of cirrhosis caused by nonalcoholic fatty liver disease.      He is doing well.  He still notes some mild right upper quadrant pain but is largely unchanged or slightly better since his last visit.  He denies any itching or skin rash.  He has only mild fatigue.  He " denies any increased abdominal girth or lower extremity edema.  He denies any gastrointestinal bleeding or any overt signs of hepatic encephalopathy.     His weakness and speech difficulties after his stroke continue to improve.     He denies any fevers or chills, cough or shortness of breath.  He denies any nausea or vomiting, diarrhea or constipation.  His appetite has been good.  His weight is stable.      He does report that his diabetes has been under good control.     Current Outpatient Medications   Medication     aspirin 81 MG tablet     chlorhexidine (PERIDEX) 0.12 % solution     cyanocobalamin 1000 MCG SUBL     gabapentin (NEURONTIN) 300 MG capsule     insulin pen needle (B-D U/F) 31G X 8 MM miscellaneous     insulin pen needle (BD HEIKE U/F) 32G X 4 MM     LEVEMIR FLEXTOUCH 100 UNIT/ML pen     lisinopril (ZESTRIL) 10 MG tablet     nebivolol (BYSTOLIC) 5 MG tablet     rosuvastatin (CRESTOR) 20 MG tablet     Vitamin D, Cholecalciferol, 1000 units TABS     zolpidem (AMBIEN) 5 MG tablet     No current facility-administered medications for this visit.    On physical examination, he looks well.  HEENT exam shows no scleral icterus or temporal muscle wasting.  Neurologic exam shows improved speech and no facial drooping.    Recent Results (from the past 168 hour(s))   INR [[id number]]    Collection Time: 12/15/20  7:44 AM   Result Value Ref Range    INR 1.13 0.86 - 1.14   CBC with platelets [SGB472]    Collection Time: 12/15/20  7:44 AM   Result Value Ref Range    WBC 5.8 4.0 - 11.0 10e9/L    RBC Count 5.23 4.4 - 5.9 10e12/L    Hemoglobin 15.7 13.3 - 17.7 g/dL    Hematocrit 46.3 40.0 - 53.0 %    MCV 89 78 - 100 fl    MCH 30.0 26.5 - 33.0 pg    MCHC 33.9 31.5 - 36.5 g/dL    RDW 12.9 10.0 - 15.0 %    Platelet Count 161 150 - 450 10e9/L   Basic metabolic panel [LAB15]    Collection Time: 12/15/20  7:44 AM   Result Value Ref Range    Sodium 138 133 - 144 mmol/L    Potassium 4.8 3.4 - 5.3 mmol/L    Chloride 105 94 - 109  mmol/L    Carbon Dioxide 30 20 - 32 mmol/L    Anion Gap 3 3 - 14 mmol/L    Glucose 191 (H) 70 - 99 mg/dL    Urea Nitrogen 13 7 - 30 mg/dL    Creatinine 0.89 0.66 - 1.25 mg/dL    GFR Estimate >90 >60 mL/min/[1.73_m2]    GFR Estimate If Black >90 >60 mL/min/[1.73_m2]    Calcium 9.4 8.5 - 10.1 mg/dL   Hepatic Panel [LAB20]    Collection Time: 12/15/20  7:44 AM   Result Value Ref Range    Bilirubin Direct 0.4 (H) 0.0 - 0.2 mg/dL    Bilirubin Total 1.4 (H) 0.2 - 1.3 mg/dL    Albumin 4.1 3.4 - 5.0 g/dL    Protein Total 7.7 6.8 - 8.8 g/dL    Alkaline Phosphatase 66 40 - 150 U/L    ALT 50 0 - 70 U/L    AST 22 0 - 45 U/L      IMPRESSION:  My impression is that Mr. Burgos has cirrhosis caused by nonalcoholic fatty liver disease.  His ultrasound and blood work all completely unchanged.  All complications are well addressed.  We did discuss COVID-19 and he is aware that he is at increased risk for more severe disease and thus will be considered a high priority for the vaccine when is made available to patients.  He is agreeable to going forward with the vaccine.  I will see him back in the clinic in 6 months for repeat imaging and blood work.     Thank you very much for allowing me to participate in the care of this patient.  If you have any questions regarding my recommendations, please do not hesitate to contact me.         Kevin Bedolla MD      Professor of Medicine  [employer]      Executive Medical Director, Solid Organ Transplant Program  [facility]    Type of service:  Video Visit    Video Start Time: 8:08 Am  Video End Time: 8:24 AM    Originating Location (pt. Location): Home    Distant Location (provider location):  [facility]PATOLOGY [facility] [place]     Platform used for Video Visit: Doximity      Again, thank you for allowing me to participate in the care of your patient.        Sincerely,        Kevin Bedolla MD

## 2020-12-15 NOTE — PROGRESS NOTES
"Doximity text    Vikash Burgos is a 60 year old male who is being evaluated via a billable video visit.      The patient has been notified of following:     \"This video visit will be conducted via a call between you and your physician/provider. We have found that certain health care needs can be provided without the need for an in-person physical exam.  This service lets us provide the care you need with a video conversation.  If a prescription is necessary we can send it directly to your pharmacy.  If lab work is needed we can place an order for that and you can then stop by our lab to have the test done at a later time.    Video visits are billed at different rates depending on your insurance coverage.  Please reach out to your insurance provider with any questions.    If during the course of the call the physician/provider feels a video visit is not appropriate, you will not be charged for this service.\"    Patient has given verbal consent for Video visit? Yes  How would you like to obtain your AVS? MyChart  If you are dropped from the video visit, the video invite should be resent to: Text to cell phone: [phone]  Will anyone else be joining your video visit? No        Video-Visit Details    I had the pleasure of seeing Kelton Burgos for followup in the Liver Clinic at the [facility] on December 15, 2020.  Mr. Burgos returns for followup of cirrhosis caused by nonalcoholic fatty liver disease.      He is doing well.  He still notes some mild right upper quadrant pain but is largely unchanged or slightly better since his last visit.  He denies any itching or skin rash.  He has only mild fatigue.  He denies any increased abdominal girth or lower extremity edema.  He denies any gastrointestinal bleeding or any overt signs of hepatic encephalopathy.     His weakness and speech difficulties after his stroke continue to improve.     He denies any fevers or chills, cough or " shortness of breath.  He denies any nausea or vomiting, diarrhea or constipation.  His appetite has been good.  His weight is stable.      He does report that his diabetes has been under good control.     Current Outpatient Medications   Medication     aspirin 81 MG tablet     chlorhexidine (PERIDEX) 0.12 % solution     cyanocobalamin 1000 MCG SUBL     gabapentin (NEURONTIN) 300 MG capsule     insulin pen needle (B-D U/F) 31G X 8 MM miscellaneous     insulin pen needle (BD HEIKE U/F) 32G X 4 MM     LEVEMIR FLEXTOUCH 100 UNIT/ML pen     lisinopril (ZESTRIL) 10 MG tablet     nebivolol (BYSTOLIC) 5 MG tablet     rosuvastatin (CRESTOR) 20 MG tablet     Vitamin D, Cholecalciferol, 1000 units TABS     zolpidem (AMBIEN) 5 MG tablet     No current facility-administered medications for this visit.    On physical examination, he looks well.  HEENT exam shows no scleral icterus or temporal muscle wasting.  Neurologic exam shows improved speech and no facial drooping.    Recent Results (from the past 168 hour(s))   INR [[id number]]    Collection Time: 12/15/20  7:44 AM   Result Value Ref Range    INR 1.13 0.86 - 1.14   CBC with platelets [PGB094]    Collection Time: 12/15/20  7:44 AM   Result Value Ref Range    WBC 5.8 4.0 - 11.0 10e9/L    RBC Count 5.23 4.4 - 5.9 10e12/L    Hemoglobin 15.7 13.3 - 17.7 g/dL    Hematocrit 46.3 40.0 - 53.0 %    MCV 89 78 - 100 fl    MCH 30.0 26.5 - 33.0 pg    MCHC 33.9 31.5 - 36.5 g/dL    RDW 12.9 10.0 - 15.0 %    Platelet Count 161 150 - 450 10e9/L   Basic metabolic panel [LAB15]    Collection Time: 12/15/20  7:44 AM   Result Value Ref Range    Sodium 138 133 - 144 mmol/L    Potassium 4.8 3.4 - 5.3 mmol/L    Chloride 105 94 - 109 mmol/L    Carbon Dioxide 30 20 - 32 mmol/L    Anion Gap 3 3 - 14 mmol/L    Glucose 191 (H) 70 - 99 mg/dL    Urea Nitrogen 13 7 - 30 mg/dL    Creatinine 0.89 0.66 - 1.25 mg/dL    GFR Estimate >90 >60 mL/min/[1.73_m2]    GFR Estimate If Black >90 >60 mL/min/[1.73_m2]     Calcium 9.4 8.5 - 10.1 mg/dL   Hepatic Panel [LAB20]    Collection Time: 12/15/20  7:44 AM   Result Value Ref Range    Bilirubin Direct 0.4 (H) 0.0 - 0.2 mg/dL    Bilirubin Total 1.4 (H) 0.2 - 1.3 mg/dL    Albumin 4.1 3.4 - 5.0 g/dL    Protein Total 7.7 6.8 - 8.8 g/dL    Alkaline Phosphatase 66 40 - 150 U/L    ALT 50 0 - 70 U/L    AST 22 0 - 45 U/L      IMPRESSION:  My impression is that Mr. Burgos has cirrhosis caused by nonalcoholic fatty liver disease.  His ultrasound and blood work all completely unchanged.  All complications are well addressed.  We did discuss COVID-19 and he is aware that he is at increased risk for more severe disease and thus will be considered a high priority for the vaccine when is made available to patients.  He is agreeable to going forward with the vaccine.  I will see him back in the clinic in 6 months for repeat imaging and blood work.     Thank you very much for allowing me to participate in the care of this patient.  If you have any questions regarding my recommendations, please do not hesitate to contact me.         Kevin Bedolla MD      Professor of Medicine  [employer]      Executive Medical Director, Solid Organ Transplant Program  [facility]    Type of service:  Video Visit    Video Start Time: 8:08 Am  Video End Time: 8:24 AM    Originating Location (pt. Location): Home    Distant Location (provider location):  [facility]PATOLOG[facility] [place]     Platform used for Video Visit: Kassi

## 2021-01-05 ENCOUNTER — OFFICE VISIT (OUTPATIENT)
Dept: FAMILY MEDICINE | Facility: CLINIC | Age: 61
End: 2021-01-05
Payer: COMMERCIAL

## 2021-01-05 VITALS
OXYGEN SATURATION: 98 % | WEIGHT: 198 LBS | SYSTOLIC BLOOD PRESSURE: 131 MMHG | DIASTOLIC BLOOD PRESSURE: 77 MMHG | HEART RATE: 62 BPM | BODY MASS INDEX: 27.63 KG/M2 | TEMPERATURE: 98.1 F

## 2021-01-05 DIAGNOSIS — M19.041 PRIMARY OSTEOARTHRITIS OF BOTH HANDS: Primary | ICD-10-CM

## 2021-01-05 DIAGNOSIS — M19.042 PRIMARY OSTEOARTHRITIS OF BOTH HANDS: Primary | ICD-10-CM

## 2021-01-05 PROCEDURE — 99213 OFFICE O/P EST LOW 20 MIN: CPT | Performed by: INTERNAL MEDICINE

## 2021-01-05 RX ORDER — SODIUM PHOSPHATE,MONO-DIBASIC 19G-7G/118
1 ENEMA (ML) RECTAL DAILY
COMMUNITY

## 2021-01-05 RX ORDER — CELECOXIB 100 MG/1
100 CAPSULE ORAL DAILY
Qty: 30 CAPSULE | Refills: 1 | Status: SHIPPED | OUTPATIENT
Start: 2021-01-05 | End: 2021-03-02

## 2021-01-05 NOTE — PROGRESS NOTES
"  Assessment & Plan   Problem List Items Addressed This Visit     None      Visit Diagnoses     Primary osteoarthritis of both hands    -  Primary    Relevant Medications    celecoxib (CELEBREX) 100 MG capsule      This is a patient who presents to clinic today for follow-up on osteoarthritis.  Patient has been utilizing glucosamine/chondroitin has had no significant improvement in his symptoms.  Continues to have aches and pains of the fingers and thumbs.  His pinky finger has nodules that of the distal interphalangeal joint bilaterally.  States that warmth improves his symptoms.    I suspect the patient has osteoarthritis.  He has evidence of Heberden's nodes.  I would recommend a low dose of Celebrex.    I am cognizant of his underlying nonalcoholic steatohepatitis.  We will followed carefully over the course of next several weeks.  I will assess his liver function tests at the time of his next clinic appointment.  I recommend he continue with the glucosamine/chondroitin additionally.    Review of external notes as documented above                          BMI:   Estimated body mass index is 27.63 kg/m  as calculated from the following:    Height as of 11/13/20: 1.803 m (5' 10.98\").    Weight as of this encounter: 89.8 kg (198 lb).         See Patient Instructions    Return in about 2 months (around 3/5/2021) for Follow up.    Marshal Cuevas MD  [facility]     Vikash Burgos is a 60 year old male who presents to clinic today for the following health issues     HPI patient presents for follow-up of his osteoarthritis.  Continues to have symptoms.  His thumbs and pinkies are primarily affected.  Glucosamine chondroitin has not been effective in reducing his pain.  Warmth has alleviated some of his discomfort      Medication Followup of glucosamine chondroitin    Taking Medication as prescribed: yes    Side Effects:  None    Medication Helping Symptoms:  NO-not improving, but " not worsening     New Patient/Transfer of Care    Review of Systems   Constitutional, HEENT, cardiovascular, pulmonary, gi and gu systems are negative, except as otherwise noted.      Objective    /77   Pulse 62   Temp 98.1  F (36.7  C) (Tympanic)   Wt 89.8 kg (198 lb)   SpO2 98%   BMI 27.63 kg/m    Body mass index is 27.63 kg/m .  Physical Exam   Heberden's nodes bilateral    Patient in no apparent distress    Orders Only on 12/15/2020   Component Date Value Ref Range Status     Alpha Fetoprotein 12/15/2020 <1.5  0 - 8 ug/L Final    Assay Method:  Chemiluminescence using Siemens Centaur XP     INR 12/15/2020 1.13  0.86 - 1.14 Final     WBC 12/15/2020 5.8  4.0 - 11.0 10e9/L Final     RBC Count 12/15/2020 5.23  4.4 - 5.9 10e12/L Final     Hemoglobin 12/15/2020 15.7  13.3 - 17.7 g/dL Final     Hematocrit 12/15/2020 46.3  40.0 - 53.0 % Final     MCV 12/15/2020 89  78 - 100 fl Final     MCH 12/15/2020 30.0  26.5 - 33.0 pg Final     MCHC 12/15/2020 33.9  31.5 - 36.5 g/dL Final     RDW 12/15/2020 12.9  10.0 - 15.0 % Final     Platelet Count 12/15/2020 161  150 - 450 10e9/L Final     Sodium 12/15/2020 138  133 - 144 mmol/L Final     Potassium 12/15/2020 4.8  3.4 - 5.3 mmol/L Final     Chloride 12/15/2020 105  94 - 109 mmol/L Final     Carbon Dioxide 12/15/2020 30  20 - 32 mmol/L Final     Anion Gap 12/15/2020 3  3 - 14 mmol/L Final     Glucose 12/15/2020 191* 70 - 99 mg/dL Final     Urea Nitrogen 12/15/2020 13  7 - 30 mg/dL Final     Creatinine 12/15/2020 0.89  0.66 - 1.25 mg/dL Final     GFR Estimate 12/15/2020 >90  >60 mL/min/[1.73_m2] Final    Comment: Non  GFR Calc  Starting 12/18/2018, serum creatinine based estimated GFR (eGFR) will be   calculated using the Chronic Kidney Disease Epidemiology Collaboration   (CKD-EPI) equation.       GFR Estimate If Black 12/15/2020 >90  >60 mL/min/[1.73_m2] Final    Comment:  GFR Calc  Starting 12/18/2018, serum creatinine based estimated  GFR (eGFR) will be   calculated using the Chronic Kidney Disease Epidemiology Collaboration   (CKD-EPI) equation.       Calcium 12/15/2020 9.4  8.5 - 10.1 mg/dL Final     Bilirubin Direct 12/15/2020 0.4* 0.0 - 0.2 mg/dL Final     Bilirubin Total 12/15/2020 1.4* 0.2 - 1.3 mg/dL Final     Albumin 12/15/2020 4.1  3.4 - 5.0 g/dL Final     Protein Total 12/15/2020 7.7  6.8 - 8.8 g/dL Final     Alkaline Phosphatase 12/15/2020 66  40 - 150 U/L Final     ALT 12/15/2020 50  0 - 70 U/L Final     AST 12/15/2020 22  0 - 45 U/L Final

## 2021-01-11 DIAGNOSIS — I10 BENIGN ESSENTIAL HYPERTENSION: ICD-10-CM

## 2021-01-11 RX ORDER — NEBIVOLOL 5 MG/1
5 TABLET ORAL DAILY
Qty: 90 TABLET | Refills: 0 | Status: SHIPPED | OUTPATIENT
Start: 2021-01-11 | End: 2021-02-10

## 2021-01-11 NOTE — TELEPHONE ENCOUNTER
Received refill request for:  Bystolic  Last OV was: 5/6/2020 with CARLOS Florian  Labs/EKG: n/a  F/U scheduled: 2/3/2021 with Dr. Roberts  New script sent to: [facility]

## 2021-01-12 ENCOUNTER — TELEPHONE (OUTPATIENT)
Dept: CARDIOLOGY | Facility: CLINIC | Age: 61
End: 2021-01-12

## 2021-01-12 DIAGNOSIS — E78.2 MIXED HYPERLIPIDEMIA: Primary | ICD-10-CM

## 2021-01-12 DIAGNOSIS — I25.10 CORONARY ARTERY DISEASE INVOLVING NATIVE CORONARY ARTERY OF NATIVE HEART WITHOUT ANGINA PECTORIS: ICD-10-CM

## 2021-01-12 RX ORDER — ROSUVASTATIN CALCIUM 20 MG/1
20 TABLET, COATED ORAL DAILY
Qty: 90 TABLET | Refills: 1 | Status: SHIPPED | OUTPATIENT
Start: 2021-01-12 | End: 2021-05-07

## 2021-01-12 NOTE — TELEPHONE ENCOUNTER
Wife called requesting a refill in Rosuvastatin 20 mg one tablet daily/. Prescription e scribed.    Next Dr. Roberts visit 2-3-2021,

## 2021-01-14 ENCOUNTER — HEALTH MAINTENANCE LETTER (OUTPATIENT)
Age: 61
End: 2021-01-14

## 2021-01-15 ENCOUNTER — PRE VISIT (OUTPATIENT)
Dept: CARDIOLOGY | Facility: CLINIC | Age: 61
End: 2021-01-15

## 2021-01-15 DIAGNOSIS — E78.2 MIXED HYPERLIPIDEMIA: Primary | ICD-10-CM

## 2021-02-03 ENCOUNTER — VIRTUAL VISIT (OUTPATIENT)
Dept: CARDIOLOGY | Facility: CLINIC | Age: 61
End: 2021-02-03
Payer: COMMERCIAL

## 2021-02-03 VITALS
BODY MASS INDEX: 26.32 KG/M2 | DIASTOLIC BLOOD PRESSURE: 67 MMHG | HEART RATE: 59 BPM | SYSTOLIC BLOOD PRESSURE: 138 MMHG | HEIGHT: 71 IN | WEIGHT: 188 LBS

## 2021-02-03 DIAGNOSIS — E78.6 HDL DEFICIENCY: ICD-10-CM

## 2021-02-03 DIAGNOSIS — E78.2 MIXED HYPERLIPIDEMIA: ICD-10-CM

## 2021-02-03 DIAGNOSIS — I25.10 CORONARY ARTERY DISEASE INVOLVING NATIVE CORONARY ARTERY OF NATIVE HEART WITHOUT ANGINA PECTORIS: Primary | ICD-10-CM

## 2021-02-03 DIAGNOSIS — I65.22 LEFT CAROTID ARTERY STENOSIS: ICD-10-CM

## 2021-02-03 DIAGNOSIS — I10 BENIGN ESSENTIAL HYPERTENSION: ICD-10-CM

## 2021-02-03 DIAGNOSIS — R94.39 ABNORMAL CARDIOVASCULAR STRESS TEST: ICD-10-CM

## 2021-02-03 LAB
ALT SERPL W P-5'-P-CCNC: 46 U/L (ref 0–70)
CHOLEST SERPL-MCNC: 104 MG/DL
HDLC SERPL-MCNC: 37 MG/DL
LDLC SERPL CALC-MCNC: 44 MG/DL
NONHDLC SERPL-MCNC: 67 MG/DL
TRIGL SERPL-MCNC: 114 MG/DL

## 2021-02-03 PROCEDURE — 99214 OFFICE O/P EST MOD 30 MIN: CPT | Mod: 95 | Performed by: INTERNAL MEDICINE

## 2021-02-03 PROCEDURE — 84460 ALANINE AMINO (ALT) (SGPT): CPT | Performed by: INTERNAL MEDICINE

## 2021-02-03 PROCEDURE — 80061 LIPID PANEL: CPT | Performed by: INTERNAL MEDICINE

## 2021-02-03 PROCEDURE — 36415 COLL VENOUS BLD VENIPUNCTURE: CPT | Performed by: INTERNAL MEDICINE

## 2021-02-03 ASSESSMENT — MIFFLIN-ST. JEOR: SCORE: 1684.63

## 2021-02-03 NOTE — LETTER
2/3/2021    Marshal Cuevas MD  [address]  [place] [facility] [place] [address]    RE: Vikash Burgos       Dear Colleague,    I had the pleasure of seeing Vikash Burgos in the [facility].    BONI is a 60 year old who is being evaluated via a billable video visit.      How would you like to obtain your AVS? MyChart  If the video visit is dropped, the invitation should be resent by: Text to cell phone: [phone]  Will anyone else be joining your video visit? No      Review Of Systems  Skin: significant hair loss  Eyes:Ears/Nose/Throat: wears glasses  Respiratory: NEGATIVE  Cardiovascular:NEGATIVE  Gastrointestinal: NEGATIVE  Genitourinary:NEGATIVE  Musculoskeletal: arthritis  Neurologic: neuropathy  Psychiatric: NEGATIVE  Hematologic/Lymphatic/Immunologic: NEGATIVE  Endocrine:  Diabetes Type II     Vitals - Patient Reported  Systolic (Patient Reported): 138  Diastolic (Patient Reported): 67  Weight (Patient Reported): 85.3 kg (188 lb)  Pulse (Patient Reported): 59    BP is significantly higher in the early morning (3 am) than in the evening (7 pm)    HELEN Tran    Telephone number of patient: [phone]    Video Start Time: 9:17 AM  Video-Visit Details    Type of service:  Video Visit  DOX    Video End Time:9:49 AM    Originating Location (pt. Location): Home    Distant Location (provider location):  [facility]     Platform used for Video Visit: Doximity     General:  no apparent distress, normal body habitus, sitting upright.  ENT/Mouth:  membranes moist, no nasal discharge.  Normal head shape, no apparent injury or laceration.  Eyes:  no scleral icterus, normal conjunctivae.  No observed jaundice.  Neck:  no apparent neck swelling.   Chest/Lungs:  No breathing difficulty while speaking.  No audible wheezing.  No cough during conversation.  Cardiovascular:  No obviously elevated jugular venous pressure.    Extremities:  no apparent  cyanosis.  Skin:  no xanthelasma.  No facial lacerations.  Neurologic:  Normal arm motion bilateral, no tremors.  No obvious facial droop.  Psychiatric:  Alert and oriented x3, calm demeanor    The rest of the comprehensive physical examination is deferred due to public [facility] emergency video visit restrictions.        Service Date: 02/03/2021      HISTORY OF PRESENT ILLNESS:  Kelton is a very nice 60-year-old gentleman who is  to one of our schedulers, Carmina.      Kelton's past medical history is significant for diabetes mellitus, mixed hyperlipidemia, hypertension, coronary artery disease and carotid disease.      Coronary history dates back to 2010 when he underwent coronary bypass grafting x4 by Dr. Marshal Pruett.   At that time, he was noted to have moderate carotid disease.  When we evaluated him last year, this was found to have progressed and he underwent a left carotid endarterectomy by Dr. Devyn Stubbs.  Unfortunately, this was complicated by both some local facial nerve trauma resulting in left-sided face numbness, but also probably a stroke, although nothing was seen on MRI.  He clearly describes right arm and right leg weakness and a dysequilibrium that it interfered with his ability to exercise.  Over the course of the last year, this has improved significantly, but states it still affects his walking and speech.      Ruperto states he is having no chest, arm, neck, jaw or shoulder discomfort.  No dyspnea on exertion, orthopnea or PND.  No palpitations, lightheadedness, dizziness, syncope or near-syncope.  He has no side effects with his current medical regimen.      ASSESSMENT AND PLAN:  Kelton has no symptoms to suggest ischemia.  Of note, he had a coronary angiogram in 2014 at the [place] demonstrating all bypass grafts to be widely patent, but he had diffuse small caliber diabetic coronary arteries.  A stress nuclear scan done last year as part of a preoperative evaluation demonstrated a very small  lateral apical defect and a very small inferior inferolateral defect.  As he was able to exercise 13 minutes on the treadmill and had an ejection fraction of 51%, we cleared him for his surgery for which he had no cardiac issues.      As stated, ejection fraction at nuclear scan was thought to be a 51%.      He has no symptoms to suggest heart failure or significant arrhythmia.      Blood pressure he states for the most part is well controlled.  He checks his blood pressure at 7:00 in the morning and 7:00 in the evening and it usually comes back at 120 for systolic, although he states if he checks it earlier in the morning, he may get blood pressures as high as 160, but this is clearly the barrie of his medications and he has not taken them yet for the day.      Fasting lipid profile is outstanding.  Total cholesterol is 104, HDL 37, LDL 44, triglycerides are 114.      Creatinine is normal with normal electrolytes and potassium of 4.8.      He reports a weight of 188 pounds, which is down from his previous weight.      I have emphasized that he needs to get back to his exercise regimen.  He states his ability to walk is affected by the weakness in his leg and his dysequilibrium and he states he has pain on the ball of his right foot ever since the carotid surgery, although he does think he can ride his bike, which is his favorite form of exercise anyways.  I have told him he needs to get on a stationary bike, aiming for at least 150 minutes a week.      We reviewed his medications.  We will continue them as is.  I will have him follow up in 1 year.  If he should have any problems, I would be glad to see him sooner.      Thank you for allowing me to participate in his care.         BRITT DUNLAP MD, [facility]             D: 2021   T: 2021   MT: LATOSHA      Name:     IHSAN SANCHEZ   MRN:      [id number]        Account:      [id number]   :      1960           Service Date: 2021       Document: [id number]        Thank you for allowing me to participate in the care of your patient.    Sincerely,     Zeb Roberts MD     [facility]

## 2021-02-03 NOTE — PROGRESS NOTES
BONI is a 60 year old who is being evaluated via a billable video visit.      How would you like to obtain your AVS? MyChart  If the video visit is dropped, the invitation should be resent by: Text to cell phone: [phone]  Will anyone else be joining your video visit? No      Review Of Systems  Skin: significant hair loss  Eyes:Ears/Nose/Throat: wears glasses  Respiratory: NEGATIVE  Cardiovascular:NEGATIVE  Gastrointestinal: NEGATIVE  Genitourinary:NEGATIVE  Musculoskeletal: arthritis  Neurologic: neuropathy  Psychiatric: NEGATIVE  Hematologic/Lymphatic/Immunologic: NEGATIVE  Endocrine:  Diabetes Type II     Vitals - Patient Reported  Systolic (Patient Reported): 138  Diastolic (Patient Reported): 67  Weight (Patient Reported): 85.3 kg (188 lb)  Pulse (Patient Reported): 59    BP is significantly higher in the early morning (3 am) than in the evening (7 pm)    HELEN Tran    Telephone number of patient: [phone]    Video Start Time: 9:17 AM  Video-Visit Details    Type of service:  Video Visit  DOX    Video End Time:9:49 AM    Originating Location (pt. Location): Home    Distant Location (provider location):  [facility]     [facility] Visit[facility]     General:  no apparent distress, normal body habitus, sitting upright.  ENT/Mouth:  membranes moist, no nasal discharge.  Normal head shape, no apparent injury or laceration.  Eyes:  no scleral icterus, normal conjunctivae.  No observed jaundice.  Neck:  no apparent neck swelling.   Chest/Lungs:  No breathing difficulty while speaking.  No audible wheezing.  No cough during conversation.  Cardiovascular:  No obviously elevated jugular venous pressure.    Extremities:  no apparent cyanosis.  Skin:  no xanthelasma.  No facial lacerations.  Neurologic:  Normal arm motion bilateral, no tremors.  No obvious facial droop.  Psychiatric:  Alert and oriented x3, calm demeanor    The rest of the comprehensive physical  examination is deferred due to public health emergency video visit restrictions.

## 2021-02-03 NOTE — PROGRESS NOTES
Service Date: 02/03/2021      HISTORY OF PRESENT ILLNESS:  Kelton is a very nice 60-year-old gentleman who is  to one of our schedulers, Carmina.      Kelton's past medical history is significant for diabetes mellitus, mixed hyperlipidemia, hypertension, coronary artery disease and carotid disease.      Coronary history dates back to 2010 when he underwent coronary bypass grafting x4 by Dr. Marshal Pruett.   At that time, he was noted to have moderate carotid disease.  When we evaluated him last year, this was found to have progressed and he underwent a left carotid endarterectomy by Dr. Devyn Stubbs.  Unfortunately, this was complicated by both some local facial nerve trauma resulting in left-sided face numbness, but also probably a stroke, although nothing was seen on MRI.  He clearly describes right arm and right leg weakness and a dysequilibrium that it interfered with his ability to exercise.  Over the course of the last year, this has improved significantly, but states it still affects his walking and speech.      Ruperto states he is having no chest, arm, neck, jaw or shoulder discomfort.  No dyspnea on exertion, orthopnea or PND.  No palpitations, lightheadedness, dizziness, syncope or near-syncope.  He has no side effects with his current medical regimen.      ASSESSMENT AND PLAN:  Kelton has no symptoms to suggest ischemia.  Of note, he had a coronary angiogram in 2014 at the [facility] demonstrating all bypass grafts to be widely patent, but he had diffuse small caliber diabetic coronary arteries.  A stress nuclear scan done last year as part of a preoperative evaluation demonstrated a very small lateral apical defect and a very small inferior inferolateral defect.  As he was able to exercise 13 minutes on the treadmill and had an ejection fraction of 51%, we cleared him for his surgery for which he had no cardiac issues.      As stated, ejection fraction at nuclear scan was thought to be a 51%.      He has no  symptoms to suggest heart failure or significant arrhythmia.      Blood pressure he states for the most part is well controlled.  He checks his blood pressure at 7:00 in the morning and 7:00 in the evening and it usually comes back at 120 for systolic, although he states if he checks it earlier in the morning, he may get blood pressures as high as 160, but this is clearly the barrie of his medications and he has not taken them yet for the day.      Fasting lipid profile is outstanding.  Total cholesterol is 104, HDL 37, LDL 44, triglycerides are 114.      Creatinine is normal with normal electrolytes and potassium of 4.8.      He reports a weight of 188 pounds, which is down from his previous weight.      I have emphasized that he needs to get back to his exercise regimen.  He states his ability to walk is affected by the weakness in his leg and his dysequilibrium and he states he has pain on the ball of his right foot ever since the carotid surgery, although he does think he can ride his bike, which is his favorite form of exercise anyways.  I have told him he needs to get on a stationary bike, aiming for at least 150 minutes a week.      We reviewed his medications.  We will continue them as is.  I will have him follow up in 1 year.  If he should have any problems, I would be glad to see him sooner.      Thank you for allowing me to participate in his care.         BRITT DUNLAP MD, [facility]             D: 2021   T: 2021   MT: LATOSHA      Name:     IHSAN SANCHEZ   MRN:      [id number]        Account:      [id number]   :      1960           Service Date: 2021      Document: [id number]

## 2021-02-04 DIAGNOSIS — F51.01 PRIMARY INSOMNIA: ICD-10-CM

## 2021-02-05 RX ORDER — ZOLPIDEM TARTRATE 5 MG/1
TABLET ORAL
Qty: 5 TABLET | Refills: 0 | Status: SHIPPED | OUTPATIENT
Start: 2021-02-05 | End: 2021-03-10

## 2021-02-08 NOTE — PROGRESS NOTES
Outpatient Occupational Therapy Discharge Note     Patient: Vikash Burgos  : 1960    Beginning/End Dates of Reporting Period:  3/13/2020 to 3/13/2020    Referring Provider: Marissa Weinberg PA-C    Therapy Diagnosis: R sided weakness, peripheral neuropathy    Client Self Report:   See eval.     Goals:     Goal Identifier 1-Shoulder AROM   Goal Description Patient to demonstrate independence with a shoulder strengthening HEP to increase independence with carrying groceries, lifting a gallon of milk, etc.   Target Date 20   Date Met      Progress:     Goal Identifier 2- Strength   Goal Description Patient to increase R  strength by 15# for improved ability to grasp items and hold a golf club.   Target Date 20   Date Met      Progress:     Goal Identifier 3-FMC   Goal Description Patient to demostrate improved FMC skills by completing the 9HPT in 23 seconds with his R hand for improve ease with buttoning, zipping and writing.   Target Date 20   Date Met      Progress:     Goal Identifier 4-Community Mobility   Goal Description Patient to demonstrate independence/safety with pre-driving assessments including LE reaction time/coordination for improved safety with community mobility/shopping/driivng.   Target Date 20   Date Met      Progress:       Progress Toward Goals:   Not assessed this period.  Patient seen for OT evaluation and education on hand strengthening and shoulder HEP.  He did not schedule any appts after initial evaluation.  Multiple voicemails left to follow up.    Plan:  Discharge from therapy.    Discharge:    Reason for Discharge: Patient has failed to schedule further appointments.    Discharge Plan: Please re-order OT if patient wishes to continue.

## 2021-02-10 DIAGNOSIS — I10 BENIGN ESSENTIAL HYPERTENSION: ICD-10-CM

## 2021-02-10 RX ORDER — NEBIVOLOL 5 MG/1
5 TABLET ORAL DAILY
Qty: 90 TABLET | Refills: 3 | Status: SHIPPED | OUTPATIENT
Start: 2021-02-10 | End: 2022-03-14

## 2021-02-10 NOTE — TELEPHONE ENCOUNTER
Received refill request for:  Bystolic 5 mg daily   Last OV was: 2/3/2021  Labs/EKG: na  F/U scheduled: orders for 2/2022  [facility]  [phone]

## 2021-02-13 ENCOUNTER — NURSE TRIAGE (OUTPATIENT)
Dept: NURSING | Facility: CLINIC | Age: 61
End: 2021-02-13

## 2021-02-13 NOTE — TELEPHONE ENCOUNTER
Spouse calling; patient present.  States patient became sick on 2/9/21 with vomiting, dizziness and shortness of breath; was in bed all day.  No vomiting since 2/9/21 but dizziness and shortness of breath continue; patient is short of breath even at rest.  FNA advised to go to ED and spouse states patient is reluctant to go, but if decides to go, will go to [facility] ED.    Reason for Disposition    MODERATE difficulty breathing (e.g., speaks in phrases, SOB even at rest, pulse 100-120)    Additional Information    Negative: SEVERE difficulty breathing (e.g., struggling for each breath, speaks in single words)    Negative: Difficult to awaken or acting confused (e.g., disoriented, slurred speech)    Negative: Bluish (or gray) lips or face now    Negative: Shock suspected (e.g., cold/pale/clammy skin, too weak to stand, low BP, rapid pulse)    Negative: Sounds like a life-threatening emergency to the triager    Negative: [1] COVID-19 exposure AND [2] no symptoms    Negative: [1] Lives with someone known to have influenza (flu test positive) AND [2] flu-like symptoms (e.g., cough, runny nose, sore throat, SOB; with or without fever)    Negative: [1] Adult with possible COVID-19 symptoms AND [2] triager concerned about severity of symptoms or other causes    Negative: COVID-19 vaccine reaction suspected (e.g., fever, headache, muscle aches) occurring during days 1-3 after getting vaccine    Negative: COVID-19 vaccine, questions about    Negative: COVID-19 and breastfeeding, questions about    Negative: SEVERE or constant chest pain or pressure (Exception: mild central chest pain, present only when coughing)    Protocols used: CORONAVIRUS (COVID-19) DIAGNOSED OR [id number] 1.3

## 2021-02-18 ENCOUNTER — NURSE TRIAGE (OUTPATIENT)
Dept: FAMILY MEDICINE | Facility: CLINIC | Age: 61
End: 2021-02-18

## 2021-02-18 NOTE — TELEPHONE ENCOUNTER
Reason for Call:  Same Day Appointment, Requested Provider:  Dr Cuevas    PCP: Marshal Cuevas    Reason for visit: possible pneumonia    Duration of symptoms: a couple of days     Have you been treated for this in the past? Yes    Additional comments: is wondering if Dr Cuevas would work him in face to face this week or next.    Can we leave a detailed message on this number? YES    Phone number patient can be reached at: Home number on file [phone] (home)    Best Time: any    Call taken on 2/18/2021 at 8:08 AM by Ernestina Soto

## 2021-02-18 NOTE — TELEPHONE ENCOUNTER
"Returned call to patient.     Intermittent SOB, weakness    Patient complains of intermittent SOB, doesn't feel like he's able to take a deep breath, weakness  Fever: no  Duration of symptoms: 6 day(s) ago  Flu shot: yes      Plan:  clinic appointment with provider--first available tomorrow  No clinic appointments available today. Patient doesn't want Virtual Visit,which is not appropriate for symptoms anyway.    Advised ED or URGENT CARE if symptoms worsen prior to appointment     Advise per RN protocols.   See protocol, assessment, disposition, and care advise.     Caller agrees with this plan/advise.  [facility] [address] [place] [facility] [address]  [phone] (home) [phone] (work)      Additional Information    Negative: Breathing stopped and hasn't returned    Negative: Choking on something    Negative: SEVERE difficulty breathing (e.g., struggling for each breath, speaks in single words, pulse > 120)    Negative: Bluish (or gray) lips or face    Negative: Difficult to awaken or acting confused (e.g., disoriented, slurred speech)    Negative: Passed out (i.e., fainted, collapsed and was not responding)    Negative: Wheezing started suddenly after medicine, an allergic food, or bee sting    Negative: Stridor    Negative: Slow, shallow and weak breathing    Negative: Sounds like a life-threatening emergency to the triager    Negative: MODERATE difficulty breathing (e.g., speaks in phrases, SOB even at rest, pulse 100-120) of new onset or worse than normal    Negative: Wheezing can be heard across the room    Negative: Drooling or spitting out saliva (because can't swallow)    Negative: Any history of prior \"blood clot\" in leg or lungs    Negative: Recent illness requiring prolonged bedrest (i.e., immobilization)    Negative: Hip or leg fracture in past 2 months (e.g., or had cast on leg or ankle)    Negative: Major surgery in the past month    Negative: Recent long-distance travel with " "prolonged time in car, bus, plane, or train (i.e., within past 2 weeks; 6 or  more hours duration)    Negative: Extra heart beats OR irregular heart beating   (i.e., \"palpitations\")    Negative: Fever > 103 F (39.4 C)    Negative: Fever > 101 F (38.3 C) and over 60 years of age    Negative: Fever > 100.0 F (37.8 C) and bedridden (e.g., nursing home patient, stroke, chronic illness, recovering from surgery)    Negative: Fever > 100.0 F (37.8 C) and diabetes mellitus or weak immune system (e.g., HIV positive, cancer chemo, splenectomy, organ transplant, chronic steroids)    Negative: Periods where breathing stops and then resumes normally and bedridden (e.g., nursing home patient, CVA)    Negative: Pregnant or postpartum (from 0 to 6 weeks after delivery)    Negative: Patient sounds very sick or weak to the triager    Patient wants to be seen    Answer Assessment - Initial Assessment Questions  1. RESPIRATORY STATUS: \"Describe your breathing?\" (e.g., wheezing, shortness of breath, unable to speak, severe coughing)       SOB, trouble taking a deep breath.  Denies cough  During today's conversation patient reports being on his treadmill x 40 mins and continued during conversation.  Patient able to talk in full sentences.   2. ONSET: \"When did this breathing problem begin?\"       6 days ago  3. PATTERN \"Does the difficult breathing come and go, or has it been constant since it started?\"       intermittent  4. SEVERITY: \"How bad is your breathing?\" (e.g., mild, moderate, severe)     - MILD: No SOB at rest, mild SOB with walking, speaks normally in sentences, can lay down, no retractions, pulse < 100.     - MODERATE: SOB at rest, SOB with minimal exertion and prefers to sit, cannot lie down flat, speaks in phrases, mild retractions, audible wheezing, pulse 100-120.     - SEVERE: Very SOB at rest, speaks in single words, struggling to breathe, sitting hunched forward, retractions, pulse > 120       Moderate.  Reports  " "on average  5. RECURRENT SYMPTOM: \"Have you had difficulty breathing before?\" If so, ask: \"When was the last time?\" and \"What happened that time?\"       1-2 yrs ago  6. CARDIAC HISTORY: \"Do you have any history of heart disease?\" (e.g., heart attack, angina, bypass surgery, angioplasty)       Age 50 bypass surgery.  Denies other sx  7. LUNG HISTORY: \"Do you have any history of lung disease?\"  (e.g., pulmonary embolus, asthma, emphysema)      Hx pneumonia.  Denies others  8. CAUSE: \"What do you think is causing the breathing problem?\"       \"I'm wondering if I am getting pneumonia\"  9. OTHER SYMPTOMS: \"Do you have any other symptoms? (e.g., dizziness, runny nose, cough, chest pain, fever)      Runny nose.  Rare episodes of dizziness  10. PREGNANCY: \"Is there any chance you are pregnant?\" \"When was your last menstrual period?\"        NA  11. TRAVEL: \"Have you traveled out of the country in the last month?\" (e.g., travel history, exposures)        Denies    Protocols used: BREATHING DIFFICULTY-A-OH    Lauren TRINH, RN      February 18, 2021  9:31 AM        "

## 2021-02-19 ENCOUNTER — TELEPHONE (OUTPATIENT)
Dept: CARDIOLOGY | Facility: CLINIC | Age: 61
End: 2021-02-19

## 2021-02-19 NOTE — TELEPHONE ENCOUNTER
Called pt to discuss visit scheduled at 3; provider concerned for SOB at rest.   Pt reports breathing slightly better today, but yes still has SOB at rest  Hx of carotid surgery/stroke in Feb 2020.  Had covid in March 2020.    Discussed multiple risk factors, and rationale for concern for SOB at Rest (PE), and that even if small percentage chance, is very serious and should proceed to ER.    Pt hesitant, but agreed to ER, and cancelling 3pm appt.   Salome Rogers, RN  [facility] RN Triage Team

## 2021-02-19 NOTE — TELEPHONE ENCOUNTER
Call from patient's spouse requesting an appointment with Dr. Roberts. Spouse states since his last visit on 2/3/2021 she has noticed he is having short episodes of dyspnea. These are intermittent throughout the day and only last a few minutes. They happen at rest and with activity, she states it feels very random to her.     Spouse states patient denies any fever, chills, cough, HA, sore throat, or body aches. She thinks he has gained a pound or two since starting a new arthritis medication. She states he has no peripheral edema.     Will message Dr. Roberts to review

## 2021-02-22 ENCOUNTER — PRE VISIT (OUTPATIENT)
Dept: CARDIOLOGY | Facility: CLINIC | Age: 61
End: 2021-02-22

## 2021-02-24 ENCOUNTER — OFFICE VISIT (OUTPATIENT)
Dept: CARDIOLOGY | Facility: CLINIC | Age: 61
End: 2021-02-24
Payer: COMMERCIAL

## 2021-02-24 VITALS
BODY MASS INDEX: 26.74 KG/M2 | SYSTOLIC BLOOD PRESSURE: 172 MMHG | DIASTOLIC BLOOD PRESSURE: 97 MMHG | HEART RATE: 68 BPM | WEIGHT: 191 LBS | HEIGHT: 71 IN

## 2021-02-24 DIAGNOSIS — R06.02 SOB (SHORTNESS OF BREATH): ICD-10-CM

## 2021-02-24 DIAGNOSIS — I25.10 CORONARY ARTERY DISEASE INVOLVING NATIVE CORONARY ARTERY OF NATIVE HEART WITHOUT ANGINA PECTORIS: Primary | Chronic | ICD-10-CM

## 2021-02-24 DIAGNOSIS — I10 BENIGN ESSENTIAL HYPERTENSION: ICD-10-CM

## 2021-02-24 PROCEDURE — 99213 OFFICE O/P EST LOW 20 MIN: CPT | Performed by: INTERNAL MEDICINE

## 2021-02-24 ASSESSMENT — MIFFLIN-ST. JEOR: SCORE: 1693.18

## 2021-02-24 NOTE — PROGRESS NOTES
HPI and Plan:   See dictation    No orders of the defined types were placed in this encounter.      No orders of the defined types were placed in this encounter.      There are no discontinued medications.      Encounter Diagnoses   Name Primary?     Coronary artery disease involving native coronary artery of native heart without angina pectoris Yes     Benign essential hypertension      SOB (shortness of breath)        CURRENT MEDICATIONS:  Current Outpatient Medications   Medication Sig Dispense Refill     aspirin 81 MG tablet Take 1 tablet by mouth daily.       celecoxib (CELEBREX) 100 MG capsule Take 1 capsule (100 mg) by mouth daily 30 capsule 1     chlorhexidine (PERIDEX) 0.12 % solution Take 15 mLs by mouth 2 times daily as needed   5     cyanocobalamin 1000 MCG SUBL Place 1,000 mcg under the tongue daily       gabapentin (NEURONTIN) 300 MG capsule Take 1 capsule (300 mg) by mouth 3 times daily 90 capsule 4     glucosamine-chondroitin 500-400 MG CAPS per capsule Take 1 capsule by mouth daily       insulin pen needle (B-D U/F) 31G X 8 MM miscellaneous Use one pen needles daily or as directed. 100 each 3     insulin pen needle (BD HEIKE U/F) 32G X 4 MM Use 1 daily or as directed. 100 each prn     LEVEMIR FLEXTOUCH 100 UNIT/ML pen INJECT 50 UNITS SUBCUTANEOUS AT BEDTIME 99 mL 11     lisinopril (ZESTRIL) 10 MG tablet One tablet daily with extra 1/2 pill daily prn  BP over 160 135 tablet 3     nebivolol (BYSTOLIC) 5 MG tablet Take 1 tablet (5 mg) by mouth daily 90 tablet 3     rosuvastatin (CRESTOR) 20 MG tablet Take 1 tablet (20 mg) by mouth daily 90 tablet 1     Vitamin D, Cholecalciferol, 1000 units TABS Take 1,000 Units by mouth daily       zolpidem (AMBIEN) 5 MG tablet TAKE 1 TAB ORALLY AS NEEDED FOR SLEEP 5 tablet 0       ALLERGIES     Allergies   Allergen Reactions     Chlorthalidone Nausea and Vomiting     dizziness     Penicillins Rash     Rash with PCN only. Patient has taken amoxicillin with no rash.        PAST MEDICAL HISTORY:  Past Medical History:   Diagnosis Date     Basal cell carcinoma      Carotid stenosis, left     s/p left CEA 2/2020     Cerebral infarction (H)     left CVA 2/2020 post-op carotid endarterectomy     Coronary artery disease     4 vessel bypass November 2010; LIMA ->LAD, SVG-> OM3, SVG -> D2, SVG -> PDA     Diabetes mellitus (H) 2005    neuropathy     Generalized anxiety disorder 05/02/2014     Hepatitis C, chronic (H) 2005     Hyperlipidemia LDL goal < 70      Hypertension      Liver diseases     9/15 Liver is 10 cm in span without left lobe enlargement     Persistent microalbuminuria associated with type 2 diabetes mellitus (H) 05/06/2015       PAST SURGICAL HISTORY:  Past Surgical History:   Procedure Laterality Date     ARTHROSCOPY KNEE RT/LT      left     COLONOSCOPY       CORONARY ARTERY BYPASS  11/18/201    Coronary artery bypass grafting x 4 with placement of the left internal mammary artery to the distal midportion of the left anterior descending artery, saphenous vein graft from aorta to the third obtuse marginal branch of circumflex coronary artery, saphenous vein graft from aorta to the second diagonal branch, saphenous vein graft from aorta to the posterior descending artery.     ENDARTERECTOMY CAROTID Left 2/21/2020    Procedure: LEFT CAROTID ENDARTERECTOMY WITH EEG;  Surgeon: Semaj Stubbs MD;  Location:  OR     ESOPHAGOSCOPY, GASTROSCOPY, DUODENOSCOPY (EGD), COMBINED  10/31/2011    Procedure:COMBINED ESOPHAGOSCOPY, GASTROSCOPY, DUODENOSCOPY (EGD); Surgeon:ALONSO ALDANA; Location: GI     ESOPHAGOSCOPY, GASTROSCOPY, DUODENOSCOPY (EGD), COMBINED N/A 3/8/2018    Procedure: COMBINED ESOPHAGOSCOPY, GASTROSCOPY, DUODENOSCOPY (EGD);  EGD;  Surgeon: Angel Luis Justice MD;  Location:  GI     HEART CATH CORONARY ANGIOGRAM W/LV GRAM  9-11-10    CV Surgery recommended     HEART CATH CORONARY ANGIOGRAM W/LV GRAM  2-28-14    Medical management     HERNIA REPAIR,  INGUINAL RT/LT      left       FAMILY HISTORY:  Family History   Problem Relation Age of Onset     C.A.D. Father      Cancer Father         bladder     Heart Surgery Father         bypass surgery     Heart Disease Mother        SOCIAL HISTORY:  Social History     Socioeconomic History     Marital status:      Spouse name: None     Number of children: None     Years of education: None     Highest education level: None   Occupational History     None   Social Needs     Financial resource strain: None     Food insecurity     Worry: None     Inability: None     Transportation needs     Medical: None     Non-medical: None   Tobacco Use     Smoking status: Former Smoker     Packs/day: 0.50     Years: 12.00     Pack years: 6.00     Types: Cigarettes     Start date:      Quit date: 2005     Years since quittin.0     Smokeless tobacco: Never Used   Substance and Sexual Activity     Alcohol use: Yes     Frequency: Monthly or less     Comment: minimal     Drug use: No     Sexual activity: Yes     Partners: Female   Lifestyle     Physical activity     Days per week: None     Minutes per session: None     Stress: None   Relationships     Social connections     Talks on phone: None     Gets together: None     Attends Church service: None     Active member of club or organization: None     Attends meetings of clubs or organizations: None     Relationship status: None     Intimate partner violence     Fear of current or ex partner: None     Emotionally abused: None     Physically abused: None     Forced sexual activity: None   Other Topics Concern     Parent/sibling w/ CABG, MI or angioplasty before 65F 55M? No      Service Not Asked     Blood Transfusions Not Asked     Caffeine Concern Yes     Comment: 2 cups coffee per day     Occupational Exposure Not Asked     Hobby Hazards Not Asked     Sleep Concern Not Asked     Stress Concern Not Asked     Weight Concern Not Asked     Special Diet Yes      "Comment: low carb diet, low sugar     Back Care Not Asked     Exercise Yes     Comment: treadmill 40 minutes, walk, daily, bike 30 minutes every other day     Bike Helmet Not Asked     Seat Belt Not Asked     Self-Exams Not Asked   Social History Narrative     None       Review of Systems:  Skin:  Negative       Eyes:  Negative      ENT:  Negative      Respiratory:  Positive for shortness of breath;dyspnea on exertion     Cardiovascular:    lightheadedness;Positive for    Gastroenterology: Negative      Genitourinary:  Negative      Musculoskeletal:  Positive for arthritis    Neurologic:  Positive for headaches;numbness or tingling of feet    Psychiatric:  Negative      Heme/Lymph/Imm:  Negative      Endocrine:  Positive for diabetes      Physical Exam:  Vitals: BP (!) 172/97   Pulse 68   Ht 1.803 m (5' 10.98\")   Wt 86.6 kg (191 lb)   BMI 26.65 kg/m      Constitutional:  cooperative, alert and oriented, well developed, well nourished, in no acute distress appears anxious      Skin:  warm and dry to the touch, no apparent skin lesions or masses noted          Head:  normocephalic, no masses or lesions        Eyes:  pupils equal and round, conjunctivae and lids unremarkable, sclera white, no xanthalasma, EOMS intact, no nystagmus        Lymph:      ENT:  no pallor or cyanosis, dentition good        Neck:  carotid pulses are full and equal bilaterally;no carotid bruit        Respiratory:  normal breath sounds, clear to auscultation, normal A-P diameter, normal symmetry, normal respiratory excursion, no use of accessory muscles         Cardiac: regular rhythm;normal S1 and S2;no S3 or S4 occasional premature beats     LUSB;systolic ejection murmur;grade 1 holosystolic murmur;apical;grade 1      pulses full and equal                                        GI:           Extremities and Muscular Skeletal:  no edema;no spinal abnormalities noted;normal muscle strength and tone              Neurological:  no gross motor " deficits        Psych:  affect appropriate, oriented to time, person and place Anxious      CC  No referring provider defined for this encounter.

## 2021-02-24 NOTE — LETTER
2/24/2021      Marshal Cuevas MD  [address]  [facility] [address]      RE: Vikash Burgos       Dear Colleague,    I had the pleasure of seeing Vikash Burgos in the [facility].    Service Date: 02/24/2021      HISTORY OF PRESENT ILLNESS:  Kelton is a very nice 61-year-old gentleman,  to one of our schedulers, Carmina.      Kelton's past medical history significant for diabetes mellitus, mixed hyperlipidemia, hypertension, coronary artery disease and carotid disease.      Coronary history dates back to 2010 when he underwent coronary bypass grafting x4 by Dr. Marshal Pruett.  A 2014 angiogram at the [place] demonstrated all bypass grafts to be widely patent; however, he had diffuse small caliber diabetic coronary arteries.        His most recent evaluation of his coronary arteries was 01/2020 when he underwent a stress nuclear scan where he exercised 13 minutes of a standard Wade protocol with ejection fraction of 51%.  This did demonstrate a very small lateral apical defect and a very small infero-inferolateral defect.  We cleared him for surgery and he did well from a cardiac standpoint.      On the other hand, he underwent a left carotid endarterectomy by Dr. Devyn Stubbs which was unfortunately complicated by both some local facial nerve trauma resulting in left-sided facial numbness, but also probably a stroke, although nothing was seen on MRI.  He clearly describes right arm and right leg weakness and a dysequilibrium interfering with his ability to exercise.  Over the last year, this has improved significantly, but states he still notes an effect in walking and speech.      I saw Kelton in followup earlier this month and overall we thought he was doing well, but just needed to get back to his regular exercise regimen, which he had not yet done because of his rehabbing from his stroke.      I am now seeing Kelton because since that time, he  states he had a day where he was ill.  He has a difficult time describing it.  He said he felt sick.  He had a cough, some vomiting, some shortness of breath and just felt poorly for 1 day.  Subsequently he thinks he has recovered.  He does have some shortness of breath which he describes mostly as a feeling of inability to take a deep enough breath.  He thinks it was somewhat interfering with his exercise, but states on the treadmill, he is pushing through and he states he thinks he is approaching his previous conditioning.  He notes over the last 2 weeks, symptoms have gotten significantly better.        He also has noticed, and Carmina has noticed, that occasionally when he is resting he will take a deep sigh or deep breath.  He is not even aware of it, but he says he does not think he is short of breath, but Carmina and his son are concerned that he is taking these deep breaths.        He is having no chest, arm, neck, jaw or shoulder discomfort.  He states occasionally he will feel some dizziness.  He is concerned as we talked about on his last visit, that he has taken Celebrex for his arthritis.  He states that Celebrex is doing a wonderful job for his arthritis, but he remembers me talking about fluid retention and the slight increased risk of heart attack.      He also is concerned that he may have had COVID last year.  He states it was very early on.  He never got tested and has been reading lately about a long-haul COVID problems appearing months down the road and wonders whether this may be part that.      ASSESSMENT AND PLAN:  Carmina Melara and I had a very nice discussion.  At this time, he is clearly getting better.  It is unclear whether he had a viral syndrome or something 2 weeks ago.  I did offer to repeat a stress test as we have one from last year that we can compare this directly to see if there has been any deterioration from a coronary standpoint, any problems with chronotropic incompetence or  uncontrolled high blood pressure.  At this time, he states he is feeling like he is steadily getting better and would like to put off any stress test.  I told him I will not schedule it at this time, but if he does not think he is improving, by all means call and we will set up a stress test on medications.      I do not think this is heart failure.      Heart rate is 68.  We did do a Holter monitor on him in 2018, which did demonstrate some Wenckebach block, PVCs, PACs and some pauses, but all due to his Wenckebach.      Blood pressure is very high today, but Kelton is very anxious today.  Blood pressure is 172/97.  He states at home it typically runs in the 130s.  I have told him to follow his blood pressure.  If his blood pressure is poorly controlled, this can contribute to shortness of breath and decreased exercise tolerance.  As stated, if we do a stress test, I would do it on all of his meds this to see what he does on the treadmill with his medications.  Otherwise, I will have him follow up in 6 months with my [facility] to make sure blood pressure is well controlled.        I have asked them call me if his blood pressure is consistently in the upper 130s or higher.  We did talk about the SPRINT criteria.      I reviewed his medications.  We will continue everything as is.      The fasting lipid profile we reviewed earlier this month was excellent.  Electrolytes also reviewed earlier this month are excellent.      Thank you for allowing me to participate in his care.         BRITT DUNLAP MD, [facility]             D: 2021   T: 2021   MT: LATOSHA      Name:     IHSAN SANCHEZ   MRN:      [id number]        Account:      [id number]   :      1960           Service Date: 2021      Document: [id number]        Outpatient Encounter Medications as of 2021   Medication Sig Dispense Refill     aspirin 81 MG tablet Take 1 tablet by mouth daily.       celecoxib (CELEBREX) 100 MG capsule Take 1  capsule (100 mg) by mouth daily 30 capsule 1     chlorhexidine (PERIDEX) 0.12 % solution Take 15 mLs by mouth 2 times daily as needed   5     cyanocobalamin 1000 MCG SUBL Place 1,000 mcg under the tongue daily       gabapentin (NEURONTIN) 300 MG capsule Take 1 capsule (300 mg) by mouth 3 times daily 90 capsule 4     glucosamine-chondroitin 500-400 MG CAPS per capsule Take 1 capsule by mouth daily       insulin pen needle (B-D U/F) 31G X 8 MM miscellaneous Use one pen needles daily or as directed. 100 each 3     insulin pen needle (BD HEIKE U/F) 32G X 4 MM Use 1 daily or as directed. 100 each prn     LEVEMIR FLEXTOUCH 100 UNIT/ML pen INJECT 50 UNITS SUBCUTANEOUS AT BEDTIME 99 mL 11     lisinopril (ZESTRIL) 10 MG tablet One tablet daily with extra 1/2 pill daily prn  BP over 160 135 tablet 3     nebivolol (BYSTOLIC) 5 MG tablet Take 1 tablet (5 mg) by mouth daily 90 tablet 3     rosuvastatin (CRESTOR) 20 MG tablet Take 1 tablet (20 mg) by mouth daily 90 tablet 1     Vitamin D, Cholecalciferol, 1000 units TABS Take 1,000 Units by mouth daily       zolpidem (AMBIEN) 5 MG tablet TAKE 1 TAB ORALLY AS NEEDED FOR SLEEP 5 tablet 0     No facility-administered encounter medications on file as of 2/24/2021.        Again, thank you for allowing me to participate in the care of your patient.      Sincerely,    Zeb Roberts MD     [facility]

## 2021-02-24 NOTE — LETTER
2/24/2021    Marshal Cuevas MD  [address]  [facility] [address]    RE: Vikash Reevesmackenzie       Dear Colleague,    I had the pleasure of seeing Vikash Burgos in the [facility].    HPI and Plan:   See dictation    No orders of the defined types were placed in this encounter.      No orders of the defined types were placed in this encounter.      There are no discontinued medications.      Encounter Diagnoses   Name Primary?     Coronary artery disease involving native coronary artery of native heart without angina pectoris Yes     Benign essential hypertension      SOB (shortness of breath)        CURRENT MEDICATIONS:  Current Outpatient Medications   Medication Sig Dispense Refill     aspirin 81 MG tablet Take 1 tablet by mouth daily.       celecoxib (CELEBREX) 100 MG capsule Take 1 capsule (100 mg) by mouth daily 30 capsule 1     chlorhexidine (PERIDEX) 0.12 % solution Take 15 mLs by mouth 2 times daily as needed   5     cyanocobalamin 1000 MCG SUBL Place 1,000 mcg under the tongue daily       gabapentin (NEURONTIN) 300 MG capsule Take 1 capsule (300 mg) by mouth 3 times daily 90 capsule 4     glucosamine-chondroitin 500-400 MG CAPS per capsule Take 1 capsule by mouth daily       insulin pen needle (B-D U/F) 31G X 8 MM miscellaneous Use one pen needles daily or as directed. 100 each 3     insulin pen needle (BD HEIKE U/F) 32G X 4 MM Use 1 daily or as directed. 100 each prn     LEVEMIR FLEXTOUCH 100 UNIT/ML pen INJECT 50 UNITS SUBCUTANEOUS AT BEDTIME 99 mL 11     lisinopril (ZESTRIL) 10 MG tablet One tablet daily with extra 1/2 pill daily prn  BP over 160 135 tablet 3     nebivolol (BYSTOLIC) 5 MG tablet Take 1 tablet (5 mg) by mouth daily 90 tablet 3     rosuvastatin (CRESTOR) 20 MG tablet Take 1 tablet (20 mg) by mouth daily 90 tablet 1     Vitamin D, Cholecalciferol, 1000 units TABS Take 1,000 Units by mouth daily       zolpidem (AMBIEN) 5 MG  tablet TAKE 1 TAB ORALLY AS NEEDED FOR SLEEP 5 tablet 0       ALLERGIES     Allergies   Allergen Reactions     Chlorthalidone Nausea and Vomiting     dizziness     Penicillins Rash     Rash with PCN only. Patient has taken amoxicillin with no rash.       PAST MEDICAL HISTORY:  Past Medical History:   Diagnosis Date     Basal cell carcinoma      Carotid stenosis, left     s/p left CEA 2/2020     Cerebral infarction (H)     left CVA 2/2020 post-op carotid endarterectomy     Coronary artery disease     4 vessel bypass November 2010; LIMA ->LAD, SVG-> OM3, SVG -> D2, SVG -> PDA     Diabetes mellitus (H) 2005    neuropathy     Generalized anxiety disorder 05/02/2014     Hepatitis C, chronic (H) 2005     Hyperlipidemia LDL goal < 70      Hypertension      Liver diseases     9/15 Liver is 10 cm in span without left lobe enlargement     Persistent microalbuminuria associated with type 2 diabetes mellitus (H) 05/06/2015       PAST SURGICAL HISTORY:  Past Surgical History:   Procedure Laterality Date     ARTHROSCOPY KNEE RT/LT      left     COLONOSCOPY       CORONARY ARTERY BYPASS  11/18/201    Coronary artery bypass grafting x 4 with placement of the left internal mammary artery to the distal midportion of the left anterior descending artery, saphenous vein graft from aorta to the third obtuse marginal branch of circumflex coronary artery, saphenous vein graft from aorta to the second diagonal branch, saphenous vein graft from aorta to the posterior descending artery.     ENDARTERECTOMY CAROTID Left 2/21/2020    Procedure: LEFT CAROTID ENDARTERECTOMY WITH EEG;  Surgeon: Semaj Stubbs MD;  Location:  OR     ESOPHAGOSCOPY, GASTROSCOPY, DUODENOSCOPY (EGD), COMBINED  10/31/2011    Procedure:COMBINED ESOPHAGOSCOPY, GASTROSCOPY, DUODENOSCOPY (EGD); Surgeon:ALONSO ALDANA; Location: GI     ESOPHAGOSCOPY, GASTROSCOPY, DUODENOSCOPY (EGD), COMBINED N/A 3/8/2018    Procedure: COMBINED ESOPHAGOSCOPY, GASTROSCOPY,  DUODENOSCOPY (EGD);  EGD;  Surgeon: Angel Luis Justice MD;  Location: [facility]     HEART CATH CORONARY ANGIOGRAM W/LV GRAM  -10    CV Surgery recommended     HEART CATH CORONARY ANGIOGRAM W/LV GRAM  14    Medical management     HERNIA REPAIR, INGUINAL RT/LT      left       FAMILY HISTORY:  Family History   Problem Relation Age of Onset     C.A.D. Father      Cancer Father         bladder     Heart Surgery Father         bypass surgery     Heart Disease Mother        SOCIAL HISTORY:  Social History     Socioeconomic History     Marital status:      Spouse name: None     Number of children: None     Years of education: None     Highest education level: None   Occupational History     None   Social Needs     Financial resource strain: None     Food insecurity     Worry: None     Inability: None     Transportation needs     Medical: None     Non-medical: None   Tobacco Use     Smoking status: Former Smoker     Packs/day: 0.50     Years: 12.00     Pack years: 6.00     Types: Cigarettes     Start date:      Quit date: 2005     Years since quittin.0     Smokeless tobacco: Never Used   Substance and Sexual Activity     Alcohol use: Yes     Frequency: Monthly or less     Comment: minimal     Drug use: No     Sexual activity: Yes     Partners: Female   Lifestyle     Physical activity     Days per week: None     Minutes per session: None     Stress: None   Relationships     Social connections     Talks on phone: None     Gets together: None     Attends Bahai service: None     Active member of club or organization: None     Attends meetings of clubs or organizations: None     Relationship status: None     Intimate partner violence     Fear of current or ex partner: None     Emotionally abused: None     Physically abused: None     Forced sexual activity: None   Other Topics Concern     Parent/sibling w/ CABG, MI or angioplasty before 65F 55M? No      Service Not Asked     Blood  "Transfusions Not Asked     Caffeine Concern Yes     Comment: 2 cups coffee per day     Occupational Exposure Not Asked     Hobby Hazards Not Asked     Sleep Concern Not Asked     Stress Concern Not Asked     Weight Concern Not Asked     Special Diet Yes     Comment: low carb diet, low sugar     Back Care Not Asked     Exercise Yes     Comment: treadmill 40 minutes, walk, daily, bike 30 minutes every other day     Bike Helmet Not Asked     Seat Belt Not Asked     Self-Exams Not Asked   Social History Narrative     None       Review of Systems:  Skin:  Negative       Eyes:  Negative      ENT:  Negative      Respiratory:  Positive for shortness of breath;dyspnea on exertion     Cardiovascular:    lightheadedness;Positive for    Gastroenterology: Negative      Genitourinary:  Negative      Musculoskeletal:  Positive for arthritis    Neurologic:  Positive for headaches;numbness or tingling of feet    Psychiatric:  Negative      Heme/Lymph/Imm:  Negative      Endocrine:  Positive for diabetes      Physical Exam:  Vitals: BP (!) 172/97   Pulse 68   Ht 1.803 m (5' 10.98\")   Wt 86.6 kg (191 lb)   BMI 26.65 kg/m      Constitutional:  cooperative, alert and oriented, well developed, well nourished, in no acute distress appears anxious      Skin:  warm and dry to the touch, no apparent skin lesions or masses noted          Head:  normocephalic, no masses or lesions        Eyes:  pupils equal and round, conjunctivae and lids unremarkable, sclera white, no xanthalasma, EOMS intact, no nystagmus        Lymph:      ENT:  no pallor or cyanosis, dentition good        Neck:  carotid pulses are full and equal bilaterally;no carotid bruit        Respiratory:  normal breath sounds, clear to auscultation, normal A-P diameter, normal symmetry, normal respiratory excursion, no use of accessory muscles         Cardiac: regular rhythm;normal S1 and S2;no S3 or S4 occasional premature beats     LUSB;systolic ejection murmur;grade 1 " holosystolic murmur;apical;grade 1      pulses full and equal                                        GI:           Extremities and Muscular Skeletal:  no edema;no spinal abnormalities noted;normal muscle strength and tone              Neurological:  no gross motor deficits        Psych:  affect appropriate, oriented to time, person and place Anxious      CC  No referring provider defined for this encounter.                  Thank you for allowing me to participate in the care of your patient.      Sincerely,     Zeb Roberts MD     [facility]  cc:   No referring provider defined for this encounter.

## 2021-02-24 NOTE — PROGRESS NOTES
Service Date: 02/24/2021      HISTORY OF PRESENT ILLNESS:  Kelton is a very nice 61-year-old gentleman,  to one of our schedulers, Carmina.      Kelton's past medical history significant for diabetes mellitus, mixed hyperlipidemia, hypertension, coronary artery disease and carotid disease.      Coronary history dates back to 2010 when he underwent coronary bypass grafting x4 by Dr. Marshal Pruett.  A 2014 angiogram at the [facility] demonstrated all bypass grafts to be widely patent; however, he had diffuse small caliber diabetic coronary arteries.        His most recent evaluation of his coronary arteries was 01/2020 when he underwent a stress nuclear scan where he exercised 13 minutes of a standard Wade protocol with ejection fraction of 51%.  This did demonstrate a very small lateral apical defect and a very small infero-inferolateral defect.  We cleared him for surgery and he did well from a cardiac standpoint.      On the other hand, he underwent a left carotid endarterectomy by Dr. Devyn Stubbs which was unfortunately complicated by both some local facial nerve trauma resulting in left-sided facial numbness, but also probably a stroke, although nothing was seen on MRI.  He clearly describes right arm and right leg weakness and a dysequilibrium interfering with his ability to exercise.  Over the last year, this has improved significantly, but states he still notes an effect in walking and speech.      I saw Kelton in followup earlier this month and overall we thought he was doing well, but just needed to get back to his regular exercise regimen, which he had not yet done because of his rehabbing from his stroke.      I am now seeing Kelton because since that time, he states he had a day where he was ill.  He has a difficult time describing it.  He said he felt sick.  He had a cough, some vomiting, some shortness of breath and just felt poorly for 1 day.  Subsequently he thinks he has recovered.  He does have some  shortness of breath which he describes mostly as a feeling of inability to take a deep enough breath.  He thinks it was somewhat interfering with his exercise, but states on the treadmill, he is pushing through and he states he thinks he is approaching his previous conditioning.  He notes over the last 2 weeks, symptoms have gotten significantly better.        He also has noticed, and Carmina has noticed, that occasionally when he is resting he will take a deep sigh or deep breath.  He is not even aware of it, but he says he does not think he is short of breath, but Carmina and his son are concerned that he is taking these deep breaths.        He is having no chest, arm, neck, jaw or shoulder discomfort.  He states occasionally he will feel some dizziness.  He is concerned as we talked about on his last visit, that he has taken Celebrex for his arthritis.  He states that Celebrex is doing a wonderful job for his arthritis, but he remembers me talking about fluid retention and the slight increased risk of heart attack.      He also is concerned that he may have had COVID last year.  He states it was very early on.  He never got tested and has been reading lately about a long-haul COVID problems appearing months down the road and wonders whether this may be part that.      ASSESSMENT AND PLAN:  Carmina Melara and I had a very nice discussion.  At this time, he is clearly getting better.  It is unclear whether he had a viral syndrome or something 2 weeks ago.  I did offer to repeat a stress test as we have one from last year that we can compare this directly to see if there has been any deterioration from a coronary standpoint, any problems with chronotropic incompetence or uncontrolled high blood pressure.  At this time, he states he is feeling like he is steadily getting better and would like to put off any stress test.  I told him I will not schedule it at this time, but if he does not think he is improving, by all  means call and we will set up a stress test on medications.      I do not think this is heart failure.      Heart rate is 68.  We did do a Holter monitor on him in 2018, which did demonstrate some Wenckebach block, PVCs, PACs and some pauses, but all due to his Wenckebach.      Blood pressure is very high today, but Kelton is very anxious today.  Blood pressure is 172/97.  He states at home it typically runs in the 130s.  I have told him to follow his blood pressure.  If his blood pressure is poorly controlled, this can contribute to shortness of breath and decreased exercise tolerance.  As stated, if we do a stress test, I would do it on all of his meds this to see what he does on the treadmill with his medications.  Otherwise, I will have him follow up in 6 months with my [facility] to make sure blood pressure is well controlled.        I have asked them call me if his blood pressure is consistently in the upper 130s or higher.  We did talk about the SPRINT criteria.      I reviewed his medications.  We will continue everything as is.      The fasting lipid profile we reviewed earlier this month was excellent.  Electrolytes also reviewed earlier this month are excellent.      Thank you for allowing me to participate in his care.         BRITT DUNLAP MD, [facility]             D: 2021   T: 2021   MT: LATOSHA      Name:     IHSAN SANCHEZ   MRN:      [id number]        Account:      [id number]   :      1960           Service Date: 2021      Document: [id number]

## 2021-03-01 DIAGNOSIS — M19.041 PRIMARY OSTEOARTHRITIS OF BOTH HANDS: ICD-10-CM

## 2021-03-01 DIAGNOSIS — M19.042 PRIMARY OSTEOARTHRITIS OF BOTH HANDS: ICD-10-CM

## 2021-03-02 RX ORDER — CELECOXIB 100 MG/1
CAPSULE ORAL
Qty: 30 CAPSULE | Refills: 1 | Status: SHIPPED | OUTPATIENT
Start: 2021-03-02 | End: 2021-04-22

## 2021-03-08 ENCOUNTER — TELEPHONE (OUTPATIENT)
Dept: FAMILY MEDICINE | Facility: CLINIC | Age: 61
End: 2021-03-08

## 2021-03-08 DIAGNOSIS — R06.2 WHEEZING: Primary | ICD-10-CM

## 2021-03-09 DIAGNOSIS — F51.01 PRIMARY INSOMNIA: ICD-10-CM

## 2021-03-09 NOTE — TELEPHONE ENCOUNTER
Pending Prescriptions:                       Disp   Refills    zolpidem (AMBIEN) 5 MG tablet             5 tabl*0            Sig: TAKE 1 TAB ORALLY AS NEEDED FOR SLEEP          Last Written Prescription Date:  2-5-2021  Last Fill Quantity: 5,   # refills: 0  Last Office Visit: 1-5-2021 [facility] Office visit:   None    Routing refill request to provider for review/approval because:  Drug not on the [facility], [facility] or [facility] refill protocol or controlled substance    RT Emre (R)

## 2021-03-09 NOTE — TELEPHONE ENCOUNTER
Routing refill request to provider for review/approval because:  Drug not active on patient's medication list    Please review and authorize if appropriate.    Thank you,   Alban STARKS RN

## 2021-03-09 NOTE — TELEPHONE ENCOUNTER
Albuterol inhaler not on current med list    Original order   04/08/20 Sent (none) Jailyn Medel MD [facility]   Associated Diagnoses  Cough [R05]  - Primary       Wheezing [R06.2]         Was discontinued by rooming staff   11/13/20 1312 Discontinue Li Borden CMA Reason: Therapy completed     I called [facility] [phone]   Customer initiated request.    Routing to triage.

## 2021-03-10 RX ORDER — ZOLPIDEM TARTRATE 5 MG/1
TABLET ORAL
Qty: 5 TABLET | Refills: 0 | Status: SHIPPED | OUTPATIENT
Start: 2021-03-10 | End: 2021-05-07

## 2021-03-12 RX ORDER — ALBUTEROL SULFATE 90 UG/1
AEROSOL, METERED RESPIRATORY (INHALATION)
Qty: 1 INHALER | Refills: 3 | Status: SHIPPED | OUTPATIENT
Start: 2021-03-12 | End: 2022-02-14

## 2021-03-12 RX ORDER — ALBUTEROL SULFATE 90 UG/1
AEROSOL, METERED RESPIRATORY (INHALATION)
COMMUNITY
Start: 2020-05-17 | End: 2021-03-12

## 2021-04-09 NOTE — PROGRESS NOTES
Vikash Burgos is a 60-year-old gentleman with metabolic syndrome and prior CABG who is status post an asymptomatic left carotid endarterectomy on 2/21/2020.  He awoke from that procedure neurologically intact except for some moderate left tongue deviation.  Several hours later he had right eye ptosis with right-sided weakness.  A code stroke was called and he underwent neurology consultation.  MRI of the brain was negative for infarct.  CTA showed a widely patent left carotid artery with no associated abnormalities.  Neurology could not find an organic cause for the right-sided weakness and did not feel that he had suffered an infarct.  Swallow studies were abnormal and he was discharged home on a dysphagia diet.    Kelton was evaluated by Dr. Jean-Baptiste at the [facility] in March 2020.  MRI of the brain was negative for infarct but clinically he may have suffered a stroke.  He continues in physical therapy and speech therapy.  He is biking avidly up to 30 miles per day.  His strength is improving but is not quite back to normal.  He still notices some right arm weakness compared to the left.    Past medical history is otherwise significant for stage IV liver disease with HCV.  He is followed in transplant hepatology.  He is also status post CABG about 10 years ago.    At today's visit he had no new complaints.    Exam:  Well-developed male in no acute distress.  Blood pressure 159/83 with a pulse of 59.  Speech is fairly normal.  His tongue deviates slightly to the left.  Well-healed right-sided carotid endarterectomy incision.  Right upper extremity strength 3-4/5.  Left upper extremity strength 5/5.  Normal gait.    Imaging:  LEFT CAROTID ULTRASOUND   9/1/2020 8:14 AM      HISTORY: History of left carotid endarterectomy on 2/21/2020. Left  carotid artery stenosis.     COMPARISON: 1/20/2020     LEFT CAROTID FINDINGS:  Plaque in the common carotid, carotid bulb and  internal carotid artery.  Left  ICA PSV:  132  cm/sec.  Left ICA EDV:  29 cm/sec.  Left ICA/CCA PSV Ratio:  0.96    These indicate  50 - 69%  diameter stenosis of the left ICA.    Left Vertebral: Antegrade flow.   Left ECA: Antegrade flow.      Causes of Decreased Accuracy:   None.                                                                       IMPRESSION: 50-69% diameter stenosis of the left ICA relative to the  distal ICA diameter.     DEION NGUYEN,     ASSESSMENT:  6 months status post left carotid endarterectomy with subsequent postoperative right face, arm, and leg weakness with associated numbness.  MRI of the brain was normal.  Varying neurologic opinions as to whether or not he has truly suffered a stroke.  His left carotid artery is widely patent.  Clinically he seems to be improving with ongoing physical and speech therapy.    RECOMMENDATION:  I reviewed all the above with Kelton.  I defer to my neurologic colleagues regarding the question of possible postoperative stroke.  Thankfully he seems to be improving clinically.  He will continue with physical therapy and speech therapy.  He remains physically active.  He will continue his medical regimen which includes daily aspirin.  Vascular surgical follow-up will be with me in 1 year for repeat bilateral carotid ultrasonography.    Total length of this encounter was 20 minutes.    Devyn Stubbs MD

## 2021-04-14 ENCOUNTER — OFFICE VISIT (OUTPATIENT)
Dept: NURSING | Facility: CLINIC | Age: 61
End: 2021-04-14
Payer: COMMERCIAL

## 2021-04-14 PROCEDURE — 0001A PR COVID VAC PFIZER DIL RECON 30 MCG/0.3 ML IM: CPT

## 2021-04-14 PROCEDURE — 91300 PR COVID VAC PFIZER DIL RECON 30 MCG/0.3 ML IM: CPT

## 2021-04-22 DIAGNOSIS — M19.041 PRIMARY OSTEOARTHRITIS OF BOTH HANDS: ICD-10-CM

## 2021-04-22 DIAGNOSIS — M19.042 PRIMARY OSTEOARTHRITIS OF BOTH HANDS: ICD-10-CM

## 2021-04-22 RX ORDER — CELECOXIB 100 MG/1
CAPSULE ORAL
Qty: 30 CAPSULE | Refills: 1 | Status: SHIPPED | OUTPATIENT
Start: 2021-04-22 | End: 2021-06-21

## 2021-05-05 ENCOUNTER — IMMUNIZATION (OUTPATIENT)
Dept: NURSING | Facility: CLINIC | Age: 61
End: 2021-05-05
Attending: INTERNAL MEDICINE
Payer: COMMERCIAL

## 2021-05-05 DIAGNOSIS — I10 BENIGN ESSENTIAL HYPERTENSION: ICD-10-CM

## 2021-05-05 PROCEDURE — 91300 PR COVID VAC PFIZER DIL RECON 30 MCG/0.3 ML IM: CPT

## 2021-05-05 PROCEDURE — 0002A PR COVID VAC PFIZER DIL RECON 30 MCG/0.3 ML IM: CPT

## 2021-05-05 RX ORDER — LISINOPRIL 10 MG/1
TABLET ORAL
Qty: 135 TABLET | Refills: 3 | Status: SHIPPED | OUTPATIENT
Start: 2021-05-05 | End: 2021-07-23

## 2021-05-06 DIAGNOSIS — F51.01 PRIMARY INSOMNIA: ICD-10-CM

## 2021-05-06 NOTE — TELEPHONE ENCOUNTER
zolpidem (AMBIEN) 5 MG tablet      Last Written Prescription Date:  3/10/21  Last Fill Quantity: 5 tablet,   # refills: 0  Last Office Visit: 1/05/2021 with Mason  Future Office visit:       Routing refill request to provider for review/approval because:  Drug not on the [facility], [facility] or [facility] refill protocol or controlled substance

## 2021-05-07 DIAGNOSIS — E78.2 MIXED HYPERLIPIDEMIA: ICD-10-CM

## 2021-05-07 DIAGNOSIS — I25.10 CORONARY ARTERY DISEASE INVOLVING NATIVE CORONARY ARTERY OF NATIVE HEART WITHOUT ANGINA PECTORIS: ICD-10-CM

## 2021-05-07 RX ORDER — ZOLPIDEM TARTRATE 5 MG/1
TABLET ORAL
Qty: 5 TABLET | Refills: 0 | Status: SHIPPED | OUTPATIENT
Start: 2021-05-07 | End: 2021-07-06

## 2021-05-07 RX ORDER — ROSUVASTATIN CALCIUM 20 MG/1
20 TABLET, COATED ORAL DAILY
Qty: 90 TABLET | Refills: 2 | Status: SHIPPED | OUTPATIENT
Start: 2021-05-07 | End: 2022-01-20

## 2021-05-07 NOTE — TELEPHONE ENCOUNTER
Routing refill request to provider for review/approval because:  Drug not on the [facility] refill protocol   Yady Cordero RN  [facility] Triage Nurse

## 2021-06-07 ENCOUNTER — ANCILLARY PROCEDURE (OUTPATIENT)
Dept: ULTRASOUND IMAGING | Facility: CLINIC | Age: 61
End: 2021-06-07
Attending: INTERNAL MEDICINE
Payer: COMMERCIAL

## 2021-06-07 DIAGNOSIS — K74.60 CIRRHOSIS OF LIVER WITHOUT ASCITES, UNSPECIFIED HEPATIC CIRRHOSIS TYPE (H): ICD-10-CM

## 2021-06-07 LAB
AFP SERPL-MCNC: 2.1 UG/L (ref 0–8)
ALBUMIN SERPL-MCNC: 4.2 G/DL (ref 3.4–5)
ALP SERPL-CCNC: 53 U/L (ref 40–150)
ALT SERPL W P-5'-P-CCNC: 46 U/L (ref 0–70)
ANION GAP SERPL CALCULATED.3IONS-SCNC: 5 MMOL/L (ref 3–14)
AST SERPL W P-5'-P-CCNC: 28 U/L (ref 0–45)
BILIRUB DIRECT SERPL-MCNC: 0.3 MG/DL (ref 0–0.2)
BILIRUB SERPL-MCNC: 1.1 MG/DL (ref 0.2–1.3)
BUN SERPL-MCNC: 17 MG/DL (ref 7–30)
CALCIUM SERPL-MCNC: 9.6 MG/DL (ref 8.5–10.1)
CHLORIDE SERPL-SCNC: 108 MMOL/L (ref 94–109)
CO2 SERPL-SCNC: 28 MMOL/L (ref 20–32)
CREAT SERPL-MCNC: 0.89 MG/DL (ref 0.66–1.25)
ERYTHROCYTE [DISTWIDTH] IN BLOOD BY AUTOMATED COUNT: 12.9 % (ref 10–15)
GFR SERPL CREATININE-BSD FRML MDRD: >90 ML/MIN/{1.73_M2}
GLUCOSE SERPL-MCNC: 104 MG/DL (ref 70–99)
HCT VFR BLD AUTO: 45.1 % (ref 40–53)
HGB BLD-MCNC: 15.3 G/DL (ref 13.3–17.7)
INR PPP: 1.11 (ref 0.86–1.14)
MCH RBC QN AUTO: 30.6 PG (ref 26.5–33)
MCHC RBC AUTO-ENTMCNC: 33.9 G/DL (ref 31.5–36.5)
MCV RBC AUTO: 90 FL (ref 78–100)
PLATELET # BLD AUTO: 141 10E9/L (ref 150–450)
POTASSIUM SERPL-SCNC: 4.8 MMOL/L (ref 3.4–5.3)
PROT SERPL-MCNC: 7.6 G/DL (ref 6.8–8.8)
RBC # BLD AUTO: 5 10E12/L (ref 4.4–5.9)
SODIUM SERPL-SCNC: 141 MMOL/L (ref 133–144)
WBC # BLD AUTO: 5.3 10E9/L (ref 4–11)

## 2021-06-07 PROCEDURE — 85027 COMPLETE CBC AUTOMATED: CPT | Performed by: PATHOLOGY

## 2021-06-07 PROCEDURE — 80076 HEPATIC FUNCTION PANEL: CPT | Performed by: PATHOLOGY

## 2021-06-07 PROCEDURE — 82105 ALPHA-FETOPROTEIN SERUM: CPT | Mod: 90 | Performed by: PATHOLOGY

## 2021-06-07 PROCEDURE — 36415 COLL VENOUS BLD VENIPUNCTURE: CPT | Performed by: PATHOLOGY

## 2021-06-07 PROCEDURE — 99000 SPECIMEN HANDLING OFFICE-LAB: CPT | Performed by: PATHOLOGY

## 2021-06-07 PROCEDURE — 85610 PROTHROMBIN TIME: CPT | Performed by: PATHOLOGY

## 2021-06-07 PROCEDURE — 80048 BASIC METABOLIC PNL TOTAL CA: CPT | Performed by: PATHOLOGY

## 2021-06-07 PROCEDURE — 76700 US EXAM ABDOM COMPLETE: CPT | Performed by: STUDENT IN AN ORGANIZED HEALTH CARE EDUCATION/TRAINING PROGRAM

## 2021-06-19 DIAGNOSIS — M19.042 PRIMARY OSTEOARTHRITIS OF BOTH HANDS: ICD-10-CM

## 2021-06-19 DIAGNOSIS — M19.041 PRIMARY OSTEOARTHRITIS OF BOTH HANDS: ICD-10-CM

## 2021-06-21 RX ORDER — CELECOXIB 100 MG/1
CAPSULE ORAL
Qty: 30 CAPSULE | Refills: 1 | Status: SHIPPED | OUTPATIENT
Start: 2021-06-21 | End: 2021-08-11

## 2021-06-21 NOTE — TELEPHONE ENCOUNTER
Routing refill request to provider for review/approval because:  Drug not on the [facility] refill protocol .     Last B/P 172/97 with Cardiology.     Lab Results   Component Value Date    WBC 5.3 06/07/2021     Lab Results   Component Value Date    RBC 5.00 06/07/2021     Lab Results   Component Value Date    HGB 15.3 06/07/2021     Lab Results   Component Value Date    HCT 45.1 06/07/2021     No components found for: MCT  Lab Results   Component Value Date    MCV 90 06/07/2021     Lab Results   Component Value Date    MCH 30.6 06/07/2021     Lab Results   Component Value Date    MCHC 33.9 06/07/2021     Lab Results   Component Value Date    RDW 12.9 06/07/2021     Lab Results   Component Value Date     06/07/2021         Nida Pryor RN  [facility] Triage

## 2021-07-03 ENCOUNTER — HEALTH MAINTENANCE LETTER (OUTPATIENT)
Age: 61
End: 2021-07-03

## 2021-07-03 DIAGNOSIS — F51.01 PRIMARY INSOMNIA: ICD-10-CM

## 2021-07-05 NOTE — TELEPHONE ENCOUNTER
Routing refill request to provider for review/approval because:  Drug not on the [facility] refill protocol     Pending Prescriptions:                       Disp   Refills    zolpidem (AMBIEN) 5 MG tablet [Pharmacy Me*5 tabl*0        Sig: TAKE 1 TAB ORALLY AS NEEDED FOR SLEEP    Last Written Prescription Date:  5-7-2021  Last Fill Quantity: 30,  # refills: 0   Last office visit: 1/5/2021 with prescribing provider:  Dr. Cuevas   Future Office Visit:      Nida Pryor RN  [facility]

## 2021-07-06 RX ORDER — ZOLPIDEM TARTRATE 5 MG/1
TABLET ORAL
Qty: 5 TABLET | Refills: 0 | Status: SHIPPED | OUTPATIENT
Start: 2021-07-06 | End: 2022-01-20

## 2021-07-12 ENCOUNTER — DOCUMENTATION ONLY (OUTPATIENT)
Dept: CARDIOLOGY | Facility: CLINIC | Age: 61
End: 2021-07-12

## 2021-07-12 NOTE — PROGRESS NOTES
[place] [facility] MEASURES BLOOD PRESSURE RECHECK      Last office visit: 2/24/21    Previous blood pressure: 172 mm Hg  Previous heart rate: 97 bpm    Time of visit:     Morning medications were taken at:      Today's blood pressure:  mm Hg  Today's heart rate:  bpm     Home monitor blood pressure: 139/75 mmHg  Home monitor heart rate: 61 bpm      Additional Comments:       Results routed to:     HELEN Tran

## 2021-07-23 ENCOUNTER — OFFICE VISIT (OUTPATIENT)
Dept: CARDIOLOGY | Facility: CLINIC | Age: 61
End: 2021-07-23
Payer: COMMERCIAL

## 2021-07-23 VITALS
HEIGHT: 71 IN | SYSTOLIC BLOOD PRESSURE: 154 MMHG | HEART RATE: 64 BPM | WEIGHT: 196 LBS | BODY MASS INDEX: 27.44 KG/M2 | DIASTOLIC BLOOD PRESSURE: 84 MMHG

## 2021-07-23 DIAGNOSIS — E78.2 MIXED HYPERLIPIDEMIA: ICD-10-CM

## 2021-07-23 DIAGNOSIS — I10 BENIGN ESSENTIAL HYPERTENSION: Primary | ICD-10-CM

## 2021-07-23 DIAGNOSIS — I25.10 CORONARY ARTERY DISEASE INVOLVING NATIVE CORONARY ARTERY OF NATIVE HEART WITHOUT ANGINA PECTORIS: ICD-10-CM

## 2021-07-23 PROCEDURE — 99214 OFFICE O/P EST MOD 30 MIN: CPT | Performed by: NURSE PRACTITIONER

## 2021-07-23 RX ORDER — LISINOPRIL 5 MG/1
TABLET ORAL
Qty: 30 TABLET | Refills: 6 | Status: SHIPPED | OUTPATIENT
Start: 2021-07-23 | End: 2021-09-08

## 2021-07-23 ASSESSMENT — MIFFLIN-ST. JEOR: SCORE: 1715.92

## 2021-07-23 NOTE — PATIENT INSTRUCTIONS
Call my nurse with any questions or concerns:  [phone]  *If you have concerns after hours, please call [phone], option 2 to speak with on call Cardiologist.    1. Medication changes from today:    Lisinopril 5 mg am and 10 mg pm (12 hours apart)  Continue Bystolic  B/P GOAL < 140/80

## 2021-07-23 NOTE — LETTER
2021    Marshal Cuevas MD  [address]  [facility] [address]    RE: Vikash Burgos       Dear Colleague,    I had the pleasure of seeing Vikash Burgos in the [facility].    HPI and Plan: #[id number]  See dictation    Orders Placed This Encounter   Procedures     Basic metabolic panel     Follow-Up with Cardiac Advanced Practice Provider       Orders Placed This Encounter   Medications     lisinopril (ZESTRIL) 5 MG tablet     Si mg am and 10 mg pm     Dispense:  30 tablet     Refill:  6     Only dispense 30 of these tablets. Pt will be using his 10 mg tablets for pm dosing       Medications Discontinued During This Encounter   Medication Reason     lisinopril (ZESTRIL) 10 MG tablet          Encounter Diagnoses   Name Primary?     Benign essential hypertension Yes     Coronary artery disease involving native coronary artery of native heart without angina pectoris        CURRENT MEDICATIONS:  Current Outpatient Medications   Medication Sig Dispense Refill     albuterol (PROAIR HFA/PROVENTIL HFA/VENTOLIN HFA) 108 (90 Base) MCG/ACT inhaler INHALE 2 PUFFS INTO THE LUNGS EVERY 4 HOURS AS NEEDED FOR SHORTNESS OF BREATH / DYSPNEA OR WHEEZING 1 Inhaler 3     aspirin 81 MG tablet Take 1 tablet by mouth daily.       celecoxib (CELEBREX) 100 MG capsule TAKE 1 CAPSULE BY MOUTH EVERY DAY 30 capsule 1     cyanocobalamin 1000 MCG SUBL Place 1,000 mcg under the tongue daily       glucosamine-chondroitin 500-400 MG CAPS per capsule Take 1 capsule by mouth daily       LEVEMIR FLEXTOUCH 100 UNIT/ML pen INJECT 50 UNITS SUBCUTANEOUS AT BEDTIME 99 mL 11     lisinopril (ZESTRIL) 5 MG tablet 5 mg am and 10 mg pm 30 tablet 6     nebivolol (BYSTOLIC) 5 MG tablet Take 1 tablet (5 mg) by mouth daily 90 tablet 3     rosuvastatin (CRESTOR) 20 MG tablet Take 1 tablet (20 mg) by mouth daily 90 tablet 2     zolpidem (AMBIEN) 5 MG tablet TAKE 1 TAB ORALLY AS NEEDED  FOR SLEEP 5 tablet 0     chlorhexidine (PERIDEX) 0.12 % solution Take 15 mLs by mouth 2 times daily as needed   5     gabapentin (NEURONTIN) 300 MG capsule Take 1 capsule (300 mg) by mouth 3 times daily (Patient not taking: Reported on 7/23/2021) 90 capsule 4     insulin pen needle (B-D U/F) 31G X 8 MM miscellaneous Use one pen needles daily or as directed. 100 each 3     insulin pen needle (BD HEIKE U/F) 32G X 4 MM Use 1 daily or as directed. 100 each prn     Vitamin D, Cholecalciferol, 1000 units TABS Take 1,000 Units by mouth daily         ALLERGIES     Allergies   Allergen Reactions     Chlorthalidone Nausea and Vomiting     dizziness     Pcn [Penicillins] Rash     Rash with PCN only. Patient has taken amoxicillin with no rash.       PAST MEDICAL HISTORY:  Past Medical History:   Diagnosis Date     Basal cell carcinoma      Carotid stenosis, left     s/p left CEA 2/2020     Cerebral infarction (H)     left CVA 2/2020 post-op carotid endarterectomy     Coronary artery disease     4 vessel bypass November 2010; LIMA ->LAD, SVG-> OM3, SVG -> D2, SVG -> PDA     Diabetes mellitus (H) 2005    neuropathy     Generalized anxiety disorder 05/02/2014     Hepatitis C, chronic (H) 2005     Hyperlipidemia LDL goal < 70      Hypertension      Liver diseases     9/15 Liver is 10 cm in span without left lobe enlargement     Persistent microalbuminuria associated with type 2 diabetes mellitus (H) 05/06/2015       PAST SURGICAL HISTORY:  Past Surgical History:   Procedure Laterality Date     ARTHROSCOPY KNEE RT/LT      left     COLONOSCOPY       CORONARY ARTERY BYPASS  11/18/201    Coronary artery bypass grafting x 4 with placement of the left internal mammary artery to the distal midportion of the left anterior descending artery, saphenous vein graft from aorta to the third obtuse marginal branch of circumflex coronary artery, saphenous vein graft from aorta to the second diagonal branch, saphenous vein graft from aorta to the  posterior descending artery.     ENDARTERECTOMY CAROTID Left 2020    Procedure: LEFT CAROTID ENDARTERECTOMY WITH EEG;  Surgeon: Semaj Stubbs MD;  Location:  OR     ESOPHAGOSCOPY, GASTROSCOPY, DUODENOSCOPY (EGD), COMBINED  10/31/2011    Procedure:COMBINED ESOPHAGOSCOPY, GASTROSCOPY, DUODENOSCOPY (EGD); Surgeon:ALONSO ALDANA; Location: GI     ESOPHAGOSCOPY, GASTROSCOPY, DUODENOSCOPY (EGD), COMBINED N/A 3/8/2018    Procedure: COMBINED ESOPHAGOSCOPY, GASTROSCOPY, DUODENOSCOPY (EGD);  EGD;  Surgeon: Angel Luis Justice MD;  Location:  GI     HEART CATH CORONARY ANGIOGRAM W/LV GRAM  9-11-10    CV Surgery recommended     HEART CATH CORONARY ANGIOGRAM W/LV GRAM  14    Medical management     HERNIA REPAIR, INGUINAL RT/LT      left       FAMILY HISTORY:  Family History   Problem Relation Age of Onset     C.A.D. Father      Cancer Father         bladder     Heart Surgery Father         bypass surgery     Heart Disease Mother        SOCIAL HISTORY:  Social History     Socioeconomic History     Marital status:      Spouse name: None     Number of children: None     Years of education: None     Highest education level: None   Occupational History     None   Tobacco Use     Smoking status: Former Smoker     Packs/day: 0.50     Years: 12.00     Pack years: 6.00     Types: Cigarettes     Start date:      Quit date: 2005     Years since quittin.4     Smokeless tobacco: Never Used   Substance and Sexual Activity     Alcohol use: Yes     Comment: minimal     Drug use: No     Sexual activity: Yes     Partners: Female   Other Topics Concern     Parent/sibling w/ CABG, MI or angioplasty before 65F 55M? No      Service Not Asked     Blood Transfusions Not Asked     Caffeine Concern Yes     Comment: 2 cups coffee per day     Occupational Exposure Not Asked     Hobby Hazards Not Asked     Sleep Concern Not Asked     Stress Concern Not Asked     Weight Concern Not Asked     Special  "Diet Yes     Comment: low carb diet, low sugar     Back Care Not Asked     Exercise Yes     Comment: treadmill 40 minutes, walk, daily, bike 30 minutes every other day     Bike Helmet Not Asked     Seat Belt Not Asked     Self-Exams Not Asked   Social History Narrative     None     Social Determinants of Health     Financial Resource Strain:      Difficulty of Paying Living Expenses:    Food Insecurity:      Worried About Running Out of Food in the Last Year:      Ran Out of Food in the Last Year:    Transportation Needs:      Lack of Transportation (Medical):      Lack of Transportation (Non-Medical):    Physical Activity:      Days of Exercise per Week:      Minutes of Exercise per Session:    Stress:      Feeling of Stress :    Social Connections:      Frequency of Communication with Friends and Family:      Frequency of Social Gatherings with Friends and Family:      Attends Nondenominational Services:      Active Member of Clubs or Organizations:      Attends Club or Organization Meetings:      Marital Status:    Intimate Partner Violence:      Fear of Current or Ex-Partner:      Emotionally Abused:      Physically Abused:      Sexually Abused:        Review of Systems:  Skin:  Negative       Eyes:  Negative      ENT:  Negative      Respiratory:  Positive for shortness of breath;dyspnea on exertion     Cardiovascular:    lightheadedness;Positive for    Gastroenterology: Negative      Genitourinary:  Negative      Musculoskeletal:  Positive for arthritis    Neurologic:  Positive for headaches;numbness or tingling of feet    Psychiatric:  Negative      Heme/Lymph/Imm:  Negative      Endocrine:  Positive for diabetes      Physical Exam:  Vitals: BP (!) 154/84   Pulse 64   Ht 1.803 m (5' 10.98\")   Wt 88.9 kg (196 lb)   BMI 27.35 kg/m      Constitutional:  cooperative, alert and oriented, well developed, well nourished, in no acute distress        Skin:  warm and dry to the touch, no apparent skin lesions or masses " noted          Head:  normocephalic, no masses or lesions        Eyes:  pupils equal and round;conjunctivae and lids unremarkable        Lymph:      ENT:  no pallor or cyanosis, dentition good        Neck:  carotid pulses are full and equal bilaterally;JVP normal        Respiratory:  normal breath sounds, clear to auscultation, normal A-P diameter, normal symmetry, normal respiratory excursion, no use of accessory muscles         Cardiac: regular rhythm;normal S1 and S2;no S3 or S4       LUSB;systolic ejection murmur;grade 1        not assessed this visit                                        GI:  abdomen soft        Extremities and Muscular Skeletal:  no edema;no spinal abnormalities noted;normal muscle strength and tone              Neurological:  no gross motor deficits        Psych:  affect appropriate, oriented to time, person and place        CC  No referring provider defined for this encounter.                  Thank you for allowing me to participate in the care of your patient.      Sincerely,     Chen Mercedes NP, APRN [facility]  cc:   No referring provider defined for this encounter.

## 2021-07-23 NOTE — PROGRESS NOTES
Service Date: 07/23/2021    HISTORY OF PRESENT ILLNESS:  Mr. Burgos is a delightful, 61-year-old gentleman that I am having the pleasure of meeting today.  He is an established patient of Dr. Mosley.  He is the  of one of our schedulers, Carmina.    His past medical history includes diabetes mellitus, mixed hyperlipidemia, hypertension, coronary artery disease and carotid disease.  In 2010, he underwent a CABG x4 with Dr. Marshal Pruett  In 2014, an angiogram at the [facility] demonstrated all bypass grafts to be completely patent.  However, he had diffuse small-caliber diabetic coronary arteries.    In 01/2020, he underwent a stress nuclear test where he exercised 13 minutes on the standard Wade protocol with an ejection fraction of 51%.  He did demonstrate a very small lateral apical defect and a very small inferior, inferolateral wall defect.    He underwent a left carotid endarterectomy with Dr. Stubbs, which was unfortunately complicated by both some local facial nerve trauma resulting in left-sided facial numbness, but also probably a stroke.  Although nothing was seen on MRI, he did develop right arm and right leg weakness as well as dysequilibrium interfering with ability to exercise.  In our discussion today, it appears that neurologically he is very intact.  He is back to exercising on a regular basis and has no difficulty with his speech.    He is here today for followup regarding his hypertension.  Unfortunately, he notes that his blood pressure when he wakes (around 4:30 a.m.) is as high as 170 with diastolic readings 80 to 90s.  As the day progresses, his blood pressure does improve.  By the time he goes to bed, his blood pressure sometimes is as good as 120/80.  Currently, he is on Bystolic 5 mg daily and lisinopril 5 mg daily.  He was taking an additional p.r.n. dose, but states he has not been doing that.  He wonders if the addition of Celebrex, which was started for arthritis, could be  causing his elevated blood pressure.  His blood pressure today is 154/84.  Weight is stable at 196 pounds.    He denies chest pain, shortness of breath, lightheadedness or dizziness.  All other review of systems and past medical history are noted below.    ASSESSMENT AND PLAN:  Mr. Sanchez is a delightful, 61-year-old gentleman here today for followup.  We had a nice discussion today.  He is a very good historian and got me up to date with his past medical history.  1.  Hypertension.  His blood pressure simply is not well controlled.  I would like him to take 5 mg of lisinopril in the morning and 10 mg in the evening (12 hours apart).  He will continue Bystolic 5 mg once daily.  I would like to see him back in 1 month with a BMP and a repeat of his blood pressure.  If his blood pressure is stable, we will continue this regimen. If not, we will then titrate accordingly.    I have given him a blood pressure goal of less than 140 and less than 80.  If he does not have any episodes of lightheadedness or dizziness, then he should not be concerned.  If his blood pressure is high, I have asked that he contact us.  2.  Coronary artery disease.  CABG is outlined above.  He has no complaints of angina.  Again, he is on Bystolic, rosuvastatin, baby aspirin and lisinopril.  3.  Type 2 diabetes mellitus.  Most recent A1c was 6.9.    Thank you for including me in the care of this delightful gentleman.  Should you have questions or concerns, please feel free to contact us.    Chen Mercedes NP        D: 2021   T: 2021   MT: fidel    Name:     IHSAN SANCHEZ  MRN:      [id number]        Account:      [id number]   :      1960           Service Date: 2021       Document: [id number]

## 2021-08-09 ENCOUNTER — TELEPHONE (OUTPATIENT)
Dept: CARDIOLOGY | Facility: CLINIC | Age: 61
End: 2021-08-09

## 2021-08-09 DIAGNOSIS — M19.041 PRIMARY OSTEOARTHRITIS OF BOTH HANDS: ICD-10-CM

## 2021-08-09 DIAGNOSIS — M19.042 PRIMARY OSTEOARTHRITIS OF BOTH HANDS: ICD-10-CM

## 2021-08-11 RX ORDER — CELECOXIB 100 MG/1
CAPSULE ORAL
Qty: 30 CAPSULE | Refills: 1 | Status: SHIPPED | OUTPATIENT
Start: 2021-08-11 | End: 2021-10-11

## 2021-08-11 NOTE — TELEPHONE ENCOUNTER
Routing refill request to provider for review/approval because:  BP and labs are out of range:        BP Readings from Last 3 Encounters:   07/23/21 (!) 154/84   02/24/21 (!) 172/97   02/03/21 138/67     CBC RESULTS: Recent Labs   Lab Test 06/07/21  0646   WBC 5.3   RBC 5.00   HGB 15.3   HCT 45.1   MCV 90   MCH 30.6   MCHC 33.9   RDW 12.9   *

## 2021-08-24 ENCOUNTER — TELEPHONE (OUTPATIENT)
Dept: CARDIOLOGY | Facility: CLINIC | Age: 61
End: 2021-08-24

## 2021-09-03 ENCOUNTER — LAB (OUTPATIENT)
Dept: LAB | Facility: CLINIC | Age: 61
End: 2021-09-03
Payer: COMMERCIAL

## 2021-09-03 DIAGNOSIS — I10 BENIGN ESSENTIAL HYPERTENSION: ICD-10-CM

## 2021-09-03 LAB
ANION GAP SERPL CALCULATED.3IONS-SCNC: 3 MMOL/L (ref 3–14)
BUN SERPL-MCNC: 18 MG/DL (ref 7–30)
CALCIUM SERPL-MCNC: 9.3 MG/DL (ref 8.5–10.1)
CHLORIDE BLD-SCNC: 104 MMOL/L (ref 94–109)
CO2 SERPL-SCNC: 28 MMOL/L (ref 20–32)
CREAT SERPL-MCNC: 1.01 MG/DL (ref 0.66–1.25)
GFR SERPL CREATININE-BSD FRML MDRD: 80 ML/MIN/1.73M2
GLUCOSE BLD-MCNC: 222 MG/DL (ref 70–99)
POTASSIUM BLD-SCNC: 4.7 MMOL/L (ref 3.4–5.3)
SODIUM SERPL-SCNC: 135 MMOL/L (ref 133–144)

## 2021-09-03 PROCEDURE — 80048 BASIC METABOLIC PNL TOTAL CA: CPT | Performed by: NURSE PRACTITIONER

## 2021-09-03 PROCEDURE — 36415 COLL VENOUS BLD VENIPUNCTURE: CPT | Performed by: NURSE PRACTITIONER

## 2021-09-08 ENCOUNTER — OFFICE VISIT (OUTPATIENT)
Dept: CARDIOLOGY | Facility: CLINIC | Age: 61
End: 2021-09-08
Payer: COMMERCIAL

## 2021-09-08 VITALS
BODY MASS INDEX: 26.68 KG/M2 | HEART RATE: 64 BPM | SYSTOLIC BLOOD PRESSURE: 168 MMHG | WEIGHT: 197 LBS | DIASTOLIC BLOOD PRESSURE: 90 MMHG | HEIGHT: 72 IN

## 2021-09-08 DIAGNOSIS — I10 BENIGN ESSENTIAL HYPERTENSION: Primary | ICD-10-CM

## 2021-09-08 DIAGNOSIS — I65.22 LEFT CAROTID ARTERY STENOSIS: ICD-10-CM

## 2021-09-08 DIAGNOSIS — I25.10 CORONARY ARTERY DISEASE INVOLVING NATIVE CORONARY ARTERY OF NATIVE HEART WITHOUT ANGINA PECTORIS: ICD-10-CM

## 2021-09-08 DIAGNOSIS — E78.2 MIXED HYPERLIPIDEMIA: ICD-10-CM

## 2021-09-08 PROCEDURE — 99214 OFFICE O/P EST MOD 30 MIN: CPT | Performed by: NURSE PRACTITIONER

## 2021-09-08 RX ORDER — LISINOPRIL 10 MG/1
10 TABLET ORAL 2 TIMES DAILY
Start: 2021-09-08 | End: 2021-10-29 | Stop reason: SINTOL

## 2021-09-08 ASSESSMENT — MIFFLIN-ST. JEOR: SCORE: 1736.59

## 2021-09-08 NOTE — PATIENT INSTRUCTIONS
Call my nurse with any questions or concerns:  [phone]  *If you have concerns after hours, please call [phone], option 2 to speak with on call Cardiologist.    1. Medication changes from today:    Increase lisinopril 10 mg twice daily  BMP in 6-8 weeks

## 2021-09-08 NOTE — LETTER
9/8/2021    Marshal Cuevas MD  [address]  [place] [address]    RE: Vikash Kole Burgos       Dear Colleague,    I had the pleasure of seeing Vikash Sharif Loretta in the [facility].    HPI and Plan: [id number]  See dictation    Orders Placed This Encounter   Procedures     Basic metabolic panel     Follow-Up with Cardiac Advanced Practice Provider       Orders Placed This Encounter   Medications     lisinopril (ZESTRIL) 10 MG tablet     Sig: Take 1 tablet (10 mg) by mouth 2 times daily       Medications Discontinued During This Encounter   Medication Reason     lisinopril (ZESTRIL) 5 MG tablet          Encounter Diagnosis   Name Primary?     Benign essential hypertension        CURRENT MEDICATIONS:  Current Outpatient Medications   Medication Sig Dispense Refill     albuterol (PROAIR HFA/PROVENTIL HFA/VENTOLIN HFA) 108 (90 Base) MCG/ACT inhaler INHALE 2 PUFFS INTO THE LUNGS EVERY 4 HOURS AS NEEDED FOR SHORTNESS OF BREATH / DYSPNEA OR WHEEZING 1 Inhaler 3     aspirin 81 MG tablet Take 1 tablet by mouth daily.       celecoxib (CELEBREX) 100 MG capsule TAKE 1 CAPSULE BY MOUTH EVERY DAY 30 capsule 1     chlorhexidine (PERIDEX) 0.12 % solution Take 15 mLs by mouth 2 times daily as needed   5     cyanocobalamin 1000 MCG SUBL Place 1,000 mcg under the tongue daily       gabapentin (NEURONTIN) 300 MG capsule Take 1 capsule (300 mg) by mouth 3 times daily 90 capsule 4     glucosamine-chondroitin 500-400 MG CAPS per capsule Take 1 capsule by mouth daily       insulin pen needle (B-D U/F) 31G X 8 MM miscellaneous Use one pen needles daily or as directed. 100 each 3     insulin pen needle (BD HEIKE U/F) 32G X 4 MM Use 1 daily or as directed. 100 each prn     LEVEMIR FLEXTOUCH 100 UNIT/ML pen INJECT 50 UNITS SUBCUTANEOUS AT BEDTIME 99 mL 11     lisinopril (ZESTRIL) 10 MG tablet Take 1 tablet (10 mg) by mouth 2 times daily       nebivolol (BYSTOLIC) 5 MG tablet  Take 1 tablet (5 mg) by mouth daily 90 tablet 3     rosuvastatin (CRESTOR) 20 MG tablet Take 1 tablet (20 mg) by mouth daily 90 tablet 2     Vitamin D, Cholecalciferol, 1000 units TABS Take 1,000 Units by mouth daily       zolpidem (AMBIEN) 5 MG tablet TAKE 1 TAB ORALLY AS NEEDED FOR SLEEP 5 tablet 0       ALLERGIES     Allergies   Allergen Reactions     Chlorthalidone Nausea and Vomiting     dizziness     Pcn [Penicillins] Rash     Rash with PCN only. Patient has taken amoxicillin with no rash.       PAST MEDICAL HISTORY:  Past Medical History:   Diagnosis Date     Basal cell carcinoma      Carotid stenosis, left     s/p left CEA 2/2020     Cerebral infarction (H)     left CVA 2/2020 post-op carotid endarterectomy     Coronary artery disease     4 vessel bypass November 2010; LIMA ->LAD, SVG-> OM3, SVG -> D2, SVG -> PDA     Diabetes mellitus (H) 2005    neuropathy     Generalized anxiety disorder 05/02/2014     Hepatitis C, chronic (H) 2005     Hyperlipidemia LDL goal < 70      Hypertension      Liver diseases     9/15 Liver is 10 cm in span without left lobe enlargement     Persistent microalbuminuria associated with type 2 diabetes mellitus (H) 05/06/2015       PAST SURGICAL HISTORY:  Past Surgical History:   Procedure Laterality Date     ARTHROSCOPY KNEE RT/LT      left     COLONOSCOPY       CORONARY ARTERY BYPASS  11/18/201    Coronary artery bypass grafting x 4 with placement of the left internal mammary artery to the distal midportion of the left anterior descending artery, saphenous vein graft from aorta to the third obtuse marginal branch of circumflex coronary artery, saphenous vein graft from aorta to the second diagonal branch, saphenous vein graft from aorta to the posterior descending artery.     ENDARTERECTOMY CAROTID Left 2/21/2020    Procedure: LEFT CAROTID ENDARTERECTOMY WITH EEG;  Surgeon: Semaj Stubbs MD;  Location:  OR     ESOPHAGOSCOPY, GASTROSCOPY, DUODENOSCOPY (EGD), COMBINED   10/31/2011    Procedure:COMBINED ESOPHAGOSCOPY, GASTROSCOPY, DUODENOSCOPY (EGD); Surgeon:ALONSO ALDANA; Location: GI     ESOPHAGOSCOPY, GASTROSCOPY, DUODENOSCOPY (EGD), COMBINED N/A 3/8/2018    Procedure: COMBINED ESOPHAGOSCOPY, GASTROSCOPY, DUODENOSCOPY (EGD);  EGD;  Surgeon: Angel Luis Justice MD;  Location:  GI     HEART CATH CORONARY ANGIOGRAM W/LV GRAM  -10    CV Surgery recommended     HEART CATH CORONARY ANGIOGRAM W/LV GRAM  14    Medical management     HERNIA REPAIR, INGUINAL RT/LT      left       FAMILY HISTORY:  Family History   Problem Relation Age of Onset     C.A.D. Father      Cancer Father         bladder     Heart Surgery Father         bypass surgery     Heart Disease Mother        SOCIAL HISTORY:  Social History     Socioeconomic History     Marital status:      Spouse name: None     Number of children: None     Years of education: None     Highest education level: None   Occupational History     None   Tobacco Use     Smoking status: Former Smoker     Packs/day: 0.50     Years: 12.00     Pack years: 6.00     Types: Cigarettes     Start date:      Quit date: 2005     Years since quittin.6     Smokeless tobacco: Never Used   Substance and Sexual Activity     Alcohol use: Yes     Comment: minimal     Drug use: No     Sexual activity: Yes     Partners: Female   Other Topics Concern     Parent/sibling w/ CABG, MI or angioplasty before 65F 55M? No      Service Not Asked     Blood Transfusions Not Asked     Caffeine Concern Yes     Comment: 2 cups coffee per day     Occupational Exposure Not Asked     Hobby Hazards Not Asked     Sleep Concern Not Asked     Stress Concern Not Asked     Weight Concern Not Asked     Special Diet Yes     Comment: low carb diet, low sugar     Back Care Not Asked     Exercise Yes     Comment: treadmill 40 minutes, walk, daily, bike 30 minutes every other day     Bike Helmet Not Asked     Seat Belt Not Asked     Self-Exams Not  Asked   Social History Narrative     None     Social Determinants of Health     Financial Resource Strain:      Difficulty of Paying Living Expenses:    Food Insecurity:      Worried About Running Out of Food in the Last Year:      Ran Out of Food in the Last Year:    Transportation Needs:      Lack of Transportation (Medical):      Lack of Transportation (Non-Medical):    Physical Activity:      Days of Exercise per Week:      Minutes of Exercise per Session:    Stress:      Feeling of Stress :    Social Connections:      Frequency of Communication with Friends and Family:      Frequency of Social Gatherings with Friends and Family:      Attends Mandaen Services:      Active Member of Clubs or Organizations:      Attends Club or Organization Meetings:      Marital Status:    Intimate Partner Violence:      Fear of Current or Ex-Partner:      Emotionally Abused:      Physically Abused:      Sexually Abused:        Review of Systems:  Skin:  Negative       Eyes:  Negative      ENT:  Negative      Respiratory:  Positive for dyspnea on exertion sl better   Cardiovascular:    lightheadedness;Positive for;dizziness same  Gastroenterology: Negative      Genitourinary:  Negative      Musculoskeletal:  Positive for arthritis    Neurologic:  Positive for headaches;numbness or tingling of feet same  Psychiatric:  Negative      Heme/Lymph/Imm:  Negative      Endocrine:  Positive for diabetes      Physical Exam:  Vitals: BP (!) 168/90   Pulse 64   Ht 1.829 m (6')   Wt 89.4 kg (197 lb)   BMI 26.72 kg/m      Constitutional:  cooperative, alert and oriented, well developed, well nourished, in no acute distress        Skin:  warm and dry to the touch;no apparent skin lesions or masses noted          Head:  normocephalic, no masses or lesions        Eyes:  pupils equal and round;conjunctivae and lids unremarkable        Lymph:      ENT:  no pallor or cyanosis, dentition good        Neck:  JVP normal        Respiratory:  normal  breath sounds, clear to auscultation, normal A-P diameter, normal symmetry, normal respiratory excursion, no use of accessory muscles         Cardiac: regular rhythm;normal S1 and S2;no murmurs, gallops or rubs detected                not assessed this visit                                        GI:  not assessed this visit        Extremities and Muscular Skeletal:  no deformities, clubbing, cyanosis, erythema observed;no edema              Neurological:  no gross motor deficits        Psych:  Alert and Oriented x 3        CC  STEW Cuellar CNP  [address]  [place] [id number]                  Thank you for allowing me to participate in the care of your patient.      Sincerely,     Chen Mercedes, NP, APRN CNP     [facility]  cc:   STEW Cuellar CNP  [address]  [place] [address]

## 2021-09-08 NOTE — PROGRESS NOTES
HPI and Plan: [id number]  See dictation    Orders Placed This Encounter   Procedures     Basic metabolic panel     Follow-Up with Cardiac Advanced Practice Provider       Orders Placed This Encounter   Medications     lisinopril (ZESTRIL) 10 MG tablet     Sig: Take 1 tablet (10 mg) by mouth 2 times daily       Medications Discontinued During This Encounter   Medication Reason     lisinopril (ZESTRIL) 5 MG tablet          Encounter Diagnosis   Name Primary?     Benign essential hypertension        CURRENT MEDICATIONS:  Current Outpatient Medications   Medication Sig Dispense Refill     albuterol (PROAIR HFA/PROVENTIL HFA/VENTOLIN HFA) 108 (90 Base) MCG/ACT inhaler INHALE 2 PUFFS INTO THE LUNGS EVERY 4 HOURS AS NEEDED FOR SHORTNESS OF BREATH / DYSPNEA OR WHEEZING 1 Inhaler 3     aspirin 81 MG tablet Take 1 tablet by mouth daily.       celecoxib (CELEBREX) 100 MG capsule TAKE 1 CAPSULE BY MOUTH EVERY DAY 30 capsule 1     chlorhexidine (PERIDEX) 0.12 % solution Take 15 mLs by mouth 2 times daily as needed   5     cyanocobalamin 1000 MCG SUBL Place 1,000 mcg under the tongue daily       gabapentin (NEURONTIN) 300 MG capsule Take 1 capsule (300 mg) by mouth 3 times daily 90 capsule 4     glucosamine-chondroitin 500-400 MG CAPS per capsule Take 1 capsule by mouth daily       insulin pen needle (B-D U/F) 31G X 8 MM miscellaneous Use one pen needles daily or as directed. 100 each 3     insulin pen needle (BD HEIKE U/F) 32G X 4 MM Use 1 daily or as directed. 100 each prn     LEVEMIR FLEXTOUCH 100 UNIT/ML pen INJECT 50 UNITS SUBCUTANEOUS AT BEDTIME 99 mL 11     lisinopril (ZESTRIL) 10 MG tablet Take 1 tablet (10 mg) by mouth 2 times daily       nebivolol (BYSTOLIC) 5 MG tablet Take 1 tablet (5 mg) by mouth daily 90 tablet 3     rosuvastatin (CRESTOR) 20 MG tablet Take 1 tablet (20 mg) by mouth daily 90 tablet 2     Vitamin D, Cholecalciferol, 1000 units TABS Take 1,000 Units by mouth daily       zolpidem (AMBIEN) 5 MG tablet TAKE  1 TAB ORALLY AS NEEDED FOR SLEEP 5 tablet 0       ALLERGIES     Allergies   Allergen Reactions     Chlorthalidone Nausea and Vomiting     dizziness     Pcn [Penicillins] Rash     Rash with PCN only. Patient has taken amoxicillin with no rash.       PAST MEDICAL HISTORY:  Past Medical History:   Diagnosis Date     Basal cell carcinoma      Carotid stenosis, left     s/p left CEA 2/2020     Cerebral infarction (H)     left CVA 2/2020 post-op carotid endarterectomy     Coronary artery disease     4 vessel bypass November 2010; LIMA ->LAD, SVG-> OM3, SVG -> D2, SVG -> PDA     Diabetes mellitus (H) 2005    neuropathy     Generalized anxiety disorder 05/02/2014     Hepatitis C, chronic (H) 2005     Hyperlipidemia LDL goal < 70      Hypertension      Liver diseases     9/15 Liver is 10 cm in span without left lobe enlargement     Persistent microalbuminuria associated with type 2 diabetes mellitus (H) 05/06/2015       PAST SURGICAL HISTORY:  Past Surgical History:   Procedure Laterality Date     ARTHROSCOPY KNEE RT/LT      left     COLONOSCOPY       CORONARY ARTERY BYPASS  11/18/201    Coronary artery bypass grafting x 4 with placement of the left internal mammary artery to the distal midportion of the left anterior descending artery, saphenous vein graft from aorta to the third obtuse marginal branch of circumflex coronary artery, saphenous vein graft from aorta to the second diagonal branch, saphenous vein graft from aorta to the posterior descending artery.     ENDARTERECTOMY CAROTID Left 2/21/2020    Procedure: LEFT CAROTID ENDARTERECTOMY WITH EEG;  Surgeon: Semaj Stubbs MD;  Location:  OR     ESOPHAGOSCOPY, GASTROSCOPY, DUODENOSCOPY (EGD), COMBINED  10/31/2011    Procedure:COMBINED ESOPHAGOSCOPY, GASTROSCOPY, DUODENOSCOPY (EGD); Surgeon:ALONSO ALDANA; Location: GI     ESOPHAGOSCOPY, GASTROSCOPY, DUODENOSCOPY (EGD), COMBINED N/A 3/8/2018    Procedure: COMBINED ESOPHAGOSCOPY, GASTROSCOPY, DUODENOSCOPY  (EGD);  EGD;  Surgeon: Angel Luis Justice MD;  [facility]: [facility]     HEART CATH CORONARY ANGIOGRAM W/LV GRAM  9-11-10    CV Surgery recommended     HEART CATH CORONARY ANGIOGRAM W/LV GRAM  14    Medical management     HERNIA REPAIR, INGUINAL RT/LT      left       FAMILY HISTORY:  Family History   Problem Relation Age of Onset     C.A.D. Father      Cancer Father         bladder     Heart Surgery Father         bypass surgery     Heart Disease Mother        SOCIAL HISTORY:  Social History     Socioeconomic History     Marital status:      Spouse name: None     Number of children: None     Years of education: None     Highest education level: None   Occupational History     None   Tobacco Use     Smoking status: Former Smoker     Packs/day: 0.50     Years: 12.00     Pack years: 6.00     Types: Cigarettes     Start date:      Quit date: 2005     Years since quittin.6     Smokeless tobacco: Never Used   Substance and Sexual Activity     Alcohol use: Yes     Comment: minimal     Drug use: No     Sexual activity: Yes     Partners: Female   Other Topics Concern     Parent/sibling w/ CABG, MI or angioplasty before 65F 55M? No      Service Not Asked     Blood Transfusions Not Asked     Caffeine Concern Yes     Comment: 2 cups coffee per day     Occupational Exposure Not Asked     Hobby Hazards Not Asked     Sleep Concern Not Asked     Stress Concern Not Asked     Weight Concern Not Asked     Special Diet Yes     Comment: low carb diet, low sugar     Back Care Not Asked     Exercise Yes     Comment: treadmill 40 minutes, walk, daily, bike 30 minutes every other day     Bike Helmet Not Asked     Seat Belt Not Asked     Self-Exams Not Asked   Social History Narrative     None     Social Determinants of Health     Financial Resource Strain:      Difficulty of Paying Living Expenses:    Food Insecurity:      Worried About Running Out of Food in the Last Year:      Ran Out of Food in the Last  Year:    Transportation Needs:      Lack of Transportation (Medical):      Lack of Transportation (Non-Medical):    Physical Activity:      Days of Exercise per Week:      Minutes of Exercise per Session:    Stress:      Feeling of Stress :    Social Connections:      Frequency of Communication with Friends and Family:      Frequency of Social Gatherings with Friends and Family:      Attends [employer] Services:      Active Member of Clubs or Organizations:      Attends Club or Organization Meetings:      Marital Status:    Intimate Partner Violence:      Fear of Current or Ex-Partner:      Emotionally Abused:      Physically Abused:      Sexually Abused:        Review of Systems:  Skin:  Negative       Eyes:  Negative      ENT:  Negative      Respiratory:  Positive for dyspnea on exertion sl better   Cardiovascular:    lightheadedness;Positive for;dizziness same  Gastroenterology: Negative      Genitourinary:  Negative      Musculoskeletal:  Positive for arthritis    Neurologic:  Positive for headaches;numbness or tingling of feet same  Psychiatric:  Negative      Heme/Lymph/Imm:  Negative      Endocrine:  Positive for diabetes      Physical Exam:  Vitals: BP (!) 168/90   Pulse 64   Ht 1.829 m (6')   Wt 89.4 kg (197 lb)   BMI 26.72 kg/m      Constitutional:  cooperative, alert and oriented, well developed, well nourished, in no acute distress        Skin:  warm and dry to the touch;no apparent skin lesions or masses noted          Head:  normocephalic, no masses or lesions        Eyes:  pupils equal and round;conjunctivae and lids unremarkable        Lymph:      ENT:  no pallor or cyanosis, dentition good        Neck:  JVP normal        Respiratory:  normal breath sounds, clear to auscultation, normal A-P diameter, normal symmetry, normal respiratory excursion, no use of accessory muscles         Cardiac: regular rhythm;normal S1 and S2;no murmurs, gallops or rubs detected                not assessed this visit                                         GI:  not assessed this visit        Extremities and Muscular Skeletal:  no deformities, clubbing, cyanosis, erythema observed;no edema              Neurological:  no gross motor deficits        Psych:  Alert and Oriented x 3        CC  Chen Mercedes, STEW CNP  [address]  [place]  [place] [address]

## 2021-09-08 NOTE — PROGRESS NOTES
Service Date: 09/08/2021    HISTORY OF PRESENT ILLNESS:  Mr. Burgos is a delightful, 61-year-old gentleman that I am having the pleasure of seeing again today in followup.  He is an established patient of Dr. Roberts.  His past medical history includes:  1.  Type 2 diabetes mellitus.  2.  Mixed hyperlipidemia.  3.  Hypertension.  4.  Coronary artery disease with a CABG x4 with Dr. Marshal Pruett.  Last angiogram in 2014 showed all bypass grafts were completely patent.  5.  Carotid disease with carotid endarterectomy on the left with Dr. Stubbs complicated by some local facial nerve trauma resulting in left-sided facial numbness and probable stroke.  The patient has no neurological deficits noted.    I have had the pleasure of meeting Kelton and working with him to help his hypertension.  He tends to be a driven person, wakes up early in the morning as early as 2 a.m., but usually around 4 a.m.  He bikes at least 10-20 miles daily.  He is diligent about following a low-sodium diabetic diet.  He takes his blood pressure about 4 times a day.  He admits that his blood pressures are all over the board.    Currently, he denies chest pain, shortness of breath, lightheadedness or dizziness.  He continues to be slightly hypertensive today at 168/90.  He is currently on Bystolic 5 mg daily and lisinopril 5 mg in the morning and 10 mg at night.    All other review of systems and past medical history are noted below.    ASSESSMENT AND PLAN:  Kelton is a delightful, 61-year-old gentleman here today for followup.  1.  Hypertension.    Blood pressure is still not optimized.  I have asked that we increase lisinopril to 10 mg twice daily.  Bystolic will continue 5 mg daily.  His last BMP showed a stable creatinine at 1.0 with a GFR of 80.  I would like to see him back in 6-8 weeks with another BMP to assure he is tolerating the ACE increase.  I did ask the patient to decrease his blood pressure checks to once daily alternating times of  the day.  Also, he needs to watch the hidden sodium when he eats out.  Most likely, we will need to further up titrate lisinopril to 20 mg twice daily.    2.  Coronary artery disease with CABG and peripheral arterial disease as outlined above.    He has no complaints of angina.  He is on Bystolic, rosuvastatin, lisinopril and aspirin.    3.  Type 2 diabetes mellitus, on Levemir.    The patient and I had a long discussion today about his diabetes.  It is well controlled at 6.9.  I have asked that he talk to Dr. Cuevas about other options rather than insulin.  He was intolerant of metformin and stated that he has some dizziness with metformin.  He does suffer from peripheral neuropathy and is on gabapentin.  We briefly discussed other options, such as Jardiance and Ozempic.  The patient will discuss this with Dr. Cuevas if he so chooses.    4.  Hyperlipidemia.    LDL goal is less than 70.  His lipids in February were as follows:  Total cholesterol 104, HDL 37, LDL 44, triglycerides 114.    If there are any questions or concerns, I have asked Kelton to please contact us.  We briefly discussed intermittent fasting and different dietary options.  If he does have any concerns in the interim, I have asked that he please contact us.    As always, thank you for including me in his care.    Chen Mercedes NP        D: 2021   T: 2021   MT: fidel    Name:     IHSAN SANCHEZ  MRN:      [id number]        Account:      [id number]   :      1960           Service Date: 2021       Document: [id number]

## 2021-09-09 DIAGNOSIS — E11.49 DM TYPE 2 CAUSING NEUROLOGICAL DISEASE (H): ICD-10-CM

## 2021-09-13 RX ORDER — PEN NEEDLE, DIABETIC 31 GX5/16"
NEEDLE, DISPOSABLE MISCELLANEOUS
Qty: 100 EACH | Refills: 0 | Status: SHIPPED | OUTPATIENT
Start: 2021-09-13 | End: 2021-12-08

## 2021-10-09 DIAGNOSIS — M19.041 PRIMARY OSTEOARTHRITIS OF BOTH HANDS: ICD-10-CM

## 2021-10-09 DIAGNOSIS — M19.042 PRIMARY OSTEOARTHRITIS OF BOTH HANDS: ICD-10-CM

## 2021-10-11 RX ORDER — CELECOXIB 100 MG/1
CAPSULE ORAL
Qty: 30 CAPSULE | Refills: 1 | Status: SHIPPED | OUTPATIENT
Start: 2021-10-11 | End: 2021-12-07

## 2021-10-11 NOTE — TELEPHONE ENCOUNTER
Routing refill request to provider for review/approval because:  BP Readings from Last 3 Encounters:   09/08/21 (!) 168/90   07/23/21 (!) 154/84   02/24/21 (!) 172/97     BP high

## 2021-10-20 ENCOUNTER — HOSPITAL ENCOUNTER (OUTPATIENT)
Dept: ULTRASOUND IMAGING | Facility: CLINIC | Age: 61
End: 2021-10-20
Attending: SURGERY
Payer: COMMERCIAL

## 2021-10-20 ENCOUNTER — OFFICE VISIT (OUTPATIENT)
Dept: OTHER | Facility: CLINIC | Age: 61
End: 2021-10-20
Attending: SURGERY
Payer: COMMERCIAL

## 2021-10-20 VITALS — DIASTOLIC BLOOD PRESSURE: 67 MMHG | HEART RATE: 90 BPM | RESPIRATION RATE: 16 BRPM | SYSTOLIC BLOOD PRESSURE: 146 MMHG

## 2021-10-20 DIAGNOSIS — Z98.890 HISTORY OF LEFT-SIDED CAROTID ENDARTERECTOMY: ICD-10-CM

## 2021-10-20 DIAGNOSIS — I65.22 ATHEROSCLEROSIS OF LEFT CAROTID ARTERY: ICD-10-CM

## 2021-10-20 DIAGNOSIS — Z98.890 HISTORY OF LEFT-SIDED CAROTID ENDARTERECTOMY: Primary | ICD-10-CM

## 2021-10-20 PROCEDURE — G0463 HOSPITAL OUTPT CLINIC VISIT: HCPCS

## 2021-10-20 PROCEDURE — 99213 OFFICE O/P EST LOW 20 MIN: CPT | Performed by: SURGERY

## 2021-10-20 PROCEDURE — 93880 EXTRACRANIAL BILAT STUDY: CPT

## 2021-10-20 NOTE — PROGRESS NOTES
"Vikash Burgos is a 61-year-old gentleman with metabolic syndrome and prior CABG who is status post an asymptomatic left carotid endarterectomy on 2/21/2020.  He awoke from that procedure neurologically intact except for some moderate left tongue deviation.  Several hours later he had right eye ptosis with right-sided weakness.  A code stroke was called and he underwent neurology consultation.  MRI of the brain was negative for infarct.  CTA showed a widely patent left carotid artery with no associated abnormalities.  Neurology could not find an organic cause for the right-sided weakness and did not feel that he had suffered an infarct.  Swallow studies were abnormal and he was discharged home on a dysphagia diet.     Kelton was evaluated by Dr. Jean-Baptiste at the [facility] in March 2020.  MRI of the brain was negative for infarct but clinically he may have suffered a stroke.  Past medical history is otherwise significant for stage IV liver disease with HCV.  He is followed in transplant hepatology.  He is also status post CABG about 10 years ago.    Kelton returns today for bilateral carotid ultrasound.  He continues with his steady recovery from the unknown periprocedural neurologic event.  He still notes some numbness of his upper lip and reports being careful swallowing his food.  He notes an occasional \"lisp\" which is not apparent to me during our conversation.  He describes some right-sided weakness which does not limit him in his daily activities and continues to improve.  He remains an avid bicyclist.  At today's visit he is essentially without complaints.    Exam:  Well-developed male alert and oriented x3.  Blood pressure 146/67 with a pulse of 90.  Well-healed left-sided neck incision.  His smile is symmetric and his tongue is in the midline.  No cranial nerve deficiencies..  Neurologic exam to me is grossly nonfocal.    Imaging:  Carotid ultrasound today shows minimal less than 50% " right-sided disease.  The left carotid artery is widely patent with mild intimal hyperplasia.    ASSESSMENT:   18 months status post left carotid endarterectomy with subsequent postoperative right face, arm, and leg weakness with associated numbness.  MRI of the brain was normal.  Varying neurologic opinions as to whether or not he has truly suffered a stroke.  His left carotid artery remains widely patent.  Clinically he continues with steady improvement.    RECOMMENDATION:   I reviewed all the above with Kelton.  He remains physically active.  Relative to his carotid disease I have no concerns.  He will continue his medical regimen which includes daily aspirin.  Vascular surgical follow-up will be with me in 1 year for repeat bilateral carotid ultrasonography.    Total length of this encounter was 20 minutes.    Devyn Stubbs MD

## 2021-10-20 NOTE — PROGRESS NOTES
[facility]        Patient is here for a follow up  to discuss about US results      BP (!) 146/67 (BP Location: Right arm, Patient Position: Chair, Cuff Size: Adult Regular)   Pulse 90   Resp 16     The provider has been notified that the patient has no concerns.     Questions patient would like addressed today are: N/A.    Refills are needed: No    Has homecare services and agency name:  [facility]

## 2021-10-27 ENCOUNTER — LAB (OUTPATIENT)
Dept: LAB | Facility: CLINIC | Age: 61
End: 2021-10-27
Payer: COMMERCIAL

## 2021-10-27 DIAGNOSIS — I65.22 LEFT CAROTID ARTERY STENOSIS: ICD-10-CM

## 2021-10-27 DIAGNOSIS — Z98.890 HISTORY OF LEFT-SIDED CAROTID ENDARTERECTOMY: Primary | ICD-10-CM

## 2021-10-27 DIAGNOSIS — I10 BENIGN ESSENTIAL HYPERTENSION: ICD-10-CM

## 2021-10-27 LAB
ANION GAP SERPL CALCULATED.3IONS-SCNC: 3 MMOL/L (ref 3–14)
BUN SERPL-MCNC: 16 MG/DL (ref 7–30)
CALCIUM SERPL-MCNC: 8.9 MG/DL (ref 8.5–10.1)
CHLORIDE BLD-SCNC: 104 MMOL/L (ref 94–109)
CO2 SERPL-SCNC: 27 MMOL/L (ref 20–32)
CREAT SERPL-MCNC: 0.9 MG/DL (ref 0.66–1.25)
GFR SERPL CREATININE-BSD FRML MDRD: >90 ML/MIN/1.73M2
GLUCOSE BLD-MCNC: 232 MG/DL (ref 70–99)
POTASSIUM BLD-SCNC: 4.5 MMOL/L (ref 3.4–5.3)
SODIUM SERPL-SCNC: 134 MMOL/L (ref 133–144)

## 2021-10-27 PROCEDURE — 36415 COLL VENOUS BLD VENIPUNCTURE: CPT

## 2021-10-27 PROCEDURE — 80048 BASIC METABOLIC PNL TOTAL CA: CPT

## 2021-10-29 ENCOUNTER — VIRTUAL VISIT (OUTPATIENT)
Dept: CARDIOLOGY | Facility: CLINIC | Age: 61
End: 2021-10-29
Attending: NURSE PRACTITIONER
Payer: COMMERCIAL

## 2021-10-29 VITALS
SYSTOLIC BLOOD PRESSURE: 179 MMHG | DIASTOLIC BLOOD PRESSURE: 71 MMHG | WEIGHT: 191 LBS | HEART RATE: 49 BPM | BODY MASS INDEX: 25.87 KG/M2 | HEIGHT: 72 IN

## 2021-10-29 DIAGNOSIS — I25.10 CORONARY ARTERY DISEASE INVOLVING NATIVE CORONARY ARTERY OF NATIVE HEART WITHOUT ANGINA PECTORIS: ICD-10-CM

## 2021-10-29 DIAGNOSIS — I10 BENIGN ESSENTIAL HYPERTENSION: Primary | ICD-10-CM

## 2021-10-29 DIAGNOSIS — E78.2 MIXED HYPERLIPIDEMIA: ICD-10-CM

## 2021-10-29 PROCEDURE — 99214 OFFICE O/P EST MOD 30 MIN: CPT | Mod: 95 | Performed by: NURSE PRACTITIONER

## 2021-10-29 RX ORDER — VALSARTAN 80 MG/1
TABLET ORAL
Qty: 90 TABLET | Refills: 6 | Status: SHIPPED | OUTPATIENT
Start: 2021-10-29 | End: 2021-11-17 | Stop reason: ALTCHOICE

## 2021-10-29 ASSESSMENT — MIFFLIN-ST. JEOR: SCORE: 1709.37

## 2021-10-29 NOTE — LETTER
10/29/2021    Marshal Cuevas MD  [address]  [place] [address]    RE: Vikash Burgos       Dear Colleague,    I had the pleasure of seeing Vikash Burgos in the [facility].      KELTON is a 61 year old who is being evaluated via a billable video visit.      How would you like to obtain your AVS? MyChart     If the video visit is dropped, the invitation should be resent by: Text to cell phone: [phone]     Will anyone else be joining your video visit? No         Review Of Systems  Skin: NEGATIVE  Eyes:Ears/Nose/Throat: wears contacts and glasses  Respiratory: NEGATIVE  Cardiovascular:NEGATIVE  Gastrointestinal: NEGATIVE  Genitourinary:NEGATIVE  Musculoskeletal: arthritis  Neurologic: numbness/tingling in hands and feet, neuropathy  Psychiatric: NEGATIVE  Hematologic/Lymphatic/Immunologic: slight hayfever  Endocrine:  Diabetes Type II    Vitals - Patient Reported  Systolic (Patient Reported): (!) 179  Diastolic (Patient Reported): 71  Weight (Patient Reported): 86.6 kg (191 lb)  Pulse (Patient Reported): 49    HELEN Tran    Telephone number of patient: [phone]  Video Start Time: 7:38 AM  Video-Visit Details    Type of service:  Video Visit DOX    Video End Time:8:01 AM    Originating Location (pt. Location): Home    Distant Location (provider location):  [facility] [place]     Platform used for Video Visit: Compario       REAGAN NOTE:  This visit was completed via video due to COVID-19 precautions.  The patient provided consent for a video visit.      I had the pleasure evaluating Kelton Burgos  for HTN.      The patient is a 61 year male with the following medical issues:  1.  Type 2 diabetes mellitus.  2.  Mixed hyperlipidemia.  3.  Hypertension.  4.  Coronary artery disease with a CABG x4 with Dr. Marshal Pruett.  Last angiogram in 2014 showed all bypass grafts were completely patent, however, he had  diffuse small caliber diabetic coronary arteries.     5.  Carotid disease with carotid endarterectomy on the left with Dr. Stubbs complicated by some local facial nerve trauma resulting in left-sided facial numbness and probable stroke.  The patient has no neurological deficits noted.    It is my pleasure having a video visit with Kelton this morning.  Since her last visit, he has done well.  He did have a few days where his systolic blood pressure was in the mid 200s.  His sleep hygiene has been something he is always struggled with.  He often wakes up at 2 or 3 AM.  He noticed his blood pressure was at about 260 with diastolic pressure running in the 70s.  After a few days, he states that his blood pressure came down.  Now, at 2 AM his systolic blood pressure is 170s and will come down to the 140s during the day.  He is biking 20 miles a day.  He feels well no complaints of exercise intolerance or chest pain.  Only concern is an ACE cough.  His heart rates range between 50 to 60 bpm.  Labs were stable. All other review of systems are noted above.    PHYSICAL EXAMINATION:  General:  no apparent distress, normal body habitus, sitting upright.  ENT/Mouth:  no nasal discharge.  Normal head shape, no apparent injury or laceration.  Eyes:  normal conjunctivae.  No observed jaundice.  Neck:  no apparent neck swelling.   Chest/Lungs:  no breathing difficulty while speaking.  No audible wheezing.  No cough during conversation.  Cardiovascular:  reported HR is regular.  No obviously elevated jugular venous pressure.  No reported edema in LE.   Extremities:  no apparent cyanosis.  Skin:  no xanthelasma or apparent rash.  Neurologic:  normal arm motion, no tremor.    Psychiatric:  alert and oriented x3, calm demeanor.  The rest of the comprehensive physical examination is deferred due to public health emergency video visit restrictions.         DIAGNOSTIC STUDIES:  Component      Latest Ref Rng & Units 10/27/2021   Sodium       133 - 144 mmol/L 134   Potassium      3.4 - 5.3 mmol/L 4.5   Chloride      94 - 109 mmol/L 104   Carbon Dioxide      20 - 32 mmol/L 27   Anion Gap      3 - 14 mmol/L 3   Urea Nitrogen      7 - 30 mg/dL 16   Creatinine      0.66 - 1.25 mg/dL 0.90   Calcium      8.5 - 10.1 mg/dL 8.9   Glucose      70 - 99 mg/dL 232 (H)   GFR Estimate      >60 mL/min/1.73m2 >90     Component      Latest Ref Rng & Units 2/3/2021   Cholesterol      <200 mg/dL 104   Triglycerides      <150 mg/dL 114   HDL Cholesterol      >39 mg/dL 37 (L)   LDL Cholesterol Calculated      <100 mg/dL 44   Non HDL Cholesterol      <130 mg/dL 67     The nuclear stress test 1/2020     There is a small area of mild ischemia in the inferoapical segment(s) of the left ventricle. There are no moderate or large areas of ischemia identified on this study.     Left ventricular function is normal, 59%.     A prior study was conducted on 3/13/2018.  The previously noted basal inferior wall defect is not appreciated on the current study.    IMPRESSION:  1. HTN  2. CAD with CABG  3. DM II on Levemir  4. Hyperlipidemia   5. PVD-carotid stenosis managed by     RECOMMENDATIONS:  1. Stop lisinopril for ACE cough  2. Start Valsartan 80 mg am and 80 mg pm then up titrate to 80 mg am and 160 mg pm if SBP still >140 mmHG  3. See me in 1 month. Video or in person   4. Call me with any concerns    Chen Mercedes NP, APRN CNP                Thank you for allowing me to participate in the care of your patient.      Sincerely,     Chen Mercedes NP, APRN CNP     [facility]  cc:   STEW Cuellar CNP  [address]  [place] [id number]

## 2021-10-29 NOTE — PATIENT INSTRUCTIONS
Call my nurse with any questions or concerns:  [phone]  *If you have concerns after hours, please call [phone], option 2 to speak with on call Cardiologist.    1. Medication changes from today:    Stop lisinopril  Start Valsartan 80 mg am and 160 mg evening        2. Lab Results:       Results for BONI SANCHEZ (MRN [id number]) as of 10/29/2021 07:56   Ref. Range 10/27/2021 07:57   Sodium Latest Ref Range: 133 - 144 mmol/L 134   Potassium Latest Ref Range: 3.4 - 5.3 mmol/L 4.5   Chloride Latest Ref Range: 94 - 109 mmol/L 104   Carbon Dioxide Latest Ref Range: 20 - 32 mmol/L 27   Urea Nitrogen Latest Ref Range: 7 - 30 mg/dL 16   Creatinine Latest Ref Range: 0.66 - 1.25 mg/dL 0.90   GFR Estimate Latest Ref Range: >60 mL/min/1.73m2 >90   Calcium Latest Ref Range: 8.5 - 10.1 mg/dL 8.9   Anion Gap Latest Ref Range: 3 - 14 mmol/L 3

## 2021-10-29 NOTE — PROGRESS NOTES
KELTON is a 61 year old who is being evaluated via a billable video visit.      How would you like to obtain your AVS? MyChart     If the video visit is dropped, the invitation should be resent by: Text to cell phone: [phone]     Will anyone else be joining your video visit? No         Review Of Systems  Skin: NEGATIVE  Eyes:Ears/Nose/Throat: wears contacts and glasses  Respiratory: NEGATIVE  Cardiovascular:NEGATIVE  Gastrointestinal: NEGATIVE  Genitourinary:NEGATIVE  Musculoskeletal: arthritis  Neurologic: numbness/tingling in hands and feet, neuropathy  Psychiatric: NEGATIVE  Hematologic/Lymphatic/Immunologic: slight hayfever  Endocrine:  Diabetes Type II    Vitals - Patient Reported  Systolic (Patient Reported): (!) 179  Diastolic (Patient Reported): 71  Weight (Patient Reported): 86.6 kg (191 lb)  Pulse (Patient Reported): 49    HELEN Tran    Telephone number of patient: [phone]  Video Start Time: 7:38 AM  Video-Visit Details    Type of service:  Video Visit DOX    Video End Time:8:01 AM    Originating Location (pt. Location): Home    Distant Location (provider location):  [facility]     Platform used for Video Visit: iC[employer]       REAGAN NOTE:  This visit was completed via video due to COVID-19 precautions.  The patient provided consent for a video visit.      I had the pleasure evaluating Kelton Burgos  for HTN.      The patient is a 61 year male with the following medical issues:  1.  Type 2 diabetes mellitus.  2.  Mixed hyperlipidemia.  3.  Hypertension.  4.  Coronary artery disease with a CABG x4 with Dr. Marshal Pruett.  Last angiogram in 2014 showed all bypass grafts were completely patent, however, he had diffuse small caliber diabetic coronary arteries.     5.  Carotid disease with carotid endarterectomy on the left with Dr. Stubbs complicated by some local facial nerve trauma resulting in left-sided facial numbness and probable stroke.  The patient has no  neurological deficits noted.    It is my pleasure having a video visit with Kelton this morning.  Since her last visit, he has done well.  He did have a few days where his systolic blood pressure was in the mid 200s.  His sleep hygiene has been something he is always struggled with.  He often wakes up at 2 or 3 AM.  He noticed his blood pressure was at about 260 with diastolic pressure running in the 70s.  After a few days, he states that his blood pressure came down.  Now, at 2 AM his systolic blood pressure is 170s and will come down to the 140s during the day.  He is biking 20 miles a day.  He feels well no complaints of exercise intolerance or chest pain.  Only concern is an ACE cough.  His heart rates range between 50 to 60 bpm.  Labs were stable. All other review of systems are noted above.    PHYSICAL EXAMINATION:  General:  no apparent distress, normal body habitus, sitting upright.  ENT/Mouth:  no nasal discharge.  Normal head shape, no apparent injury or laceration.  Eyes:  normal conjunctivae.  No observed jaundice.  Neck:  no apparent neck swelling.   Chest/Lungs:  no breathing difficulty while speaking.  No audible wheezing.  No cough during conversation.  Cardiovascular:  reported HR is regular.  No obviously elevated jugular venous pressure.  No reported edema in LE.   Extremities:  no apparent cyanosis.  Skin:  no xanthelasma or apparent rash.  Neurologic:  normal arm motion, no tremor.    Psychiatric:  alert and oriented x3, calm demeanor.  The rest of the comprehensive physical examination is deferred due to public health emergency video visit restrictions.         DIAGNOSTIC STUDIES:  Component      Latest Ref Rng & Units 10/27/2021   Sodium      133 - 144 mmol/L 134   Potassium      3.4 - 5.3 mmol/L 4.5   Chloride      94 - 109 mmol/L 104   Carbon Dioxide      20 - 32 mmol/L 27   Anion Gap      3 - 14 mmol/L 3   Urea Nitrogen      7 - 30 mg/dL 16   Creatinine      0.66 - 1.25 mg/dL 0.90   Calcium       8.5 - 10.1 mg/dL 8.9   Glucose      70 - 99 mg/dL 232 (H)   GFR Estimate      >60 mL/min/1.73m2 >90     Component      Latest Ref Rng & Units 2/3/2021   Cholesterol      <200 mg/dL 104   Triglycerides      <150 mg/dL 114   HDL Cholesterol      >39 mg/dL 37 (L)   LDL Cholesterol Calculated      <100 mg/dL 44   Non HDL Cholesterol      <130 mg/dL 67     The nuclear stress test 1/2020     There is a small area of mild ischemia in the inferoapical segment(s) of the left ventricle. There are no moderate or large areas of ischemia identified on this study.     Left ventricular function is normal, 59%.     A prior study was conducted on 3/13/2018.  The previously noted basal inferior wall defect is not appreciated on the current study.    IMPRESSION:  1. HTN  2. CAD with CABG  3. DM II on Levemir  4. Hyperlipidemia   5. PVD-carotid stenosis managed by     RECOMMENDATIONS:  1. Stop lisinopril for ACE cough  2. Start Valsartan 80 mg am and 80 mg pm then up titrate to 80 mg am and 160 mg pm if SBP still >140 mmHG  3. See me in 1 month. Video or in person   4. Call me with any concerns    Chen Mercedes, NP, APRN CNP

## 2021-11-17 ENCOUNTER — MYC REFILL (OUTPATIENT)
Dept: CARDIOLOGY | Facility: CLINIC | Age: 61
End: 2021-11-17
Payer: COMMERCIAL

## 2021-11-17 DIAGNOSIS — I10 BENIGN ESSENTIAL HYPERTENSION: Primary | ICD-10-CM

## 2021-11-17 RX ORDER — LISINOPRIL 10 MG/1
10 TABLET ORAL 2 TIMES DAILY
Qty: 180 TABLET | Refills: 3 | Status: SHIPPED | OUTPATIENT
Start: 2021-11-17 | End: 2022-03-14

## 2021-11-17 NOTE — TELEPHONE ENCOUNTER
Please see [employer] message.  Pt has increased dizziness with valsartan. Will stop valsartan and restart lisinopril at 10 mg bid. If dizziness continues then pt should see PCP for further evaluation.

## 2021-12-03 ENCOUNTER — VIRTUAL VISIT (OUTPATIENT)
Dept: CARDIOLOGY | Facility: CLINIC | Age: 61
End: 2021-12-03
Payer: COMMERCIAL

## 2021-12-03 DIAGNOSIS — I10 BENIGN ESSENTIAL HYPERTENSION: ICD-10-CM

## 2021-12-03 PROCEDURE — 99443 PR PHYSICIAN TELEPHONE EVALUATION 21-30 MIN: CPT | Performed by: NURSE PRACTITIONER

## 2021-12-03 NOTE — PROGRESS NOTES
KELTON is a 61 year old who is being evaluated via a billable video visit.      How would you like to obtain your AVS? MyChart  If the video visit is dropped, the invitation should be resent by: Text to cell phone: [phone]  Will anyone else be joining your video visit? No       Vitals - Patient Reported  Systolic (Patient Reported): (!) 152  Diastolic (Patient Reported): 72  Weight (Patient Reported): 85.7 kg (189 lb)  Pulse (Patient Reported): 49    Review Of Systems  Skin: negative  Eyes: glasses  Ears/Nose/Throat: negative, positive for negative  Respiratory: Cough: mild  Cardiovascular: dizziness: mild  Gastrointestinal: negative  Genitourinary: negative  Musculoskeletal: negative  Neurologic: headaches  Psychiatric: negative  Hematologic/Lymphatic/Immunologic: negative  Endocrine: diabetes        Pearl Anguiano LPN      Video Start Time: 4:00 PM  Video-Visit Details    Type of service:  Video Visit    Video End Time:4:21 PM    Originating Location (pt. Location): Home    Distant Location (provider location):  [facility] [place]     Platform used for Video Visit: [employer] NOTE:  This visit was completed via video due to COVID-19 precautions.  The patient provided consent for a video visit.      I had the pleasure evaluating Kelton Burgos for HTN.      The patient is a delightful 62 yo male with the following medical issues:    1. Type 2 diabetes mellitus.  2.  Mixed hyperlipidemia.  3.  Hypertension.  4.  Coronary artery disease with a CABG x4 with Dr. Marshal Pruett.  Last angiogram in 2014 showed all bypass grafts were completely patent, however, he had diffuse small caliber diabetic coronary arteries.     5.  Carotid disease with carotid endarterectomy on the left with Dr. Stubbs complicated by some local facial nerve trauma resulting in left-sided facial numbness and probable stroke.  The patient has no neurological deficits noted.    Tried Valsartan, but developed severe dizziness.  Lisinopril was stopped for cough, but we decided to switch back to lisinopril 10 mg bid and his cough is minimal and dizziness has resolved. Otherwise feeling better. Taking his b/p once daily and pressures are better and rangin-150/60-70's. 2:30 am pressures tend to be the highest readings. Pulse rate is low, but pt is an avid bike rider (20 miles a day) and exercises year round. Denies any other concerns at this time. Recently joined a diabetic research study which he's excited about and will start in 2022.        PHYSICAL EXAMINATION: video was limited r/t freezing images so changed to phone visit    General:  no apparent distress, normal body habitus, sitting upright.  Eyes:  normal conjunctivae.  No observed jaundice.  Neck:  no apparent neck swelling.   Chest/Lungs:  no breathing difficulty while speaking.  No audible wheezing.  No cough during conversation.  Cardiovascular:  reported HR is regular.  No obviously elevated jugular venous pressure.  No reported edema in LE.   Extremities:  no apparent cyanosis.  Skin:  no xanthelasma or apparent rash.  Neurologic:  normal arm motion, no tremor.    Psychiatric:  alert and oriented x3, calm demeanor.  The rest of the comprehensive physical examination is deferred due to public health emergency video visit restrictions.         DIAGNOSTIC STUDIES:  The nuclear stress test 2020     There is a small area of mild ischemia in the inferoapical segment(s) of the left ventricle. There are no moderate or large areas of ischemia identified on this study.     Left ventricular function is normal, 59%.     A prior study was conducted on 3/13/2018.  The previously noted basal inferior wall defect is not appreciated on the current study.    IMPRESSION:  1. HTN  2. CAD with CABG  3. DM II on Levemir  4. Hyperlipidemia   5. PVD-carotid stenosis managed by        RECOMMENDATIONS:  1. No changes needed at this time  2. Continue with current exercise and diet  plan because he's doing great.    Call with any concerns    Chen Mercedes NP, APRN CNP

## 2021-12-03 NOTE — LETTER
12/3/2021    Marshal Ceuvas MD  [address]  [place] [address]    RE: Vikash Burgos       Dear Colleague,    I had the pleasure of seeing Vikash Burgos in the [facility].    KELTON is a 61 year old who is being evaluated via a billable video visit.      How would you like to obtain your AVS? MyChart  If the video visit is dropped, the invitation should be resent by: Text to cell phone: [phone]  Will anyone else be joining your video visit? No       Vitals - Patient Reported  Systolic (Patient Reported): (!) 152  Diastolic (Patient Reported): 72  Weight (Patient Reported): 85.7 kg (189 lb)  Pulse (Patient Reported): 49    Review Of Systems  Skin: negative  Eyes: glasses  Ears/Nose/Throat: negative, positive for negative  Respiratory: Cough: mild  Cardiovascular: dizziness: mild  Gastrointestinal: negative  Genitourinary: negative  Musculoskeletal: negative  Neurologic: headaches  Psychiatric: negative  Hematologic/Lymphatic/Immunologic: negative  Endocrine: diabetes        Pearl Anguiano LPN      Video Start Time: 4:00 PM  Video-Visit Details    Type of service:  Video Visit    Video End Time:4:21 PM    Originating Location (pt. Location): Home    Distant Location (provider location):  [facility] [place]     Platform used for Video Visit: Conductor NOTE:  This visit was completed via video due to COVID-19 precautions.  The patient provided consent for a video visit.      I had the pleasure evaluating Kelton Burgos for HTN.      The patient is a delightful 62 yo male with the following medical issues:    1. Type 2 diabetes mellitus.  2.  Mixed hyperlipidemia.  3.  Hypertension.  4.  Coronary artery disease with a CABG x4 with Dr. Marshal Pruett.  Last angiogram in 2014 showed all bypass grafts were completely patent, however, he had diffuse small caliber diabetic coronary arteries.     5.  Carotid disease with  carotid endarterectomy on the left with Dr. Stubbs complicated by some local facial nerve trauma resulting in left-sided facial numbness and probable stroke.  The patient has no neurological deficits noted.    Tried Valsartan, but developed severe dizziness. Lisinopril was stopped for cough, but we decided to switch back to lisinopril 10 mg bid and his cough is minimal and dizziness has resolved. Otherwise feeling better. Taking his b/p once daily and pressures are better and rangin-150/60-70's. 2:30 am pressures tend to be the highest readings. Pulse rate is low, but pt is an avid bike rider (20 miles a day) and exercises year round. Denies any other concerns at this time. Recently joined a diabetic research study which he's excited about and will start in 2022.        PHYSICAL EXAMINATION: video was limited r/t freezing images so changed to phone visit    General:  no apparent distress, normal body habitus, sitting upright.  Eyes:  normal conjunctivae.  No observed jaundice.  Neck:  no apparent neck swelling.   Chest/Lungs:  no breathing difficulty while speaking.  No audible wheezing.  No cough during conversation.  Cardiovascular:  reported HR is regular.  No obviously elevated jugular venous pressure.  No reported edema in LE.   Extremities:  no apparent cyanosis.  Skin:  no xanthelasma or apparent rash.  Neurologic:  normal arm motion, no tremor.    Psychiatric:  alert and oriented x3, calm demeanor.  The rest of the comprehensive physical examination is deferred due to public health emergency video visit restrictions.         DIAGNOSTIC STUDIES:  The nuclear stress test 2020     There is a small area of mild ischemia in the inferoapical segment(s) of the left ventricle. There are no moderate or large areas of ischemia identified on this study.     Left ventricular function is normal, 59%.     A prior study was conducted on 3/13/2018.  The previously noted basal inferior wall defect is not  appreciated on the current study.    IMPRESSION:  1. HTN  2. CAD with CABG  3. DM II on Levemir  4. Hyperlipidemia   5. PVD-carotid stenosis managed by        RECOMMENDATIONS:  1. No changes needed at this time  2. Continue with current exercise and diet plan because he's doing great.    Call with any concerns    Chen Mercedes NP, APRN CNP                Thank you for allowing me to participate in the care of your patient.      Sincerely,     Chen Mercedes NP, APRN CNP     [facility]  cc:   STEW Cuellar CNP  [address]  [place] [id number]

## 2021-12-04 DIAGNOSIS — M19.042 PRIMARY OSTEOARTHRITIS OF BOTH HANDS: ICD-10-CM

## 2021-12-04 DIAGNOSIS — M19.041 PRIMARY OSTEOARTHRITIS OF BOTH HANDS: ICD-10-CM

## 2021-12-06 DIAGNOSIS — E11.49 DM TYPE 2 CAUSING NEUROLOGICAL DISEASE (H): ICD-10-CM

## 2021-12-07 RX ORDER — CELECOXIB 100 MG/1
CAPSULE ORAL
Qty: 30 CAPSULE | Refills: 1 | Status: SHIPPED | OUTPATIENT
Start: 2021-12-07 | End: 2022-01-21

## 2021-12-07 NOTE — TELEPHONE ENCOUNTER
BP Readings from Last 3 Encounters:   10/29/21 (!) 179/71   10/20/21 (!) 146/67   09/08/21 (!) 168/90     Please refill as appropriate.  Thank you,    Smiley Cooper RN  [facility]

## 2021-12-08 RX ORDER — PEN NEEDLE, DIABETIC 31 GX5/16"
NEEDLE, DISPOSABLE MISCELLANEOUS
Qty: 100 EACH | Refills: 0 | Status: SHIPPED | OUTPATIENT
Start: 2021-12-08 | End: 2022-03-07

## 2021-12-08 RX ORDER — INSULIN DETEMIR 100 [IU]/ML
INJECTION, SOLUTION SUBCUTANEOUS
Qty: 15 ML | Refills: 27 | Status: SHIPPED | OUTPATIENT
Start: 2021-12-08 | End: 2022-11-14

## 2021-12-08 NOTE — TELEPHONE ENCOUNTER
Lab Results   Component Value Date    A1C 6.9 11/13/2020    A1C 7.4 02/21/2020    A1C 7.2 09/03/2019    A1C 6.6 08/21/2018    A1C 7.2 11/20/2017     Has appointment in 1 month.    Please refill as appropriate.  Thank you,    Smiley Cooper RN  [facility]

## 2022-01-12 ENCOUNTER — VIRTUAL VISIT (OUTPATIENT)
Dept: CARDIOLOGY | Facility: CLINIC | Age: 62
End: 2022-01-12
Payer: COMMERCIAL

## 2022-01-12 DIAGNOSIS — I25.10 CORONARY ARTERY DISEASE INVOLVING NATIVE CORONARY ARTERY OF NATIVE HEART WITHOUT ANGINA PECTORIS: Primary | ICD-10-CM

## 2022-01-12 DIAGNOSIS — Z00.6 EXAMINATION OF PARTICIPANT OR CONTROL IN CLINICAL RESEARCH: ICD-10-CM

## 2022-01-12 DIAGNOSIS — E11.9 DIABETES MELLITUS (H): ICD-10-CM

## 2022-01-12 PROCEDURE — 99207 PR NO CHARGE-RESEARCH SERVICE: CPT

## 2022-01-12 NOTE — LETTER
1/12/2022    Marshal Cuevas MD  [address]  [facility] [address]    RE: Vikash Burgos       Dear Colleague,     I had the pleasure of seeing Vikash Burgos in the [facility].  SW patient this am about the Surpass study. He is interested in the study, ICF and IP information sent to the subject for review. Screening appt is scheduled for this Monday.    Mis Husain RN

## 2022-01-12 NOTE — PROGRESS NOTES
SW patient this am about the Surpass study. He is interested in the study, ICF and IP information sent to the subject for review. Screening appt is scheduled for this Monday.    Mis Husain RN

## 2022-01-17 ENCOUNTER — OFFICE VISIT (OUTPATIENT)
Dept: CARDIOLOGY | Facility: CLINIC | Age: 62
End: 2022-01-17
Payer: COMMERCIAL

## 2022-01-17 VITALS
SYSTOLIC BLOOD PRESSURE: 172 MMHG | HEIGHT: 72 IN | OXYGEN SATURATION: 98 % | BODY MASS INDEX: 26.41 KG/M2 | HEART RATE: 56 BPM | DIASTOLIC BLOOD PRESSURE: 83 MMHG | WEIGHT: 195 LBS

## 2022-01-17 DIAGNOSIS — I25.10 CORONARY ARTERY DISEASE INVOLVING NATIVE CORONARY ARTERY OF NATIVE HEART WITHOUT ANGINA PECTORIS: Primary | ICD-10-CM

## 2022-01-17 DIAGNOSIS — Z00.6 EXAMINATION OF PARTICIPANT OR CONTROL IN CLINICAL RESEARCH: ICD-10-CM

## 2022-01-17 DIAGNOSIS — E11.9 DIABETES MELLITUS (H): ICD-10-CM

## 2022-01-17 PROCEDURE — 99207 PR NO CHARGE-RESEARCH SERVICE: CPT

## 2022-01-17 ASSESSMENT — MIFFLIN-ST. JEOR: SCORE: 1727.51

## 2022-01-17 NOTE — LETTER
1/17/2022    Marshal Cuevas MD  [address]  [facility] [address]    RE: Vikash Burgos       Dear Colleague,     I had the pleasure of seeing Vikash Burgos in the [facility].    Met with patient this am to discuss Surpass study and review inclusion/exclusion criteria.    Patient has Liver Cirrhosis secondary to Fatty Liver. He is followed by Dr Wenceslao Bedolla at the [facility]. LFT's are WNL with the exception of mildly elevated bili @ 0.4. CBC and ALT are stable and WNL. He has no evidence of HCC and no symptoms related to his cirrhosis at this time.     Will discuss with sponsor as to whether patient can be included in the study with his liver cirrhosis.     Patient informed and I will contact him when I hear back from the sponsor.    Thank you for allowing me to participate in the care of your patient.      Sincerely,     Mis Husain RN     [facility]  cc:   No referring provider defined for this encounter.

## 2022-01-19 DIAGNOSIS — M19.041 PRIMARY OSTEOARTHRITIS OF BOTH HANDS: ICD-10-CM

## 2022-01-19 DIAGNOSIS — M19.042 PRIMARY OSTEOARTHRITIS OF BOTH HANDS: ICD-10-CM

## 2022-01-20 DIAGNOSIS — I25.10 CORONARY ARTERY DISEASE INVOLVING NATIVE CORONARY ARTERY OF NATIVE HEART WITHOUT ANGINA PECTORIS: ICD-10-CM

## 2022-01-20 DIAGNOSIS — F51.01 PRIMARY INSOMNIA: ICD-10-CM

## 2022-01-20 DIAGNOSIS — E78.2 MIXED HYPERLIPIDEMIA: ICD-10-CM

## 2022-01-20 RX ORDER — ROSUVASTATIN CALCIUM 20 MG/1
20 TABLET, COATED ORAL DAILY
Qty: 90 TABLET | Refills: 0 | Status: SHIPPED | OUTPATIENT
Start: 2022-01-20 | End: 2022-05-11

## 2022-01-20 RX ORDER — ZOLPIDEM TARTRATE 5 MG/1
TABLET ORAL
Qty: 5 TABLET | Refills: 0 | Status: SHIPPED | OUTPATIENT
Start: 2022-01-20 | End: 2022-04-28

## 2022-01-20 NOTE — TELEPHONE ENCOUNTER
Routing refill request to provider for review/approval because:  Labs out of range:    Platelet Count   Date Value Ref Range Status   06/07/2021 141 (L) 150 - 450 10e9/L Final      Amber Castellanos RN

## 2022-01-20 NOTE — TELEPHONE ENCOUNTER
Routing refill request to provider for review/approval because:  Drug not on the [facility] refill protocol   Amber Castellanos RN

## 2022-01-21 RX ORDER — CELECOXIB 100 MG/1
CAPSULE ORAL
Qty: 30 CAPSULE | Refills: 1 | Status: SHIPPED | OUTPATIENT
Start: 2022-01-21 | End: 2022-04-01

## 2022-01-27 DIAGNOSIS — Z00.6 EXAMINATION OF PARTICIPANT OR CONTROL IN CLINICAL RESEARCH: ICD-10-CM

## 2022-01-27 DIAGNOSIS — I25.10 CORONARY ARTERY DISEASE INVOLVING NATIVE CORONARY ARTERY OF NATIVE HEART WITHOUT ANGINA PECTORIS: Primary | ICD-10-CM

## 2022-01-27 DIAGNOSIS — E11.9 DIABETES MELLITUS (H): ICD-10-CM

## 2022-01-27 PROCEDURE — 93005 ELECTROCARDIOGRAM TRACING: CPT | Performed by: INTERNAL MEDICINE

## 2022-02-03 ENCOUNTER — OFFICE VISIT (OUTPATIENT)
Dept: CARDIOLOGY | Facility: CLINIC | Age: 62
End: 2022-02-03
Payer: COMMERCIAL

## 2022-02-03 ENCOUNTER — TRANSFERRED RECORDS (OUTPATIENT)
Dept: CARDIOLOGY | Facility: CLINIC | Age: 62
End: 2022-02-03

## 2022-02-03 VITALS
HEART RATE: 54 BPM | HEIGHT: 72 IN | WEIGHT: 195 LBS | SYSTOLIC BLOOD PRESSURE: 163 MMHG | BODY MASS INDEX: 26.41 KG/M2 | DIASTOLIC BLOOD PRESSURE: 78 MMHG

## 2022-02-03 DIAGNOSIS — E11.9 DIABETES MELLITUS (H): ICD-10-CM

## 2022-02-03 DIAGNOSIS — Z00.6 EXAMINATION OF PARTICIPANT OR CONTROL IN CLINICAL RESEARCH: ICD-10-CM

## 2022-02-03 DIAGNOSIS — I25.10 CORONARY ARTERY DISEASE INVOLVING NATIVE CORONARY ARTERY OF NATIVE HEART WITHOUT ANGINA PECTORIS: Primary | ICD-10-CM

## 2022-02-03 PROCEDURE — 99207 PR NO CHARGE-RESEARCH SERVICE: CPT

## 2022-02-03 ASSESSMENT — MIFFLIN-ST. JEOR: SCORE: 1727.64

## 2022-02-03 NOTE — PROGRESS NOTES
"  SURPASS-CVOT Study [Effect of tirzepatide versus Dulaglutide on Major Cardiovascular Events in Patients with Type 2 Diabetes]    Subject seen for screening visit to discuss review the study details/consent form and collect qualifying data per protocol.    The study discussion included the following:     Study purpose    Qualifications for participation    Length of study participation    Study procedures    Risks and side effects    Benefits (if any)    Voluntary nature of participation    Alternatives to participation    Confidentiality of records    Financial considerations     Subject asked questions and agreed that he received answers that satisfied him.    Consent form (Version [id number] Approved [id number]) signed by the subject.     A signed copy was given to the subject & a copy was forwarded to medical records.    No study procedures were done prior to obtaining informed consent.     Inclusion/exclusion criteria reviewed.    Vitals:    02/03/22 0750 02/03/22 0815   BP: (!) 178/76 (!) 163/78   BP Location:  Left arm   Patient Position:  Chair   Cuff Size:  Adult Regular   Pulse: 67 54   Weight: 88.5 kg (195 lb)    Height: 1.829 m (6' 0.01\")      Waist circumference: 102 cm and 102.5 cm   measured immediately above iliac crest while subject was standing  BP measured after 5 minutes resting in a seated position - two readings taken one minute apart using automated cuff.    BP has been somewhat elevated per patient and he states they are readjusting his medications.    male  61 year old  Past Medical History:   Diagnosis Date     Basal cell carcinoma      Carotid stenosis, left     s/p left CEA 2/2020     Cerebral infarction (H)     left CVA 2/2020 post-op carotid endarterectomy     Coronary artery disease     4 vessel bypass November 2010; LIMA ->LAD, SVG-> OM3, SVG -> D2, SVG -> PDA     Diabetes mellitus (H) 2005    neuropathy     Generalized anxiety disorder 05/02/2014     Hepatitis C, chronic (H) 2005     " Hyperlipidemia LDL goal < 70      Hypertension      Liver diseases     9/15 Liver is 10 cm in span without left lobe enlargement     Persistent microalbuminuria associated with type 2 diabetes mellitus (H) 05/06/2015       Have subject's First Degree Relatives been diagnosed with Coronary Artery Disease (males <55 years old, females <65 years old)? YES     Have subject's First Degree Relatives been diagnosed with Cerebrovascular Disease? YES      Subject is currently taking an SGLT-2 inhibitor [Invokana, Farxiga & Jardiance (flozins)]  []  Yes    [x]  No  [randomization stratified by use of SGLT -2 inhibitors]    Physical exam will be done by NP on 02/14/2022.    Non fasting labs drawn and sent to central lab.    Patient is scheduled for an ophthalmologic exam with fundoscopy @ [facility].      Plan: Randomization visit tentatively scheduled for 2/17/22 dependent upon qualifying labs. PE and eye exam.     Mis Husain RN      Current Outpatient Medications:      albuterol (PROAIR HFA/PROVENTIL HFA/VENTOLIN HFA) 108 (90 Base) MCG/ACT inhaler, INHALE 2 PUFFS INTO THE LUNGS EVERY 4 HOURS AS NEEDED FOR SHORTNESS OF BREATH / DYSPNEA OR WHEEZING, Disp: 1 Inhaler, Rfl: 3     aspirin 81 MG tablet, Take 1 tablet by mouth daily., Disp: , Rfl:      B-D U/F 31G X 8 MM insulin pen needle, USE ONE PEN NEEDLES DAILY OR AS DIRECTED., Disp: 100 each, Rfl: 0     celecoxib (CELEBREX) 100 MG capsule, TAKE 1 CAPSULE BY MOUTH EVERY DAY, Disp: 30 capsule, Rfl: 1     chlorhexidine (PERIDEX) 0.12 % solution, Take 15 mLs by mouth 2 times daily as needed , Disp: , Rfl: 5     cyanocobalamin 1000 MCG SUBL, Place 1,000 mcg under the tongue daily, Disp: , Rfl:      gabapentin (NEURONTIN) 300 MG capsule, Take 1 capsule (300 mg) by mouth 3 times daily, Disp: 90 capsule, Rfl: 4     glucosamine-chondroitin 500-400 MG CAPS per capsule, Take 1 capsule by mouth daily, Disp: , Rfl:      insulin pen needle (BD HEIKE U/F) 32G X 4 MM, Use 1 daily or as  directed., Disp: 100 each, Rfl: prn     LEVEMIR FLEXTOUCH 100 UNIT/ML pen, INJECT 50 UNITS SUBCUTANEOUS AT BEDTIME, Disp: 15 mL, Rfl: 27     lisinopril (ZESTRIL) 10 MG tablet, Take 1 tablet (10 mg) by mouth 2 times daily, Disp: 180 tablet, Rfl: 3     nebivolol (BYSTOLIC) 5 MG tablet, Take 1 tablet (5 mg) by mouth daily, Disp: 90 tablet, Rfl: 3     rosuvastatin (CRESTOR) 20 MG tablet, Take 1 tablet (20 mg) by mouth daily, Disp: 90 tablet, Rfl: 0     Vitamin D, Cholecalciferol, 1000 units TABS, Take 1,000 Units by mouth daily, Disp: , Rfl:      zolpidem (AMBIEN) 5 MG tablet, TAKE 1 TAB ORALLY AS NEEDED FOR SLEEP, Disp: 5 tablet, Rfl: 0

## 2022-02-03 NOTE — PROGRESS NOTES
Met with patient this am to discuss Surpass study and review inclusion/exclusion criteria.    Patient has Liver Cirrhosis secondary to Fatty Liver. He is followed by Dr Wenceslao Bedolla at the [facility]. LFT's are WNL with the exception of mildly elevated bili @ 0.4. CBC and ALT are stable and WNL. He has no evidence of HCC and no symptoms related to his cirrhosis at this time.     Will discuss with sponsor as to whether patient can be included in the study with his liver cirrhosis.     Patient informed and I will contact him when I hear back from the sponsor.     Impression: Combined forms of age-related cataract, bilateral: H25.813. Plan: No treatment currently recommended due to level of vision. Patient will monitor vision changes and contact us with any decrease in vision, will re-evaluate cataracts on return visit.

## 2022-02-12 ENCOUNTER — HEALTH MAINTENANCE LETTER (OUTPATIENT)
Age: 62
End: 2022-02-12

## 2022-02-14 ENCOUNTER — OFFICE VISIT (OUTPATIENT)
Dept: CARDIOLOGY | Facility: CLINIC | Age: 62
End: 2022-02-14
Payer: COMMERCIAL

## 2022-02-14 VITALS
BODY MASS INDEX: 26.68 KG/M2 | WEIGHT: 197 LBS | SYSTOLIC BLOOD PRESSURE: 160 MMHG | DIASTOLIC BLOOD PRESSURE: 81 MMHG | HEIGHT: 72 IN | HEART RATE: 66 BPM

## 2022-02-14 DIAGNOSIS — I10 HYPERTENSION, UNSPECIFIED TYPE: Primary | ICD-10-CM

## 2022-02-14 PROCEDURE — 99214 OFFICE O/P EST MOD 30 MIN: CPT | Performed by: PHYSICIAN ASSISTANT

## 2022-02-14 ASSESSMENT — MIFFLIN-ST. JEOR: SCORE: 1731.72

## 2022-02-14 NOTE — LETTER
2/14/2022    Marshal Cuevas MD  [address]  [place] [address]    RE: Vikash Sharif Loretta       Dear Colleague,     I had the pleasure of seeing Vikash Sharif Loretta in the [facility].  [id number]  50 minutes spent on the date of the encounter doing chart review, review of test results, patient visit and documentation     HPI and Plan:   See dictation    Orders this Visit:  Orders Placed This Encounter   Procedures     US Renal Complete w Doppler Complete     24 Hour Blood Pressure Monitor - Adult     No orders of the defined types were placed in this encounter.    Medications Discontinued During This Encounter   Medication Reason     albuterol (PROAIR HFA/PROVENTIL HFA/VENTOLIN HFA) 108 (90 Base) MCG/ACT inhaler          Encounter Diagnosis   Name Primary?     Hypertension, unspecified type Yes       CURRENT MEDICATIONS:  Current Outpatient Medications   Medication Sig Dispense Refill     aspirin 81 MG tablet Take 1 tablet by mouth daily.       celecoxib (CELEBREX) 100 MG capsule TAKE 1 CAPSULE BY MOUTH EVERY DAY 30 capsule 1     chlorhexidine (PERIDEX) 0.12 % solution Take 15 mLs by mouth 2 times daily as needed   5     cyanocobalamin 1000 MCG SUBL Place 1,000 mcg under the tongue daily       gabapentin (NEURONTIN) 300 MG capsule Take 1 capsule (300 mg) by mouth 3 times daily 90 capsule 4     glucosamine-chondroitin 500-400 MG CAPS per capsule Take 1 capsule by mouth daily       LEVEMIR FLEXTOUCH 100 UNIT/ML pen INJECT 50 UNITS SUBCUTANEOUS AT BEDTIME 15 mL 27     lisinopril (ZESTRIL) 10 MG tablet Take 1 tablet (10 mg) by mouth 2 times daily 180 tablet 3     nebivolol (BYSTOLIC) 5 MG tablet Take 1 tablet (5 mg) by mouth daily 90 tablet 3     rosuvastatin (CRESTOR) 20 MG tablet Take 1 tablet (20 mg) by mouth daily 90 tablet 0     Vitamin D, Cholecalciferol, 1000 units TABS Take 1,000 Units by mouth daily       zolpidem (AMBIEN) 5 MG tablet TAKE 1 TAB ORALLY AS NEEDED FOR SLEEP 5 tablet  0     B-D U/F 31G X 8 MM insulin pen needle USE ONE PEN NEEDLES DAILY OR AS DIRECTED. 100 each 0     insulin pen needle (BD HEIKE U/F) 32G X 4 MM Use 1 daily or as directed. 100 each prn       ALLERGIES     Allergies   Allergen Reactions     Chlorthalidone Nausea and Vomiting     dizziness     Pcn [Penicillins] Rash     Rash with PCN only. Patient has taken amoxicillin with no rash.       PAST MEDICAL HISTORY:  Past Medical History:   Diagnosis Date     Basal cell carcinoma      Carotid stenosis, left     s/p left CEA 2/2020     Cerebral infarction (H)     left CVA 2/2020 post-op carotid endarterectomy     Coronary artery disease     4 vessel bypass November 2010; LIMA ->LAD, SVG-> OM3, SVG -> D2, SVG -> PDA     Diabetes mellitus (H) 2005    neuropathy     Generalized anxiety disorder 05/02/2014     Hepatitis C, chronic (H) 2005     Hyperlipidemia LDL goal < 70      Hypertension      Liver diseases     9/15 Liver is 10 cm in span without left lobe enlargement     Persistent microalbuminuria associated with type 2 diabetes mellitus (H) 05/06/2015       PAST SURGICAL HISTORY:  Past Surgical History:   Procedure Laterality Date     ARTHROSCOPY KNEE RT/LT      left     COLONOSCOPY       CORONARY ARTERY BYPASS  11/18/201    Coronary artery bypass grafting x 4 with placement of the left internal mammary artery to the distal midportion of the left anterior descending artery, saphenous vein graft from aorta to the third obtuse marginal branch of circumflex coronary artery, saphenous vein graft from aorta to the second diagonal branch, saphenous vein graft from aorta to the posterior descending artery.     ENDARTERECTOMY CAROTID Left 2/21/2020    Procedure: LEFT CAROTID ENDARTERECTOMY WITH EEG;  Surgeon: Semaj Stubbs MD;  Location:  OR     ESOPHAGOSCOPY, GASTROSCOPY, DUODENOSCOPY (EGD), COMBINED  10/31/2011    Procedure:COMBINED ESOPHAGOSCOPY, GASTROSCOPY, DUODENOSCOPY (EGD); Surgeon:ALONSO ALDANA; Location: GI      ESOPHAGOSCOPY, GASTROSCOPY, DUODENOSCOPY (EGD), COMBINED N/A 3/8/2018    Procedure: COMBINED ESOPHAGOSCOPY, GASTROSCOPY, DUODENOSCOPY (EGD);  EGD;  Surgeon: Angel Luis Justice MD;  Location: [facility]     HEART CATH CORONARY ANGIOGRAM W/LV GRAM  9--10    CV Surgery recommended     HEART CATH CORONARY ANGIOGRAM W/LV GRAM  -14    Medical management     HERNIA REPAIR, INGUINAL RT/LT      left       FAMILY HISTORY:  Family History   Problem Relation Age of Onset     C.A.D. Father      Cancer Father         bladder     Heart Surgery Father         bypass surgery     Heart Disease Mother        SOCIAL HISTORY:  Social History     Socioeconomic History     Marital status:      Spouse name: None     Number of children: None     Years of education: None     Highest education level: None   Occupational History     None   Tobacco Use     Smoking status: Former Smoker     Packs/day: 0.50     Years: 12.00     Pack years: 6.00     Types: Cigarettes     Start date:      Quit date: 2005     Years since quittin.0     Smokeless tobacco: Never Used   Substance and Sexual Activity     Alcohol use: Yes     Comment: minimal     Drug use: No     Sexual activity: Yes     Partners: Female   Other Topics Concern     Parent/sibling w/ CABG, MI or angioplasty before 65F 55M? No      Service Not Asked     Blood Transfusions Not Asked     Caffeine Concern Yes     Comment: 2 cups coffee per day     Occupational Exposure Not Asked     Hobby Hazards Not Asked     Sleep Concern Not Asked     Stress Concern Not Asked     Weight Concern Not Asked     Special Diet Yes     Comment: low carb diet, low sugar     Back Care Not Asked     Exercise Yes     Comment: treadmill 40 minutes, walk, daily, bike 30 minutes every other day     Bike Helmet Not Asked     Seat Belt Not Asked     Self-Exams Not Asked   Social History Narrative     None     Social Determinants of Health     Financial Resource Strain: Not on file  "  Food Insecurity: Not on file   Transportation Needs: Not on file   Physical Activity: Not on file   Stress: Not on file   Social Connections: Not on file   Intimate Partner Violence: Not on file   Housing Stability: Not on file       Review of Systems:  Skin:  not assessed     Eyes:  Negative glasses  ENT:  not assessed    Respiratory:  Positive for cough  Cardiovascular:  Negative for;palpitations;chest pain;edema lightheadedness;fatigue;Positive for  Gastroenterology: Negative    Genitourinary:  Negative    Musculoskeletal:  Positive for arthritis  Neurologic:  Positive for headaches;numbness or tingling of feet  Psychiatric:  Positive for sleep disturbances  Heme/Lymph/Imm:  Negative allergies  Endocrine:  Positive for diabetes    Physical Exam:  Vitals: BP (!) 160/81   Pulse 66   Ht 1.829 m (6' 0.01\")   Wt 89.4 kg (197 lb)   BMI 26.71 kg/m     Please refer to dictation for physical exam    Recent Lab Results: all reviewed today  CBC RESULTS:  Lab Results   Component Value Date    WBC 5.3 06/07/2021    RBC 5.00 06/07/2021    HGB 15.3 06/07/2021    HCT 45.1 06/07/2021    MCV 90 06/07/2021    MCH 30.6 06/07/2021    MCHC 33.9 06/07/2021    RDW 12.9 06/07/2021     (L) 06/07/2021       BMP RESULTS:  Lab Results   Component Value Date     10/27/2021     06/07/2021    POTASSIUM 4.5 10/27/2021    POTASSIUM 4.8 06/07/2021    CHLORIDE 104 10/27/2021    CHLORIDE 108 06/07/2021    CO2 27 10/27/2021    CO2 28 06/07/2021    ANIONGAP 3 10/27/2021    ANIONGAP 5 06/07/2021     (H) 10/27/2021     (H) 06/07/2021    BUN 16 10/27/2021    BUN 17 06/07/2021    CR 0.90 10/27/2021    CR 0.89 06/07/2021    GFRESTIMATED >90 10/27/2021    GFRESTIMATED >90 06/07/2021    GFRESTBLACK >90 06/07/2021    LIA 8.9 10/27/2021    LIA 9.6 06/07/2021        INR RESULTS:  Lab Results   Component Value Date    INR 1.11 06/07/2021    INR 1.13 12/15/2020           CC  No referring provider defined for this " encounter.        Service Date: 2022    PRIMARY CARDIOLOGIST:  Zeb Roberts MD     REASON FOR VISIT:  SURPASS trial, hypertension.    HISTORY OF PRESENT ILLNESS:  Mr. Burgos is an absolutely delightful 62-year-old gentleman with past medical history significant for the followin.  Coronary artery disease with history of 4-vessel CAB in  by Dr. Pruett  with LIMA to the LAD, SVG to the OM3, SVG to the D2, SVG to the PDA.  Last angiogram was  and showed a patent LIMA patent graft to the OM with a 60% lesion distal to the OM patent LIMA and RPDA.  2.  Low normal EF around 50%.  3.  Peripheral arterial disease with history of carotid endarterectomy in 2020 complicated by CVA and facial nerve trauma, leaving some facial numbness.  4.  Hypertension.  5.  Type 2 diabetes, well controlled.  6.  Hyperlipidemia.  7.  History of hepatitis C.  I believe back in about  when the patient presented with a stage IV, liver failure, treated with Pegasys and ribavirin with excellent results and cured, but with ongoing cirrhosis.    He comes in today for enrollment in the SURPASS trial.  He tells me that overall he has been doing well.  He denies chest pain, shortness of breath, orthopnea, or PND.  He bikes about 6000 miles a year, primarily as his left leg has pain where they did his vein harvesting from his CABG.  If he does not exercise, he has significant pain at night.  He denies orthopnea or PND.  Unfortunately, home blood pressures are high.  This morning when he took his blood pressure when he woke up at 2 a.m., it was 198/100.  This seems variable.  He also notes that his blood pressure seemed to go up when he did a trial of Celebrex for the arthritis and never came back down.  Overall, he feels well, but he is concerned his blood pressure is not as well controlled or his diabetes is not as well controlled as it could be.    SOCIAL HISTORY:  He is .  His wife's name is Carmina.  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  He traveled frequently when he was working.  He is a former smoker, about a 6-pack-year history, quit in 1993.  Minimal alcohol use.    PHYSICAL EXAMINATION:    GENERAL:  Well-developed, well-nourished gentleman in no acute distress.  HEENT:  Normocephalic, atraumatic.  HEART:  Regular in rate and rhythm.  I do not appreciate murmur, rub or gallop.  LUNGS:  Clear, without wheezes, rales, or rhonchi.  ABDOMEN:  Positive bowel sounds, soft, nontender.  I do not appreciate hepatomegaly.  EXTREMITIES:  Without peripheral edema.    LYMPHATICS:  I do not appreciate any lymphadenopathy in his head with the head or neck.    ASSESSMENT AND PLAN:    1.  Hypertension, ongoing in markedly elevated.  It appears that earlier, about 3 months ago, this was better controlled at home, but now seems to be high again.  He certainly has some degree of white coat hypertension and to further delineate this, we are going to get 24-hour blood pressure monitor to see where we are and target when his blood pressures are higher.  At this point, we discussed using nebivolol before bed.  He has problems sleeping anyway and maybe this will make him tired and helps him sleep more than the 4 hours he already sleeps.  He otherwise does not drink much alcohol and exercises regularly.  He is maybe 5-10 pounds overweight, so that is not contributing.  Given his severe vascular disease with both carotid disease and coronary artery disease, we are going to do a renal artery ultrasound to see if there may be a lesion there that could be contributing.  That may be intervened upon to lower blood pressure.  Once we get these results back, we will further adjust medications.  2.  Hyperlipidemia, excellent control.  LDL 44, HDL 37, total cholesterol 104.  We will continue rosuvastatin.  3.  Coronary artery disease without recurrent  anginal symptoms.  Excellent lifestyle modification.  4.  Carotid artery disease, followed by Vascular.  5.  Type 2 diabetes.  He is going to enroll SURPASS trial.  If his A1c is too low to be in that trial, we certainly should start him on an SGLT2 given he has diabetes to reduce his risk of future heart attack or heart failure.    We will see him back in about 3 weeks to go over all of these tests and further adjust medicine sooner with concerns.    Thank you for allowing me to participate in this absolutely delightful patient's care.    Michaelle Caba PA-C        D: 2022   T: 2022   MT: LATOSHA    Name:     IHSAN SANCHEZ  MRN:      [id number]        Account:      [id number]   :      1960           Service Date: 2022       Document: [id number]      Thank you for allowing me to participate in the care of your patient.      Sincerely,     Michaelle Caba PA-C     [facility]  cc:   No referring provider defined for this encounter.

## 2022-02-14 NOTE — PROGRESS NOTES
[id number]  50 minutes spent on the date of the encounter doing chart review, review of test results, patient visit and documentation     HPI and Plan:   See dictation    Orders this Visit:  Orders Placed This Encounter   Procedures     US Renal Complete w Doppler Complete     24 Hour Blood Pressure Monitor - Adult     No orders of the defined types were placed in this encounter.    Medications Discontinued During This Encounter   Medication Reason     albuterol (PROAIR HFA/PROVENTIL HFA/VENTOLIN HFA) 108 (90 Base) MCG/ACT inhaler          Encounter Diagnosis   Name Primary?     Hypertension, unspecified type Yes       CURRENT MEDICATIONS:  Current Outpatient Medications   Medication Sig Dispense Refill     aspirin 81 MG tablet Take 1 tablet by mouth daily.       celecoxib (CELEBREX) 100 MG capsule TAKE 1 CAPSULE BY MOUTH EVERY DAY 30 capsule 1     chlorhexidine (PERIDEX) 0.12 % solution Take 15 mLs by mouth 2 times daily as needed   5     cyanocobalamin 1000 MCG SUBL Place 1,000 mcg under the tongue daily       gabapentin (NEURONTIN) 300 MG capsule Take 1 capsule (300 mg) by mouth 3 times daily 90 capsule 4     glucosamine-chondroitin 500-400 MG CAPS per capsule Take 1 capsule by mouth daily       LEVEMIR FLEXTOUCH 100 UNIT/ML pen INJECT 50 UNITS SUBCUTANEOUS AT BEDTIME 15 mL 27     lisinopril (ZESTRIL) 10 MG tablet Take 1 tablet (10 mg) by mouth 2 times daily 180 tablet 3     nebivolol (BYSTOLIC) 5 MG tablet Take 1 tablet (5 mg) by mouth daily 90 tablet 3     rosuvastatin (CRESTOR) 20 MG tablet Take 1 tablet (20 mg) by mouth daily 90 tablet 0     Vitamin D, Cholecalciferol, 1000 units TABS Take 1,000 Units by mouth daily       zolpidem (AMBIEN) 5 MG tablet TAKE 1 TAB ORALLY AS NEEDED FOR SLEEP 5 tablet 0     B-D U/F 31G X 8 MM insulin pen needle USE ONE PEN NEEDLES DAILY OR AS DIRECTED. 100 each 0     insulin pen needle (BD HEIKE U/F) 32G X 4 MM Use 1 daily or as directed. 100 each prn       ALLERGIES     Allergies    Allergen Reactions     Chlorthalidone Nausea and Vomiting     dizziness     Pcn [Penicillins] Rash     Rash with PCN only. Patient has taken amoxicillin with no rash.       PAST MEDICAL HISTORY:  Past Medical History:   Diagnosis Date     Basal cell carcinoma      Carotid stenosis, left     s/p left CEA 2/2020     Cerebral infarction (H)     left CVA 2/2020 post-op carotid endarterectomy     Coronary artery disease     4 vessel bypass November 2010; LIMA ->LAD, SVG-> OM3, SVG -> D2, SVG -> PDA     Diabetes mellitus (H) 2005    neuropathy     Generalized anxiety disorder 05/02/2014     Hepatitis C, chronic (H) 2005     Hyperlipidemia LDL goal < 70      Hypertension      Liver diseases     9/15 Liver is 10 cm in span without left lobe enlargement     Persistent microalbuminuria associated with type 2 diabetes mellitus (H) 05/06/2015       PAST SURGICAL HISTORY:  Past Surgical History:   Procedure Laterality Date     ARTHROSCOPY KNEE RT/LT      left     COLONOSCOPY       CORONARY ARTERY BYPASS  11/18/201    Coronary artery bypass grafting x 4 with placement of the left internal mammary artery to the distal midportion of the left anterior descending artery, saphenous vein graft from aorta to the third obtuse marginal branch of circumflex coronary artery, saphenous vein graft from aorta to the second diagonal branch, saphenous vein graft from aorta to the posterior descending artery.     ENDARTERECTOMY CAROTID Left 2/21/2020    Procedure: LEFT CAROTID ENDARTERECTOMY WITH EEG;  Surgeon: Semaj Stubbs MD;  Location:  OR     ESOPHAGOSCOPY, GASTROSCOPY, DUODENOSCOPY (EGD), COMBINED  10/31/2011    Procedure:COMBINED ESOPHAGOSCOPY, GASTROSCOPY, DUODENOSCOPY (EGD); Surgeon:ALONSO ALDANA; Location: GI     ESOPHAGOSCOPY, GASTROSCOPY, DUODENOSCOPY (EGD), COMBINED N/A 3/8/2018    Procedure: COMBINED ESOPHAGOSCOPY, GASTROSCOPY, DUODENOSCOPY (EGD);  EGD;  Surgeon: Angel Luis Justice MD;  Location:  GI     HEART  CATH CORONARY ANGIOGRAM W/LV GRAM  -10    CV Surgery recommended     HEART CATH CORONARY ANGIOGRAM W/LV GRAM  14    Medical management     HERNIA REPAIR, INGUINAL RT/LT      left       FAMILY HISTORY:  Family History   Problem Relation Age of Onset     C.A.D. Father      Cancer Father         bladder     Heart Surgery Father         bypass surgery     Heart Disease Mother        SOCIAL HISTORY:  Social History     Socioeconomic History     Marital status:      Spouse name: None     Number of children: None     Years of education: None     Highest education level: None   Occupational History     None   Tobacco Use     Smoking status: Former Smoker     Packs/day: 0.50     Years: 12.00     Pack years: 6.00     Types: Cigarettes     Start date:      Quit date: 2005     Years since quittin.0     Smokeless tobacco: Never Used   Substance and Sexual Activity     Alcohol use: Yes     Comment: minimal     Drug use: No     Sexual activity: Yes     Partners: Female   Other Topics Concern     Parent/sibling w/ CABG, MI or angioplasty before 65F 55M? No      Service Not Asked     Blood Transfusions Not Asked     Caffeine Concern Yes     Comment: 2 cups coffee per day     Occupational Exposure Not Asked     Hobby Hazards Not Asked     Sleep Concern Not Asked     Stress Concern Not Asked     Weight Concern Not Asked     Special Diet Yes     Comment: low carb diet, low sugar     Back Care Not Asked     Exercise Yes     Comment: treadmill 40 minutes, walk, daily, bike 30 minutes every other day     Bike Helmet Not Asked     Seat Belt Not Asked     Self-Exams Not Asked   Social History Narrative     None     Social Determinants of Health     Financial Resource Strain: Not on file   Food Insecurity: Not on file   Transportation Needs: Not on file   Physical Activity: Not on file   Stress: Not on file   Social Connections: Not on file   Intimate Partner Violence: Not on file   Housing Stability:  "Not on file       Review of Systems:  Skin:  not assessed     Eyes:  Negative glasses  ENT:  not assessed    Respiratory:  Positive for cough  Cardiovascular:  Negative for;palpitations;chest pain;edema lightheadedness;fatigue;Positive for  Gastroenterology: Negative    Genitourinary:  Negative    Musculoskeletal:  Positive for arthritis  Neurologic:  Positive for headaches;numbness or tingling of feet  Psychiatric:  Positive for sleep disturbances  Heme/Lymph/Imm:  Negative allergies  Endocrine:  Positive for diabetes    Physical Exam:  Vitals: BP (!) 160/81   Pulse 66   Ht 1.829 m (6' 0.01\")   Wt 89.4 kg (197 lb)   BMI 26.71 kg/m     Please refer to dictation for physical exam    Recent Lab Results: all reviewed today  CBC RESULTS:  Lab Results   Component Value Date    WBC 5.3 06/07/2021    RBC 5.00 06/07/2021    HGB 15.3 06/07/2021    HCT 45.1 06/07/2021    MCV 90 06/07/2021    MCH 30.6 06/07/2021    MCHC 33.9 06/07/2021    RDW 12.9 06/07/2021     (L) 06/07/2021       BMP RESULTS:  Lab Results   Component Value Date     10/27/2021     06/07/2021    POTASSIUM 4.5 10/27/2021    POTASSIUM 4.8 06/07/2021    CHLORIDE 104 10/27/2021    CHLORIDE 108 06/07/2021    CO2 27 10/27/2021    CO2 28 06/07/2021    ANIONGAP 3 10/27/2021    ANIONGAP 5 06/07/2021     (H) 10/27/2021     (H) 06/07/2021    BUN 16 10/27/2021    BUN 17 06/07/2021    CR 0.90 10/27/2021    CR 0.89 06/07/2021    GFRESTIMATED >90 10/27/2021    GFRESTIMATED >90 06/07/2021    GFRESTBLACK >90 06/07/2021    LIA 8.9 10/27/2021    LIA 9.6 06/07/2021        INR RESULTS:  Lab Results   Component Value Date    INR 1.11 06/07/2021    INR 1.13 12/15/2020           CC  No referring provider defined for this encounter.      "

## 2022-02-14 NOTE — LETTER
2/14/2022       RE: Vikash Burgos  [address]  [place] [address]     Dear Colleague,    Thank you for referring your patient, Vikash Burgos, to the [facility] at [facility]. Please see a copy of my visit note below.    [id number]  50 minutes spent on the date of the encounter doing chart review, review of test results, patient visit and documentation     HPI and Plan:   See dictation    Orders this Visit:  Orders Placed This Encounter   Procedures     US Renal Complete w Doppler Complete     24 Hour Blood Pressure Monitor - Adult     No orders of the defined types were placed in this encounter.    Medications Discontinued During This Encounter   Medication Reason     albuterol (PROAIR HFA/PROVENTIL HFA/VENTOLIN HFA) 108 (90 Base) MCG/ACT inhaler          Encounter Diagnosis   Name Primary?     Hypertension, unspecified type Yes       CURRENT MEDICATIONS:  Current Outpatient Medications   Medication Sig Dispense Refill     aspirin 81 MG tablet Take 1 tablet by mouth daily.       celecoxib (CELEBREX) 100 MG capsule TAKE 1 CAPSULE BY MOUTH EVERY DAY 30 capsule 1     chlorhexidine (PERIDEX) 0.12 % solution Take 15 mLs by mouth 2 times daily as needed   5     cyanocobalamin 1000 MCG SUBL Place 1,000 mcg under the tongue daily       gabapentin (NEURONTIN) 300 MG capsule Take 1 capsule (300 mg) by mouth 3 times daily 90 capsule 4     glucosamine-chondroitin 500-400 MG CAPS per capsule Take 1 capsule by mouth daily       LEVEMIR FLEXTOUCH 100 UNIT/ML pen INJECT 50 UNITS SUBCUTANEOUS AT BEDTIME 15 mL 27     lisinopril (ZESTRIL) 10 MG tablet Take 1 tablet (10 mg) by mouth 2 times daily 180 tablet 3     nebivolol (BYSTOLIC) 5 MG tablet Take 1 tablet (5 mg) by mouth daily 90 tablet 3     rosuvastatin (CRESTOR) 20 MG tablet Take 1 tablet (20 mg) by mouth daily 90 tablet 0     Vitamin D, Cholecalciferol, 1000 units TABS Take 1,000  Units by mouth daily       zolpidem (AMBIEN) 5 MG tablet TAKE 1 TAB ORALLY AS NEEDED FOR SLEEP 5 tablet 0     B-D U/F 31G X 8 MM insulin pen needle USE ONE PEN NEEDLES DAILY OR AS DIRECTED. 100 each 0     insulin pen needle (BD HEIKE U/F) 32G X 4 MM Use 1 daily or as directed. 100 each prn       ALLERGIES     Allergies   Allergen Reactions     Chlorthalidone Nausea and Vomiting     dizziness     Pcn [Penicillins] Rash     Rash with PCN only. Patient has taken amoxicillin with no rash.       PAST MEDICAL HISTORY:  Past Medical History:   Diagnosis Date     Basal cell carcinoma      Carotid stenosis, left     s/p left CEA 2/2020     Cerebral infarction (H)     left CVA 2/2020 post-op carotid endarterectomy     Coronary artery disease     4 vessel bypass November 2010; LIMA ->LAD, SVG-> OM3, SVG -> D2, SVG -> PDA     Diabetes mellitus (H) 2005    neuropathy     Generalized anxiety disorder 05/02/2014     Hepatitis C, chronic (H) 2005     Hyperlipidemia LDL goal < 70      Hypertension      Liver diseases     9/15 Liver is 10 cm in span without left lobe enlargement     Persistent microalbuminuria associated with type 2 diabetes mellitus (H) 05/06/2015       PAST SURGICAL HISTORY:  Past Surgical History:   Procedure Laterality Date     ARTHROSCOPY KNEE RT/LT      left     COLONOSCOPY       CORONARY ARTERY BYPASS  11/18/201    Coronary artery bypass grafting x 4 with placement of the left internal mammary artery to the distal midportion of the left anterior descending artery, saphenous vein graft from aorta to the third obtuse marginal branch of circumflex coronary artery, saphenous vein graft from aorta to the second diagonal branch, saphenous vein graft from aorta to the posterior descending artery.     ENDARTERECTOMY CAROTID Left 2/21/2020    Procedure: LEFT CAROTID ENDARTERECTOMY WITH EEG;  Surgeon: Semaj Stubbs MD;  Location:  OR     ESOPHAGOSCOPY, GASTROSCOPY, DUODENOSCOPY (EGD), COMBINED  10/31/2011     Procedure:COMBINED ESOPHAGOSCOPY, GASTROSCOPY, DUODENOSCOPY (EGD); Surgeon:ALONSO ALDANA; Location:[facility]     ESOPHAGOSCOPY, GASTROSCOPY, DUODENOSCOPY (EGD), COMBINED N/A 3/8/2018    Procedure: COMBINED ESOPHAGOSCOPY, GASTROSCOPY, DUODENOSCOPY (EGD);  EGD;  Surgeon: Angel Luis Justice MD;  Location: [facility]     HEART CATH CORONARY ANGIOGRAM W/LV GRAM  9--10    CV Surgery recommended     HEART CATH CORONARY ANGIOGRAM W/LV GRAM  -14    Medical management     HERNIA REPAIR, INGUINAL RT/LT      left       FAMILY HISTORY:  Family History   Problem Relation Age of Onset     C.A.D. Father      Cancer Father         bladder     Heart Surgery Father         bypass surgery     Heart Disease Mother        SOCIAL HISTORY:  Social History     Socioeconomic History     Marital status:      Spouse name: None     Number of children: None     Years of education: None     Highest education level: None   Occupational History     None   Tobacco Use     Smoking status: Former Smoker     Packs/day: 0.50     Years: 12.00     Pack years: 6.00     Types: Cigarettes     Start date:      Quit date: 2005     Years since quittin.0     Smokeless tobacco: Never Used   Substance and Sexual Activity     Alcohol use: Yes     Comment: minimal     Drug use: No     Sexual activity: Yes     Partners: Female   Other Topics Concern     Parent/sibling w/ CABG, MI or angioplasty before 65F 55M? No      Service Not Asked     Blood Transfusions Not Asked     Caffeine Concern Yes     Comment: 2 cups coffee per day     Occupational Exposure Not Asked     Hobby Hazards Not Asked     Sleep Concern Not Asked     Stress Concern Not Asked     Weight Concern Not Asked     Special Diet Yes     Comment: low carb diet, low sugar     Back Care Not Asked     Exercise Yes     Comment: treadmill 40 minutes, walk, daily, bike 30 minutes every other day     Bike Helmet Not Asked     Seat Belt Not Asked     Self-Exams Not Asked   Social  "History Narrative     None     Social Determinants of Health     Financial Resource Strain: Not on file   Food Insecurity: Not on file   Transportation Needs: Not on file   Physical Activity: Not on file   Stress: Not on file   Social Connections: Not on file   Intimate Partner Violence: Not on file   Housing Stability: Not on file       Review of Systems:  Skin:  not assessed     Eyes:  Negative glasses  ENT:  not assessed    Respiratory:  Positive for cough  Cardiovascular:  Negative for;palpitations;chest pain;edema lightheadedness;fatigue;Positive for  Gastroenterology: Negative    Genitourinary:  Negative    Musculoskeletal:  Positive for arthritis  Neurologic:  Positive for headaches;numbness or tingling of feet  Psychiatric:  Positive for sleep disturbances  Heme/Lymph/Imm:  Negative allergies  Endocrine:  Positive for diabetes    Physical Exam:  Vitals: BP (!) 160/81   Pulse 66   Ht 1.829 m (6' 0.01\")   Wt 89.4 kg (197 lb)   BMI 26.71 kg/m     Please refer to dictation for physical exam    Recent Lab Results: all reviewed today  CBC RESULTS:  Lab Results   Component Value Date    WBC 5.3 06/07/2021    RBC 5.00 06/07/2021    HGB 15.3 06/07/2021    HCT 45.1 06/07/2021    MCV 90 06/07/2021    MCH 30.6 06/07/2021    MCHC 33.9 06/07/2021    RDW 12.9 06/07/2021     (L) 06/07/2021       BMP RESULTS:  Lab Results   Component Value Date     10/27/2021     06/07/2021    POTASSIUM 4.5 10/27/2021    POTASSIUM 4.8 06/07/2021    CHLORIDE 104 10/27/2021    CHLORIDE 108 06/07/2021    CO2 27 10/27/2021    CO2 28 06/07/2021    ANIONGAP 3 10/27/2021    ANIONGAP 5 06/07/2021     (H) 10/27/2021     (H) 06/07/2021    BUN 16 10/27/2021    BUN 17 06/07/2021    CR 0.90 10/27/2021    CR 0.89 06/07/2021    GFRESTIMATED >90 10/27/2021    GFRESTIMATED >90 06/07/2021    GFRESTBLACK >90 06/07/2021    LIA 8.9 10/27/2021    LIA 9.6 06/07/2021        INR RESULTS:  Lab Results   Component Value Date    INR " 1.11 2021    INR 1.13 12/15/2020           CC  No referring provider defined for this encounter.        Service Date: 2022    PRIMARY CARDIOLOGIST:  Zeb Roberts MD     REASON FOR VISIT:  SURPASS trial, hypertension.    HISTORY OF PRESENT ILLNESS:  Mr. Burgos is an absolutely delightful 62-year-old gentleman with past medical history significant for the followin.  Coronary artery disease with history of 4-vessel CAB in  by Dr. Pruett  with LIMA to the LAD, SVG to the OM3, SVG to the D2, SVG to the PDA.  Last angiogram was  and showed a patent LIMA patent graft to the OM with a 60% lesion distal to the OM patent LIMA and RPDA.  2.  Low normal EF around 50%.  3.  Peripheral arterial disease with history of carotid endarterectomy in 2020 complicated by CVA and facial nerve trauma, leaving some facial numbness.  4.  Hypertension.  5.  Type 2 diabetes, well controlled.  6.  Hyperlipidemia.  7.  History of hepatitis C.  I believe back in about  when the patient presented with a stage IV, liver failure, treated with Pegasys and ribavirin with excellent results and cured, but with ongoing cirrhosis.    He comes in today for enrollment in the SURPASS trial.  He tells me that overall he has been doing well.  He denies chest pain, shortness of breath, orthopnea, or PND.  He bikes about 6000 miles a year, primarily as his left leg has pain where they did his vein harvesting from his CABG.  If he does not exercise, he has significant pain at night.  He denies orthopnea or PND.  Unfortunately, home blood pressures are high.  This morning when he took his blood pressure when he woke up at 2 a.m., it was 198/100.  This seems variable.  He also notes that his blood pressure seemed to go up when he did a trial of Celebrex for the arthritis and never came back down.  Overall, he feels well, but he is concerned his blood pressure is not as well controlled or his diabetes is not as well  controlled as it could be.    SOCIAL HISTORY:  He is .  His wife's name is Carmina.  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  He traveled frequently when he was working.  He is a former smoker, about a 6-pack-year history, quit in 1993.  Minimal alcohol use.    PHYSICAL EXAMINATION:    GENERAL:  Well-developed, well-nourished gentleman in no acute distress.  HEENT:  Normocephalic, atraumatic.  HEART:  Regular in rate and rhythm.  I do not appreciate murmur, rub or gallop.  LUNGS:  Clear, without wheezes, rales, or rhonchi.  ABDOMEN:  Positive bowel sounds, soft, nontender.  I do not appreciate hepatomegaly.  EXTREMITIES:  Without peripheral edema.    LYMPHATICS:  I do not appreciate any lymphadenopathy in his head with the head or neck.    ASSESSMENT AND PLAN:    1.  Hypertension, ongoing in markedly elevated.  It appears that earlier, about 3 months ago, this was better controlled at home, but now seems to be high again.  He certainly has some degree of white coat hypertension and to further delineate this, we are going to get 24-hour blood pressure monitor to see where we are and target when his blood pressures are higher.  At this point, we discussed using nebivolol before bed.  He has problems sleeping anyway and maybe this will make him tired and helps him sleep more than the 4 hours he already sleeps.  He otherwise does not drink much alcohol and exercises regularly.  He is maybe 5-10 pounds overweight, so that is not contributing.  Given his severe vascular disease with both carotid disease and coronary artery disease, we are going to do a renal artery ultrasound to see if there may be a lesion there that could be contributing.  That may be intervened upon to lower blood pressure.  Once we get these results back, we will further adjust medications.  2.  Hyperlipidemia, excellent control.  LDL 44, HDL 37, total  cholesterol 104.  We will continue rosuvastatin.  3.  Coronary artery disease without recurrent anginal symptoms.  Excellent lifestyle modification.  4.  Carotid artery disease, followed by Vascular.  5.  Type 2 diabetes.  He is going to enroll SURPASS trial.  If his A1c is too low to be in that trial, we certainly should start him on an SGLT2 given he has diabetes to reduce his risk of future heart attack or heart failure.    We will see him back in about 3 weeks to go over all of these tests and further adjust medicine sooner with concerns.    Thank you for allowing me to participate in this absolutely delightful patient's care.    Michaelle Caba PA-C        D: 2022   T: 2022   MT: LATOSHA    Name:     IHSAN SANCHEZSulma  MRN:      [id number]        Account:      [id number]   :      1960           Service Date: 2022     Document: [id number]

## 2022-02-14 NOTE — PATIENT INSTRUCTIONS
Thank you for visiting with me today.    We discussed: we'll get blood pressure controlled.    We'll make sure your renal arteries are not causing your high blood pressure.    We'll also do a 24 hour blood pressure monitor.      Medication changes:  Move your nebivolol to bedtime.      Follow up: I'll see you back in 3-4 weeks.        Please call my nurse, Tara, at [phone] with any questions or concerns.    Scheduling phone number: [phone]  Reminder: Please bring in all current medications, over the counter supplements and vitamin bottles to your next appointment.

## 2022-02-14 NOTE — PROGRESS NOTES
Service Date: 2022    PRIMARY CARDIOLOGIST:  Zeb Roberts MD     REASON FOR VISIT:  SURPASS trial, hypertension.    HISTORY OF PRESENT ILLNESS:  Mr. Burgos is an absolutely delightful 62-year-old gentleman with past medical history significant for the followin.  Coronary artery disease with history of 4-vessel CAB in  by Dr. Pruett  with LIMA to the LAD, SVG to the OM3, SVG to the D2, SVG to the PDA.  Last angiogram was  and showed a patent LIMA patent graft to the OM with a 60% lesion distal to the OM patent LIMA and RPDA.  2.  Low normal EF around 50%.  3.  Peripheral arterial disease with history of carotid endarterectomy in 2020 complicated by CVA and facial nerve trauma, leaving some facial numbness.  4.  Hypertension.  5.  Type 2 diabetes, well controlled.  6.  Hyperlipidemia.  7.  History of hepatitis C.  I believe back in about  when the patient presented with a stage IV, liver failure, treated with Pegasys and ribavirin with excellent results and cured, but with ongoing cirrhosis.    He comes in today for enrollment in the SURPASS trial.  He tells me that overall he has been doing well.  He denies chest pain, shortness of breath, orthopnea, or PND.  He bikes about 6000 miles a year, primarily as his left leg has pain where they did his vein harvesting from his CABG.  If he does not exercise, he has significant pain at night.  He denies orthopnea or PND.  Unfortunately, home blood pressures are high.  This morning when he took his blood pressure when he woke up at 2 a.m., it was 198/100.  This seems variable.  He also notes that his blood pressure seemed to go up when he did a trial of Celebrex for the arthritis and never came back down.  Overall, he feels well, but he is concerned his blood pressure is not as well controlled or his diabetes is not as well controlled as it could be.    SOCIAL HISTORY:  He is .  His wife's name is Carmina.  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  He traveled frequently when he was working.  He is a former smoker, about a 6-pack-year history, quit in 1993.  Minimal alcohol use.    PHYSICAL EXAMINATION:    GENERAL:  Well-developed, well-nourished gentleman in no acute distress.  HEENT:  Normocephalic, atraumatic.  HEART:  Regular in rate and rhythm.  I do not appreciate murmur, rub or gallop.  LUNGS:  Clear, without wheezes, rales, or rhonchi.  ABDOMEN:  Positive bowel sounds, soft, nontender.  I do not appreciate hepatomegaly.  EXTREMITIES:  Without peripheral edema.    LYMPHATICS:  I do not appreciate any lymphadenopathy in his head with the head or neck.    ASSESSMENT AND PLAN:    1.  Hypertension, ongoing in markedly elevated.  It appears that earlier, about 3 months ago, this was better controlled at home, but now seems to be high again.  He certainly has some degree of white coat hypertension and to further delineate this, we are going to get 24-hour blood pressure monitor to see where we are and target when his blood pressures are higher.  At this point, we discussed using nebivolol before bed.  He has problems sleeping anyway and maybe this will make him tired and helps him sleep more than the 4 hours he already sleeps.  He otherwise does not drink much alcohol and exercises regularly.  He is maybe 5-10 pounds overweight, so that is not contributing.  Given his severe vascular disease with both carotid disease and coronary artery disease, we are going to do a renal artery ultrasound to see if there may be a lesion there that could be contributing.  That may be intervened upon to lower blood pressure.  Once we get these results back, we will further adjust medications.  2.  Hyperlipidemia, excellent control.  LDL 44, HDL 37, total cholesterol 104.  We will continue rosuvastatin.  3.  Coronary artery disease without recurrent anginal symptoms.   Excellent lifestyle modification.  4.  Carotid artery disease, followed by Vascular.  5.  Type 2 diabetes.  He is going to enroll SURPASS trial.  If his A1c is too low to be in that trial, we certainly should start him on an SGLT2 given he has diabetes to reduce his risk of future heart attack or heart failure.    We will see him back in about 3 weeks to go over all of these tests and further adjust medicine sooner with concerns.    Thank you for allowing me to participate in this absolutely delightful patient's care.    Michaelle Caba PA-C        D: 2022   T: 2022   MT: LATOSHA    Name:     IHSAN SANCHEZ  MRN:      [id number]        Account:      [id number]   :      1960           Service Date: 2022       Document: [id number]

## 2022-02-15 ENCOUNTER — RESEARCH ENCOUNTER (OUTPATIENT)
Dept: CARDIOLOGY | Facility: CLINIC | Age: 62
End: 2022-02-15

## 2022-02-15 DIAGNOSIS — E11.9 DIABETES MELLITUS (H): ICD-10-CM

## 2022-02-15 DIAGNOSIS — Z00.6 EXAMINATION OF PARTICIPANT OR CONTROL IN CLINICAL RESEARCH: ICD-10-CM

## 2022-02-15 DIAGNOSIS — I25.10 CORONARY ARTERY DISEASE INVOLVING NATIVE CORONARY ARTERY OF NATIVE HEART WITHOUT ANGINA PECTORIS: Primary | ICD-10-CM

## 2022-02-15 NOTE — PROGRESS NOTES
[facility]OT INCLUSION/EXCLUSION CRITERIA     Yes  No Criteria #   Inclusion Criteria: All must be answered  yes  to be eligible for study      [x] Yes  [] No 1 Men or women at least 40 years old with a diagnosis of T2DM [per WHO 2019]      [x] Yes [] No 2 Established CVD, including at least one of the CVD criteria noted below [ a-c ] see CVD Criteria     [x]  A. Coronary artery disease (CAD) with EITHER of the following:  Documented history of spontaneous MI  ?50% stenosis in 1 or more major coronary arteries, determined by invasive angiography  ?50% stenosis in 2 or more major coronary arteries, determined by computed   tomography coronary angiography (CTCA), or  History of surgical or percutaneous coronary revascularization procedure     [x]  b. Cerebrovascular disease - ANY of the following:  Documented history of ischemic stroke  Carotid arterial disease with ?50% stenosis, documented by carotid ultrasound, magnetic resonance imaging (MRI), or angiography  Carotid stenting or surgical revascularization     []  c. Peripheral arterial disease with EITHER of the following:  Intermittent claudication and ankle-brachial index <0.9  Prior nontraumatic amputation or peripheral vascular procedure (eg, stenting or surgical revascularization), due to peripheral arterial ischemia    [x] Yes  [] No 3 HbA1c >7% [ >53 mmol/mol ] and <10.5% [ <91.3 mmol/mol ] based on central laboratory assessment at screening.    [x] Yes  [] No 4 Body mass index [BMI] >25 kg/m2    [x] Yes [] No 5   At the time of signing the informed consent: Contraceptive use by men or women should be consistent with local regulations regarding the methods of contraception for those participating in clinical trials.    (Refer to protocol for full details)              [x] Yes [] No 6   In the Investigator's opinion, are well motivated , capable and willing to   Learn how to self-inject treatment [tirzepatide or dulaglutide] as required for this protocol.  Visually impaired persons who are not able to perform the injections must have the assistance of a sighted indicidual trained to inject the study drug. Persons with physical limitations who are not able to perform the injections must have the assistance of an individual trained to inject the study drug.  Inject study drug every week.  Have a sufficient understanding of one of the provided languages of the country such that they will be able to complete the patient questionnaires  Make themselves available for the duration of the study and who will comply with the required study visits.     [x] Yes [] No 7   Capable of giving signed written informed consent as described in Appendix 3, which includes compliance with the requirements and restrictions listed in the informed consent form [ICF] and in this protocol.     Yes No Criteria #    Exclusion Criteria: All must be answered  no  to be eligible for study     [] Yes [x] No 8 Have type 1 diabetes mellitus     [] Yes  [x] No 9   Have uncontrolled diabetes requiring immediate therapy [such as diabetic ketoacidosis] at screening or randomization, in the judgment of the physician.       [] Yes  [x] No 10   Have had 1 or more episodes of severe hypoglycemia and/or 1 or more events of hypoglycemia unawareness within 6 months prior to screening.       [] Yes  [x] No 11   Have a history of proliferative retinopathy or macular edema. A dilated fundoscopic exam, evaluated by and eye care professional [ophthalmologist or optometrist] is required to confirm eligibility. In addition, non proliferative diabetic retinopathy that requires acute treatment according to the opinion of the investigator is also excluded.       [] Yes  [x] No 12 Have been hospitalized for CHF within 2 months prior to screening.     [] Yes  [x] No 13 Have chronic [place] Heart Association Functional Classification IV CHF     [] Yes [x] No 14 Are currently planning a coronary, carotid or peripheral artery  revascularization.     [] Yes  [x] No 15   Had chronic or acute pancreatitis any time prior to screening, irrespective of etiology       [] Yes  [x] No 16   Have a known clinically significant gastric emptying abnormality [eg, severe diabetic gastroparesis or gastric outlet obstruction], have undergone  or currently planning any gastric bypass [bariatric] surgery or restrictive bariatric surgery [eg, Lap-BandR vertical sleeve gastrectomy].       [] Yes  [x] No 17   Have acute or chronic hepatiits, signs or symptoms of any other liver disease, or an alanine aminotransaminase [ALT] level >3.0 x the upper limit of normal [ULN] for the reference range as determined by the central laboratory.    Note: Patients with nonalcoholic fatty liver disease are eligible to particpate if their ALT level is <3 x the ULN for the reference range      [] Yes [x] No 18 Have known chronic severe renal failure [defined as a known eGFR <15 mL/minute/1.73m2] or are on chronic dialysis    [] Yes  [x] No 19   Have evidence of a significant, uncontrolled endocrine abnormality [eg,  thyrotoxicosis or adrenal crises]      [] Yes  [x] No 20   Have a family or personal history of mutliple endocrine neoplasia type 2 [MEN2] or familial medullary thyroid carcinoma [MTC] or personal history of non-familial MTC      [] Yes  [x] No 21   Have a serum calcitonin level at screening [based on central laboratory results] of:  >20 ng/L at Visit 1 if eGFR  >60 mL/min/1.73m2  >35 ng/L at Visit 1 if eGFR  <60 mL/min/1.73m2      [] Yes  [x] No 22   Have a history of an active or untreated malignancy or are in remission from a clinically significant malignancy for less than 5 years. An exception for this criterion is  basal or squamous cell skin cancer, in situ carcinomas of the cervix, or in situ prostate cancer      [] Yes  [x] No 23   Have a history of any other condition [such as known drug or alcohol abuse or psychiatric disorder] that, in the opinion of the  investigator, may preclude the pateint from following and completing the protocol.      [] Yes  [x] No 24   Have had a transplanted organ [corneal transplants (keratoplasty) allowed] or awaiting an organ transplant.     [] Yes  [x] No 25   Have any other conditions [eg, hypersensitivity] that is a contraindication to any incretin or GLP-1 Annika     [] Yes  [x] No 26   Have had an MI, percutaneous coronary revascularization procedure, ischemic stroke, carotid stenting or surgical revascularization, non traumatic amputation or peripheral vascular procedure [eg, stenting or surgical revascularization] less than 60 days prior to screening.     [] Yes  [x] No 27   Have had coronary artery bypass graft surgery less than 5 years prior to screening     [] Yes  [x] No 28   Treatment with any incretin, GLP-1 RA or pramlintide in a period of 3 months prior to Visit 1     [] Yes [x] No 29   Discontinuation of any incretin, GLP-1 RA or pramlintide due to intolerability any time prior to Visit 1     [] Yes [x] No 31   Are currently enrolled in any other clinical study involving an investigational product or any other type of medical research judged not to be scientifically or medically compatible with this study.     [] Yes [x] No 32   Have participated within the last 30 days in a clinical trial involving an investigational product. If the previous investigational product has a long half-life, 3 months or 5 half lives [whichever is longer] should have passed     [] Yes  [x] No 33   Have previously completed or withdrawn from this study or randomized into any other study investigating tirzepatide     [] Yes [x] No 34   Are investigator site personnel directly affiliated with this study and/or their immediate families. Immediate family is defined as a spouse, parent, child or sibling whether biological or legally adopted     [] Yes [x] No 35 Are [employer] employees   [] Yes [x] No 36 Are women who are pregnant or breastfeeding   [] Yes  [x] No 37 Have had a blood transfusion or severe blood loss within 90 days prior to screening or have known hematological conditions that may interfere with HbA1c measurement     [x] I have personally reviewed all of these criteria and agree that Vikash Sharif Ruelcarolinemackenzie is able to be randomized in the [facility] CVOT clinical trial.    Mis Husain RN  Clinical Trials Nurse

## 2022-02-17 ENCOUNTER — TRANSFERRED RECORDS (OUTPATIENT)
Dept: CARDIOLOGY | Facility: CLINIC | Age: 62
End: 2022-02-17

## 2022-02-17 ENCOUNTER — OFFICE VISIT (OUTPATIENT)
Dept: CARDIOLOGY | Facility: CLINIC | Age: 62
End: 2022-02-17
Payer: COMMERCIAL

## 2022-02-17 VITALS
HEART RATE: 59 BPM | SYSTOLIC BLOOD PRESSURE: 153 MMHG | BODY MASS INDEX: 26.71 KG/M2 | OXYGEN SATURATION: 98 % | HEIGHT: 72 IN | DIASTOLIC BLOOD PRESSURE: 83 MMHG

## 2022-02-17 DIAGNOSIS — Z00.6 EXAMINATION OF PARTICIPANT OR CONTROL IN CLINICAL RESEARCH: ICD-10-CM

## 2022-02-17 DIAGNOSIS — I25.10 CORONARY ARTERY DISEASE INVOLVING NATIVE CORONARY ARTERY OF NATIVE HEART WITHOUT ANGINA PECTORIS: Primary | ICD-10-CM

## 2022-02-17 DIAGNOSIS — E11.9 DIABETES MELLITUS (H): ICD-10-CM

## 2022-02-17 PROCEDURE — 99207 PR NO CHARGE-RESEARCH SERVICE: CPT | Performed by: INTERNAL MEDICINE

## 2022-02-17 PROCEDURE — 93000 ELECTROCARDIOGRAM COMPLETE: CPT | Performed by: PHYSICIAN ASSISTANT

## 2022-02-17 PROCEDURE — 99207 PR NO CHARGE-RESEARCH SERVICE: CPT

## 2022-02-17 NOTE — PROGRESS NOTES
"SURPASS-CVOT Study [Effect of tirzepatide versus Dulaglutide on Major Cardiovascular Events in Patients with Type 2 Diabetes]    Subject seen for randomization visit    Inclusion/exclusion criteria reviewed and subject remains eligible to participate.    Patient reported outcomes testing [APPADL, EQ-5D-5L, and IW-SP] was completed prior to other evaluations      Vitals:    02/17/22 0816 02/17/22 0825   BP: (!) 140/82 (!) 153/83   BP Location: Left arm Left arm   Patient Position: Chair Chair   Cuff Size: Adult Regular Adult Regular   Pulse: 60 59   SpO2: 98%    Height: 1.829 m (6' 0.01\")      Waist circumference 102.5 cm and 102.5 cm   [measured immediately above iliac crest while subject is standing]    BP measured after 5 minutes resting in a seated position - two readings taken one minute apart using automated cuff.    Sujbect queried for adverse events, endpoints and medication changes. No changes since last visit.     Local EKG done.    Patient was referred for 24 hr blood pressure monitoring and renal artery US due to continued elevated BP's. Patient asked if that was covered by insurance and I explained that it is not as these are dx previous to his entering the study.    Fasting labs drawn and sent to central lab.    Adherence/lifestyle reinforcement done.    Subject randomized via IVRS.     Subject received training in self-injection and performed self injection with demonstration pen without problems.     Injection not done in clinic as patient is heading an all day meeting at the [place]. He plans to take when he gets home and will call me to let me know how he is doing.     All study supplies dispensed, including subject ID card. Discussed possible side effects and to monitor for hypoglycemia.     Will see him again in 2 weeks.    Mis Husain RN        Current Outpatient Medications:      aspirin 81 MG tablet, Take 1 tablet by mouth daily., Disp: , Rfl:      B-D U/F 31G X 8 MM insulin pen needle, " USE ONE PEN NEEDLES DAILY OR AS DIRECTED., Disp: 100 each, Rfl: 0     celecoxib (CELEBREX) 100 MG capsule, TAKE 1 CAPSULE BY MOUTH EVERY DAY, Disp: 30 capsule, Rfl: 1     chlorhexidine (PERIDEX) 0.12 % solution, Take 15 mLs by mouth 2 times daily as needed , Disp: , Rfl: 5     cyanocobalamin 1000 MCG SUBL, Place 1,000 mcg under the tongue daily, Disp: , Rfl:      gabapentin (NEURONTIN) 300 MG capsule, Take 1 capsule (300 mg) by mouth 3 times daily, Disp: 90 capsule, Rfl: 4     glucosamine-chondroitin 500-400 MG CAPS per capsule, Take 1 capsule by mouth daily, Disp: , Rfl:      insulin pen needle (BD HEIKE U/F) 32G X 4 MM, Use 1 daily or as directed., Disp: 100 each, Rfl: prn     LEVEMIR FLEXTOUCH 100 UNIT/ML pen, INJECT 50 UNITS SUBCUTANEOUS AT BEDTIME, Disp: 15 mL, Rfl: 27     lisinopril (ZESTRIL) 10 MG tablet, Take 1 tablet (10 mg) by mouth 2 times daily, Disp: 180 tablet, Rfl: 3     nebivolol (BYSTOLIC) 5 MG tablet, Take 1 tablet (5 mg) by mouth daily, Disp: 90 tablet, Rfl: 3     rosuvastatin (CRESTOR) 20 MG tablet, Take 1 tablet (20 mg) by mouth daily, Disp: 90 tablet, Rfl: 0     Vitamin D, Cholecalciferol, 1000 units TABS, Take 1,000 Units by mouth daily, Disp: , Rfl:      zolpidem (AMBIEN) 5 MG tablet, TAKE 1 TAB ORALLY AS NEEDED FOR SLEEP, Disp: 5 tablet, Rfl: 0    Current Facility-Administered Medications:      study - tirzepatide 2.5-15 mg or dulaglutide 1.5 mg ([facility]) (IDS# [id number]) injection 1.5-15 mg, 1.5-15 mg, Subcutaneous, Q7 Days, Mando Elias MD

## 2022-02-28 ENCOUNTER — HOSPITAL ENCOUNTER (OUTPATIENT)
Dept: CARDIOLOGY | Facility: CLINIC | Age: 62
Discharge: HOME | End: 2022-02-28
Attending: PHYSICIAN ASSISTANT | Admitting: PHYSICIAN ASSISTANT
Payer: COMMERCIAL

## 2022-02-28 DIAGNOSIS — I10 HYPERTENSION, UNSPECIFIED TYPE: ICD-10-CM

## 2022-02-28 PROCEDURE — 93786 AMBL BP MNTR W/SW REC ONLY: CPT

## 2022-02-28 PROCEDURE — 93790 AMBL BP MNTR W/SW I&R: CPT | Performed by: INTERNAL MEDICINE

## 2022-03-01 ENCOUNTER — HOSPITAL ENCOUNTER (OUTPATIENT)
Dept: ULTRASOUND IMAGING | Facility: CLINIC | Age: 62
Discharge: HOME | End: 2022-03-01
Attending: PHYSICIAN ASSISTANT | Admitting: PHYSICIAN ASSISTANT
Payer: COMMERCIAL

## 2022-03-01 DIAGNOSIS — I10 HYPERTENSION, UNSPECIFIED TYPE: ICD-10-CM

## 2022-03-01 DIAGNOSIS — I10 BENIGN ESSENTIAL HYPERTENSION: Primary | ICD-10-CM

## 2022-03-01 PROCEDURE — 76770 US EXAM ABDO BACK WALL COMP: CPT | Mod: XS

## 2022-03-02 DIAGNOSIS — I10 BENIGN ESSENTIAL HYPERTENSION: Primary | ICD-10-CM

## 2022-03-03 ENCOUNTER — VIRTUAL VISIT (OUTPATIENT)
Dept: CARDIOLOGY | Facility: CLINIC | Age: 62
End: 2022-03-03
Payer: COMMERCIAL

## 2022-03-03 DIAGNOSIS — I25.10 CORONARY ARTERY DISEASE INVOLVING NATIVE CORONARY ARTERY OF NATIVE HEART WITHOUT ANGINA PECTORIS: ICD-10-CM

## 2022-03-03 DIAGNOSIS — I10 HYPERTENSION, UNSPECIFIED TYPE: ICD-10-CM

## 2022-03-03 DIAGNOSIS — I10 BENIGN ESSENTIAL HYPERTENSION: Primary | ICD-10-CM

## 2022-03-03 PROCEDURE — 99207 PR NO CHARGE-RESEARCH SERVICE: CPT

## 2022-03-03 NOTE — PROGRESS NOTES
SURPASS-CVOT Study [Effect of tirzepatide versus Dulaglutide on Major Cardiovascular Events in Patients with Type 2Diabetes]    Subject contacted by telephone to evaluate/ensure compliance. Reports he is taking his 3rd shot tonight and hasn't had any side effects.     Subject queried for adverse events, endpoints and medication changes.     Patient had a [facility] Renal US on 3/1/22 due to ongoing HTN. He was notified that he has renal stenosis and is scheduled for a CT on 3/8/22.     Queried regarding compliance and re-educated if necessary. No IP compliance issues.     SURPASS-CVOT Study [Effect of tirzepatide versus Dulaglutide on Major Cardiovascular Events in Patients with Type 2 Diabetes]    Diagnosis/event description (diagnosis preferred): Bilateral Renal Stenosis  Start date: 3/1/2022  Date resolved:   Intensity: ([] mild /[x]  moderate / [] severe)   Study Medication adjustment:  [] Yes   [x] No  Outcome:not recovered   Date study team aware of event: 3/3/2022  Was treatment given for this event:  [] Yes   [x] No, not at this time  Serious adverse event?    Yes   [x] No  Special interest event?  [] Yes   [x] No  Endpoint? [] Yes   [x] No   Fatal event?  [] Yes   [x] No      Dr. Elias: Reasonable possibility that AE is related to study treatment    [] Yes   [x] No      Will see him in clinic in 2 weeks.    Mis Husain RN

## 2022-03-06 DIAGNOSIS — E11.49 DM TYPE 2 CAUSING NEUROLOGICAL DISEASE (H): ICD-10-CM

## 2022-03-07 RX ORDER — PEN NEEDLE, DIABETIC 31 GX5/16"
NEEDLE, DISPOSABLE MISCELLANEOUS
Qty: 100 EACH | Refills: 3 | Status: SHIPPED | OUTPATIENT
Start: 2022-03-07

## 2022-03-07 NOTE — TELEPHONE ENCOUNTER
Prescription approved per [facility] Refill Protocol.    Elizabeth OLIVAS RN  EP Triage

## 2022-03-08 ENCOUNTER — HOSPITAL ENCOUNTER (OUTPATIENT)
Dept: CT IMAGING | Facility: CLINIC | Age: 62
Discharge: HOME | End: 2022-03-08
Attending: PHYSICIAN ASSISTANT | Admitting: PHYSICIAN ASSISTANT
Payer: COMMERCIAL

## 2022-03-08 DIAGNOSIS — I10 BENIGN ESSENTIAL HYPERTENSION: ICD-10-CM

## 2022-03-08 LAB
CREAT BLD-MCNC: 1 MG/DL (ref 0.7–1.3)
GFR SERPL CREATININE-BSD FRML MDRD: >60 ML/MIN/1.73M2

## 2022-03-08 PROCEDURE — 74175 CTA ABDOMEN W/CONTRAST: CPT

## 2022-03-08 PROCEDURE — 250N000009 HC RX 250: Performed by: PHYSICIAN ASSISTANT

## 2022-03-08 PROCEDURE — 250N000011 HC RX IP 250 OP 636: Performed by: PHYSICIAN ASSISTANT

## 2022-03-08 PROCEDURE — 82565 ASSAY OF CREATININE: CPT

## 2022-03-08 RX ORDER — IOPAMIDOL 755 MG/ML
80 INJECTION, SOLUTION INTRAVASCULAR ONCE
Status: COMPLETED | OUTPATIENT
Start: 2022-03-08 | End: 2022-03-08

## 2022-03-08 RX ADMIN — IOPAMIDOL 80 ML: 755 INJECTION, SOLUTION INTRAVENOUS at 08:01

## 2022-03-08 RX ADMIN — SODIUM CHLORIDE 80 ML: 9 INJECTION, SOLUTION INTRAVENOUS at 08:01

## 2022-03-14 ENCOUNTER — OFFICE VISIT (OUTPATIENT)
Dept: CARDIOLOGY | Facility: CLINIC | Age: 62
End: 2022-03-14
Payer: COMMERCIAL

## 2022-03-14 VITALS
SYSTOLIC BLOOD PRESSURE: 146 MMHG | WEIGHT: 190 LBS | BODY MASS INDEX: 25.73 KG/M2 | HEART RATE: 71 BPM | DIASTOLIC BLOOD PRESSURE: 87 MMHG | HEIGHT: 72 IN

## 2022-03-14 DIAGNOSIS — I73.9 PERIPHERAL VASCULAR DISEASE, UNSPECIFIED (H): ICD-10-CM

## 2022-03-14 DIAGNOSIS — K74.60 CIRRHOSIS OF LIVER WITHOUT ASCITES, UNSPECIFIED HEPATIC CIRRHOSIS TYPE (H): ICD-10-CM

## 2022-03-14 DIAGNOSIS — E11.49 DM TYPE 2 CAUSING NEUROLOGICAL DISEASE (H): ICD-10-CM

## 2022-03-14 DIAGNOSIS — I10 BENIGN ESSENTIAL HYPERTENSION: ICD-10-CM

## 2022-03-14 DIAGNOSIS — I10 HYPERTENSION, UNSPECIFIED TYPE: ICD-10-CM

## 2022-03-14 PROCEDURE — 99214 OFFICE O/P EST MOD 30 MIN: CPT | Performed by: PHYSICIAN ASSISTANT

## 2022-03-14 RX ORDER — LISINOPRIL 20 MG/1
20 TABLET ORAL 2 TIMES DAILY
Qty: 180 TABLET | Refills: 3 | Status: SHIPPED | OUTPATIENT
Start: 2022-03-14 | End: 2022-08-01

## 2022-03-14 RX ORDER — NEBIVOLOL 10 MG/1
10 TABLET ORAL AT BEDTIME
Qty: 90 TABLET | Refills: 3 | Status: SHIPPED | OUTPATIENT
Start: 2022-03-14 | End: 2022-05-11

## 2022-03-14 NOTE — PATIENT INSTRUCTIONS
Thank you for visiting with me today.    We discussed: I'm confident we'll get your blood pressure under control.     Medication changes:  Increase nebivolol to 10 mg at bedtime.    In about 5 days increase lisinopril to 20 mg twice a day.      Follow up: with me in about 3-4 weeks.  We'll have your labs drawn at your liver visit (BMP- basic metabolic panel).      Please call my nurse, Tara, at [phone] with any questions or concerns.    Scheduling phone number: [phone]  Reminder: Please bring in all current medications, over the counter supplements and vitamin bottles to your next appointment.

## 2022-03-14 NOTE — LETTER
3/14/2022    Marshal Cuevas MD  [address]  [facility] [address]    RE: Vikash Kole Burgos       Dear Colleague,     I had the pleasure of seeing Vikash Kole Burgos in the [facility].  [id number]      HPI and Plan:   See dictation    Orders this Visit:  Orders Placed This Encounter   Procedures     Basic metabolic panel     Follow-Up with Cardiology REAGAN     Orders Placed This Encounter   Medications     nebivolol (BYSTOLIC) 10 MG tablet     Sig: Take 1 tablet (10 mg) by mouth At Bedtime     Dispense:  90 tablet     Refill:  3     lisinopril (ZESTRIL) 20 MG tablet     Sig: Take 1 tablet (20 mg) by mouth 2 times daily     Dispense:  180 tablet     Refill:  3     Medications Discontinued During This Encounter   Medication Reason     nebivolol (BYSTOLIC) 5 MG tablet      lisinopril (ZESTRIL) 10 MG tablet          Encounter Diagnoses   Name Primary?     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       celecoxib (CELEBREX) 100 MG capsule TAKE 1 CAPSULE BY MOUTH EVERY DAY 30 capsule 1     chlorhexidine (PERIDEX) 0.12 % solution Take 15 mLs by mouth 2 times daily as needed   5     cyanocobalamin 1000 MCG SUBL Place 1,000 mcg under the tongue daily       gabapentin (NEURONTIN) 300 MG capsule Take 1 capsule (300 mg) by mouth 3 times daily 90 capsule 4     glucosamine-chondroitin 500-400 MG CAPS per capsule Take 1 capsule by mouth daily       insulin pen needle (BD HEIKE U/F) 32G X 4 MM Use 1 daily or as directed. 100 each prn     LEVEMIR FLEXTOUCH 100 UNIT/ML pen INJECT 50 UNITS SUBCUTANEOUS AT BEDTIME 15 mL 27     lisinopril (ZESTRIL) 20 MG tablet Take  1 tablet (20 mg) by mouth 2 times daily 180 tablet 3     nebivolol (BYSTOLIC) 10 MG tablet Take 1 tablet (10 mg) by mouth At Bedtime 90 tablet 3     rosuvastatin (CRESTOR) 20 MG tablet Take 1 tablet (20 mg) by mouth daily 90 tablet 0     Vitamin D, Cholecalciferol, 1000 units TABS Take 1,000 Units by mouth daily       zolpidem (AMBIEN) 5 MG tablet TAKE 1 TAB ORALLY AS NEEDED FOR SLEEP 5 tablet 0     B-D U/F 31G X 8 MM insulin pen needle USE ONE PEN NEEDLES DAILY OR AS DIRECTED. 100 each 3       ALLERGIES     Allergies   Allergen Reactions     Chlorthalidone Nausea and Vomiting     dizziness     Pcn [Penicillins] Rash     Rash with PCN only. Patient has taken amoxicillin with no rash.       PAST MEDICAL HISTORY:  Past Medical History:   Diagnosis Date     Basal cell carcinoma      Carotid stenosis, left     s/p left CEA 2/2020     Cerebral infarction (H)     left CVA 2/2020 post-op carotid endarterectomy     Coronary artery disease     4 vessel bypass November 2010; LIMA ->LAD, SVG-> OM3, SVG -> D2, SVG -> PDA     Diabetes mellitus (H) 2005    neuropathy     Generalized anxiety disorder 05/02/2014     Hepatitis C, chronic (H) 2005     Hyperlipidemia LDL goal < 70      Hypertension      Liver diseases     9/15 Liver is 10 cm in span without left lobe enlargement     Persistent microalbuminuria associated with type 2 diabetes mellitus (H) 05/06/2015       PAST SURGICAL HISTORY:  Past Surgical History:   Procedure Laterality Date     ARTHROSCOPY KNEE RT/LT      left     COLONOSCOPY       CORONARY ARTERY BYPASS  11/18/201    Coronary artery bypass grafting x 4 with placement of the left internal mammary artery to the distal midportion of the left anterior descending artery, saphenous vein graft from aorta to the third obtuse marginal branch of circumflex coronary artery, saphenous vein graft from aorta to the second diagonal branch, saphenous vein graft from aorta to the posterior descending artery.     ENDARTERECTOMY  CAROTID Left 2020    Procedure: LEFT CAROTID ENDARTERECTOMY WITH EEG;  Surgeon: Semaj Stubbs MD;  Location:  OR     ESOPHAGOSCOPY, GASTROSCOPY, DUODENOSCOPY (EGD), COMBINED  10/31/2011    Procedure:COMBINED ESOPHAGOSCOPY, GASTROSCOPY, DUODENOSCOPY (EGD); Surgeon:ALONSO ALDANA; Location: GI     ESOPHAGOSCOPY, GASTROSCOPY, DUODENOSCOPY (EGD), COMBINED N/A 3/8/2018    Procedure: COMBINED ESOPHAGOSCOPY, GASTROSCOPY, DUODENOSCOPY (EGD);  EGD;  Surgeon: Angel Luis Justice MD;  Location:  GI     HEART CATH CORONARY ANGIOGRAM W/LV GRAM  9-11-10    CV Surgery recommended     HEART CATH CORONARY ANGIOGRAM W/LV GRAM  14    Medical management     HERNIA REPAIR, INGUINAL RT/LT      left       FAMILY HISTORY:  Family History   Problem Relation Age of Onset     C.A.D. Father      Cancer Father         bladder     Heart Surgery Father         bypass surgery     Heart Disease Mother        SOCIAL HISTORY:  Social History     Socioeconomic History     Marital status:      Spouse name: None     Number of children: None     Years of education: None     Highest education level: None   Occupational History     None   Tobacco Use     Smoking status: Former Smoker     Packs/day: 0.50     Years: 12.00     Pack years: 6.00     Types: Cigarettes     Start date:      Quit date: 2005     Years since quittin.1     Smokeless tobacco: Never Used   Substance and Sexual Activity     Alcohol use: Yes     Comment: minimal     Drug use: No     Sexual activity: Yes     Partners: Female   Other Topics Concern     Parent/sibling w/ CABG, MI or angioplasty before 65F 55M? No      Service Not Asked     Blood Transfusions Not Asked     Caffeine Concern Yes     Comment: 2 cups coffee per day     Occupational Exposure Not Asked     Hobby Hazards Not Asked     Sleep Concern Not Asked     Stress Concern Not Asked     Weight Concern Not Asked     Special Diet Yes     Comment: low carb diet, low sugar  "    Back Care Not Asked     Exercise Yes     Comment: treadmill 40 minutes, walk, daily, bike 30 minutes every other day     Bike Helmet Not Asked     Seat Belt Not Asked     Self-Exams Not Asked   Social History Narrative     None     Social Determinants of Health     Financial Resource Strain: Not on file   Food Insecurity: Not on file   Transportation Needs: Not on file   Physical Activity: Not on file   Stress: Not on file   Social Connections: Not on file   Intimate Partner Violence: Not on file   Housing Stability: Not on file       Review of Systems:  Skin:  not assessed     Eyes:  Negative glasses  ENT:  not assessed    Respiratory:  Negative    Cardiovascular:  palpitations;chest pain;edema;Negative for lightheadedness;fatigue;Positive for  Gastroenterology: Negative    Genitourinary:  Negative    Musculoskeletal:  Positive for arthritis  Neurologic:  Positive for headaches;numbness or tingling of feet  Psychiatric:  Positive for sleep disturbances  Heme/Lymph/Imm:  Negative allergies  Endocrine:  Positive for diabetes    Physical Exam:  Vitals: BP (!) 146/87   Pulse 71   Ht 1.829 m (6' 0.01\")   Wt 86.2 kg (190 lb)   BMI 25.76 kg/m     Please refer to dictation for physical exam    Recent Lab Results: all reviewed today  CBC RESULTS:  Lab Results   Component Value Date    WBC 5.3 06/07/2021    RBC 5.00 06/07/2021    HGB 15.3 06/07/2021    HCT 45.1 06/07/2021    MCV 90 06/07/2021    MCH 30.6 06/07/2021    MCHC 33.9 06/07/2021    RDW 12.9 06/07/2021     (L) 06/07/2021       BMP RESULTS:  Lab Results   Component Value Date     10/27/2021     06/07/2021    POTASSIUM 4.5 10/27/2021    POTASSIUM 4.8 06/07/2021    CHLORIDE 104 10/27/2021    CHLORIDE 108 06/07/2021    CO2 27 10/27/2021    CO2 28 06/07/2021    ANIONGAP 3 10/27/2021    ANIONGAP 5 06/07/2021     (H) 10/27/2021     (H) 06/07/2021    BUN 16 10/27/2021    BUN 17 06/07/2021    CR 1.0 03/08/2022    CR 0.90 10/27/2021    " CR 0.89 2021    GFRESTIMATED >60 2022    GFRESTIMATED >90 2021    GFRESTBLACK >90 2021    LIA 8.9 10/27/2021    LIA 9.6 2021        INR RESULTS:  Lab Results   Component Value Date    INR 1.11 2021    INR 1.13 12/15/2020           CC  YESSY Plascencia-C  [address]  [place] [id number]        Service Date: 2022    PRIMARY CARDIOLOGIST:  Dr. Zeb Roberts.    REASON FOR VISIT:  Hypertension followup.    HISTORY OF PRESENT ILLNESS:  Mr. Burgos is a delightful 62-year-old gentleman with past medical history significant for the followin.  Coronary artery disease, history of 4-vessel CABG in  with a LIMA to the LAD, SVG to the OM3, SVG to the D2, SVG to the PDA.  Last angiogram was  showing a patent LIMA to the LAD, patent vein graft to the OM diagonal and RPDA with having a 60% lesion distal to the touchdown.  2.  Low normal EF about 50%.  3.  Peripheral arterial disease with history of carotid endarterectomy in 2020 complicated by CVA, facial nerve trauma, leaving some facial numbness.  4.  Hypertension.  5.  Type 2 diabetes, well controlled.  6.  Hyperlipidemia.  7.  History of hepatitis C I believe diagnosed back in about  when the patient presented with stage IV liver failure, treated with Pegasys and ribavirin with excellent results and cured but ongoing cirrhosis.    I met him when he was enrolling in the SURPASS trial.  At that point, we noted his blood pressures were markedly elevated.  Given his other disease, I was concerned about renal artery stenosis.  We did a renal artery ultrasound, which showed normal kidneys and some elevated flow in the renal artery origins, right worse than left.  We sent him for a CTA.  This showed mild atheromatous plaque but no significant narrowing.  He did have a 50% narrowing of the proximal celiac trunk.  Otherwise, he had patent abdominal arteries.  There were no other significant findings.   These were reviewed today.    Last time I moved his nebivolol to bedtime and we had him wear a 24-hour blood pressure monitor.  Unfortunately, when he wore the 24-hour blood pressure monitor, it erred out so he only kept it on for about 2 hours.  The results that I do have show an average blood pressure of 148/83 and that was during awake hours.    Today he comes in saying he continues to feel well.  He continues to exercise hard and it is frustrating that his blood pressure is high.  Blood pressures seem to vary through the day with them being the worse when he wakes up in the early morning at 2:30 to 3.  They are in 180s sometimes or even up to 190.  They tend to drop to the 40s and 50s by late morning, and midday around 2 p.m. they are as low as 119.  They then increase through the evening hours to the 150s.  He otherwise feels well though and denies chest pain, shortness of breath, orthopnea or PND.  At baseline, he already has biked 10 miles today when I am seeing him and he will do another round of exercise in the afternoon.  He watches his weight.  He eats fairly healthy.  He does note that he thought his blood pressure went up and he did a trial of Celebrex for arthritis and never came down.    SOCIAL HISTORY:  He is  to his wife, Carmina.  The have a half-way place in [place].  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  They have several children and one is a nurse at [employer].  There is a son who is a  who is interviewing at [place] next week.  He quit smoking in 1993.  Minimal alcohol use.    PHYSICAL EXAMINATION:    GENERAL:  Well-developed, well-nourished gentleman in no acute distress.  HEENT:  Normocephalic, atraumatic.  HEART:  Regular in rate and rhythm without murmur, rub or gallop.  LUNGS:  Clear, without wheezes, rales, or rhonchi.  EXTREMITIES:  Without peripheral edema.  SKIN:  Warm and dry.    ASSESSMENT AND PLAN:    1.   Hypertension.  Remains markedly elevated and quite variable throughout the day.  The patient does not sleep a lot, but this has been his normal pattern for years and years.  I do not think this is a major contributor and he otherwise exercises regularly and has a healthy weight.  At this point, he is only on 2 blood pressure medicines and I am quite confident we will get things controlled.  We will start by increasing his nebivolol to 10 mg at bedtime.  In about 5 days, we will increase his lisinopril to 20 mg b.i.d.  He will get a BMP when he gets labs done in a couple of weeks at his hepatologist.  Our goal blood pressure for him is less than 130/80 and we have previously reviewed this reduces the risk of stroke and future heart attack.  2.  Hyperlipidemia, excellent control.  Last LDL 44, HDL 37, total cholesterol 104.  3.  Carotid artery disease, followed by Vascular.  4.  Type 2 diabetes, now participating in the SURPASS trial.  5.  Liver failure secondary to hep C, follow closely at the [facility].    It is my great pleasure to participate in this delightful patient's care.  We will see him back in 3-4 weeks.  He will continue to intermittently monitor his blood pressures in the interim, sooner with concerns.    Michaelle Caba PA-C        D: 2022   T: 2022   MT: michael    Name:     IHSAN SANCHEZ  MRN:      [id number]        Account:      [id number]   :      1960           Service Date: 2022       Document: [id number]

## 2022-03-14 NOTE — PROGRESS NOTES
Service Date: 2022    PRIMARY CARDIOLOGIST:  Dr. Zeb Roberts.    REASON FOR VISIT:  Hypertension followup.    HISTORY OF PRESENT ILLNESS:  Mr. Burgos is a delightful 62-year-old gentleman with past medical history significant for the followin.  Coronary artery disease, history of 4-vessel CABG in  with a LIMA to the LAD, SVG to the OM3, SVG to the D2, SVG to the PDA.  Last angiogram was  showing a patent LIMA to the LAD, patent vein graft to the OM diagonal and RPDA with having a 60% lesion distal to the touchdown.  2.  Low normal EF about 50%.  3.  Peripheral arterial disease with history of carotid endarterectomy in 2020 complicated by CVA, facial nerve trauma, leaving some facial numbness.  4.  Hypertension.  5.  Type 2 diabetes, well controlled.  6.  Hyperlipidemia.  7.  History of hepatitis C I believe diagnosed back in about  when the patient presented with stage IV liver failure, treated with Pegasys and ribavirin with excellent results and cured but ongoing cirrhosis.    I met him when he was enrolling in the SURPASS trial.  At that point, we noted his blood pressures were markedly elevated.  Given his other disease, I was concerned about renal artery stenosis.  We did a renal artery ultrasound, which showed normal kidneys and some elevated flow in the renal artery origins, right worse than left.  We sent him for a CTA.  This showed mild atheromatous plaque but no significant narrowing.  He did have a 50% narrowing of the proximal celiac trunk.  Otherwise, he had patent abdominal arteries.  There were no other significant findings.  These were reviewed today.    Last time I moved his nebivolol to bedtime and we had him wear a 24-hour blood pressure monitor.  Unfortunately, when he wore the 24-hour blood pressure monitor, it erred out so he only kept it on for about 2 hours.  The results that I do have show an average blood pressure of 148/83 and that was during awake  hours.    Today he comes in saying he continues to feel well.  He continues to exercise hard and it is frustrating that his blood pressure is high.  Blood pressures seem to vary through the day with them being the worse when he wakes up in the early morning at 2:30 to 3.  They are in 180s sometimes or even up to 190.  They tend to drop to the 40s and 50s by late morning, and midday around 2 p.m. they are as low as 119.  They then increase through the evening hours to the 150s.  He otherwise feels well though and denies chest pain, shortness of breath, orthopnea or PND.  At baseline, he already has biked 10 miles today when I am seeing him and he will do another round of exercise in the afternoon.  He watches his weight.  He eats fairly healthy.  He does note that he thought his blood pressure went up and he did a trial of Celebrex for arthritis and never came down.    SOCIAL HISTORY:  He is  to his wife, Carmina.  The have a jail place in [place].  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  They have several children and one is a nurse at [employer].  There is a son who is a  who is interviewing at [place] next week.  He quit smoking in 1993.  Minimal alcohol use.    PHYSICAL EXAMINATION:    GENERAL:  Well-developed, well-nourished gentleman in no acute distress.  HEENT:  Normocephalic, atraumatic.  HEART:  Regular in rate and rhythm without murmur, rub or gallop.  LUNGS:  Clear, without wheezes, rales, or rhonchi.  EXTREMITIES:  Without peripheral edema.  SKIN:  Warm and dry.    ASSESSMENT AND PLAN:    1.  Hypertension.  Remains markedly elevated and quite variable throughout the day.  The patient does not sleep a lot, but this has been his normal pattern for years and years.  I do not think this is a major contributor and he otherwise exercises regularly and has a healthy weight.  At this point, he is only on 2 blood pressure medicines  and I am quite confident we will get things controlled.  We will start by increasing his nebivolol to 10 mg at bedtime.  In about 5 days, we will increase his lisinopril to 20 mg b.i.d.  He will get a BMP when he gets labs done in a couple of weeks at his hepatologist.  Our goal blood pressure for him is less than 130/80 and we have previously reviewed this reduces the risk of stroke and future heart attack.  2.  Hyperlipidemia, excellent control.  Last LDL 44, HDL 37, total cholesterol 104.  3.  Carotid artery disease, followed by Vascular.  4.  Type 2 diabetes, now participating in the SURPASS trial.  5.  Liver failure secondary to hep C, follow closely at the [facility].    It is my great pleasure to participate in this delightful patient's care.  We will see him back in 3-4 weeks.  He will continue to intermittently monitor his blood pressures in the interim, sooner with concerns.    Michaelle Caba PA-C        D: 2022   T: 2022   MT: michael    Name:     IHSAN SANCHEZSulma  MRN:      [id number]        Account:      [id number]   :      1960           Service Date: 2022       Document: [id number]

## 2022-03-14 NOTE — PROGRESS NOTES
[id number]      HPI and Plan:   See dictation    Orders this Visit:  Orders Placed This Encounter   Procedures     Basic metabolic panel     Follow-Up with Cardiology REAGAN     Orders Placed This Encounter   Medications     nebivolol (BYSTOLIC) 10 MG tablet     Sig: Take 1 tablet (10 mg) by mouth At Bedtime     Dispense:  90 tablet     Refill:  3     lisinopril (ZESTRIL) 20 MG tablet     Sig: Take 1 tablet (20 mg) by mouth 2 times daily     Dispense:  180 tablet     Refill:  3     Medications Discontinued During This Encounter   Medication Reason     nebivolol (BYSTOLIC) 5 MG tablet      lisinopril (ZESTRIL) 10 MG tablet          Encounter Diagnoses   Name Primary?     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       celecoxib (CELEBREX) 100 MG capsule TAKE 1 CAPSULE BY MOUTH EVERY DAY 30 capsule 1     chlorhexidine (PERIDEX) 0.12 % solution Take 15 mLs by mouth 2 times daily as needed   5     cyanocobalamin 1000 MCG SUBL Place 1,000 mcg under the tongue daily       gabapentin (NEURONTIN) 300 MG capsule Take 1 capsule (300 mg) by mouth 3 times daily 90 capsule 4     glucosamine-chondroitin 500-400 MG CAPS per capsule Take 1 capsule by mouth daily       insulin pen needle (BD HEIKE U/F) 32G X 4 MM Use 1 daily or as directed. 100 each prn     LEVEMIR FLEXTOUCH 100 UNIT/ML pen INJECT 50 UNITS SUBCUTANEOUS AT BEDTIME 15 mL 27     lisinopril (ZESTRIL) 20 MG tablet Take 1 tablet (20 mg) by mouth 2 times daily 180 tablet 3     nebivolol (BYSTOLIC) 10 MG tablet Take 1 tablet (10 mg) by mouth At Bedtime 90 tablet 3     rosuvastatin (CRESTOR) 20 MG tablet Take 1 tablet (20 mg) by mouth  daily 90 tablet 0     Vitamin D, Cholecalciferol, 1000 units TABS Take 1,000 Units by mouth daily       zolpidem (AMBIEN) 5 MG tablet TAKE 1 TAB ORALLY AS NEEDED FOR SLEEP 5 tablet 0     B-D U/F 31G X 8 MM insulin pen needle USE ONE PEN NEEDLES DAILY OR AS DIRECTED. 100 each 3       ALLERGIES     Allergies   Allergen Reactions     Chlorthalidone Nausea and Vomiting     dizziness     Pcn [Penicillins] Rash     Rash with PCN only. Patient has taken amoxicillin with no rash.       PAST MEDICAL HISTORY:  Past Medical History:   Diagnosis Date     Basal cell carcinoma      Carotid stenosis, left     s/p left CEA 2/2020     Cerebral infarction (H)     left CVA 2/2020 post-op carotid endarterectomy     Coronary artery disease     4 vessel bypass November 2010; LIMA ->LAD, SVG-> OM3, SVG -> D2, SVG -> PDA     Diabetes mellitus (H) 2005    neuropathy     Generalized anxiety disorder 05/02/2014     Hepatitis C, chronic (H) 2005     Hyperlipidemia LDL goal < 70      Hypertension      Liver diseases     9/15 Liver is 10 cm in span without left lobe enlargement     Persistent microalbuminuria associated with type 2 diabetes mellitus (H) 05/06/2015       PAST SURGICAL HISTORY:  Past Surgical History:   Procedure Laterality Date     ARTHROSCOPY KNEE RT/LT      left     COLONOSCOPY       CORONARY ARTERY BYPASS  11/18/201    Coronary artery bypass grafting x 4 with placement of the left internal mammary artery to the distal midportion of the left anterior descending artery, saphenous vein graft from aorta to the third obtuse marginal branch of circumflex coronary artery, saphenous vein graft from aorta to the second diagonal branch, saphenous vein graft from aorta to the posterior descending artery.     ENDARTERECTOMY CAROTID Left 2/21/2020    Procedure: LEFT CAROTID ENDARTERECTOMY WITH EEG;  Surgeon: Semaj Stubbs MD;  Location:  OR     ESOPHAGOSCOPY, GASTROSCOPY, DUODENOSCOPY (EGD), COMBINED  10/31/2011     Procedure:COMBINED ESOPHAGOSCOPY, GASTROSCOPY, DUODENOSCOPY (EGD); Surgeon:ALONSO ALDANA; Location[facility]     ESOPHAGOSCOPY, GASTROSCOPY, DUODENOSCOPY (EGD), COMBINED N/A 3/8/2018    Procedure: COMBINED ESOPHAGOSCOPY, GASTROSCOPY, DUODENOSCOPY (EGD);  EGD;  Surgeon: Angel Luis Justice MD;  Location: [facility]     HEART CATH CORONARY ANGIOGRAM W/LV GRAM  9--10    CV Surgery recommended     HEART CATH CORONARY ANGIOGRAM W/LV GRAM  -14    Medical management     HERNIA REPAIR, INGUINAL RT/LT      left       FAMILY HISTORY:  Family History   Problem Relation Age of Onset     C.A.D. Father      Cancer Father         bladder     Heart Surgery Father         bypass surgery     Heart Disease Mother        SOCIAL HISTORY:  Social History     Socioeconomic History     Marital status:      Spouse name: None     Number of children: None     Years of education: None     Highest education level: None   Occupational History     None   Tobacco Use     Smoking status: Former Smoker     Packs/day: 0.50     Years: 12.00     Pack years: 6.00     Types: Cigarettes     Start date:      Quit date: 2005     Years since quittin.1     Smokeless tobacco: Never Used   Substance and Sexual Activity     Alcohol use: Yes     Comment: minimal     Drug use: No     Sexual activity: Yes     Partners: Female   Other Topics Concern     Parent/sibling w/ CABG, MI or angioplasty before 65F 55M? No      Service Not Asked     Blood Transfusions Not Asked     Caffeine Concern Yes     Comment: 2 cups coffee per day     Occupational Exposure Not Asked     Hobby Hazards Not Asked     Sleep Concern Not Asked     Stress Concern Not Asked     Weight Concern Not Asked     Special Diet Yes     Comment: low carb diet, low sugar     Back Care Not Asked     Exercise Yes     Comment: treadmill 40 minutes, walk, daily, bike 30 minutes every other day     Bike Helmet Not Asked     Seat Belt Not Asked     Self-Exams Not Asked   Social  "History Narrative     None     Social Determinants of Health     Financial Resource Strain: Not on file   Food Insecurity: Not on file   Transportation Needs: Not on file   Physical Activity: Not on file   Stress: Not on file   Social Connections: Not on file   Intimate Partner Violence: Not on file   Housing Stability: Not on file       Review of Systems:  Skin:  not assessed     Eyes:  Negative glasses  ENT:  not assessed    Respiratory:  Negative    Cardiovascular:  palpitations;chest pain;edema;Negative for lightheadedness;fatigue;Positive for  Gastroenterology: Negative    Genitourinary:  Negative    Musculoskeletal:  Positive for arthritis  Neurologic:  Positive for headaches;numbness or tingling of feet  Psychiatric:  Positive for sleep disturbances  Heme/Lymph/Imm:  Negative allergies  Endocrine:  Positive for diabetes    Physical Exam:  Vitals: BP (!) 146/87   Pulse 71   Ht 1.829 m (6' 0.01\")   Wt 86.2 kg (190 lb)   BMI 25.76 kg/m     Please refer to dictation for physical exam    Recent Lab Results: all reviewed today  CBC RESULTS:  Lab Results   Component Value Date    WBC 5.3 06/07/2021    RBC 5.00 06/07/2021    HGB 15.3 06/07/2021    HCT 45.1 06/07/2021    MCV 90 06/07/2021    MCH 30.6 06/07/2021    MCHC 33.9 06/07/2021    RDW 12.9 06/07/2021     (L) 06/07/2021       BMP RESULTS:  Lab Results   Component Value Date     10/27/2021     06/07/2021    POTASSIUM 4.5 10/27/2021    POTASSIUM 4.8 06/07/2021    CHLORIDE 104 10/27/2021    CHLORIDE 108 06/07/2021    CO2 27 10/27/2021    CO2 28 06/07/2021    ANIONGAP 3 10/27/2021    ANIONGAP 5 06/07/2021     (H) 10/27/2021     (H) 06/07/2021    BUN 16 10/27/2021    BUN 17 06/07/2021    CR 1.0 03/08/2022    CR 0.90 10/27/2021    CR 0.89 06/07/2021    GFRESTIMATED >60 03/08/2022    GFRESTIMATED >90 06/07/2021    GFRESTBLACK >90 06/07/2021    LIA 8.9 10/27/2021    LIA 9.6 06/07/2021        INR RESULTS:  Lab Results   Component " Value Date    INR 1.11 06/07/2021    INR 1.13 12/15/2020           CC  Michaelle Caba, PA-C  [address]  [place] [id number]

## 2022-03-15 NOTE — RESULT ENCOUNTER NOTE
Will review or did review during clinic visit.  Please see progress note for plan.  Michaelle Caba PA-C 3/15/2022 11:52 AM

## 2022-03-17 ENCOUNTER — OFFICE VISIT (OUTPATIENT)
Dept: CARDIOLOGY | Facility: CLINIC | Age: 62
End: 2022-03-17
Payer: COMMERCIAL

## 2022-03-17 VITALS
HEART RATE: 64 BPM | BODY MASS INDEX: 25.63 KG/M2 | WEIGHT: 189.2 LBS | DIASTOLIC BLOOD PRESSURE: 82 MMHG | HEIGHT: 72 IN | SYSTOLIC BLOOD PRESSURE: 136 MMHG

## 2022-03-17 DIAGNOSIS — E11.9 DIABETES MELLITUS (H): ICD-10-CM

## 2022-03-17 DIAGNOSIS — Z00.6 EXAMINATION OF PARTICIPANT OR CONTROL IN CLINICAL RESEARCH: ICD-10-CM

## 2022-03-17 DIAGNOSIS — I25.10 CORONARY ARTERY DISEASE INVOLVING NATIVE CORONARY ARTERY OF NATIVE HEART WITHOUT ANGINA PECTORIS: ICD-10-CM

## 2022-03-17 DIAGNOSIS — E11.49 DM TYPE 2 CAUSING NEUROLOGICAL DISEASE (H): Primary | ICD-10-CM

## 2022-03-17 PROCEDURE — 99207 PR NO CHARGE-RESEARCH SERVICE: CPT

## 2022-03-17 PROCEDURE — 99207 PR NO CHARGE-RESEARCH SERVICE: CPT | Performed by: INTERNAL MEDICINE

## 2022-03-17 NOTE — LETTER
"3/17/2022    Marshal Cuevas MD  [address] [facility] [address]  [facility] [address]    RE: Vikash Burgos       Dear Colleague,     I had the pleasure of seeing Vikash Burgos in the [facility].  SURPASS-CVOT Study [Effect of tirzepatide versus Dulaglutide on Major Cardiovascular Events in Patients with Type 2 Diabetes]    Subject seen for Visit 4    In completed clinical studies with Tirzepatide, two participants out of 5415 receiving Tirzepatide and one participant out of 2360 receiving placebo or other diabetic medication, have reported pancreatic cancer. Overall, the risk pancreatic cancer was not increased when using tirzepatide when compared to placebo/comparator. You are being asked to sign an updated informed consent form to ensure that you are aware that the study Sponsor is monitoring cases of pancreatic cancer as a potential risk.      Patient was informed and he agrees to continue in the study. ICF Version [id number] was read and signed by the subject. A copy was given to him.      Subject queried for adverse events, endpoints and medication changes. Patient reports dizziness since changing his BP medication on 3/14/22.    Adherence/lifestyle reinforcement done.     Subject drug dispensed, Kit Number [id number]  No of pens dispensed at last visit 4  Any Returned medication No  Date of first dose since last visit: 02/17/22  Date of last dose taken since last visit: 03/10/22      Vitals:    03/17/22 0906 03/17/22 0908   BP: 136/83 136/82   BP Location: Left arm Left arm   Patient Position: Chair Chair   Cuff Size: Adult Regular Adult Regular   Pulse: 62 64   Weight: 85.8 kg (189 lb 3.2 oz)    Height: 1.829 m (6' 0.01\")      SURPASS-CVOT Study [Effect of tirzepatide versus Dulaglutide on Major Cardiovascular Events in Patients with Type 2 Diabetes]    Diagnosis/event description (diagnosis preferred):  Dizziness  Start date: 03/14/22   Date resolved:    Intensity: ([x] mild /[]  moderate " / [] severe)   Study Medication adjustment:  [] Yes   [x] No  Outcome: not recovered  Date study team aware of event:03/17/22  Was treatment given for this event:  [] Yes   [x] No   If yes, provide details  Serious adverse event?    Yes   [x] No  Special interest event?  [] Yes   [x] No  Endpoint? [] Yes   [x] No   Fatal event?  [] Yes   [x] No      Dr. Elias: Reasonable possibility that AE is related to study treatment    [] Yes   [] No          Next visit schedule for 2 weeks phone visit.      Mis Husain, RN  Clinical Trials Nurse

## 2022-03-17 NOTE — PROGRESS NOTES
"SURPASS-CVOT Study [Effect of tirzepatide versus Dulaglutide on Major Cardiovascular Events in Patients with Type 2 Diabetes]    Subject seen for Visit 4    In completed clinical studies with Tirzepatide, two participants out of 5415 receiving Tirzepatide and one participant out of 2360 receiving placebo or other diabetic medication, have reported pancreatic cancer. Overall, the risk pancreatic cancer was not increased when using tirzepatide when compared to placebo/comparator. You are being asked to sign an updated informed consent form to ensure that you are aware that the study Sponsor is monitoring cases of pancreatic cancer as a potential risk.      Patient was informed and he agrees to continue in the study. ICF Version [id number] was read and signed by the subject. A copy was given to him.      Subject queried for adverse events, endpoints and medication changes. Patient reports dizziness since changing his BP medication on 3/14/22.    Adherence/lifestyle reinforcement done.     Subject drug dispensed, Kit Number [id number]  No of pens dispensed at last visit 4  Any Returned medication No  Date of first dose since last visit: 02/17/22  Date of last dose taken since last visit: 03/10/22      Vitals:    03/17/22 0906 03/17/22 0908   BP: 136/83 136/82   BP Location: Left arm Left arm   Patient Position: Chair Chair   Cuff Size: Adult Regular Adult Regular   Pulse: 62 64   Weight: 85.8 kg (189 lb 3.2 oz)    Height: 1.829 m (6' 0.01\")      SURPASS-CVOT Study [Effect of tirzepatide versus Dulaglutide on Major Cardiovascular Events in Patients with Type 2 Diabetes]    Diagnosis/event description (diagnosis preferred):  Dizziness  Start date: 03/14/22   Date resolved:    Intensity: ([x] mild /[]  moderate / [] severe)   Study Medication adjustment:  [] Yes   [x] No  Outcome: not recovered  Date study team aware of event:03/17/22  Was treatment given for this event:  [] Yes   [x] No   If yes, provide details  Serious " adverse event?    Yes   [x] No  Special interest event?  [] Yes   [x] No  Endpoint? [] Yes   [x] No   Fatal event?  [] Yes   [x] No      Dr. Elias: Reasonable possibility that AE is related to study treatment    [] Yes   [x] No          Next visit schedule for 2 weeks phone visit.      Mis Husain RN  Clinical Trials Nurse

## 2022-03-23 ENCOUNTER — OFFICE VISIT (OUTPATIENT)
Dept: CARDIOLOGY | Facility: CLINIC | Age: 62
End: 2022-03-23
Attending: PHYSICIAN ASSISTANT
Payer: COMMERCIAL

## 2022-03-23 VITALS
OXYGEN SATURATION: 98 % | HEART RATE: 64 BPM | DIASTOLIC BLOOD PRESSURE: 84 MMHG | BODY MASS INDEX: 25.67 KG/M2 | SYSTOLIC BLOOD PRESSURE: 134 MMHG | WEIGHT: 189.5 LBS | HEIGHT: 72 IN

## 2022-03-23 DIAGNOSIS — I10 HYPERTENSION, UNSPECIFIED TYPE: ICD-10-CM

## 2022-03-23 DIAGNOSIS — G47.30 SLEEP APNEA, UNSPECIFIED TYPE: ICD-10-CM

## 2022-03-23 DIAGNOSIS — I70.1 RENAL ARTERY STENOSIS (H): Primary | ICD-10-CM

## 2022-03-23 PROCEDURE — 99203 OFFICE O/P NEW LOW 30 MIN: CPT | Performed by: INTERNAL MEDICINE

## 2022-03-23 NOTE — PROGRESS NOTES
HPI and Plan:   See dictation    Today's clinic visit entailed:  The following tests were independently interpreted by me as noted in my documentation: Renal artery US and CTA   30 minutes spent on the date of the encounter doing chart review, history and exam, documentation and further activities per the note  Provider  Link to MDM Help Grid     The level of medical decision making during this visit was of moderate complexity.      Orders Placed This Encounter   Procedures     US Renal Complete w Doppler Complete     Sleep Study Referral     Follow-Up with Cardiology       No orders of the defined types were placed in this encounter.      There are no discontinued medications.      Encounter Diagnoses   Name Primary?     Hypertension, unspecified type      Sleep apnea, unspecified type      Renal artery stenosis (H) Yes       CURRENT MEDICATIONS:  Current Outpatient Medications   Medication Sig Dispense Refill     aspirin 81 MG tablet Take 1 tablet by mouth daily.       B-D U/F 31G X 8 MM insulin pen needle USE ONE PEN NEEDLES DAILY OR AS DIRECTED. 100 each 3     celecoxib (CELEBREX) 100 MG capsule TAKE 1 CAPSULE BY MOUTH EVERY DAY 30 capsule 1     chlorhexidine (PERIDEX) 0.12 % solution Take 15 mLs by mouth 2 times daily as needed   5     cyanocobalamin 1000 MCG SUBL Place 1,000 mcg under the tongue daily       gabapentin (NEURONTIN) 300 MG capsule Take 1 capsule (300 mg) by mouth 3 times daily 90 capsule 4     glucosamine-chondroitin 500-400 MG CAPS per capsule Take 1 capsule by mouth daily       insulin pen needle (BD HEIKE U/F) 32G X 4 MM Use 1 daily or as directed. 100 each prn     LEVEMIR FLEXTOUCH 100 UNIT/ML pen INJECT 50 UNITS SUBCUTANEOUS AT BEDTIME 15 mL 27     lisinopril (ZESTRIL) 20 MG tablet Take 1 tablet (20 mg) by mouth 2 times daily 180 tablet 3     nebivolol (BYSTOLIC) 10 MG tablet Take 1 tablet (10 mg) by mouth At Bedtime 90 tablet 3     rosuvastatin (CRESTOR) 20 MG tablet Take 1 tablet (20 mg)  by mouth daily 90 tablet 0     Vitamin D, Cholecalciferol, 1000 units TABS Take 1,000 Units by mouth daily       zolpidem (AMBIEN) 5 MG tablet TAKE 1 TAB ORALLY AS NEEDED FOR SLEEP 5 tablet 0       ALLERGIES     Allergies   Allergen Reactions     Chlorthalidone Nausea and Vomiting     dizziness     Pcn [Penicillins] Rash     Rash with PCN only. Patient has taken amoxicillin with no rash.       PAST MEDICAL HISTORY:  Past Medical History:   Diagnosis Date     Basal cell carcinoma      Carotid stenosis, left     s/p left CEA 2/2020     Cerebral infarction (H)     left CVA 2/2020 post-op carotid endarterectomy     Coronary artery disease     4 vessel bypass November 2010; LIMA ->LAD, SVG-> OM3, SVG -> D2, SVG -> PDA     Diabetes mellitus (H) 2005    neuropathy     Generalized anxiety disorder 05/02/2014     Hepatitis C, chronic (H) 2005     Hyperlipidemia LDL goal < 70      Hypertension      Liver diseases     9/15 Liver is 10 cm in span without left lobe enlargement     Persistent microalbuminuria associated with type 2 diabetes mellitus (H) 05/06/2015     Renal artery stenosis (H)        PAST SURGICAL HISTORY:  Past Surgical History:   Procedure Laterality Date     ARTHROSCOPY KNEE RT/LT      left     COLONOSCOPY       CORONARY ARTERY BYPASS  11/18/201    Coronary artery bypass grafting x 4 with placement of the left internal mammary artery to the distal midportion of the left anterior descending artery, saphenous vein graft from aorta to the third obtuse marginal branch of circumflex coronary artery, saphenous vein graft from aorta to the second diagonal branch, saphenous vein graft from aorta to the posterior descending artery.     ENDARTERECTOMY CAROTID Left 2/21/2020    Procedure: LEFT CAROTID ENDARTERECTOMY WITH EEG;  Surgeon: Semaj Stubbs MD;  Location:  OR     ESOPHAGOSCOPY, GASTROSCOPY, DUODENOSCOPY (EGD), COMBINED  10/31/2011    Procedure:COMBINED ESOPHAGOSCOPY, GASTROSCOPY, DUODENOSCOPY (EGD);  Surgeon:ALONSO ALDANA; Location: GI     ESOPHAGOSCOPY, GASTROSCOPY, DUODENOSCOPY (EGD), COMBINED N/A 3/8/2018    Procedure: COMBINED ESOPHAGOSCOPY, GASTROSCOPY, DUODENOSCOPY (EGD);  EGD;  Surgeon: Angel Luis Justice MD;  Location:  GI     HEART CATH CORONARY ANGIOGRAM W/LV GRAM  9-11-10    CV Surgery recommended     HEART CATH CORONARY ANGIOGRAM W/LV GRAM  2-14    Medical management     HERNIA REPAIR, INGUINAL RT/LT      left       FAMILY HISTORY:  Family History   Problem Relation Age of Onset     C.A.D. Father      Cancer Father         bladder     Heart Surgery Father         bypass surgery     Heart Disease Mother        SOCIAL HISTORY:  Social History     Socioeconomic History     Marital status:      Spouse name: None     Number of children: None     Years of education: None     Highest education level: None   Occupational History     None   Tobacco Use     Smoking status: Former Smoker     Packs/day: 0.50     Years: 12.00     Pack years: 6.00     Types: Cigarettes     Start date:      Quit date: 2005     Years since quittin.1     Smokeless tobacco: Never Used   Substance and Sexual Activity     Alcohol use: Yes     Comment: minimal     Drug use: No     Sexual activity: Yes     Partners: Female   Other Topics Concern     Parent/sibling w/ CABG, MI or angioplasty before 65F 55M? No      Service Not Asked     Blood Transfusions Not Asked     Caffeine Concern Yes     Comment: 2 cups coffee per day     Occupational Exposure Not Asked     Hobby Hazards Not Asked     Sleep Concern Not Asked     Stress Concern Not Asked     Weight Concern Not Asked     Special Diet Yes     Comment: low carb diet, low sugar     Back Care Not Asked     Exercise Yes     Comment: treadmill 40 minutes, walk, daily, bike 30 minutes every other day     Bike Helmet Not Asked     Seat Belt Not Asked     Self-Exams Not Asked   Social History Narrative     None     Social Determinants of Health      Financial Resource Strain: Not on file   Food Insecurity: Not on file   Transportation Needs: Not on file   Physical Activity: Not on file   Stress: Not on file   Social Connections: Not on file   Intimate Partner Violence: Not on file   Housing Stability: Not on file       Review of Systems:  Skin:  not assessed   basdangelo cell being treated by derm    Eyes:  Negative glasses right eye retnal neuropathy beginning stages   ENT:  not assessed      Respiratory:  Negative shortness of breath;cough     Cardiovascular:  palpitations;chest pain;edema;Negative lightheadedness;fatigue;Positive for fatigue due to meds  Gastroenterology: Negative heartburn food choice   Genitourinary:  Negative      Musculoskeletal:  Positive for arthritis increasing  Neurologic:  Positive for headaches;numbness or tingling of feet    Psychiatric:  Negative sleep disturbances;anxiety;depression    Heme/Lymph/Imm:  Negative allergies    Endocrine:  Positive for diabetes Type II    Physical Exam:  Vitals: /84   Pulse 64   Ht 1.829 m (6')   Wt 86 kg (189 lb 8 oz)   SpO2 98%   BMI 25.70 kg/m      Constitutional:  cooperative;in no acute distress        Skin:  warm and dry to the touch          Head:  normocephalic        Eyes:  conjunctivae and lids unremarkable        Lymph:No Axillary lymphadenopathy present     ENT:  no pallor or cyanosis        Neck:  JVP normal;no carotid bruit        Respiratory:  clear to auscultation         Cardiac: regular rhythm;no murmurs, gallops or rubs detected                pulses full and equal, no bruits auscultated                                        GI:  abdomen soft;non-tender        Extremities and Muscular Skeletal:  no edema              Neurological:  no gross motor deficits        Psych:  Alert and Oriented x 3        CC  Michaelle Caba, PA-C  [address]  [place] [id number]

## 2022-03-23 NOTE — LETTER
3/23/2022    Marshal Cuevas MD  [address]  [place] [address]    RE: Vikash Reevesmackenzie       Dear Colleague,     I had the pleasure of seeing Vikash Reevesmackenzie in the [facility].  HPI and Plan:   See dictation    Today's clinic visit entailed:  The following tests were independently interpreted by me as noted in my documentation: Renal artery US and CTA   30 minutes spent on the date of the encounter doing chart review, history and exam, documentation and further activities per the note  Provider  Link to [employer] Help Grid     The level of medical decision making during this visit was of moderate complexity.      Orders Placed This Encounter   Procedures     US Renal Complete w Doppler Complete     Sleep Study Referral     Follow-Up with Cardiology       No orders of the defined types were placed in this encounter.      There are no discontinued medications.      Encounter Diagnoses   Name Primary?     Hypertension, unspecified type      Sleep apnea, unspecified type      Renal artery stenosis (H) Yes       CURRENT MEDICATIONS:  Current Outpatient Medications   Medication Sig Dispense Refill     aspirin 81 MG tablet Take 1 tablet by mouth daily.       B-D U/F 31G X 8 MM insulin pen needle USE ONE PEN NEEDLES DAILY OR AS DIRECTED. 100 each 3     celecoxib (CELEBREX) 100 MG capsule TAKE 1 CAPSULE BY MOUTH EVERY DAY 30 capsule 1     chlorhexidine (PERIDEX) 0.12 % solution Take 15 mLs by mouth 2 times daily as needed   5     cyanocobalamin 1000 MCG SUBL Place 1,000 mcg under the tongue daily       gabapentin (NEURONTIN) 300 MG capsule Take 1 capsule (300 mg) by mouth 3 times daily 90 capsule 4     glucosamine-chondroitin 500-400 MG CAPS per capsule Take 1 capsule by mouth daily       insulin pen needle (BD HEIKE U/F) 32G X 4 MM Use 1 daily or as directed. 100 each prn     LEVEMIR FLEXTOUCH 100 UNIT/ML pen INJECT 50 UNITS SUBCUTANEOUS AT BEDTIME 15 mL 27     lisinopril (ZESTRIL) 20 MG  tablet Take 1 tablet (20 mg) by mouth 2 times daily 180 tablet 3     nebivolol (BYSTOLIC) 10 MG tablet Take 1 tablet (10 mg) by mouth At Bedtime 90 tablet 3     rosuvastatin (CRESTOR) 20 MG tablet Take 1 tablet (20 mg) by mouth daily 90 tablet 0     Vitamin D, Cholecalciferol, 1000 units TABS Take 1,000 Units by mouth daily       zolpidem (AMBIEN) 5 MG tablet TAKE 1 TAB ORALLY AS NEEDED FOR SLEEP 5 tablet 0       ALLERGIES     Allergies   Allergen Reactions     Chlorthalidone Nausea and Vomiting     dizziness     Pcn [Penicillins] Rash     Rash with PCN only. Patient has taken amoxicillin with no rash.       PAST MEDICAL HISTORY:  Past Medical History:   Diagnosis Date     Basal cell carcinoma      Carotid stenosis, left     s/p left CEA 2/2020     Cerebral infarction (H)     left CVA 2/2020 post-op carotid endarterectomy     Coronary artery disease     4 vessel bypass November 2010; LIMA ->LAD, SVG-> OM3, SVG -> D2, SVG -> PDA     Diabetes mellitus (H) 2005    neuropathy     Generalized anxiety disorder 05/02/2014     Hepatitis C, chronic (H) 2005     Hyperlipidemia LDL goal < 70      Hypertension      Liver diseases     9/15 Liver is 10 cm in span without left lobe enlargement     Persistent microalbuminuria associated with type 2 diabetes mellitus (H) 05/06/2015     Renal artery stenosis (H)        PAST SURGICAL HISTORY:  Past Surgical History:   Procedure Laterality Date     ARTHROSCOPY KNEE RT/LT      left     COLONOSCOPY       CORONARY ARTERY BYPASS  11/18/201    Coronary artery bypass grafting x 4 with placement of the left internal mammary artery to the distal midportion of the left anterior descending artery, saphenous vein graft from aorta to the third obtuse marginal branch of circumflex coronary artery, saphenous vein graft from aorta to the second diagonal branch, saphenous vein graft from aorta to the posterior descending artery.     ENDARTERECTOMY CAROTID Left 2/21/2020    Procedure: LEFT CAROTID  ENDARTERECTOMY WITH EEG;  Surgeon: Semaj Stubbs MD;  Location:  OR     ESOPHAGOSCOPY, GASTROSCOPY, DUODENOSCOPY (EGD), COMBINED  10/31/2011    Procedure:COMBINED ESOPHAGOSCOPY, GASTROSCOPY, DUODENOSCOPY (EGD); Surgeon:ALONSO ALDANA; Location: GI     ESOPHAGOSCOPY, GASTROSCOPY, DUODENOSCOPY (EGD), COMBINED N/A 3/8/2018    Procedure: COMBINED ESOPHAGOSCOPY, GASTROSCOPY, DUODENOSCOPY (EGD);  EGD;  Surgeon: Angel Luis Justice MD;  Location:  GI     HEART CATH CORONARY ANGIOGRAM W/LV GRAM  --10    CV Surgery recommended     HEART CATH CORONARY ANGIOGRAM W/LV GRAM  14    Medical management     HERNIA REPAIR, INGUINAL RT/LT      left       FAMILY HISTORY:  Family History   Problem Relation Age of Onset     C.A.D. Father      Cancer Father         bladder     Heart Surgery Father         bypass surgery     Heart Disease Mother        SOCIAL HISTORY:  Social History     Socioeconomic History     Marital status:      Spouse name: None     Number of children: None     Years of education: None     Highest education level: None   Occupational History     None   Tobacco Use     Smoking status: Former Smoker     Packs/day: 0.50     Years: 12.00     Pack years: 6.00     Types: Cigarettes     Start date:      Quit date: 2005     Years since quittin.1     Smokeless tobacco: Never Used   Substance and Sexual Activity     Alcohol use: Yes     Comment: minimal     Drug use: No     Sexual activity: Yes     Partners: Female   Other Topics Concern     Parent/sibling w/ CABG, MI or angioplasty before 65F 55M? No      Service Not Asked     Blood Transfusions Not Asked     Caffeine Concern Yes     Comment: 2 cups coffee per day     Occupational Exposure Not Asked     Hobby Hazards Not Asked     Sleep Concern Not Asked     Stress Concern Not Asked     Weight Concern Not Asked     Special Diet Yes     Comment: low carb diet, low sugar     Back Care Not Asked     Exercise Yes      Comment: treadmill 40 minutes, walk, daily, bike 30 minutes every other day     Bike Helmet Not Asked     Seat Belt Not Asked     Self-Exams Not Asked   Social History Narrative     None     Social Determinants of Health     Financial Resource Strain: Not on file   Food Insecurity: Not on file   Transportation Needs: Not on file   Physical Activity: Not on file   Stress: Not on file   Social Connections: Not on file   Intimate Partner Violence: Not on file   Housing Stability: Not on file       Review of Systems:  Skin:  not assessed   baso cell being treated by derm    Eyes:  Negative glasses right eye retnal neuropathy beginning stages   ENT:  not assessed      Respiratory:  Negative shortness of breath;cough     Cardiovascular:  palpitations;chest pain;edema;Negative lightheadedness;fatigue;Positive for fatigue due to meds  Gastroenterology: Negative heartburn food choice   Genitourinary:  Negative      Musculoskeletal:  Positive for arthritis increasing  Neurologic:  Positive for headaches;numbness or tingling of feet    Psychiatric:  Negative sleep disturbances;anxiety;depression    Heme/Lymph/Imm:  Negative allergies    Endocrine:  Positive for diabetes Type II    Physical Exam:  Vitals: /84   Pulse 64   Ht 1.829 m (6')   Wt 86 kg (189 lb 8 oz)   SpO2 98%   BMI 25.70 kg/m      Constitutional:  cooperative;in no acute distress        Skin:  warm and dry to the touch          Head:  normocephalic        Eyes:  conjunctivae and lids unremarkable        Lymph:No Axillary lymphadenopathy present     ENT:  no pallor or cyanosis        Neck:  JVP normal;no carotid bruit        Respiratory:  clear to auscultation         Cardiac: regular rhythm;no murmurs, gallops or rubs detected                pulses full and equal, no bruits auscultated                                        GI:  abdomen soft;non-tender        Extremities and Muscular Skeletal:  no edema              Neurological:  no gross motor  deficits        Psych:  Alert and Oriented x 3        CC  Michaelle Caba PA-C  [address]  [place]  [place] [address]                  Thank you for allowing me to participate in the care of your patient.      Sincerely,     Erasmo Godinez MD, MD     [facility]  cc:   Michaelle Caba PA-C  [address]  [place]  [place] [address]

## 2022-03-23 NOTE — LETTER
3/23/2022      RE: Vikash Burgos  [address]  [place] [address]       HPI and Plan:   See dictation    Today's clinic visit entailed:  The following tests were independently interpreted by me as noted in my documentation: Renal artery US and CTA   30 minutes spent on the date of the encounter doing chart review, history and exam, documentation and further activities per the note  Provider  Link to [employer] Help Grid     The level of medical decision making during this visit was of moderate complexity.      Orders Placed This Encounter   Procedures     US Renal Complete w Doppler Complete     Sleep Study Referral     Follow-Up with Cardiology       No orders of the defined types were placed in this encounter.      There are no discontinued medications.      Encounter Diagnoses   Name Primary?     Hypertension, unspecified type      Sleep apnea, unspecified type      Renal artery stenosis (H) Yes       CURRENT MEDICATIONS:  Current Outpatient Medications   Medication Sig Dispense Refill     aspirin 81 MG tablet Take 1 tablet by mouth daily.       B-D U/F 31G X 8 MM insulin pen needle USE ONE PEN NEEDLES DAILY OR AS DIRECTED. 100 each 3     celecoxib (CELEBREX) 100 MG capsule TAKE 1 CAPSULE BY MOUTH EVERY DAY 30 capsule 1     chlorhexidine (PERIDEX) 0.12 % solution Take 15 mLs by mouth 2 times daily as needed   5     cyanocobalamin 1000 MCG SUBL Place 1,000 mcg under the tongue daily       gabapentin (NEURONTIN) 300 MG capsule Take 1 capsule (300 mg) by mouth 3 times daily 90 capsule 4     glucosamine-chondroitin 500-400 MG CAPS per capsule Take 1 capsule by mouth daily       insulin pen needle (BD HEIKE U/F) 32G X 4 MM Use 1 daily or as directed. 100 each prn     LEVEMIR FLEXTOUCH 100 UNIT/ML pen INJECT 50 UNITS SUBCUTANEOUS AT BEDTIME 15 mL 27     lisinopril (ZESTRIL) 20 MG tablet Take 1 tablet (20 mg) by mouth 2 times daily 180 tablet 3     nebivolol (BYSTOLIC) 10 MG tablet Take 1 tablet (10 mg)  by mouth At Bedtime 90 tablet 3     rosuvastatin (CRESTOR) 20 MG tablet Take 1 tablet (20 mg) by mouth daily 90 tablet 0     Vitamin D, Cholecalciferol, 1000 units TABS Take 1,000 Units by mouth daily       zolpidem (AMBIEN) 5 MG tablet TAKE 1 TAB ORALLY AS NEEDED FOR SLEEP 5 tablet 0       ALLERGIES     Allergies   Allergen Reactions     Chlorthalidone Nausea and Vomiting     dizziness     Pcn [Penicillins] Rash     Rash with PCN only. Patient has taken amoxicillin with no rash.       PAST MEDICAL HISTORY:  Past Medical History:   Diagnosis Date     Basal cell carcinoma      Carotid stenosis, left     s/p left CEA 2/2020     Cerebral infarction (H)     left CVA 2/2020 post-op carotid endarterectomy     Coronary artery disease     4 vessel bypass November 2010; LIMA ->LAD, SVG-> OM3, SVG -> D2, SVG -> PDA     Diabetes mellitus (H) 2005    neuropathy     Generalized anxiety disorder 05/02/2014     Hepatitis C, chronic (H) 2005     Hyperlipidemia LDL goal < 70      Hypertension      Liver diseases     9/15 Liver is 10 cm in span without left lobe enlargement     Persistent microalbuminuria associated with type 2 diabetes mellitus (H) 05/06/2015     Renal artery stenosis (H)        PAST SURGICAL HISTORY:  Past Surgical History:   Procedure Laterality Date     ARTHROSCOPY KNEE RT/LT      left     COLONOSCOPY       CORONARY ARTERY BYPASS  11/18/201    Coronary artery bypass grafting x 4 with placement of the left internal mammary artery to the distal midportion of the left anterior descending artery, saphenous vein graft from aorta to the third obtuse marginal branch of circumflex coronary artery, saphenous vein graft from aorta to the second diagonal branch, saphenous vein graft from aorta to the posterior descending artery.     ENDARTERECTOMY CAROTID Left 2/21/2020    Procedure: LEFT CAROTID ENDARTERECTOMY WITH EEG;  Surgeon: Semaj Stubbs MD;  Location:  OR     ESOPHAGOSCOPY, GASTROSCOPY, DUODENOSCOPY (EGD),  COMBINED  10/31/2011    Procedure:COMBINED ESOPHAGOSCOPY, GASTROSCOPY, DUODENOSCOPY (EGD); Surgeon:ALONSO ALDANA; Location: GI     ESOPHAGOSCOPY, GASTROSCOPY, DUODENOSCOPY (EGD), COMBINED N/A 3/8/2018    Procedure: COMBINED ESOPHAGOSCOPY, GASTROSCOPY, DUODENOSCOPY (EGD);  EGD;  Surgeon: Angel Luis Justice MD;  Location:  GI     HEART CATH CORONARY ANGIOGRAM W/LV GRAM  -10    CV Surgery recommended     HEART CATH CORONARY ANGIOGRAM W/LV GRAM  14    Medical management     HERNIA REPAIR, INGUINAL RT/LT      left       FAMILY HISTORY:  Family History   Problem Relation Age of Onset     C.A.D. Father      Cancer Father         bladder     Heart Surgery Father         bypass surgery     Heart Disease Mother        SOCIAL HISTORY:  Social History     Socioeconomic History     Marital status:      Spouse name: None     Number of children: None     Years of education: None     Highest education level: None   Occupational History     None   Tobacco Use     Smoking status: Former Smoker     Packs/day: 0.50     Years: 12.00     Pack years: 6.00     Types: Cigarettes     Start date:      Quit date: 2005     Years since quittin.1     Smokeless tobacco: Never Used   Substance and Sexual Activity     Alcohol use: Yes     Comment: minimal     Drug use: No     Sexual activity: Yes     Partners: Female   Other Topics Concern     Parent/sibling w/ CABG, MI or angioplasty before 65F 55M? No      Service Not Asked     Blood Transfusions Not Asked     Caffeine Concern Yes     Comment: 2 cups coffee per day     Occupational Exposure Not Asked     Hobby Hazards Not Asked     Sleep Concern Not Asked     Stress Concern Not Asked     Weight Concern Not Asked     Special Diet Yes     Comment: low carb diet, low sugar     Back Care Not Asked     Exercise Yes     Comment: treadmill 40 minutes, walk, daily, bike 30 minutes every other day     Bike Helmet Not Asked     Seat Belt Not Asked      Self-Exams Not Asked   Social History Narrative     None     Social Determinants of Health     Financial Resource Strain: Not on file   Food Insecurity: Not on file   Transportation Needs: Not on file   Physical Activity: Not on file   Stress: Not on file   Social Connections: Not on file   Intimate Partner Violence: Not on file   Housing Stability: Not on file       Review of Systems:  Skin:  not assessed   baso cell being treated by derm    Eyes:  Negative glasses right eye retnal neuropathy beginning stages   ENT:  not assessed      Respiratory:  Negative shortness of breath;cough     Cardiovascular:  palpitations;chest pain;edema;Negative lightheadedness;fatigue;Positive for fatigue due to meds  Gastroenterology: Negative heartburn food choice   Genitourinary:  Negative      Musculoskeletal:  Positive for arthritis increasing  Neurologic:  Positive for headaches;numbness or tingling of feet    Psychiatric:  Negative sleep disturbances;anxiety;depression    Heme/Lymph/Imm:  Negative allergies    Endocrine:  Positive for diabetes Type II    Physical Exam:  Vitals: /84   Pulse 64   Ht 1.829 m (6')   Wt 86 kg (189 lb 8 oz)   SpO2 98%   BMI 25.70 kg/m      Constitutional:  cooperative;in no acute distress        Skin:  warm and dry to the touch          Head:  normocephalic        Eyes:  conjunctivae and lids unremarkable        Lymph:No Axillary lymphadenopathy present     ENT:  no pallor or cyanosis        Neck:  JVP normal;no carotid bruit        Respiratory:  clear to auscultation         Cardiac: regular rhythm;no murmurs, gallops or rubs detected                pulses full and equal, no bruits auscultated                                        GI:  abdomen soft;non-tender        Extremities and Muscular Skeletal:  no edema              Neurological:  no gross motor deficits        Psych:  Alert and Oriented x 3        CC  Michaelle Caba, PA-C  [address]  [place]  [id number]                Service Date: 03/23/2022    REASON FOR CONSULTATION:  Renal artery stenosis and labile hypertension.    REFERRING PHYSICIAN:  Michaelle Caba PA-C.    HISTORY OF PRESENT ILLNESS:  I had the pleasure of seeing Kelton Burgos at the [facility] in [place] this morning.  He is a very pleasant 60-year-old gentleman with extensive cardiovascular history including coronary artery disease, status post prior CABG, carotid artery disease, status post prior left carotid artery endarterectomy and mild ischemic cardiomyopathy.  He also has a history of hypertension, type 2 diabetes, hyperlipidemia and prior hepatitis C with ongoing cirrhosis.  He is referred for further evaluation regarding possible renal artery stenosis and labile hypertension.    His blood pressure was reasonably well controlled in the past. However, over the past several months, he has noticed labile hypertension.  He has had blood pressures as high as 200, usually in the morning when he wakes up.  Blood pressure usually improves throughout the day, but occasionally, he will have spikes into the 170s.  He was referred for renal artery stenosis for possible evaluation of secondary hypertension.  The ultrasound, which I personally reviewed, was performed on 03/01/2022.  This revealed elevated velocities at the origin of renal arteries bilaterally.  Specifically, the origin of the left renal artery had significantly elevated velocity of 340 meters per second.  This was followed up with a CTA of the abdomen.  The CTA of the abdomen was read as showing no evidence of significant renal artery stenosis.  Mild atheromatous plaque was noted in the bilateral renal artery origins.  However, visually the proximal left renal artery appears to have at least moderate stenosis.    The patient's blood pressure medications were recently adjusted.  Both the lisinopril and the Bystolic doses were increased.  Since then, his blood  pressure has been reasonably well controlled.  Blood pressure in the office today is 134/84.    He tells me that he has sleep apnea but is currently not treated.  He does not sleep much and at an average sleeps 4 hours per night.    ASSESSMENT AND PLAN:  A very pleasant 62-year-old gentleman with renal artery stenosis, likely mild on the right and at least moderate on the left.  We discussed indications for revascularization.  Fortunately, the CT does not show severe stenosis, although the lesion at the origin of the left might have been underestimated.  Nevertheless, his blood pressure appears to be reasonably well controlled now with increased doses.  As such, I would recommend continuing current medical program.  He is somewhat anxious about potential progression of his renal artery stenosis.  Therefore, we will perform surveillance ultrasound in approximately 6 months.    We have also discussed the role of sleep apnea and secondary hypertension.  I told him, in fact, the number one cause of secondary hypertension in adults is sleep apnea.  I would refer him to the Sleep Medicine Clinic for further evaluation.  I told him it is really important for him to have his sleep apnea treated.    I will see him after his ultrasound in approximately 6-12 months.  I appreciate the opportunity to participate in his care.    Erasmo Godinez MD        D: 2022   T: 2022   MT: michael    Name:     IHSAN SANCHEZ  MRN:      [id number]        Account:      [id number]   :      1960           Service Date: 2022       Document: [id number]

## 2022-03-23 NOTE — PROGRESS NOTES
Service Date: 03/23/2022    REASON FOR CONSULTATION:  Renal artery stenosis and labile hypertension.    REFERRING PHYSICIAN:  Michaelle Caba PA-C.    HISTORY OF PRESENT ILLNESS:  I had the pleasure of seeing Kelton Burgos at the [facility] in [place] this morning.  He is a very pleasant 60-year-old gentleman with extensive cardiovascular history including coronary artery disease, status post prior CABG, carotid artery disease, status post prior left carotid artery endarterectomy and mild ischemic cardiomyopathy.  He also has a history of hypertension, type 2 diabetes, hyperlipidemia and prior hepatitis C with ongoing cirrhosis.  He is referred for further evaluation regarding possible renal artery stenosis and labile hypertension.    His blood pressure was reasonably well controlled in the past. However, over the past several months, he has noticed labile hypertension.  He has had blood pressures as high as 200, usually in the morning when he wakes up.  Blood pressure usually improves throughout the day, but occasionally, he will have spikes into the 170s.  He was referred for renal artery stenosis for possible evaluation of secondary hypertension.  The ultrasound, which I personally reviewed, was performed on 03/01/2022.  This revealed elevated velocities at the origin of renal arteries bilaterally.  Specifically, the origin of the left renal artery had significantly elevated velocity of 340 meters per second.  This was followed up with a CTA of the abdomen.  The CTA of the abdomen was read as showing no evidence of significant renal artery stenosis.  Mild atheromatous plaque was noted in the bilateral renal artery origins.  However, visually the proximal left renal artery appears to have at least moderate stenosis.    The patient's blood pressure medications were recently adjusted.  Both the lisinopril and the Bystolic doses were increased.  Since then, his blood pressure has been  reasonably well controlled.  Blood pressure in the office today is 134/84.    He tells me that he has sleep apnea but is currently not treated.  He does not sleep much and at an average sleeps 4 hours per night.    ASSESSMENT AND PLAN:  A very pleasant 62-year-old gentleman with renal artery stenosis, likely mild on the right and at least moderate on the left.  We discussed indications for revascularization.  Fortunately, the CT does not show severe stenosis, although the lesion at the origin of the left might have been underestimated.  Nevertheless, his blood pressure appears to be reasonably well controlled now with increased doses.  As such, I would recommend continuing current medical program.  He is somewhat anxious about potential progression of his renal artery stenosis.  Therefore, we will perform surveillance ultrasound in approximately 6 months.    We have also discussed the role of sleep apnea and secondary hypertension.  I told him, in fact, the number one cause of secondary hypertension in adults is sleep apnea.  I would refer him to the Sleep Medicine Clinic for further evaluation.  I told him it is really important for him to have his sleep apnea treated.    I will see him after his ultrasound in approximately 6-12 months.  I appreciate the opportunity to participate in his care.    Erasmo Godinez MD        D: 2022   T: 2022   MT: michael    Name:     IHSAN SANCHEZ  MRN:      [id number]        Account:      [id number]   :      1960           Service Date: 2022       Document: [id number]

## 2022-03-29 ENCOUNTER — OFFICE VISIT (OUTPATIENT)
Dept: GASTROENTEROLOGY | Facility: CLINIC | Age: 62
End: 2022-03-29
Attending: INTERNAL MEDICINE
Payer: COMMERCIAL

## 2022-03-29 ENCOUNTER — LAB (OUTPATIENT)
Dept: LAB | Facility: CLINIC | Age: 62
End: 2022-03-29
Payer: COMMERCIAL

## 2022-03-29 ENCOUNTER — ANCILLARY PROCEDURE (OUTPATIENT)
Dept: ULTRASOUND IMAGING | Facility: CLINIC | Age: 62
End: 2022-03-29
Attending: INTERNAL MEDICINE
Payer: COMMERCIAL

## 2022-03-29 VITALS
SYSTOLIC BLOOD PRESSURE: 153 MMHG | WEIGHT: 186.4 LBS | HEART RATE: 70 BPM | OXYGEN SATURATION: 98 % | DIASTOLIC BLOOD PRESSURE: 82 MMHG | TEMPERATURE: 97.5 F | BODY MASS INDEX: 25.28 KG/M2

## 2022-03-29 DIAGNOSIS — K74.60 CIRRHOSIS OF LIVER WITHOUT ASCITES, UNSPECIFIED HEPATIC CIRRHOSIS TYPE (H): ICD-10-CM

## 2022-03-29 DIAGNOSIS — I25.10 CORONARY ARTERY DISEASE INVOLVING NATIVE CORONARY ARTERY OF NATIVE HEART WITHOUT ANGINA PECTORIS: ICD-10-CM

## 2022-03-29 DIAGNOSIS — K74.60 CIRRHOSIS OF LIVER WITHOUT ASCITES, UNSPECIFIED HEPATIC CIRRHOSIS TYPE (H): Primary | ICD-10-CM

## 2022-03-29 DIAGNOSIS — I10 HYPERTENSION, UNSPECIFIED TYPE: ICD-10-CM

## 2022-03-29 LAB
ALBUMIN SERPL-MCNC: 4.4 G/DL (ref 3.4–5)
ALP SERPL-CCNC: 67 U/L (ref 40–150)
ALT SERPL W P-5'-P-CCNC: 42 U/L (ref 0–70)
ANION GAP SERPL CALCULATED.3IONS-SCNC: 6 MMOL/L (ref 3–14)
AST SERPL W P-5'-P-CCNC: 24 U/L (ref 0–45)
BILIRUB DIRECT SERPL-MCNC: 0.4 MG/DL (ref 0–0.2)
BILIRUB SERPL-MCNC: 1.8 MG/DL (ref 0.2–1.3)
BUN SERPL-MCNC: 18 MG/DL (ref 7–30)
CALCIUM SERPL-MCNC: 9.4 MG/DL (ref 8.5–10.1)
CHLORIDE BLD-SCNC: 103 MMOL/L (ref 94–109)
CHOLEST SERPL-MCNC: 93 MG/DL
CO2 SERPL-SCNC: 30 MMOL/L (ref 20–32)
CREAT SERPL-MCNC: 0.95 MG/DL (ref 0.66–1.25)
FASTING STATUS PATIENT QL REPORTED: YES
GFR SERPL CREATININE-BSD FRML MDRD: 90 ML/MIN/1.73M2
GLUCOSE BLD-MCNC: 87 MG/DL (ref 70–99)
HDLC SERPL-MCNC: 36 MG/DL
LDLC SERPL CALC-MCNC: 40 MG/DL
NONHDLC SERPL-MCNC: 57 MG/DL
POTASSIUM BLD-SCNC: 4.6 MMOL/L (ref 3.4–5.3)
PROT SERPL-MCNC: 7.9 G/DL (ref 6.8–8.8)
SODIUM SERPL-SCNC: 139 MMOL/L (ref 133–144)
TRIGL SERPL-MCNC: 87 MG/DL

## 2022-03-29 PROCEDURE — 80061 LIPID PANEL: CPT | Performed by: PATHOLOGY

## 2022-03-29 PROCEDURE — 99214 OFFICE O/P EST MOD 30 MIN: CPT | Performed by: INTERNAL MEDICINE

## 2022-03-29 PROCEDURE — 82248 BILIRUBIN DIRECT: CPT | Performed by: PATHOLOGY

## 2022-03-29 PROCEDURE — 80053 COMPREHEN METABOLIC PANEL: CPT | Performed by: PATHOLOGY

## 2022-03-29 PROCEDURE — 36415 COLL VENOUS BLD VENIPUNCTURE: CPT | Performed by: PATHOLOGY

## 2022-03-29 PROCEDURE — 76700 US EXAM ABDOM COMPLETE: CPT | Performed by: RADIOLOGY

## 2022-03-29 PROCEDURE — G0463 HOSPITAL OUTPT CLINIC VISIT: HCPCS

## 2022-03-29 ASSESSMENT — PAIN SCALES - GENERAL: PAINLEVEL: NO PAIN (0)

## 2022-03-29 NOTE — LETTER
3/29/2022     RE: Vikash Burgos  [address]  [place] [address]    Dear Colleague,    Thank you for referring your patient, Vikash Burgos, to the [facility] [place]. Please see a copy of my visit note below.    HISTORY OF PRESENT ILLNESS:  I had the pleasure of seeing Vikash Burgos for followup in the Liver Clinic at the [facility] on 03/29/2022.  Mr. Burgos returns for followup of cirrhosis caused by nonalcoholic fatty liver disease.    For the most part, he is unchanged.  He is having some issues with his blood pressure.  There was an issue raised about renal artery stenosis, though that does not appear to be the most significant problem.  He is working with a cardiologist at [facility] to try to address that.    Otherwise, he still notes some very mild right upper quadrant pain.  He denies any itching or skin rash.  He has mild fatigue.  He denies any increased abdominal girth or lower extremity edema.  He has not had any gastrointestinal bleeding or any overt signs of hepatic encephalopathy.    He denies any fevers or chills, cough or shortness of breath.  He denies any nausea or vomiting, diarrhea or constipation.  His appetite has been good.  For the most part, his weight has been stable.    He has received 3 doses of the COVID-19 vaccine.    Current Outpatient Medications   Medication     aspirin 81 MG tablet     B-D U/F 31G X 8 MM insulin pen needle     celecoxib (CELEBREX) 100 MG capsule     chlorhexidine (PERIDEX) 0.12 % solution     cyanocobalamin 1000 MCG SUBL     gabapentin (NEURONTIN) 300 MG capsule     glucosamine-chondroitin 500-400 MG CAPS per capsule     insulin pen needle (BD HEIKE U/F) 32G X 4 MM     LEVEMIR FLEXTOUCH 100 UNIT/ML pen     lisinopril (ZESTRIL) 20 MG tablet     nebivolol (BYSTOLIC) 10 MG tablet     rosuvastatin (CRESTOR) 20 MG tablet     Vitamin D, Cholecalciferol, 1000 units TABS      zolpidem (AMBIEN) 5 MG tablet     Current Facility-Administered Medications   Medication     study - tirzepatide 2.5-15 mg or dulaglutide 1.5 mg (SURPASS) (IDS# 5849) injection 1.5-15 mg     study - tirzepatide 2.5-15 mg or dulaglutide 1.5 mg (SURPASS) (IDS# 5849) injection 1.5-15 mg     BP (!) 153/82   Pulse 70   Temp 97.5  F (36.4  C) (Oral)   Wt 84.6 kg (186 lb 6.4 oz)   SpO2 98%   BMI 25.28 kg/m      PHYSICAL EXAMINATION:    GENERAL:  He looks quite well.  HEENT:  No scleral icterus or temporal muscle wasting.  CHEST:  Clear.  ABDOMEN:  No increase in girth.  No masses or tenderness to palpation is present.  His liver is 10 cm in span without left lobe enlargement.  No spleen tip is palpable.  EXTREMITIES:  No edema.  SKIN:  No stigmata of chronic liver disease.  NEUROLOGIC:  No asterixis.    Recent Results (from the past 168 hour(s))   Lipid Profile    Collection Time: 03/29/22  7:22 AM   Result Value Ref Range    Cholesterol 93 <200 mg/dL    Triglycerides 87 <150 mg/dL    Direct Measure HDL 36 (L) >=40 mg/dL    LDL Cholesterol Calculated 40 <=100 mg/dL    Non HDL Cholesterol 57 <130 mg/dL    Patient Fasting > 8hrs? Yes    Basic metabolic panel    Collection Time: 03/29/22  7:22 AM   Result Value Ref Range    Sodium 139 133 - 144 mmol/L    Potassium 4.6 3.4 - 5.3 mmol/L    Chloride 103 94 - 109 mmol/L    Carbon Dioxide (CO2) 30 20 - 32 mmol/L    Anion Gap 6 3 - 14 mmol/L    Urea Nitrogen 18 7 - 30 mg/dL    Creatinine 0.95 0.66 - 1.25 mg/dL    Calcium 9.4 8.5 - 10.1 mg/dL    Glucose 87 70 - 99 mg/dL    GFR Estimate 90 >60 mL/min/1.73m2   Hepatic panel    Collection Time: 03/29/22  7:22 AM   Result Value Ref Range    Bilirubin Total 1.8 (H) 0.2 - 1.3 mg/dL    Bilirubin Direct 0.4 (H) 0.0 - 0.2 mg/dL    Protein Total 7.9 6.8 - 8.8 g/dL    Albumin 4.4 3.4 - 5.0 g/dL    Alkaline Phosphatase 67 40 - 150 U/L    AST 24 0 - 45 U/L    ALT 42 0 - 70 U/L      Narrative & Impression   ULTRASOUND ABDOMEN COMPLETE  3/29/2022 6:34 AM     CLINICAL HISTORY: Cirrhosis of liver without ascites, unspecified  hepatic cirrhosis type (H).      COMPARISONS: CT 3/8/2022     REFERRING PROVIDER: LONDON YANG     TECHNIQUE: Abdominal viscera evaluated with grayscale imaging. Splenic  and main portal veins evaluated with color Doppler ultrasound.      Cine clips through the liver were saved in the patient's record.     FINDINGS:  Liver: 14.8 cm. Increased echogenicity and echotexture. No focal  hepatic lesion.  US visualization score: A - No or minimal limitations     Spleen: 3.6 x 11.1 x 12.0 cm.     Right kidney: 11.9 cm. Normal. No mass, stone, or hydronephrosis.     Left kidney: 12.3 cm. Normal. No mass, stone, or hydronephrosis.     Aorta:        Proximal: 2.3 cm.       Mid: 1.6 cm.       Distal: 1.5 cm.     Inferior vena cava: 2.2 cm.     Main portal vein diameter: 1.0 cm.     Ascites: None     Gallbladder: 2.1 mm wall thickness. No pericholecystic fluid. No  stones or sludge. No sonographic Mora's sign elicited.     Common bile duct: 3.5 mm     Pancreas: Partially visualized. No abnormality demonstrated.     Splenic vein: antegrade  Main portal vein: antegrade                                                                    IMPRESSION:  1. Increased hepatic echogenicity and echotexture. No focal hepatic  lesion. Differential includes cirrhosis, hepatitis, and  hepatosteatosis.     IMPRESSION:  Mr. Burgos has well-compensated cirrhosis caused by nonalcoholic fatty liver disease.  His liver function is excellent, and all complications are currently well addressed.  I will not be making any change to his medical regimen.  I will have him back in the clinic in 6 months.    He is complaining of some shoulder symptoms, and I have referred him to Dr. Motley from Orthopedics to evaluate his shoulder.    I also have recommended he contact his cardiologist about 24 hour blood pressure monitoring.    Finally, I have recommended Benadryl  for sleep, although it has been quite some time since he had a sleep study, and I would support the cardiologist's recommendation to go forward with that.    Thank you very much for allowing me to participate in the care of this patient.  If you have any questions regarding my recommendations, please do not hesitate to contact me.         Kevin Bedolla MD      Professor of Medicine  [employer]      Executive Medical Director, Solid Organ Transplant Program  [facility]

## 2022-03-29 NOTE — PROGRESS NOTES
HISTORY OF PRESENT ILLNESS:  I had the pleasure of seeing Vikash Burgos for followup in the Liver Clinic at the [facility] on 03/29/2022.  Mr. Burgos returns for followup of cirrhosis caused by nonalcoholic fatty liver disease.    For the most part, he is unchanged.  He is having some issues with his blood pressure.  There was an issue raised about renal artery stenosis, though that does not appear to be the most significant problem.  He is working with a cardiologist at [facility] to try to address that.    Otherwise, he still notes some very mild right upper quadrant pain.  He denies any itching or skin rash.  He has mild fatigue.  He denies any increased abdominal girth or lower extremity edema.  He has not had any gastrointestinal bleeding or any overt signs of hepatic encephalopathy.    He denies any fevers or chills, cough or shortness of breath.  He denies any nausea or vomiting, diarrhea or constipation.  His appetite has been good.  For the most part, his weight has been stable.    He has received 3 doses of the COVID-19 vaccine.    Current Outpatient Medications   Medication     aspirin 81 MG tablet     B-D U/F 31G X 8 MM insulin pen needle     celecoxib (CELEBREX) 100 MG capsule     chlorhexidine (PERIDEX) 0.12 % solution     cyanocobalamin 1000 MCG SUBL     gabapentin (NEURONTIN) 300 MG capsule     glucosamine-chondroitin 500-400 MG CAPS per capsule     insulin pen needle (BD HEIKE U/F) 32G X 4 MM     LEVEMIR FLEXTOUCH 100 UNIT/ML pen     lisinopril (ZESTRIL) 20 MG tablet     nebivolol (BYSTOLIC) 10 MG tablet     rosuvastatin (CRESTOR) 20 MG tablet     Vitamin D, Cholecalciferol, 1000 units TABS     zolpidem (AMBIEN) 5 MG tablet     Current Facility-Administered Medications   Medication     study - tirzepatide 2.5-15 mg or dulaglutide 1.5 mg (SURPASS) (IDS# 5849) injection 1.5-15 mg     study - tirzepatide 2.5-15 mg or dulaglutide 1.5 mg (SURPASS) (IDS# 5849)  injection 1.5-15 mg     BP (!) 153/82   Pulse 70   Temp 97.5  F (36.4  C) (Oral)   Wt 84.6 kg (186 lb 6.4 oz)   SpO2 98%   BMI 25.28 kg/m      PHYSICAL EXAMINATION:    GENERAL:  He looks quite well.  HEENT:  No scleral icterus or temporal muscle wasting.  CHEST:  Clear.  ABDOMEN:  No increase in girth.  No masses or tenderness to palpation is present.  His liver is 10 cm in span without left lobe enlargement.  No spleen tip is palpable.  EXTREMITIES:  No edema.  SKIN:  No stigmata of chronic liver disease.  NEUROLOGIC:  No asterixis.    Recent Results (from the past 168 hour(s))   Lipid Profile    Collection Time: 03/29/22  7:22 AM   Result Value Ref Range    Cholesterol 93 <200 mg/dL    Triglycerides 87 <150 mg/dL    Direct Measure HDL 36 (L) >=40 mg/dL    LDL Cholesterol Calculated 40 <=100 mg/dL    Non HDL Cholesterol 57 <130 mg/dL    Patient Fasting > 8hrs? Yes    Basic metabolic panel    Collection Time: 03/29/22  7:22 AM   Result Value Ref Range    Sodium 139 133 - 144 mmol/L    Potassium 4.6 3.4 - 5.3 mmol/L    Chloride 103 94 - 109 mmol/L    Carbon Dioxide (CO2) 30 20 - 32 mmol/L    Anion Gap 6 3 - 14 mmol/L    Urea Nitrogen 18 7 - 30 mg/dL    Creatinine 0.95 0.66 - 1.25 mg/dL    Calcium 9.4 8.5 - 10.1 mg/dL    Glucose 87 70 - 99 mg/dL    GFR Estimate 90 >60 mL/min/1.73m2   Hepatic panel    Collection Time: 03/29/22  7:22 AM   Result Value Ref Range    Bilirubin Total 1.8 (H) 0.2 - 1.3 mg/dL    Bilirubin Direct 0.4 (H) 0.0 - 0.2 mg/dL    Protein Total 7.9 6.8 - 8.8 g/dL    Albumin 4.4 3.4 - 5.0 g/dL    Alkaline Phosphatase 67 40 - 150 U/L    AST 24 0 - 45 U/L    ALT 42 0 - 70 U/L      Narrative & Impression   ULTRASOUND ABDOMEN COMPLETE 3/29/2022 6:34 AM     CLINICAL HISTORY: Cirrhosis of liver without ascites, unspecified  hepatic cirrhosis type (H).      COMPARISONS: CT 3/8/2022     REFERRING PROVIDER: LONDON YANG     TECHNIQUE: Abdominal viscera evaluated with grayscale imaging. Splenic  and main  portal veins evaluated with color Doppler ultrasound.      Cine clips through the liver were saved in the patient's record.     FINDINGS:  Liver: 14.8 cm. Increased echogenicity and echotexture. No focal  hepatic lesion.  US visualization score: A - No or minimal limitations     Spleen: 3.6 x 11.1 x 12.0 cm.     Right kidney: 11.9 cm. Normal. No mass, stone, or hydronephrosis.     Left kidney: 12.3 cm. Normal. No mass, stone, or hydronephrosis.     Aorta:        Proximal: 2.3 cm.       Mid: 1.6 cm.       Distal: 1.5 cm.     Inferior vena cava: 2.2 cm.     Main portal vein diameter: 1.0 cm.     Ascites: None     Gallbladder: 2.1 mm wall thickness. No pericholecystic fluid. No  stones or sludge. No sonographic Mora's sign elicited.     Common bile duct: 3.5 mm     Pancreas: Partially visualized. No abnormality demonstrated.     Splenic vein: antegrade  Main portal vein: antegrade                                                                      IMPRESSION:  1. Increased hepatic echogenicity and echotexture. No focal hepatic  lesion. Differential includes cirrhosis, hepatitis, and  hepatosteatosis.     IMPRESSION:  Mr. Burgos has well-compensated cirrhosis caused by nonalcoholic fatty liver disease.  His liver function is excellent, and all complications are currently well addressed.  I will not be making any change to his medical regimen.  I will have him back in the clinic in 6 months.    He is complaining of some shoulder symptoms, and I have referred him to Dr. Motley from Orthopedics to evaluate his shoulder.    I also have recommended he contact his cardiologist about 24 hour blood pressure monitoring.    Finally, I have recommended Benadryl for sleep, although it has been quite some time since he had a sleep study, and I would support the cardiologist's recommendation to go forward with that.    Thank you very much for allowing me to participate in the care of this patient.  If you have any questions  regarding my recommendations, please do not hesitate to contact me.         Kevin Bedolla MD      Professor of Medicine  [employer]      Executive Medical Director, Solid Organ Transplant Program  [facility]

## 2022-03-29 NOTE — NURSING NOTE
Chief Complaint   Patient presents with     RECHECK       BP (!) 153/82   Pulse 70   Temp 97.5  F (36.4  C) (Oral)   Wt 84.6 kg (186 lb 6.4 oz)   SpO2 98%   BMI 25.28 kg/m      Satya Almonte MA on 3/29/2022 at 8:03 AM

## 2022-03-31 ENCOUNTER — VIRTUAL VISIT (OUTPATIENT)
Dept: CARDIOLOGY | Facility: CLINIC | Age: 62
End: 2022-03-31
Payer: COMMERCIAL

## 2022-03-31 DIAGNOSIS — E11.49 DM TYPE 2 CAUSING NEUROLOGICAL DISEASE (H): Primary | ICD-10-CM

## 2022-03-31 DIAGNOSIS — I25.10 CORONARY ARTERY DISEASE INVOLVING NATIVE CORONARY ARTERY OF NATIVE HEART WITHOUT ANGINA PECTORIS: ICD-10-CM

## 2022-03-31 DIAGNOSIS — Z00.6 EXAMINATION OF PARTICIPANT OR CONTROL IN CLINICAL RESEARCH: ICD-10-CM

## 2022-03-31 PROCEDURE — 99207 PR NO CHARGE NURSE ONLY: CPT

## 2022-03-31 NOTE — PROGRESS NOTES
SURPASS-CVOT Study [Effect of tirzepatide versus Dulaglutide on Major Cardiovascular Events in Patients with Type 2Diabetes]    Subject contacted by telephone to evaluate/ensure compliance. States no missed doses of study IP. He is happy to report his blood sugars have decreased from the 200's to 87 and feels great. Denies hypoglycemic events.     Sujbect queried for adverse events, endpoints and medication changes. No Med changes reported.     Patient has a hx of R shoulder pain past 2 years,that has waxed and waned. He is being referred to Ortho for a consult-scheduled in May.     Saw Dr Godinez for Renal Stenosis FU, no changes will recheck in 6-12 months.     Follow-up with Dr Bedolla @ [facility] for liver cirrhosis, US negative for hepatoma, no changes in care.    Queried regarding compliance chris-educated if necessary.    Will see him/her in clinic in 2 weeks.    Mis Husain RN

## 2022-03-31 NOTE — LETTER
3/31/2022    Marshal Cuevas MD  [address]  [facility] [address]    RE: Vikash Sharif Loretta       Dear Colleague,     I had the pleasure of seeing Vikash Burgos in the [facility].  SURPASS-CVOT Study [Effect of tirzepatide versus Dulaglutide on Major Cardiovascular Events in Patients with Type 2Diabetes]    Subject contacted by telephone to evaluate/ensure compliance. States no missed doses of study IP. He is happy to report his blood sugars have decreased from the 200's to 87 and feels great. Denies hypoglycemic events.     Sujbect queried for adverse events, endpoints and medication changes. No Med changes reported.     Patient has a hx of R shoulder pain past 2 years,that has waxed and waned. He is being referred to Ortho for a consult-scheduled in May.     Saw Dr Godinez for Renal Stenosis FU, no changes will recheck in 6-12 months.     [facility]  for liver [facility], US negative for hepatoma, no changes in care.    Queried regarding compliance chris-educated if necessary.    Will see him/her in clinic in 2 weeks.    Mis Husain RN    Thank you for allowing me to participate in the care of your patient.    Sincerely,   Mis Husain RN   [facility]  cc: No referring provider defined for this encounter.

## 2022-04-01 DIAGNOSIS — M19.041 PRIMARY OSTEOARTHRITIS OF BOTH HANDS: ICD-10-CM

## 2022-04-01 DIAGNOSIS — M19.042 PRIMARY OSTEOARTHRITIS OF BOTH HANDS: ICD-10-CM

## 2022-04-01 RX ORDER — CELECOXIB 100 MG/1
CAPSULE ORAL
Qty: 30 CAPSULE | Refills: 1 | Status: SHIPPED | OUTPATIENT
Start: 2022-04-01 | End: 2022-06-02

## 2022-04-01 NOTE — TELEPHONE ENCOUNTER
Routing refill request to provider for review/approval because:  Failed protocol    Elizabeth OLIVAS RN  EP Triage

## 2022-04-14 ENCOUNTER — OFFICE VISIT (OUTPATIENT)
Dept: CARDIOLOGY | Facility: CLINIC | Age: 62
End: 2022-04-14

## 2022-04-14 VITALS
WEIGHT: 186.9 LBS | HEART RATE: 74 BPM | OXYGEN SATURATION: 96 % | SYSTOLIC BLOOD PRESSURE: 119 MMHG | HEIGHT: 72 IN | BODY MASS INDEX: 25.32 KG/M2 | DIASTOLIC BLOOD PRESSURE: 70 MMHG

## 2022-04-14 DIAGNOSIS — E11.49 DM TYPE 2 CAUSING NEUROLOGICAL DISEASE (H): Primary | ICD-10-CM

## 2022-04-14 DIAGNOSIS — I25.10 CORONARY ARTERY DISEASE INVOLVING NATIVE CORONARY ARTERY OF NATIVE HEART WITHOUT ANGINA PECTORIS: ICD-10-CM

## 2022-04-14 DIAGNOSIS — Z00.6 EXAMINATION OF PARTICIPANT OR CONTROL IN CLINICAL RESEARCH: ICD-10-CM

## 2022-04-14 PROCEDURE — 99207 PR NO CHARGE NURSE ONLY: CPT

## 2022-04-14 PROCEDURE — 99207 PR NO CHARGE NURSE ONLY: CPT | Performed by: INTERNAL MEDICINE

## 2022-04-14 NOTE — LETTER
4/14/2022    Marshal Cuevas MD  [address]  [facility] [address]    RE: Vikash Sharif Loretta       Dear Colleague,     I had the pleasure of seeing Vik[facility]evesmackenzie [facility] the [facility].  SURPASS-CVOT Study [Effect of tirzepatide versus Dulaglutide on Major Cardiovascular Events in Patients with Type 2 Diabetes]    Subject seen for Visit Week 8    Subject queried for adverse events, endpoints and medication changes. No side effects reported. No change in meds. No hypoglycemic events BP has improved with new medications.     Adherence/lifestyle reinforcement done     Subject drug dispensed, Kit Number [id number]  No of pens dispensed at last visit 4  Any Returned medication 0  Date of first dose since last visit: 3/17/22  Date of last dose taken since last visit: 4/7/22    Vitals:    04/14/22 0851 04/14/22 0903   BP: 104/66 119/70   BP Location:  Left arm   Patient Position:  Sitting   Cuff Size:  Adult Large   Pulse: 75 74   SpO2: 96%    Weight: 84.8 kg (186 lb 14.4 oz)    Height: 1.829 m (6')        Next visit schedule for 2 weeks via phone.      Mis Husain RN  Clinical Trials Nurse        Thank you for allowing me to participate in the care of your patient.      Sincerely,     Mis Husain RN     [facility]  cc:   No referring provider defined for this encounter.

## 2022-04-14 NOTE — PROGRESS NOTES
SURPASS-CVOT Study [Effect of tirzepatide versus Dulaglutide on Major Cardiovascular Events in Patients with Type 2 Diabetes]    Subject seen for Visit Week 8    Subject queried for adverse events, endpoints and medication changes. No side effects reported. No change in meds. No hypoglycemic events BP has improved with new medications.     Adherence/lifestyle reinforcement done     Subject drug dispensed, Kit Number [id number]  No of pens dispensed at last visit 4  Any Returned medication 0  Date of first dose since last visit: 3/17/22  Date of last dose taken since last visit: 4/7/22    Vitals:    04/14/22 0851 04/14/22 0903   BP: 104/66 119/70   BP Location:  Left arm   Patient Position:  Sitting   Cuff Size:  Adult Large   Pulse: 75 74   SpO2: 96%    Weight: 84.8 kg (186 lb 14.4 oz)    Height: 1.829 m (6')        Next visit schedule for 2 weeks via phone.      Mis Husain RN  Clinical Trials Nurse

## 2022-04-18 ENCOUNTER — OFFICE VISIT (OUTPATIENT)
Dept: CARDIOLOGY | Facility: CLINIC | Age: 62
End: 2022-04-18
Payer: COMMERCIAL

## 2022-04-18 VITALS
HEART RATE: 74 BPM | DIASTOLIC BLOOD PRESSURE: 74 MMHG | BODY MASS INDEX: 24.92 KG/M2 | SYSTOLIC BLOOD PRESSURE: 129 MMHG | WEIGHT: 184 LBS | HEIGHT: 72 IN

## 2022-04-18 DIAGNOSIS — I10 HYPERTENSION, UNSPECIFIED TYPE: Primary | ICD-10-CM

## 2022-04-18 PROCEDURE — 99214 OFFICE O/P EST MOD 30 MIN: CPT | Performed by: PHYSICIAN ASSISTANT

## 2022-04-18 NOTE — LETTER
4/18/2022    Marshal Cuevas MD  [address]  [facility] [address]    RE: Vikash Reevesmackenzie       Dear Colleague,     I had the pleasure of seeing Vikash Lipscombsheridan in the [facility].  [id number]      HPI and Plan:   See dictation    Orders this Visit:  Orders Placed This Encounter   Procedures     Follow-Up with Cardiology REAGAN     No orders of the defined types were placed in this encounter.    There are no discontinued medications.      Encounter Diagnosis   Name Primary?     Hypertension, unspecified type Yes       CURRENT MEDICATIONS:  Current Outpatient Medications   Medication Sig Dispense Refill     aspirin 81 MG tablet Take 1 tablet by mouth daily.       B-D U/F 31G X 8 MM insulin pen needle USE ONE PEN NEEDLES DAILY OR AS DIRECTED. 100 each 3     celecoxib (CELEBREX) 100 MG capsule TAKE 1 CAPSULE BY MOUTH EVERY DAY 30 capsule 1     chlorhexidine (PERIDEX) 0.12 % solution Take 15 mLs by mouth 2 times daily as needed   5     cyanocobalamin 1000 MCG SUBL Place 1,000 mcg under the tongue daily       gabapentin (NEURONTIN) 300 MG capsule Take 1 capsule (300 mg) by mouth 3 times daily 90 capsule 4     glucosamine-chondroitin 500-400 MG CAPS per capsule Take 1 capsule by mouth daily       insulin pen needle (BD HEIKE U/F) 32G X 4 MM Use 1 daily or as directed. 100 each prn     LEVEMIR FLEXTOUCH 100 UNIT/ML pen INJECT 50 UNITS SUBCUTANEOUS AT BEDTIME 15 mL 27     lisinopril (ZESTRIL) 20 MG tablet Take 1 tablet (20 mg) by mouth 2 times daily 180 tablet 3     nebivolol (BYSTOLIC) 10 MG tablet Take 1 tablet (10 mg) by mouth At Bedtime 90 tablet 3     rosuvastatin (CRESTOR) 20 MG tablet Take 1 tablet (20 mg) by mouth daily 90 tablet 0     study - tirzepatide 2.5-15 mg or dulaglutide 1.5 mg, SURPASS,, IDS# [id number], PEN injection Inject 1.5-15 mg Subcutaneous every 7 days for 4 doses Subject 52786 Visit 6 Kit# [id number] 4 each 0     Vitamin D, Cholecalciferol, 1000 units TABS Take 1,000 Units by  mouth daily       zolpidem (AMBIEN) 5 MG tablet TAKE 1 TAB ORALLY AS NEEDED FOR SLEEP 5 tablet 0       ALLERGIES     Allergies   Allergen Reactions     Chlorthalidone Nausea and Vomiting     dizziness     Pcn [Penicillins] Rash     Rash with PCN only. Patient has taken amoxicillin with no rash.       PAST MEDICAL HISTORY:  Past Medical History:   Diagnosis Date     Basal cell carcinoma      Carotid stenosis, left     s/p left CEA 2/2020     Cerebral infarction (H)     left CVA 2/2020 post-op carotid endarterectomy     Coronary artery disease     4 vessel bypass November 2010; LIMA ->LAD, SVG-> OM3, SVG -> D2, SVG -> PDA     Diabetes mellitus (H) 2005    neuropathy     Generalized anxiety disorder 05/02/2014     Hepatitis C, chronic (H) 2005     Hyperlipidemia LDL goal < 70      Hypertension      Liver diseases     9/15 Liver is 10 cm in span without left lobe enlargement     Persistent microalbuminuria associated with type 2 diabetes mellitus (H) 05/06/2015     Renal artery stenosis (H)        PAST SURGICAL HISTORY:  Past Surgical History:   Procedure Laterality Date     ARTHROSCOPY KNEE RT/LT      left     COLONOSCOPY       CORONARY ARTERY BYPASS  11/18/201    Coronary artery bypass grafting x 4 with placement of the left internal mammary artery to the distal midportion of the left anterior descending artery, saphenous vein graft from aorta to the third obtuse marginal branch of circumflex coronary artery, saphenous vein graft from aorta to the second diagonal branch, saphenous vein graft from aorta to the posterior descending artery.     ENDARTERECTOMY CAROTID Left 2/21/2020    Procedure: LEFT CAROTID ENDARTERECTOMY WITH EEG;  Surgeon: Semaj Stubbs MD;  Location:  OR     ESOPHAGOSCOPY, GASTROSCOPY, DUODENOSCOPY (EGD), COMBINED  10/31/2011    Procedure:COMBINED ESOPHAGOSCOPY, GASTROSCOPY, DUODENOSCOPY (EGD); Surgeon:ALONSO ALDANA; Location: GI     ESOPHAGOSCOPY, GASTROSCOPY, DUODENOSCOPY (EGD),  COMBINED N/A 3/8/2018    Procedure: COMBINED ESOPHAGOSCOPY, GASTROSCOPY, DUODENOSCOPY (EGD);  EGD;  Surgeon: Angel Luis Justice MD;  Location: [facility]     HEART CATH CORONARY ANGIOGRAM W/LV GRAM  -10    CV Surgery recommended     HEART CATH CORONARY ANGIOGRAM W/LV GRAM  14    Medical management     HERNIA REPAIR, INGUINAL RT/LT      left       FAMILY HISTORY:  Family History   Problem Relation Age of Onset     C.A.D. Father      Cancer Father         bladder     Heart Surgery Father         bypass surgery     Heart Disease Mother        SOCIAL HISTORY:  Social History     Socioeconomic History     Marital status:      Spouse name: None     Number of children: None     Years of education: None     Highest education level: None   Tobacco Use     Smoking status: Former Smoker     Packs/day: 0.50     Years: 12.00     Pack years: 6.00     Types: Cigarettes     Start date:      Quit date: 2005     Years since quittin.2     Smokeless tobacco: Never Used   Substance and Sexual Activity     Alcohol use: Yes     Comment: minimal     Drug use: No     Sexual activity: Yes     Partners: Female   Other Topics Concern     Parent/sibling w/ CABG, MI or angioplasty before 65F 55M? No     Caffeine Concern Yes     Comment: 2 cups coffee per day     Special Diet Yes     Comment: low carb diet, low sugar     Exercise Yes     Comment: treadmill 40 minutes, walk, daily, bike 30 minutes every other day       Review of Systems:  Skin:  Negative     Eyes:  Negative    ENT:  Positive for vertigo  Respiratory:  Negative    Cardiovascular:    fatigue;lightheadedness;dizziness;Positive for  Gastroenterology: Negative    Genitourinary:  Negative    Musculoskeletal:  Negative    Neurologic:  Negative    Psychiatric:  Negative    Heme/Lymph/Imm:  Negative    Endocrine:  Positive for diabetes    Physical Exam:  Vitals: /74   Pulse 74   Ht 1.829 m (6')   Wt 83.5 kg (184 lb)   BMI 24.95 kg/m     Please refer  to dictation for physical exam    Recent Lab Results: all reviewed today  CBC RESULTS:  Lab Results   Component Value Date    WBC 5.3 06/07/2021    RBC 5.00 06/07/2021    HGB 15.3 06/07/2021    HCT 45.1 06/07/2021    MCV 90 06/07/2021    MCH 30.6 06/07/2021    MCHC 33.9 06/07/2021    RDW 12.9 06/07/2021     (L) 06/07/2021       BMP RESULTS:  Lab Results   Component Value Date     03/29/2022     06/07/2021    POTASSIUM 4.6 03/29/2022    POTASSIUM 4.8 06/07/2021    CHLORIDE 103 03/29/2022    CHLORIDE 108 06/07/2021    CO2 30 03/29/2022    CO2 28 06/07/2021    ANIONGAP 6 03/29/2022    ANIONGAP 5 06/07/2021    GLC 87 03/29/2022     (H) 06/07/2021    BUN 18 03/29/2022    BUN 17 06/07/2021    CR 0.95 03/29/2022    CR 0.89 06/07/2021    GFRESTIMATED 90 03/29/2022    GFRESTIMATED >60 03/08/2022    GFRESTIMATED >90 06/07/2021    GFRESTBLACK >90 06/07/2021    LIA 9.4 03/29/2022    LIA 9.6 06/07/2021        INR RESULTS:  Lab Results   Component Value Date    INR 1.11 06/07/2021    INR 1.13 12/15/2020           CC  Michaelle Caba PA-C  [address]  [place] [address]    Thank you for allowing me to participate in the care of your patient.      Sincerely,     Michaelle Caba PA-C     [facility]

## 2022-04-18 NOTE — PROGRESS NOTES
Service Date: 2022    PRIMARY CARDIOLOGIST:  Has been Dr. Roberts.    REASON FOR VISIT:  Hypertension followup.    HISTORY OF PRESENT ILLNESS:  Kelton is an absolutely delightful 62-year-old gentleman with past medical history significant for the followin.  Coronary artery disease with history of 4-vessel CABG in  by Dr. Pruett with a LIMA to LAD, SVG to the OM3, SVG to the D2, SVG to the PDA.  Last angiogram was  showing patent LIMA and grafts, with just a 60% stenosis of the OM after the touchdown.  2.  EF about 50%.  3.  Peripheral artery disease with history of carotid endarterectomy in 2020 complicated by CVA and facial nerve trauma leaving some facial numbness.  4.  Hypertension.  5.  Type 2 diabetes.  6.  Hyperlipidemia.  7.  Hepatitis C diagnosed back in, I believe, about  when the patient presented with stage IV liver failure, treated with Pegasys and ribavirin with excellent results with ongoing cirrhosis.    I met him when he enrolled in the SURPASS trial and we worked on getting his blood pressure is controlled.  We evaluated him for renal stenosis and there was some mild narrowing but not enough to be causing his degree of hypertension.  We worked on adjusting his medications, and he is here today for followup.    Fortunately, his blood pressures have now been well controlled.  The worst blood pressure he has had recently is 150.  They are otherwise 119, 120, 110s.  With this, he feels okay but he is having some lightheadedness and dizziness if he stands up.  This seems to happen more when he is sitting down for about 30-60 minutes rather than if he is eating or just sits down briefly.  For a long time, he has had difficulty getting lightheaded if he stood in place to long.  As long as he keeps walking, this has not been a problem.  So far, he finds these tolerable given how good his blood pressure is.    SOCIAL HISTORY:  He is  to his wife, Carmina.  They have a  snf place in [place].  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  One of their children is a nurse at [employer].  The other is a son who is a  who has several offers but currently following for [employer].  He quit smoking in .  Minimal alcohol use.    PHYSICAL EXAMINATION:    GENERAL:  Well-developed, well-nourished gentleman in no acute distress.  HEENT:  Normocephalic, atraumatic.  HEART:  Regular in rate and rhythm.  There is no murmur, rub or gallop.  LUNGS:  Clear, without wheezes, rales or rhonchi.  EXTREMITIES:  Without peripheral edema.  SKIN:  Warm and dry.    ASSESSMENT AND PLAN:    1.  Hypertension, excellent control with some degree of orthostatic hypotension that we have agreed we will watch for now.  I encouraged him to do some foot pumps before standing up to help with the orthostasis.  If it continues to be an issue, I would likely cut back his morning dose of lisinopril.  2.  Hyperlipidemia, excellent control.  Last LDL 44, HDL 37, total cholesterol 104.  3.  Carotid artery disease, followed by Vascular.  4.  Type 2 diabetes, participating in the SURPASS trial.  5.  Liver failure secondary to hep C, followed closely at the [facility].    This patient is overall doing well.  Thank you for allowing me to participate in this absolutely delightful patient's care.  We will check back in a month to make sure his hypotension is resolving, sooner with concerns.    Michaelle Caba PA-C        D: 2022   T: 2022   MT: chika    Name:     IHSAN SANCHEZ  MRN:      [id number]        Account:      [id number]   :      1960           Service Date: 2022       Document: [id number]

## 2022-04-18 NOTE — PATIENT INSTRUCTIONS
Thank you for visiting with me today.    We discussed: I'm glad you're blood pressures are better.      Follow up: with me in about a month.       Please call my nurse, Tara, at [phone] with any questions or concerns.    Scheduling phone number: [phone]  Reminder: Please bring in all current medications, over the counter supplements and vitamin bottles to your next appointment.

## 2022-04-18 NOTE — PROGRESS NOTES
[id number]      HPI and Plan:   See dictation    Orders this Visit:  Orders Placed This Encounter   Procedures     Follow-Up with Cardiology REAGAN     No orders of the defined types were placed in this encounter.    There are no discontinued medications.      Encounter Diagnosis   Name Primary?     Hypertension, unspecified type Yes       CURRENT MEDICATIONS:  Current Outpatient Medications   Medication Sig Dispense Refill     aspirin 81 MG tablet Take 1 tablet by mouth daily.       B-D U/F 31G X 8 MM insulin pen needle USE ONE PEN NEEDLES DAILY OR AS DIRECTED. 100 each 3     celecoxib (CELEBREX) 100 MG capsule TAKE 1 CAPSULE BY MOUTH EVERY DAY 30 capsule 1     chlorhexidine (PERIDEX) 0.12 % solution Take 15 mLs by mouth 2 times daily as needed   5     cyanocobalamin 1000 MCG SUBL Place 1,000 mcg under the tongue daily       gabapentin (NEURONTIN) 300 MG capsule Take 1 capsule (300 mg) by mouth 3 times daily 90 capsule 4     glucosamine-chondroitin 500-400 MG CAPS per capsule Take 1 capsule by mouth daily       insulin pen needle (BD HEIKE U/F) 32G X 4 MM Use 1 daily or as directed. 100 each prn     LEVEMIR FLEXTOUCH 100 UNIT/ML pen INJECT 50 UNITS SUBCUTANEOUS AT BEDTIME 15 mL 27     lisinopril (ZESTRIL) 20 MG tablet Take 1 tablet (20 mg) by mouth 2 times daily 180 tablet 3     nebivolol (BYSTOLIC) 10 MG tablet Take 1 tablet (10 mg) by mouth At Bedtime 90 tablet 3     rosuvastatin (CRESTOR) 20 MG tablet Take 1 tablet (20 mg) by mouth daily 90 tablet 0     study - tirzepatide 2.5-15 mg or dulaglutide 1.5 mg, SURPASS,, IDS# [id number], PEN injection Inject 1.5-15 mg Subcutaneous every 7 days for 4 doses Subject 78011 Visit 6 Kit# [id number] 4 each 0     Vitamin D, Cholecalciferol, 1000 units TABS Take 1,000 Units by mouth daily       zolpidem (AMBIEN) 5 MG tablet TAKE 1 TAB ORALLY AS NEEDED FOR SLEEP 5 tablet 0       ALLERGIES     Allergies   Allergen Reactions     Chlorthalidone Nausea and Vomiting     dizziness     Pcn  [Penicillins] Rash     Rash with PCN only. Patient has taken amoxicillin with no rash.       PAST MEDICAL HISTORY:  Past Medical History:   Diagnosis Date     Basal cell carcinoma      Carotid stenosis, left     s/p left CEA 2/2020     Cerebral infarction (H)     left CVA 2/2020 post-op carotid endarterectomy     Coronary artery disease     4 vessel bypass November 2010; LIMA ->LAD, SVG-> OM3, SVG -> D2, SVG -> PDA     Diabetes mellitus (H) 2005    neuropathy     Generalized anxiety disorder 05/02/2014     Hepatitis C, chronic (H) 2005     Hyperlipidemia LDL goal < 70      Hypertension      Liver diseases     9/15 Liver is 10 cm in span without left lobe enlargement     Persistent microalbuminuria associated with type 2 diabetes mellitus (H) 05/06/2015     Renal artery stenosis (H)        PAST SURGICAL HISTORY:  Past Surgical History:   Procedure Laterality Date     ARTHROSCOPY KNEE RT/LT      left     COLONOSCOPY       CORONARY ARTERY BYPASS  11/18/201    Coronary artery bypass grafting x 4 with placement of the left internal mammary artery to the distal midportion of the left anterior descending artery, saphenous vein graft from aorta to the third obtuse marginal branch of circumflex coronary artery, saphenous vein graft from aorta to the second diagonal branch, saphenous vein graft from aorta to the posterior descending artery.     ENDARTERECTOMY CAROTID Left 2/21/2020    Procedure: LEFT CAROTID ENDARTERECTOMY WITH EEG;  Surgeon: Semaj Stubbs MD;  Location:  OR     ESOPHAGOSCOPY, GASTROSCOPY, DUODENOSCOPY (EGD), COMBINED  10/31/2011    Procedure:COMBINED ESOPHAGOSCOPY, GASTROSCOPY, DUODENOSCOPY (EGD); Surgeon:ALONSO ALDANA; Location: GI     ESOPHAGOSCOPY, GASTROSCOPY, DUODENOSCOPY (EGD), COMBINED N/A 3/8/2018    Procedure: COMBINED ESOPHAGOSCOPY, GASTROSCOPY, DUODENOSCOPY (EGD);  EGD;  Surgeon: Angel Luis Justice MD;  Location:  GI     HEART CATH CORONARY ANGIOGRAM W/LV GRAM  9-11-10    CV  Surgery recommended     HEART CATH CORONARY ANGIOGRAM W/LV GRAM  14    Medical management     HERNIA REPAIR, INGUINAL RT/LT      left       FAMILY HISTORY:  Family History   Problem Relation Age of Onset     C.A.D. Father      Cancer Father         bladder     Heart Surgery Father         bypass surgery     Heart Disease Mother        SOCIAL HISTORY:  Social History     Socioeconomic History     Marital status:      Spouse name: None     Number of children: None     Years of education: None     Highest education level: None   Tobacco Use     Smoking status: Former Smoker     Packs/day: 0.50     Years: 12.00     Pack years: 6.00     Types: Cigarettes     Start date:      Quit date: 2005     Years since quittin.2     Smokeless tobacco: Never Used   Substance and Sexual Activity     Alcohol use: Yes     Comment: minimal     Drug use: No     Sexual activity: Yes     Partners: Female   Other Topics Concern     Parent/sibling w/ CABG, MI or angioplasty before 65F 55M? No     Caffeine Concern Yes     Comment: 2 cups coffee per day     Special Diet Yes     Comment: low carb diet, low sugar     Exercise Yes     Comment: treadmill 40 minutes, walk, daily, bike 30 minutes every other day       Review of Systems:  Skin:  Negative     Eyes:  Negative    ENT:  Positive for vertigo  Respiratory:  Negative    Cardiovascular:    fatigue;lightheadedness;dizziness;Positive for  Gastroenterology: Negative    Genitourinary:  Negative    Musculoskeletal:  Negative    Neurologic:  Negative    Psychiatric:  Negative    Heme/Lymph/Imm:  Negative    Endocrine:  Positive for diabetes    Physical Exam:  Vitals: /74   Pulse 74   Ht 1.829 m (6')   Wt 83.5 kg (184 lb)   BMI 24.95 kg/m     Please refer to dictation for physical exam    Recent Lab Results: all reviewed today  CBC RESULTS:  Lab Results   Component Value Date    WBC 5.3 2021    RBC 5.00 2021    HGB 15.3 2021    HCT 45.1 2021     MCV 90 06/07/2021    MCH 30.6 06/07/2021    MCHC 33.9 06/07/2021    RDW 12.9 06/07/2021     (L) 06/07/2021       BMP RESULTS:  Lab Results   Component Value Date     03/29/2022     06/07/2021    POTASSIUM 4.6 03/29/2022    POTASSIUM 4.8 06/07/2021    CHLORIDE 103 03/29/2022    CHLORIDE 108 06/07/2021    CO2 30 03/29/2022    CO2 28 06/07/2021    ANIONGAP 6 03/29/2022    ANIONGAP 5 06/07/2021    GLC 87 03/29/2022     (H) 06/07/2021    BUN 18 03/29/2022    BUN 17 06/07/2021    CR 0.95 03/29/2022    CR 0.89 06/07/2021    GFRESTIMATED 90 03/29/2022    GFRESTIMATED >60 03/08/2022    GFRESTIMATED >90 06/07/2021    GFRESTBLACK >90 06/07/2021    LIA 9.4 03/29/2022    LIA 9.6 06/07/2021        INR RESULTS:  Lab Results   Component Value Date    INR 1.11 06/07/2021    INR 1.13 12/15/2020           ANTONELLA Caba PAKotaC  [address]  [place] [id number]

## 2022-04-26 DIAGNOSIS — F51.01 PRIMARY INSOMNIA: ICD-10-CM

## 2022-04-27 NOTE — TELEPHONE ENCOUNTER
Routing refill request to provider for review/approval because:  Drug not on the [facility] refill protocol .     Last refill 1/20/2021 for 5 tablets.     Nida Pryor RN  [facility]

## 2022-04-28 ENCOUNTER — VIRTUAL VISIT (OUTPATIENT)
Dept: CARDIOLOGY | Facility: CLINIC | Age: 62
End: 2022-04-28
Payer: COMMERCIAL

## 2022-04-28 ENCOUNTER — TELEPHONE (OUTPATIENT)
Dept: CARDIOLOGY | Facility: CLINIC | Age: 62
End: 2022-04-28

## 2022-04-28 DIAGNOSIS — I25.10 CORONARY ARTERY DISEASE INVOLVING NATIVE CORONARY ARTERY OF NATIVE HEART WITHOUT ANGINA PECTORIS: ICD-10-CM

## 2022-04-28 DIAGNOSIS — E11.49 DM TYPE 2 CAUSING NEUROLOGICAL DISEASE (H): ICD-10-CM

## 2022-04-28 DIAGNOSIS — Z00.6 EXAMINATION OF PARTICIPANT OR CONTROL IN CLINICAL RESEARCH: Primary | ICD-10-CM

## 2022-04-28 PROCEDURE — 99207 PR NO CHARGE NURSE ONLY: CPT

## 2022-04-28 RX ORDER — ZOLPIDEM TARTRATE 5 MG/1
TABLET ORAL
Qty: 5 TABLET | Refills: 0 | Status: SHIPPED | OUTPATIENT
Start: 2022-04-28 | End: 2022-08-05

## 2022-04-28 NOTE — LETTER
4/28/2022    Marshal Cuevas MD  [address]  [place] [address]    RE: Vikash Sharif Loretta       Dear Colleague,     I had the pleasure of seeing [place] Kole Burgos in the [facility].  SURPASS-CVOT Study [Effect of tirzepatide versus Dulaglutide on Major Cardiovascular Events in Patients with Type 2Diabetes]    Subject contacted by telephone to evaluate/ensure compliance.    Subject queried for adverse events, endpoints and medication changes.     Patient reports nausea which starts 1 day after injection and lasts 3 days for the past  2 weeks. Also, reports tinnitus, which he feels started after his BP meds were adjusted. His BP is well controlled now although it is noted he is having some orhostasis per Michaelle Caba note. Informed patient to contact his MD to let them know about this. He stated he would.     Will see him in clinic in 2 weeks.    SURPASS-CVOT Study [Effect of tirzepatide versus Dulaglutide on Major Cardiovascular Events in Patients with Type 2 Diabetes]    Diagnosis/event description (diagnosis preferred): Nausea  Start date: 04/15/2022  Date resolved: 4/18/2022    Intensity: ([x] mild /[]  moderate / [] severe)     Study Medication adjustment:  [] Yes   [x] No    Outcome: ([x] resolved [with or without sequelae] /[] recovering / [] not recovered / [] death / [] unknown)      Date study team aware of event: 04/28/2022    Was treatment given for this event:  [] Yes   [x] No   If yes, provide details  Serious adverse event?    Yes   [x] No    Special interest event?  [] Yes   [x] No    Endpoint? [] Yes   [x] No     Fatal event?  [] Yes   [x] No      Dr. Elias: Reasonable possibility that AE is related to study treatment    [] Yes   [] No      SURPASS-CVOT Study [Effect of tirzepatide versus Dulaglutide on Major Cardiovascular Events in Patients with Type 2 Diabetes]    Diagnosis/event description (diagnosis preferred):  Nausea  Start date: 4/22/2022   Date resolved:  4/25/2022    Intensity: ([x] mild /[]  moderate / [] severe)     Study Medication adjustment:  [] Yes   [x] No    Outcome: ([x] resolved [with or without sequelae] /[] recovering / [] not recovered / [] death / [] unknown)      Date study team aware of event: 4/28/2022    Was treatment given for this event:  [] Yes   [x] No   If yes, provide details  Serious adverse event?    Yes   [x] No    Special interest event?  [] Yes   [x] No    Endpoint? [] Yes   [x] No     Fatal event?  [] Yes   [x] No      Dr. Elias: Reasonable possibility that AE is related to study treatment    [] Yes   [] No    SURPASS-CVOT Study [Effect of tirzepatide versus Dulaglutide on Major Cardiovascular Events in Patients with Type 2 Diabetes]    Diagnosis/event description (diagnosis preferred):  Tinnitus (recent increase in BP meds)    Start date:  04/2022  Date resolved:      Intensity: ([x] mild /[]  moderate / [] severe)     Study Medication adjustment:  [] Yes   [x] No    Outcome: ([] resolved [with or without sequelae] /[] recovering / [] not recovered / [] death / [] unknown)      Date study team aware of event: 4/28/2022    Was treatment given for this event:  [] Yes   [x] No   If yes, provide details  Serious adverse event?    Yes   [x] No    Special interest event?  [] Yes   [x] No    Endpoint? [] Yes   [x] No     Fatal event?  [] Yes   [x] No      Dr. Elias: Reasonable possibility that AE is related to study treatment    [] Yes   [] No    SURPASS-CVOT Study [Effect of tirzepatide versus Dulaglutide on Major Cardiovascular Events in Patients with Type 2 Diabetes]    Diagnosis/event description (diagnosis preferred):  Orthostatic Hypotension  (recent increase in BP meds)    Start date:  4/18/2022  Date resolved:      Intensity: ([x] mild /[]  moderate / [] severe)     Study Medication adjustment:  [] Yes   [x] No    Outcome: ([] resolved [with or without sequelae] /[] recovering / [x] not recovered / [] death / [] unknown)      Date  study team aware of event: 4/28/2022    Was treatment given for this event:  [] Yes   [x] No   If yes, provide details  Serious adverse event?    Yes   [x] No    Special interest event?  [] Yes   [x] No    Endpoint? [] Yes   [x] No     Fatal event?  [] Yes   [x] No      Dr. Elias: Reasonable possibility that AE is related to study treatment    [] Yes   [] No    Mis Husain RN      Thank you for allowing me to participate in the care of your patient.      Sincerely,     Mis Husain RN     [facility]  cc:   No referring provider defined for this encounter.         [id number]

## 2022-04-28 NOTE — PROGRESS NOTES
SURPASS-CVOT Study [Effect of tirzepatide versus Dulaglutide on Major Cardiovascular Events in Patients with Type 2Diabetes]    Subject contacted by telephone to evaluate/ensure compliance.    Subject queried for adverse events, endpoints and medication changes.     Patient reports nausea which starts 1 day after injection and lasts 3 days for the past  2 weeks. Also, reports tinnitus, which he feels started after his BP meds were adjusted. His BP is well controlled now although it is noted he is having some orthostasis per Michaelle Caba note. Informed patient to contact his MD to let them know about this. He stated he would.     Will see him in clinic in 2 weeks.    SURPASS-CVOT Study [Effect of tirzepatide versus Dulaglutide on Major Cardiovascular Events in Patients with Type 2 Diabetes]    Diagnosis/event description (diagnosis preferred): Nausea  Start date: 04/15/2022  Date resolved: 4/18/2022    Intensity: ([x] mild /[]  moderate / [] severe)     Study Medication adjustment:  [] Yes   [x] No    Outcome: ([x] resolved [with or without sequelae] /[] recovering / [] not recovered / [] death / [] unknown)      Date study team aware of event: 04/28/2022    Was treatment given for this event:  [] Yes   [x] No   If yes, provide details  Serious adverse event?    Yes   [x] No    Special interest event?  [] Yes   [x] No    Endpoint? [] Yes   [x] No     Fatal event?  [] Yes   [x] No      Dr. Elias: Reasonable possibility that AE is related to study treatment    [x] Yes   [] No      SURPASS-CVOT Study [Effect of tirzepatide versus Dulaglutide on Major Cardiovascular Events in Patients with Type 2 Diabetes]    Diagnosis/event description (diagnosis preferred):  Nausea  Start date: 4/22/2022   Date resolved: 4/25/2022    Intensity: ([x] mild /[]  moderate / [] severe)     Study Medication adjustment:  [] Yes   [x] No    Outcome: ([x] resolved [with or without sequelae] /[] recovering / [] not recovered / [] death / []  unknown)      Date study team aware of event: 4/28/2022    Was treatment given for this event:  [] Yes   [x] No   If yes, provide details  Serious adverse event?    Yes   [x] No    Special interest event?  [] Yes   [x] No    Endpoint? [] Yes   [x] No     Fatal event?  [] Yes   [x] No      Dr. Elias: Reasonable possibility that AE is related to study treatment    [x] Yes   [] No    SURPASS-CVOT Study [Effect of tirzepatide versus Dulaglutide on Major Cardiovascular Events in Patients with Type 2 Diabetes]    Diagnosis/event description (diagnosis preferred):  Tinnitus (recent increase in BP meds)    Start date:  04/2022  Date resolved:      Intensity: ([x] mild /[]  moderate / [] severe)     Study Medication adjustment:  [] Yes   [x] No    Outcome: ([] resolved [with or without sequelae] /[] recovering / [] not recovered / [] death / [] unknown)      Date study team aware of event: 4/28/2022    Was treatment given for this event:  [] Yes   [x] No   If yes, provide details  Serious adverse event?    Yes   [x] No    Special interest event?  [] Yes   [x] No    Endpoint? [] Yes   [x] No     Fatal event?  [] Yes   [x] No      Dr. Elias: Reasonable possibility that AE is related to study treatment    [] Yes   [x] No    SURPASS-CVOT Study [Effect of tirzepatide versus Dulaglutide on Major Cardiovascular Events in Patients with Type 2 Diabetes]    Diagnosis/event description (diagnosis preferred):  Orthostatic Hypotension  (recent increase in BP meds)    Start date:  4/18/2022  Date resolved:      Intensity: ([x] mild /[]  moderate / [] severe)     Study Medication adjustment:  [] Yes   [x] No    Outcome: ([] resolved [with or without sequelae] /[] recovering / [x] not recovered / [] death / [] unknown)      Date study team aware of event: 4/28/2022    Was treatment given for this event:  [] Yes   [x] No   If yes, provide details  Serious adverse event?    Yes   [x] No    Special interest event?  [] Yes   [x]  No    Endpoint? [] Yes   [x] No     Fatal event?  [] Yes   [x] No      Dr. Elias: Reasonable possibility that AE is related to study treatment    [] Yes   [x] No    Mis Husain RN

## 2022-05-11 ENCOUNTER — OFFICE VISIT (OUTPATIENT)
Dept: CARDIOLOGY | Facility: CLINIC | Age: 62
End: 2022-05-11
Payer: COMMERCIAL

## 2022-05-11 VITALS
OXYGEN SATURATION: 99 % | HEIGHT: 72 IN | DIASTOLIC BLOOD PRESSURE: 71 MMHG | WEIGHT: 182 LBS | BODY MASS INDEX: 24.65 KG/M2 | HEART RATE: 63 BPM | SYSTOLIC BLOOD PRESSURE: 107 MMHG

## 2022-05-11 DIAGNOSIS — I25.10 CORONARY ARTERY DISEASE INVOLVING NATIVE CORONARY ARTERY OF NATIVE HEART WITHOUT ANGINA PECTORIS: ICD-10-CM

## 2022-05-11 DIAGNOSIS — I10 BENIGN ESSENTIAL HYPERTENSION: ICD-10-CM

## 2022-05-11 DIAGNOSIS — E78.2 MIXED HYPERLIPIDEMIA: ICD-10-CM

## 2022-05-11 DIAGNOSIS — I10 HYPERTENSION, UNSPECIFIED TYPE: ICD-10-CM

## 2022-05-11 PROCEDURE — 99213 OFFICE O/P EST LOW 20 MIN: CPT | Performed by: PHYSICIAN ASSISTANT

## 2022-05-11 RX ORDER — NEBIVOLOL 5 MG/1
5 TABLET ORAL AT BEDTIME
Qty: 90 TABLET | Refills: 3 | Status: SHIPPED | OUTPATIENT
Start: 2022-05-11 | End: 2022-06-27

## 2022-05-11 RX ORDER — ROSUVASTATIN CALCIUM 20 MG/1
20 TABLET, COATED ORAL DAILY
Qty: 90 TABLET | Refills: 3 | Status: SHIPPED | OUTPATIENT
Start: 2022-05-11 | End: 2023-05-01

## 2022-05-11 NOTE — PATIENT INSTRUCTIONS
Thank you for visiting with me today.    We discussed: we'll work on making sure breathing improves.     Medication changes:  decrease nebivolol to 5 mg once a day.      Follow up: with me in 4-6 weeks.        Please call my nurse, Tara, at [phone] with any questions or concerns.    Scheduling phone number: [phone]  Reminder: Please bring in all current medications, over the counter supplements and vitamin bottles to your next appointment.

## 2022-05-11 NOTE — LETTER
5/11/2022    Marshal Cuevas MD  [address]  [facility] [address]    RE: Vikash Sharif Loretta       Dear Colleague,     I had the pleasure of seeing Vikash Kole Burgos in the [facility].  [phone]      HPI and Plan:   See dictation    Orders this Visit:  Orders Placed This Encounter   Procedures     Follow-Up with Cardiology REAGAN     Orders Placed This Encounter   Medications     nebivolol (BYSTOLIC) 5 MG tablet     Sig: Take 1 tablet (5 mg) by mouth At Bedtime     Dispense:  90 tablet     Refill:  3     rosuvastatin (CRESTOR) 20 MG tablet     Sig: Take 1 tablet (20 mg) by mouth daily     Dispense:  90 tablet     Refill:  3     Medications Discontinued During This Encounter   Medication Reason     gabapentin (NEURONTIN) 300 MG capsule Stopped by Patient     nebivolol (BYSTOLIC) 10 MG tablet      rosuvastatin (CRESTOR) 20 MG tablet Reorder         Encounter Diagnoses   Name Primary?     Hypertension, unspecified type      Benign essential hypertension      Coronary artery disease involving native coronary artery of native heart without angina pectoris      Mixed hyperlipidemia        CURRENT MEDICATIONS:  Current Outpatient Medications   Medication Sig Dispense Refill     aspirin 81 MG tablet Take 1 tablet by mouth daily.       B-D U/F 31G X 8 MM insulin pen needle USE ONE PEN NEEDLES DAILY OR AS DIRECTED. 100 each 3     celecoxib (CELEBREX) 100 MG capsule TAKE 1 CAPSULE BY MOUTH EVERY DAY 30 capsule 1     chlorhexidine (PERIDEX) 0.12 % solution Take 15 mLs by mouth 2 times daily as needed   5     cyanocobalamin 1000 MCG SUBL Place 1,000 mcg under the tongue daily       glucosamine-chondroitin 500-400 MG CAPS per capsule Take 1 capsule by mouth daily       insulin pen needle (BD HEIKE U/F) 32G X 4 MM Use 1 daily or as directed. 100 each prn     LEVEMIR FLEXTOUCH 100 UNIT/ML pen INJECT 50 UNITS SUBCUTANEOUS AT BEDTIME 15 mL 27     lisinopril (ZESTRIL) 20 MG tablet Take 1 tablet (20 mg) by  mouth 2 times daily 180 tablet 3     nebivolol (BYSTOLIC) 5 MG tablet Take 1 tablet (5 mg) by mouth At Bedtime 90 tablet 3     rosuvastatin (CRESTOR) 20 MG tablet Take 1 tablet (20 mg) by mouth daily 90 tablet 3     Vitamin D, Cholecalciferol, 1000 units TABS Take 1,000 Units by mouth daily       zolpidem (AMBIEN) 5 MG tablet TAKE 1 TAB ORALLY AS NEEDED FOR SLEEP 5 tablet 0       ALLERGIES     Allergies   Allergen Reactions     Pcn [Penicillins] Rash     Rash with PCN only. Patient has taken amoxicillin with no rash.       PAST MEDICAL HISTORY:  Past Medical History:   Diagnosis Date     Basal cell carcinoma      Carotid stenosis, left     s/p left CEA 2/2020     Cerebral infarction (H)     left CVA 2/2020 post-op carotid endarterectomy     Coronary artery disease     4 vessel bypass November 2010; LIMA ->LAD, SVG-> OM3, SVG -> D2, SVG -> PDA     Diabetes mellitus (H) 2005    neuropathy     Generalized anxiety disorder 05/02/2014     Hepatitis C, chronic (H) 2005     Hyperlipidemia LDL goal < 70      Hypertension      Liver diseases     9/15 Liver is 10 cm in span without left lobe enlargement     Persistent microalbuminuria associated with type 2 diabetes mellitus (H) 05/06/2015     Renal artery stenosis (H)        PAST SURGICAL HISTORY:  Past Surgical History:   Procedure Laterality Date     ARTHROSCOPY KNEE RT/LT      left     COLONOSCOPY       CORONARY ARTERY BYPASS  11/18/201    Coronary artery bypass grafting x 4 with placement of the left internal mammary artery to the distal midportion of the left anterior descending artery, saphenous vein graft from aorta to the third obtuse marginal branch of circumflex coronary artery, saphenous vein graft from aorta to the second diagonal branch, saphenous vein graft from aorta to the posterior descending artery.     ENDARTERECTOMY CAROTID Left 2/21/2020    Procedure: LEFT CAROTID ENDARTERECTOMY WITH EEG;  Surgeon: Semaj Stubbs MD;  Location:  OR      ESOPHAGOSCOPY, GASTROSCOPY, DUODENOSCOPY (EGD), COMBINED  10/31/2011    Procedure:COMBINED ESOPHAGOSCOPY, GASTROSCOPY, DUODENOSCOPY (EGD); Surgeon:ALONSO ALDANA; Location: GI     ESOPHAGOSCOPY, GASTROSCOPY, DUODENOSCOPY (EGD), COMBINED N/A 3/8/2018    Procedure: COMBINED ESOPHAGOSCOPY, GASTROSCOPY, DUODENOSCOPY (EGD);  EGD;  Surgeon: Angel Luis Justice MD;  Location:  GI     HEART CATH CORONARY ANGIOGRAM W/LV GRAM  -10    CV Surgery recommended     HEART CATH CORONARY ANGIOGRAM W/LV GRAM  14    Medical management     HERNIA REPAIR, INGUINAL RT/LT      left       FAMILY HISTORY:  Family History   Problem Relation Age of Onset     C.A.D. Father      Cancer Father         bladder     Heart Surgery Father         bypass surgery     Heart Disease Mother        SOCIAL HISTORY:  Social History     Socioeconomic History     Marital status:      Spouse name: None     Number of children: None     Years of education: None     Highest education level: None   Tobacco Use     Smoking status: Former Smoker     Packs/day: 0.50     Years: 12.00     Pack years: 6.00     Types: Cigarettes     Start date:      Quit date: 2005     Years since quittin.2     Smokeless tobacco: Never Used   Substance and Sexual Activity     Alcohol use: Yes     Comment: minimal     Drug use: No     Sexual activity: Yes     Partners: Female   Other Topics Concern     Parent/sibling w/ CABG, MI or angioplasty before 65F 55M? No     Caffeine Concern Yes     Comment: 2 cups coffee per day     Special Diet Yes     Comment: low carb diet, low sugar     Exercise Yes     Comment: treadmill 40 minutes, walk, daily, bike 30 minutes every other day       Review of Systems:  Skin:  Negative     Eyes:  Positive for    ENT:  Negative    Respiratory:  Positive for dyspnea on exertion;shortness of breath  Cardiovascular:  Negative    Gastroenterology: Negative    Genitourinary:  Negative    Musculoskeletal:  Positive for  arthritis  Neurologic:  Negative    Psychiatric:       Heme/Lymph/Imm:  Positive for allergies  Endocrine:  Positive for diabetes    Physical Exam:  Vitals: /71 (BP Location: Right arm, Cuff Size: Adult Large)   Pulse 63   Ht 1.829 m (6')   Wt 82.6 kg (182 lb)   SpO2 99%   BMI 24.68 kg/m     Please refer to dictation for physical exam    Recent Lab Results: all reviewed today  CBC RESULTS:  Lab Results   Component Value Date    WBC 5.3 06/07/2021    RBC 5.00 06/07/2021    HGB 15.3 06/07/2021    HCT 45.1 06/07/2021    MCV 90 06/07/2021    MCH 30.6 06/07/2021    MCHC 33.9 06/07/2021    RDW 12.9 06/07/2021     (L) 06/07/2021       BMP RESULTS:  Lab Results   Component Value Date     03/29/2022     06/07/2021    POTASSIUM 4.6 03/29/2022    POTASSIUM 4.8 06/07/2021    CHLORIDE 103 03/29/2022    CHLORIDE 108 06/07/2021    CO2 30 03/29/2022    CO2 28 06/07/2021    ANIONGAP 6 03/29/2022    ANIONGAP 5 06/07/2021    GLC 87 03/29/2022     (H) 06/07/2021    BUN 18 03/29/2022    BUN 17 06/07/2021    CR 0.95 03/29/2022    CR 0.89 06/07/2021    GFRESTIMATED 90 03/29/2022    GFRESTIMATED >60 03/08/2022    GFRESTIMATED >90 06/07/2021    GFRESTBLACK >90 06/07/2021    LIA 9.4 03/29/2022    LIA 9.6 06/07/2021        INR RESULTS:  Lab Results   Component Value Date    INR 1.11 06/07/2021    INR 1.13 12/15/2020           CC  Michaelle Caba PA-C  [address]  [place]  [place] [address]    Thank you for allowing me to participate in the care of your patient.      Sincerely,     Michaelle Caba PA-C     [facility]

## 2022-05-11 NOTE — PROGRESS NOTES
Service Date: 2022    PRIMARY CARDIOLOGIST:  Dr. Zeb Roberts    REASON FOR VISIT:  Hypertension follow-up.    HISTORY OF PRESENT ILLNESS:  Kelton is an absolutely delightful 62-year-old gentleman with past medical history significant for the followin.  Coronary artery disease with history of 4-vessel CABG in  by Dr. Pruett, with LIMA to the LAD, SVG to the OM3, SVG to the D2 and SVG to the PDA.  His last angiogram in  showed patent LIMA and grafts with just a 60% stenosis of the OM after touchdown.  On a treadmill nuclear stress test in 2020, he went 13 METs.  He had just a small area of inferior ischemia in the inferoapical segment.  Anginal equivalent was dyspnea on exertion.  2.  Low-normal EF at 50%.  3.  Peripheral arterial disease with history of carotid endarterectomy 2020, complicated by CVA and facial nerve trauma, leaving some facial numbness.  4.  Hypertension.  5.  Excellent well-controlled type 2 diabetes.  6.  Hyperlipidemia.  7.  Hepatitis C diagnosed in about  when the patient presented with stage IV liver failure, treated with Pegasys and ribavirin with excellent results, with ongoing well-controlled cirrhosis.    I had met him when he enrolled in the SURPASS trial, and we have worked on optimizing his blood pressure.  We did evaluate him for renal artery stenosis and there was just mild narrowing, not enough to cause this degree of hypertension.    He comes in today.  He has continued to having some lightheaded episodes, although these are improving and they are when he stands up, especially after sitting for extended period of time, like 30-60 minutes.  He is, though, noting that he is more dyspneic.  Typically after biking in the winter, he does very intense workouts, and when he gets outside, he feels better.  Unfortunately, this year he feels like his lung capacity is not quite there.  He denies chest pain with this, orthopnea or PND.  He has stable peripheral  edema that is improved with exercise.  We discussed today that his anginal equivalent was slow progressive dyspnea.    SOCIAL HISTORY:  He is  to his wife, Carmina.  They have a [facility] place in [place].  He is retired from [employer] and [employer] and traveled thousands of miles a year.  One of their daughters is a nurse at [employer].  The other is a son who is a  for [employer].  He quit smoking in .  Minimal alcohol use.    PHYSICAL EXAMINATION:    GENERAL:  Well-developed, well-nourished gentleman, in no acute distress, fit-appearing.  HEENT:  Normocephalic, atraumatic.  HEART:  Regular in rate and rhythm.  No murmur, rub or gallop.  LUNGS:  Clear, without wheezes, rales, or rhonchi.  EXTREMITIES:  Without peripheral edema.  NECK:  Carotids are quiet.    ASSESSMENT AND PLAN:  1.  Hypertension, excellent control with some degree of orthostatic hypotension, but also concern for decreased exercise tolerance.  I am wondering if he is having some chronotropic incompetence with nebivolol, although he notes that his heart rates actually seems higher, or some potential pulmonary adverse effect.  At this point, we are going to try decreasing his nebivolol to 5 mg a day and see if things improve.  If his dyspnea on exertion does not improve, I would definitely send him for a nuclear stress test.  2.  Hyperlipidemia, excellent control.  3.  Carotid disease, followed by Vascular.  4.  Type 2 diabetes in the SURPASS trial with dramatically-improved glucose on serial labs.  5.  Liver failure secondary to hep C, followed closely at the [facility].    Thank you for allowing us to participate in this absolutely delightful patient's care.  We will see him back in about a month, sooner with concerns.    Michaelle Caba PA-C        D: 2022   T: 2022   MT: al    Name:     IHSAN SANCHEZ  MRN:      [id number]        Account:      [id number]   :      1960            Service Date: 05/11/2022       Document: [id number]

## 2022-05-11 NOTE — PROGRESS NOTES
[id number]      HPI and Plan:   See dictation    Orders this Visit:  Orders Placed This Encounter   Procedures     Follow-Up with Cardiology REAGAN     Orders Placed This Encounter   Medications     nebivolol (BYSTOLIC) 5 MG tablet     Sig: Take 1 tablet (5 mg) by mouth At Bedtime     Dispense:  90 tablet     Refill:  3     rosuvastatin (CRESTOR) 20 MG tablet     Sig: Take 1 tablet (20 mg) by mouth daily     Dispense:  90 tablet     Refill:  3     Medications Discontinued During This Encounter   Medication Reason     gabapentin (NEURONTIN) 300 MG capsule Stopped by Patient     nebivolol (BYSTOLIC) 10 MG tablet      rosuvastatin (CRESTOR) 20 MG tablet Reorder         Encounter Diagnoses   Name Primary?     Hypertension, unspecified type      Benign essential hypertension      Coronary artery disease involving native coronary artery of native heart without angina pectoris      Mixed hyperlipidemia        CURRENT MEDICATIONS:  Current Outpatient Medications   Medication Sig Dispense Refill     aspirin 81 MG tablet Take 1 tablet by mouth daily.       B-D U/F 31G X 8 MM insulin pen needle USE ONE PEN NEEDLES DAILY OR AS DIRECTED. 100 each 3     celecoxib (CELEBREX) 100 MG capsule TAKE 1 CAPSULE BY MOUTH EVERY DAY 30 capsule 1     chlorhexidine (PERIDEX) 0.12 % solution Take 15 mLs by mouth 2 times daily as needed   5     cyanocobalamin 1000 MCG SUBL Place 1,000 mcg under the tongue daily       glucosamine-chondroitin 500-400 MG CAPS per capsule Take 1 capsule by mouth daily       insulin pen needle (BD HEIKE U/F) 32G X 4 MM Use 1 daily or as directed. 100 each prn     LEVEMIR FLEXTOUCH 100 UNIT/ML pen INJECT 50 UNITS SUBCUTANEOUS AT BEDTIME 15 mL 27     lisinopril (ZESTRIL) 20 MG tablet Take 1 tablet (20 mg) by mouth 2 times daily 180 tablet 3     nebivolol (BYSTOLIC) 5 MG tablet Take 1 tablet (5 mg) by mouth At Bedtime 90 tablet 3     rosuvastatin (CRESTOR) 20 MG tablet Take 1 tablet (20 mg) by mouth daily 90 tablet 3      Vitamin D, Cholecalciferol, 1000 units TABS Take 1,000 Units by mouth daily       zolpidem (AMBIEN) 5 MG tablet TAKE 1 TAB ORALLY AS NEEDED FOR SLEEP 5 tablet 0       ALLERGIES     Allergies   Allergen Reactions     Pcn [Penicillins] Rash     Rash with PCN only. Patient has taken amoxicillin with no rash.       PAST MEDICAL HISTORY:  Past Medical History:   Diagnosis Date     Basal cell carcinoma      Carotid stenosis, left     s/p left CEA 2/2020     Cerebral infarction (H)     left CVA 2/2020 post-op carotid endarterectomy     Coronary artery disease     4 vessel bypass November 2010; LIMA ->LAD, SVG-> OM3, SVG -> D2, SVG -> PDA     Diabetes mellitus (H) 2005    neuropathy     Generalized anxiety disorder 05/02/2014     Hepatitis C, chronic (H) 2005     Hyperlipidemia LDL goal < 70      Hypertension      Liver diseases     9/15 Liver is 10 cm in span without left lobe enlargement     Persistent microalbuminuria associated with type 2 diabetes mellitus (H) 05/06/2015     Renal artery stenosis (H)        PAST SURGICAL HISTORY:  Past Surgical History:   Procedure Laterality Date     ARTHROSCOPY KNEE RT/LT      left     COLONOSCOPY       CORONARY ARTERY BYPASS  11/18/201    Coronary artery bypass grafting x 4 with placement of the left internal mammary artery to the distal midportion of the left anterior descending artery, saphenous vein graft from aorta to the third obtuse marginal branch of circumflex coronary artery, saphenous vein graft from aorta to the second diagonal branch, saphenous vein graft from aorta to the posterior descending artery.     ENDARTERECTOMY CAROTID Left 2/21/2020    Procedure: LEFT CAROTID ENDARTERECTOMY WITH EEG;  Surgeon: Semaj Stubbs MD;  Location:  OR     ESOPHAGOSCOPY, GASTROSCOPY, DUODENOSCOPY (EGD), COMBINED  10/31/2011    Procedure:COMBINED ESOPHAGOSCOPY, GASTROSCOPY, DUODENOSCOPY (EGD); Surgeon:ALONSO ALDANA; Location: GI     ESOPHAGOSCOPY, GASTROSCOPY, DUODENOSCOPY  (EGD), COMBINED N/A 3/8/2018    Procedure: COMBINED ESOPHAGOSCOPY, GASTROSCOPY, DUODENOSCOPY (EGD);  EGD;  Surgeon: Angel Luis Justice MD;  Location: [facility]     HEART CATH CORONARY ANGIOGRAM W/LV GRAM  9--10    CV Surgery recommended     HEART CATH CORONARY ANGIOGRAM W/LV GRAM  -14    Medical management     HERNIA REPAIR, INGUINAL RT/LT      left       FAMILY HISTORY:  Family History   Problem Relation Age of Onset     C.A.D. Father      Cancer Father         bladder     Heart Surgery Father         bypass surgery     Heart Disease Mother        SOCIAL HISTORY:  Social History     Socioeconomic History     Marital status:      Spouse name: None     Number of children: None     Years of education: None     Highest education level: None   Tobacco Use     Smoking status: Former Smoker     Packs/day: 0.50     Years: 12.00     Pack years: 6.00     Types: Cigarettes     Start date:      Quit date: 2005     Years since quittin.2     Smokeless tobacco: Never Used   Substance and Sexual Activity     Alcohol use: Yes     Comment: minimal     Drug use: No     Sexual activity: Yes     Partners: Female   Other Topics Concern     Parent/sibling w/ CABG, MI or angioplasty before 65F 55M? No     Caffeine Concern Yes     Comment: 2 cups coffee per day     Special Diet Yes     Comment: low carb diet, low sugar     Exercise Yes     Comment: treadmill 40 minutes, walk, daily, bike 30 minutes every other day       Review of Systems:  Skin:  Negative     Eyes:  Positive for    ENT:  Negative    Respiratory:  Positive for dyspnea on exertion;shortness of breath  Cardiovascular:  Negative    Gastroenterology: Negative    Genitourinary:  Negative    Musculoskeletal:  Positive for arthritis  Neurologic:  Negative    Psychiatric:       Heme/Lymph/Imm:  Positive for allergies  Endocrine:  Positive for diabetes    Physical Exam:  Vitals: /71 (BP Location: Right arm, Cuff Size: Adult Large)   Pulse 63   Ht  1.829 m (6')   Wt 82.6 kg (182 lb)   SpO2 99%   BMI 24.68 kg/m     Please refer to dictation for physical exam    Recent Lab Results: all reviewed today  CBC RESULTS:  Lab Results   Component Value Date    WBC 5.3 06/07/2021    RBC 5.00 06/07/2021    HGB 15.3 06/07/2021    HCT 45.1 06/07/2021    MCV 90 06/07/2021    MCH 30.6 06/07/2021    MCHC 33.9 06/07/2021    RDW 12.9 06/07/2021     (L) 06/07/2021       BMP RESULTS:  Lab Results   Component Value Date     03/29/2022     06/07/2021    POTASSIUM 4.6 03/29/2022    POTASSIUM 4.8 06/07/2021    CHLORIDE 103 03/29/2022    CHLORIDE 108 06/07/2021    CO2 30 03/29/2022    CO2 28 06/07/2021    ANIONGAP 6 03/29/2022    ANIONGAP 5 06/07/2021    GLC 87 03/29/2022     (H) 06/07/2021    BUN 18 03/29/2022    BUN 17 06/07/2021    CR 0.95 03/29/2022    CR 0.89 06/07/2021    GFRESTIMATED 90 03/29/2022    GFRESTIMATED >60 03/08/2022    GFRESTIMATED >90 06/07/2021    GFRESTBLACK >90 06/07/2021    LIA 9.4 03/29/2022    LIA 9.6 06/07/2021        INR RESULTS:  Lab Results   Component Value Date    INR 1.11 06/07/2021    INR 1.13 12/15/2020           CC  Michaelle Caba PA-C  [address]  [place] [id number]

## 2022-05-12 ENCOUNTER — TRANSFERRED RECORDS (OUTPATIENT)
Dept: CARDIOLOGY | Facility: CLINIC | Age: 62
End: 2022-05-12

## 2022-05-12 ENCOUNTER — OFFICE VISIT (OUTPATIENT)
Dept: CARDIOLOGY | Facility: CLINIC | Age: 62
End: 2022-05-12
Payer: COMMERCIAL

## 2022-05-12 VITALS
HEIGHT: 72 IN | WEIGHT: 179 LBS | OXYGEN SATURATION: 99 % | BODY MASS INDEX: 24.24 KG/M2 | DIASTOLIC BLOOD PRESSURE: 79 MMHG | SYSTOLIC BLOOD PRESSURE: 128 MMHG | HEART RATE: 65 BPM

## 2022-05-12 DIAGNOSIS — M25.511 RIGHT SHOULDER PAIN, UNSPECIFIED CHRONICITY: Primary | ICD-10-CM

## 2022-05-12 DIAGNOSIS — Z00.6 EXAMINATION OF PARTICIPANT OR CONTROL IN CLINICAL RESEARCH: ICD-10-CM

## 2022-05-12 DIAGNOSIS — I25.10 CORONARY ARTERY DISEASE INVOLVING NATIVE CORONARY ARTERY OF NATIVE HEART WITHOUT ANGINA PECTORIS: Primary | ICD-10-CM

## 2022-05-12 DIAGNOSIS — E11.49 DM TYPE 2 CAUSING NEUROLOGICAL DISEASE (H): ICD-10-CM

## 2022-05-12 PROCEDURE — 99207 PR NO CHARGE NURSE ONLY: CPT | Performed by: INTERNAL MEDICINE

## 2022-05-12 PROCEDURE — 99207 PR NO CHARGE NURSE ONLY: CPT

## 2022-05-12 NOTE — LETTER
5/12/2022    Marshal Cuevas MD  [address]  [facility] [address]    RE: Laclede Kole Burgos       Dear Colleague,     I had the pleasure of seeing Vikash Burgos in the [facility].  SURPASS-CVOT Study [Effect of tirzepatide versus Dulaglutide on Major Cardiovascular Events which in Patients with Type 2 Diabetes]    Subject seen for Visit Week 12    Subject queried for adverse events, endpoints and medication changes. Continues to have nausea which starts on the 3rd day after injection and ends on day 5. Vomits on Day 5 small amts of liquids each week, then feels fine. These symptoms occur after each injection. States he saw cardiology yesterday and had another change in BP meds, decreasing Nebivolol to 5 mg a day. He started this yesterday so no changes yet.  He continues to have Tinnitus and mild Orthostasis. States his blood sugars and doing great and is excited to see his A1C results which were drawn today per protocol.    Adherence/lifestyle reinforcement done     Labs drawn and sent to central lab per protocol.    Subject drug dispensed, Kit Number [id number]    No of pens dispensed at last visit 4  Any Returned medication 0  Date of first dose since last visit: 04/14/2022  Date of last dose taken since last visit: 05/05/2022    Vitals:    05/12/22 0845 05/12/22 0858   BP: 130/78 128/79   BP Location:  Right arm   Patient Position:  Sitting   Cuff Size:  Adult Regular   Pulse: 61 65   SpO2: 99%    Weight: 81.2 kg (179 lb)    Height: 1.829 m (6')      SURPASS-CVOT Study [Effect of tirzepatide versus Dulaglutide on Major Cardiovascular Events in Patients with Type 2 Diabetes]    Diagnosis/event description (diagnosis preferred):  Nausea  Start date: 05/01/2022   Date resolved: 05/03/2022     Intensity: ([x] mild /[]  moderate / [] severe)     Study Medication adjustment:  [] Yes   [x] No    Outcome: ([x] resolved [with or without sequelae] /[] recovering / [] not recovered / []  death / [] unknown)      Date study team aware of event: 05/12/2022    Was treatment given for this event:  [] Yes   [x] No   If yes, provide details  Serious adverse event?    Yes   [x] No    Special interest event?  [] Yes   [x] No    Endpoint? [] Yes   [x] No     Fatal event?  [] Yes   [x] No      Dr. Elias: Reasonable possibility that AE is related to study treatment    [] Yes   [] No    Mis Husain RN    SURPASS-CVOT Study [Effect of tirzepatide versus Dulaglutide on Major Cardiovascular Events in Patients with Type 2 Diabetes]    Diagnosis/event description (diagnosis preferred):  Vomiting (X1-liquids)  Start date: 05/03/2022  Date resolved: 05/03/2022    Intensity: ([x] mild /[]  moderate / [] severe)     Study Medication adjustment:  [] Yes   [x] No    Outcome: ([x] resolved [with or without sequelae] /[] recovering / [] not recovered / [] death / [] unknown)      Date study team aware of event: 05/12/2022    Was treatment given for this event:  [] Yes   [x] No   If yes, provide details  Serious adverse event?    Yes   [x] No    Special interest event?  [] Yes   [x] No    Endpoint? [] Yes   [x] No     Fatal event?  [] Yes   [x] No      Dr. Elias: Reasonable possibility that AE is related to study treatment    [] Yes   [] No    Mis Husain RN    [employer]-CVOT Study [Effect of tirzepatide versus Dulaglutide on Major Cardiovascular Events in Patients with Type 2 Diabetes]    Diagnosis/event description (diagnosis preferred):  Nausea  Start date: 05/08/2022  Date resolved: 05/10/2022    Intensity: ([x] mild /[]  moderate / [] severe)     Study Medication adjustment:  [] Yes   [x] No    Outcome: ([x] resolved [with or without sequelae] /[] recovering / [] not recovered / [] death / [] unknown)      Date study team aware of event: 05/12/2022    Was treatment given for this event:  [] Yes   [x] No   If yes, provide details  Serious adverse event?    Yes   [x] No    Special interest event?  [] Yes   [x]  No    Endpoint? [] Yes   [x] No     Fatal event?  [] Yes   [x] No      Dr. Elias: Reasonable possibility that AE is related to study treatment    [] Yes   [] No    Mis Husain RN    SURPASS-CVOT Study [Effect of tirzepatide versus Dulaglutide on Major Cardiovascular Events in Patients with Type 2 Diabetes]    Diagnosis/event description (diagnosis preferred):  Vomiting (X1-liquids)  Start date: 05/10/2022   Date resolved: 05/10/2022     Intensity: ([x] mild /[]  moderate / [] severe)     Study Medication adjustment:  [] Yes   [x] No    Outcome: ([x] resolved [with or without sequelae] /[] recovering / [] not recovered / [] death / [] unknown)    Date study team aware of event: 05/12/2022    Was treatment given for this event:  [] Yes   [x] No   If yes, provide details  Serious adverse event?    Yes   [x] No    Special interest event?  [] Yes   [x] No    Endpoint? [] Yes   [x] No     Fatal event?  [] Yes   [x] No      Dr. Elias: Reasonable possibility that AE is related to study treatment    [] Yes   [] No    Mis Husain RN      Next visit schedule for 2 weeks via phone visit      Mis Husain RN  Clinical Trials Nurse    Thank you for allowing me to participate in the care of your patient.      Sincerely,   Mis Husain RN   [facility]

## 2022-05-12 NOTE — PROGRESS NOTES
SURPASS-CVOT Study [Effect of tirzepatide versus Dulaglutide on Major Cardiovascular Events which in Patients with Type 2 Diabetes]    Subject seen for Visit Week 12    Subject queried for adverse events, endpoints and medication changes. Continues to have nausea which starts on the 3rd day after injection and ends on day 5. Vomits on Day 5 small amts of liquids each week, then feels fine. These symptoms occur after each injection. States he saw cardiology yesterday and had another change in BP meds, decreasing Nebivolol to 5 mg a day. He started this yesterday so no changes yet.  He continues to have Tinnitus and mild Orthostasis. States his blood sugars and doing great and is excited to see his A1C results which were drawn today per protocol.    Adherence/lifestyle reinforcement done     Labs drawn and sent to central lab per protocol.    Subject drug dispensed, Kit Number [id number]    No of pens dispensed at last visit 4  Any Returned medication 0  Date of first dose since last visit: 04/14/2022  Date of last dose taken since last visit: 05/05/2022    Vitals:    05/12/22 0845 05/12/22 0858   BP: 130/78 128/79   BP Location:  Right arm   Patient Position:  Sitting   Cuff Size:  Adult Regular   Pulse: 61 65   SpO2: 99%    Weight: 81.2 kg (179 lb)    Height: 1.829 m (6')      SURPASS-CVOT Study [Effect of tirzepatide versus Dulaglutide on Major Cardiovascular Events in Patients with Type 2 Diabetes]    Diagnosis/event description (diagnosis preferred):  Nausea  Start date: 05/01/2022   Date resolved: 05/03/2022     Intensity: ([x] mild /[]  moderate / [] severe)     Study Medication adjustment:  [] Yes   [x] No    Outcome: ([x] resolved [with or without sequelae] /[] recovering / [] not recovered / [] death / [] unknown)      Date study team aware of event: 05/12/2022    Was treatment given for this event:  [] Yes   [x] No   If yes, provide details  Serious adverse event?    Yes   [x] No    Special interest event?  []  Yes   [x] No    Endpoint? [] Yes   [x] No     Fatal event?  [] Yes   [x] No      Dr. Elias: Reasonable possibility that AE is related to study treatment    [x] Yes   [] No    Mis Husain RN    [employer]-CVmobile mum Study [Effect of tirzepatide versus Dulaglutide on Major Cardiovascular Events in Patients with Type 2 Diabetes]    Diagnosis/event description (diagnosis preferred):  Vomiting (X1-liquids)  Start date: 05/03/2022  Date resolved: 05/03/2022    Intensity: ([x] mild /[]  moderate / [] severe)     Study Medication adjustment:  [] Yes   [x] No    Outcome: ([x] resolved [with or without sequelae] /[] recovering / [] not recovered / [] death / [] unknown)      Date study team aware of event: 05/12/2022    Was treatment given for this event:  [] Yes   [x] No   If yes, provide details  Serious adverse event?    Yes   [x] No    Special interest event?  [] Yes   [x] No    Endpoint? [] Yes   [x] No     Fatal event?  [] Yes   [x] No      Dr. Elias: Reasonable possibility that AE is related to study treatment    [x] Yes   [] No    Mis Husain RN    [employer] Study [Effect of tirzepatide versus Dulaglutide on Major Cardiovascular Events in Patients with Type 2 Diabetes]    Diagnosis/event description (diagnosis preferred):  Nausea  Start date: 05/08/2022  Date resolved: 05/10/2022    Intensity: ([x] mild /[]  moderate / [] severe)     Study Medication adjustment:  [] Yes   [x] No    Outcome: ([x] resolved [with or without sequelae] /[] recovering / [] not recovered / [] death / [] unknown)      Date study team aware of event: 05/12/2022    Was treatment given for this event:  [] Yes   [x] No   If yes, provide details  Serious adverse event?    Yes   [x] No    Special interest event?  [] Yes   [x] No    Endpoint? [] Yes   [x] No     Fatal event?  [] Yes   [x] No      Dr. Elias: Reasonable possibility that AE is related to study treatment    [x] Yes   [] No    Mis Husain RN    ERPLYOT Study [Effect of  tirzepatide versus Dulaglutide on Major Cardiovascular Events in Patients with Type 2 Diabetes]    Diagnosis/event description (diagnosis preferred):  Vomiting (X1-liquids)  Start date: 05/10/2022   Date resolved: 05/10/2022     Intensity: ([x] mild /[]  moderate / [] severe)     Study Medication adjustment:  [] Yes   [x] No    Outcome: ([x] resolved [with or without sequelae] /[] recovering / [] not recovered / [] death / [] unknown)    Date study team aware of event: 05/12/2022    Was treatment given for this event:  [] Yes   [x] No   If yes, provide details  Serious adverse event?    Yes   [x] No    Special interest event?  [] Yes   [x] No    Endpoint? [] Yes   [x] No     Fatal event?  [] Yes   [x] No      Dr. Elias: Reasonable possibility that AE is related to study treatment    [x] Yes   [] No    Mis Husain RN      Next visit schedule for 2 weeks via phone visit      Mis Husain RN  Clinical Trials Nurse

## 2022-05-16 ENCOUNTER — OFFICE VISIT (OUTPATIENT)
Dept: ORTHOPEDICS | Facility: CLINIC | Age: 62
End: 2022-05-16
Attending: INTERNAL MEDICINE
Payer: COMMERCIAL

## 2022-05-16 ENCOUNTER — PRE VISIT (OUTPATIENT)
Dept: ORTHOPEDICS | Facility: CLINIC | Age: 62
End: 2022-05-16

## 2022-05-16 VITALS — BODY MASS INDEX: 23.84 KG/M2 | WEIGHT: 176 LBS | HEIGHT: 72 IN

## 2022-05-16 DIAGNOSIS — K74.60 CIRRHOSIS OF LIVER WITHOUT ASCITES, UNSPECIFIED HEPATIC CIRRHOSIS TYPE (H): ICD-10-CM

## 2022-05-16 DIAGNOSIS — M75.21 BICEPS TENDINITIS OF RIGHT UPPER EXTREMITY: Primary | ICD-10-CM

## 2022-05-16 PROCEDURE — 99203 OFFICE O/P NEW LOW 30 MIN: CPT | Performed by: ORTHOPAEDIC SURGERY

## 2022-05-16 NOTE — PROGRESS NOTES
CHIEF COMPLAINT:  Right shoulder pain.    HISTORY OF PRESENT ILLNESS:  Mr. Burgos is a very pleasant, 62-year-old man with a history of pain and disability in his shoulder.  He notes that he has had pain for at least the last 12 years.  He notes he had a couple injuries while playing high school football and hockey. He notes that over the last 12 years, it has progressively worsened.  Now it interferes with his activities of daily living.  He notes that the pain localizes anteriorly, and it is worse with abduction and forward elevation.  It radiates down the anterior aspect of the arm and towards the elbow.    He also had symptoms with 2 associated chest surgeries.  One was for his coronary artery bypass grafting, where he notes that they had a significant amount of difficulty tracking his chest.  He also had a radial artery cardiac catheterization and afterwards he had significant pain in the right shoulder, where he currently notes that his pain is.    SOCIAL HISTORY:  He is retired.  Enjoys golfing and biking. Lives in [place] in the winter.  He is .    PAST MEDICAL HISTORY:  Significant for multiple medical problems, including a history of cirrhosis, which is currently in treatment.    Radiographs obtained today show sternal wires.  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  There is no glenohumeral arthritis with proximal migration.    PHYSICAL EXAMINATION:  He is a well-developed male in mild to moderate distress.  He articulates and communicates well with a normal affect.  His breathing is nonlabored.  His sensation is intact in the axillary nerve distribution.  He has warm and well-perfused hands with a faintly palpable radial pulse.  He has normal hair and sweat patterns without evidence of skin breakdown.  There is no AC joint tenderness.  He has 140 degrees of forward elevation, 40 degrees of external rotation at the side, internal rotation at the back to T12.  He has a normal lift off.  He has 2/3 biceps  tenderness to palpation and states that recreates his symptoms.  He has an abnormal Speed and Valencia that improve with supination, localize posteriorly and recreate a portion of the symptoms.  He has a normal Yergason.  He has 5/5 forward elevator and external rotator strength.  On the left side in comparison, he has 155/40/T12.    ASSESSMENT:   1.  Right shoulder mild stiffness.  2.  Right shoulder biceps disease.  3.  Cirrhosis.    PLAN:  I had a lengthy talk with Mr. Burgos today.  We talked at length about his shoulder and the fact that I thought he had a motion abnormality and some biceps tendinitis.  I told him about the dosing, that he could try over-the-counter anti-inflammatory medication (ibuprofen 600 mg p.o. t.i.d. or Naproxen two (220mg) over-the-counter tablets twice a day).  I asked him to talk with Dr. Bedolla before starting this.  I will start him on a course of physical therapy.  He would like to do this elsewhere.  I gave him paperwork to do so.  If this substantially improves, that is great.  Otherwise, he could have an ultrasound-guided injection of corticosteroid into the biceps tendon sheath down the road.  If that does not alleviate his symptoms, he could get an MRI scan of his rotator cuff and follow up with me afterwards for a discussion of what surgical options exist.  I told him I thought there was a very good likelihood that he would not need surgical remediation, and he seemed pleased.    cc:  Kevin Bedolla MD  [facility]  [address] [place] [id number]

## 2022-05-16 NOTE — LETTER
5/16/2022         RE: Vikash Burgos  [address]  [place] [address]        Dear Colleague,    Thank you for referring your patient, Vikash Burgos, to the [facility] [place]. Please see a copy of my visit note below.    CHIEF COMPLAINT:  Right shoulder pain.    HISTORY OF PRESENT ILLNESS:  Mr. Burgos is a very pleasant, 62-year-old man with a history of pain and disability in his shoulder.  He notes that he has had pain for at least the last 12 years.  He notes he had a couple injuries while playing high school football and hockey. He notes that over the last 12 years, it has progressively worsened.  Now it interferes with his activities of daily living.  He notes that the pain localizes anteriorly, and it is worse with abduction and forward elevation.  It radiates down the anterior aspect of the arm and towards the elbow.    He also had symptoms with 2 associated chest surgeries.  One was for his coronary artery bypass grafting, where he notes that they had a significant amount of difficulty tracking his chest.  He also had a radial artery cardiac catheterization and afterwards he had significant pain in the right shoulder, where he currently notes that his pain is.    SOCIAL HISTORY:  He is retired.  Enjoys golfing and biking. Lives in [place] in the winter.  He is .    PAST MEDICAL HISTORY:  Significant for multiple medical problems, including a history of cirrhosis, which is currently in treatment.    Radiographs obtained today show sternal wires.  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  There is no glenohumeral arthritis with proximal migration.    PHYSICAL EXAMINATION:  He is a well-developed male in mild to moderate distress.  He articulates and communicates well with a normal affect.  His breathing is nonlabored.  His sensation is intact in the axillary nerve distribution.  He has warm and well-perfused hands with a faintly palpable radial pulse.  He  has normal hair and sweat patterns without evidence of skin breakdown.  There is no AC joint tenderness.  He has 140 degrees of forward elevation, 40 degrees of external rotation at the side, internal rotation at the back to T12.  He has a normal lift off.  He has 2/3 biceps tenderness to palpation and states that recreates his symptoms.  He has an abnormal Speed and [place] that improve with supination, localize posteriorly and recreate a portion of the symptoms.  He has a normal Yergason.  He has 5/5 forward elevator and external rotator strength.  On the left side in comparison, he has 155/40/T12.    ASSESSMENT:   1.  Right shoulder mild stiffness.  2.  Right shoulder biceps disease.  3.  Cirrhosis.    PLAN:  I had a lengthy talk with Mr. Burgos today.  We talked at length about his shoulder and the fact that I thought he had a motion abnormality and some biceps tendinitis.  I told him about the dosing, that he could try over-the-counter anti-inflammatory medication (ibuprofen 600 mg p.o. t.i.d. or Naproxen two (220mg) over-the-counter tablets twice a day).  I asked him to talk with Dr. Bedolla before starting this.  I will start him on a course of physical therapy.  He would like to do this elsewhere.  I gave him paperwork to do so.  If this substantially improves, that is great.  Otherwise, he could have an ultrasound-guided injection of corticosteroid into the biceps tendon sheath down the road.  If that does not alleviate his symptoms, he could get an MRI scan of his rotator cuff and follow up with me afterwards for a discussion of what surgical options exist.  I told him I thought there was a very good likelihood that he would not need surgical remediation, and he seemed pleased.    cc:  Kevin Bedolla MD  [facility]  [address] [place] [id number]      Uday Motley MD

## 2022-05-16 NOTE — NURSING NOTE
Reason For Visit:   Chief Complaint   Patient presents with     Consult     Right Rotator cuff injury        PCP: Marshal Cuevas      ?  No  Occupation Retired, financial services   Currently working? No.  Work status?  Retired.  Date of injury: unsure- Possible old football surgery or from two different surgeries  Date of surgery: No  Smoker: No    Right  hand dominant    SANE score  Affected shoulder: Right   Right shoulder SANE: 65-70  Left shoulder SANE: 100    Ht 1.829 m (6')   Wt 79.8 kg (176 lb)   BMI 23.87 kg/m        Pain Assessment  Patient Currently in Pain: Yes  0-10 Pain Scale: 8  Primary Pain Location: Shoulder  Pain Descriptors: Discomfort    Ana Paula Chaparro LPN

## 2022-05-16 NOTE — PATIENT INSTRUCTIONS
Plan from visit with Dr. Motlye today:    Check with Dr. Bedolla to make sure he is OK with you starting Ibuprofen 600mg three times a day OR Naprosyn 2 over the counter tablets twice a day.  In not significantly better in 2-3 months contact the clinic by phone or MyChart and we can help you get scheduled for an injection.

## 2022-05-17 ENCOUNTER — MYC MEDICAL ADVICE (OUTPATIENT)
Dept: CARDIOLOGY | Facility: CLINIC | Age: 62
End: 2022-05-17
Payer: COMMERCIAL

## 2022-05-17 ENCOUNTER — TELEPHONE (OUTPATIENT)
Dept: GASTROENTEROLOGY | Facility: CLINIC | Age: 62
End: 2022-05-17
Payer: COMMERCIAL

## 2022-05-17 ENCOUNTER — TRANSFERRED RECORDS (OUTPATIENT)
Dept: MULTI SPECIALTY CLINIC | Facility: CLINIC | Age: 62
End: 2022-05-17

## 2022-05-17 LAB — RETINOPATHY: NORMAL

## 2022-05-17 NOTE — TELEPHONE ENCOUNTER
[facility] Call Center    Phone Message    May a detailed message be left on voicemail: yes     Reason for Call: Other: Patient has a bad right shoulder.  He was given some medications to take and he would like to discuss them with Dr. Bedolla, to make sure they are okay for his liver.  Please follow up with patient.     Action Taken: Message routed to:  [facility] ([facility]    Travel Screening: Not Applicable

## 2022-05-17 NOTE — TELEPHONE ENCOUNTER
Call back to patient. Updated patient per Dr. Bedolla patient can't take ibuprofen or naproxyn because of his cirrhosis. Dr. Bedolla is ok with a steroid injection.     Sent inbasket message to Dr. Motley with Dr. Bedolla's reply.    Patient voiced understanding and will wait for reply from Dr. Motley on next steps.    Flores PENN LPN  Hepatology Clinic      ------------  Kevin Bedolla MD [place], Flores, LPN  Caller: Unspecified (Today,  8:25 AM)  You can't take ibuprofen or naproxyn because of the cirrhosis. Steroid injection would be fine.

## 2022-05-17 NOTE — TELEPHONE ENCOUNTER
Call back to patient. Patient reports he had his first visit with Dr. Motley yesterday for shoulder pain. Dr. Motley wanted Dr. Bedolla to review medication recommendations to see if he had any concerns with either choice from a liver standpoint?    Dr. Motley's care plan for patient.  PLAN:  I had a lengthy talk with Mr. Burgos today.  We talked at length about his shoulder and the fact that I thought he had a motion abnormality and some biceps tendinitis.  I told him about the dosing, that he could try over-the-counter anti-inflammatory medication (ibuprofen 600 mg p.o. t.i.d. or Naproxen two (220mg) over-the-counter tablets twice a day).  I asked him to talk with Dr. Bedolla before starting this.  I will start him on a course of physical therapy.  He would like to do this elsewhere.  I gave him paperwork to do so.  If this substantially improves, that is great.  Otherwise, he could have an ultrasound-guided injection of corticosteroid into the biceps tendon sheath down the road.  If that does not alleviate his symptoms, he could get an MRI scan of his rotator cuff and follow up with me afterwards for a discussion of what surgical options exist.  I told him I thought there was a very good likelihood that he would not need surgical remediation, and he seemed pleased.      Plan forwarded to Dr. Bedolla for review.    Flores PENN LPN  Hepatology Clinic

## 2022-05-26 ENCOUNTER — VIRTUAL VISIT (OUTPATIENT)
Dept: CARDIOLOGY | Facility: CLINIC | Age: 62
End: 2022-05-26
Payer: COMMERCIAL

## 2022-05-26 DIAGNOSIS — E11.49 DM TYPE 2 CAUSING NEUROLOGICAL DISEASE (H): ICD-10-CM

## 2022-05-26 DIAGNOSIS — Z00.6 EXAMINATION OF PARTICIPANT OR CONTROL IN CLINICAL RESEARCH: ICD-10-CM

## 2022-05-26 DIAGNOSIS — I25.10 CORONARY ARTERY DISEASE INVOLVING NATIVE CORONARY ARTERY OF NATIVE HEART WITHOUT ANGINA PECTORIS: Primary | ICD-10-CM

## 2022-05-26 PROCEDURE — 99207 PR NO CHARGE NURSE ONLY: CPT

## 2022-05-26 NOTE — LETTER
"5/26/2022    Marshal Cuevas MD  [address]  [facility] [address]    RE: Vikash Sharif Loretta       Dear Colleague,     I had the pleasure of seeing Vikash Sharif Loretta in the [facility].  SURPASS-CVOT Study [Effect of tirzepatide versus Dulaglutide on Major Cardiovascular Events in Patients with Type 2Diabetes]    Subject contacted by telephone to evaluate/ensure compliance.    Subject queried for adverse events, endpoints and medication changes. Patient reports he develops \"sour stomach 2 days after taking his injection and it lasts for 5 days, then resolves. This has happened after both IP injections. He is not taking Omeprazole or other PPI. Informed if his sx's continue we can evaluate medications for him.    Was seen by Ortho on 5/16/22 for his R shoulder pain which is documented as having for the past 12 years. He was informed he could taker OTC Ibuprofen or Naproxen with approval by Dr bedolla @ Hepatology clinic. Dr Bedolla stated he could not use either of these medications due to his cirrhosis, but would be fine with a steroid injection. He has started PT @ [facility] as of 5/18/22    Queried regarding compliance. No compliance issues, has taken all of his IP.    Will see him in clinic in 2 weeks.    Mis Husain RN    SURPASS-CVOT Study [Effect of tirzepatide versus Dulaglutide on Major Cardiovascular Events in Patients with Type 2 Diabetes]    Diagnosis/event description (diagnosis preferred): Sour Stomach    Start date:   5/14/2022  Date resolved:  5/17/2022    Intensity: ([x] mild /[]  moderate / [] severe)     Study Medication adjustment:  [] Yes   [x] No    Outcome: ([x] resolved [with or without sequelae] /[] recovering / [] not recovered / [] death / [] unknown)      Date study team aware of event: 5/26/2022    Was treatment given for this event:  [] Yes   [x] No   If yes, provide details    Serious adverse event?    Yes   [x] No    Special interest event?  [] Yes   " [x] No    Endpoint? [] Yes   [x] No     Fatal event?  [] Yes   [x] No      Dr. Elias: Reasonable possibility that AE is related to study treatment    [x] Yes   [] No    Mis Husain RN      SURPASS-CVOT Study [Effect of tirzepatide versus Dulaglutide on Major Cardiovascular Events in Patients with Type 2 Diabetes]    Diagnosis/event description (diagnosis preferred):  Sour Stomach    Start date:  5/21/2022  Date resolved: 05/24/2022    Intensity: ([x] mild /[]  moderate / [] severe)     Study Medication adjustment:  [] Yes   [x] No    Outcome: ([x] resolved [with or without sequelae] /[] recovering / [] not recovered / [] death / [] unknown)      Date study team aware of event: 05/26/2022    Was treatment given for this event:  [] Yes   [x] No   If yes, provide details    Serious adverse event?    Yes   [x] No    Special interest event?  [] Yes   [x] No    Endpoint? [] Yes   [x] No     Fatal event?  [] Yes   [x] No      Dr. Elias: Reasonable possibility that AE is related to study treatment    [x] Yes   [] No    Mis Husain RN        Thank you for allowing me to participate in the care of your patient.      Sincerely,     Mis Husain RN     [facility]  cc:   No referring provider defined for this encounter.

## 2022-05-26 NOTE — PROGRESS NOTES
"SURPASS-CVOT Study [Effect of tirzepatide versus Dulaglutide on Major Cardiovascular Events in Patients with Type 2Diabetes]    Subject contacted by telephone to evaluate/ensure compliance.    Subject queried for adverse events, endpoints and medication changes. Patient reports he develops \"sour stomach 2 days after taking his injection and it lasts for 5 days, then resolves. This has happened after both IP injections. He is not taking Omeprazole or other PPI. Informed if his sx's continue we can evaluate medications for him.    Was seen by Ortho on 5/16/22 for his R shoulder pain which is documented as having for the past 12 years. He was informed he could taker OTC Ibuprofen or Naproxen with approval by Dr bedolla @ Hepatology clinic. Dr Bedolla stated he could not use either of these medications due to his cirrhosis, but would be fine with a steroid injection. He has started PT @ [facility] as of 5/18/22    Queried regarding compliance. No compliance issues, has taken all of his IP.    Will see him in clinic in 2 weeks.    Mis Husain RN    [employer]-CVOT Study [Effect of tirzepatide versus Dulaglutide on Major Cardiovascular Events in Patients with Type 2 Diabetes]    Diagnosis/event description (diagnosis preferred): Sour Stomach    Start date:   5/14/2022  Date resolved:  5/17/2022    Intensity: ([x] mild /[]  moderate / [] severe)     Study Medication adjustment:  [] Yes   [x] No    Outcome: ([x] resolved [with or without sequelae] /[] recovering / [] not recovered / [] death / [] unknown)      Date study team aware of event: 5/26/2022    Was treatment given for this event:  [] Yes   [x] No   If yes, provide details    Serious adverse event?    Yes   [x] No    Special interest event?  [] Yes   [x] No    Endpoint? [] Yes   [x] No     Fatal event?  [] Yes   [x] No      Dr. Elias: Reasonable possibility that AE is related to study treatment    [x] Yes   [] No    Mis Husain RN      SURPASS-CVOT Study [Effect " of tirzepatide versus Dulaglutide on Major Cardiovascular Events in Patients with Type 2 Diabetes]    Diagnosis/event description (diagnosis preferred):  Sour Stomach    Start date:  5/21/2022  Date resolved: 05/24/2022    Intensity: ([x] mild /[]  moderate / [] severe)     Study Medication adjustment:  [] Yes   [x] No    Outcome: ([x] resolved [with or without sequelae] /[] recovering / [] not recovered / [] death / [] unknown)      Date study team aware of event: 05/26/2022    Was treatment given for this event:  [] Yes   [x] No   If yes, provide details    Serious adverse event?    Yes   [x] No    Special interest event?  [] Yes   [x] No    Endpoint? [] Yes   [x] No     Fatal event?  [] Yes   [x] No      Dr. Elias: Reasonable possibility that AE is related to study treatment    [x] Yes   [] No    Mis Husain, RN

## 2022-06-09 ENCOUNTER — RESEARCH ENCOUNTER (OUTPATIENT)
Dept: CARDIOLOGY | Facility: CLINIC | Age: 62
End: 2022-06-09

## 2022-06-09 ENCOUNTER — OFFICE VISIT (OUTPATIENT)
Dept: CARDIOLOGY | Facility: CLINIC | Age: 62
End: 2022-06-09
Payer: COMMERCIAL

## 2022-06-09 VITALS
SYSTOLIC BLOOD PRESSURE: 103 MMHG | BODY MASS INDEX: 23.99 KG/M2 | HEIGHT: 72 IN | HEART RATE: 54 BPM | WEIGHT: 177.1 LBS | DIASTOLIC BLOOD PRESSURE: 64 MMHG

## 2022-06-09 DIAGNOSIS — Z00.6 EXAMINATION OF PARTICIPANT OR CONTROL IN CLINICAL RESEARCH: ICD-10-CM

## 2022-06-09 DIAGNOSIS — E11.49 DM TYPE 2 CAUSING NEUROLOGICAL DISEASE (H): ICD-10-CM

## 2022-06-09 DIAGNOSIS — I25.10 CORONARY ARTERY DISEASE INVOLVING NATIVE CORONARY ARTERY OF NATIVE HEART WITHOUT ANGINA PECTORIS: Primary | ICD-10-CM

## 2022-06-09 PROCEDURE — 99207 PR NO CHARGE NURSE ONLY: CPT

## 2022-06-09 NOTE — LETTER
6/9/2022    Marshal Cuevas MD  [address]  [facility] [address]    RE: Vikash Lipscombsheridan       Dear Colleague,     I had the pleasure of seeing Vikash Burgos in the [facility].  SURPASS-CVOT Study [Effect of tirzepatide versus Dulaglutide on Major Cardiovascular Events in Patients with Type 2 Diabetes]    Subject seen for Visit 10 Week 16    Subject queried for adverse events, endpoints and medication changes.   Reports he has nausea on day 3 through day 5 after each injection along with stomach gurgling after each injection, he feels well on days 6 and 7.    He is concerned about his insulin dose @ Levimir 50 U at bedtime with his A1C @ 4.6. We discussed that he should notify his primary MD, he did state he has an endocrinology appt in 2 weeks and will discuss then. I asked him to track his blood sugars at least TID as to give the endocrinologist some clear information on how his blood sugars are during the day. He agreed to do so. He denies hypoglycemia.     He has lost 18# since starting the study. Weight went from 195 to 177.     Adherence/lifestyle reinforcement done     Subject drug dispensed, Kit Number [id number]  No of pens dispensed at last visit 4  Any Returned medication 0  Date of first dose since last visit: 05/12/2022  Date of last dose taken since last visit: 06/02/2022    Vitals:    06/09/22 0831 06/09/22 0839   BP: 116/60 103/64   Pulse: 51 54   Weight: 80.3 kg (177 lb 1.6 oz)    Height: 1.829 m (6')        Next visit scheduled for phone visit in 2 weeks.      Mis Husain, RN  Clinical Trials Nurse    SURPASS-CVOT Study [Effect of tirzepatide versus Dulaglutide on Major Cardiovascular Events in Patients with Type 2 Diabetes]    Diagnosis/event description (diagnosis preferred):  Nausea  Start date: 5/28/2022  Date resolved: 05/31/2022    Intensity: ([x] mild /[]  moderate / [] severe)     Study Medication adjustment:  [] Yes   [x] No    Outcome: ([x] resolved  [with or without sequelae] /[] recovering / [] not recovered / [] death / [] unknown)      Date study team aware of event: 06/09/2022    Was treatment given for this event:  [] Yes   [x] No   If yes, provide details  Serious adverse event?    Yes   [x] No    Special interest event?  [] Yes   [x] No    Endpoint? [] Yes   [x] No     Fatal event?  [] Yes   [x] No      Dr. Elias: Reasonable possibility that AE is related to study treatment    [x] Yes   [] No    Mis Husain RN    [employer]-CVOT Study [Effect of tirzepatide versus Dulaglutide on Major Cardiovascular Events in Patients with Type 2 Diabetes]    Diagnosis/event description (diagnosis preferred):  Stomach Gurgling  Start date: 05/28/2022  Date resolved: 05/31/2022    Intensity: ([x] mild /[]  moderate / [] severe)     Study Medication adjustment:  [] Yes   [x] No    Outcome: ([x] resolved [with or without sequelae] /[] recovering / [] not recovered / [] death / [] unknown)      Date study team aware of event: 06/09/2022    Was treatment given for this event:  [] Yes   [x] No   If yes, provide details                                                                                                                                                                                                                                                                                                                                                                                                                                                                                         Serious adverse event?    Yes   [x] No    Special interest event?  [] Yes   [x] No    Endpoint? [] Yes   [x] No     Fatal event?  [] Yes   [x] No      Dr. Elias: Reasonable possibility that AE is related to study treatment    [x] Yes   [] No    Mis Husain RN      [employer]-CVOT Study [Effect of tirzepatide versus Dulaglutide on Major Cardiovascular Events in Patients with Type 2  Diabetes]    Diagnosis/event description (diagnosis preferred):  Nausea  Start date: 06/04/2022   Date resolved: 06/07/2022     Intensity: ([x] mild /[]  moderate / [] severe)     Study Medication adjustment:  [] Yes   [x] No    Outcome: ([x] resolved [with or without sequelae] /[] recovering / [] not recovered / [] death / [] unknown)      Date study team aware of event: 06/09/2022    Was treatment given for this event:  [] Yes   [x] No   If yes, provide details  Serious adverse event?    Yes   [x] No    Special interest event?  [] Yes   [x] No    Endpoint? [] Yes   [x] No     Fatal event?  [] Yes   [x] No      Dr. Elias: Reasonable possibility that AE is related to study treatment    [x] Yes   [] No    Mis Husain RN    SURPASS-CVOT Study [Effect of tirzepatide versus Dulaglutide on Major Cardiovascular Events in Patients with Type 2 Diabetes]    Diagnosis/event description (diagnosis preferred):  Stomach Gurgling  Start date: 06/04/2022   Date resolved: 06/07/2022     Intensity: ([x] mild /[]  moderate / [] severe)     Study Medication adjustment:  [] Yes   [x] No    Outcome: ([x] resolved [with or without sequelae] /[] recovering / [] not recovered / [] death / [] unknown)      Date study team aware of event: 06/09/2022    Was treatment given for this event:  [] Yes   [x] No   If yes, provide details    Serious adverse event?    Yes   [x] No    Special interest event?  [] Yes   [x] No    Endpoint? [] Yes   [x] No     Fatal event?  [] Yes   [] No      Dr. Elias: Reasonable possibility that AE is related to study treatment    [x] Yes   [] No    Mis Husain RN          Thank you for allowing me to participate in the care of your patient.      Sincerely,     Mis Husain RN     [facility]  cc:   No referring provider defined for this encounter.

## 2022-06-09 NOTE — PROGRESS NOTES
SURPASS-CVOT Study [Effect of tirzepatide versus Dulaglutide on Major Cardiovascular Events in Patients with Type 2 Diabetes]    Subject seen for Visit 10 Week 16    Subject queried for adverse events, endpoints and medication changes.   Reports he has nausea on day 3 through day 5 after each injection along with stomach gurgling after each injection, he feels well on days 6 and 7.    He is concerned about his insulin dose @ Levimir 50 U at bedtime with his A1C @ 4.6. We discussed that he should notify his primary MD, he did state he has an endocrinology appt in 2 weeks and will discuss then. I asked him to track his blood sugars at least TID as to give the endocrinologist some clear information on how his blood sugars are during the day. He agreed to do so. He denies hypoglycemia.     He has lost 18# since starting the study. Weight went from 195 to 177.     Adherence/lifestyle reinforcement done     Subject drug dispensed, Kit Number [id number]  No of pens dispensed at last visit 4  Any Returned medication 0  Date of first dose since last visit: 05/12/2022  Date of last dose taken since last visit: 06/02/2022    Vitals:    06/09/22 0831 06/09/22 0839   BP: 116/60 103/64   Pulse: 51 54   Weight: 80.3 kg (177 lb 1.6 oz)    Height: 1.829 m (6')        Next visit scheduled for phone visit in 2 weeks.      Mis Husain RN  Clinical Trials Nurse    SURPASS-CVOT Study [Effect of tirzepatide versus Dulaglutide on Major Cardiovascular Events in Patients with Type 2 Diabetes]    Diagnosis/event description (diagnosis preferred):  Nausea  Start date: 5/28/2022  Date resolved: 05/31/2022    Intensity: ([x] mild /[]  moderate / [] severe)     Study Medication adjustment:  [] Yes   [x] No    Outcome: ([x] resolved [with or without sequelae] /[] recovering / [] not recovered / [] death / [] unknown)      Date study team aware of event: 06/09/2022    Was treatment given for this event:  [] Yes   [x] No   If yes, provide  details  Serious adverse event?    Yes   [x] No    Special interest event?  [] Yes   [x] No    Endpoint? [] Yes   [x] No     Fatal event?  [] Yes   [x] No      Dr. Elias: Reasonable possibility that AE is related to study treatment    [x] Yes   [] No    Mis Husain RN    SURPASS-CVOT Study [Effect of tirzepatide versus Dulaglutide on Major Cardiovascular Events in Patients with Type 2 Diabetes]    Diagnosis/event description (diagnosis preferred):  Stomach Gurgling  Start date: 05/28/2022  Date resolved: 05/31/2022    Intensity: ([x] mild /[]  moderate / [] severe)     Study Medication adjustment:  [] Yes   [x] No    Outcome: ([x] resolved [with or without sequelae] /[] recovering / [] not recovered / [] death / [] unknown)      Date study team aware of event: 06/09/2022    Was treatment given for this event:  [] Yes   [x] No   If yes, provide details                                                                                                                                                                                                                                                                                                                                                                                                                                                                                         Serious adverse event?    Yes   [x] No    Special interest event?  [] Yes   [x] No    Endpoint? [] Yes   [x] No     Fatal event?  [] Yes   [x] No      Dr. Elias: Reasonable possibility that AE is related to study treatment    [x] Yes   [] No    Mis Husain RN      SURPASS-CVOT Study [Effect of tirzepatide versus Dulaglutide on Major Cardiovascular Events in Patients with Type 2 Diabetes]    Diagnosis/event description (diagnosis preferred):  Nausea  Start date: 06/04/2022   Date resolved: 06/07/2022     Intensity: ([x] mild /[]  moderate / [] severe)     Study Medication adjustment:  [] Yes   [x]  No    Outcome: ([x] resolved [with or without sequelae] /[] recovering / [] not recovered / [] death / [] unknown)      Date study team aware of event: 06/09/2022    Was treatment given for this event:  [] Yes   [x] No   If yes, provide details  Serious adverse event?    Yes   [x] No    Special interest event?  [] Yes   [x] No    Endpoint? [] Yes   [x] No     Fatal event?  [] Yes   [x] No      Dr. Elias: Reasonable possibility that AE is related to study treatment    [x] Yes   [] No    Mis Husain RN    SURPASS-CVOT Study [Effect of tirzepatide versus Dulaglutide on Major Cardiovascular Events in Patients with Type 2 Diabetes]    Diagnosis/event description (diagnosis preferred):  Stomach Gurgling  Start date: 06/04/2022   Date resolved: 06/07/2022     Intensity: ([x] mild /[]  moderate / [] severe)     Study Medication adjustment:  [] Yes   [x] No    Outcome: ([x] resolved [with or without sequelae] /[] recovering / [] not recovered / [] death / [] unknown)      Date study team aware of event: 06/09/2022    Was treatment given for this event:  [] Yes   [x] No   If yes, provide details    Serious adverse event?    Yes   [x] No    Special interest event?  [] Yes   [x] No    Endpoint? [] Yes   [x] No     Fatal event?  [] Yes   [] No      Dr. Elias: Reasonable possibility that AE is related to study treatment    [x] Yes   [] No    Mis Husain RN

## 2022-06-23 ENCOUNTER — VIRTUAL VISIT (OUTPATIENT)
Dept: CARDIOLOGY | Facility: CLINIC | Age: 62
End: 2022-06-23
Payer: COMMERCIAL

## 2022-06-23 DIAGNOSIS — E11.49 DM TYPE 2 CAUSING NEUROLOGICAL DISEASE (H): ICD-10-CM

## 2022-06-23 DIAGNOSIS — I25.10 CORONARY ARTERY DISEASE INVOLVING NATIVE CORONARY ARTERY OF NATIVE HEART WITHOUT ANGINA PECTORIS: Primary | ICD-10-CM

## 2022-06-23 DIAGNOSIS — Z00.6 EXAMINATION OF PARTICIPANT OR CONTROL IN CLINICAL RESEARCH: ICD-10-CM

## 2022-06-23 PROCEDURE — 99207 PR NO CHARGE NURSE ONLY: CPT

## 2022-06-23 NOTE — LETTER
6/23/2022    Marshal Cuevas MD  [address]  [facility] [address]    RE: Vikash Sharif Loretta       Dear Colleague,     I had the pleasure of seeing Vikash Sharif Loretta in the [facility].  SURPASS-CVOT Study [Effect of tirzepatide versus Dulaglutide on Major Cardiovascular Events in Patients with Type 2Diabetes]    Subject contacted by telephone to evaluate/ensure compliance.    Sujbect queried for adverse events, endpoints and medication changes. Patient reports he has had the same side effects as stated at the prior visit. Nausea and Stomach gurgling-they have started now on day 3 and end by day 5. He does not take anything for them.    He has a pending cataract surgery coming up.No other concerns. No missed doses of study medications.    SURPASS-CVOT Study [Effect of tirzepatide versus Dulaglutide on Major Cardiovascular Events in Patients with Type 2 Diabetes]    Diagnosis/event description (diagnosis preferred):  Nausea  Start date:  06/12/2022  Date resolved:  06/14/2022    Intensity: ([x] mild /[]  moderate / [] severe)     Study Medication adjustment:  [] Yes   [x] No    Outcome: ([x] resolved [with or without sequelae] /[] recovering / [] not recovered / [] death / [] unknown)    Date study team aware of event: 06/23/2022    Was treatment given for this event:  [] Yes   [x] No   If yes, provide details  Serious adverse event?    Yes   [x] No    Special interest event?  [] Yes   [x] No    Endpoint? [] Yes   [x] No     Fatal event?  [] Yes   [x] No      Dr. Elias: Reasonable possibility that AE is related to study treatment    [x] Yes   [] No    Mis Husain, RN    SURPASS-CVOT Study [Effect of tirzepatide versus Dulaglutide on Major Cardiovascular Events in Patients with Type 2 Diabetes]    Diagnosis/event description (diagnosis preferred):  Stomach Gurgling  Start date:  06/12/2022  Date resolved:  06/14/2022    Intensity: ([x] mild /[]  moderate / [] severe)     Study  Medication adjustment:  [] Yes   [x] No    Outcome: ([x] resolved [with or without sequelae] /[] recovering / [] not recovered / [] death / [] unknown)      Date study team aware of event: 06/23/2022    Was treatment given for this event:  [] Yes   [x] No   If yes, provide details  Serious adverse event?    Yes   [x] No    Special interest event?  [] Yes   [x] No    Endpoint? [] Yes   [x] No     Fatal event?  [] Yes   [x] No      Dr. Elias: Reasonable possibility that AE is related to study treatment    [x] Yes   [] No    Mis Husain RN    SURPASS-CVOT Study [Effect of tirzepatide versus Dulaglutide on Major Cardiovascular Events in Patients with Type 2 Diabetes]    Diagnosis/event description (diagnosis preferred):  Nausea  Start date:  06/19/2022  Date resolved:  06/21/2022    Intensity: ([x] mild /[]  moderate / [] severe)     Study Medication adjustment:  [] Yes   [x] No    Outcome: ([x] resolved [with or without sequelae] /[] recovering / [] not recovered / [] death / [] unknown)     Date study team aware of event: 06/23/2022    Was treatment given for this event:  [] Yes   [x] No   If yes, provide details  Serious adverse event?    Yes   [x] No    Special interest event?  [] Yes   [x] No    Endpoint? [] Yes   [x] No     Fatal event?  [] Yes   [x] No      Dr. Elias: Reasonable possibility that AE is related to study treatment    [x] Yes   [] No    Mis Husain RN    SURPASS-CVOT Study [Effect of tirzepatide versus Dulaglutide on Major Cardiovascular Events in Patients with Type 2 Diabetes]    Diagnosis/event description (diagnosis preferred):  Stomach Gurgling  Start date: 06/19/2022  Date resolved:  06/21/2022    Intensity: ([x] mild /[]  moderate / [] severe)     Study Medication adjustment:  [] Yes   [x] No    Outcome: ([x] resolved [with or without sequelae] /[] recovering / [] not recovered / [] death / [] unknown)     Date study team aware of event: 06/23/2022    Was treatment given for this event:   [] Yes   [x] No   If yes, provide details  Serious adverse event?    Yes   [x] No    Special interest event?  [] Yes   [x] No    Endpoint? [] Yes   [x] No     Fatal event?  [] Yes   [x] No      Dr. Elias: Reasonable possibility that AE is related to study treatment    [x] Yes   [] No    Mis Husain RN    Will see him in clinic in 2 weeks.      Thank you for allowing me to participate in the care of your patient.      Sincerely,     Mis Husain RN     [facility]  cc:   No referring provider defined for this encounter.

## 2022-06-27 ENCOUNTER — OFFICE VISIT (OUTPATIENT)
Dept: CARDIOLOGY | Facility: CLINIC | Age: 62
End: 2022-06-27
Payer: COMMERCIAL

## 2022-06-27 VITALS
BODY MASS INDEX: 23.72 KG/M2 | HEART RATE: 49 BPM | SYSTOLIC BLOOD PRESSURE: 128 MMHG | OXYGEN SATURATION: 98 % | DIASTOLIC BLOOD PRESSURE: 68 MMHG | HEIGHT: 72 IN | WEIGHT: 175.1 LBS

## 2022-06-27 DIAGNOSIS — I10 HYPERTENSION, UNSPECIFIED TYPE: ICD-10-CM

## 2022-06-27 DIAGNOSIS — E78.2 MIXED HYPERLIPIDEMIA: ICD-10-CM

## 2022-06-27 DIAGNOSIS — I10 BENIGN ESSENTIAL HYPERTENSION: ICD-10-CM

## 2022-06-27 DIAGNOSIS — I25.10 CORONARY ARTERY DISEASE INVOLVING NATIVE CORONARY ARTERY OF NATIVE HEART WITHOUT ANGINA PECTORIS: Primary | ICD-10-CM

## 2022-06-27 PROCEDURE — 99213 OFFICE O/P EST LOW 20 MIN: CPT | Performed by: PHYSICIAN ASSISTANT

## 2022-06-27 RX ORDER — NEBIVOLOL 2.5 MG/1
2.5 TABLET ORAL AT BEDTIME
Qty: 30 TABLET | Refills: 11 | Status: SHIPPED | OUTPATIENT
Start: 2022-06-27 | End: 2022-08-01

## 2022-06-27 NOTE — LETTER
6/27/2022    Marshal Cuevas MD  [address]  [facility] [address]    RE: Vikash Sharif Loretta       Dear Colleague,     I had the pleasure of seeing Vikash Kole Burgos in the [facility].  [phone]      HPI and Plan:   See dictation    Orders this Visit:  Orders Placed This Encounter   Procedures     Follow-Up with Cardiology REAGAN     Orders Placed This Encounter   Medications     nebivolol (BYSTOLIC) 2.5 MG tablet     Sig: Take 1 tablet (2.5 mg) by mouth At Bedtime     Dispense:  30 tablet     Refill:  11     Medications Discontinued During This Encounter   Medication Reason     nebivolol (BYSTOLIC) 5 MG tablet          Encounter Diagnoses   Name Primary?     Hypertension, unspecified type      Coronary artery disease involving native coronary artery of native heart without angina pectoris Yes     Mixed hyperlipidemia      Benign essential hypertension        CURRENT MEDICATIONS:  Current Outpatient Medications   Medication Sig Dispense Refill     aspirin 81 MG tablet Take 1 tablet by mouth daily.       B-D U/F 31G X 8 MM insulin pen needle USE ONE PEN NEEDLES DAILY OR AS DIRECTED. 100 each 3     celecoxib (CELEBREX) 100 MG capsule TAKE 1 CAPSULE BY MOUTH EVERY DAY 30 capsule 1     chlorhexidine (PERIDEX) 0.12 % solution Take 15 mLs by mouth 2 times daily as needed   5     cyanocobalamin 1000 MCG SUBL Place 1,000 mcg under the tongue daily       glucosamine-chondroitin 500-400 MG CAPS per capsule Take 1 capsule by mouth daily       insulin pen needle (BD HEIKE U/F) 32G X 4 MM Use 1 daily or as directed. 100 each prn     LEVEMIR FLEXTOUCH 100 UNIT/ML pen INJECT 50 UNITS SUBCUTANEOUS AT BEDTIME 15 mL 27     lisinopril (ZESTRIL) 20 MG tablet Take 1 tablet (20 mg) by mouth 2 times daily 180 tablet 3     nebivolol (BYSTOLIC) 2.5 MG tablet Take 1 tablet (2.5 mg) by mouth At Bedtime 30 tablet 11     rosuvastatin (CRESTOR) 20 MG tablet Take 1 tablet (20 mg) by mouth daily 90 tablet 3     study -  tirzepatide 2.5-15 mg or dulaglutide 1.5 mg, SURPASS,, IDS# [id number], PEN injection Inject 1.5-15 mg Subcutaneous every 7 days for 4 doses 4 pen 0     Vitamin D, Cholecalciferol, 1000 units TABS Take 1,000 Units by mouth daily       zolpidem (AMBIEN) 5 MG tablet TAKE 1 TAB ORALLY AS NEEDED FOR SLEEP 5 tablet 0       ALLERGIES     Allergies   Allergen Reactions     Pcn [Penicillins] Rash     Rash with PCN only. Patient has taken amoxicillin with no rash.       PAST MEDICAL HISTORY:  Past Medical History:   Diagnosis Date     Basal cell carcinoma      Carotid stenosis, left     s/p left CEA 2/2020     Cerebral infarction (H)     left CVA 2/2020 post-op carotid endarterectomy     Coronary artery disease     4 vessel bypass November 2010; LIMA ->LAD, SVG-> OM3, SVG -> D2, SVG -> PDA     Diabetes mellitus (H) 2005    neuropathy     Generalized anxiety disorder 05/02/2014     Hepatitis C, chronic (H) 2005     Hyperlipidemia LDL goal < 70      Hypertension      Liver diseases     9/15 Liver is 10 cm in span without left lobe enlargement     Persistent microalbuminuria associated with type 2 diabetes mellitus (H) 05/06/2015     Renal artery stenosis (H)        PAST SURGICAL HISTORY:  Past Surgical History:   Procedure Laterality Date     ARTHROSCOPY KNEE RT/LT      left     COLONOSCOPY       CORONARY ARTERY BYPASS  11/18/201    Coronary artery bypass grafting x 4 with placement of the left internal mammary artery to the distal midportion of the left anterior descending artery, saphenous vein graft from aorta to the third obtuse marginal branch of circumflex coronary artery, saphenous vein graft from aorta to the second diagonal branch, saphenous vein graft from aorta to the posterior descending artery.     ENDARTERECTOMY CAROTID Left 2/21/2020    Procedure: LEFT CAROTID ENDARTERECTOMY WITH EEG;  Surgeon: Semaj Stubbs MD;  Location:  OR     ESOPHAGOSCOPY, GASTROSCOPY, DUODENOSCOPY (EGD), COMBINED  10/31/2011     Procedure:COMBINED ESOPHAGOSCOPY, GASTROSCOPY, DUODENOSCOPY (EGD); Surgeon:ALONSO ALDANA; [facility]     ESOPHAGOSCOPY, GASTROSCOPY, DUODENOSCOPY (EGD), COMBINED N/A 3/8/2018    Procedure: COMBINED ESOPHAGOSCOPY, GASTROSCOPY, DUODENOSCOPY (EGD);  EGD;  Surgeon: Angel Luis Justice MD;  [facility]: [facility]     HEART CATH CORONARY ANGIOGRAM W/LV GRAM  9--10    CV Surgery recommended     HEART CATH CORONARY ANGIOGRAM W/LV GRAM  -14    Medical management     HERNIA REPAIR, INGUINAL RT/LT      left       FAMILY HISTORY:  Family History   Problem Relation Age of Onset     C.A.D. Father      Cancer Father         bladder     Heart Surgery Father         bypass surgery     Heart Disease Mother        SOCIAL HISTORY:  Social History     Socioeconomic History     Marital status:      Spouse name: None     Number of children: None     Years of education: None     Highest education level: None   Tobacco Use     Smoking status: Former Smoker     Packs/day: 0.50     Years: 12.00     Pack years: 6.00     Types: Cigarettes     Start date:      Quit date: 2005     Years since quittin.4     Smokeless tobacco: Never Used   Substance and Sexual Activity     Alcohol use: Yes     Comment: minimal     Drug use: No     Sexual activity: Yes     Partners: Female   Other Topics Concern     Parent/sibling w/ CABG, MI or angioplasty before 65F 55M? No     Caffeine Concern Yes     Comment: 2 cups coffee per day     Special Diet Yes     Comment: low carb diet, low sugar     Exercise Yes     Comment: treadmill 40 minutes, walk, daily, bike 30 minutes every other day       Review of Systems:  Skin:  not assessed     Eyes:  not assessed    ENT:  not assessed    Respiratory:  Positive for dyspnea on exertion  Cardiovascular:    fatigue;lightheadedness;dizziness;Positive for  Gastroenterology: not assessed    Genitourinary:  not assessed    Musculoskeletal:  not assessed    Neurologic:  not assessed    Psychiatric:   not assessed    Heme/Lymph/Imm:  not assessed    Endocrine:  not assessed      Physical Exam:  Vitals: /68   Pulse (!) 49   Ht 1.829 m (6')   Wt 79.4 kg (175 lb 1.6 oz)   SpO2 98%   BMI 23.75 kg/m     Please refer to dictation for physical exam    Recent Lab Results: all reviewed today  CBC RESULTS:  Lab Results   Component Value Date    WBC 5.3 06/07/2021    RBC 5.00 06/07/2021    HGB 15.3 06/07/2021    HCT 45.1 06/07/2021    MCV 90 06/07/2021    MCH 30.6 06/07/2021    MCHC 33.9 06/07/2021    RDW 12.9 06/07/2021     (L) 06/07/2021       BMP RESULTS:  Lab Results   Component Value Date     03/29/2022     06/07/2021    POTASSIUM 4.6 03/29/2022    POTASSIUM 4.8 06/07/2021    CHLORIDE 103 03/29/2022    CHLORIDE 108 06/07/2021    CO2 30 03/29/2022    CO2 28 06/07/2021    ANIONGAP 6 03/29/2022    ANIONGAP 5 06/07/2021    GLC 87 03/29/2022     (H) 06/07/2021    BUN 18 03/29/2022    BUN 17 06/07/2021    CR 0.95 03/29/2022    CR 0.89 06/07/2021    GFRESTIMATED 90 03/29/2022    GFRESTIMATED >60 03/08/2022    GFRESTIMATED >90 06/07/2021    GFRESTBLACK >90 06/07/2021    LIA 9.4 03/29/2022    LIA 9.6 06/07/2021        INR RESULTS:  Lab Results   Component Value Date    INR 1.11 06/07/2021    INR 1.13 12/15/2020           CC  Michaelle Caba PA-C  [address]  [place] [id number]      Thank you for allowing me to participate in the care of your patient.      Sincerely,     Michaelle Caba PA-C     [facility]

## 2022-06-27 NOTE — PROGRESS NOTES
Service Date: 2022    CLINIC VISIT     PRIMARY CARDIOLOGIST:  Zeb Roberts MD     REASON FOR VISIT:  Hypertension followup.    HISTORY OF PRESENT ILLNESS:  Kelton is a delightful, 62-year-old gentleman with a past medical history significant for the followin.  Coronary artery disease with a history of 4 vessel CABG in  by Dr. Pruett with LIMA to the LAD, SVG to the OM3, SVG to the D2 and S2, SVG to the PDA.  Last angiogram in  showed patent LIMA and graft with just a 60% stenosis of the OM after touchdown.  Nuclear stress test in  showed 13 METs and had just a small area of inferior ischemia in the inferolateral segment.  His anginal equivalent was dyspnea on exertion.  2.  Low normal EF at 50%.  3.  Peripheral arterial disease with a history of carotid endarterectomy in 2020 complicated by CVA and facial nerve trauma.  4.  Hypertension.   5.  Well-controlled type 2 diabetes.  5.  Hyperlipidemia.  6.  Hepatitis C diagnosed in  when the patient presented with stage 4 liver failure, treated with Pegasys and ribavirin with excellent results, well controlled cirrhosis.  7.  SURPASS trial patient.      I met him as part of workup for the SURPASS trial, and we have been optimizing his blood pressure.  He has now gotten his blood pressure controlled, but he has had more dyspnea on exertion on these medications.  At the last visit, I decreased his nebivolol from 10-5 to see if things improved.  With this, his blood pressures are typically between 103-128 and mostly in the 110s.  His shortness of breath is a little bit better, but still not at 100% capacity.  He also has developed tinnitus in his left ear; this has been going on about 2-3 weeks.  He notes that his blood sugars are way better on the SURPASS trial, and he is wondering if he needs to decrease his Levemir, as he is dizzy at points.  He notes his last A1c was 4.8.  Overall, he feels okay, but not quite as well as he has in the  past.  He still does not feel like he is 100% capacity.  Because we had talked about the possibility of a stress test, he decided he would do one at home.  He went 19 minutes on the treadmill at 40 degrees of unknown speed, but did very well with that without chest discomfort or significant shortness of breath.    SOCIAL HISTORY:  He is  with a couple children.  One is a daughter at [employer] [facility] [employer] who is a nurse.  The other is a son who is a  for [employer].  He quit smoking in 1993.  Minimal alcohol use.    PHYSICAL EXAMINATION:    GENERAL:  Well-developed, well-nourished, fit-appearing gentleman in no acute distress.  HEENT:  Normocephalic and atraumatic.  HEART:  Regular rate and rhythm without murmur, rub or gallop.  LUNGS:  Clear without wheezes, rales or rhonchi.   EXTREMITIES:  Without peripheral edema.  SKIN:  Warm and dry.    ASSESSMENT AND PLAN:    1.  Hypertension, well controlled, but now with potential side effect of chronotropic incompetence with nebivolol and heart rate at 49 today and I suspect not increasing as much as he needs with exertion, potentially causing shortness of breath.  At this point, we are going to cut his nebivolol down to 2.5 mg a day.  He will continue to check home blood pressures and pay attention to his breathing. We may need to put on a monitor for possible chronotropic incompetence, and we discussed a possible stress test, but with him going 19 minutes at 40 degrees, I think we will likely have a negative stress test.  2.  Tinnitus, unclear etiology, possibility of this is related to his recent blood sugars.  He is currently adjusting that, and we will defer to primary and research team.  3.  Type 2 diabetes, excellent control.  4.  Hyperlipidemia, excellent control.    I will see this patient back in about 6 weeks, sooner with concerns.    Michaelle Caba PA-C        D: 06/27/2022   T: 06/27/2022   MT: [email or web address]    Name:     IHSAN SANCHEZ  MRN:      [id number]         Account:      [id number]   :      1960           Service Date: 2022       Document: [id number]

## 2022-06-27 NOTE — PATIENT INSTRUCTIONS
Thank you for visiting with me today.    We discussed: we'll keep perfecting things.     Medication changes:  decrease nebivolol to 2.5 mg once a day.  You can cut your 5 mg tablets in half.      Follow up: with me in about 6 weeks.  Please call sooner if you notice your blood pressure is in the 130s or higher.        Please call my nurse, Tara, at [phone] with any questions or concerns.    Scheduling phone number: [phone]  Reminder: Please bring in all current medications, over the counter supplements and vitamin bottles to your next appointment.

## 2022-06-27 NOTE — PROGRESS NOTES
[id number]      HPI and Plan:   See dictation    Orders this Visit:  Orders Placed This Encounter   Procedures     Follow-Up with Cardiology REAGAN     Orders Placed This Encounter   Medications     nebivolol (BYSTOLIC) 2.5 MG tablet     Sig: Take 1 tablet (2.5 mg) by mouth At Bedtime     Dispense:  30 tablet     Refill:  11     Medications Discontinued During This Encounter   Medication Reason     nebivolol (BYSTOLIC) 5 MG tablet          Encounter Diagnoses   Name Primary?     Hypertension, unspecified type      Coronary artery disease involving native coronary artery of native heart without angina pectoris Yes     Mixed hyperlipidemia      Benign essential hypertension        CURRENT MEDICATIONS:  Current Outpatient Medications   Medication Sig Dispense Refill     aspirin 81 MG tablet Take 1 tablet by mouth daily.       B-D U/F 31G X 8 MM insulin pen needle USE ONE PEN NEEDLES DAILY OR AS DIRECTED. 100 each 3     celecoxib (CELEBREX) 100 MG capsule TAKE 1 CAPSULE BY MOUTH EVERY DAY 30 capsule 1     chlorhexidine (PERIDEX) 0.12 % solution Take 15 mLs by mouth 2 times daily as needed   5     cyanocobalamin 1000 MCG SUBL Place 1,000 mcg under the tongue daily       glucosamine-chondroitin 500-400 MG CAPS per capsule Take 1 capsule by mouth daily       insulin pen needle (BD HEIKE U/F) 32G X 4 MM Use 1 daily or as directed. 100 each prn     LEVEMIR FLEXTOUCH 100 UNIT/ML pen INJECT 50 UNITS SUBCUTANEOUS AT BEDTIME 15 mL 27     lisinopril (ZESTRIL) 20 MG tablet Take 1 tablet (20 mg) by mouth 2 times daily 180 tablet 3     nebivolol (BYSTOLIC) 2.5 MG tablet Take 1 tablet (2.5 mg) by mouth At Bedtime 30 tablet 11     rosuvastatin (CRESTOR) 20 MG tablet Take 1 tablet (20 mg) by mouth daily 90 tablet 3     study - tirzepatide 2.5-15 mg or dulaglutide 1.5 mg, SURPASS,, IDS# [id number], PEN injection Inject 1.5-15 mg Subcutaneous every 7 days for 4 doses 4 pen 0     Vitamin D, Cholecalciferol, 1000 units TABS Take 1,000 Units by mouth  daily       zolpidem (AMBIEN) 5 MG tablet TAKE 1 TAB ORALLY AS NEEDED FOR SLEEP 5 tablet 0       ALLERGIES     Allergies   Allergen Reactions     Pcn [Penicillins] Rash     Rash with PCN only. Patient has taken amoxicillin with no rash.       PAST MEDICAL HISTORY:  Past Medical History:   Diagnosis Date     Basal cell carcinoma      Carotid stenosis, left     s/p left CEA 2/2020     Cerebral infarction (H)     left CVA 2/2020 post-op carotid endarterectomy     Coronary artery disease     4 vessel bypass November 2010; LIMA ->LAD, SVG-> OM3, SVG -> D2, SVG -> PDA     Diabetes mellitus (H) 2005    neuropathy     Generalized anxiety disorder 05/02/2014     Hepatitis C, chronic (H) 2005     Hyperlipidemia LDL goal < 70      Hypertension      Liver diseases     9/15 Liver is 10 cm in span without left lobe enlargement     Persistent microalbuminuria associated with type 2 diabetes mellitus (H) 05/06/2015     Renal artery stenosis (H)        PAST SURGICAL HISTORY:  Past Surgical History:   Procedure Laterality Date     ARTHROSCOPY KNEE RT/LT      left     COLONOSCOPY       CORONARY ARTERY BYPASS  11/18/201    Coronary artery bypass grafting x 4 with placement of the left internal mammary artery to the distal midportion of the left anterior descending artery, saphenous vein graft from aorta to the third obtuse marginal branch of circumflex coronary artery, saphenous vein graft from aorta to the second diagonal branch, saphenous vein graft from aorta to the posterior descending artery.     ENDARTERECTOMY CAROTID Left 2/21/2020    Procedure: LEFT CAROTID ENDARTERECTOMY WITH EEG;  Surgeon: Semaj Stubbs MD;  Location:  OR     ESOPHAGOSCOPY, GASTROSCOPY, DUODENOSCOPY (EGD), COMBINED  10/31/2011    Procedure:COMBINED ESOPHAGOSCOPY, GASTROSCOPY, DUODENOSCOPY (EGD); Surgeon:ALONSO ALDANA; Location: GI     ESOPHAGOSCOPY, GASTROSCOPY, DUODENOSCOPY (EGD), COMBINED N/A 3/8/2018    Procedure: COMBINED ESOPHAGOSCOPY,  GASTROSCOPY, DUODENOSCOPY (EGD);  EGD;  Surgeon: Angel Luis Justice MD;  Location: [facility]     HEART CATH CORONARY ANGIOGRAM W/LV GRAM  9-11-10    CV Surgery recommended     HEART CATH CORONARY ANGIOGRAM W/LV GRAM  14    Medical management     HERNIA REPAIR, INGUINAL RT/LT      left       FAMILY HISTORY:  Family History   Problem Relation Age of Onset     C.A.D. Father      Cancer Father         bladder     Heart Surgery Father         bypass surgery     Heart Disease Mother        SOCIAL HISTORY:  Social History     Socioeconomic History     Marital status:      Spouse name: None     Number of children: None     Years of education: None     Highest education level: None   Tobacco Use     Smoking status: Former Smoker     Packs/day: 0.50     Years: 12.00     Pack years: 6.00     Types: Cigarettes     Start date:      Quit date: 2005     Years since quittin.4     Smokeless tobacco: Never Used   Substance and Sexual Activity     Alcohol use: Yes     Comment: minimal     Drug use: No     Sexual activity: Yes     Partners: Female   Other Topics Concern     Parent/sibling w/ CABG, MI or angioplasty before 65F 55M? No     Caffeine Concern Yes     Comment: 2 cups coffee per day     Special Diet Yes     Comment: low carb diet, low sugar     Exercise Yes     Comment: treadmill 40 minutes, walk, daily, bike 30 minutes every other day       Review of Systems:  Skin:  not assessed     Eyes:  not assessed    ENT:  not assessed    Respiratory:  Positive for dyspnea on exertion  Cardiovascular:    fatigue;lightheadedness;dizziness;Positive for  Gastroenterology: not assessed    Genitourinary:  not assessed    Musculoskeletal:  not assessed    Neurologic:  not assessed    Psychiatric:  not assessed    Heme/Lymph/Imm:  not assessed    Endocrine:  not assessed      Physical Exam:  Vitals: /68   Pulse (!) 49   Ht 1.829 m (6')   Wt 79.4 kg (175 lb 1.6 oz)   SpO2 98%   BMI 23.75 kg/m     Please  refer to dictation for physical exam    Recent Lab Results: all reviewed today  CBC RESULTS:  Lab Results   Component Value Date    WBC 5.3 06/07/2021    RBC 5.00 06/07/2021    HGB 15.3 06/07/2021    HCT 45.1 06/07/2021    MCV 90 06/07/2021    MCH 30.6 06/07/2021    MCHC 33.9 06/07/2021    RDW 12.9 06/07/2021     (L) 06/07/2021       BMP RESULTS:  Lab Results   Component Value Date     03/29/2022     06/07/2021    POTASSIUM 4.6 03/29/2022    POTASSIUM 4.8 06/07/2021    CHLORIDE 103 03/29/2022    CHLORIDE 108 06/07/2021    CO2 30 03/29/2022    CO2 28 06/07/2021    ANIONGAP 6 03/29/2022    ANIONGAP 5 06/07/2021    GLC 87 03/29/2022     (H) 06/07/2021    BUN 18 03/29/2022    BUN 17 06/07/2021    CR 0.95 03/29/2022    CR 0.89 06/07/2021    GFRESTIMATED 90 03/29/2022    GFRESTIMATED >60 03/08/2022    GFRESTIMATED >90 06/07/2021    GFRESTBLACK >90 06/07/2021    LIA 9.4 03/29/2022    LIA 9.6 06/07/2021        INR RESULTS:  Lab Results   Component Value Date    INR 1.11 06/07/2021    INR 1.13 12/15/2020           ANTONELLA Caba PAKotaC  [address]  [place] [address]

## 2022-06-28 NOTE — PROGRESS NOTES
SURPASS-CVOT Study [Effect of tirzepatide versus Dulaglutide on Major Cardiovascular Events in Patients with Type 2Diabetes]    Subject contacted by telephone to evaluate/ensure compliance.    Spencert queried for adverse events, endpoints and medication changes. Patient reports he has had the same side effects as stated at the prior visit. Nausea and Stomach gurgling-they have started now on day 3 and end by day 5. He does not take anything for them.    He has a pending cataract surgery coming up.No other concerns. No missed doses of study medications.    SURPASS-CVOT Study [Effect of tirzepatide versus Dulaglutide on Major Cardiovascular Events in Patients with Type 2 Diabetes]    Diagnosis/event description (diagnosis preferred):  Nausea  Start date:  06/12/2022  Date resolved:  06/14/2022    Intensity: ([x] mild /[]  moderate / [] severe)     Study Medication adjustment:  [] Yes   [x] No    Outcome: ([x] resolved [with or without sequelae] /[] recovering / [] not recovered / [] death / [] unknown)    Date study team aware of event: 06/23/2022    Was treatment given for this event:  [] Yes   [x] No   If yes, provide details  Serious adverse event?    Yes   [x] No    Special interest event?  [] Yes   [x] No    Endpoint? [] Yes   [x] No     Fatal event?  [] Yes   [x] No      Dr. Elias: Reasonable possibility that AE is related to study treatment    [x] Yes   [] No    Mis Husain RN    SURPASS-CVOT Study [Effect of tirzepatide versus Dulaglutide on Major Cardiovascular Events in Patients with Type 2 Diabetes]    Diagnosis/event description (diagnosis preferred):  Stomach Gurgling  Start date:  06/12/2022  Date resolved:  06/14/2022    Intensity: ([x] mild /[]  moderate / [] severe)     Study Medication adjustment:  [] Yes   [x] No    Outcome: ([x] resolved [with or without sequelae] /[] recovering / [] not recovered / [] death / [] unknown)      Date study team aware of event: 06/23/2022    Was treatment given  for this event:  [] Yes   [x] No   If yes, provide details  Serious adverse event?    Yes   [x] No    Special interest event?  [] Yes   [x] No    Endpoint? [] Yes   [x] No     Fatal event?  [] Yes   [x] No      Dr. Elias: Reasonable possibility that AE is related to study treatment    [x] Yes   [] No    Mis Husain RN    SURPASS-CVOT Study [Effect of tirzepatide versus Dulaglutide on Major Cardiovascular Events in Patients with Type 2 Diabetes]    Diagnosis/event description (diagnosis preferred):  Nausea  Start date:  06/19/2022  Date resolved:  06/21/2022    Intensity: ([x] mild /[]  moderate / [] severe)     Study Medication adjustment:  [] Yes   [x] No    Outcome: ([x] resolved [with or without sequelae] /[] recovering / [] not recovered / [] death / [] unknown)     Date study team aware of event: 06/23/2022    Was treatment given for this event:  [] Yes   [x] No   If yes, provide details  Serious adverse event?    Yes   [x] No    Special interest event?  [] Yes   [x] No    Endpoint? [] Yes   [x] No     Fatal event?  [] Yes   [x] No      Dr. Elias: Reasonable possibility that AE is related to study treatment    [x] Yes   [] No    Mis Husain RN    [facility]-CVOT Study [Effect of tirzepatide versus Dulaglutide on Major Cardiovascular Events in Patients with Type 2 Diabetes]    Diagnosis/event description (diagnosis preferred):  Stomach Gurgling  Start date: 06/19/2022  Date resolved:  06/21/2022    Intensity: ([x] mild /[]  moderate / [] severe)     Study Medication adjustment:  [] Yes   [x] No    Outcome: ([x] resolved [with or without sequelae] /[] recovering / [] not recovered / [] death / [] unknown)     Date study team aware of event: 06/23/2022    Was treatment given for this event:  [] Yes   [x] No   If yes, provide details  Serious adverse event?    Yes   [x] No    Special interest event?  [] Yes   [x] No    Endpoint? [] Yes   [x] No     Fatal event?  [] Yes   [x] No      Dr. Elias: Reasonable  possibility that AE is related to study treatment    [x] Yes   [] No    Mis Husain RN    Will see him in clinic in 2 weeks.

## 2022-07-07 ENCOUNTER — OFFICE VISIT (OUTPATIENT)
Dept: CARDIOLOGY | Facility: CLINIC | Age: 62
End: 2022-07-07
Payer: COMMERCIAL

## 2022-07-07 VITALS
DIASTOLIC BLOOD PRESSURE: 70 MMHG | WEIGHT: 173.8 LBS | HEART RATE: 52 BPM | BODY MASS INDEX: 23.54 KG/M2 | SYSTOLIC BLOOD PRESSURE: 118 MMHG | HEIGHT: 72 IN

## 2022-07-07 DIAGNOSIS — Z00.6 EXAMINATION OF PARTICIPANT OR CONTROL IN CLINICAL RESEARCH: ICD-10-CM

## 2022-07-07 DIAGNOSIS — E11.49 DM TYPE 2 CAUSING NEUROLOGICAL DISEASE (H): ICD-10-CM

## 2022-07-07 DIAGNOSIS — I25.10 CORONARY ARTERY DISEASE INVOLVING NATIVE CORONARY ARTERY OF NATIVE HEART WITHOUT ANGINA PECTORIS: Primary | ICD-10-CM

## 2022-07-07 DIAGNOSIS — E11.9 DIABETES MELLITUS (H): Primary | ICD-10-CM

## 2022-07-07 DIAGNOSIS — I25.10 CORONARY ARTERY DISEASE INVOLVING NATIVE CORONARY ARTERY OF NATIVE HEART WITHOUT ANGINA PECTORIS: ICD-10-CM

## 2022-07-07 PROCEDURE — 99207 PR NO CHARGE NURSE ONLY: CPT

## 2022-07-07 PROCEDURE — 99207 PR NO CHARGE NURSE ONLY: CPT | Performed by: INTERNAL MEDICINE

## 2022-07-07 NOTE — LETTER
7/7/2022    Marshal Cuevas MD  [address]  [facility] [address]    RE: Vikash Reevesmackenzie       Dear Colleague,     I had the pleasure of seeing Vikash Burgos in the [facility].  SURPASS-CVOT Study [Effect of tirzepatide versus Dulaglutide on Major Cardiovascular Events in Patients with Type 2 Diabetes]    Subject seen for Visit Week 20    Subject queried for adverse events, endpoints and medication changes. No reported side effects, states stomach distress has improved. Patient states he saw Endocrinologist recently and his Levemir insulin was decreased from 50U to 30U. Will be reduced again in the future if blood sugars remain WNL.    Vitals:    07/07/22 0839 07/07/22 0850    BP: 121/72 118/70    Pulse: (!) 47 52    Weight: 78.8 kg (173 lb 12.8 oz)     Height: 1.829 m (6')       Adherence/lifestyle reinforcement done     Subject drug dispensed, Kit Number  [id number]  No of pens dispensed at last visit 4  Any Returned medication 0  Date of first dose since last visit: 06/09/2022  Date of last dose taken since last visit: 06/30/2022    Next visit schedule for 2 weeks phone visit.      Mis Husain RN  Clinical Trials Nurse        Thank you for allowing me to participate in the care of your patient.      Sincerely,     Mis Husain RN     [facility]  cc:   No referring provider defined for this encounter.

## 2022-07-07 NOTE — PROGRESS NOTES
SURPASS-CVOT Study [Effect of tirzepatide versus Dulaglutide on Major Cardiovascular Events in Patients with Type 2 Diabetes]    Subject seen for Visit Week 20    Subject queried for adverse events, endpoints and medication changes. No reported side effects, states stomach distress has improved. Patient states he saw Endocrinologist recently and his Levemir insulin was decreased from 50U to 30U. Will be reduced again in the future if blood sugars remain WNL.    Vitals:    07/07/22 0839 07/07/22 0850    BP: 121/72 118/70    Pulse: (!) 47 52    Weight: 78.8 kg (173 lb 12.8 oz)     Height: 1.829 m (6')       Adherence/lifestyle reinforcement done     Subject drug dispensed, Kit Number  [id number]  No of pens dispensed at last visit 4  Any Returned medication 0  Date of first dose since last visit: 06/09/2022  Date of last dose taken since last visit: 06/30/2022        Next visit schedule for 2 weeks phone visit.      Mis Husain RN  Clinical Trials Nurse

## 2022-07-30 DIAGNOSIS — M19.041 PRIMARY OSTEOARTHRITIS OF BOTH HANDS: ICD-10-CM

## 2022-07-30 DIAGNOSIS — M19.042 PRIMARY OSTEOARTHRITIS OF BOTH HANDS: ICD-10-CM

## 2022-08-01 DIAGNOSIS — I10 BENIGN ESSENTIAL HYPERTENSION: ICD-10-CM

## 2022-08-01 RX ORDER — LISINOPRIL 20 MG/1
20 TABLET ORAL 2 TIMES DAILY
Qty: 180 TABLET | Refills: 3 | Status: SHIPPED | OUTPATIENT
Start: 2022-08-01 | End: 2023-02-20

## 2022-08-01 RX ORDER — NEBIVOLOL 2.5 MG/1
2.5 TABLET ORAL AT BEDTIME
Qty: 30 TABLET | Refills: 11 | Status: CANCELLED | OUTPATIENT
Start: 2022-08-01

## 2022-08-01 RX ORDER — NEBIVOLOL 2.5 MG/1
2.5 TABLET ORAL AT BEDTIME
Qty: 90 TABLET | Refills: 3 | Status: SHIPPED | OUTPATIENT
Start: 2022-08-01 | End: 2022-08-31

## 2022-08-01 NOTE — TELEPHONE ENCOUNTER
8/1/22 Call from patient with Request for Rx for lisinopril and nebivolol post changes made by Michaelle KRISHNAMURTHY to be sent to [facility].   Last Office visit with: Michaelle KRISHNAMURTHY 6/27/22  Follow up scheduled:   [facility] Refill Guideline reviewed.   Medication meets criteria for refill.   Refilsl sent.  Tara Hawthorne RN 08/01/22 9:16 AM

## 2022-08-02 RX ORDER — CELECOXIB 100 MG/1
CAPSULE ORAL
Qty: 30 CAPSULE | Refills: 1 | Status: SHIPPED | OUTPATIENT
Start: 2022-08-02 | End: 2022-09-26

## 2022-08-02 NOTE — TELEPHONE ENCOUNTER
Routing refill request to provider for review/approval because:  Lab Results   Component Value Date    WBC 5.3 06/07/2021     Lab Results   Component Value Date    RBC 5.00 06/07/2021     Lab Results   Component Value Date    HGB 15.3 06/07/2021     Lab Results   Component Value Date    HCT 45.1 06/07/2021     No components found for: MCT  Lab Results   Component Value Date    MCV 90 06/07/2021     Lab Results   Component Value Date    MCH 30.6 06/07/2021     Lab Results   Component Value Date    MCHC 33.9 06/07/2021     Lab Results   Component Value Date    RDW 12.9 06/07/2021     Lab Results   Component Value Date     06/07/2021     please route to team to contact patient for appointment       Basil Prather RN  [facility] Triage Nurse

## 2022-08-04 ENCOUNTER — TRANSFERRED RECORDS (OUTPATIENT)
Dept: CARDIOLOGY | Facility: CLINIC | Age: 62
End: 2022-08-04

## 2022-08-04 ENCOUNTER — OFFICE VISIT (OUTPATIENT)
Dept: CARDIOLOGY | Facility: CLINIC | Age: 62
End: 2022-08-04
Payer: COMMERCIAL

## 2022-08-04 VITALS
HEIGHT: 72 IN | OXYGEN SATURATION: 99 % | HEART RATE: 51 BPM | BODY MASS INDEX: 22.48 KG/M2 | SYSTOLIC BLOOD PRESSURE: 164 MMHG | WEIGHT: 166 LBS | DIASTOLIC BLOOD PRESSURE: 87 MMHG

## 2022-08-04 DIAGNOSIS — Z00.6 EXAMINATION OF PARTICIPANT OR CONTROL IN CLINICAL RESEARCH: Primary | ICD-10-CM

## 2022-08-04 DIAGNOSIS — E11.49 DM TYPE 2 CAUSING NEUROLOGICAL DISEASE (H): ICD-10-CM

## 2022-08-04 DIAGNOSIS — I25.10 CORONARY ARTERY DISEASE INVOLVING NATIVE CORONARY ARTERY OF NATIVE HEART WITHOUT ANGINA PECTORIS: ICD-10-CM

## 2022-08-04 PROCEDURE — 99207 PR NO CHARGE-RESEARCH SERVICE: CPT

## 2022-08-04 NOTE — LETTER
8/4/2022    Marshal Cuevas MD  [address]  [place] [address]    RE: Vikash Burgos       Dear Colleague,     I had the pleasure of seeing Vikash Reevesmackenzie in the [facility].  SURPASS-CVOT Study [Effect of tirzepatide versus Dulaglutide on Major Cardiovascular Events in Patients with Type 2Diabetes]    Subject seen for Visit Week 24    Patient reported outcomes testing [APPADL, EQ-5D-5L, and IW-SP] were completed prior to other evaluations    Vital signs were done, including waist circumference.measured after 5 minutes resting in a seated position - two readings taken one minute apart using automated cuff.      Vitals:    08/04/22 0839 08/04/22 0842   BP: (!) 158/77 (!) 164/87   BP Location: Left arm Left arm   Patient Position:  Sitting   Cuff Size: Adult Regular Adult Regular   Pulse: 64 51   SpO2:  99%   Weight: 75.3 kg (166 lb)    Height: 1.829 m (6')      Waist Circ 89.5 cm and 90 cm.    Subject queried for adverse events, endpoints and medication changes. See AE forms.    Patient reports his neuropathy has improved immensely since he started the study drug. He is reporting no hypoglycemic events and states he has been using a Stacey now and his blood sugars fasting have been in the 70's. Non-Fasting glucose in the 118. Regular readings in the in the upper 80's. His insulin has been decreased from 50 U to 45 U, 25 U and now 20 U every day. He is being monitored by an endocrinologist.     He is reporting nausea starting 2 days afterhis injections lasting 5 days and also vomiting 2nd and 3rd days after vaccine X 4 weeks. Requesting Zofran. Information sent to Michaelle Caba regarding Zofran Rx. He is also feeling weakness this past 4 weeks.     He has lost 30# since starting the study. Heart rate is being monitored by Cardiology as his Nebevilol has had to be decreased.     Adherence/lifestyle reinforcement done     Non-fasting labs drawn and sent to central lab per  protocol.    No of pens dispensed at last visit 4  Any Returned medication 0  Date of first dose since last visit: 07/07/2022  Date of last dose taken since last visit: 07/28/2022    Subject drug dispensed 16 dosesof IP per protocol.       SURPASS-CVOT Study [Effect of tirzepatide versus Dulaglutide on Major Cardiovascular Events in Patients with Type 2 Diabetes]    Diagnosis/event description (diagnosis preferred):  Nausea  Start date: 07/23/2022  Date resolved: 07/28/2022    Intensity: ([x] mild /[]  moderate / [] severe)     Study Medication adjustment:  [] Yes   [x] No    Outcome: ([x] resolved [with or without sequelae] /[] recovering / [] not recovered / [] death / [] unknown)      Date study team aware of event: 08/04/2022    Was treatment given for this event:  [] Yes   [x] No   If yes, provide details  Serious adverse event?    Yes   [x] No    Special interest event?  [] Yes   [x] No    Endpoint? [] Yes   [x] No     Fatal event?  [] Yes   [x] No      Dr. Elias: Reasonable possibility that AE is related to study treatment    [x] Yes   [] No    Mis Husain RN      [facility]-CVOT Study [Effect of tirzepatide versus Dulaglutide on Major Cardiovascular Events in Patients with Type 2 Diabetes]    Diagnosis/event description (diagnosis preferred):  Vomiting    Start date: 07/23/2022  Date resolved: 07/24/2022    Intensity: ([x] mild /[]  moderate / [] severe)     Study Medication adjustment:  [] Yes   [x] No    Outcome: ([x] resolved [with or without sequelae] /[] recovering / [] not recovered / [] death / [] unknown)      Date study team aware of event: 08/04/2022    Was treatment given for this event:  [] Yes   [x] No   If yes, provide details  Serious adverse event?    Yes   [x] No    Special interest event?  [] Yes   [x] No    Endpoint? [] Yes   [x] No     Fatal event?  [] Yes   [x] No      Dr. Elias: Reasonable possibility that AE is related to study treatment    [x] Yes   [] No    Mis Husain  RN    [employer]-CVOT Study [Effect of tirzepatide versus Dulaglutide on Major Cardiovascular Events in Patients with Type 2 Diabetes]    Diagnosis/event description (diagnosis preferred):  Nausea    Start date: 07/23/2022   Date resolved: 08/03/2022    Intensity: ([x] mild /[]  moderate / [] severe)     Study Medication adjustment:  [] Yes   [x] No    Outcome: ([x] resolved [with or without sequelae] /[] recovering / [] not recovered / [] death / [] unknown)      Date study team aware of event: 08/04/2022    Was treatment given for this event:  [] Yes   [x] No   If yes, provide details  Serious adverse event?    Yes   [x] No    Special interest event?  [] Yes   [x] No    Endpoint? [] Yes   [x] No     Fatal event?  [] Yes   [x] No      Dr. Elias: Reasonable possibility that AE is related to study treatment    [x] Yes   [] No    Mis Husain RN    SURPASS-CVOT Study [Effect of tirzepatide versus Dulaglutide on Major Cardiovascular Events in Patients with Type 2 Diabetes]    Diagnosis/event description (diagnosis preferred):  Vomiting  Start date: 07/30/202  Date resolved: 07/31/2022     Intensity: ([x] mild /[]  moderate / [] severe)     Study Medication adjustment:  [] Yes   [x] No    Outcome: ([x] resolved [with or without sequelae] /[] recovering / [] not recovered / [] death / [] unknown)      Date study team aware of event: 08/04/2022    Was treatment given for this event:  [] Yes   [x] No   If yes, provide details  Serious adverse event?    Yes   [x] No    Special interest event?  [] Yes   [x] No    Endpoint? [] Yes   [x] No     Fatal event?  [] Yes   [x] No      Dr. Elias: Reasonable possibility that AE is related to study treatment    [x] Yes   [] No    Mis Husain RN    SURPASS-CVOT Study [Effect of tirzepatide versus Dulaglutide on Major Cardiovascular Events in Patients with Type 2 Diabetes]    Diagnosis/event description (diagnosis preferred):  Weakness  Start date: 07/09/2022  Date resolved:      Intensity: ([x] mild /[]  moderate / [] severe)     Study Medication adjustment:  [] Yes   [x] No    Outcome: ([] resolved [with or without sequelae] /[] recovering / [x] not recovered / [] death / [] unknown)      Date study team aware of event: 08/04/2022    Was treatment given for this event:  [] Yes   [x] No   If yes, provide details  Serious adverse event?    Yes   [x] No    Special interest event?  [] Yes   [x] No    Endpoint? [] Yes   [x] No     Fatal event?  [] Yes   [x] No      Dr. Elias: Reasonable possibility that AE is related to study treatment    [x] Yes   [] No    Mis Husain RN    Follow-up 3 months in clinic.    Thank you for allowing me to participate in the care of your patient.      Sincerely,   Mis Husain RN   [facility]  cc: No referring provider defined for this encounter.

## 2022-08-04 NOTE — PATIENT INSTRUCTIONS
Your blood pressure was elevated at your appointment today.  Elevated blood pressure can increase your risk of a heart attack, stroke and heart failure.  For this reason, we feel it is important to monitor your blood pressure closely.  If you have access to a home blood pressure monitor or are able to check your blood pressure at a local pharmacy in the next week we would like you to do so and call our clinic with those readings. Please call [phone] ([place]) and leave a message with your name, date of birth, blood pressure reading, date and location it was completed. If your blood pressure remains elevated your care team will be notified.  We appreciate being a part of your healthcare team and look forward to hearing from you soon.

## 2022-08-04 NOTE — PROGRESS NOTES
SURPASS-CVOT Study [Effect of tirzepatide versus Dulaglutide on Major Cardiovascular Events in Patients with Type 2Diabetes]    Subject seen for Visit Week 24    Patient reported outcomes testing [APPADL, EQ-5D-5L, and IW-SP] were completed prior to other evaluations    Vital signs were done, including waist circumference.measured after 5 minutes resting in a seated position - two readings taken one minute apart using automated cuff.      Vitals:    08/04/22 0839 08/04/22 0842   BP: (!) 158/77 (!) 164/87   BP Location: Left arm Left arm   Patient Position:  Sitting   Cuff Size: Adult Regular Adult Regular   Pulse: 64 51   SpO2:  99%   Weight: 75.3 kg (166 lb)    Height: 1.829 m (6')      Waist Circ 89.5 cm and 90 cm.    Subject queried for adverse events, endpoints and medication changes. See AE forms.    Patient reports his neuropathy has improved immensely since he started the study drug. He is reporting no hypoglycemic events and states he has been using a Stacey now and his blood sugars fasting have been in the 70's. Non-Fasting glucose in the 118. Regular readings in the in the upper 80's. His insulin has been decreased from 50 U to 45 U, 25 U and now 20 U every day. He is being monitored by an endocrinologist.     He is reporting nausea starting 2 days afterhis injections lasting 5 days and also vomiting 2nd and 3rd days after vaccine X 4 weeks. Requesting Zofran. Information sent to Michaelle Caba regarding Zofran Rx. He is also feeling weakness this past 4 weeks.     He has lost 30# since starting the study. Heart rate is being monitored by Cardiology as his Nebevilol has had to be decreased.     Adherence/lifestyle reinforcement done     Non-fasting labs drawn and sent to central lab per protocol.    No of pens dispensed at last visit 4  Any Returned medication 0  Date of first dose since last visit: 07/07/2022  Date of last dose taken since last visit: 07/28/2022    Subject drug dispensed 16 dosesof IP per  protocol.       SURPASS-CVOT Study [Effect of tirzepatide versus Dulaglutide on Major Cardiovascular Events in Patients with Type 2 Diabetes]    Diagnosis/event description (diagnosis preferred):  Nausea  Start date: 07/23/2022  Date resolved: 07/28/2022    Intensity: ([x] mild /[]  moderate / [] severe)     Study Medication adjustment:  [] Yes   [x] No    Outcome: ([x] resolved [with or without sequelae] /[] recovering / [] not recovered / [] death / [] unknown)      Date study team aware of event: 08/04/2022    Was treatment given for this event:  [] Yes   [x] No   If yes, provide details  Serious adverse event?    Yes   [x] No    Special interest event?  [] Yes   [x] No    Endpoint? [] Yes   [x] No     Fatal event?  [] Yes   [x] No      Dr. Elias: Reasonable possibility that AE is related to study treatment    [x] Yes   [] No    Mis Husain RN      [facility]-CVOT Study [Effect of tirzepatide versus Dulaglutide on Major Cardiovascular Events in Patients with Type 2 Diabetes]    Diagnosis/event description (diagnosis preferred):  Vomiting    Start date: 07/23/2022  Date resolved: 07/24/2022    Intensity: ([x] mild /[]  moderate / [] severe)     Study Medication adjustment:  [] Yes   [x] No    Outcome: ([x] resolved [with or without sequelae] /[] recovering / [] not recovered / [] death / [] unknown)      Date study team aware of event: 08/04/2022    Was treatment given for this event:  [] Yes   [x] No   If yes, provide details  Serious adverse event?    Yes   [x] No    Special interest event?  [] Yes   [x] No    Endpoint? [] Yes   [x] No     Fatal event?  [] Yes   [x] No      Dr. Elias: Reasonable possibility that AE is related to study treatment    [x] Yes   [] No    [employer] [facility]-CVOT Study [Effect of tirzepatide versus Dulaglutide on Major Cardiovascular Events in Patients with Type 2 Diabetes]    Diagnosis/event description (diagnosis preferred):  Nausea    Start date: 07/23/2022   Date  resolved: 08/03/2022    Intensity: ([x] mild /[]  moderate / [] severe)     Study Medication adjustment:  [] Yes   [x] No    Outcome: ([x] resolved [with or without sequelae] /[] recovering / [] not recovered / [] death / [] unknown)      Date study team aware of event: 08/04/2022    Was treatment given for this event:  [] Yes   [x] No   If yes, provide details  Serious adverse event?    Yes   [x] No    Special interest event?  [] Yes   [x] No    Endpoint? [] Yes   [x] No     Fatal event?  [] Yes   [x] No      Dr. Elias: Reasonable possibility that AE is related to study treatment    [x] Yes   [] No    Mis Husain RN    [employer])-CVOT Study [Effect of tirzepatide versus Dulaglutide on Major Cardiovascular Events in Patients with Type 2 Diabetes]    Diagnosis/event description (diagnosis preferred):  Vomiting  Start date: 07/30/202  Date resolved: 07/31/2022     Intensity: ([x] mild /[]  moderate / [] severe)     Study Medication adjustment:  [] Yes   [x] No    Outcome: ([x] resolved [with or without sequelae] /[] recovering / [] not recovered / [] death / [] unknown)      Date study team aware of event: 08/04/2022    Was treatment given for this event:  [] Yes   [x] No   If yes, provide details  Serious adverse event?    Yes   [x] No    Special interest event?  [] Yes   [x] No    Endpoint? [] Yes   [x] No     Fatal event?  [] Yes   [x] No      Dr. Elias: Reasonable possibility that AE is related to study treatment    [x] Yes   [] No    Mis Husain RN    [employer])-CVOT Study [Effect of tirzepatide versus Dulaglutide on Major Cardiovascular Events in Patients with Type 2 Diabetes]    Diagnosis/event description (diagnosis preferred):  Weakness  Start date: 07/09/2022  Date resolved:     Intensity: ([x] mild /[]  moderate / [] severe)     Study Medication adjustment:  [] Yes   [x] No    Outcome: ([] resolved [with or without sequelae] /[] recovering / [x] not recovered / [] death / [] unknown)      Date study team  aware of event: 08/04/2022    Was treatment given for this event:  [] Yes   [x] No   If yes, provide details  Serious adverse event?    Yes   [x] No    Special interest event?  [] Yes   [x] No    Endpoint? [] Yes   [x] No     Fatal event?  [] Yes   [x] No      Dr. Elias: Reasonable possibility that AE is related to study treatment    [x] Yes   [] No    Mis Husain RN    Follow-up 3 months in clinic.

## 2022-08-05 DIAGNOSIS — F51.01 PRIMARY INSOMNIA: ICD-10-CM

## 2022-08-05 RX ORDER — ZOLPIDEM TARTRATE 5 MG/1
TABLET ORAL
Qty: 5 TABLET | Refills: 0 | Status: SHIPPED | OUTPATIENT
Start: 2022-08-05 | End: 2022-08-31

## 2022-08-08 ENCOUNTER — RESEARCH ENCOUNTER (OUTPATIENT)
Dept: CARDIOLOGY | Facility: CLINIC | Age: 62
End: 2022-08-08

## 2022-08-08 DIAGNOSIS — Z00.6 EXAMINATION OF PARTICIPANT OR CONTROL IN CLINICAL RESEARCH: Primary | ICD-10-CM

## 2022-08-08 DIAGNOSIS — E11.49 DM TYPE 2 CAUSING NEUROLOGICAL DISEASE (H): ICD-10-CM

## 2022-08-08 DIAGNOSIS — I25.10 CORONARY ARTERY DISEASE INVOLVING NATIVE CORONARY ARTERY OF NATIVE HEART WITHOUT ANGINA PECTORIS: ICD-10-CM

## 2022-08-08 NOTE — PROGRESS NOTES
Patient had his labs drawn on 8/4/22 for M6. Total Bili returned 2.7 and Alb BCG 5.0. All other LFT's are WNL. Patient admitted at his last visit that he is having increased fatigue and nausea also stated he used to drink coffee all the time and no longer has a taste for it.     Spoke with patient this am to come in for a repeat test and he stated he is unable to come in until Wednesday 8/10/22. Sponsor was notified and agreed to having patient come back on that date. In the meantime I have asked him to monitor for the following:   Abdominal pain,   Abdominal Swelling   Fever   Weakness   Fatigue   Nausea   Vomiting   Unusually dark urine.    He feels the nausea, weakness and fatigue he is already having is related to his study medication. He did state his urine is a dark orangish in color.  Discussed hydration-he admits to drinking 3 propel a day and 2 cups of milk. I asked him to check his skin turgor and walked him through how to do it he stated his skin did not return to normal He is likely having dehydration issues and is encouraged to add water and keep hydrated. He doesn't care for water and was encouraged to add lemon to help with taste. He stated he will try that. He would like to back off his study IP to the last dose he felt well on which was at wk 16. The last 2 months he has had increased side effects. Will contact sponsor to decrease his dose.      [facility]PASS-CVOT Study [Effect of tirzepatide versus Dulaglutide on Major Cardiovascular Events in Patients with Type 2 Diabetes]    Diagnosis/event description (diagnosis preferred):  Elevated Total Bili  Start date: 08/04/2022  Date resolved:     Intensity: ([] mild /[x]  moderate / [] severe)    Study Medication adjustment:  [] Yes   [x] No    Outcome: ([] resolved [with or without sequelae] /[] recovering / [x] not recovered / [] death / [] unknown)      Date study team aware of event: 08/08/2022    Was treatment given for this event:  [] Yes   [x] No   If  yes, provide details  Serious adverse event?    Yes   [x] No    Special interest event?  [] Yes   [x] No    Endpoint? [] Yes   [x] No     Fatal event?  [] Yes   [x] No      Dr. Elias: Reasonable possibility that AE is related to study treatment    [x] Yes   [] No    Msi Husain RN      SURPASS-CVOT Study [Effect of tirzepatide versus Dulaglutide on Major Cardiovascular Events in Patients with Type 2 Diabetes]    Diagnosis/event description (diagnosis preferred):  Elevated Albumin BCG  Start date: 08/04/2022  Date resolved:      Intensity: ([x] mild /[]  moderate / [] severe)     Study Medication adjustment:  [] Yes   [x] No    Outcome: ([] resolved [with or without sequelae] /[] recovering / [x] not recovered / [] death / [] unknown)      Date study team aware of event: 08/08/2022    Was treatment given for this event:  [] Yes   [x] No   If yes, provide details  Serious adverse event?    Yes   [x] No    Special interest event?  [] Yes   [x] No    Endpoint? [] Yes   [x] No     Fatal event?  [] Yes   [x] No      Dr. Elias: Reasonable possibility that AE is related to study treatment    [] Yes   [x] No    Mis Husain RN    SURPASS-CVOT Study [Effect of tirzepatide versus Dulaglutide on Major Cardiovascular Events in Patients with Type 2 Diabetes]    Diagnosis/event description (diagnosis preferred):  Dehydration  Start date: 08/04/2022   Date resolved:     Intensity: ([x] mild /[]  moderate / [] severe)     Study Medication adjustment:  [] Yes   [x] No    Outcome: ([] resolved [with or without sequelae] /[] recovering / [x] not recovered / [] death / [] unknown)      Date study team aware of event: 08/08/2022    Was treatment given for this event:  [] Yes   [x] No   If yes, provide details  Serious adverse event?    Yes   [x] No    Special interest event?  [] Yes   [x] No    Endpoint? [] Yes   [x] No     Fatal event?  [] Yes   [x] No      Dr. Elias: Reasonable possibility that AE is related to study treatment     [] Yes   [x] No    Mis Husain RN    SURPASS-CVOT Study [Effect of tirzepatide versus Dulaglutide on Major Cardiovascular Events in Patients with Type 2 Diabetes]    Diagnosis/event description (diagnosis preferred):  Dark orange urine  Start date: 08/04/2022  Date resolved:      Intensity: ([x] mild /[]  moderate / [] severe)     Study Medication adjustment:  [] Yes   [x] No    Outcome: ([] resolved [with or without sequelae] /[] recovering / [x] not recovered / [] death / [] unknown)      Date study team aware of event: 08/08/2022    Was treatment given for this event:  [] Yes   [x] No   If yes, provide details  Serious adverse event?    Yes   [x] No    Special interest event?  [] Yes   [x] No    Endpoint? [] Yes   [x] No     Fatal event?  [] Yes   [x] No      Dr. Elias: Reasonable possibility that AE is related to study treatment    [] Yes   [x] No    Mis Husain RN

## 2022-08-10 ENCOUNTER — TRANSFERRED RECORDS (OUTPATIENT)
Dept: CARDIOLOGY | Facility: CLINIC | Age: 62
End: 2022-08-10

## 2022-08-10 ENCOUNTER — OFFICE VISIT (OUTPATIENT)
Dept: CARDIOLOGY | Facility: CLINIC | Age: 62
End: 2022-08-10
Payer: COMMERCIAL

## 2022-08-10 ENCOUNTER — TELEPHONE (OUTPATIENT)
Dept: CARDIOLOGY | Facility: CLINIC | Age: 62
End: 2022-08-10

## 2022-08-10 VITALS
DIASTOLIC BLOOD PRESSURE: 73 MMHG | BODY MASS INDEX: 22.14 KG/M2 | HEART RATE: 77 BPM | HEIGHT: 72 IN | WEIGHT: 163.5 LBS | SYSTOLIC BLOOD PRESSURE: 136 MMHG

## 2022-08-10 DIAGNOSIS — R11.0 NAUSEA: Primary | ICD-10-CM

## 2022-08-10 DIAGNOSIS — I25.10 CORONARY ARTERY DISEASE INVOLVING NATIVE CORONARY ARTERY OF NATIVE HEART WITHOUT ANGINA PECTORIS: ICD-10-CM

## 2022-08-10 DIAGNOSIS — E11.49 DM TYPE 2 CAUSING NEUROLOGICAL DISEASE (H): ICD-10-CM

## 2022-08-10 DIAGNOSIS — Z00.6 EXAMINATION OF PARTICIPANT OR CONTROL IN CLINICAL RESEARCH: Primary | ICD-10-CM

## 2022-08-10 PROCEDURE — 99207 PR NO CHARGE-RESEARCH SERVICE: CPT

## 2022-08-10 RX ORDER — ONDANSETRON 4 MG/1
4 TABLET, FILM COATED ORAL EVERY 6 HOURS PRN
Qty: 40 TABLET | Refills: 3 | Status: ON HOLD | OUTPATIENT
Start: 2022-08-10 | End: 2022-09-09

## 2022-08-10 NOTE — PROGRESS NOTES
"Lab results were not rcd until today 8/18/2022. Report came back as cancelled-not ordered W/In stability. SHE[facility] and after review she stated the lab was drawn on 8/10/22 and rcd on 8/11/22. There should not have been an issue. She has opened a ticket to have lab review. Ticket #. Patient did not receive his medication last week due to shipment issues by sponsor. Will have patients lab re-drawn today.        Patient visit 6 labs from 8/4/2011 returned as noted below with elevated Total Bili 2.7 H  and Alb BCG 5.0 H 3.3-4.9 g/dL. Sponsor requested a retest due to patients hx of cirrhosis. Labs were re-drawn today and sent to central lab per protocol. He also returned his study med that was dispensed at last visit due to GI side effects and weight loss. He used 1 dose.     Unable to obtain new dose reduced study IP today as IWRS noted insufficient supply. This was escalated to pharmacy and sponsor. Will contact patient when study IP is available.     Informed patients Hepatologist Dr Kevin Bedolla @ [facility] of the results and he responded \"He has Gilbert's syndrome so while his bilirubin is elevated, most of it is indirect and does not reflect liver dysfunction. This is Gilbert's and no importance.\"       CHEMISTRY PANEL   Total Bili 2.7 H      0.2-1.2 mg/dL L3 [ ] [ ]   Dir Bili 0.2     <0.1-0.4 mg/dL   Alk Phos 53      U/L   ALT (SGPT) 27     5-48 U/L   AST (SGOT) 22    8-40 U/L   Urea Nitr 16    4-24 mg/dL   Creatinine 0.88    0.45-1.35 mg/dL   Uric Acid 3.3    2.5-8.3 mg/dL   Calcium 9.9     8.3-10.6 mg/dL   Alb BCG 5.0 H     3.3-4.9 g/dL   CK 52      U/L     Patient has also increased his fluid intake and his skin color and turgor have improved.    Patient verified his insulin dose reductions by his endocrinologist. Asked to have them fax his appt notes to me as they are not in [facility].    Levemir Insulin 50U decreased to 40U 6/1/22                           40U decreased to 30U 7/1/2022     30U " decreased to 25U 7/16/2022                           25U decreased to 20U 8/2/2022     20U decreased to 15U 8/10/2022        Mis Husain RN

## 2022-08-10 NOTE — TELEPHONE ENCOUNTER
Thank you Mis and Dr. Bedolla!    I'm sending in zofran prn for him to take post dosing.   I sent it to [facility].      Michaelle DONALDO Caba 8/10/2022 4:47 PM        ----- Message from Mis Husain RN sent at 8/10/2022 10:20 AM CDT -----  Regarding: FW: Elevated T bili and ALB BCG  Hi Michaelle,  I didn't respond to your message regarding Zofran earlier as patients labs were abnormal and had to be redrawn. He also was having dehydration issues and he is working hard on drinking water and is better today. See below. Per his hepatologist this labs are not concerning. If you would be so kind to send out a RX for Zofran that would bee great he has constant nausea and is losing his appetite for food. I am going to have him dose reduce also.    Thanks,  Mis Husain      ----- Message -----  From: Kevin Bedolla MD  Sent: 8/9/2022   9:51 AM CDT  To: Mis Husain RN  Subject: RE: Elevated T bili and ALB BCG                  This is Gilbert's and no importance.    ----- Message -----  From: Mis Husain RN  Sent: 8/9/2022   8:00 AM CDT  To: Kevin Bedolla MD  Subject: RE: Elevated T bili and ALB BCG                  Thanks I should have sent the complete LFT panel.    CHEMISTRY PANEL  #1 Total Bili 2.7 H      0.2-1.2 mg/dL L3 [ ] [ ]  Dir Bili 0.2     <0.1-0.4 mg/dL  Alk Phos 53      U/L  ALT (SGPT) 27     5-48 U/L  AST (SGOT) 22    8-40 U/L  Urea Nitr 16    4-24 mg/dL  Creatinine 0.88    0.45-1.35 mg/dL  Uric Acid 3.3    2.5-8.3 mg/dL  Calcium 9.9     8.3-10.6 mg/dL  Alb BCG 5.0 H     3.3-4.9 g/dL  CK 52      U/L  Will see what his Follow-up shows, thanks for the information.  Mis Husain    ----- Message -----  From: Kevin Bedolla MD  Sent: 8/8/2022   3:34 PM CDT  To: Mis Husain RN  Subject: RE: Elevated T bili and ALB BCG                  Check a hepatic panel, which includes direct bilirubin.  He has Gilbert's syndrome so while his bilirubin is elevated, most of it is indirect and does not reflect liver  dysfunction.    Wenceslao Bedolla  ----- Message -----  From: Mis Husain RN  Sent: 8/8/2022  10:14 AM CDT  To: Kevin Bedolla MD  Subject: Elevated T bili and ALB BCG                      Hi Dr Bedolla,  Update on Mr Burgos.    Patient had his labs drawn on 8/4/22 for M6. Total Bili returned 2.7 and Alb BCG 5.0. All other LFT's are WNL. Patient admitted at his last visit that he is having increased fatigue and nausea also stated he used to drink coffee all the time and no longer has a taste for it.     Spoke with patient this am to come in for a repeat test and he stated he is unable to come in until Wednesday 8/10/22. Sponsor was notified and agreed to having patient come back on that date. In the meantime I have asked him to monitor for the following:   Abdominal pain,   Abdominal Swelling   Fever   Weakness   Fatigue   Nausea   Vomiting   Unusually dark urine.    He feels the nausea, weakness and fatigue he is already having is related to his study medication. He did state his urine is a dark orangish in color.  Discussed hydration-he admits to drinking 3 propel a day and 2 cups of milk. I asked him to check his skin turgor and walked him through how to do it he stated his skin did not return to normal He is likely having dehydration issues and is encouraged to add water and keep hydrated. He doesn't care for water and was encouraged to add lemon to help with taste. He stated he will try that. He would like to back off his study IP to the last dose he felt well on which was at wk 16. The last 2 months he has had increased side effects. Will contact sponsor to decrease his dose.    Please let me know if your thoughts. Will be seeing him Wednesday.  Thanks,  Mis Husain

## 2022-08-12 NOTE — PROGRESS NOTES
August 4 blood work ncs as all normal except bili and albumin which are slightly high and thus ncs..  LF

## 2022-08-18 ENCOUNTER — RESEARCH ENCOUNTER (OUTPATIENT)
Dept: CARDIOLOGY | Facility: CLINIC | Age: 62
End: 2022-08-18

## 2022-08-18 DIAGNOSIS — Z00.6 EXAMINATION OF PARTICIPANT OR CONTROL IN CLINICAL RESEARCH: Primary | ICD-10-CM

## 2022-08-18 DIAGNOSIS — I25.10 CORONARY ARTERY DISEASE INVOLVING NATIVE CORONARY ARTERY OF NATIVE HEART WITHOUT ANGINA PECTORIS: ICD-10-CM

## 2022-08-18 DIAGNOSIS — E11.49 DM TYPE 2 CAUSING NEUROLOGICAL DISEASE (H): ICD-10-CM

## 2022-08-18 PROCEDURE — 99207 PR NO CHARGE-RESEARCH SERVICE: CPT | Performed by: INTERNAL MEDICINE

## 2022-08-19 NOTE — MR AVS SNAPSHOT
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 This does not apply to pregnant women. Water, hot tea and black coffee (with nothing added) are okay. Do not drink other fluids, diet soda or chew gum.            Jan 30, 2018  8:15 AM CST   Return Visit with Zeb Roberts MD   [facility] ([facility])    [address] [place], [place] [address]  [facility] [address]   [phone]            Mar 08, 2018   Procedure with Angel Luis Ndiaye MD   [facility] [place] Endoscopy ([facility])    [address] [facility] [address] [phone]           The [facility] is located on the corner of [address] [facility] and [facility] on the [facility]. It is easily accessible from virtually any point in the [facility] area, via [address] and [address].            Mar 09, 2018  6:45 AM CST   Lab with [facility]    [facility] ([facility])    [address] [facility]  1st Floor  [facility] [id number] [phone]            Mar 09, 2018  7:00 AM CST   US ABDOMEN COMPLETE with [facility] [place] [address] Imaging Center US ([facility]  [facility])    [address] [place] [id number] [phone]           Please bring a list of your medicines (including vitamins, minerals and over-the-counter drugs). Also, tell your doctor about any allergies you may have. Wear comfortable clothes and leave your valuables at home.  Adults: No eating or drinking for 8 hours before the exam. You may take medicine with a small sip of water.  Children: - Children 6+ years: No food or drink for 6 hours before exam. - Children 1-5 years: No food or drink for 4 hours before exam. - Infants, breast-fed: may have breast milk up to 2 hours before exam. - Infants, formula: may have bottle until 4 hours before exam.  Please call the Imaging Department at your exam site with any questions.            Mar 09, 2018  8:15 AM CST   (Arrive by 8:00 AM)   Return Liver Transplant with Kevin Bedolla MD   [facility] Hepatology ([facility])    [address] [place] [id number] [phone]              Who to contact     If you have questions or need follow up information about today's clinic visit or your schedule please contact [facility] directly at [phone].  Normal or non-critical lab and imaging results will be communicated to you by ideaTree - innovate | mentor | investhart, letter or phone within 4 business days after the clinic has received the results. If you do not hear from us within 7 days, please contact the clinic through Hiring[employer]t or phone. If you have a critical or abnormal lab result, we will notify you by phone as soon as possible.  Submit refill requests through [employer] or call your pharmacy and they will forward the refill request to us. Please allow 3 business days for your refill to be completed.          Additional Information About Your Visit        [employer] Information     [employer] gives you secure access to your electronic health record. If you see a primary care provider, you can also  send messages to your care team and make appointments. If you have questions, please call your primary care clinic.  If you do not have a primary care provider, please call [phone] and they will assist you.        Care EveryWhere ID     This is your Care EveryWhere ID. This could be used by other organizations to access your [facility] medical records  [id number]        Your Vitals Were     Pulse Temperature Pulse Oximetry BMI (Body Mass Index)          61 97.6  F (36.4  C) (Oral) 98% 27.4 kg/m2         Blood Pressure from Last 3 Encounters:   11/20/17 130/82   09/15/17 138/77   03/10/17 126/88    Weight from Last 3 Encounters:   11/20/17 91.6 kg (202 lb)   09/15/17 87.1 kg (192 lb)   03/10/17 87.5 kg (193 lb)              We Performed the Following     Hemoglobin A1C ([facility])     VENOUS COLLECTION        Primary Care Provider Office Phone # Fax #    Jace Mayo [phone] [address] [facility] [address]        Equal Access to Services     [facility]: Hadii aad ku hadasho Soomaali, waaxda luqadaha, qaybta kaalmada adejosetteyada, navdeep eng . So [facility] [phone].    ATENCIÓN: Si habla español, tiene a stubbs disposición servicios gratuitos de asistencia lingüística. Alethea al [phone].    We comply with applicable federal civil rights laws and [place] laws. We do not discriminate on the basis of race, color, national origin, age, disability, sex, sexual orientation, or gender identity.            Thank you!     Thank you for choosing [facility]  for your care. Our goal is always to provide you with excellent care. Hearing back from our patients is one way we can continue to improve our services. Please take a few minutes to complete the written survey that you may receive in the mail after your visit with us. Thank you!             Your Updated Medication List - Protect others around you: Learn how to safely use, store and throw  away your medicines at www.disposemymeds.org.          This list is accurate as of: 11/20/17  6:17 PM.  Always use your most recent med list.                   Brand Name Dispense Instructions for use Diagnosis    amLODIPine-atorvastatin 10-20 MG per tablet    CADUET     Take 1 tablet by mouth daily        aspirin 81 MG tablet      Take 1 tablet by mouth daily.        atorvastatin 20 MG tablet    LIPITOR     Take 20 mg by mouth daily        chlorhexidine 0.12 % solution    PERIDEX     SWISH 1/2 OZ FOR 30 SECONDS THEN SPIT TWICE A DAY        cyanocobalamin 1000 MCG/ML injection    VITAMIN B12     Inject 1 mL into the muscle once One time injection per Dr. PENN        DULoxetine 60 MG EC capsule    CYMBALTA    30 capsule    Take 1 capsule (60 mg) by mouth daily    Diabetic polyneuropathy associated with type 2 diabetes mellitus (H)       fluorouracil 5 % cream    EFUDEX     APPLY TO THE AFFECTED AREAS OF THE FACE TWICE A DAY FOR 2 WEEKS.        * insulin detemir 100 UNIT/ML injection    LEVEMIR FLEXTOUCH    15 mL    INJECT 41 UNITS SUBCUTANEOUSLY ONCE DAILY.    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    Diabetes mellitus (H)       * B-D U/F 31G X 8 MM   Generic drug:  insulin pen needle     100 each    TEST TWICE DAILY AS DIRECTED    Encounter for medication refill       lisinopril 10 MG tablet    PRINIVIL/ZESTRIL    90 tablet    Take 1 tablet (10 mg) by mouth daily    Essential hypertension, Coronary artery disease due to lipid rich plaque       metoprolol 100 MG 24 hr tablet    TOPROL-XL    90 tablet    Take 0.5 tablets (50 mg) by mouth 2 times daily    Coronary artery disease due to lipid rich plaque, Essential hypertension       VITAMIN D  (CHOLECALCIFEROL) PO      Take 1,000 Units by mouth daily        zolpidem 10 MG tablet    AMBIEN    30 tablet    TAKE 1 TABLET BY MOUTH NIGHTLY AT BEDTIME AS NEEDED    Encounter for medication refill       * Notice:  This list has 4 medication(s) that are the same as other medications prescribed for you. Read the directions carefully, and ask your doctor or other care provider to review them with you.       Detail Level: Zone Detail Level: Detailed

## 2022-08-25 NOTE — PROGRESS NOTES
Patient returns today to  his medication for the study that was unavailable from the sponsor at his visit on 8/10/2022. He missed one dose of his study IP.   Kit dispensed today was for one month supply with dose reduction due to GI side effects and weight loss. Kit # [id number] - 4 injections given to patient. Will return in a month for next dispensing as IWRS would not allow any more kits to be dispensed.     Labs from 8/10/2022 that were initially not done, were completed after a call to [facility]. Results rcd today 8/18/2022.    Total Bili 2.2, all other LFTS were WNL.    Will see him in a month to for further dispensing.     Mis Husain RN

## 2022-08-31 ENCOUNTER — OFFICE VISIT (OUTPATIENT)
Dept: CARDIOLOGY | Facility: CLINIC | Age: 62
End: 2022-08-31
Payer: COMMERCIAL

## 2022-08-31 ENCOUNTER — OFFICE VISIT (OUTPATIENT)
Dept: CARDIOLOGY | Facility: CLINIC | Age: 62
End: 2022-08-31
Attending: PHYSICIAN ASSISTANT
Payer: COMMERCIAL

## 2022-08-31 ENCOUNTER — TELEPHONE (OUTPATIENT)
Dept: CARDIOLOGY | Facility: CLINIC | Age: 62
End: 2022-08-31

## 2022-08-31 VITALS
WEIGHT: 164.2 LBS | OXYGEN SATURATION: 97 % | HEART RATE: 44 BPM | SYSTOLIC BLOOD PRESSURE: 138 MMHG | HEIGHT: 72 IN | DIASTOLIC BLOOD PRESSURE: 56 MMHG | BODY MASS INDEX: 22.24 KG/M2

## 2022-08-31 VITALS
HEART RATE: 46 BPM | DIASTOLIC BLOOD PRESSURE: 76 MMHG | OXYGEN SATURATION: 99 % | WEIGHT: 164 LBS | SYSTOLIC BLOOD PRESSURE: 136 MMHG | BODY MASS INDEX: 22.21 KG/M2 | HEIGHT: 72 IN

## 2022-08-31 DIAGNOSIS — I48.92 ATRIAL FLUTTER, UNSPECIFIED TYPE (H): Primary | ICD-10-CM

## 2022-08-31 DIAGNOSIS — I25.10 CORONARY ARTERY DISEASE INVOLVING NATIVE CORONARY ARTERY OF NATIVE HEART WITHOUT ANGINA PECTORIS: ICD-10-CM

## 2022-08-31 DIAGNOSIS — F51.01 PRIMARY INSOMNIA: ICD-10-CM

## 2022-08-31 DIAGNOSIS — I10 HYPERTENSION, UNSPECIFIED TYPE: ICD-10-CM

## 2022-08-31 PROCEDURE — 99204 OFFICE O/P NEW MOD 45 MIN: CPT | Performed by: INTERNAL MEDICINE

## 2022-08-31 PROCEDURE — 93000 ELECTROCARDIOGRAM COMPLETE: CPT | Performed by: PHYSICIAN ASSISTANT

## 2022-08-31 PROCEDURE — 99417 PROLNG OP E/M EACH 15 MIN: CPT | Performed by: PHYSICIAN ASSISTANT

## 2022-08-31 PROCEDURE — 99215 OFFICE O/P EST HI 40 MIN: CPT | Performed by: PHYSICIAN ASSISTANT

## 2022-08-31 RX ORDER — ZOLPIDEM TARTRATE 5 MG/1
5 TABLET ORAL
Qty: 30 TABLET | Refills: 0 | Status: SHIPPED | OUTPATIENT
Start: 2022-08-31

## 2022-08-31 RX ORDER — AMLODIPINE BESYLATE 2.5 MG/1
2.5 TABLET ORAL DAILY
Qty: 30 TABLET | Refills: 11 | Status: SHIPPED | OUTPATIENT
Start: 2022-08-31 | End: 2022-09-28

## 2022-08-31 NOTE — PROGRESS NOTES
[id number]      HPI and Plan:   See dictation    Orders this Visit:  Orders Placed This Encounter   Procedures     Follow-Up with Cardiology REAGAN     EKG 12-lead complete w/read - Clinics (performed today)     Orders Placed This Encounter   Medications     apixaban ANTICOAGULANT (ELIQUIS ANTICOAGULANT) 5 MG tablet     Sig: Take 1 tablet (5 mg) by mouth 2 times daily     Dispense:  180 tablet     Refill:  3     amLODIPine (NORVASC) 2.5 MG tablet     Sig: Take 1 tablet (2.5 mg) by mouth daily     Dispense:  30 tablet     Refill:  11     zolpidem (AMBIEN) 5 MG tablet     Sig: Take 1 tablet (5 mg) by mouth nightly as needed for sleep     Dispense:  30 tablet     Refill:  0     Medications Discontinued During This Encounter   Medication Reason     nebivolol (BYSTOLIC) 2.5 MG tablet      zolpidem (AMBIEN) 5 MG tablet Reorder         Encounter Diagnoses   Name Primary?     Hypertension, unspecified type      Atrial flutter, unspecified type (H) Yes     Primary insomnia        CURRENT MEDICATIONS:  Current Outpatient Medications   Medication Sig Dispense Refill     amLODIPine (NORVASC) 2.5 MG tablet Take 1 tablet (2.5 mg) by mouth daily 30 tablet 11     apixaban ANTICOAGULANT (ELIQUIS ANTICOAGULANT) 5 MG tablet Take 1 tablet (5 mg) by mouth 2 times daily 180 tablet 3     aspirin 81 MG tablet Take 1 tablet by mouth daily.       B-D U/F 31G X 8 MM insulin pen needle USE ONE PEN NEEDLES DAILY OR AS DIRECTED. 100 each 3     celecoxib (CELEBREX) 100 MG capsule TAKE 1 CAPSULE BY MOUTH EVERY DAY 30 capsule 1     chlorhexidine (PERIDEX) 0.12 % solution Take 15 mLs by mouth 2 times daily as needed   5     cyanocobalamin 1000 MCG SUBL Place 1,000 mcg under the tongue daily       glucosamine-chondroitin 500-400 MG CAPS per capsule Take 1 capsule by mouth daily       insulin pen needle (BD HEIKE U/F) 32G X 4 MM Use 1 daily or as directed. 100 each prn     LEVEMIR FLEXTOUCH 100 UNIT/ML pen INJECT 50 UNITS SUBCUTANEOUS AT BEDTIME 15 mL 27      lisinopril (ZESTRIL) 20 MG tablet Take 1 tablet (20 mg) by mouth 2 times daily 180 tablet 3     ondansetron (ZOFRAN) 4 MG tablet Take 1 tablet (4 mg) by mouth every 6 hours as needed for nausea 40 tablet 3     rosuvastatin (CRESTOR) 20 MG tablet Take 1 tablet (20 mg) by mouth daily 90 tablet 3     Vitamin D, Cholecalciferol, 1000 units TABS Take 1,000 Units by mouth daily       zolpidem (AMBIEN) 5 MG tablet Take 1 tablet (5 mg) by mouth nightly as needed for sleep 30 tablet 0       ALLERGIES     Allergies   Allergen Reactions     Pcn [Penicillins] Rash     Rash with PCN only. Patient has taken amoxicillin with no rash.       PAST MEDICAL HISTORY:  Past Medical History:   Diagnosis Date     Basal cell carcinoma      Carotid stenosis, left     s/p left CEA 2/2020     Cerebral infarction (H)     left CVA 2/2020 post-op carotid endarterectomy     Coronary artery disease     4 vessel bypass November 2010; LIMA ->LAD, SVG-> OM3, SVG -> D2, SVG -> PDA     Diabetes mellitus (H) 2005    neuropathy     Generalized anxiety disorder 05/02/2014     Hepatitis C, chronic (H) 2005     Hyperlipidemia LDL goal < 70      Hypertension      Liver diseases     9/15 Liver is 10 cm in span without left lobe enlargement     Persistent microalbuminuria associated with type 2 diabetes mellitus (H) 05/06/2015     Renal artery stenosis (H)        PAST SURGICAL HISTORY:  Past Surgical History:   Procedure Laterality Date     ARTHROSCOPY KNEE RT/LT      left     COLONOSCOPY       CORONARY ARTERY BYPASS  11/18/201    Coronary artery bypass grafting x 4 with placement of the left internal mammary artery to the distal midportion of the left anterior descending artery, saphenous vein graft from aorta to the third obtuse marginal branch of circumflex coronary artery, saphenous vein graft from aorta to the second diagonal branch, saphenous vein graft from aorta to the posterior descending artery.     ENDARTERECTOMY CAROTID Left 2/21/2020     Procedure: LEFT CAROTID ENDARTERECTOMY WITH EEG;  Surgeon: Semaj Stubbs MD;  Location:  OR     ESOPHAGOSCOPY, GASTROSCOPY, DUODENOSCOPY (EGD), COMBINED  10/31/2011    Procedure:COMBINED ESOPHAGOSCOPY, GASTROSCOPY, DUODENOSCOPY (EGD); Surgeon:ALONSO ALDANA; Location: GI     ESOPHAGOSCOPY, GASTROSCOPY, DUODENOSCOPY (EGD), COMBINED N/A 3/8/2018    Procedure: COMBINED ESOPHAGOSCOPY, GASTROSCOPY, DUODENOSCOPY (EGD);  EGD;  Surgeon: Angel Luis Justice MD;  Location:  GI     HEART CATH CORONARY ANGIOGRAM W/LV GRAM  -10    CV Surgery recommended     HEART CATH CORONARY ANGIOGRAM W/LV GRAM  14    Medical management     HERNIA REPAIR, INGUINAL RT/LT      left       FAMILY HISTORY:  Family History   Problem Relation Age of Onset     C.A.D. Father      Cancer Father         bladder     Heart Surgery Father         bypass surgery     Heart Disease Mother        SOCIAL HISTORY:  Social History     Socioeconomic History     Marital status:      Spouse name: None     Number of children: None     Years of education: None     Highest education level: None   Tobacco Use     Smoking status: Former Smoker     Packs/day: 0.50     Years: 12.00     Pack years: 6.00     Types: Cigarettes     Start date:      Quit date: 2005     Years since quittin.6     Smokeless tobacco: Never Used   Substance and Sexual Activity     Alcohol use: Yes     Comment: minimal     Drug use: No     Sexual activity: Yes     Partners: Female   Other Topics Concern     Parent/sibling w/ CABG, MI or angioplasty before 65F 55M? No     Caffeine Concern Yes     Comment: 2 cups coffee per day     Special Diet Yes     Comment: low carb diet, low sugar     Exercise Yes     Comment: treadmill 40 minutes, walk, daily, bike 30 minutes every other day       Review of Systems:  Skin:  not assessed     Eyes:  not assessed    ENT:  not assessed    Respiratory:  Positive for dyspnea on exertion  Cardiovascular:     fatigue;Positive for;lightheadedness  Gastroenterology: not assessed    Genitourinary:  not assessed    Musculoskeletal:  not assessed    Neurologic:  not assessed    Psychiatric:  not assessed    Heme/Lymph/Imm:  not assessed    Endocrine:  Positive for diabetes    Physical Exam:  Vitals: /56   Pulse (!) 44   Ht 1.829 m (6')   Wt 74.5 kg (164 lb 3.2 oz)   SpO2 97%   BMI 22.27 kg/m     Please refer to dictation for physical exam    Recent Lab Results: all reviewed today  CBC RESULTS:  Lab Results   Component Value Date    WBC 5.3 06/07/2021    RBC 5.00 06/07/2021    HGB 15.3 06/07/2021    HCT 45.1 06/07/2021    MCV 90 06/07/2021    MCH 30.6 06/07/2021    MCHC 33.9 06/07/2021    RDW 12.9 06/07/2021     (L) 06/07/2021       BMP RESULTS:  Lab Results   Component Value Date     03/29/2022     06/07/2021    POTASSIUM 4.6 03/29/2022    POTASSIUM 4.8 06/07/2021    CHLORIDE 103 03/29/2022    CHLORIDE 108 06/07/2021    CO2 30 03/29/2022    CO2 28 06/07/2021    ANIONGAP 6 03/29/2022    ANIONGAP 5 06/07/2021    GLC 87 03/29/2022     (H) 06/07/2021    BUN 18 03/29/2022    BUN 17 06/07/2021    CR 0.95 03/29/2022    CR 0.89 06/07/2021    GFRESTIMATED 90 03/29/2022    GFRESTIMATED >60 03/08/2022    GFRESTIMATED >90 06/07/2021    GFRESTBLACK >90 06/07/2021    LIA 9.4 03/29/2022    LIA 9.6 06/07/2021        INR RESULTS:  Lab Results   Component Value Date    INR 1.11 06/07/2021    INR 1.13 12/15/2020           CC  Michaelle Caba PA-C  [address]  [place] [id number]

## 2022-08-31 NOTE — LETTER
8/31/2022    Marshal Cuevas MD  [address]  [place] [address]    RE: Vikash Sharif Loretta       Dear Colleague,     I had the pleasure of seeing Vikash Sharif Loretta in the [facility].  [id number]      HPI and Plan:   See dictation    Orders this Visit:  Orders Placed This Encounter   Procedures     Follow-Up with Cardiology REAGAN     EKG 12-lead complete w/read - Clinics (performed today)     Orders Placed This Encounter   Medications     apixaban ANTICOAGULANT (ELIQUIS ANTICOAGULANT) 5 MG tablet     Sig: Take 1 tablet (5 mg) by mouth 2 times daily     Dispense:  180 tablet     Refill:  3     amLODIPine (NORVASC) 2.5 MG tablet     Sig: Take 1 tablet (2.5 mg) by mouth daily     Dispense:  30 tablet     Refill:  11     zolpidem (AMBIEN) 5 MG tablet     Sig: Take 1 tablet (5 mg) by mouth nightly as needed for sleep     Dispense:  30 tablet     Refill:  0     Medications Discontinued During This Encounter   Medication Reason     nebivolol (BYSTOLIC) 2.5 MG tablet      zolpidem (AMBIEN) 5 MG tablet Reorder         Encounter Diagnoses   Name Primary?     Hypertension, unspecified type      Atrial flutter, unspecified type (H) Yes     Primary insomnia        CURRENT MEDICATIONS:  Current Outpatient Medications   Medication Sig Dispense Refill     amLODIPine (NORVASC) 2.5 MG tablet Take 1 tablet (2.5 mg) by mouth daily 30 tablet 11     apixaban ANTICOAGULANT (ELIQUIS ANTICOAGULANT) 5 MG tablet Take 1 tablet (5 mg) by mouth 2 times daily 180 tablet 3     aspirin 81 MG tablet Take 1 tablet by mouth daily.       B-D U/F 31G X 8 MM insulin pen needle USE ONE PEN NEEDLES DAILY OR AS DIRECTED. 100 each 3     celecoxib (CELEBREX) 100 MG capsule TAKE 1 CAPSULE BY MOUTH EVERY DAY 30 capsule 1     chlorhexidine (PERIDEX) 0.12 % solution Take 15 mLs by mouth 2 times daily as needed   5     cyanocobalamin 1000 MCG SUBL Place 1,000 mcg under the tongue daily       glucosamine-chondroitin 500-400 MG CAPS per  capsule Take 1 capsule by mouth daily       insulin pen needle (BD HEIKE U/F) 32G X 4 MM Use 1 daily or as directed. 100 each prn     LEVEMIR FLEXTOUCH 100 UNIT/ML pen INJECT 50 UNITS SUBCUTANEOUS AT BEDTIME 15 mL 27     lisinopril (ZESTRIL) 20 MG tablet Take 1 tablet (20 mg) by mouth 2 times daily 180 tablet 3     ondansetron (ZOFRAN) 4 MG tablet Take 1 tablet (4 mg) by mouth every 6 hours as needed for nausea 40 tablet 3     rosuvastatin (CRESTOR) 20 MG tablet Take 1 tablet (20 mg) by mouth daily 90 tablet 3     Vitamin D, Cholecalciferol, 1000 units TABS Take 1,000 Units by mouth daily       zolpidem (AMBIEN) 5 MG tablet Take 1 tablet (5 mg) by mouth nightly as needed for sleep 30 tablet 0       ALLERGIES     Allergies   Allergen Reactions     Pcn [Penicillins] Rash     Rash with PCN only. Patient has taken amoxicillin with no rash.       PAST MEDICAL HISTORY:  Past Medical History:   Diagnosis Date     Basal cell carcinoma      Carotid stenosis, left     s/p left CEA 2/2020     Cerebral infarction (H)     left CVA 2/2020 post-op carotid endarterectomy     Coronary artery disease     4 vessel bypass November 2010; LIMA ->LAD, SVG-> OM3, SVG -> D2, SVG -> PDA     Diabetes mellitus (H) 2005    neuropathy     Generalized anxiety disorder 05/02/2014     Hepatitis C, chronic (H) 2005     Hyperlipidemia LDL goal < 70      Hypertension      Liver diseases     9/15 Liver is 10 cm in span without left lobe enlargement     Persistent microalbuminuria associated with type 2 diabetes mellitus (H) 05/06/2015     Renal artery stenosis (H)        PAST SURGICAL HISTORY:  Past Surgical History:   Procedure Laterality Date     ARTHROSCOPY KNEE RT/LT      left     COLONOSCOPY       CORONARY ARTERY BYPASS  11/18/201    Coronary artery bypass grafting x 4 with placement of the left internal mammary artery to the distal midportion of the left anterior descending artery, saphenous vein graft from aorta to the third obtuse marginal  branch of circumflex coronary artery, saphenous vein graft from aorta to the second diagonal branch, saphenous vein graft from aorta to the posterior descending artery.     ENDARTERECTOMY CAROTID Left 2020    Procedure: LEFT CAROTID ENDARTERECTOMY WITH EEG;  Surgeon: Semaj Stubbs MD;  Location:  OR     ESOPHAGOSCOPY, GASTROSCOPY, DUODENOSCOPY (EGD), COMBINED  10/31/2011    Procedure:COMBINED ESOPHAGOSCOPY, GASTROSCOPY, DUODENOSCOPY (EGD); Surgeon:ALONSO ALDANA; Location: GI     ESOPHAGOSCOPY, GASTROSCOPY, DUODENOSCOPY (EGD), COMBINED N/A 3/8/2018    Procedure: COMBINED ESOPHAGOSCOPY, GASTROSCOPY, DUODENOSCOPY (EGD);  EGD;  Surgeon: Angel Luis Justice MD;  Location:  GI     HEART CATH CORONARY ANGIOGRAM W/LV GRAM  9-11-10    CV Surgery recommended     HEART CATH CORONARY ANGIOGRAM W/LV GRAM  2-14    Medical management     HERNIA REPAIR, INGUINAL RT/LT      left       FAMILY HISTORY:  Family History   Problem Relation Age of Onset     C.A.D. Father      Cancer Father         bladder     Heart Surgery Father         bypass surgery     Heart Disease Mother        SOCIAL HISTORY:  Social History     Socioeconomic History     Marital status:      Spouse name: None     Number of children: None     Years of education: None     Highest education level: None   Tobacco Use     Smoking status: Former Smoker     Packs/day: 0.50     Years: 12.00     Pack years: 6.00     Types: Cigarettes     Start date:      Quit date: 2005     Years since quittin.6     Smokeless tobacco: Never Used   Substance and Sexual Activity     Alcohol use: Yes     Comment: minimal     Drug use: No     Sexual activity: Yes     Partners: Female   Other Topics Concern     Parent/sibling w/ CABG, MI or angioplasty before 65F 55M? No     Caffeine Concern Yes     Comment: 2 cups coffee per day     Special Diet Yes     Comment: low carb diet, low sugar     Exercise Yes     Comment: treadmill 40 minutes, walk,  daily, bike 30 minutes every other day       Review of Systems:  Skin:  not assessed     Eyes:  not assessed    ENT:  not assessed    Respiratory:  Positive for dyspnea on exertion  Cardiovascular:    fatigue;Positive for;lightheadedness  Gastroenterology: not assessed    Genitourinary:  not assessed    Musculoskeletal:  not assessed    Neurologic:  not assessed    Psychiatric:  not assessed    Heme/Lymph/Imm:  not assessed    Endocrine:  Positive for diabetes    Physical Exam:  Vitals: /56   Pulse (!) 44   Ht 1.829 m (6')   Wt 74.5 kg (164 lb 3.2 oz)   SpO2 97%   BMI 22.27 kg/m     Please refer to dictation for physical exam    Recent Lab Results: all reviewed today  CBC RESULTS:  Lab Results   Component Value Date    WBC 5.3 06/07/2021    RBC 5.00 06/07/2021    HGB 15.3 06/07/2021    HCT 45.1 06/07/2021    MCV 90 06/07/2021    MCH 30.6 06/07/2021    MCHC 33.9 06/07/2021    RDW 12.9 06/07/2021     (L) 06/07/2021       BMP RESULTS:  Lab Results   Component Value Date     03/29/2022     06/07/2021    POTASSIUM 4.6 03/29/2022    POTASSIUM 4.8 06/07/2021    CHLORIDE 103 03/29/2022    CHLORIDE 108 06/07/2021    CO2 30 03/29/2022    CO2 28 06/07/2021    ANIONGAP 6 03/29/2022    ANIONGAP 5 06/07/2021    GLC 87 03/29/2022     (H) 06/07/2021    BUN 18 03/29/2022    BUN 17 06/07/2021    CR 0.95 03/29/2022    CR 0.89 06/07/2021    GFRESTIMATED 90 03/29/2022    GFRESTIMATED >60 03/08/2022    GFRESTIMATED >90 06/07/2021    GFRESTBLACK >90 06/07/2021    LIA 9.4 03/29/2022    LIA 9.6 06/07/2021        INR RESULTS:  Lab Results   Component Value Date    INR 1.11 06/07/2021    INR 1.13 12/15/2020           ANTONELLA Caba PA-C  [address]  [place] [address]        Service Date: 08/31/2022    PRIMARY CARDIOLOGIST:  Dr. Zeb Roberts.    REASON FOR VISIT:  Hypertension follow-up.    HISTORY OF PRESENT ILLNESS:  Kelton is a delightful 62-year-old gentleman with past medical  history significant for the followin.  Coronary artery disease with history of 4-vessel CABG in  by Dr. Corona, with LIMA to the LAD, SVG to the OM3, SVG to the D2, SVG to the PDA.  He last underwent angiogram in  that showed a patent LIMA to the LAD and patent grafts with just a 60% stenosis of the OM after touchdown of the graft.  Stress test in , he went 13 minutes and had a small area of inferior ischemia in the inferolateral segment.  His anginal equivalent was dyspnea on exertion.  2.  Low-normal EF at 50%.  3.  Peripheral arterial disease with history of carotid endarterectomy in 2021 complicated by CVA and facial nerve trauma.  4.  Hypertension.  5.  Well-controlled type 2 diabetes.  6.  Hyperlipidemia.  7.  Hepatitis C diagnosed in  when the patient presented with stage IV liver failure, treated with Pegasys and ribavirin with excellent results with MELD class 7 and Child-Mckenna class A.  8.  SURPASS trial patient.    I met him as part of the SURPASS trial, and we have been working on optimizing his blood pressure.  He has been a little bit bradycardic, so we have been slowly decreasing his nebivolol, initially from 10 mg, then to 5 and then down to 2.5 at the last visit.  He notes that he feels about the same, not terrible, but he is a little bit more winded with biking than he would think he should be.  He also notes in the last month, his heart rate has not been above 50.  It has always been more than 50 and the lowest he has seen is 32.  Also in the last month, he has been found to have lower blood sugars.  In fact, he underwent a monitor that showed about 47% of the time, his blood sugars were in the low to very low range and he has been recently paused on his insulin.  The thought is that this is secondary to his SURPASS trial medication.  He has now been off insulin for a week.  He also had adverse effects from his trial medication and had nausea and vomiting after his  injections for several weeks.  The last time he had nausea and vomiting was about a month ago.  In addition, he is sleeping poorly, and blood pressures at home, where they previously were in the 120s are now occasionally in the 150s.  He has not had syncope or presyncope.  He has not had profound fatigue.  He just does not quite feel like himself.    SOCIAL HISTORY:  He is  with a daughter who is a nurse at [employer] and a son who is a  for [employer].  He quit smoking in 1993.  Minimal alcohol use.  At baseline, exercises religiously, biking 20 miles or more in a shot and working out on the treadmill.    PHYSICAL EXAMINATION:  GENERAL:  Well-developed, fit-appearing gentleman in no acute distress.  HEENT:  Normocephalic, atraumatic.  HEART:  Regular, but bradycardic.  I do not appreciate murmur, rub or gallop.  LUNGS:  Clear, without wheezes, rales, or rhonchi.  EXTREMITIES:  Without peripheral edema.    EKG shows atrial flutter at a heart rate of 40 with right bundle branch block.  This is a variable block of 6:1 or 7:1 conduction.    ASSESSMENT AND PLAN:  1.  New diagnosis of atrial flutter with the patient only being on nebivolol, and he has a slow ventricular response.  At this point, we will discontinue his nebivolol and hopefully he will get a little bit higher heart rate.  I also greatly appreciate the time Dr. Ordonez took to review this case with me.  It does appear that he may be a candidate for flutter ablation and knowing that he already has underlying right bundle branch block and a long first-degree and previous EKGs, there may be some underlying conduction disorder.  Dr. Ordonez has agreed to see him in clinic and discuss.  We discussed today the risk of stroke in atrial flutter and he has a CHADS-VASc of 5 for coronary artery disease, hypertension, type 2 diabetes and history of stroke.  This indicates he would benefit from anticoagulation.  At this point, we will start him on Eliquis 5 mg  b.i.d.  His liver function has been stable and, in addition to his cirrhosis, he has Gilbert's syndrome.  I reviewed the indications for Eliquis and given he has class A, there is no dose adjustment.  I will reach out to Dr. Bedolla just to verify and to make sure that he does not need any dose adjustments.  We also talked briefly about cardioversion.  This would have to be AGUILA based.  At this point, given he is minimally symptomatic, I think we can watch this.  I did discuss if he becomes presyncopal, syncopal or develops worsening symptoms, he should call immediately, and we would consider AGUILA-guided cardioversion or a sooner intervention.  2.  Hypertension, elevated now, unclear reasons.  At this point, we need to stop the nebivolol.  I will start amlodipine 2.5 mg daily.  3.  Type 2 diabetes.  With trial drug in SURPASS showing a drop in his sugars in an astounding way really.  He is now on pause on insulin.  We will defer to Endocrine.  4.  Coronary artery disease, with dyspnea on exertion being his anginal equivalent.  If with rhythm issues not improved, we will have to potentially reevaluate coronaries.  5.  Hyperlipidemia, excellent control.    We will get this patient in with Dr. Ordonez, hopefully within the next week.  I am already set to see him back in October.  We will continue working on his blood pressure and other issues.  I will reach out to Dr. Bedolla to verify anticoagulation is okay from a liver standpoint.  He will call sooner with concerns.    It is my great pleasure to be able to participate in this patient's care and I greatly appreciate Dr. Ordonez' assistance with him as well.    Michaelle Caba PA-C    cc:  Zeb Roberts MD  [place] [address] [place] [address]    Tyson Ordonez MD  [address] [place] [address]    Michaelle Caba PA-C        D: 08/31/2022   T: 08/31/2022   MT: al    Name:     IHSAN SANCHEZ  KAEL  MRN:      [id number]        Account:      [id number]   :      1960           Service Date: 2022       Document: [id number]      Thank you for allowing me to participate in the care of your patient.      Sincerely,     Michaelle Caba PA-C     [facility]  cc:   Michaelle Caba PA-C  [address]  [place] [id number]

## 2022-08-31 NOTE — PATIENT INSTRUCTIONS
Thank you for visiting with me today.    We discussed: you have a new abnormal heart rhythm called atrial flutter.  We'll have you see Dr. Ordonez to discuss an option for a procedure called an ablation.      Medication changes:  stop taking nebivolol.   Start taking Eliquis 5 mg twice a day.    Start taking amlodipine 2.5 mg once a day for blood pressure.      Good primary care doctor in [place]- Dr. Land     Follow up: We'll set you up with Dr. Ordonez in the next 1 week, I'll see you back in October.      Please call my nurse, Tara, at [phone] with any questions or concerns.    Scheduling phone number: [phone]  Reminder: Please bring in all current medications, over the counter supplements and vitamin bottles to your next appointment.

## 2022-08-31 NOTE — PROGRESS NOTES
Service Date: 2022    PRIMARY CARDIOLOGIST:  Dr. Zeb Roberts.    REASON FOR VISIT:  Hypertension follow-up.    HISTORY OF PRESENT ILLNESS:  Kelton is a delightful 62-year-old gentleman with past medical history significant for the followin.  Coronary artery disease with history of 4-vessel CABG in  by Dr. Corona, with LIMA to the LAD, SVG to the OM3, SVG to the D2, SVG to the PDA.  He last underwent angiogram in  that showed a patent LIMA to the LAD and patent grafts with just a 60% stenosis of the OM after touchdown of the graft.  Stress test in , he went 13 minutes and had a small area of inferior ischemia in the inferolateral segment.  His anginal equivalent was dyspnea on exertion.  2.  Low-normal EF at 50%.  3.  Peripheral arterial disease with history of carotid endarterectomy in 2021 complicated by CVA and facial nerve trauma.  4.  Hypertension.  5.  Well-controlled type 2 diabetes.  6.  Hyperlipidemia.  7.  Hepatitis C diagnosed in  when the patient presented with stage IV liver failure, treated with Pegasys and ribavirin with excellent results with MELD class 7 and Child-Mckenna class A.  8.  SURPASS trial patient.    I met him as part of the SURPASS trial, and we have been working on optimizing his blood pressure.  He has been a little bit bradycardic, so we have been slowly decreasing his nebivolol, initially from 10 mg, then to 5 and then down to 2.5 at the last visit.  He notes that he feels about the same, not terrible, but he is a little bit more winded with biking than he would think he should be.  He also notes in the last month, his heart rate has not been above 50.  It has always been more than 50 and the lowest he has seen is 32.  Also in the last month, he has been found to have lower blood sugars.  In fact, he underwent a monitor that showed about 47% of the time, his blood sugars were in the low to very low range and he has been recently paused on his insulin.   The thought is that this is secondary to his SURPASS trial medication.  He has now been off insulin for a week.  He also had adverse effects from his trial medication and had nausea and vomiting after his injections for several weeks.  The last time he had nausea and vomiting was about a month ago.  In addition, he is sleeping poorly, and blood pressures at home, where they previously were in the 120s are now occasionally in the 150s.  He has not had syncope or presyncope.  He has not had profound fatigue.  He just does not quite feel like himself.    SOCIAL HISTORY:  He is  with a daughter who is a nurse at [employer] [place] and a son who is a  for [employer].  He quit smoking in 1993.  Minimal alcohol use.  At baseline, exercises religiously, biking 20 miles or more in a shot and working out on the treadmill.    PHYSICAL EXAMINATION:  GENERAL:  Well-developed, fit-appearing gentleman in no acute distress.  HEENT:  Normocephalic, atraumatic.  HEART:  Regular, but bradycardic.  I do not appreciate murmur, rub or gallop.  LUNGS:  Clear, without wheezes, rales, or rhonchi.  EXTREMITIES:  Without peripheral edema.    EKG shows atrial flutter at a heart rate of 40 with right bundle branch block.  This is a variable block of 6:1 or 7:1 conduction.    ASSESSMENT AND PLAN:  1.  New diagnosis of atrial flutter with the patient only being on nebivolol, and he has a slow ventricular response.  At this point, we will discontinue his nebivolol and hopefully he will get a little bit higher heart rate.  I also greatly appreciate the time Dr. Ordonez took to review this case with me.  It does appear that he may be a candidate for flutter ablation and knowing that he already has underlying right bundle branch block and a long first-degree and previous EKGs, there may be some underlying conduction disorder.  Dr. Ordonez has agreed to see him in clinic and discuss.  We discussed today the risk of stroke in atrial flutter and he has a  CHADS-VASc of 5 for coronary artery disease, hypertension, type 2 diabetes and history of stroke.  This indicates he would benefit from anticoagulation.  At this point, we will start him on Eliquis 5 mg b.i.d.  His liver function has been stable and, in addition to his cirrhosis, he has Gilbert's syndrome.  I reviewed the indications for Eliquis and given he has class A, there is no dose adjustment.  I will reach out to Dr. Bedolla just to verify and to make sure that he does not need any dose adjustments.  We also talked briefly about cardioversion.  This would have to be AGUILA based.  At this point, given he is minimally symptomatic, I think we can watch this.  I did discuss if he becomes presyncopal, syncopal or develops worsening symptoms, he should call immediately, and we would consider AGUILA-guided cardioversion or a sooner intervention.  2.  Hypertension, elevated now, unclear reasons.  At this point, we need to stop the nebivolol.  I will start amlodipine 2.5 mg daily.  3.  Type 2 diabetes.  With trial drug in SURPASS showing a drop in his sugars in an astounding way really.  He is now on pause on insulin.  We will defer to Endocrine.  4.  Coronary artery disease, with dyspnea on exertion being his anginal equivalent.  If with rhythm issues not improved, we will have to potentially reevaluate coronaries.  5.  Hyperlipidemia, excellent control.  6.  Preop discussion for cataract surgery- pt is due to undergo bilateral cataract surgery in early September.  Unfortunately, with his new dx atrial flutter and low HR he will not be able to be sedated and the case would likely be cancelled.  I asked him to postpone his cataract surgeries until we can get his rhythm addressed.      We will get this patient in with Dr. Ordonez, hopefully within the next week.  I am already set to see him back in October.  We will continue working on his blood pressure and other issues.  I will reach out to Dr. Bedolla to verify anticoagulation  is okay from a liver standpoint.  He will call sooner with concerns.    It is my great pleasure to be able to participate in this patient's care and I greatly appreciate Dr. Ordonez' assistance with him as well.    Michaelle aCba PA-C    cc:  Zeb Roberts MD  [address] [address]    Tyson Ordonez MD  [place] [id number]    Michaelle Caba PA-C        D: 2022   T: 2022   MT: al    Name:     IHSAN SANCHEZ  MRN:      [id number]        Account:      [id number]   :      1960           Service Date: 2022       Document: [id number]

## 2022-08-31 NOTE — LETTER
8/31/2022    Marshal Cuevas MD  [address]  [facility] [address]    RE: Vikash Sharif Loretta       Dear Colleague,     I had the pleasure of seeing [place] Kole Burgos in the [facility].  HPI and Plan:   See dictation [id number]        Orders Placed This Encounter   Procedures     AGUILA Only- Transesophageal Echocardiogram     Case Request EP: Ablation Atrial Flutter       No orders of the defined types were placed in this encounter.      There are no discontinued medications.      Encounter Diagnosis   Name Primary?     Atrial flutter, unspecified type (H) Yes       CURRENT MEDICATIONS:  Current Outpatient Medications   Medication Sig Dispense Refill     amLODIPine (NORVASC) 2.5 MG tablet Take 1 tablet (2.5 mg) by mouth daily 30 tablet 11     apixaban ANTICOAGULANT (ELIQUIS ANTICOAGULANT) 5 MG tablet Take 1 tablet (5 mg) by mouth 2 times daily 180 tablet 3     aspirin 81 MG tablet Take 1 tablet by mouth daily.       B-D U/F 31G X 8 MM insulin pen needle USE ONE PEN NEEDLES DAILY OR AS DIRECTED. 100 each 3     celecoxib (CELEBREX) 100 MG capsule TAKE 1 CAPSULE BY MOUTH EVERY DAY 30 capsule 1     chlorhexidine (PERIDEX) 0.12 % solution Take 15 mLs by mouth 2 times daily as needed   5     cyanocobalamin 1000 MCG SUBL Place 1,000 mcg under the tongue daily       glucosamine-chondroitin 500-400 MG CAPS per capsule Take 1 capsule by mouth daily       insulin pen needle (BD HEIKE U/F) 32G X 4 MM Use 1 daily or as directed. 100 each prn     LEVEMIR FLEXTOUCH 100 UNIT/ML pen INJECT 50 UNITS SUBCUTANEOUS AT BEDTIME 15 mL 27     lisinopril (ZESTRIL) 20 MG tablet Take 1 tablet (20 mg) by mouth 2 times daily 180 tablet 3     ondansetron (ZOFRAN) 4 MG tablet Take 1 tablet (4 mg) by mouth every 6 hours as needed for nausea 40 tablet 3     rosuvastatin (CRESTOR) 20 MG tablet Take 1 tablet (20 mg) by mouth daily 90 tablet 3     Vitamin D, Cholecalciferol, 1000 units TABS Take 1,000 Units by mouth daily        zolpidem (AMBIEN) 5 MG tablet Take 1 tablet (5 mg) by mouth nightly as needed for sleep 30 tablet 0       ALLERGIES     Allergies   Allergen Reactions     Pcn [Penicillins] Rash     Rash with PCN only. Patient has taken amoxicillin with no rash.       PAST MEDICAL HISTORY:  Past Medical History:   Diagnosis Date     Basal cell carcinoma      Carotid stenosis, left     s/p left CEA 2/2020     Cerebral infarction (H)     left CVA 2/2020 post-op carotid endarterectomy     Coronary artery disease     4 vessel bypass November 2010; LIMA ->LAD, SVG-> OM3, SVG -> D2, SVG -> PDA     Diabetes mellitus (H) 2005    neuropathy     Generalized anxiety disorder 05/02/2014     Hepatitis C, chronic (H) 2005     Hyperlipidemia LDL goal < 70      Hypertension      Liver diseases     9/15 Liver is 10 cm in span without left lobe enlargement     Persistent microalbuminuria associated with type 2 diabetes mellitus (H) 05/06/2015     Renal artery stenosis (H)        PAST SURGICAL HISTORY:  Past Surgical History:   Procedure Laterality Date     ARTHROSCOPY KNEE RT/LT      left     COLONOSCOPY       CORONARY ARTERY BYPASS  11/18/201    Coronary artery bypass grafting x 4 with placement of the left internal mammary artery to the distal midportion of the left anterior descending artery, saphenous vein graft from aorta to the third obtuse marginal branch of circumflex coronary artery, saphenous vein graft from aorta to the second diagonal branch, saphenous vein graft from aorta to the posterior descending artery.     ENDARTERECTOMY CAROTID Left 2/21/2020    Procedure: LEFT CAROTID ENDARTERECTOMY WITH EEG;  Surgeon: Semaj Stubbs MD;  Location:  OR     ESOPHAGOSCOPY, GASTROSCOPY, DUODENOSCOPY (EGD), COMBINED  10/31/2011    Procedure:COMBINED ESOPHAGOSCOPY, GASTROSCOPY, DUODENOSCOPY (EGD); Surgeon:ALONSO ALDANA; Location: GI     ESOPHAGOSCOPY, GASTROSCOPY, DUODENOSCOPY (EGD), COMBINED N/A 3/8/2018    Procedure: COMBINED  ESOPHAGOSCOPY, GASTROSCOPY, DUODENOSCOPY (EGD);  EGD;  Surgeon: Angel Luis Justice MD;  Location: [facility] GI     HEART CATH CORONARY ANGIOGRAM W/LV GRAM  -10    CV Surgery recommended     HEART CATH CORONARY ANGIOGRAM W/LV GRAM  -    Medical management     HERNIA REPAIR, INGUINAL RT/LT      left       FAMILY HISTORY:  Family History   Problem Relation Age of Onset     C.A.D. Father      Cancer Father         bladder     Heart Surgery Father         bypass surgery     Heart Disease Mother        SOCIAL HISTORY:  Social History     Socioeconomic History     Marital status:    Tobacco Use     Smoking status: Former Smoker     Packs/day: 0.50     Years: 12.00     Pack years: 6.00     Types: Cigarettes     Start date:      Quit date: 2005     Years since quittin.6     Smokeless tobacco: Never Used   Substance and Sexual Activity     Alcohol use: Yes     Comment: minimal     Drug use: No     Sexual activity: Yes     Partners: Female   Other Topics Concern     Parent/sibling w/ CABG, MI or angioplasty before 65F 55M? No     Caffeine Concern Yes     Comment: 2 cups coffee per day     Special Diet Yes     Comment: low carb diet, low sugar     Exercise Yes     Comment: treadmill 40 minutes, walk, daily, bike 30 minutes every other day                   Service Date: 2022    HISTORY OF PRESENT ILLNESS:    I had the pleasure of seeing Mr. Vikash Burgos, a delightful 62-year-old gentleman who has been referred by YESSY Rene, for evaluation of atrial flutter.    Mr. Burgos has history of coronary artery disease with 4-vessel CABG  (LIMA to LAD, vein grafts to OM3, D2 and PDA).  LVEF is 50%.  He has peripheral arterial disease with CEA in 2021 complicated by CVA and facial nerve trauma, well-controlled diabetes mellitus type 2, dyslipidemia, hypertension, hepatitis C diagnosed in  when he presented with liver failure. He was treated with antiviral therapy with excellent  "results.  He had esophageal varices, which he says later resolved.  He is currently in SURPASS trial that evaluates an antidiabetic oral medication.    Mr. Burgos was seen earlier today in the clinic by Michaelle.  He noted bradycardia recently, heart rates as low as in the 30s and seemingly never higher than 50.  ECG today showed atrial flutter with slow ventricular rate and (chronic) RBBB.    In retrospect, symptoms of decreased energy level and fatigue started perhaps in 03/2022.  He has not had syncope, though he reports moments of \"dizziness\".  He was expecting to be feeling better after losing weight and his blood sugars and hemoglobin A1c improving on the study drug (SURPASS trial).  Today's ECG likely explains his recent symptoms.    SOCIAL HISTORY:  Mr. Burgos is .  He has 2 adult children.  He quit smoking in 1993 and uses minimal alcohol.  He exercises on a regular basis without chest pain or unusual ALEXANDER.    FAMILY HISTORY:  Significant for coronary artery disease, though his both parents developed it later life (in their 70s).      PHYSICAL EXAMINATION:    GENERAL:  This is an extremely pleasant gentleman who is alert, oriented, provides excellent history and asks appropriate questions.  HEENT:  Head normocephalic, atraumatic.  NECK:  Supple with normal jugular venous pressure.  LUNGS:  Completely clear bilaterally.  CARDIOVASCULAR:  Irregular, bradycardic rhythm.  No apparent gallop.  ABDOMEN:  Soft, nontender.  EXTREMITIES:  No edema.      DIAGNOSTIC STUDIES:  - His 12-lead ECG today showed atrial flutter with negative flutter waves in the inferior leads as well as in V1.  There is notching of the flutter waves in the inferior leads.  RBBB.  - The patient has not had a recent echocardiogram.  His most recent study was a stress echocardiogram in 2014 which showed moderate LVH, normal LV function, moderate LA enlargement, and no significant valvular abnormalities.      IMPRESSION:    1.  Atrial " "flutter with bradycardia, suggesting underlying AV conduction disease.  Time of onset is unknown, though in retrospect symptoms likely started in the spring.  2.  Chronic ischemic heart disease with low normal LVEF.  3.  RBBB.  4.  PAD with previous CEA.  5.  Diabetes mellitus type 2, well controlled on a study drug.  6.  History of hepatitis C.  Relevant to the treatment plan for aflutter is the fact that he apparently had esophageal varices that resolved after treatment of hepatitis C.    Mr. Burgos is experiencing symptomatic atrial flutter with slow ventricular rate.  Despite the sawtooth appearance in the inferior leads, the atrial flutter is likely not CTI-dependent, given the concomitant negative waves in V1 and unusual notching of flutter waves inferiorly.      He was already started on apixaban by Michaelle earlier today.    I strongly recommended rhythm control with either cardioversion or catheter ablation.  I explained that the former is typically a \"short-term fix\", while the latter may permanently eliminate his atrial flutter.  Ablation works well for typical CTI flutter but less well for atypical flutter.  In addition, the degree of procedure invasiveness/risk is higher for atypical flutter.    The second issue is whether to pursue rhythm control in the near future or wait until after 4 weeks of therapeutic anticoagulation are completed.  Of note, the patient is considering cataract surgery in December.  This is relevant information because if we pursued either cardioversion or ablation, we would not want anticoagulation to be stopped for at least 1 month after the procedure (Kelton said that his ophthalmologist wants him off anticoagulation before cataract surgery).    I discussed in great detail the technical aspects, risks and benefits of both cardioversion and atrial flutter ablation.  After a lengthy discussion, we mutually arrived at the following plan.    PLAN:  A.  Schedule EP study.  If he has " typical CTI flutter or other RA flutter, we would pursue ablation.  If the flutter is left atrial, we would cardiovert.  B.  Transesophageal echocardiography, as the patient prefers proceeding with ablation or cardioversion sooner than later.  The risks and benefits of the procedure were discussed.  The patient does not have difficulty swallowing and reportedly did not have esophageal varices on his most recent endoscopic evaluation.  C.  There is risk of bradycardia following conversion to sinus rhythm.  In fact, the patient has history of first-degree AV conduction delay and RBBB when in sinus rhythm.  It is likely he will need a permanent pacemaker at some point.  D.  Continue Eliquis without interruption.  He understands Eliquis should not be interrupted for at least 4 weeks after ablation/cardioversion.    This was a complex clinic visit with lengthy discussion of treatment options.  The patient had several questions.  All were answered.      It was my pleasure seeing him today.      Tyson Ordonez MD, [facility]        cc:  [place] [address]    Michaelle Caba PA-C  [facility] at [address], [address]  [place] [address]      D: 2022   T: 2022   MT: chika    Name:     IHSAN SANCHEZ  MRN:      [id number]        Account:      [id number]   :      1960           Service Date: 2022       Document: [id number]      Thank you for allowing me to participate in the care of your patient.      Sincerely,     Tyson Ordonez MD     [facility]  cc:   No referring provider defined for this encounter.

## 2022-08-31 NOTE — PROGRESS NOTES
HPI and Plan:   See dictation [id number]        Orders Placed This Encounter   Procedures     AGUILA Only- Transesophageal Echocardiogram     Case Request EP: Ablation Atrial Flutter       No orders of the defined types were placed in this encounter.      There are no discontinued medications.      Encounter Diagnosis   Name Primary?     Atrial flutter, unspecified type (H) Yes       CURRENT MEDICATIONS:  Current Outpatient Medications   Medication Sig Dispense Refill     amLODIPine (NORVASC) 2.5 MG tablet Take 1 tablet (2.5 mg) by mouth daily 30 tablet 11     apixaban ANTICOAGULANT (ELIQUIS ANTICOAGULANT) 5 MG tablet Take 1 tablet (5 mg) by mouth 2 times daily 180 tablet 3     aspirin 81 MG tablet Take 1 tablet by mouth daily.       B-D U/F 31G X 8 MM insulin pen needle USE ONE PEN NEEDLES DAILY OR AS DIRECTED. 100 each 3     celecoxib (CELEBREX) 100 MG capsule TAKE 1 CAPSULE BY MOUTH EVERY DAY 30 capsule 1     chlorhexidine (PERIDEX) 0.12 % solution Take 15 mLs by mouth 2 times daily as needed   5     cyanocobalamin 1000 MCG SUBL Place 1,000 mcg under the tongue daily       glucosamine-chondroitin 500-400 MG CAPS per capsule Take 1 capsule by mouth daily       insulin pen needle (BD HEIKE U/F) 32G X 4 MM Use 1 daily or as directed. 100 each prn     LEVEMIR FLEXTOUCH 100 UNIT/ML pen INJECT 50 UNITS SUBCUTANEOUS AT BEDTIME 15 mL 27     lisinopril (ZESTRIL) 20 MG tablet Take 1 tablet (20 mg) by mouth 2 times daily 180 tablet 3     ondansetron (ZOFRAN) 4 MG tablet Take 1 tablet (4 mg) by mouth every 6 hours as needed for nausea 40 tablet 3     rosuvastatin (CRESTOR) 20 MG tablet Take 1 tablet (20 mg) by mouth daily 90 tablet 3     Vitamin D, Cholecalciferol, 1000 units TABS Take 1,000 Units by mouth daily       zolpidem (AMBIEN) 5 MG tablet Take 1 tablet (5 mg) by mouth nightly as needed for sleep 30 tablet 0       ALLERGIES     Allergies   Allergen Reactions     Pcn [Penicillins] Rash     Rash with PCN only. Patient has  taken amoxicillin with no rash.       PAST MEDICAL HISTORY:  Past Medical History:   Diagnosis Date     Basal cell carcinoma      Carotid stenosis, left     s/p left CEA 2/2020     Cerebral infarction (H)     left CVA 2/2020 post-op carotid endarterectomy     Coronary artery disease     4 vessel bypass November 2010; LIMA ->LAD, SVG-> OM3, SVG -> D2, SVG -> PDA     Diabetes mellitus (H) 2005    neuropathy     Generalized anxiety disorder 05/02/2014     Hepatitis C, chronic (H) 2005     Hyperlipidemia LDL goal < 70      Hypertension      Liver diseases     9/15 Liver is 10 cm in span without left lobe enlargement     Persistent microalbuminuria associated with type 2 diabetes mellitus (H) 05/06/2015     Renal artery stenosis (H)        PAST SURGICAL HISTORY:  Past Surgical History:   Procedure Laterality Date     ARTHROSCOPY KNEE RT/LT      left     COLONOSCOPY       CORONARY ARTERY BYPASS  11/18/201    Coronary artery bypass grafting x 4 with placement of the left internal mammary artery to the distal midportion of the left anterior descending artery, saphenous vein graft from aorta to the third obtuse marginal branch of circumflex coronary artery, saphenous vein graft from aorta to the second diagonal branch, saphenous vein graft from aorta to the posterior descending artery.     ENDARTERECTOMY CAROTID Left 2/21/2020    Procedure: LEFT CAROTID ENDARTERECTOMY WITH EEG;  Surgeon: Semaj Stubbs MD;  Location:  OR     ESOPHAGOSCOPY, GASTROSCOPY, DUODENOSCOPY (EGD), COMBINED  10/31/2011    Procedure:COMBINED ESOPHAGOSCOPY, GASTROSCOPY, DUODENOSCOPY (EGD); Surgeon:ALONSO ALDANA; Location: GI     ESOPHAGOSCOPY, GASTROSCOPY, DUODENOSCOPY (EGD), COMBINED N/A 3/8/2018    Procedure: COMBINED ESOPHAGOSCOPY, GASTROSCOPY, DUODENOSCOPY (EGD);  EGD;  Surgeon: Angel Luis Justice MD;  Location:  GI     HEART CATH CORONARY ANGIOGRAM W/LV GRAM  9-11-10    CV Surgery recommended     HEART CATH CORONARY ANGIOGRAM  W/LV GRAM  14    Medical management     HERNIA REPAIR, INGUINAL RT/LT      left       FAMILY HISTORY:  Family History   Problem Relation Age of Onset     C.A.D. Father      Cancer Father         bladder     Heart Surgery Father         bypass surgery     Heart Disease Mother        SOCIAL HISTORY:  Social History     Socioeconomic History     Marital status:    Tobacco Use     Smoking status: Former Smoker     Packs/day: 0.50     Years: 12.00     Pack years: 6.00     Types: Cigarettes     Start date:      Quit date: 2005     Years since quittin.6     Smokeless tobacco: Never Used   Substance and Sexual Activity     Alcohol use: Yes     Comment: minimal     Drug use: No     Sexual activity: Yes     Partners: Female   Other Topics Concern     Parent/sibling w/ CABG, MI or angioplasty before 65F 55M? No     Caffeine Concern Yes     Comment: 2 cups coffee per day     Special Diet Yes     Comment: low carb diet, low sugar     Exercise Yes     Comment: treadmill 40 minutes, walk, daily, bike 30 minutes every other day

## 2022-09-01 ENCOUNTER — TELEPHONE (OUTPATIENT)
Dept: CARDIOLOGY | Facility: CLINIC | Age: 62
End: 2022-09-01

## 2022-09-01 NOTE — TELEPHONE ENCOUNTER
9/1/22 Call to patient. He is appreciative of Michaelle Caba PA-C message and review with Dr Bedolla. Patient reports he picked up the Eliquis, will start taking tonight and twice daily as prescribed starting tomorrow. Patient will contact us if concerns or questions.  Tara Hawthorne RN 09/01/22 1:43 PM

## 2022-09-01 NOTE — PROGRESS NOTES
"Service Date: 08/31/2022    HISTORY OF PRESENT ILLNESS:    I had the pleasure of seeing Mr. Vikash Burgos, a delightful 62-year-old gentleman who has been referred by YESSY Rene, for evaluation of atrial flutter.    Mr. Burgos has history of coronary artery disease with 4-vessel CABG 2010 (LIMA to LAD, vein grafts to OM3, D2 and PDA).  LVEF is 50%.  He has peripheral arterial disease with CEA in 02/2021 complicated by CVA and facial nerve trauma, well-controlled diabetes mellitus type 2, dyslipidemia, hypertension, hepatitis C diagnosed in 2005 when he presented with liver failure. He was treated with antiviral therapy with excellent results.  He had esophageal varices, which he says later resolved.  He is currently in SURPASS trial that evaluates an antidiabetic oral medication.    Mr. Burgos was seen earlier today in the clinic by Michaelle.  He noted bradycardia recently, heart rates as low as in the 30s and seemingly never higher than 50.  ECG today showed atrial flutter with slow ventricular rate and (chronic) RBBB.    In retrospect, symptoms of decreased energy level and fatigue started perhaps in 03/2022.  He has not had syncope, though he reports moments of \"dizziness\".  He was expecting to be feeling better after losing weight and his blood sugars and hemoglobin A1c improving on the study drug (SURPASS trial).  Today's ECG likely explains his recent symptoms.    SOCIAL HISTORY:  Mr. Burgos is .  He has 2 adult children.  He quit smoking in 1993 and uses minimal alcohol.  He exercises on a regular basis without chest pain or unusual ALEXANDER.    FAMILY HISTORY:  Significant for coronary artery disease, though his both parents developed it later life (in their 70s).      PHYSICAL EXAMINATION:    GENERAL:  This is an extremely pleasant gentleman who is alert, oriented, provides excellent history and asks appropriate questions.  HEENT:  Head normocephalic, atraumatic.  NECK:  Supple with normal " "jugular venous pressure.  LUNGS:  Completely clear bilaterally.  CARDIOVASCULAR:  Irregular, bradycardic rhythm.  No apparent gallop.  ABDOMEN:  Soft, nontender.  EXTREMITIES:  No edema.      DIAGNOSTIC STUDIES:  - His 12-lead ECG today showed atrial flutter with negative flutter waves in the inferior leads as well as in V1.  There is notching of the flutter waves in the inferior leads.  RBBB.  - The patient has not had a recent echocardiogram.  His most recent study was a stress echocardiogram in 2014 which showed moderate LVH, normal LV function, moderate LA enlargement, and no significant valvular abnormalities.      IMPRESSION:    1.  Atrial flutter with bradycardia, suggesting underlying AV conduction disease.  Time of onset is unknown, though in retrospect symptoms likely started in the spring.  2.  Chronic ischemic heart disease with low normal LVEF.  3.  RBBB.  4.  PAD with previous CEA.  5.  Diabetes mellitus type 2, well controlled on a study drug.  6.  History of hepatitis C.  Relevant to the treatment plan for aflutter is the fact that he apparently had esophageal varices that resolved after treatment of hepatitis C.    Mr. Burgos is experiencing symptomatic atrial flutter with slow ventricular rate.  Despite the sawtooth appearance in the inferior leads, the atrial flutter is likely not CTI-dependent, given the concomitant negative waves in V1 and unusual notching of flutter waves inferiorly.      He was already started on apixaban by Michaelle earlier today.    I strongly recommended rhythm control with either cardioversion or catheter ablation.  I explained that the former is typically a \"short-term fix\", while the latter may permanently eliminate his atrial flutter.  Ablation works well for typical CTI flutter but less well for atypical flutter.  In addition, the degree of procedure invasiveness/risk is higher for atypical flutter.    The second issue is whether to pursue rhythm control in the near " future or wait until after 4 weeks of therapeutic anticoagulation are completed.  Of note, the patient is considering cataract surgery in December.  This is relevant information because if we pursued either cardioversion or ablation, we would not want anticoagulation to be stopped for at least 1 month after the procedure (Kelton said that his ophthalmologist wants him off anticoagulation before cataract surgery).    I discussed in great detail the technical aspects, risks and benefits of both cardioversion and atrial flutter ablation.  After a lengthy discussion, we mutually arrived at the following plan.    PLAN:  A.  Schedule EP study.  If he has typical CTI flutter or other RA flutter, we would pursue ablation.  If the flutter is left atrial, we would cardiovert.  B.  Transesophageal echocardiography, as the patient prefers proceeding with ablation or cardioversion sooner than later.  The risks and benefits of the procedure were discussed.  The patient does not have difficulty swallowing and reportedly did not have esophageal varices on his most recent endoscopic evaluation.  C.  There is risk of bradycardia following conversion to sinus rhythm.  In fact, the patient has history of first-degree AV conduction delay and RBBB when in sinus rhythm.  It is likely he will need a permanent pacemaker at some point.  D.  Continue Eliquis without interruption.  He understands Eliquis should not be interrupted for at least 4 weeks after ablation/cardioversion.    This was a complex clinic visit with lengthy discussion of treatment options.  The patient had several questions.  All were answered.      It was my pleasure seeing him today.      Tyson Ordonez MD, [facility]        cc:  Marshal Cuevas MD  [facility]  [address] [place] [address]    Michaelle Caba PA-C  [facility] at [address], [address]  [place]  [address]      D: 08/31/2022   T: 08/31/2022   MT:  ll    Name:     IHSAN SANCHEZ  MRN:      [id number]        Account:      [id number]   :      1960           Service Date: 2022       Document: [id number]

## 2022-09-01 NOTE — TELEPHONE ENCOUNTER
----- Message from Kevin Bedolla MD sent at 9/1/2022 12:10 PM CDT -----  Regarding: RE: anticoagulation for new dx atrial flutter.  I don't have any concerns.      ----- Message -----  From: Michaelle Caba PA-C  Sent: 9/1/2022  10:09 AM CDT  To: Kevin Bedolla MD  Subject: anticoagulation for new dx atrial flutter.       Dr. Bedolla,   We share care of Kelton whom you see for cirrhosis and gilberts syndrome.      Yesterday he presented in slow atrial flutter and will likely undergo ablation.  I started him on Eliquis 5 mg bid for cva prevention, but wanted to double check with you that his liver issues are stable enough for that dose.  The last MELD or Jose Eduardo Mckenna scores I found were many years ago, but per his report they remain stable.      Any concerns with the Eliqius?    Thanks,   Michaelle Caba PA-C  10:09 AM 9/1/2022   [facility]

## 2022-09-01 NOTE — TELEPHONE ENCOUNTER
Called pt to let him know that Dr. Bedolla is ok with Eliquis. No answer, left VM.    Copy Tara Hawthorne as fyi if pt calls back.

## 2022-09-08 ENCOUNTER — TELEPHONE (OUTPATIENT)
Dept: MEDSURG UNIT | Facility: CLINIC | Age: 62
End: 2022-09-08

## 2022-09-08 ENCOUNTER — TELEPHONE (OUTPATIENT)
Dept: CARDIOLOGY | Facility: CLINIC | Age: 62
End: 2022-09-08

## 2022-09-08 DIAGNOSIS — I48.92 ATRIAL FLUTTER, UNSPECIFIED TYPE (H): Primary | ICD-10-CM

## 2022-09-08 RX ORDER — LIDOCAINE 40 MG/G
CREAM TOPICAL
Status: CANCELLED | OUTPATIENT
Start: 2022-09-08

## 2022-09-08 RX ORDER — SODIUM CHLORIDE, SODIUM LACTATE, POTASSIUM CHLORIDE, CALCIUM CHLORIDE 600; 310; 30; 20 MG/100ML; MG/100ML; MG/100ML; MG/100ML
INJECTION, SOLUTION INTRAVENOUS CONTINUOUS
Status: CANCELLED | OUTPATIENT
Start: 2022-09-08

## 2022-09-08 NOTE — TELEPHONE ENCOUNTER
Chart reviewed for upcoming procedure.  CSE and nursing orders in epic.  No contrast allergy, per clinic note instructed on arrival time and covid testing process.

## 2022-09-08 NOTE — TELEPHONE ENCOUNTER
Spoke to patient to review instructions for aflutter ablation scheduled for 9/9 .  Patient aware that he is to be NPO after midnight and may take sips of water with am medications. Patient reported he has NOT  been on his insulin for approximately 10 days. magaly is in an extensive study for his diabetes.  Patient to arrive at 6:30am.  Patient aware that he will NOT be staying overnight after procedure unless there are complication.  Patient has arranged for ride and someone to be with him for 24 hours.  Home COVID test completed and patient to bring in picture of results.  Patient provided verbal understanding regarding above.  SACHIN Erwin

## 2022-09-09 ENCOUNTER — HOSPITAL ENCOUNTER (OUTPATIENT)
Facility: CLINIC | Age: 62
Discharge: HOME | End: 2022-09-09
Admitting: INTERNAL MEDICINE
Payer: COMMERCIAL

## 2022-09-09 ENCOUNTER — TELEPHONE (OUTPATIENT)
Dept: CARDIOLOGY | Facility: CLINIC | Age: 62
End: 2022-09-09

## 2022-09-09 ENCOUNTER — HOSPITAL ENCOUNTER (OUTPATIENT)
Dept: CARDIOLOGY | Facility: CLINIC | Age: 62
Discharge: HOME | End: 2022-09-09
Attending: INTERNAL MEDICINE
Payer: COMMERCIAL

## 2022-09-09 VITALS
OXYGEN SATURATION: 97 % | DIASTOLIC BLOOD PRESSURE: 72 MMHG | SYSTOLIC BLOOD PRESSURE: 123 MMHG | RESPIRATION RATE: 14 BRPM | HEART RATE: 74 BPM

## 2022-09-09 DIAGNOSIS — I48.92 ATRIAL FLUTTER, UNSPECIFIED TYPE (H): Primary | ICD-10-CM

## 2022-09-09 DIAGNOSIS — I48.92 ATRIAL FLUTTER, UNSPECIFIED TYPE (H): ICD-10-CM

## 2022-09-09 LAB
ANION GAP SERPL CALCULATED.3IONS-SCNC: 3 MMOL/L (ref 3–14)
BUN SERPL-MCNC: 13 MG/DL (ref 7–30)
CALCIUM SERPL-MCNC: 9 MG/DL (ref 8.5–10.1)
CHLORIDE BLD-SCNC: 108 MMOL/L (ref 94–109)
CO2 SERPL-SCNC: 27 MMOL/L (ref 20–32)
CREAT SERPL-MCNC: 0.86 MG/DL (ref 0.66–1.25)
ERYTHROCYTE [DISTWIDTH] IN BLOOD BY AUTOMATED COUNT: 13.2 % (ref 10–15)
GFR SERPL CREATININE-BSD FRML MDRD: >90 ML/MIN/1.73M2
GLUCOSE BLD-MCNC: 113 MG/DL (ref 70–99)
GLUCOSE BLDC GLUCOMTR-MCNC: 89 MG/DL (ref 70–99)
HCT VFR BLD AUTO: 44.7 % (ref 40–53)
HGB BLD-MCNC: 14.9 G/DL (ref 13.3–17.7)
LVEF ECHO: NORMAL
MCH RBC QN AUTO: 30.3 PG (ref 26.5–33)
MCHC RBC AUTO-ENTMCNC: 33.3 G/DL (ref 31.5–36.5)
MCV RBC AUTO: 91 FL (ref 78–100)
PLATELET # BLD AUTO: 142 10E3/UL (ref 150–450)
POTASSIUM BLD-SCNC: 5 MMOL/L (ref 3.4–5.3)
RBC # BLD AUTO: 4.91 10E6/UL (ref 4.4–5.9)
SODIUM SERPL-SCNC: 138 MMOL/L (ref 133–144)
WBC # BLD AUTO: 4.7 10E3/UL (ref 4–11)

## 2022-09-09 PROCEDURE — 99152 MOD SED SAME PHYS/QHP 5/>YRS: CPT

## 2022-09-09 PROCEDURE — 80048 BASIC METABOLIC PNL TOTAL CA: CPT | Performed by: INTERNAL MEDICINE

## 2022-09-09 PROCEDURE — 999N000183 HC STATISTIC TEE INCLUDES SEDATION

## 2022-09-09 PROCEDURE — C1731 CATH, EP, 20 OR MORE ELEC: HCPCS | Performed by: INTERNAL MEDICINE

## 2022-09-09 PROCEDURE — 999N000071 HC STATISTIC HEART CATH LAB OR EP LAB

## 2022-09-09 PROCEDURE — C1733 CATH, EP, OTHR THAN COOL-TIP: HCPCS | Performed by: INTERNAL MEDICINE

## 2022-09-09 PROCEDURE — 99152 MOD SED SAME PHYS/QHP 5/>YRS: CPT | Performed by: INTERNAL MEDICINE

## 2022-09-09 PROCEDURE — 36591 DRAW BLOOD OFF VENOUS DEVICE: CPT

## 2022-09-09 PROCEDURE — 96374 THER/PROPH/DIAG INJ IV PUSH: CPT

## 2022-09-09 PROCEDURE — 999N000184 HC STATISTIC TELEMETRY

## 2022-09-09 PROCEDURE — C1732 CATH, EP, DIAG/ABL, 3D/VECT: HCPCS | Performed by: INTERNAL MEDICINE

## 2022-09-09 PROCEDURE — 93005 ELECTROCARDIOGRAM TRACING: CPT

## 2022-09-09 PROCEDURE — 272N000001 HC OR GENERAL SUPPLY STERILE: Performed by: INTERNAL MEDICINE

## 2022-09-09 PROCEDURE — 93653 COMPRE EP EVAL TX SVT: CPT | Performed by: INTERNAL MEDICINE

## 2022-09-09 PROCEDURE — 93325 DOPPLER ECHO COLOR FLOW MAPG: CPT | Mod: 26 | Performed by: INTERNAL MEDICINE

## 2022-09-09 PROCEDURE — 93320 DOPPLER ECHO COMPLETE: CPT | Mod: 26 | Performed by: INTERNAL MEDICINE

## 2022-09-09 PROCEDURE — 250N000011 HC RX IP 250 OP 636: Performed by: INTERNAL MEDICINE

## 2022-09-09 PROCEDURE — 999N000054 HC STATISTIC EKG NON-CHARGEABLE

## 2022-09-09 PROCEDURE — 250N000009 HC RX 250: Performed by: INTERNAL MEDICINE

## 2022-09-09 PROCEDURE — 99153 MOD SED SAME PHYS/QHP EA: CPT | Performed by: INTERNAL MEDICINE

## 2022-09-09 PROCEDURE — 96376 TX/PRO/DX INJ SAME DRUG ADON: CPT

## 2022-09-09 PROCEDURE — 85041 AUTOMATED RBC COUNT: CPT | Performed by: INTERNAL MEDICINE

## 2022-09-09 PROCEDURE — C1894 INTRO/SHEATH, NON-LASER: HCPCS | Performed by: INTERNAL MEDICINE

## 2022-09-09 PROCEDURE — 36415 COLL VENOUS BLD VENIPUNCTURE: CPT | Performed by: INTERNAL MEDICINE

## 2022-09-09 PROCEDURE — 93312 ECHO TRANSESOPHAGEAL: CPT | Mod: 26 | Performed by: INTERNAL MEDICINE

## 2022-09-09 PROCEDURE — 93325 DOPPLER ECHO COLOR FLOW MAPG: CPT

## 2022-09-09 PROCEDURE — 96375 TX/PRO/DX INJ NEW DRUG ADDON: CPT

## 2022-09-09 PROCEDURE — 82962 GLUCOSE BLOOD TEST: CPT

## 2022-09-09 PROCEDURE — C1730 CATH, EP, 19 OR FEW ELECT: HCPCS | Performed by: INTERNAL MEDICINE

## 2022-09-09 PROCEDURE — 93010 ELECTROCARDIOGRAM REPORT: CPT | Mod: 76 | Performed by: INTERNAL MEDICINE

## 2022-09-09 PROCEDURE — 258N000003 HC RX IP 258 OP 636: Performed by: INTERNAL MEDICINE

## 2022-09-09 RX ORDER — LIDOCAINE 40 MG/G
CREAM TOPICAL
Status: DISCONTINUED | OUTPATIENT
Start: 2022-09-09 | End: 2022-09-09 | Stop reason: HOSPADM

## 2022-09-09 RX ORDER — NALOXONE HYDROCHLORIDE 0.4 MG/ML
0.4 INJECTION, SOLUTION INTRAMUSCULAR; INTRAVENOUS; SUBCUTANEOUS
Status: DISCONTINUED | OUTPATIENT
Start: 2022-09-09 | End: 2022-09-09 | Stop reason: HOSPADM

## 2022-09-09 RX ORDER — ACETAMINOPHEN 325 MG/1
650 TABLET ORAL EVERY 4 HOURS PRN
Status: DISCONTINUED | OUTPATIENT
Start: 2022-09-09 | End: 2022-09-09 | Stop reason: HOSPADM

## 2022-09-09 RX ORDER — LIDOCAINE 50 MG/G
OINTMENT TOPICAL ONCE
Status: COMPLETED | OUTPATIENT
Start: 2022-09-09 | End: 2022-09-09

## 2022-09-09 RX ORDER — NALOXONE HYDROCHLORIDE 0.4 MG/ML
0.2 INJECTION, SOLUTION INTRAMUSCULAR; INTRAVENOUS; SUBCUTANEOUS
Status: DISCONTINUED | OUTPATIENT
Start: 2022-09-09 | End: 2022-09-09 | Stop reason: HOSPADM

## 2022-09-09 RX ORDER — LIDOCAINE HYDROCHLORIDE 40 MG/ML
1.5 SOLUTION TOPICAL ONCE
Status: COMPLETED | OUTPATIENT
Start: 2022-09-09 | End: 2022-09-09

## 2022-09-09 RX ORDER — HEPARIN SODIUM 200 [USP'U]/100ML
100-600 INJECTION, SOLUTION INTRAVENOUS CONTINUOUS PRN
Status: DISCONTINUED | OUTPATIENT
Start: 2022-09-09 | End: 2022-09-09 | Stop reason: HOSPADM

## 2022-09-09 RX ORDER — FENTANYL CITRATE 50 UG/ML
50 INJECTION, SOLUTION INTRAMUSCULAR; INTRAVENOUS ONCE
Status: COMPLETED | OUTPATIENT
Start: 2022-09-09 | End: 2022-09-09

## 2022-09-09 RX ORDER — FENTANYL CITRATE 50 UG/ML
25 INJECTION, SOLUTION INTRAMUSCULAR; INTRAVENOUS
Status: DISCONTINUED | OUTPATIENT
Start: 2022-09-09 | End: 2022-09-09 | Stop reason: HOSPADM

## 2022-09-09 RX ORDER — FLUMAZENIL 0.1 MG/ML
0.2 INJECTION, SOLUTION INTRAVENOUS
Status: DISCONTINUED | OUTPATIENT
Start: 2022-09-09 | End: 2022-09-09 | Stop reason: HOSPADM

## 2022-09-09 RX ORDER — SODIUM CHLORIDE, SODIUM LACTATE, POTASSIUM CHLORIDE, CALCIUM CHLORIDE 600; 310; 30; 20 MG/100ML; MG/100ML; MG/100ML; MG/100ML
INJECTION, SOLUTION INTRAVENOUS CONTINUOUS
Status: DISCONTINUED | OUTPATIENT
Start: 2022-09-09 | End: 2022-09-09 | Stop reason: HOSPADM

## 2022-09-09 RX ORDER — DEXTROSE MONOHYDRATE 25 G/50ML
9.5 INJECTION, SOLUTION INTRAVENOUS
Status: DISCONTINUED | OUTPATIENT
Start: 2022-09-09 | End: 2022-09-09 | Stop reason: HOSPADM

## 2022-09-09 RX ORDER — GLYCOPYRROLATE 0.2 MG/ML
0.1 INJECTION, SOLUTION INTRAMUSCULAR; INTRAVENOUS ONCE
Status: COMPLETED | OUTPATIENT
Start: 2022-09-09 | End: 2022-09-09

## 2022-09-09 RX ORDER — HEPARIN SODIUM 200 [USP'U]/100ML
100-500 INJECTION, SOLUTION INTRAVENOUS CONTINUOUS PRN
Status: DISCONTINUED | OUTPATIENT
Start: 2022-09-09 | End: 2022-09-09 | Stop reason: HOSPADM

## 2022-09-09 RX ORDER — MIDAZOLAM HCL IN 0.9 % NACL/PF 1 MG/ML
.5-6 PLASTIC BAG, INJECTION (ML) INTRAVENOUS CONTINUOUS PRN
Status: DISCONTINUED | OUTPATIENT
Start: 2022-09-09 | End: 2022-09-09 | Stop reason: HOSPADM

## 2022-09-09 RX ORDER — FENTANYL CITRATE 50 UG/ML
INJECTION, SOLUTION INTRAMUSCULAR; INTRAVENOUS
Status: DISCONTINUED | OUTPATIENT
Start: 2022-09-09 | End: 2022-09-09 | Stop reason: HOSPADM

## 2022-09-09 RX ADMIN — FENTANYL CITRATE 50 MCG: 50 INJECTION, SOLUTION INTRAMUSCULAR; INTRAVENOUS at 08:40

## 2022-09-09 RX ADMIN — TOPICAL ANESTHETIC 0.5 ML: 200 SPRAY DENTAL; PERIODONTAL at 08:30

## 2022-09-09 RX ADMIN — LIDOCAINE HYDROCHLORIDE 1.5 ML: 40 SOLUTION TOPICAL at 08:30

## 2022-09-09 RX ADMIN — FENTANYL CITRATE 50 MCG: 50 INJECTION, SOLUTION INTRAMUSCULAR; INTRAVENOUS at 08:45

## 2022-09-09 RX ADMIN — GLYCOPYRROLATE 0.1 MG: 0.2 INJECTION, SOLUTION INTRAMUSCULAR; INTRAVENOUS at 08:25

## 2022-09-09 RX ADMIN — MIDAZOLAM HYDROCHLORIDE 2 MG: 1 INJECTION, SOLUTION INTRAMUSCULAR; INTRAVENOUS at 08:41

## 2022-09-09 RX ADMIN — SODIUM CHLORIDE, POTASSIUM CHLORIDE, SODIUM LACTATE AND CALCIUM CHLORIDE: 600; 310; 30; 20 INJECTION, SOLUTION INTRAVENOUS at 07:12

## 2022-09-09 RX ADMIN — MIDAZOLAM HYDROCHLORIDE 2 MG: 1 INJECTION, SOLUTION INTRAMUSCULAR; INTRAVENOUS at 08:47

## 2022-09-09 ASSESSMENT — ACTIVITIES OF DAILY LIVING (ADL)
ADLS_ACUITY_SCORE: 35

## 2022-09-09 NOTE — PROGRESS NOTES
Dictated.    RA mapping showed extensive scar on the posterior wall.  Ultimately, the arrhythmia proved to be CTI dependent.  Successful ablation.    Post ablation, his rhythm is sinus with first-degree AV conduction delay and RBBB, chronic findings.  Occasionally, there is second-degree AV block type I (with patient under sedation).    EBL 15 mL.  No apparent complication.      Plan:  -Bedrest for 4 hours, then discharge home if doing well  -Continue Eliquis for at least 1 month  -Cardiac monitor before follow-up visit with EP REAGAN to assess for atrial fibrillation and/or AV block.

## 2022-09-09 NOTE — PRE-PROCEDURE
GENERAL PRE-PROCEDURE:   Procedure:  EPS, possible ablation vs CV  Date/Time:  9/9/2022 9:18 AM    Written consent obtained?: Yes    Risks and benefits: Risks, benefits and alternatives were discussed    Consent given by:  Patient  Patient states understanding of procedure being performed: Yes    Patient's understanding of procedure matches consent: Yes    Procedure consent matches procedure scheduled: Yes    Expected level of sedation:  Moderate  Appropriately NPO:  Yes  ASA Class:  2  Mallampati  :  Grade 2- soft palate, base of uvula, tonsillar pillars, and portion of posterior pharyngeal wall visible  Lungs:  Lungs clear with good breath sounds bilaterally  Heart:  Normal heart sounds and rate  History & Physical reviewed:  History and physical reviewed and no updates needed  Statement of review:  I have reviewed the lab findings, diagnostic data, medications, and the plan for sedation

## 2022-09-09 NOTE — PROVIDER NOTIFICATION
HR post Aflutter ablation mostly 50s-70s at rest but pt having occasional 1.5-2 second pauses with low HRs in 30s/40s. Patient alert, denies symptoms (lighheadedness, chest pain, dizziness, vision changes), /91. Getting EKG now. Text page to Dr. Ordonez to update.    Return call from Dr. Ordonez, he states he is aware and rhythm to be expected. Will monitor patient for prolonged bradycardia and symptoms.

## 2022-09-09 NOTE — PROGRESS NOTES
Care Suites Discharge Nursing Note    Patient Information  Name: Vikash Burgos  Age: 62 year old    Discharge Education:  Discharge instructions reviewed: Yes  Additional education/resources provided: none  Patient/patient representative verbalizes understanding: Yes  Patient discharging on new medications: No  Medication education completed: N/A    Discharge Plans:   Discharge location: home  Discharge ride contacted: Yes  Approximate discharge time: 1527    Discharge Criteria:  Discharge criteria met and vital signs stable: Yes    HR stable 50s-70s with occasional brief pauses, patient remains asymptomatic. Up at bedside, ambulated in [place], voided. R groin site remains soft and stable with pea sized amount of drainage on dressing. Denies pain.    Patient Belongs:  Patient belongings returned to patient: Yes    Zeny Leon RN

## 2022-09-09 NOTE — DISCHARGE INSTRUCTIONS
AGUILA  (Transesophageal Echocardiogram)  Discharge Instructions    After you go home:    Have an adult stay with you for 6 hours.       For 24 hours - due to the sedation you received:  Relax and take it easy.  Do NOT make any important or legal decisions.  Do NOT drive or operate machines at home or at work.  Do NOT drink alcohol.    Diet:  You may resume your normal diet, but no scratchy foods for two days.  If your throat is sore, eat cold, bland or soft foods.  You may have heartburn if the tube used in the exam entered your stomach.  If so:   - Do not eat acidic and spicy foods.   - Do not eat three hours before bedtime. Clear liquids are okay.   - When lying down, use two pillows to raise your head.    Medicines:    Take your medications, including blood thinners, unless your provider tells you not to.  If you have stopped any medicines, check with your provider about when to restart them.  You may take Tylenol (Acetaminophen) if your throat is sore.  You may take antacids if you have heartburn.      Follow Up Appointments:    Follow up with your cardiologist at [facility] of patient preference as instructed.  Follow up with your primary care provider as needed.    Call the clinic if:    You have heartburn that is severe or lasts more than 72 hours.  You have a sore throat that feels worse after 72 hours.  You have shortness of breath, neck pain, chest pain, fever, chills, coughing up blood, or other unusual signs.  Questions or concerns      [facility] [place]:    [phone] [facility] (7 days a week)       A FLUTTER ABLATION DISCHARGE INSTRUCTIONS    After you go home:    Have an adult stay with you until tomorrow.  You may resume your normal diet.       For 24 hours - due to the sedation you received:  Relax and take it easy.  Do NOT make any important or legal decisions.  Do NOT drive or operate machines at home or at work.  Do NOT drink alcohol.    Care of Groin Puncture  Site:    For the first 24 hrs - check the puncture site every 1-2 hours while awake.  For 2 days, when you cough, sneeze, laugh or move your bowels, hold your hand over the puncture site and press firmly.  Remove the dressing after 24 hours. If there is minor oozing, apply another bandaid and remove it after 12 hours.  It is normal to have a small bruise or pea size lump at the site.  You may shower tomorrow.  Do NOT take a bath, or use a hot tub or pool for at least 3 days. Do NOT scrub the site. Do not use lotion or powder near the puncture site.    Activity:            For 3 days:  No stooping or squatting  Do NOT do any heavy activity such as exercise, lifting, or straining.   No housework, yard work or any activity that make you sweat  Do NOT lift more than 10 pounds    Bleeding:    If you start bleeding from the site in your groin, lie down flat and press firmly on the site for 10 minutes.   Once bleeding stops, lay flat for 2 hours.  Call [facility] nurse line as soon as you can.       Call 911 right away if you have heavy bleeding or bleeding that does not stop.      Medicines:    Take your medications, including blood thinners, unless your provider tells you not to.  If you have stopped any medicines, check with your provider about when to restart them.  If you have pain or shortness of breath, you may take Advil (ibuprofen) or Tylenol (acetaminophen).    Follow Up Appointments:    10/10/2022 with YESSY Rene at 8:00am in [place]  You will receive a phone call Monday, 9/12 from an RN to see how you are doing.    Call the clinic if:    You have increased pain or a large or growing hard lump around the site.  The site is red, swollen, hot or tender.  Blood or fluid is draining from the site.  You have chills or a fever greater than 101 F (38 C).  Your leg feels numb, cool or changes color.  Increased pain in the chest and/or groin.  Increased shortness of breath  Chest pain not  relieved by Tylenol or Advil/Ibuprofen  New pain in the back or belly that you cannot control with Tylenol.  Recurrent irregular or fast heart rate (AFlutter) lasting over 2 hours.  Any questions or concerns.    Heart rhythms:    You may have some irregular heartbeats. These feel very strong. They may make you feel that the fast heart rhythm is going to start again.  Give it time. The irregular beats should occur less often.      [facility]:    [phone] ( 8am-4:30pm M-F)  Dee Gage    [phone] Option 2  On Call Cardiologist (7 days a week)

## 2022-09-09 NOTE — PROGRESS NOTES
Patient reports a history of esophageal varices.  Has not been seen for an upper GI in the past 7 years.  Reviewed instructions with patient and wife.  No further questions at this time.

## 2022-09-09 NOTE — PROGRESS NOTES
Care Suites Admission Nursing Note    Patient Information  Name: Vikash Burgos  Age: 62 year old  Reason for admission: AGUILA/ablation  Care Suites arrival time: 0630    Visitor Information  Name: debra  Informed of visitor restrictions: Yes  1 visitor allowed per patient   Visitor must screen negative for COVID symptoms   Visitor must wear a mask  Waiting rooms closed to visitors    Patient Admission/Assessment   Pre-procedure assessment complete: Yes  If abnormal assessment/labs, provider notified: N/A  NPO: Yes  Medications held per instructions/orders: N/A  Consent: obtained  If applicable, pregnancy test status: deferred  Patient oriented to room: Yes  Education/questions answered: Yes  Plan/other: AGUILA/ablation    Discharge Planning  Discharge name/phone number: debra [phone]   Overnight post sedation caregiver: debra   Discharge location: [place]    Julian Dyson RN

## 2022-09-09 NOTE — PROGRESS NOTES
Stopcock suture loosened per order, R groin site stable, no bleeding present. Pedal pulses weak (baseline). Patient tolerating ice chips, reports throat still slightly numb. VSS, HR continues to fluctuate between 30s-70s no prolonged pauses or bradycardia noted. Family at bedside.

## 2022-09-09 NOTE — PROGRESS NOTES
1225 Report received from Zeny Leon RN.  1230 Pt sleeping soundly. Gauze drsg CDI to right groin puncture sites. No oozing or hematoma noted. Area soft & flat.  Pt's family at bedside.    1330 Report given to Zeny Leon RN.

## 2022-09-09 NOTE — Clinical Note
Hemodynamic equipment used: 5 lead ECG, [employer] With 3 Leads, Machine BP Cuff and pulse oximeter probe.

## 2022-09-13 LAB
ATRIAL RATE - MUSE: 278 BPM
ATRIAL RATE - MUSE: 57 BPM
DIASTOLIC BLOOD PRESSURE - MUSE: NORMAL MMHG
DIASTOLIC BLOOD PRESSURE - MUSE: NORMAL MMHG
INTERPRETATION ECG - MUSE: NORMAL
INTERPRETATION ECG - MUSE: NORMAL
P AXIS - MUSE: -72 DEGREES
P AXIS - MUSE: 92 DEGREES
PR INTERVAL - MUSE: 280 MS
PR INTERVAL - MUSE: NORMAL MS
QRS DURATION - MUSE: 160 MS
QRS DURATION - MUSE: 162 MS
QT - MUSE: 490 MS
QT - MUSE: 502 MS
QTC - MUSE: 444 MS
QTC - MUSE: 476 MS
R AXIS - MUSE: -64 DEGREES
R AXIS - MUSE: -80 DEGREES
SYSTOLIC BLOOD PRESSURE - MUSE: NORMAL MMHG
SYSTOLIC BLOOD PRESSURE - MUSE: NORMAL MMHG
T AXIS - MUSE: 28 DEGREES
T AXIS - MUSE: 67 DEGREES
VENTRICULAR RATE- MUSE: 47 BPM
VENTRICULAR RATE- MUSE: 57 BPM

## 2022-09-15 ENCOUNTER — RESEARCH ENCOUNTER (OUTPATIENT)
Dept: CARDIOLOGY | Facility: CLINIC | Age: 62
End: 2022-09-15

## 2022-09-15 DIAGNOSIS — E11.9 DIABETES MELLITUS, TYPE 2 (H): Primary | ICD-10-CM

## 2022-09-15 DIAGNOSIS — I25.10 CORONARY ARTERY DISEASE INVOLVING NATIVE CORONARY ARTERY OF NATIVE HEART WITHOUT ANGINA PECTORIS: ICD-10-CM

## 2022-09-15 DIAGNOSIS — E11.49 DM TYPE 2 CAUSING NEUROLOGICAL DISEASE (H): ICD-10-CM

## 2022-09-15 DIAGNOSIS — Z00.6 EXAMINATION OF PARTICIPANT OR CONTROL IN CLINICAL RESEARCH: ICD-10-CM

## 2022-09-16 ENCOUNTER — HOSPITAL ENCOUNTER (OUTPATIENT)
Dept: CARDIOLOGY | Facility: CLINIC | Age: 62
Discharge: HOME | End: 2022-09-16
Attending: INTERNAL MEDICINE | Admitting: INTERNAL MEDICINE
Payer: COMMERCIAL

## 2022-09-16 DIAGNOSIS — I48.92 ATRIAL FLUTTER, UNSPECIFIED TYPE (H): ICD-10-CM

## 2022-09-16 PROCEDURE — 93246 EXT ECG>7D<15D RECORDING: CPT

## 2022-09-16 PROCEDURE — 93248 EXT ECG>7D<15D REV&INTERPJ: CPT | Performed by: INTERNAL MEDICINE

## 2022-09-24 DIAGNOSIS — M19.042 PRIMARY OSTEOARTHRITIS OF BOTH HANDS: ICD-10-CM

## 2022-09-24 DIAGNOSIS — M19.041 PRIMARY OSTEOARTHRITIS OF BOTH HANDS: ICD-10-CM

## 2022-09-26 RX ORDER — CELECOXIB 100 MG/1
CAPSULE ORAL
Qty: 30 CAPSULE | Refills: 1 | Status: SHIPPED | OUTPATIENT
Start: 2022-09-26 | End: 2022-11-21

## 2022-09-26 NOTE — TELEPHONE ENCOUNTER
Routing refill request to provider for review/approval because:  Labs out of range:  CBC  Patient needs to be seen because it has been more than 1 year since last office visit.    Desiree HARRIS RN  [facility]

## 2022-09-27 ENCOUNTER — OFFICE VISIT (OUTPATIENT)
Dept: GASTROENTEROLOGY | Facility: CLINIC | Age: 62
End: 2022-09-27
Attending: INTERNAL MEDICINE
Payer: COMMERCIAL

## 2022-09-27 ENCOUNTER — ANCILLARY PROCEDURE (OUTPATIENT)
Dept: ULTRASOUND IMAGING | Facility: CLINIC | Age: 62
End: 2022-09-27
Attending: INTERNAL MEDICINE
Payer: COMMERCIAL

## 2022-09-27 ENCOUNTER — LAB (OUTPATIENT)
Dept: LAB | Facility: CLINIC | Age: 62
End: 2022-09-27
Attending: INTERNAL MEDICINE
Payer: COMMERCIAL

## 2022-09-27 VITALS
HEART RATE: 78 BPM | WEIGHT: 165.1 LBS | BODY MASS INDEX: 22.39 KG/M2 | DIASTOLIC BLOOD PRESSURE: 89 MMHG | OXYGEN SATURATION: 99 % | SYSTOLIC BLOOD PRESSURE: 134 MMHG

## 2022-09-27 DIAGNOSIS — K74.60 CIRRHOSIS OF LIVER WITHOUT ASCITES, UNSPECIFIED HEPATIC CIRRHOSIS TYPE (H): ICD-10-CM

## 2022-09-27 DIAGNOSIS — K74.60 CIRRHOSIS OF LIVER WITHOUT ASCITES, UNSPECIFIED HEPATIC CIRRHOSIS TYPE (H): Primary | ICD-10-CM

## 2022-09-27 LAB
AFP SERPL-MCNC: 2.4 NG/ML
ALBUMIN SERPL BCG-MCNC: 4.7 G/DL (ref 3.5–5.2)
ALP SERPL-CCNC: 59 U/L (ref 40–129)
ALT SERPL W P-5'-P-CCNC: 37 U/L (ref 10–50)
ANION GAP SERPL CALCULATED.3IONS-SCNC: 8 MMOL/L (ref 7–15)
AST SERPL W P-5'-P-CCNC: 34 U/L (ref 10–50)
BILIRUB DIRECT SERPL-MCNC: 0.33 MG/DL (ref 0–0.3)
BILIRUB SERPL-MCNC: 1.4 MG/DL
BUN SERPL-MCNC: 11.7 MG/DL (ref 8–23)
CALCIUM SERPL-MCNC: 10 MG/DL (ref 8.8–10.2)
CHLORIDE SERPL-SCNC: 102 MMOL/L (ref 98–107)
CREAT SERPL-MCNC: 0.84 MG/DL (ref 0.67–1.17)
DEPRECATED HCO3 PLAS-SCNC: 28 MMOL/L (ref 22–29)
ERYTHROCYTE [DISTWIDTH] IN BLOOD BY AUTOMATED COUNT: 13.2 % (ref 10–15)
GFR SERPL CREATININE-BSD FRML MDRD: >90 ML/MIN/1.73M2
GLUCOSE SERPL-MCNC: 107 MG/DL (ref 70–99)
HCT VFR BLD AUTO: 46.8 % (ref 40–53)
HGB BLD-MCNC: 15.8 G/DL (ref 13.3–17.7)
INR PPP: 1.21 (ref 0.85–1.15)
MCH RBC QN AUTO: 30.7 PG (ref 26.5–33)
MCHC RBC AUTO-ENTMCNC: 33.8 G/DL (ref 31.5–36.5)
MCV RBC AUTO: 91 FL (ref 78–100)
PLATELET # BLD AUTO: 141 10E3/UL (ref 150–450)
POTASSIUM SERPL-SCNC: 4.6 MMOL/L (ref 3.4–5.3)
PROT SERPL-MCNC: 7.3 G/DL (ref 6.4–8.3)
RBC # BLD AUTO: 5.15 10E6/UL (ref 4.4–5.9)
SODIUM SERPL-SCNC: 138 MMOL/L (ref 136–145)
WBC # BLD AUTO: 5 10E3/UL (ref 4–11)

## 2022-09-27 PROCEDURE — 76700 US EXAM ABDOM COMPLETE: CPT | Performed by: RADIOLOGY

## 2022-09-27 PROCEDURE — 85610 PROTHROMBIN TIME: CPT | Performed by: PATHOLOGY

## 2022-09-27 PROCEDURE — 82105 ALPHA-FETOPROTEIN SERUM: CPT | Mod: 90 | Performed by: PATHOLOGY

## 2022-09-27 PROCEDURE — 99000 SPECIMEN HANDLING OFFICE-LAB: CPT | Performed by: PATHOLOGY

## 2022-09-27 PROCEDURE — 80053 COMPREHEN METABOLIC PANEL: CPT | Mod: 90 | Performed by: PATHOLOGY

## 2022-09-27 PROCEDURE — 36415 COLL VENOUS BLD VENIPUNCTURE: CPT | Performed by: PATHOLOGY

## 2022-09-27 PROCEDURE — 82248 BILIRUBIN DIRECT: CPT | Mod: 90 | Performed by: PATHOLOGY

## 2022-09-27 PROCEDURE — 99214 OFFICE O/P EST MOD 30 MIN: CPT | Performed by: INTERNAL MEDICINE

## 2022-09-27 PROCEDURE — 85027 COMPLETE CBC AUTOMATED: CPT | Performed by: PATHOLOGY

## 2022-09-27 ASSESSMENT — PAIN SCALES - GENERAL: PAINLEVEL: NO PAIN (0)

## 2022-09-27 NOTE — LETTER
9/27/2022         RE: Vikash Burgos  [address]  [place] [address]        Dear Colleague,    Thank you for referring your patient, Vikash Burgos, to the [facility] [place]. Please see a copy of my visit note below.    HISTORY OF PRESENT ILLNESS:  I had the pleasure of seeing Francisco J Burgos for follow-up in the Liver Clinic at the [facility] on 09/27/2022.  Mr. Burgos returns for follow-up of cirrhosis, most likely caused by nonalcoholic fatty liver disease.    He continues to have a number of medical problems.  Most recently, he was hospitalized at [facility] for atrial flutter.  He ultimately underwent an ablation procedure, which seems to have controlled his rhythm.  He also has had intermittent problems with bradycardia as well.    He otherwise does occasionally report some very mild right upper quadrant pain.  He denies any itching or skin rash and still has a moderate amount of fatigue.  He denies any increased abdominal girth or lower extremity edema.  He has not had any gastrointestinal bleeding or any overt signs of hepatic encephalopathy.    He denies any fevers or chills.  He still has some shortness of breath, but no cough.  He denies any nausea or vomiting, diarrhea or constipation.  He is on a diabetes study and has lost a significant amount of weight on the study drug.  This has helped his blood sugars and his liver tests are normal as well.  He lost about 30 pounds of weight on the study.    He otherwise has had no other new events since he was last seen.    Current Outpatient Medications   Medication     amLODIPine (NORVASC) 2.5 MG tablet     apixaban ANTICOAGULANT (ELIQUIS ANTICOAGULANT) 5 MG tablet     aspirin 81 MG tablet     B-D U/F 31G X 8 MM insulin pen needle     celecoxib (CELEBREX) 100 MG capsule     chlorhexidine (PERIDEX) 0.12 % solution     cyanocobalamin 1000 MCG SUBL      glucosamine-chondroitin 500-400 MG CAPS per capsule     insulin pen needle (BD HEIKE U/F) 32G X 4 MM     LEVEMIR FLEXTOUCH 100 UNIT/ML pen     lisinopril (ZESTRIL) 20 MG tablet     rosuvastatin (CRESTOR) 20 MG tablet     Vitamin D, Cholecalciferol, 1000 units TABS     zolpidem (AMBIEN) 5 MG tablet     Current Facility-Administered Medications   Medication     study - tirzepatide 2.5-15 mg or dulaglutide 1.5 mg (SURPASS) (IDS# [id number]) injection 1.5-15 mg     study - tirzepatide 2.5-15 mg or dulaglutide 1.5 mg (SURPASS) (IDS# [id number]) injection 1.5-15 mg     study - tirzepatide 2.5-15 mg or dulaglutide 1.5 mg (SURPASS) (IDS# [id number]) injection 1.5-15 mg     /89   Pulse 78   Wt 74.9 kg (165 lb 1.6 oz)   SpO2 99%   BMI 22.39 kg/m      PHYSICAL EXAMINATION:    GENERAL:  He looks quite well.  HEENT:  Shows no scleral icterus or temporal muscle wasting.  CHEST:  Clear.  ABDOMEN:  No increase in girth.  No masses or tenderness to palpation are present.  His liver is 10 cm in span without left lobe enlargement.  No spleen tip is palpable.  EXTREMITIES:  No edema.  SKIN:  No stigmata of chronic liver disease.  NEUROLOGIC:  Shows no asterixis.    Recent Results (from the past 168 hour(s))   Hepatic Panel [LAB20]    Collection Time: 09/27/22  6:39 AM   Result Value Ref Range    Protein Total 7.3 6.4 - 8.3 g/dL    Albumin 4.7 3.5 - 5.2 g/dL    Bilirubin Total 1.4 (H) <=1.2 mg/dL    Alkaline Phosphatase 59 40 - 129 U/L    AST 34 10 - 50 U/L    ALT 37 10 - 50 U/L    Bilirubin Direct 0.33 (H) 0.00 - 0.30 mg/dL   Basic metabolic panel [LAB15]    Collection Time: 09/27/22  6:39 AM   Result Value Ref Range    Sodium 138 136 - 145 mmol/L    Potassium 4.6 3.4 - 5.3 mmol/L    Chloride 102 98 - 107 mmol/L    Carbon Dioxide (CO2) 28 22 - 29 mmol/L    Anion Gap 8 7 - 15 mmol/L    Urea Nitrogen 11.7 8.0 - 23.0 mg/dL    Creatinine 0.84 0.67 - 1.17 mg/dL    Calcium 10.0 8.8 - 10.2 mg/dL    Glucose 107 (H) 70 - 99 mg/dL    GFR Estimate >90 >60  mL/min/1.73m2   CBC with platelets [[id number]]    Collection Time: 09/27/22  6:39 AM   Result Value Ref Range    WBC Count 5.0 4.0 - 11.0 10e3/uL    RBC Count 5.15 4.40 - 5.90 10e6/uL    Hemoglobin 15.8 13.3 - 17.7 g/dL    Hematocrit 46.8 40.0 - 53.0 %    MCV 91 78 - 100 fL    MCH 30.7 26.5 - 33.0 pg    MCHC 33.8 31.5 - 36.5 g/dL    RDW 13.2 10.0 - 15.0 %    Platelet Count 141 (L) 150 - 450 10e3/uL   INR [[id number]]    Collection Time: 09/27/22  6:39 AM   Result Value Ref Range    INR 1.21 (H) 0.85 - 1.15      EXAMINATION: US ABDOMEN COMPLETE 9/27/2022 7:27 AM       CLINICAL HISTORY: Cirrhosis of liver without ascites.     COMPARISON: 3/29/2022, 3/8/2022         FINDINGS:  The liver parenchyma demonstrates a coarsened echotexture. The liver measures 12.0 cm in length. No intrahepatic or extrahepatic biliary ductal dilatation. The common bile duct measures 3 mm. The gallbladder is normal, without gallstones, wall thickening, or pericholecystic fluid. US visualization score: A - No or minimal limitations.     The spleen measures maximally 9.6 cm and is normal in appearance. The visualized portions of the pancreas are normal in echogenicity.     The visualized upper abdominal aorta and inferior vena cava are normal.       The kidneys are normal in position and echogenicity. The right kidney measures 12.0 cm and the left kidney measures 12.6 cm. No urinary tract dilation.      Trace ascites.                                                                    IMPRESSION:   1. Cirrhosis without focal liver lesion.  a. LI-RADS US Category: US-1 Negative: No US evidence of HCC.  b. Recommend continued surveillance US.  2. Trace ascites.    IMPRESSION:  Mr. Burgos has well-compensated cirrhosis.  His liver tests are all completely normal.  He does have an elevated indirect bilirubin, likely secondary to Gilbert syndrome.  His ultrasound also looks quite good and interestingly does not report any increased echogenicity, which  would be consistent with fatty liver.  He seems to be doing well on his clinical trial.    In terms of his atrial dysrhythmias, I did ask him to speak with his cardiologist about what his likely need for a pacemaker is.  He does have right bundle branch block described as a first-degree AV block and he has been seen to be a high risk to need a pacemaker.      My plan will be to see him back in the clinic in 3 months.  I did encourage him to get an additional dose of the COVID-19 vaccine.  He is otherwise up to date with regard to vaccines and cancer screening.      Thank you very much for allowing me to participate in the care of this patient.      If you have any questions regarding my recommendations, please do not hesitate to contact me.            Kevin Bedolla MD        Professor of Medicine   [employer]        Executive Medical Director, Solid Organ Transplant Program   [facility]

## 2022-09-27 NOTE — PROGRESS NOTES
HISTORY OF PRESENT ILLNESS:  I had the pleasure of seeing Francisco J Burgos for follow-up in the Liver Clinic at the [facility] on 09/27/2022.  Mr. Burgos returns for follow-up of cirrhosis, most likely caused by nonalcoholic fatty liver disease.    He continues to have a number of medical problems.  Most recently, he was hospitalized at [facility] for atrial flutter.  He ultimately underwent an ablation procedure, which seems to have controlled his rhythm.  He also has had intermittent problems with bradycardia as well.    He otherwise does occasionally report some very mild right upper quadrant pain.  He denies any itching or skin rash and still has a moderate amount of fatigue.  He denies any increased abdominal girth or lower extremity edema.  He has not had any gastrointestinal bleeding or any overt signs of hepatic encephalopathy.    He denies any fevers or chills.  He still has some shortness of breath, but no cough.  He denies any nausea or vomiting, diarrhea or constipation.  He is on a diabetes study and has lost a significant amount of weight on the study drug.  This has helped his blood sugars and his liver tests are normal as well.  He lost about 30 pounds of weight on the study.    He otherwise has had no other new events since he was last seen.    Current Outpatient Medications   Medication     amLODIPine (NORVASC) 2.5 MG tablet     apixaban ANTICOAGULANT (ELIQUIS ANTICOAGULANT) 5 MG tablet     aspirin 81 MG tablet     B-D U/F 31G X 8 MM insulin pen needle     celecoxib (CELEBREX) 100 MG capsule     chlorhexidine (PERIDEX) 0.12 % solution     cyanocobalamin 1000 MCG SUBL     glucosamine-chondroitin 500-400 MG CAPS per capsule     insulin pen needle (BD HEIKE U/F) 32G X 4 MM     LEVEMIR FLEXTOUCH 100 UNIT/ML pen     lisinopril (ZESTRIL) 20 MG tablet     rosuvastatin (CRESTOR) 20 MG tablet     Vitamin D, Cholecalciferol, 1000 units TABS     zolpidem (AMBIEN) 5 MG  tablet     Current Facility-Administered Medications   Medication     study - tirzepatide 2.5-15 mg or dulaglutide 1.5 mg (SURPASS) (IDS# [id number]) injection 1.5-15 mg     study - tirzepatide 2.5-15 mg or dulaglutide 1.5 mg (SURPASS) (IDS# [id number]) injection 1.5-15 mg     study - tirzepatide 2.5-15 mg or dulaglutide 1.5 mg (SURPASS) (IDS# [id number]) injection 1.5-15 mg     /89   Pulse 78   Wt 74.9 kg (165 lb 1.6 oz)   SpO2 99%   BMI 22.39 kg/m      PHYSICAL EXAMINATION:    GENERAL:  He looks quite well.  HEENT:  Shows no scleral icterus or temporal muscle wasting.  CHEST:  Clear.  ABDOMEN:  No increase in girth.  No masses or tenderness to palpation are present.  His liver is 10 cm in span without left lobe enlargement.  No spleen tip is palpable.  EXTREMITIES:  No edema.  SKIN:  No stigmata of chronic liver disease.  NEUROLOGIC:  Shows no asterixis.    Recent Results (from the past 168 hour(s))   Hepatic Panel [LAB20]    Collection Time: 09/27/22  6:39 AM   Result Value Ref Range    Protein Total 7.3 6.4 - 8.3 g/dL    Albumin 4.7 3.5 - 5.2 g/dL    Bilirubin Total 1.4 (H) <=1.2 mg/dL    Alkaline Phosphatase 59 40 - 129 U/L    AST 34 10 - 50 U/L    ALT 37 10 - 50 U/L    Bilirubin Direct 0.33 (H) 0.00 - 0.30 mg/dL   Basic metabolic panel [LAB15]    Collection Time: 09/27/22  6:39 AM   Result Value Ref Range    Sodium 138 136 - 145 mmol/L    Potassium 4.6 3.4 - 5.3 mmol/L    Chloride 102 98 - 107 mmol/L    Carbon Dioxide (CO2) 28 22 - 29 mmol/L    Anion Gap 8 7 - 15 mmol/L    Urea Nitrogen 11.7 8.0 - 23.0 mg/dL    Creatinine 0.84 0.67 - 1.17 mg/dL    Calcium 10.0 8.8 - 10.2 mg/dL    Glucose 107 (H) 70 - 99 mg/dL    GFR Estimate >90 >60 mL/min/1.73m2   CBC with platelets [[id number]]    Collection Time: 09/27/22  6:39 AM   Result Value Ref Range    WBC Count 5.0 4.0 - 11.0 10e3/uL    RBC Count 5.15 4.40 - 5.90 10e6/uL    Hemoglobin 15.8 13.3 - 17.7 g/dL    Hematocrit 46.8 40.0 - 53.0 %    MCV 91 78 - 100 fL    MCH 30.7 26.5 -  33.0 pg    MCHC 33.8 31.5 - 36.5 g/dL    RDW 13.2 10.0 - 15.0 %    Platelet Count 141 (L) 150 - 450 10e3/uL   INR [[id number]]    Collection Time: 09/27/22  6:39 AM   Result Value Ref Range    INR 1.21 (H) 0.85 - 1.15      EXAMINATION: US ABDOMEN COMPLETE 9/27/2022 7:27 AM       CLINICAL HISTORY: Cirrhosis of liver without ascites.     COMPARISON: 3/29/2022, 3/8/2022         FINDINGS:  The liver parenchyma demonstrates a coarsened echotexture. The liver measures 12.0 cm in length. No intrahepatic or extrahepatic biliary ductal dilatation. The common bile duct measures 3 mm. The gallbladder is normal, without gallstones, wall thickening, or pericholecystic fluid. US visualization score: A - No or minimal limitations.     The spleen measures maximally 9.6 cm and is normal in appearance. The visualized portions of the pancreas are normal in echogenicity.     The visualized upper abdominal aorta and inferior vena cava are normal.       The kidneys are normal in position and echogenicity. The right kidney measures 12.0 cm and the left kidney measures 12.6 cm. No urinary tract dilation.      Trace ascites.                                                                    IMPRESSION:   1. Cirrhosis without focal liver lesion.  a. LI-RADS US Category: US-1 Negative: No US evidence of HCC.  b. Recommend continued surveillance US.  2. Trace ascites.    IMPRESSION:  Mr. Burgos has well-compensated cirrhosis.  His liver tests are all completely normal.  He does have an elevated indirect bilirubin, likely secondary to Gilbert syndrome.  His ultrasound also looks quite good and interestingly does not report any increased echogenicity, which would be consistent with fatty liver.  He seems to be doing well on his clinical trial.    In terms of his atrial dysrhythmias, I did ask him to speak with his cardiologist about what his likely need for a pacemaker is.  He does have right bundle branch block described as a first-degree AV  block and he has been seen to be a high risk to need a pacemaker.      My plan will be to see him back in the clinic in 3 months.  I did encourage him to get an additional dose of the COVID-19 vaccine.  He is otherwise up to date with regard to vaccines and cancer screening.      Thank you very much for allowing me to participate in the care of this patient.      If you have any questions regarding my recommendations, please do not hesitate to contact me.            Kevin Bedolla MD        Professor of Medicine   [employer]        Executive Medical Director, Solid Organ Transplant Program   [facility]

## 2022-09-28 DIAGNOSIS — I10 HYPERTENSION, UNSPECIFIED TYPE: ICD-10-CM

## 2022-09-28 RX ORDER — AMLODIPINE BESYLATE 2.5 MG/1
2.5 TABLET ORAL DAILY
Qty: 90 TABLET | Refills: 3 | Status: SHIPPED | OUTPATIENT
Start: 2022-09-28 | End: 2023-07-26

## 2022-09-28 NOTE — TELEPHONE ENCOUNTER
Refill request from: Patient   Medication requested:  Amlodipine   Last office visit:  22  Labs/EK22 / 22  Future Appointments:      Date Time Provider Department Center   10/10/2022  8:00 AM Michaelle Caba PA-C [facility]   10/19/2022  8:15 AM Tyson Ordonez MD [facility]   2022  9:10 AM Mis Husain RN [facility]   2023  9:10 AM Mis Husain RN [facility]   3/21/2023  6:45 AM  [facility]   3/21/2023  7:00 AM [email or web address]   3/21/2023  8:45 AM Kevin Bedolla MD [facility]   2023  9:10 AM Mis Husain RN [facility]   2023  9:10 AM Mis Husain RN [facility]   2023  9:10 AM Mis Husain RN [facility]     [facility] Refill Guideline reviewed. Medication meets criteria for refill. Refill sent.  Tara Hawthorne RN 22 9:08 AM

## 2022-10-07 ENCOUNTER — TELEPHONE (OUTPATIENT)
Dept: FAMILY MEDICINE | Facility: CLINIC | Age: 62
End: 2022-10-07

## 2022-10-07 NOTE — TELEPHONE ENCOUNTER
LVM to schedule preop with provider taking new Pt's (Fadi Aranda, Lizzy Bacon, Lizy, or Bony Lyons).

## 2022-10-07 NOTE — TELEPHONE ENCOUNTER
Reason for Call:  Appointment Request -- see below     Patient requesting this type of appt: Pre-op    Requested provider: Marshal Cuevas    Reason patient unable to be scheduled: Not within requested timeframe    When does patient want to be seen/preferred time: 1-2 weeks    Comments: est care pre op cateract 12/12 and 12/19 at [place] Dr. Mejía    Patient wants to est care with Dr. Land     Could we send this information to you in [facility] or would you prefer to receive a phone call?:   Patient would prefer a phone call   Okay to leave a detailed message?: Yes at Other phone number:  [phone]    Call taken on 10/7/2022 at 7:38 AM by Teresita Weiss

## 2022-10-08 ENCOUNTER — NURSE TRIAGE (OUTPATIENT)
Dept: NURSING | Facility: CLINIC | Age: 62
End: 2022-10-08

## 2022-10-09 ENCOUNTER — NURSE TRIAGE (OUTPATIENT)
Dept: NURSING | Facility: CLINIC | Age: 62
End: 2022-10-09

## 2022-10-09 ENCOUNTER — HOSPITAL ENCOUNTER (EMERGENCY)
Facility: CLINIC | Age: 62
Discharge: HOME | End: 2022-10-09
Attending: EMERGENCY MEDICINE | Admitting: EMERGENCY MEDICINE
Payer: COMMERCIAL

## 2022-10-09 VITALS
TEMPERATURE: 99 F | BODY MASS INDEX: 21.02 KG/M2 | RESPIRATION RATE: 18 BRPM | DIASTOLIC BLOOD PRESSURE: 66 MMHG | HEART RATE: 98 BPM | WEIGHT: 155 LBS | OXYGEN SATURATION: 97 % | SYSTOLIC BLOOD PRESSURE: 148 MMHG

## 2022-10-09 DIAGNOSIS — U07.1 INFECTION DUE TO 2019 NOVEL CORONAVIRUS: ICD-10-CM

## 2022-10-09 LAB
FLUAV RNA SPEC QL NAA+PROBE: NEGATIVE
FLUBV RNA RESP QL NAA+PROBE: NEGATIVE
RSV RNA SPEC NAA+PROBE: NEGATIVE
SARS-COV-2 RNA RESP QL NAA+PROBE: POSITIVE

## 2022-10-09 PROCEDURE — 87637 SARSCOV2&INF A&B&RSV AMP PRB: CPT | Performed by: EMERGENCY MEDICINE

## 2022-10-09 PROCEDURE — C9803 HOPD COVID-19 SPEC COLLECT: HCPCS

## 2022-10-09 PROCEDURE — 99283 EMERGENCY DEPT VISIT LOW MDM: CPT

## 2022-10-09 ASSESSMENT — ENCOUNTER SYMPTOMS
SORE THROAT: 1
MYALGIAS: 1
HEADACHES: 1
FEVER: 1
BACK PAIN: 0
SHORTNESS OF BREATH: 0
COUGH: 1
CHILLS: 1

## 2022-10-09 NOTE — ED NOTES
Pt given pulse oxymeter and instruction how to use and to come to ER if symptoms worsen or oxygen saturation drops below 90%

## 2022-10-09 NOTE — ED PROVIDER NOTES
History   Chief Complaint:  Covid Concern       HPI   Vikash Burgos is a 62 year old male with history of stage 4 liver disease who presents with recent development of low-grade fever, chills, cough, myalgia, sore throat, and headaches since yesterday. Patient's son tested positive for covid Thursday (3 days ago) and the patient tested positive today. He is vaccinated and boosted for covid. He denies back pain and shortness of breath. Patient is on a study medicine.  Patient presents to the ED to inquire if he is a candidate for antiviral medication.    Review of Systems   Constitutional: Positive for chills and fever.   HENT: Positive for sore throat.    Respiratory: Positive for cough. Negative for shortness of breath.    Musculoskeletal: Positive for myalgias. Negative for back pain.   Neurological: Positive for headaches.   All other systems reviewed and are negative.    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)  Diabetic polyneuropathy associated with diabetes mellitus due to underlying condition (H)  Left carotid artery stenosis  Cirrhosis of liver without ascites  Biceps tendinitis of right upper extremity    Past Surgical History:    Bilateral knee arthroscopy   Coronary artery bypass  Endarterectomy carotid  Atrial ablation  Heart catheterization  Hernia repair     Family History:    Father: CAD, bladder cancer, heart bypass  Mother: heart disease    Social History:  The patient presents to the ED spouse  PCP: Marshal Cuevas     Physical Exam     Patient Vitals for the past 24 hrs:   BP Temp Temp src Pulse Resp SpO2 Weight    10/09/22 1310 (!) 148/66 99  F (37.2  C) Temporal 98 18 98 % 70.3 kg (155 lb)       Physical Exam  General: Alert and cooperative with exam. Patient in mild distress. Normal mentation. Non toxic appearing.  Head:  Scalp is NC/AT  Eyes:  No scleral icterus, PERRL  ENT:  The external nose and ears are normal. The oropharynx is normal and without erythema; mucus membranes are moist. Uvula midline, no evidence of deep space infection.  Neck:  Normal range of motion without rigidity.  CV:  Regular rate and rhythm    No pathologic murmur   Resp:  Breath sounds are clear bilaterally. Mild cough    Non-labored, no retractions or accessory muscle use  GI:  Abdomen is soft, no distension, no tenderness. No peritoneal signs  MS:  No lower extremity edema   Skin:  Warm and dry, No rash or lesions noted.  Neuro: Oriented x 3. No gross motor deficits.       Emergency Department Course   Laboratory:  COVID-positive  Negative influenza A/B and RSV    Emergency Department Course:  Reviewed:  I reviewed nursing notes, vitals, past medical history and Care Everywhere    Assessments:  1350 I obtained history and examined the patient as noted above.     Disposition:  The patient was discharged to home.     Impression & Plan   Medical Decision Making:  Patient is a 62-year-old male who presents with COVID symptoms beginning yesterday with known COVID exposure.  Laboratory testing later confirmed positive COVID result.  On exam patient is not toxic or significantly ill in appearance.  Lungs are clear to auscultation; no indication for chest x-ray.  Work of breathing and oxygen saturations are normal.  Patient is vaccinated, boosted, and had a COVID infection at the beginning of the pandemic.  Unfortunately patient has multiple medication interactions and liver disease which preclude him from being a candidate for Paxlovid; also is on a study medication with unknown potential medication interaction.  At this time given his overall well  appearance I feel he is safe and appropriate for continued outpatient supportive care.  Return precautions discussed.  Patient discharged home.      Diagnosis:    ICD-10-CM    1. Infection due to 2019 novel coronavirus  U07.1           Scribe Disclosure:  I, Tyler Fishman, am serving as a scribe at 1:50 PM on 10/9/2022 to document services personally performed by Eloy Allison DO based on my observations and the provider's statements to me.         Eloy Cook DO  10/09/22 2201

## 2022-10-09 NOTE — TELEPHONE ENCOUNTER
Carmina (wife) calls and says that her son tested positive for Covid on Friday and lives with her and Kelton. Carmina says that Kelton developed a cough this afternoon. Carmina says that her  has underlying health conditions: heart disease, Diabetes, Stage 4 Liver Disease. Carmina says that she wants her  to have a virtual visit with a , so Kelton can get an antiviral Covid medication, even though he has not been tested for covid yet. Carmina was conferenced with [facility] , for assistance in scheduling a virtual visit with a  For tomorrow. COVID 19 Nurse Triage Plan/Patient Instructions    Please be aware that novel coronavirus (COVID-19) may be circulating in the community. If you develop symptoms such as fever, cough, or SOB or if you have concerns about the presence of another infection including coronavirus (COVID-19), please contact your health care provider or visit [email or web address].     Disposition/Instructions    Home care recommended. Follow home care protocol based instructions.    Thank you for taking steps to prevent the spread of this virus.  o Limit your contact with others.  o Wear a simple mask to cover your cough.  o Wash your hands well and often.    Resources    [facility]: About COVID-19: [email or web address]    CDC: What to Do If You're Sick: www.cdc.gov/coronavirus/2019-ncov/about/steps-when-sick.html    CDC: Ending Home Isolation: www.cdc.gov/coronavirus/2019-ncov/hcp/disposition-in-home-patients.html     CDC: Caring for Someone: www.cdc.gov/coronavirus/2019-ncov/if-you-are-sick/care-for-someone.html     [facility]: Interim Guidance for Hospital Discharge to Home: www.health.[facility].[place]/diseases/coronavirus/hcp/hospdischarge.pdf    [facility] clinical trials (COVID-19 research studies): [email or web address].[email or web address]/umn-clinical-trials     Below are the COVID-19 hotlines at the [place] Department of Health ([facility]). Interpreters are available.   o For  health questions: Call 692-439-3153 or 1-962.959.9337 (7 a.m. to 7 p.m.)  o For questions about schools and childcare: Call 393-351-6010 or 1-885.577.9417 (7 a.m. to 7 p.m.)                     Reason for Disposition    Health Information question, no triage required and triager able to answer question    Additional Information    Negative: [1] Caller is not with the adult (patient) AND [2] reporting urgent symptoms    Negative: Lab result questions    Negative: Medication questions    Negative: Caller can't be reached by phone    Negative: Caller has already spoken to PCP or another triager    Negative: RN needs further essential information from caller in order to complete triage    Negative: Requesting regular office appointment    Negative: [1] Caller requesting NON-URGENT health information AND [2] PCP's office is the best resource    Protocols used: INFORMATION ONLY CALL - NO TRIAGE-A-

## 2022-10-09 NOTE — ED TRIAGE NOTES
Covid positive this morning, symptoms started yesterday. No CP or SOB. Chills, fever, cough, dizzy. Hx of stage four liver disease, CAD, HTN, diabetes. Wants antiviral medication, was unable to get them over the phone, was instructed to come to ER.

## 2022-10-09 NOTE — TELEPHONE ENCOUNTER
Nurse Triage SBAR    Is this a 2nd Level Triage? YES, LICENSED PRACTITIONER REVIEW IS REQUIRED    Situation: Covid positive    Background: Symptoms started last night, tested positive this morning. Temp at time of call was 101.2 after taking Tylenol. Patient has a cough, sore throat, states that he is dizzy and feels extremely weak. Denies shortness of breath, denies chest pain or pressure. Denies difficulty breathing.     Assessment: High risk Covid positive.     Protocol Recommended Disposition:   See HCP Within 4 Hours (Or PCP Triage)    Recommendation:     Patients wife would like to get Kelton on anti-virals ASAP. He has a telephone visit this evening at 2000.      Paged to provider    On-call provider paged at 1129.    On-call returned call at 1140 and recommended that patient go to the ED.     Provider Recommendation Follow Up:   Reached patient/caregiver. Informed of provider's recommendations. Patient verbalized understanding and does not agree with the plan.           Patients wife stated that she is going to wait for the virtual visit tonight as she dosen't want to take Kelton to the ED right now. Writer advised to take him to the ED if his symptoms get worse. Patient wife verbalized understanding.       Does the patient meet one of the following criteria for ADS visit consideration? 16+ years old, with an [facility] PCP     TIP  Providers, please consider if this condition is appropriate for management at one of our Acute and Diagnostic Services sites.     If patient is a good candidate, please use dotphrase <dot>triageresponse and select Refer to ADS to document.    Reason for Disposition    [1] Fever > 101 F (38.3 C) AND [2] age > 60 years    Additional Information    Negative: SEVERE difficulty breathing (e.g., struggling for each breath, speaks in single words)    Negative: Difficult to awaken or acting confused (e.g., disoriented, slurred speech)    Negative: Bluish (or gray) lips or face now    Negative:  Shock suspected (e.g., cold/pale/clammy skin, too weak to stand, low BP, rapid pulse)    Negative: Sounds like a life-threatening emergency to the triager    Negative: [1] Diagnosed or suspected COVID-19 AND [2] symptoms lasting 3 or more weeks    Negative: [1] COVID-19 exposure AND [2] no symptoms    Negative: COVID-19 vaccine reaction suspected (e.g., fever, headache, muscle aches) occurring 1 to 3 days after getting vaccine    Negative: COVID-19 vaccine, questions about    Negative: [1] Lives with someone known to have influenza (flu test positive) AND [2] flu-like symptoms (e.g., cough, runny nose, sore throat, SOB; with or without fever)    Negative: [1] Adult with possible COVID-19 symptoms AND [2] triager concerned about severity of symptoms or other causes    Negative: COVID-19 and breastfeeding, questions about    Negative: SEVERE or constant chest pain or pressure  (Exception: Mild central chest pain, present only when coughing.)    Negative: MODERATE difficulty breathing (e.g., speaks in phrases, SOB even at rest, pulse 100-120)    Negative: [1] Headache AND [2] stiff neck (can't touch chin to chest)    Negative: Oxygen level (e.g., pulse oximetry) 90 percent or lower    Negative: Chest pain or pressure    Negative: Patient sounds very sick or weak to the triager    Negative: MILD difficulty breathing (e.g., minimal/no SOB at rest, SOB with walking, pulse <100)    Negative: Fever > 103 F (39.4 C)    Protocols used: CORONAVIRUS (COVID-19) DIAGNOSED OR [id number] 1.18.2022

## 2022-10-10 ENCOUNTER — TELEPHONE (OUTPATIENT)
Dept: CARDIOLOGY | Facility: CLINIC | Age: 62
End: 2022-10-10

## 2022-10-10 ENCOUNTER — HEALTH MAINTENANCE LETTER (OUTPATIENT)
Age: 62
End: 2022-10-10

## 2022-10-10 ENCOUNTER — VIRTUAL VISIT (OUTPATIENT)
Dept: CARDIOLOGY | Facility: CLINIC | Age: 62
End: 2022-10-10
Attending: PHYSICIAN ASSISTANT
Payer: COMMERCIAL

## 2022-10-10 DIAGNOSIS — I48.92 ATRIAL FLUTTER, UNSPECIFIED TYPE (H): ICD-10-CM

## 2022-10-10 PROCEDURE — 99442 PR PHYSICIAN TELEPHONE EVALUATION 11-20 MIN: CPT | Performed by: PHYSICIAN ASSISTANT

## 2022-10-10 NOTE — LETTER
10/10/2022    Marshal Cuevas MD  [address]  [place] [address]    RE: Vikash Sharif Loretta       Dear Colleague,     I had the pleasure of seeing Vikash Ray Loretta in the [facility].  Vitals - Patient Reported  Systolic (Patient Reported): 126  Diastolic (Patient Reported): 81  Weight (Patient Reported): 70.8 kg (156 lb)  Height (Patient Reported): 182.9 cm (6')  BMI (Based on Pt Reported Ht/Wt): 21.16  Pulse (Patient Reported): 106      Review Of Systems    Respiratory: Shortness of breath.  Cardiovascular:NEGATIVE    Endocrine:  Diabetes.  Insulin on hold for the Surpass Study.    Denis Freire NREMT        Kelton is a 62 year old who is being evaluated via a billable telephone visit.      What phone number would you like to be contacted at? [phone]  How would you like to obtain your AVS? [employer]     Phone call duration: 25 minutes    [phone]      HPI and Plan:   See dictation    Orders this Visit:  Orders Placed This Encounter   Procedures     Sleep Study (referral)     No orders of the defined types were placed in this encounter.    There are no discontinued medications.      Encounter Diagnosis   Name Primary?     Atrial flutter, unspecified type (H)        CURRENT MEDICATIONS:  Current Outpatient Medications   Medication Sig Dispense Refill     amLODIPine (NORVASC) 2.5 MG tablet Take 1 tablet (2.5 mg) by mouth daily 90 tablet 3     apixaban ANTICOAGULANT (ELIQUIS ANTICOAGULANT) 5 MG tablet Take 1 tablet (5 mg) by mouth 2 times daily 180 tablet 3     aspirin 81 MG tablet Take 1 tablet by mouth daily.       B-D U/F 31G X 8 MM insulin pen needle USE ONE PEN NEEDLES DAILY OR AS DIRECTED. 100 each 3     celecoxib (CELEBREX) 100 MG capsule TAKE 1 CAPSULE BY MOUTH EVERY DAY 30 capsule 1     chlorhexidine (PERIDEX) 0.12 % solution Take 15 mLs by mouth 2 times daily as needed   5     cyanocobalamin 1000 MCG SUBL Place 1,000 mcg under the tongue daily       glucosamine-chondroitin  500-400 MG CAPS per capsule Take 1 capsule by mouth daily       insulin pen needle (BD HEIKE U/F) 32G X 4 MM Use 1 daily or as directed. 100 each prn     lisinopril (ZESTRIL) 20 MG tablet Take 1 tablet (20 mg) by mouth 2 times daily 180 tablet 3     rosuvastatin (CRESTOR) 20 MG tablet Take 1 tablet (20 mg) by mouth daily 90 tablet 3     Vitamin D, Cholecalciferol, 1000 units TABS Take 1,000 Units by mouth daily       zolpidem (AMBIEN) 5 MG tablet Take 1 tablet (5 mg) by mouth nightly as needed for sleep 30 tablet 0     LEVEMIR FLEXTOUCH 100 UNIT/ML pen INJECT 50 UNITS SUBCUTANEOUS AT BEDTIME (Patient not taking: Reported on 10/10/2022) 15 mL 27       ALLERGIES     Allergies   Allergen Reactions     Pcn [Penicillins] Rash     Rash with PCN only. Patient has taken amoxicillin with no rash.       PAST MEDICAL HISTORY:  Past Medical History:   Diagnosis Date     Basal cell carcinoma      Carotid stenosis, left     s/p left CEA 2/2020     Cerebral infarction (H)     left CVA 2/2020 post-op carotid endarterectomy     Coronary artery disease     4 vessel bypass November 2010; LIMA ->LAD, SVG-> OM3, SVG -> D2, SVG -> PDA     Diabetes mellitus (H) 2005    neuropathy     Generalized anxiety disorder 05/02/2014     Hepatitis C, chronic (H) 2005     Hyperlipidemia LDL goal < 70      Hypertension      Liver diseases     9/15 Liver is 10 cm in span without left lobe enlargement     Persistent microalbuminuria associated with type 2 diabetes mellitus (H) 05/06/2015     Renal artery stenosis (H)        PAST SURGICAL HISTORY:  Past Surgical History:   Procedure Laterality Date     ARTHROSCOPY KNEE RT/LT      left     COLONOSCOPY       CORONARY ARTERY BYPASS  11/18/201    Coronary artery bypass grafting x 4 with placement of the left internal mammary artery to the distal midportion of the left anterior descending artery, saphenous vein graft from aorta to the third obtuse marginal branch of circumflex coronary artery, saphenous vein  graft from aorta to the second diagonal branch, saphenous vein graft from aorta to the posterior descending artery.     ENDARTERECTOMY CAROTID Left 2020    Procedure: LEFT CAROTID ENDARTERECTOMY WITH EEG;  Surgeon: Semaj Stubbs MD;  Location: [facility] OR     EP ABLATION ATRIAL FLUTTER N/A 2022    Procedure: Ablation Atrial Flutter;  Surgeon: Tyson Ordonez MD;  Location:  HEART CARDIAC CATH LAB     ESOPHAGOSCOPY, GASTROSCOPY, DUODENOSCOPY (EGD), COMBINED  10/31/2011    Procedure:COMBINED ESOPHAGOSCOPY, GASTROSCOPY, DUODENOSCOPY (EGD); Surgeon:ALONSO ALDANA; Location: GI     ESOPHAGOSCOPY, GASTROSCOPY, DUODENOSCOPY (EGD), COMBINED N/A 3/8/2018    Procedure: COMBINED ESOPHAGOSCOPY, GASTROSCOPY, DUODENOSCOPY (EGD);  EGD;  Surgeon: Angel Luis Justice MD;  Location:  GI     HEART CATH CORONARY ANGIOGRAM W/LV GRAM  --10    CV Surgery recommended     HEART CATH CORONARY ANGIOGRAM W/LV GRAM  -14    Medical management     HERNIA REPAIR, INGUINAL RT/LT      left       FAMILY HISTORY:  Family History   Problem Relation Age of Onset     C.A.D. Father      Cancer Father         bladder     Heart Surgery Father         bypass surgery     Heart Disease Mother        SOCIAL HISTORY:  Social History     Socioeconomic History     Marital status:    Tobacco Use     Smoking status: Former     Packs/day: 0.50     Years: 12.00     Pack years: 6.00     Types: Cigarettes     Start date:      Quit date: 2005     Years since quittin.7     Smokeless tobacco: Never   Substance and Sexual Activity     Alcohol use: Yes     Comment: minimal     Drug use: No     Sexual activity: Yes     Partners: Female   Other Topics Concern     Parent/sibling w/ CABG, MI or angioplasty before 65F 55M? No     Caffeine Concern Yes     Comment: 2 cups coffee per day     Special Diet Yes     Comment: low carb diet, low sugar     Exercise Yes     Comment: treadmill 40 minutes, walk, daily, bike 30  minutes every other day       Review of Systems:  Skin:        Eyes:       ENT:       Respiratory:       Cardiovascular:       Gastroenterology:      Genitourinary:       Musculoskeletal:       Neurologic:       Psychiatric:       Heme/Lymph/Imm:       Endocrine:         Physical Exam:  Vitals: There were no vitals taken for this visit.   Please refer to dictation for physical exam    Recent Lab Results: all reviewed today  CBC RESULTS:  Lab Results   Component Value Date    WBC 5.0 09/27/2022    WBC 5.3 06/07/2021    RBC 5.15 09/27/2022    RBC 5.00 06/07/2021    HGB 15.8 09/27/2022    HGB 15.3 06/07/2021    HCT 46.8 09/27/2022    HCT 45.1 06/07/2021    MCV 91 09/27/2022    MCV 90 06/07/2021    MCH 30.7 09/27/2022    MCH 30.6 06/07/2021    MCHC 33.8 09/27/2022    MCHC 33.9 06/07/2021    RDW 13.2 09/27/2022    RDW 12.9 06/07/2021     (L) 09/27/2022     (L) 06/07/2021       BMP RESULTS:  Lab Results   Component Value Date     09/27/2022     06/07/2021    POTASSIUM 4.6 09/27/2022    POTASSIUM 5.0 09/09/2022    POTASSIUM 4.8 06/07/2021    CHLORIDE 102 09/27/2022    CHLORIDE 108 09/09/2022    CHLORIDE 108 06/07/2021    CO2 28 09/27/2022    CO2 27 09/09/2022    CO2 28 06/07/2021    ANIONGAP 8 09/27/2022    ANIONGAP 3 09/09/2022    ANIONGAP 5 06/07/2021     (H) 09/27/2022    GLC 89 09/09/2022     (H) 09/09/2022     (H) 06/07/2021    BUN 11.7 09/27/2022    BUN 13 09/09/2022    BUN 17 06/07/2021    CR 0.84 09/27/2022    CR 0.89 06/07/2021    GFRESTIMATED >90 09/27/2022    GFRESTIMATED >60 03/08/2022    GFRESTIMATED >90 06/07/2021    GFRESTBLACK >90 06/07/2021    LIA 10.0 09/27/2022    LIA 9.6 06/07/2021        INR RESULTS:  Lab Results   Component Value Date    INR 1.21 (H) 09/27/2022    INR 1.11 06/07/2021    INR 1.13 12/15/2020           CC  Michaelle Caba PA-C  [address]  [place] [id number]        Service Date: 10/10/2022    TELEPHONE VISIT    PRIMARY  "CARDIOLOGIST:  Dr. Roberts, recently followed with Dr. Ordonez for rhythm issues.    REASON FOR VISIT:  Hypertension, flutter ablation followup.    HISTORY OF PRESENT ILLNESS:  Kelton is a delightful 62-year-old gentleman with past medical history significant for the followin.  Coronary artery disease with history of 4-vessel CABG in  by Dr. Pruett with LIMA to the LAD, SVG to the OM3, SVG to D2, SVG to the PDA.  Last angiogram in  showed a patent LIMA to the LAD as well as patent other grafts with just a 60% stenosis of the OM after touchdown of the other grafts.  Stress test in  showed a small area of inferior ischemia.  Overall, his anginal equivalent is dyspnea on exertion.  2.  Low normal EF at 50%.  3.  Peripheral arterial disease with history of carotid endarterectomy 2021 complicated by CVA and facial nerve trauma.  4.  Hypertension.  5.  Well-controlled type 2 diabetes.  6.  Hyperlipidemia.  7.  Hepatitis C diagnosed in  when the patient presented with stage IV liver failure, treated with Pegasys and ribavirin with excellent results.  Followed by Dr. Bedolla.    8.  SURPASS trial.    I was benita enough to meet Kelton as part of the SURPASS trial and we have worked on optimizing his blood pressure.  We noticed he was becoming more and more bradycardic and eventually was found to have atrial flutter.  He was seen by Dr. Ordonez and underwent flutter ablation on 2022 with no complications.  He tells me that overall the procedure went well, although he notes that his heart feels different.  He states it feels like his heart was \"trespassed on.\"  He does not have pain.  He does not have a pleuritic sensation.  It just feels like something changed inside.  Because of his recent low heart rates, he has not been exercising so he does not feel as good as he would when he was biking 7 days a week.  He continues to have dizziness 2-3 times a day and it is not improved with his ablation.  He has " not had syncope or presyncope.    Unfortunately, he became ill on Saturday.  He had an exposure to COVID from his son.  He now has fevers, chills, aches and cough.  He tested positive yesterday and presented to the ER to see if he would be a candidate for antivirals.  With his liver disease, he is not.  In addition, he is on the SURPASS trial so they are not sure which drug he is on.  His oxygen sats at home have been in the 90s.  He notes his heart rate has been higher in the last 2 days.    SOCIAL HISTORY:  He is  to my colleague, Carmina.  He has a daughter who is a nurse at [employer] [place] and a son who is a  for [employer].  Quit smoking in 1993.  Minimal to no alcohol use.  At baseline, he exercises religiously, biking about 20 miles at a time and working on the treadmill and, again, has not done that in the last 2-3 months.    PHYSICAL EXAMINATION:  Limited, as this is a phone visit.    Zio Patch reviewed today shows a variety of arrhythmias, including third-degree heart block occurring multiple times during sleeping hours, atrial flutter, atrial tachycardia, AFib, junctional rhythm as well as second-degree type 1 heart block.  Overall, heart rate average of 75 with a low of 24 and max of 166.    ASSESSMENT AND PLAN:    1.  Atrial flutter, status post recent ablation.  Appropriately on Eliquis with multiple arrhythmias noted and a wide range of heart rates.  We discussed that it does take some time for the atrial flutter to heal and be eliminated.  Dr. Ordonez notes in his discussion of his procedure that on his monitor he fully expected second-degree heart block or more and we are seeing third-degree heart block, but it is during sleeping hours.  It is unclear to me whether this patient's dizziness is related to bradycardia.  I will discuss this all with Dr. Ordonez, but in the meantime, he will remain on Eliquis and off any rate controlling medications.  Given his heart block is during the overnight hours, I  have referred him for a sleep study.  2.  Hypertension, improved at home in the 120s-130s.  We recently took him off labetalol.  This is reasonable control for the time being.  3.  Type 2 diabetes with SURPASS trial now on pause with his insulin.  4.  Coronary artery disease with dyspnea on exertion being his anginal equivalent.  If we do not get rhythm issues settled, we may need to consider reevaluation of his coronaries.  5.  Hyperlipidemia, excellent control.    I will see this patient back in about 6 weeks.  We will continue to work through his blood pressure and make sure we have things tuned up.  He is seeing Dr. Ordonez next week, but I will reach out to him in the meantime to get a little more information on what he is thinking with his Zio Patch. He will call sooner with any concerns.  We also discussed that he is to go to the ER with any hypoxia below 90 or if his COVID symptoms worsen.    Thank you for allowing me to participate in this delightful patient's care.    Michaelle Caba PA-C        D: 10/10/2022   T: 10/10/2022   MT: michael    Name:     IHSAN SANCHEZ  MRN:      [id number]        Account:      [id number]   :      1960           Service Date: 10/10/2022       Document: [id number]      Thank you for allowing me to participate in the care of your patient.      Sincerely,     Michaelle Caba PA-C     [facility]  cc:   Michaelle Caba PA-C  [address]  [place] [address]

## 2022-10-10 NOTE — TELEPHONE ENCOUNTER
Received VM from [facility] with critical Zio Patch results.     Chart reviewed. Per note from aflutter ablation on 9/9/2022:   Successful atrial flutter ablation with arrhythmia termination and bidirectional block.  Anticoagulation should be continued for at least one month.  Before interruption of anticoagulation the patient will require a 14-day leadless cardiac monitor to rule out paroxysmal AF.  Of note, he has known AV conduction disease.  I do expect periods of (at least) second-degree AV block on his upcoming cardiac monitor.      Called hyth back. Pt had an episode of CHB with HR of 27 bpm. This is on page 22 of the report, strip 15. He had a total of 3 CHB episodes, with the slowest HR being 27bpm. There was also a 7% AF/flutter burden.     Reviewed Zio Patch report on website. The 3 CHB episodes all occurred during sleeping hours. None of the episodes patient symptom triggered. Pt does not take a beta blocker.     Will send update to Dr. Ordonez. OV with Dr. Ordonez scheduled 10/19/2022. Pt had telephone visit with Michaelle KRISHNAMURTHY this morning (telephone because he is COVID+), her note is not in Epic yet at this time.       Strips of CHB episodes:                   These are the same 3 episodes below, different formatting of the strips:

## 2022-10-10 NOTE — PROGRESS NOTES
"Service Date: 10/10/2022    TELEPHONE VISIT    PRIMARY CARDIOLOGIST:  Dr. Roberts, recently followed with Dr. Ordonez for rhythm issues.    REASON FOR VISIT:  Hypertension, flutter ablation followup.    HISTORY OF PRESENT ILLNESS:  Kelton is a delightful 62-year-old gentleman with past medical history significant for the followin.  Coronary artery disease with history of 4-vessel CABG in  by Dr. Pruett with LIMA to the LAD, SVG to the OM3, SVG to D2, SVG to the PDA.  Last angiogram in  showed a patent LIMA to the LAD as well as patent other grafts with just a 60% stenosis of the OM after touchdown of the other grafts.  Stress test in  showed a small area of inferior ischemia.  Overall, his anginal equivalent is dyspnea on exertion.  2.  Low normal EF at 50%.  3.  Peripheral arterial disease with history of carotid endarterectomy 2021 complicated by CVA and facial nerve trauma.  4.  Hypertension.  5.  Well-controlled type 2 diabetes.  6.  Hyperlipidemia.  7.  Hepatitis C diagnosed in  when the patient presented with stage IV liver failure, treated with Pegasys and ribavirin with excellent results.  Followed by Dr. Bedolla.    8.  SURPASS trial.    I was benita enough to meet Kelton as part of the SURPASS trial and we have worked on optimizing his blood pressure.  We noticed he was becoming more and more bradycardic and eventually was found to have atrial flutter.  He was seen by Dr. Ordonez and underwent flutter ablation on 2022 with no complications.  He tells me that overall the procedure went well, although he notes that his heart feels different.  He states it feels like his heart was \"trespassed on.\"  He does not have pain.  He does not have a pleuritic sensation.  It just feels like something changed inside.  Because of his recent low heart rates, he has not been exercising so he does not feel as good as he would when he was biking 7 days a week.  He continues to have dizziness 2-3 times a " day and it is not improved with his ablation.  He has not had syncope or presyncope.    Unfortunately, he became ill on Saturday.  He had an exposure to COVID from his son.  He now has fevers, chills, aches and cough.  He tested positive yesterday and presented to the ER to see if he would be a candidate for antivirals.  With his liver disease, he is not.  In addition, he is on the SURPASS trial so they are not sure which drug he is on.  His oxygen sats at home have been in the 90s.  He notes his heart rate has been higher in the last 2 days.    SOCIAL HISTORY:  He is  to my colleague, Carmina.  He has a daughter who is a nurse at [employer] [facility] [place] and a son who is a  for [employer].  Quit smoking in 1993.  Minimal to no alcohol use.  At baseline, he exercises religiously, biking about 20 miles at a time and working on the treadmill and, again, has not done that in the last 2-3 months.    PHYSICAL EXAMINATION:  Limited, as this is a phone visit.    Zio Patch reviewed today shows a variety of arrhythmias, including third-degree heart block occurring multiple times during sleeping hours, atrial flutter, atrial tachycardia, AFib, junctional rhythm as well as second-degree type 1 heart block.  Overall, heart rate average of 75 with a low of 24 and max of 166.    ASSESSMENT AND PLAN:    1.  Atrial flutter, status post recent ablation.  Appropriately on Eliquis with multiple arrhythmias noted and a wide range of heart rates.  We discussed that it does take some time for the atrial flutter to heal and be eliminated.  Dr. Ordonez notes in his discussion of his procedure that on his monitor he fully expected second-degree heart block or more and we are seeing third-degree heart block, but it is during sleeping hours.  It is unclear to me whether this patient's dizziness is related to bradycardia.  I will discuss this all with Dr. Ordonez, but in the meantime, he will remain on Eliquis and off any rate controlling medications.   Given his heart block is during the overnight hours, I have referred him for a sleep study.  2.  Hypertension, improved at home in the 120s-130s.  We recently took him off labetalol.  This is reasonable control for the time being.  3.  Type 2 diabetes with SURPASS trial now on pause with his insulin.  4.  Coronary artery disease with dyspnea on exertion being his anginal equivalent.  If we do not get rhythm issues settled, we may need to consider reevaluation of his coronaries.  5.  Hyperlipidemia, excellent control.    I will see this patient back in about 6 weeks.  We will continue to work through his blood pressure and make sure we have things tuned up.  He is seeing Dr. Ordonez next week, but I will reach out to him in the meantime to get a little more information on what he is thinking with his Zio Patch. He will call sooner with any concerns.  We also discussed that he is to go to the ER with any hypoxia below 90 or if his COVID symptoms worsen.    Thank you for allowing me to participate in this delightful patient's care.    Michaelle Caba PA-C        D: 10/10/2022   T: 10/10/2022   MT: michael    Name:     IHSAN SANCHEZ  MRN:      [id number]        Account:      [id number]   :      1960           Service Date: 10/10/2022       Document: [id number]

## 2022-10-10 NOTE — PROGRESS NOTES
Vitals - Patient Reported  Systolic (Patient Reported): 126  Diastolic (Patient Reported): 81  Weight (Patient Reported): 70.8 kg (156 lb)  Height (Patient Reported): 182.9 cm (6')  BMI (Based on Pt Reported Ht/Wt): 21.16  Pulse (Patient Reported): 106      Review Of Systems    Respiratory: Shortness of breath.  Cardiovascular:NEGATIVE    Endocrine:  Diabetes.  Insulin on hold for the Surpass Study.    Denis ANA Freire        Kelton is a 62 year old who is being evaluated via a billable telephone visit.      What phone number would you like to be contacted at? [phone]  How would you like to obtain your AVS? [facility]     Phone call duration: 25 minutes    [phone]      HPI and Plan:   See dictation    Orders this Visit:  Orders Placed This Encounter   Procedures     Sleep Study (referral)     No orders of the defined types were placed in this encounter.    There are no discontinued medications.      Encounter Diagnosis   Name Primary?     Atrial flutter, unspecified type (H)        CURRENT MEDICATIONS:  Current Outpatient Medications   Medication Sig Dispense Refill     amLODIPine (NORVASC) 2.5 MG tablet Take 1 tablet (2.5 mg) by mouth daily 90 tablet 3     apixaban ANTICOAGULANT (ELIQUIS ANTICOAGULANT) 5 MG tablet Take 1 tablet (5 mg) by mouth 2 times daily 180 tablet 3     aspirin 81 MG tablet Take 1 tablet by mouth daily.       B-D U/F 31G X 8 MM insulin pen needle USE ONE PEN NEEDLES DAILY OR AS DIRECTED. 100 each 3     celecoxib (CELEBREX) 100 MG capsule TAKE 1 CAPSULE BY MOUTH EVERY DAY 30 capsule 1     chlorhexidine (PERIDEX) 0.12 % solution Take 15 mLs by mouth 2 times daily as needed   5     cyanocobalamin 1000 MCG SUBL Place 1,000 mcg under the tongue daily       glucosamine-chondroitin 500-400 MG CAPS per capsule Take 1 capsule by mouth daily       insulin pen needle (BD HEIKE U/F) 32G X 4 MM Use 1 daily or as directed. 100 each prn     lisinopril (ZESTRIL) 20 MG tablet Take 1 tablet (20 mg) by mouth 2  times daily 180 tablet 3     rosuvastatin (CRESTOR) 20 MG tablet Take 1 tablet (20 mg) by mouth daily 90 tablet 3     Vitamin D, Cholecalciferol, 1000 units TABS Take 1,000 Units by mouth daily       zolpidem (AMBIEN) 5 MG tablet Take 1 tablet (5 mg) by mouth nightly as needed for sleep 30 tablet 0     LEVEMIR FLEXTOUCH 100 UNIT/ML pen INJECT 50 UNITS SUBCUTANEOUS AT BEDTIME (Patient not taking: Reported on 10/10/2022) 15 mL 27       ALLERGIES     Allergies   Allergen Reactions     Pcn [Penicillins] Rash     Rash with PCN only. Patient has taken amoxicillin with no rash.       PAST MEDICAL HISTORY:  Past Medical History:   Diagnosis Date     Basal cell carcinoma      Carotid stenosis, left     s/p left CEA 2/2020     Cerebral infarction (H)     left CVA 2/2020 post-op carotid endarterectomy     Coronary artery disease     4 vessel bypass November 2010; LIMA ->LAD, SVG-> OM3, SVG -> D2, SVG -> PDA     Diabetes mellitus (H) 2005    neuropathy     Generalized anxiety disorder 05/02/2014     Hepatitis C, chronic (H) 2005     Hyperlipidemia LDL goal < 70      Hypertension      Liver diseases     9/15 Liver is 10 cm in span without left lobe enlargement     Persistent microalbuminuria associated with type 2 diabetes mellitus (H) 05/06/2015     Renal artery stenosis (H)        PAST SURGICAL HISTORY:  Past Surgical History:   Procedure Laterality Date     ARTHROSCOPY KNEE RT/LT      left     COLONOSCOPY       CORONARY ARTERY BYPASS  11/18/201    Coronary artery bypass grafting x 4 with placement of the left internal mammary artery to the distal midportion of the left anterior descending artery, saphenous vein graft from aorta to the third obtuse marginal branch of circumflex coronary artery, saphenous vein graft from aorta to the second diagonal branch, saphenous vein graft from aorta to the posterior descending artery.     ENDARTERECTOMY CAROTID Left 2/21/2020    Procedure: LEFT CAROTID ENDARTERECTOMY WITH EEG;  Surgeon:  Semaj Stubbs MD;  Location:  OR     EP ABLATION ATRIAL FLUTTER N/A 2022    Procedure: Ablation Atrial Flutter;  Surgeon: Tyson Ordonez MD;  Location:  HEART CARDIAC CATH LAB     ESOPHAGOSCOPY, GASTROSCOPY, DUODENOSCOPY (EGD), COMBINED  10/31/2011    Procedure:COMBINED ESOPHAGOSCOPY, GASTROSCOPY, DUODENOSCOPY (EGD); Surgeon:ALONSO ALDANA; Location: GI     ESOPHAGOSCOPY, GASTROSCOPY, DUODENOSCOPY (EGD), COMBINED N/A 3/8/2018    Procedure: COMBINED ESOPHAGOSCOPY, GASTROSCOPY, DUODENOSCOPY (EGD);  EGD;  Surgeon: Angel Luis Justice MD;  Location:  GI     HEART CATH CORONARY ANGIOGRAM W/LV GRAM  -10    CV Surgery recommended     HEART CATH CORONARY ANGIOGRAM W/LV GRAM  -14    Medical management     HERNIA REPAIR, INGUINAL RT/LT      left       FAMILY HISTORY:  Family History   Problem Relation Age of Onset     C.A.D. Father      Cancer Father         bladder     Heart Surgery Father         bypass surgery     Heart Disease Mother        SOCIAL HISTORY:  Social History     Socioeconomic History     Marital status:    Tobacco Use     Smoking status: Former     Packs/day: 0.50     Years: 12.00     Pack years: 6.00     Types: Cigarettes     Start date:      Quit date: 2005     Years since quittin.7     Smokeless tobacco: Never   Substance and Sexual Activity     Alcohol use: Yes     Comment: minimal     Drug use: No     Sexual activity: Yes     Partners: Female   Other Topics Concern     Parent/sibling w/ CABG, MI or angioplasty before 65F 55M? No     Caffeine Concern Yes     Comment: 2 cups coffee per day     Special Diet Yes     Comment: low carb diet, low sugar     Exercise Yes     Comment: treadmill 40 minutes, walk, daily, bike 30 minutes every other day       Review of Systems:  Skin:        Eyes:       ENT:       Respiratory:       Cardiovascular:       Gastroenterology:      Genitourinary:       Musculoskeletal:       Neurologic:        Psychiatric:       Heme/Lymph/Imm:       Endocrine:         Physical Exam:  Vitals: There were no vitals taken for this visit.   Please refer to dictation for physical exam    Recent Lab Results: all reviewed today  CBC RESULTS:  Lab Results   Component Value Date    WBC 5.0 09/27/2022    WBC 5.3 06/07/2021    RBC 5.15 09/27/2022    RBC 5.00 06/07/2021    HGB 15.8 09/27/2022    HGB 15.3 06/07/2021    HCT 46.8 09/27/2022    HCT 45.1 06/07/2021    MCV 91 09/27/2022    MCV 90 06/07/2021    MCH 30.7 09/27/2022    MCH 30.6 06/07/2021    MCHC 33.8 09/27/2022    MCHC 33.9 06/07/2021    RDW 13.2 09/27/2022    RDW 12.9 06/07/2021     (L) 09/27/2022     (L) 06/07/2021       BMP RESULTS:  Lab Results   Component Value Date     09/27/2022     06/07/2021    POTASSIUM 4.6 09/27/2022    POTASSIUM 5.0 09/09/2022    POTASSIUM 4.8 06/07/2021    CHLORIDE 102 09/27/2022    CHLORIDE 108 09/09/2022    CHLORIDE 108 06/07/2021    CO2 28 09/27/2022    CO2 27 09/09/2022    CO2 28 06/07/2021    ANIONGAP 8 09/27/2022    ANIONGAP 3 09/09/2022    ANIONGAP 5 06/07/2021     (H) 09/27/2022    GLC 89 09/09/2022     (H) 09/09/2022     (H) 06/07/2021    BUN 11.7 09/27/2022    BUN 13 09/09/2022    BUN 17 06/07/2021    CR 0.84 09/27/2022    CR 0.89 06/07/2021    GFRESTIMATED >90 09/27/2022    GFRESTIMATED >60 03/08/2022    GFRESTIMATED >90 06/07/2021    GFRESTBLACK >90 06/07/2021    LIA 10.0 09/27/2022    LIA 9.6 06/07/2021        INR RESULTS:  Lab Results   Component Value Date    INR 1.21 (H) 09/27/2022    INR 1.11 06/07/2021    INR 1.13 12/15/2020           CC  Michaelle Caba, PA-C  [id number]  [place] [id number]

## 2022-10-11 ENCOUNTER — TELEPHONE (OUTPATIENT)
Dept: CARDIOLOGY | Facility: CLINIC | Age: 62
End: 2022-10-11

## 2022-10-11 NOTE — TELEPHONE ENCOUNTER
Per  Dr. Ordonez's routing comment:  Thanks, I am seeing him in a few days, nothing further till then

## 2022-10-11 NOTE — TELEPHONE ENCOUNTER
Discussed with Dr. Ordonez- pt has some monica including 3rd degree heart block as well as some tachy.  Concerning for future syncopal episodes.  Dr. Ordonez is leaning toward a ppm, but will discuss in detail at clinic visit next week.  No answer, left  for pt and RN Tara, phone number to call with questions [phone].    Copy Tara Lindsay as kofi

## 2022-10-12 NOTE — TELEPHONE ENCOUNTER
10/12/2022 Message from patient. Kelton reports receiving Michaelle More PA message and has respect for her honesty and for her and will talk to her later.  Tara Hawthorne RN 10/12/22 4:12 PM

## 2022-10-14 ENCOUNTER — TELEPHONE (OUTPATIENT)
Dept: OTHER | Facility: CLINIC | Age: 62
End: 2022-10-14

## 2022-10-14 NOTE — TELEPHONE ENCOUNTER
Yes, ok to change visit to phone to discuss results.  Routing to scheduling to contact patient to coordinate above.    Nikole Vivas, BENJYN, RN-[facility] Vascular [facility]

## 2022-10-14 NOTE — TELEPHONE ENCOUNTER
[facility] VASCULAR [facility] CENTER    Who is the name of the provider?:  Enoc    What is the location you see this provider at/preferred location?: [place]  Person calling: Kelton Burgos  Phone number:  [phone]  Nurse call back needed:  NO     Reason for call:   Patient asked to reschedule follow up with Enoc but is having heart surgery on 11/14. Patients visit moved to 12/2 and is asking if this visit can be changed to virtual. Please advise.    Pharmacy location:  n/a  Outside Imaging: Not Applicable   Can we leave a detailed message on this number?  YES

## 2022-10-18 NOTE — TELEPHONE ENCOUNTER
Left voicemail with spouse with  instructions for patient to call back to schedule their appointment(s)    October 18, 2022 , 10:09 AM

## 2022-10-19 ENCOUNTER — OFFICE VISIT (OUTPATIENT)
Dept: CARDIOLOGY | Facility: CLINIC | Age: 62
End: 2022-10-19
Payer: COMMERCIAL

## 2022-10-19 VITALS
OXYGEN SATURATION: 100 % | BODY MASS INDEX: 21.81 KG/M2 | DIASTOLIC BLOOD PRESSURE: 73 MMHG | HEART RATE: 58 BPM | WEIGHT: 161 LBS | HEIGHT: 72 IN | SYSTOLIC BLOOD PRESSURE: 118 MMHG

## 2022-10-19 DIAGNOSIS — I48.92 ATRIAL FLUTTER, UNSPECIFIED TYPE (H): Primary | ICD-10-CM

## 2022-10-19 PROCEDURE — 99214 OFFICE O/P EST MOD 30 MIN: CPT | Performed by: INTERNAL MEDICINE

## 2022-10-19 PROCEDURE — 93000 ELECTROCARDIOGRAM COMPLETE: CPT | Performed by: INTERNAL MEDICINE

## 2022-10-19 NOTE — PROGRESS NOTES
PHYSICIAN NOTE:  This visit was completed in person at the [facility].      I had the pleasure evaluating Mr. Kelton Burgos today.      Kelton is a delightful 62-year-old gentleman with past medical history significant for:  1.  Coronary artery disease with 4-vessel CABG in 2010 (LIMA to the LAD, SVG to the OM3, SVG to D2, SVG to the PDA).  Last angiogram in 2014 showed a patent LIMA to the LAD as well as patent other grafts with just a 60% stenosis of the OM after touchdown of the other grafts.  Stress test in 2021 showed a small area of inferior ischemia.   2.  Low normal EF at 50%.  3.  Peripheral arterial disease with history of carotid endarterectomy 02/2021 complicated by CVA and facial nerve trauma.  4.  Hypertension.  5.  Well-controlled type 2 diabetes.  6.  Hyperlipidemia.  7.  Hepatitis C diagnosed in 2005 when the patient presented with stage IV liver failure, treated with Pegasys and ribavirin with excellent results.  Followed by Dr. Bedolla.    8.  SURPASS trial.  9.  CTI flutter with atypical electrocardiographic appearance due to large RA scar successful CTI ablation on 9/9/2022.  Documentation of paroxysmal atrial fibrillation following ablation of atrial flutter.  On apixaban.  [id number] score is at least 3.     Kelton has not felt a major difference in energy level following atrial flutter ablation on 9/9/2022.  Immediately after flutter termination he was quite bradycardic, around 40.  Sinus bradycardia with significantly prolonged AK interval was noted.  He subsequently wore a 14-day cardiac monitor which I reviewed today.  It showed a 7% paroxysmal AF burden, occasional with RVR, with frequent PACs (5.5%) and PVCs (3.1%).  Frequent periods of bradycardia, not only during sleep (when periods of complete AV block were noted) but also during the day when intermittent second-degree AV block was apparent.    He has not had chest pain, near-syncope or syncope.  No bleeding issues on  Eliquis.      PHYSICAL EXAMINATION:  General:  in no apparent distress, normal body habitus  ENT/Mouth:  no nasal discharge.  Eyes:  normal conjunctivae.   Neck:  no thyromegaly or lymphadenopathy.  Chest/Lungs:  patient is not dyspneic.  Lungs CTA, without rales or wheezing.    Cardiovascular: Normal JVP, rate is regular.  No gallop.  1/6 systolic murmur.    Abdomen:  no abdominal distention.  Soft, negative HJR.    Extremities:  no edema  Skin:  no xanthelasma.  No facial laceration.  Neurologic:  alert & oriented x 3.  Normal arm motion bilateral, no tremors.    Vascular:  2+ carotids without bruits.  2+ radials.        DIAGNOSTIC STUDIES:  Laboratory studies: Sodium 138, potassium 4.6, creatinine 0.84, hematocrit 46.8%  ECG: Sinus rhythm with first-degree AV block and RBBB, frequent PVCs, possible inferior infarct  Echocardiogram: AGUILA on 9/9/2022 showed EF 55-60%, normal RV, no significant valve abnormalities.      IMPRESSION:  1. AV conduction disease with RBBB and intermittent second-degree AV block Mobitz 2.  Periods of third-degree AV block during sleep.  2. CTI flutter with recent ablation.  Large RA scar.  3. Paroxysmal atrial fibrillation, on occasion with RVR.  4. Chronic ischemic heart disease with previous CABG.    RECOMMENDATIONS:  1. Implant dual-chamber permanent pacemaker.  The technical aspects, risks/benefits of the procedure were discussed in detail with the patient.  I quoted Kelton an approximately 2% risk of serious complication.  He expressed understanding and has agreed to proceed.  2. Hold Eliquis for 2 days before the procedure.  3. Start beta-blocker following pacemaker implantation as he has episodes of AF with RVR.    It was my pleasure seeing this delightful patient.  Please feel free to call with any questions.   Total time spent today, including time for chart review and documentation, was 30 minutes.      Tyson Ordonez MD, [facility]        Orders Placed This Encounter   Procedures      EKG 12-lead complete w/read - Clinics (performed today)     Echocardiogram Complete     Case Request EP: Temporary Pacemaker Insertion       No orders of the defined types were placed in this encounter.      There are no discontinued medications.      Encounter Diagnosis   Name Primary?     Atrial flutter, unspecified type (H) Yes       CURRENT MEDICATIONS:  Current Outpatient Medications   Medication Sig Dispense Refill     amLODIPine (NORVASC) 2.5 MG tablet Take 1 tablet (2.5 mg) by mouth daily 90 tablet 3     apixaban ANTICOAGULANT (ELIQUIS ANTICOAGULANT) 5 MG tablet Take 1 tablet (5 mg) by mouth 2 times daily 180 tablet 3     aspirin 81 MG tablet Take 1 tablet by mouth daily.       B-D U/F 31G X 8 MM insulin pen needle USE ONE PEN NEEDLES DAILY OR AS DIRECTED. 100 each 3     celecoxib (CELEBREX) 100 MG capsule TAKE 1 CAPSULE BY MOUTH EVERY DAY 30 capsule 1     chlorhexidine (PERIDEX) 0.12 % solution Take 15 mLs by mouth 2 times daily as needed   5     cyanocobalamin 1000 MCG SUBL Place 1,000 mcg under the tongue daily       glucosamine-chondroitin 500-400 MG CAPS per capsule Take 1 capsule by mouth daily       insulin pen needle (BD HEIKE U/F) 32G X 4 MM Use 1 daily or as directed. 100 each prn     lisinopril (ZESTRIL) 20 MG tablet Take 1 tablet (20 mg) by mouth 2 times daily 180 tablet 3     rosuvastatin (CRESTOR) 20 MG tablet Take 1 tablet (20 mg) by mouth daily 90 tablet 3     Vitamin D, Cholecalciferol, 1000 units TABS Take 1,000 Units by mouth daily       zolpidem (AMBIEN) 5 MG tablet Take 1 tablet (5 mg) by mouth nightly as needed for sleep 30 tablet 0     LEVEMIR FLEXTOUCH 100 UNIT/ML pen INJECT 50 UNITS SUBCUTANEOUS AT BEDTIME (Patient not taking: No sig reported) 15 mL 27       ALLERGIES     Allergies   Allergen Reactions     Pcn [Penicillins] Rash     Rash with PCN only. Patient has taken amoxicillin with no rash.       PAST MEDICAL HISTORY:  Past Medical History:   Diagnosis Date     Basal cell  carcinoma      Carotid stenosis, left     s/p left CEA 2/2020     Cerebral infarction (H)     left CVA 2/2020 post-op carotid endarterectomy     Coronary artery disease     4 vessel bypass November 2010; LIMA ->LAD, SVG-> OM3, SVG -> D2, SVG -> PDA     Diabetes mellitus (H) 2005    neuropathy     Generalized anxiety disorder 05/02/2014     Hepatitis C, chronic (H) 2005     Hyperlipidemia LDL goal < 70      Hypertension      Liver diseases     9/15 Liver is 10 cm in span without left lobe enlargement     Persistent microalbuminuria associated with type 2 diabetes mellitus (H) 05/06/2015     Renal artery stenosis (H)        PAST SURGICAL HISTORY:  Past Surgical History:   Procedure Laterality Date     ARTHROSCOPY KNEE RT/LT      left     COLONOSCOPY       CORONARY ARTERY BYPASS  11/18/201    Coronary artery bypass grafting x 4 with placement of the left internal mammary artery to the distal midportion of the left anterior descending artery, saphenous vein graft from aorta to the third obtuse marginal branch of circumflex coronary artery, saphenous vein graft from aorta to the second diagonal branch, saphenous vein graft from aorta to the posterior descending artery.     ENDARTERECTOMY CAROTID Left 2/21/2020    Procedure: LEFT CAROTID ENDARTERECTOMY WITH EEG;  Surgeon: Semaj Stubbs MD;  Location:  OR     EP ABLATION ATRIAL FLUTTER N/A 9/9/2022    Procedure: Ablation Atrial Flutter;  Surgeon: Tyson Ordonez MD;  Location:  HEART CARDIAC CATH LAB     ESOPHAGOSCOPY, GASTROSCOPY, DUODENOSCOPY (EGD), COMBINED  10/31/2011    Procedure:COMBINED ESOPHAGOSCOPY, GASTROSCOPY, DUODENOSCOPY (EGD); Surgeon:ALONSO ALDANA; Location: GI     ESOPHAGOSCOPY, GASTROSCOPY, DUODENOSCOPY (EGD), COMBINED N/A 3/8/2018    Procedure: COMBINED ESOPHAGOSCOPY, GASTROSCOPY, DUODENOSCOPY (EGD);  EGD;  Surgeon: Angel Luis Justice MD;  Location:  GI     HEART CATH CORONARY ANGIOGRAM W/LV GRAM  9-11-10    CV Surgery  recommended     HEART CATH CORONARY ANGIOGRAM W/LV GRAM  14    Medical management     HERNIA REPAIR, INGUINAL RT/LT      left       FAMILY HISTORY:  Family History   Problem Relation Age of Onset     C.A.D. Father      Cancer Father         bladder     Heart Surgery Father         bypass surgery     Heart Disease Mother        SOCIAL HISTORY:  Social History     Socioeconomic History     Marital status:      Spouse name: None     Number of children: None     Years of education: None     Highest education level: None   Tobacco Use     Smoking status: Former     Packs/day: 0.50     Years: 12.00     Pack years: 6.00     Types: Cigarettes     Start date:      Quit date: 2005     Years since quittin.7     Smokeless tobacco: Never   Substance and Sexual Activity     Alcohol use: Yes     Comment: minimal     Drug use: No     Sexual activity: Yes     Partners: Female   Other Topics Concern     Parent/sibling w/ CABG, MI or angioplasty before 65F 55M? No     Caffeine Concern Yes     Comment: 2 cups coffee per day     Special Diet Yes     Comment: low carb diet, low sugar     Exercise Yes     Comment: treadmill 40 minutes, walk, daily, bike 30 minutes every other day       Review of Systems:  Skin:          Eyes:         ENT:         Respiratory:          Cardiovascular:         Gastroenterology:        Genitourinary:         Musculoskeletal:         Neurologic:         Psychiatric:         Heme/Lymph/Imm:         Endocrine:           Physical Exam:  Vitals: /73 (BP Location: Left arm, Cuff Size: Adult Large)   Pulse 58   Ht 1.829 m (6')   Wt 73 kg (161 lb)   SpO2 100%   BMI 21.84 kg/m      Constitutional:           Skin:             Head:           Eyes:           Lymph:      ENT:           Neck:           Respiratory:            Cardiac:                                                           GI:           Extremities and Muscular Skeletal:                 Neurological:            Psych:           CC  No referring provider defined for this encounter.

## 2022-10-19 NOTE — LETTER
10/19/2022    Marshal Cuevas MD  [address]  [facility] [address]    RE: Vikash Burgos       Dear Colleague,     I had the pleasure of seeing Vikash Burgos in the [facility].  PHYSICIAN NOTE:  This visit was completed in person at the [facility].      I had the pleasure evaluating Mr. Kelton Burgos today.      Kelton is a delightful 62-year-old gentleman with past medical history significant for:  1.  Coronary artery disease with 4-vessel CABG in 2010 (LIMA to the LAD, SVG to the OM3, SVG to D2, SVG to the PDA).  Last angiogram in 2014 showed a patent LIMA to the LAD as well as patent other grafts with just a 60% stenosis of the OM after touchdown of the other grafts.  Stress test in 2021 showed a small area of inferior ischemia.   2.  Low normal EF at 50%.  3.  Peripheral arterial disease with history of carotid endarterectomy 02/2021 complicated by CVA and facial nerve trauma.  4.  Hypertension.  5.  Well-controlled type 2 diabetes.  6.  Hyperlipidemia.  7.  Hepatitis C diagnosed in 2005 when the patient presented with stage IV liver failure, treated with Pegasys and ribavirin with excellent results.  Followed by Dr. Bedolla.    8.  SURPASS trial.  9.  CTI flutter with atypical electrocardiographic appearance due to large RA scar successful CTI ablation on 9/9/2022.  Documentation of paroxysmal atrial fibrillation following ablation of atrial flutter.  On apixaban.  [id number] score is at least 3.     Kelton has not felt a major difference in energy level following atrial flutter ablation on 9/9/2022.  Immediately after flutter termination he was quite bradycardic, around 40.  Sinus bradycardia with significantly prolonged AL interval was noted.  He subsequently wore a 14-day cardiac monitor which I reviewed today.  It showed a 7% paroxysmal AF burden, occasional with RVR, with frequent PACs (5.5%) and PVCs (3.1%).  Frequent periods of bradycardia, not only  during sleep (when periods of complete AV block were noted) but also during the day when intermittent second-degree AV block was apparent.    He has not had chest pain, near-syncope or syncope.  No bleeding issues on Eliquis.      PHYSICAL EXAMINATION:  General:  in no apparent distress, normal body habitus  ENT/Mouth:  no nasal discharge.  Eyes:  normal conjunctivae.   Neck:  no thyromegaly or lymphadenopathy.  Chest/Lungs:  patient is not dyspneic.  Lungs CTA, without rales or wheezing.    Cardiovascular: Normal JVP, rate is regular.  No gallop.  1/6 systolic murmur.    Abdomen:  no abdominal distention.  Soft, negative HJR.    Extremities:  no edema  Skin:  no xanthelasma.  No facial laceration.  Neurologic:  alert & oriented x 3.  Normal arm motion bilateral, no tremors.    Vascular:  2+ carotids without bruits.  2+ radials.        DIAGNOSTIC STUDIES:  Laboratory studies: Sodium 138, potassium 4.6, creatinine 0.84, hematocrit 46.8%  ECG: Sinus rhythm with first-degree AV block and RBBB, frequent PVCs, possible inferior infarct  Echocardiogram: AGUILA on 9/9/2022 showed EF 55-60%, normal RV, no significant valve abnormalities.      IMPRESSION:  1. AV conduction disease with RBBB and intermittent second-degree AV block Mobitz 2.  Periods of third-degree AV block during sleep.  2. CTI flutter with recent ablation.  Large RA scar.  3. Paroxysmal atrial fibrillation, on occasion with RVR.  4. Chronic ischemic heart disease with previous CABG.    RECOMMENDATIONS:  1. Implant dual-chamber permanent pacemaker.  The technical aspects, risks/benefits of the procedure were discussed in detail with the patient.  I quoted Kelton an approximately 2% risk of serious complication.  He expressed understanding and has agreed to proceed.  2. Hold Eliquis for 2 days before the procedure.  3. Start beta-blocker following pacemaker implantation as he has episodes of AF with RVR.    It was my pleasure seeing this delightful patient.   Please feel free to call with any questions.   Total time spent today, including time for chart review and documentation, was 30 minutes.      Tyson Ordonez MD, [facility]        Orders Placed This Encounter   Procedures     EKG 12-lead complete w/read - Clinics (performed today)     Echocardiogram Complete     Case Request EP: Temporary Pacemaker Insertion       No orders of the defined types were placed in this encounter.      There are no discontinued medications.      Encounter Diagnosis   Name Primary?     Atrial flutter, unspecified type (H) Yes       CURRENT MEDICATIONS:  Current Outpatient Medications   Medication Sig Dispense Refill     amLODIPine (NORVASC) 2.5 MG tablet Take 1 tablet (2.5 mg) by mouth daily 90 tablet 3     apixaban ANTICOAGULANT (ELIQUIS ANTICOAGULANT) 5 MG tablet Take 1 tablet (5 mg) by mouth 2 times daily 180 tablet 3     aspirin 81 MG tablet Take 1 tablet by mouth daily.       B-D U/F 31G X 8 MM insulin pen needle USE ONE PEN NEEDLES DAILY OR AS DIRECTED. 100 each 3     celecoxib (CELEBREX) 100 MG capsule TAKE 1 CAPSULE BY MOUTH EVERY DAY 30 capsule 1     chlorhexidine (PERIDEX) 0.12 % solution Take 15 mLs by mouth 2 times daily as needed   5     cyanocobalamin 1000 MCG SUBL Place 1,000 mcg under the tongue daily       glucosamine-chondroitin 500-400 MG CAPS per capsule Take 1 capsule by mouth daily       insulin pen needle (BD HEIKE U/F) 32G X 4 MM Use 1 daily or as directed. 100 each prn     lisinopril (ZESTRIL) 20 MG tablet Take 1 tablet (20 mg) by mouth 2 times daily 180 tablet 3     rosuvastatin (CRESTOR) 20 MG tablet Take 1 tablet (20 mg) by mouth daily 90 tablet 3     Vitamin D, Cholecalciferol, 1000 units TABS Take 1,000 Units by mouth daily       zolpidem (AMBIEN) 5 MG tablet Take 1 tablet (5 mg) by mouth nightly as needed for sleep 30 tablet 0     LEVEMIR FLEXTOUCH 100 UNIT/ML pen INJECT 50 UNITS SUBCUTANEOUS AT BEDTIME (Patient not taking: No sig reported) 15 mL 27        ALLERGIES     Allergies   Allergen Reactions     Pcn [Penicillins] Rash     Rash with PCN only. Patient has taken amoxicillin with no rash.       PAST MEDICAL HISTORY:  Past Medical History:   Diagnosis Date     Basal cell carcinoma      Carotid stenosis, left     s/p left CEA 2/2020     Cerebral infarction (H)     left CVA 2/2020 post-op carotid endarterectomy     Coronary artery disease     4 vessel bypass November 2010; LIMA ->LAD, SVG-> OM3, SVG -> D2, SVG -> PDA     Diabetes mellitus (H) 2005    neuropathy     Generalized anxiety disorder 05/02/2014     Hepatitis C, chronic (H) 2005     Hyperlipidemia LDL goal < 70      Hypertension      Liver diseases     9/15 Liver is 10 cm in span without left lobe enlargement     Persistent microalbuminuria associated with type 2 diabetes mellitus (H) 05/06/2015     Renal artery stenosis (H)        PAST SURGICAL HISTORY:  Past Surgical History:   Procedure Laterality Date     ARTHROSCOPY KNEE RT/LT      left     COLONOSCOPY       CORONARY ARTERY BYPASS  11/18/201    Coronary artery bypass grafting x 4 with placement of the left internal mammary artery to the distal midportion of the left anterior descending artery, saphenous vein graft from aorta to the third obtuse marginal branch of circumflex coronary artery, saphenous vein graft from aorta to the second diagonal branch, saphenous vein graft from aorta to the posterior descending artery.     ENDARTERECTOMY CAROTID Left 2/21/2020    Procedure: LEFT CAROTID ENDARTERECTOMY WITH EEG;  Surgeon: Semaj Stubbs MD;  Location:  OR     EP ABLATION ATRIAL FLUTTER N/A 9/9/2022    Procedure: Ablation Atrial Flutter;  Surgeon: Tyson Ordonez MD;  Location:  HEART CARDIAC CATH LAB     ESOPHAGOSCOPY, GASTROSCOPY, DUODENOSCOPY (EGD), COMBINED  10/31/2011    Procedure:COMBINED ESOPHAGOSCOPY, GASTROSCOPY, DUODENOSCOPY (EGD); Surgeon:ALONSO ALDANA; Location: GI     ESOPHAGOSCOPY, GASTROSCOPY, DUODENOSCOPY  (EGD), COMBINED N/A 3/8/2018    Procedure: COMBINED ESOPHAGOSCOPY, GASTROSCOPY, DUODENOSCOPY (EGD);  EGD;  Surgeon: Angel Luis Justice MD;  Location: [facility] GI     HEART CATH CORONARY ANGIOGRAM W/LV GRAM  9--10    CV Surgery recommended     HEART CATH CORONARY ANGIOGRAM W/LV GRAM  -14    Medical management     HERNIA REPAIR, INGUINAL RT/LT      left       FAMILY HISTORY:  Family History   Problem Relation Age of Onset     C.A.D. Father      Cancer Father         bladder     Heart Surgery Father         bypass surgery     Heart Disease Mother        SOCIAL HISTORY:  Social History     Socioeconomic History     Marital status:      Spouse name: None     Number of children: None     Years of education: None     Highest education level: None   Tobacco Use     Smoking status: Former     Packs/day: 0.50     Years: 12.00     Pack years: 6.00     Types: Cigarettes     Start date:      Quit date: 2005     Years since quittin.7     Smokeless tobacco: Never   Substance and Sexual Activity     Alcohol use: Yes     Comment: minimal     Drug use: No     Sexual activity: Yes     Partners: Female   Other Topics Concern     Parent/sibling w/ CABG, MI or angioplasty before 65F 55M? No     Caffeine Concern Yes     Comment: 2 cups coffee per day     Special Diet Yes     Comment: low carb diet, low sugar     Exercise Yes     Comment: treadmill 40 minutes, walk, daily, bike 30 minutes every other day       Review of Systems:  Skin:          Eyes:         ENT:         Respiratory:          Cardiovascular:         Gastroenterology:        Genitourinary:         Musculoskeletal:         Neurologic:         Psychiatric:         Heme/Lymph/Imm:         Endocrine:           Physical Exam:  Vitals: /73 (BP Location: Left arm, Cuff Size: Adult Large)   Pulse 58   Ht 1.829 m (6')   Wt 73 kg (161 lb)   SpO2 100%   BMI 21.84 kg/m      Constitutional:           Skin:             Head:           Eyes:            Lymph:      ENT:           Neck:           Respiratory:            Cardiac:                                                           GI:           Extremities and Muscular Skeletal:                 Neurological:           Psych:           CC  No referring provider defined for this encounter.    Thank you for allowing me to participate in the care of your patient.      Sincerely,     Tyson Ordonez MD     [facility]

## 2022-10-26 ENCOUNTER — TELEPHONE (OUTPATIENT)
Dept: CARDIOLOGY | Facility: CLINIC | Age: 62
End: 2022-10-26

## 2022-10-26 DIAGNOSIS — E78.2 MIXED HYPERLIPIDEMIA: ICD-10-CM

## 2022-10-26 DIAGNOSIS — I25.10 CORONARY ARTERY DISEASE INVOLVING NATIVE CORONARY ARTERY OF NATIVE HEART WITHOUT ANGINA PECTORIS: Primary | Chronic | ICD-10-CM

## 2022-10-26 DIAGNOSIS — I10 BENIGN ESSENTIAL HYPERTENSION: ICD-10-CM

## 2022-10-26 DIAGNOSIS — R06.02 SOB (SHORTNESS OF BREATH): ICD-10-CM

## 2022-11-02 DIAGNOSIS — I44.30 AV BLOCK: Primary | ICD-10-CM

## 2022-11-02 RX ORDER — CLINDAMYCIN PHOSPHATE 900 MG/50ML
900 INJECTION, SOLUTION INTRAVENOUS
Status: CANCELLED | OUTPATIENT
Start: 2022-11-02

## 2022-11-02 RX ORDER — SODIUM CHLORIDE 450 MG/100ML
INJECTION, SOLUTION INTRAVENOUS CONTINUOUS
Status: CANCELLED | OUTPATIENT
Start: 2022-11-02

## 2022-11-02 RX ORDER — LIDOCAINE 40 MG/G
CREAM TOPICAL
Status: CANCELLED | OUTPATIENT
Start: 2022-11-02

## 2022-11-02 NOTE — PROGRESS NOTES
Called patient with pre-procedure instructions for PPM device implant on 11/7/2022 at 10:30am:    Anticoagulation: yes, takes eliquis. Instructed to hold 2 days prior to procedure.   Oral diabetes meds: N/A  Insulin: yes, dulaglutide once a week shot via research study. Patient instructed to contact his research team for guidance r/t NPO status preprocedure.  SGLT2 Inhibitors - hold 3-4 days prior to procedure (Invokana, Farxiga, Jardiance, Steglatro): N/A  Diuretic: N/A  Contrast allergy: N/A    Pt informed to be NPO at midnight    Instructed pt to shower the morning of the procedure, and then put on a clean shirt in order to help prevent infection.     Pt has post-procedure transportation and 24 hours monitoring set up. Pt reminded to call before coming in if their plans change.  With limited bed availability due to COVID, overnight hospital stays will be allowed for clinical reasons only.    Pt aware of no driving for 24 hours post procedure due to sedation.     COVID test scheduled: N/A; patient tested positive for COVID-19 in early October    Asked pt to take temperature the morning of the procedure and call [facility] at [phone] if it is above 100.0    Pt aware of arrival time of 8:30am and location. Pt verbalized understanding of instructions.     SACHIN Blackman

## 2022-11-03 NOTE — PROGRESS NOTES
Received message from patient as a return call from SACHIN Hyatt's message on 11/2/22.  Attempted to call patient back and had to leave a message.  All instructions as outlined in Olena's note provided (consent to communicate on file).  Requested call back to confirm that instructions were received and to review any questions patient may have.     SACHIN Torres       11/3/22 Addendum at 1615:  Received message from patient stating that he had received all of the instructions for his upcoming procedure and has no further questions at this time.  He was very appreciative for the detailed and descriptive instructions and will call back if he has any questions.  SACHIN Torres

## 2022-11-07 ENCOUNTER — APPOINTMENT (OUTPATIENT)
Dept: GENERAL RADIOLOGY | Facility: CLINIC | Age: 62
End: 2022-11-07
Payer: COMMERCIAL

## 2022-11-07 ENCOUNTER — HOSPITAL ENCOUNTER (OUTPATIENT)
Facility: CLINIC | Age: 62
Discharge: HOME | End: 2022-11-07
Admitting: INTERNAL MEDICINE
Payer: COMMERCIAL

## 2022-11-07 VITALS
SYSTOLIC BLOOD PRESSURE: 122 MMHG | OXYGEN SATURATION: 98 % | WEIGHT: 164 LBS | TEMPERATURE: 97 F | HEIGHT: 72 IN | HEART RATE: 70 BPM | BODY MASS INDEX: 22.21 KG/M2 | DIASTOLIC BLOOD PRESSURE: 72 MMHG | RESPIRATION RATE: 16 BRPM

## 2022-11-07 DIAGNOSIS — I44.30 AV BLOCK: Primary | ICD-10-CM

## 2022-11-07 DIAGNOSIS — Z95.0 CARDIAC PACEMAKER IN SITU: Primary | ICD-10-CM

## 2022-11-07 DIAGNOSIS — I48.92 ATRIAL FLUTTER, UNSPECIFIED TYPE (H): ICD-10-CM

## 2022-11-07 LAB
ANION GAP SERPL CALCULATED.3IONS-SCNC: 7 MMOL/L (ref 3–14)
BUN SERPL-MCNC: 15 MG/DL (ref 7–30)
CALCIUM SERPL-MCNC: 9.5 MG/DL (ref 8.5–10.1)
CHLORIDE BLD-SCNC: 107 MMOL/L (ref 94–109)
CO2 SERPL-SCNC: 23 MMOL/L (ref 20–32)
CREAT SERPL-MCNC: 0.82 MG/DL (ref 0.66–1.25)
ERYTHROCYTE [DISTWIDTH] IN BLOOD BY AUTOMATED COUNT: 13.9 % (ref 10–15)
GFR SERPL CREATININE-BSD FRML MDRD: >90 ML/MIN/1.73M2
GLUCOSE BLD-MCNC: 101 MG/DL (ref 70–99)
HCT VFR BLD AUTO: 41.3 % (ref 40–53)
HGB BLD-MCNC: 14.8 G/DL (ref 13.3–17.7)
MCH RBC QN AUTO: 31.8 PG (ref 26.5–33)
MCHC RBC AUTO-ENTMCNC: 35.8 G/DL (ref 31.5–36.5)
MCV RBC AUTO: 89 FL (ref 78–100)
PLATELET # BLD AUTO: 141 10E3/UL (ref 150–450)
POTASSIUM BLD-SCNC: 4 MMOL/L (ref 3.4–5.3)
RBC # BLD AUTO: 4.65 10E6/UL (ref 4.4–5.9)
SODIUM SERPL-SCNC: 137 MMOL/L (ref 133–144)
WBC # BLD AUTO: 5.3 10E3/UL (ref 4–11)

## 2022-11-07 PROCEDURE — 80048 BASIC METABOLIC PNL TOTAL CA: CPT | Performed by: INTERNAL MEDICINE

## 2022-11-07 PROCEDURE — 999N000065 XR CHEST 2 VIEWS

## 2022-11-07 PROCEDURE — 999N000054 HC STATISTIC EKG NON-CHARGEABLE

## 2022-11-07 PROCEDURE — 36591 DRAW BLOOD OFF VENOUS DEVICE: CPT

## 2022-11-07 PROCEDURE — 99152 MOD SED SAME PHYS/QHP 5/>YRS: CPT | Performed by: INTERNAL MEDICINE

## 2022-11-07 PROCEDURE — 272N000001 HC OR GENERAL SUPPLY STERILE: Performed by: INTERNAL MEDICINE

## 2022-11-07 PROCEDURE — C1785 PMKR, DUAL, RATE-RESP: HCPCS | Performed by: INTERNAL MEDICINE

## 2022-11-07 PROCEDURE — 258N000002 HC RX IP 258 OP 250: Performed by: INTERNAL MEDICINE

## 2022-11-07 PROCEDURE — 93005 ELECTROCARDIOGRAM TRACING: CPT

## 2022-11-07 PROCEDURE — 250N000013 HC RX MED GY IP 250 OP 250 PS 637: Performed by: INTERNAL MEDICINE

## 2022-11-07 PROCEDURE — 93010 ELECTROCARDIOGRAM REPORT: CPT | Performed by: INTERNAL MEDICINE

## 2022-11-07 PROCEDURE — 33208 INSRT HEART PM ATRIAL & VENT: CPT | Mod: KX | Performed by: INTERNAL MEDICINE

## 2022-11-07 PROCEDURE — 36415 COLL VENOUS BLD VENIPUNCTURE: CPT | Performed by: INTERNAL MEDICINE

## 2022-11-07 PROCEDURE — 999N000184 HC STATISTIC TELEMETRY

## 2022-11-07 PROCEDURE — 85027 COMPLETE CBC AUTOMATED: CPT | Performed by: INTERNAL MEDICINE

## 2022-11-07 PROCEDURE — 99153 MOD SED SAME PHYS/QHP EA: CPT | Performed by: INTERNAL MEDICINE

## 2022-11-07 PROCEDURE — 250N000009 HC RX 250: Performed by: INTERNAL MEDICINE

## 2022-11-07 PROCEDURE — 999N000071 HC STATISTIC HEART CATH LAB OR EP LAB

## 2022-11-07 PROCEDURE — C1894 INTRO/SHEATH, NON-LASER: HCPCS | Performed by: INTERNAL MEDICINE

## 2022-11-07 PROCEDURE — 33208 INSRT HEART PM ATRIAL & VENT: CPT | Performed by: INTERNAL MEDICINE

## 2022-11-07 PROCEDURE — 250N000011 HC RX IP 250 OP 636: Performed by: INTERNAL MEDICINE

## 2022-11-07 PROCEDURE — C1898 LEAD, PMKR, OTHER THAN TRANS: HCPCS | Performed by: INTERNAL MEDICINE

## 2022-11-07 DEVICE — IMP LEAD PACING BIPOLAR CAPSUREFIX NOVUS 45CM 5076-45: Type: IMPLANTABLE DEVICE | Status: FUNCTIONAL

## 2022-11-07 DEVICE — PACEMAKER AZURE MRI XT DR: Type: IMPLANTABLE DEVICE | Status: FUNCTIONAL

## 2022-11-07 DEVICE — IMP LEAD PACING BIPOLAR CAPSUREFIX NOVUS 52CM 5076-52: Type: IMPLANTABLE DEVICE | Status: FUNCTIONAL

## 2022-11-07 RX ORDER — NALOXONE HYDROCHLORIDE 0.4 MG/ML
0.4 INJECTION, SOLUTION INTRAMUSCULAR; INTRAVENOUS; SUBCUTANEOUS
Status: DISCONTINUED | OUTPATIENT
Start: 2022-11-07 | End: 2022-11-07 | Stop reason: HOSPADM

## 2022-11-07 RX ORDER — OXYCODONE AND ACETAMINOPHEN 5; 325 MG/1; MG/1
1 TABLET ORAL EVERY 4 HOURS PRN
Status: DISCONTINUED | OUTPATIENT
Start: 2022-11-07 | End: 2022-11-07 | Stop reason: HOSPADM

## 2022-11-07 RX ORDER — ACETAMINOPHEN 325 MG/1
650 TABLET ORAL EVERY 4 HOURS PRN
Status: DISCONTINUED | OUTPATIENT
Start: 2022-11-07 | End: 2022-11-07 | Stop reason: HOSPADM

## 2022-11-07 RX ORDER — SODIUM CHLORIDE 450 MG/100ML
INJECTION, SOLUTION INTRAVENOUS CONTINUOUS
Status: DISCONTINUED | OUTPATIENT
Start: 2022-11-07 | End: 2022-11-07 | Stop reason: HOSPADM

## 2022-11-07 RX ORDER — METOPROLOL SUCCINATE 25 MG/1
25 TABLET, EXTENDED RELEASE ORAL DAILY
Qty: 30 TABLET | Refills: 4 | Status: SHIPPED | OUTPATIENT
Start: 2022-11-07 | End: 2022-12-12

## 2022-11-07 RX ORDER — NALOXONE HYDROCHLORIDE 0.4 MG/ML
0.2 INJECTION, SOLUTION INTRAMUSCULAR; INTRAVENOUS; SUBCUTANEOUS
Status: DISCONTINUED | OUTPATIENT
Start: 2022-11-07 | End: 2022-11-07 | Stop reason: HOSPADM

## 2022-11-07 RX ORDER — FENTANYL CITRATE 50 UG/ML
INJECTION, SOLUTION INTRAMUSCULAR; INTRAVENOUS
Status: DISCONTINUED | OUTPATIENT
Start: 2022-11-07 | End: 2022-11-07 | Stop reason: HOSPADM

## 2022-11-07 RX ORDER — MIDAZOLAM HCL IN 0.9 % NACL/PF 1 MG/ML
.5-6 PLASTIC BAG, INJECTION (ML) INTRAVENOUS CONTINUOUS PRN
Status: DISCONTINUED | OUTPATIENT
Start: 2022-11-07 | End: 2022-11-07 | Stop reason: HOSPADM

## 2022-11-07 RX ORDER — CLINDAMYCIN PHOSPHATE 900 MG/50ML
900 INJECTION, SOLUTION INTRAVENOUS
Status: COMPLETED | OUTPATIENT
Start: 2022-11-07 | End: 2022-11-07

## 2022-11-07 RX ORDER — LIDOCAINE 40 MG/G
CREAM TOPICAL
Status: DISCONTINUED | OUTPATIENT
Start: 2022-11-07 | End: 2022-11-07 | Stop reason: HOSPADM

## 2022-11-07 RX ORDER — BUPIVACAINE HYDROCHLORIDE 2.5 MG/ML
INJECTION, SOLUTION EPIDURAL; INFILTRATION; INTRACAUDAL
Status: DISCONTINUED | OUTPATIENT
Start: 2022-11-07 | End: 2022-11-07 | Stop reason: HOSPADM

## 2022-11-07 RX ADMIN — OXYCODONE HYDROCHLORIDE AND ACETAMINOPHEN 1 TABLET: 5; 325 TABLET ORAL at 14:53

## 2022-11-07 RX ADMIN — SODIUM CHLORIDE: 4.5 INJECTION, SOLUTION INTRAVENOUS at 09:06

## 2022-11-07 RX ADMIN — CLINDAMYCIN PHOSPHATE 900 MG: 900 INJECTION, SOLUTION INTRAVENOUS at 10:30

## 2022-11-07 ASSESSMENT — ACTIVITIES OF DAILY LIVING (ADL)
ADLS_ACUITY_SCORE: 35

## 2022-11-07 NOTE — DISCHARGE SUMMARY
[facility] Discharge Nursing Note    Patient Information  Name: Vikash Burgos  Age: 62 year old    Discharge Education:  Discharge instructions reviewed: Yes  Additional education/resources provided: no  Patient/patient representative verbalizes understanding: Yes  Patient discharging on new medications: No  Medication education completed: N/A    Discharge Plans:   Discharge location: home  Discharge ride contacted: Yes  Approximate discharge time: 1530    Discharge Criteria:  Discharge criteria met and vital signs stable: Yes    Patient Belongs:  Patient belongings returned to patient: Yes    Jaguar Amaya RN

## 2022-11-07 NOTE — PROGRESS NOTES
Dictated.    Successful dual-chamber pacemaker implantation, [employer]tr[employer].  DDD 60/140 ppm.    EBL 10-15 mL.  No apparent complication.    Plan:  -Chest x-ray and device interrogation later in the day  -Start Toprol-XL 25 mg daily  -Resume anticoagulation in 48 hours  -Device clinic check in 1 week

## 2022-11-07 NOTE — PROGRESS NOTES
[facility] Admission Nursing Note    Patient Information  Name: Vikash Burgos  Age: 62 year old  Reason for admission: pacemaker implant   Care [facility]es arrival time: 0900    Visitor Information  Name: debra  Informed of visitor restrictions: Yes  1 visitor allowed per patient   Visitor must screen negative for COVID symptoms   Visitor must wear a mask  Waiting rooms closed to visitors    Patient Admission/Assessment   Pre-procedure assessment complete: Yes  If abnormal assessment/labs, provider notified: N/A  NPO: Yes  Medications held per instructions/orders: Yes  Consent: deferred  If applicable, pregnancy test status: deferred  Patient oriented to room: Yes  Education/questions answered: Yes  Plan/other: pacemaker implant     Discharge Planning  Discharge name/phone number: debra [phone]   Overnight post sedation caregiver: debra  Discharge location: [place]    Julian Dyson RN

## 2022-11-07 NOTE — PROGRESS NOTES
[facility] Post Procedure Note    Patient Information  Name: Vikash Burgos  Age: 62 year old    Post Procedure  Time patient returned to Care [facility]: 11:25  Concerns/abnormal assessment: None  Plan/Other: Chest x-ray at 14:00  Device interrogation and teaching with Mone Herring Rep.  Continue to monitor patient.  Review discharge instructions.  Discharge to home at approximately 14:30.

## 2022-11-07 NOTE — PRE-PROCEDURE
GENERAL PRE-PROCEDURE:   Procedure:  Dual-chamber pm  Date/Time:  11/7/2022 10:26 AM    Written consent obtained?: Yes    Risks and benefits: Risks, benefits and alternatives were discussed    Consent given by:  Patient  Patient states understanding of procedure being performed: Yes    Patient's understanding of procedure matches consent: Yes    Procedure consent matches procedure scheduled: Yes    Expected level of sedation:  Moderate  Appropriately NPO:  Yes  ASA Class:  2  Mallampati  :  Grade 1- soft palate, uvula, tonsillar pillars, and posterior pharyngeal wall visible  Lungs:  Lungs clear with good breath sounds bilaterally  Heart:  Normal heart sounds and rate  History & Physical reviewed:  History and physical reviewed and no updates needed  Statement of review:  I have reviewed the lab findings, diagnostic data, medications, and the plan for sedation

## 2022-11-07 NOTE — DISCHARGE INSTRUCTIONS
Pacemaker Implant Discharge Instructions     After you go home:    Have an adult stay with you until tomorrow.  You may resume your normal diet.       For 24 hours - due to the sedation you received:  Relax and take it easy.  Do NOT make any important or legal decisions.  Do NOT drive or operate machines at home or at work.  Do NOT drink alcohol.    Care of Chest Incision:    Keep the bandage on at least 3 days. You may remove the dressing on 11-10-22. Change it only if it gets loose or soaked. If you need to change it, use 4x4-inch gauze and a large clear bandage.   If there is a pressure dressing (gauze & tape) - 24 hours after your procedure you may remove ONLY the top dressing. Leave the bottom dressing on.  Leave the strips of tape on. They will fall off on their own, or we will remove them at your first check-up.  Check your wound daily for signs of infection, such as increased redness, severe swelling or draining. Fever may also be a sign of infection. Call us if you see any of these signs.  If there are no signs of infection, you may shower after the bandage comes off in 3 days. If you take a tub bath, keep the wound dry.  No soaking the incision (swimming pool, bathtub, hot tub) for 2 weeks.  You may have mild to medium pain for 3 to 5 days. Take Acetaminophen (Tylenol) or Ibuprofen (Advil) for the pain. If the pain persists or is severe, call us.    Activity:    For at least 2 weeks: Do not raise your elbow above your shoulder. You can begin to use your arm as it feels comfortable to you.  Do not use arm on implant side to lift more than 10 pounds for 2 weeks.  In 6 to 8 weeks: You may begin to golf, play tennis, swim and do similar activities.  No driving for one day & limit to necessary driving for one week.    Bleeding:    If you start bleeding from the incision site, sit down and press firmly on the site for 10 minutes.   Once bleeding stops, call [facility] as soon as you can.       Call [phone]  right away if you have heavy bleeding or bleeding that does not stop.      Medicines:    Take your medications, including blood thinners, unless your provider tells you not to.  If you have stopped any medicines, check with your provider about when to restart them.    Follow Up Appointments:    Follow up with Device Clinic at [facility] of patient preference in 7-10 days.    Call the clinic if:    You have a large or growing hard lump around the site.  The site is red, swollen, hot or tender.  Blood or fluid is draining from the site.  You have chills or a fever greater than 101 F (38 C).  You feel dizzy or light-headed.  Questions or concerns    Telling others about your device:    Before you leave the hospital, you will receive a temporary ID card. A permanent card will be mailed to you about 6 to 8 weeks later. Always carry the ID card with you. It has important details about your device.  You may also get a Medical Alert bracelet or tag that says you have a pacemaker.  Go to [email or web address].   Always tell doctors, dentists and other care providers that you have a device implanted in you.  Let us know before you plan any surgeries. Your care team must take special steps to keep you safe during certain procedures. These steps will depend on the type of device you have. Your provider will need to see your ID card. They may need to call us for instructions.    Device Safety:    Please refer to device  s booklet for further information.        [facility] [place]:    [phone] [facility] (7 days a week)

## 2022-11-08 ENCOUNTER — TELEPHONE (OUTPATIENT)
Dept: CARDIOLOGY | Facility: CLINIC | Age: 62
End: 2022-11-08

## 2022-11-08 ENCOUNTER — DOCUMENTATION ONLY (OUTPATIENT)
Dept: CARDIOLOGY | Facility: CLINIC | Age: 62
End: 2022-11-08

## 2022-11-08 LAB
ATRIAL RATE - MUSE: 76 BPM
DIASTOLIC BLOOD PRESSURE - MUSE: NORMAL MMHG
INTERPRETATION ECG - MUSE: NORMAL
P AXIS - MUSE: NORMAL DEGREES
PR INTERVAL - MUSE: NORMAL MS
QRS DURATION - MUSE: 148 MS
QT - MUSE: 450 MS
QTC - MUSE: 482 MS
R AXIS - MUSE: -80 DEGREES
SYSTOLIC BLOOD PRESSURE - MUSE: NORMAL MMHG
T AXIS - MUSE: 65 DEGREES
VENTRICULAR RATE- MUSE: 69 BPM

## 2022-11-08 NOTE — TELEPHONE ENCOUNTER
DIAGNOSIS: Right Rotor cuff, referred by Dr Kevin Bedolla   APPOINTMENT DATE: 5/16/2022   NOTES STATUS DETAILS   OFFICE NOTE from referring provider [facility]:  3/29/22 - GI OV with Dr. Bedolla   OFFICE NOTE from other specialist Internal [facility]:  5/6/20 - CARDIO OV with Leny Snow NP    [place] [facility]:  3/13/20 - OT OV with Mercy Magaña OT  3/5/20 - PT OV with Bisi Evans PT   DISCHARGE SUMMARY from [facility]:  2/21/20 - Admission with Dr. Stubbs   DISCHARGE REPORT from the ER N/A    OPERATIVE REPORT N/A    MEDICATION LIST Internal    LABS     CBC/DIFF Internal [facility]:  6/7/21 - CBC   CT SCAN [facility]:  2/21/20 - CTA Head Neck  5/9/14 - CT Chest   XRAYS (IMAGES & REPORTS) [facility]:  11/12/14 - XR Chest/Ribs  10/31/14 - XR Chest     
see mdm for attending note

## 2022-11-08 NOTE — TELEPHONE ENCOUNTER
Post device implant discharge phone call.    Reviewed the following:  - No raising arm above shoulder on the side of implant for 3 weeks  - Remove outer dressing 3 days after implant. May shower after outer dressing removed.   - Leave steri-strips in place, will be removed at 1 week device check  - Limit driving for: 1 week (PPM)  - Watch for redness, drainage, warmth, or fever. Call device clinic if any signs of infection.     1 week device check scheduled: 11/21/22@10:00AM    Pt states understanding of all instructions.   SACHIN Bañuelos

## 2022-11-08 NOTE — PROGRESS NOTES
"Hello device Kelton AGUAYO's new MDT dual-chamber PM, implanted on 11/7, was programmed VVI 40 ppm.  We initially tried AAI/DDD with MVP \"on\" but just in the first few hrs post implant he paced 55% in the RV d/t long first degree...  To minimize RV pacing we switched to VVI.  Please keep an eye on V pacing and let me know if it exceeds 30% or so.    Iris, CHANTALE  "

## 2022-11-08 NOTE — PROGRESS NOTES
This information has been copied over to patient's paceart demographics to ensure continued follow up by device team.    ESTEPHANIE STEPHENSON

## 2022-11-14 ENCOUNTER — OFFICE VISIT (OUTPATIENT)
Dept: FAMILY MEDICINE | Facility: CLINIC | Age: 62
End: 2022-11-14
Payer: COMMERCIAL

## 2022-11-14 VITALS
TEMPERATURE: 97.7 F | WEIGHT: 165.4 LBS | HEART RATE: 57 BPM | RESPIRATION RATE: 16 BRPM | OXYGEN SATURATION: 97 % | BODY MASS INDEX: 22.4 KG/M2 | SYSTOLIC BLOOD PRESSURE: 118 MMHG | HEIGHT: 72 IN | DIASTOLIC BLOOD PRESSURE: 75 MMHG

## 2022-11-14 DIAGNOSIS — Z23 NEED FOR PROPHYLACTIC VACCINATION AND INOCULATION AGAINST INFLUENZA: ICD-10-CM

## 2022-11-14 DIAGNOSIS — E78.2 MIXED HYPERLIPIDEMIA: ICD-10-CM

## 2022-11-14 DIAGNOSIS — Z12.11 COLON CANCER SCREENING: ICD-10-CM

## 2022-11-14 DIAGNOSIS — Z12.5 PROSTATE CANCER SCREENING: ICD-10-CM

## 2022-11-14 DIAGNOSIS — Z86.73 HISTORY OF STROKE: ICD-10-CM

## 2022-11-14 DIAGNOSIS — I73.9 PERIPHERAL VASCULAR DISEASE, UNSPECIFIED (H): ICD-10-CM

## 2022-11-14 DIAGNOSIS — I48.0 PAROXYSMAL ATRIAL FIBRILLATION (H): ICD-10-CM

## 2022-11-14 DIAGNOSIS — I65.22 LEFT CAROTID ARTERY STENOSIS: ICD-10-CM

## 2022-11-14 DIAGNOSIS — I10 BENIGN ESSENTIAL HYPERTENSION: ICD-10-CM

## 2022-11-14 DIAGNOSIS — I25.10 CORONARY ARTERY DISEASE INVOLVING NATIVE CORONARY ARTERY OF NATIVE HEART WITHOUT ANGINA PECTORIS: Chronic | ICD-10-CM

## 2022-11-14 DIAGNOSIS — E11.49 DM TYPE 2 CAUSING NEUROLOGICAL DISEASE (H): Primary | ICD-10-CM

## 2022-11-14 DIAGNOSIS — K74.60 CIRRHOSIS OF LIVER WITHOUT ASCITES, UNSPECIFIED HEPATIC CIRRHOSIS TYPE (H): ICD-10-CM

## 2022-11-14 LAB
CREAT UR-MCNC: 51 MG/DL
HBA1C MFR BLD: 5 % (ref 0–5.6)
MICROALBUMIN UR-MCNC: 7 MG/L
MICROALBUMIN/CREAT UR: 13.73 MG/G CR (ref 0–17)
PSA SERPL-MCNC: 1.29 UG/L (ref 0–4)

## 2022-11-14 PROCEDURE — 99207 PR FOOT EXAM NO CHARGE: CPT | Mod: 25 | Performed by: INTERNAL MEDICINE

## 2022-11-14 PROCEDURE — 36415 COLL VENOUS BLD VENIPUNCTURE: CPT | Performed by: INTERNAL MEDICINE

## 2022-11-14 PROCEDURE — 90682 RIV4 VACC RECOMBINANT DNA IM: CPT | Performed by: INTERNAL MEDICINE

## 2022-11-14 PROCEDURE — 99214 OFFICE O/P EST MOD 30 MIN: CPT | Mod: 25 | Performed by: INTERNAL MEDICINE

## 2022-11-14 PROCEDURE — G0103 PSA SCREENING: HCPCS | Performed by: INTERNAL MEDICINE

## 2022-11-14 PROCEDURE — 82043 UR ALBUMIN QUANTITATIVE: CPT | Performed by: INTERNAL MEDICINE

## 2022-11-14 PROCEDURE — 83036 HEMOGLOBIN GLYCOSYLATED A1C: CPT | Performed by: INTERNAL MEDICINE

## 2022-11-14 PROCEDURE — 90471 IMMUNIZATION ADMIN: CPT | Performed by: INTERNAL MEDICINE

## 2022-11-14 ASSESSMENT — PAIN SCALES - GENERAL: PAINLEVEL: MODERATE PAIN (5)

## 2022-11-14 NOTE — PROGRESS NOTES
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 Bedolla    Coronary artery disease involving native coronary artery of native heart without angina pectoris  Stable symptoms, follow up with cardiology     Benign essential hypertension  Well controlled     Mixed hyperlipidemia  Well controlled as of March     Left carotid artery stenosis  p CEA    History of stroke    Atrial fibrillation   He is back on Eliquis  Rate seems OK today; but he is working with cardiology re recent pacemaker placement    Colon cancer screening  Overdue for screening discussed options, FIT is likely better route for him   - Fecal colorectal cancer screen FIT; Future    Prostate cancer screening  Overdue for screening discussed; he desires PSA  - PROSTATE SPEC ANTIGEN SCREEN; Future      30 minutes spent on the date of the encounter doing chart review, history and exam, documentation and further activities per the note       Recommended flu shot today and COVID shot with/in 3 months of his recent infection     Return in about 6 months (around 5/14/2023) for Preventive Visit.   Patient instructed to return to clinic or contact us sooner if symptoms worsen or new symptoms develop.     Danish Land MD  [facility] WANDER Melara is a 62 year old, presenting for the following health issues:    Establish care  F/up cardiology    HPI       Complicated hx  S/p CABG, CEA s/p stroke, diabetes, hypertension, hyperlipidemia, cirrhosis due to fatty liver ? Atrial fibrillation/flutter, s/p pace maker   Needs new PCP  Sees endocrine, cardiology, hepatology  Enrolled in  "diabetes study   He was to have cataract surgery, and scheduled for preop, but he canceled due to recent pace placement  No new symptoms     Review of Systems         Objective    /75 (BP Location: Left arm, Patient Position: Sitting, Cuff Size: Adult Large)   Pulse 57   Temp 97.7  F (36.5  C) (Tympanic)   Resp 16   Ht 1.816 m (5' 11.5\")   Wt 75 kg (165 lb 6.4 oz)   SpO2 97%   BMI 22.75 kg/m    Body mass index is 22.75 kg/m .  Physical Exam   GENERAL: healthy, alert and no distress  RESP: lungs clear to auscultation - no rales, rhonchi or wheezes  CV: Heart with regular rate and rhythm.   ABDOMEN: soft, nontender, no hepatosplenomegaly, no masses and bowel sounds normal  MS: no gross musculoskeletal defects noted, no edema  NEURO: Normal strength and tone, mentation intact and speech normal  PSYCH: mentation appears normal, affect normal/bright  Diabetic foot exam: normal DP and PT pulses, no trophic changes or ulcerative lesions and normal sensory exam        Labs penidng             "

## 2022-11-15 NOTE — RESULT ENCOUNTER NOTE
The following letter pertains to your most recent diagnostic tests:    -Your microalbumin level which is a diabetes urine test to check for any evidence of early kidney injury from diabetes returned negative.    -Your prostate specific antigen (PSA) test result returned normal.     -Your hemoglobin A1c test which is a diabetes blood test that represents and average of your blood sugars over the last 3 months returned at 5 which is at your goal of hemoglobin A1c less than 7.    Don't forget to submit your stool test for colon cancer screening.          Sincerely,    Dr. Land

## 2022-11-17 ENCOUNTER — OFFICE VISIT (OUTPATIENT)
Dept: CARDIOLOGY | Facility: CLINIC | Age: 62
End: 2022-11-17
Payer: COMMERCIAL

## 2022-11-17 ENCOUNTER — TELEPHONE (OUTPATIENT)
Dept: CARDIOLOGY | Facility: CLINIC | Age: 62
End: 2022-11-17

## 2022-11-17 ENCOUNTER — ANCILLARY PROCEDURE (OUTPATIENT)
Dept: CARDIOLOGY | Facility: CLINIC | Age: 62
End: 2022-11-17
Attending: INTERNAL MEDICINE
Payer: COMMERCIAL

## 2022-11-17 ENCOUNTER — RESEARCH ENCOUNTER (OUTPATIENT)
Dept: CARDIOLOGY | Facility: CLINIC | Age: 62
End: 2022-11-17

## 2022-11-17 VITALS
HEIGHT: 71 IN | HEART RATE: 61 BPM | BODY MASS INDEX: 22.54 KG/M2 | DIASTOLIC BLOOD PRESSURE: 75 MMHG | WEIGHT: 161 LBS | SYSTOLIC BLOOD PRESSURE: 128 MMHG

## 2022-11-17 DIAGNOSIS — Z86.79 H/O ATRIAL FLUTTER: Primary | ICD-10-CM

## 2022-11-17 DIAGNOSIS — I25.10 CORONARY ARTERY DISEASE INVOLVING NATIVE CORONARY ARTERY OF NATIVE HEART WITHOUT ANGINA PECTORIS: Primary | ICD-10-CM

## 2022-11-17 DIAGNOSIS — E11.49 DM TYPE 2 CAUSING NEUROLOGICAL DISEASE (H): Primary | ICD-10-CM

## 2022-11-17 DIAGNOSIS — I25.10 CORONARY ARTERY DISEASE INVOLVING NATIVE CORONARY ARTERY OF NATIVE HEART WITHOUT ANGINA PECTORIS: ICD-10-CM

## 2022-11-17 DIAGNOSIS — Z95.0 CARDIAC PACEMAKER IN SITU: ICD-10-CM

## 2022-11-17 DIAGNOSIS — E11.9 DIABETES MELLITUS (H): ICD-10-CM

## 2022-11-17 DIAGNOSIS — Z00.6 EXAMINATION OF PARTICIPANT OR CONTROL IN CLINICAL RESEARCH: ICD-10-CM

## 2022-11-17 PROCEDURE — 93280 PM DEVICE PROGR EVAL DUAL: CPT | Performed by: INTERNAL MEDICINE

## 2022-11-17 PROCEDURE — 99207 PR NO CHARGE-RESEARCH SERVICE: CPT

## 2022-11-17 NOTE — TELEPHONE ENCOUNTER
"Pt seen in clinic for 10 day PPM follow up     Pt had recent PPM and AFlutter Ablation. Noted to be in Aflutter since 11/14/2022. Pt does endorse some SOB, tiredness and generally \" feeling worn out\". Also c/o a bit more dizziness than usual ie states he has had some dizziness ever since his stroke.     Pt is on Eliquis.    Programmed VVI 40    Plan to see pt back Jan 5 th for follow up but instructed pt to call if symptoms get worse or if any questions. Phone number provided.     Will route to Dr Ordonez for review and recommendations.         Braxton STEPHENSON                                              "

## 2022-11-17 NOTE — TELEPHONE ENCOUNTER
Can you please bring Kelton back, at his convenience, for a 12-lead ECG?  So we can see if it is the flutter we ablated in September or something different?  (CL seems longer for this one...)  Also, have the [employer] rep there, if Kelton still in flutter we may be able to pace terminate it.  But do the ECG first !  DI

## 2022-11-17 NOTE — PROGRESS NOTES
SURPASS-CVOT Study [Effect of tirzepatide versus Dulaglutide on Major Cardiovascular Events in Patients with Type 2Diabetes]    Subject seen for Visit M9 visit.    Subject queried for adverse events, endpoints and medication changes. See below.    Noted Sinus bradycardia back in 2/17/22.    7/22/2022 Reported poor sleep    7/31/2022 Nausea and vomiting X 1    7/31/22 ALEXANDER, Sinus Bradycardia    8/10/2022 Zofran added for nausea PRN     8/24/22 Levemir Insulin dc'd    8/31/2022 Medication changes:  stop taking nebivolol.           Start taking Eliquis 5 mg twice a day.                                                        Start taking amlodipine 2.5 mg once a day for blood pressure.      8/31/22 Atrial flutter    Nebivolol Dc'd   Eliquis 5mg BID   Amlodipine 2.5mg every day for BP    9/9/22 Echo AGUILA, LVEF 55-60%.   Trace mitral regurgitation     9/9/2022 ECG - Sinus Bradycardia       9/9/2022 ATRIAL FLUTTER ABLATION . Converted to sinus rhythm post ablation. Successful catheter ablation of cavotricuspid isthmus flutter.     9/16/22 ZIO patch - shows a variety of arrhythmias, including third-degree heart block occurring multiple times during sleeping hours, atrial flutter, atrial tachycardia, AFib, junctional rhythm as well as second-degree type 1 heart block.    9/27/22 Hepatology Follow-up No fatty Liver on US    10/8/22 Covid + seen in ER, Paxlovid not given due to liver cirrhosis    10/19/22 ECG - Sinus Rhythm    11/7/2022 ECG-new dx with Atrial fibrillation   Pacemaker and lead implant-ECG post procedure NSR       Adherence/lifestyle reinforcement done     No of pens dispensedat last visit 12  Any Returned medication 4, has 2 full boxes at home, asked to not use them and return them when he comes   Date of first dose since last visit: 09/15/2022  Date of last dose taken since last visit: 11/10/2022  Subject drug dispensed kit #'s [id number], [id number], [id number] and [id number].      SURPASS-CVOT Study [Effect of tirzepatide  versus Dulaglutide on Major Cardiovascular Events in Patients with Type 2 Diabetes]    Diagnosis/event description (diagnosis preferred): Poor Sleep  Start date: 07/22/2022  Date resolved:     Intensity: ([x] mild /[]  moderate / [] severe)    Study Medication adjustment:  [] Yes   [x] No    Outcome: ([] resolved [with or without sequelae] /[] recovering / [x] not recovered / [] death / [] unknown)      Date study team aware of event: 11/17/2022    Was treatment given for this event:  [] Yes   [x] No   If yes, provide details    Serious adverse event?    Yes   [x] No    Special interest event?  [] Yes   [x] No    Endpoint? [] Yes   [x] No     Fatal event?  [] Yes   [x] No      Dr. Elias: Reasonable possibility that AE is related to study treatment    [] Yes   [x] No      SURPASS-CVOT Study [Effect of tirzepatide versus Dulaglutide on Major Cardiovascular Events in Patients with Type 2 Diabetes]    Diagnosis/event description (diagnosis preferred):  Vomited  Start date: 07/31/2022   Date resolved: 07/31/2022    Intensity: ([x] mild /[]  moderate / [] severe)     Study Medication adjustment:  [] Yes   [x] No    Outcome: ([x] resolved [with or without sequelae] /[] recovering / [] not recovered / [] death / [] unknown)    Date study team aware of event: 11/17/2022    Was treatment given for this event:  [] Yes   [x] No   If yes, provide details  Serious adverse event?    Yes   [x] No    Special interest event?  [] Yes   [x] No    Endpoint? [] Yes   [x] No     Fatal event?  [] Yes   [x] No      Dr. Elias: Reasonable possibility that AE is related to study treatment    [x] Yes   [] No      SURPASS-CVOT Study [Effect of tirzepatide versus Dulaglutide on Major Cardiovascular Events in Patients with Type 2 Diabetes]    Diagnosis/event description (diagnosis preferred):  Nausea  Start date: 08/13/2022  Date resolved: 08/17/2022     Intensity: ([] mild /[x]  moderate / [] severe)     Study Medication adjustment:  [] Yes    [x] No    Outcome: ([x] resolved [with or without sequelae] /[] recovering / [] not recovered / [] death / [] unknown)      Date study team aware of event: 11/17/2022    Was treatment given for this event:  [x] Yes   [] No   If yes, provide details Zofran was ordered on 8/10/22    Serious adverse event?    Yes   [x] No    Special interest event?  [] Yes   [x] No    Endpoint? [] Yes   [x] No     Fatal event?  [] Yes   [x] No      Dr. Elias: Reasonable possibility that AE is related to study treatment    [x] Yes   [] No    Mis Husain RN      [employer]-CVOT Study [Effect of tirzepatide versus Dulaglutide on Major Cardiovascular Events in Patients with Type 2 Diabetes]    Diagnosis/event description (diagnosis preferred):  Nausea    Start date: 08/20/2022   Date resolved: 8/24/2022    Intensity: ([] mild /[x]  moderate / [] severe)     Study Medication adjustment:  [] Yes   [x] No    Outcome: ([x] resolved [with or without sequelae] /[] recovering / [] not recovered / [] death / [] unknown)      Date study team aware of event: 11/17/2022    Was treatment given for this event:  [x] Yes   [] No   If yes, provide details Zofran    Serious adverse event?    Yes   [x] No    Special interest event?  [] Yes   [x] No    Endpoint? [] Yes   [x] No     Fatal event?  [] Yes   [x] No      Dr. Elias: Reasonable possibility that AE is related to study treatment    [x] Yes   [] No    Mis Husain RN      [employer]-CVOT Study [Effect of tirzepatide versus Dulaglutide on Major Cardiovascular Events in Patients with Type 2 Diabetes]    Diagnosis/event description (diagnosis preferred):  Nausea  Start date: 08/27/2022  Date resolved: 08/31/2022    Intensity: ([] mild /[x]  moderate / [] severe)     Study Medication adjustment:  [] Yes   [x] No    Outcome: ([x] resolved [with or without sequelae] /[] recovering / [] not recovered / [] death / [] unknown)      Date study team aware of event: 11/17/2022    Was treatment given for this  event:  [x] Yes   [] No   If yes, provide details Zofran    Serious adverse event?    Yes   [x] No    Special interest event?  [] Yes   [x] No    Endpoint? [] Yes   [x] No     Fatal event?  [] Yes   [x] No      Dr. Elias: Reasonable possibility that AE is related to study treatment    [x] Yes   [] No    Mis Husain RN    [facility]-CVOT Study [Effect of tirzepatide versus Dulaglutide on Major Cardiovascular Events in Patients with Type 2 Diabetes]    Diagnosis/event description (diagnosis preferred):  Nausea  Start date:  09/03/2022  Date resolved: 09/07/2022    Intensity: ([] mild /[x]  moderate / [] severe)     Study Medication adjustment:  [] Yes   [x] No    Outcome: ([x] resolved [with or without sequelae] /[] recovering / [] not recovered / [] death / [] unknown)      Date study team aware of event: 11/17/2022    Was treatment given for this event:  [x] Yes   [] No   If yes, provide details Zofran    Serious adverse event?    Yes   [x] No    Special interest event?  [] Yes   [x] No    Endpoint? [] Yes   [x] No     Fatal event?  [] Yes   [x] No      Dr. Elias: Reasonable possibility that AE is related to study treatment    [x] Yes   [] No    Mis Husain RN      [facility]-CVOT Study [Effect of tirzepatide versus Dulaglutide on Major Cardiovascular Events in Patients with Type 2 Diabetes]    Diagnosis/event description (diagnosis preferred):  Nausea  Start date: 09/10/2022  Date resolved: 09/14/2022    Intensity: ([] mild /[x]  moderate / [] severe)     Study Medication adjustment:  [] Yes   [x] No    Outcome: ([x] resolved [with or without sequelae] /[] recovering / [] not recovered / [] death / [] unknown)      Date study team aware of event: 11/17/2022    Was treatment given for this event:  [x] Yes   [] No   If yes, provide details Zofran    Serious adverse event?    Yes   [x] No    Special interest event?  [] Yes   [x] No    Endpoint? [] Yes   [x] No     Fatal event?  [] Yes   [x] No      Dr. Elias:  Reasonable possibility that AE is related to study treatment    [x] Yes   [] No    Mis Husain RN      SURPASS-CVOT Study [Effect of tirzepatide versus Dulaglutide on Major Cardiovascular Events in Patients with Type 2 Diabetes]    Diagnosis/event description (diagnosis preferred):  Nausea  Start date: 09/17/2022  Date resolved: 09/21/2022    Intensity: ([] mild /[x]  moderate / [] severe)     Study Medication adjustment:  [] Yes   [x] No    Outcome: ([x] resolved [with or without sequelae] /[] recovering / [] not recovered / [] death / [] unknown)      Date study team aware of event: 11/17/2022    Was treatment given for this event:  [x] Yes   [] No   If yes, provide details Zofran    Serious adverse event?    Yes   [x] No    Special interest event?  [] Yes   [x] No    Endpoint? [] Yes   [x] No     Fatal event?  [] Yes   [x] No      Dr. Elias: Reasonable possibility that AE is related to study treatment    [x] Yes   [] No    Mis Husain RN      SURPASS-CVOT Study [Effect of tirzepatide versus Dulaglutide on Major Cardiovascular Events in Patients with Type 2 Diabetes]    Diagnosis/event description (diagnosis preferred):  Nausea  Start date: 09/24/2022  Date resolved: 09/28/2022     Intensity: ([] mild /[x]  moderate / [] severe)     Study Medication adjustment:  [] Yes   [x] No    Outcome: ([x] resolved [with or without sequelae] /[] recovering / [] not recovered / [] death / [] unknown)      Date study team aware of event: 11/17/2022    Was treatment given for this event:  [x] Yes   [] No   If yes, provide details Zofran    Serious adverse event?    Yes   [x] No    Special interest event?  [] Yes   [x] No    Endpoint? [] Yes   [x] No     Fatal event?  [] Yes   [x] No      Dr. Elias: Reasonable possibility that AE is related to study treatment    [x] Yes   [] No    Mis Husain RN    See page 2 of visit for remainder of adverse events.      Will see again in clinic in 3 months.

## 2022-11-18 DIAGNOSIS — M19.041 PRIMARY OSTEOARTHRITIS OF BOTH HANDS: ICD-10-CM

## 2022-11-18 DIAGNOSIS — M19.042 PRIMARY OSTEOARTHRITIS OF BOTH HANDS: ICD-10-CM

## 2022-11-18 NOTE — TELEPHONE ENCOUNTER
Patient aware and in agreement with plan.  Orders placed for EKG to be done on 11/21 prior to courtesy appointment (Nima aware and will be present).    ESTEPHANIE STEPHENSON

## 2022-11-21 ENCOUNTER — ANCILLARY PROCEDURE (OUTPATIENT)
Dept: CARDIOLOGY | Facility: CLINIC | Age: 62
End: 2022-11-21
Attending: INTERNAL MEDICINE
Payer: COMMERCIAL

## 2022-11-21 ENCOUNTER — TELEPHONE (OUTPATIENT)
Dept: CARDIOLOGY | Facility: CLINIC | Age: 62
End: 2022-11-21

## 2022-11-21 DIAGNOSIS — Z95.0 CARDIAC PACEMAKER IN SITU: ICD-10-CM

## 2022-11-21 RX ORDER — CELECOXIB 100 MG/1
CAPSULE ORAL
Qty: 30 CAPSULE | Refills: 1 | Status: SHIPPED | OUTPATIENT
Start: 2022-11-21 | End: 2023-02-08

## 2022-11-21 NOTE — TELEPHONE ENCOUNTER
Thank you for the update Payton.  I am glad Kelton is back in normal rhythm.    It is possible but unlikely that the arrhythmia he had recently was recurrence of his CTI flutter.  The cycle length was not the same.  He has a large scar in the RA, so he is a perfect set up for all kinds of atrial arrhythmias.  We will keep an eye on these through his device.    CHANTALE

## 2022-11-21 NOTE — TELEPHONE ENCOUNTER
Routing refill request to provider for review/approval because:      Medication last ordered by Dr. Cuevas     LOV: 11/14/22 establish care with Dr. Emery Quiñones, SACHIN  [facility]

## 2022-11-21 NOTE — TELEPHONE ENCOUNTER
"  Pt came into clinic for courtesy check today. Had Aflutter ablation and PPM on 11/7/2022. At his 10 day device check pt was noticed to be in AFlutter. Dr Ordonez wanted pt to be brought back in to have ATP attempted. However pt is now SR. Afib alert programmed on at this time. If pt has recurrent Afib would want him to come to clinic for an EKG and possible ATP attempt and programming. Nima ([employer] Rep) here for device check and did program ATP settings for device (did not turn on at this time) so if Dr Ordonez wishes this to be turned on it is ready to go and only needs to be programmed \"on\". Will notify Dr Ordonez of todays findings. Pt instructed to call if any concerns.       Pt also feels like he has no energy and is wondering if his lower rate needs to be adjusted.   He is currently programmed VVI 40.   Braxton STEPHENSON      "

## 2022-11-23 LAB
MDC_IDC_EPISODE_DTM: NORMAL
MDC_IDC_EPISODE_DTM: NORMAL
MDC_IDC_EPISODE_DURATION: 53 S
MDC_IDC_EPISODE_DURATION: 6 S
MDC_IDC_EPISODE_ID: 2
MDC_IDC_EPISODE_ID: 3
MDC_IDC_EPISODE_TYPE: NORMAL
MDC_IDC_EPISODE_TYPE: NORMAL
MDC_IDC_LEAD_IMPLANT_DT: NORMAL
MDC_IDC_LEAD_LOCATION: NORMAL
MDC_IDC_LEAD_LOCATION_DETAIL_1: NORMAL
MDC_IDC_LEAD_MFG: NORMAL
MDC_IDC_LEAD_MODEL: NORMAL
MDC_IDC_LEAD_POLARITY_TYPE: NORMAL
MDC_IDC_LEAD_SERIAL: NORMAL
MDC_IDC_MSMT_BATTERY_DTM: NORMAL
MDC_IDC_MSMT_BATTERY_DTM: NORMAL
MDC_IDC_MSMT_BATTERY_REMAINING_LONGEVITY: 181 MO
MDC_IDC_MSMT_BATTERY_REMAINING_LONGEVITY: 181 MO
MDC_IDC_MSMT_BATTERY_RRT_TRIGGER: 2.62
MDC_IDC_MSMT_BATTERY_RRT_TRIGGER: 2.62
MDC_IDC_MSMT_BATTERY_STATUS: NORMAL
MDC_IDC_MSMT_BATTERY_STATUS: NORMAL
MDC_IDC_MSMT_BATTERY_VOLTAGE: 3.23 V
MDC_IDC_MSMT_BATTERY_VOLTAGE: 3.23 V
MDC_IDC_MSMT_LEADCHNL_RA_IMPEDANCE_VALUE: 342 OHM
MDC_IDC_MSMT_LEADCHNL_RA_IMPEDANCE_VALUE: 342 OHM
MDC_IDC_MSMT_LEADCHNL_RA_IMPEDANCE_VALUE: 551 OHM
MDC_IDC_MSMT_LEADCHNL_RA_IMPEDANCE_VALUE: 589 OHM
MDC_IDC_MSMT_LEADCHNL_RA_SENSING_INTR_AMPL: 2.88 MV
MDC_IDC_MSMT_LEADCHNL_RA_SENSING_INTR_AMPL: 2.88 MV
MDC_IDC_MSMT_LEADCHNL_RA_SENSING_INTR_AMPL: 4.88 MV
MDC_IDC_MSMT_LEADCHNL_RA_SENSING_INTR_AMPL: 5.38 MV
MDC_IDC_MSMT_LEADCHNL_RV_IMPEDANCE_VALUE: 342 OHM
MDC_IDC_MSMT_LEADCHNL_RV_IMPEDANCE_VALUE: 342 OHM
MDC_IDC_MSMT_LEADCHNL_RV_IMPEDANCE_VALUE: 437 OHM
MDC_IDC_MSMT_LEADCHNL_RV_IMPEDANCE_VALUE: 437 OHM
MDC_IDC_MSMT_LEADCHNL_RV_PACING_THRESHOLD_AMPLITUDE: 0.5 V
MDC_IDC_MSMT_LEADCHNL_RV_PACING_THRESHOLD_AMPLITUDE: 0.62 V
MDC_IDC_MSMT_LEADCHNL_RV_PACING_THRESHOLD_PULSEWIDTH: 0.4 MS
MDC_IDC_MSMT_LEADCHNL_RV_PACING_THRESHOLD_PULSEWIDTH: 0.4 MS
MDC_IDC_MSMT_LEADCHNL_RV_SENSING_INTR_AMPL: 6.38 MV
MDC_IDC_MSMT_LEADCHNL_RV_SENSING_INTR_AMPL: 6.62 MV
MDC_IDC_MSMT_LEADCHNL_RV_SENSING_INTR_AMPL: 9.62 MV
MDC_IDC_MSMT_LEADCHNL_RV_SENSING_INTR_AMPL: 9.62 MV
MDC_IDC_PG_IMPLANT_DTM: NORMAL
MDC_IDC_PG_IMPLANT_DTM: NORMAL
MDC_IDC_PG_MFG: NORMAL
MDC_IDC_PG_MFG: NORMAL
MDC_IDC_PG_MODEL: NORMAL
MDC_IDC_PG_MODEL: NORMAL
MDC_IDC_PG_SERIAL: NORMAL
MDC_IDC_PG_SERIAL: NORMAL
MDC_IDC_PG_TYPE: NORMAL
MDC_IDC_PG_TYPE: NORMAL
MDC_IDC_SESS_CLINIC_NAME: NORMAL
MDC_IDC_SESS_CLINIC_NAME: NORMAL
MDC_IDC_SESS_DTM: NORMAL
MDC_IDC_SESS_DTM: NORMAL
MDC_IDC_SESS_TYPE: NORMAL
MDC_IDC_SESS_TYPE: NORMAL
MDC_IDC_SET_BRADY_HYSTRATE: NORMAL
MDC_IDC_SET_BRADY_HYSTRATE: NORMAL
MDC_IDC_SET_BRADY_LOWRATE: 40 {BEATS}/MIN
MDC_IDC_SET_BRADY_LOWRATE: 40 {BEATS}/MIN
MDC_IDC_SET_BRADY_MODE: NORMAL
MDC_IDC_SET_BRADY_MODE: NORMAL
MDC_IDC_SET_LEADCHNL_RA_SENSING_ANODE_ELECTRODE_1: NORMAL
MDC_IDC_SET_LEADCHNL_RA_SENSING_ANODE_ELECTRODE_1: NORMAL
MDC_IDC_SET_LEADCHNL_RA_SENSING_ANODE_LOCATION_1: NORMAL
MDC_IDC_SET_LEADCHNL_RA_SENSING_ANODE_LOCATION_1: NORMAL
MDC_IDC_SET_LEADCHNL_RA_SENSING_CATHODE_ELECTRODE_1: NORMAL
MDC_IDC_SET_LEADCHNL_RA_SENSING_CATHODE_ELECTRODE_1: NORMAL
MDC_IDC_SET_LEADCHNL_RA_SENSING_CATHODE_LOCATION_1: NORMAL
MDC_IDC_SET_LEADCHNL_RA_SENSING_CATHODE_LOCATION_1: NORMAL
MDC_IDC_SET_LEADCHNL_RA_SENSING_POLARITY: NORMAL
MDC_IDC_SET_LEADCHNL_RA_SENSING_POLARITY: NORMAL
MDC_IDC_SET_LEADCHNL_RA_SENSING_SENSITIVITY: 0.3 MV
MDC_IDC_SET_LEADCHNL_RA_SENSING_SENSITIVITY: 0.3 MV
MDC_IDC_SET_LEADCHNL_RV_PACING_AMPLITUDE: 2 V
MDC_IDC_SET_LEADCHNL_RV_PACING_AMPLITUDE: 2 V
MDC_IDC_SET_LEADCHNL_RV_PACING_ANODE_ELECTRODE_1: NORMAL
MDC_IDC_SET_LEADCHNL_RV_PACING_ANODE_ELECTRODE_1: NORMAL
MDC_IDC_SET_LEADCHNL_RV_PACING_ANODE_LOCATION_1: NORMAL
MDC_IDC_SET_LEADCHNL_RV_PACING_ANODE_LOCATION_1: NORMAL
MDC_IDC_SET_LEADCHNL_RV_PACING_CAPTURE_MODE: NORMAL
MDC_IDC_SET_LEADCHNL_RV_PACING_CAPTURE_MODE: NORMAL
MDC_IDC_SET_LEADCHNL_RV_PACING_CATHODE_ELECTRODE_1: NORMAL
MDC_IDC_SET_LEADCHNL_RV_PACING_CATHODE_ELECTRODE_1: NORMAL
MDC_IDC_SET_LEADCHNL_RV_PACING_CATHODE_LOCATION_1: NORMAL
MDC_IDC_SET_LEADCHNL_RV_PACING_CATHODE_LOCATION_1: NORMAL
MDC_IDC_SET_LEADCHNL_RV_PACING_POLARITY: NORMAL
MDC_IDC_SET_LEADCHNL_RV_PACING_POLARITY: NORMAL
MDC_IDC_SET_LEADCHNL_RV_PACING_PULSEWIDTH: 0.4 MS
MDC_IDC_SET_LEADCHNL_RV_PACING_PULSEWIDTH: 0.4 MS
MDC_IDC_SET_LEADCHNL_RV_SENSING_ANODE_ELECTRODE_1: NORMAL
MDC_IDC_SET_LEADCHNL_RV_SENSING_ANODE_ELECTRODE_1: NORMAL
MDC_IDC_SET_LEADCHNL_RV_SENSING_ANODE_LOCATION_1: NORMAL
MDC_IDC_SET_LEADCHNL_RV_SENSING_ANODE_LOCATION_1: NORMAL
MDC_IDC_SET_LEADCHNL_RV_SENSING_CATHODE_ELECTRODE_1: NORMAL
MDC_IDC_SET_LEADCHNL_RV_SENSING_CATHODE_ELECTRODE_1: NORMAL
MDC_IDC_SET_LEADCHNL_RV_SENSING_CATHODE_LOCATION_1: NORMAL
MDC_IDC_SET_LEADCHNL_RV_SENSING_CATHODE_LOCATION_1: NORMAL
MDC_IDC_SET_LEADCHNL_RV_SENSING_POLARITY: NORMAL
MDC_IDC_SET_LEADCHNL_RV_SENSING_POLARITY: NORMAL
MDC_IDC_SET_LEADCHNL_RV_SENSING_SENSITIVITY: 0.9 MV
MDC_IDC_SET_LEADCHNL_RV_SENSING_SENSITIVITY: 0.9 MV
MDC_IDC_SET_ZONE_DETECTION_INTERVAL: 350 MS
MDC_IDC_SET_ZONE_DETECTION_INTERVAL: 350 MS
MDC_IDC_SET_ZONE_DETECTION_INTERVAL: 400 MS
MDC_IDC_SET_ZONE_DETECTION_INTERVAL: 400 MS
MDC_IDC_SET_ZONE_TYPE: NORMAL
MDC_IDC_STAT_AT_BURDEN_PERCENT: 22.3 %
MDC_IDC_STAT_AT_BURDEN_PERCENT: 33.5 %
MDC_IDC_STAT_AT_DTM_END: NORMAL
MDC_IDC_STAT_AT_DTM_END: NORMAL
MDC_IDC_STAT_AT_DTM_START: NORMAL
MDC_IDC_STAT_AT_DTM_START: NORMAL
MDC_IDC_STAT_BRADY_AP_VP_PERCENT: 0 %
MDC_IDC_STAT_BRADY_AP_VP_PERCENT: 0.01 %
MDC_IDC_STAT_BRADY_AP_VS_PERCENT: 0 %
MDC_IDC_STAT_BRADY_AP_VS_PERCENT: 0 %
MDC_IDC_STAT_BRADY_AS_VP_PERCENT: 11.45 %
MDC_IDC_STAT_BRADY_AS_VP_PERCENT: 11.87 %
MDC_IDC_STAT_BRADY_AS_VS_PERCENT: 88.07 %
MDC_IDC_STAT_BRADY_AS_VS_PERCENT: 88.55 %
MDC_IDC_STAT_BRADY_DTM_END: NORMAL
MDC_IDC_STAT_BRADY_DTM_END: NORMAL
MDC_IDC_STAT_BRADY_DTM_START: NORMAL
MDC_IDC_STAT_BRADY_DTM_START: NORMAL
MDC_IDC_STAT_BRADY_RA_PERCENT_PACED: 0 %
MDC_IDC_STAT_BRADY_RA_PERCENT_PACED: 0.01 %
MDC_IDC_STAT_BRADY_RV_PERCENT_PACED: 14.89 %
MDC_IDC_STAT_BRADY_RV_PERCENT_PACED: 17.6 %
MDC_IDC_STAT_EPISODE_RECENT_COUNT: 0
MDC_IDC_STAT_EPISODE_RECENT_COUNT: 1
MDC_IDC_STAT_EPISODE_RECENT_COUNT_DTM_END: NORMAL
MDC_IDC_STAT_EPISODE_RECENT_COUNT_DTM_START: NORMAL
MDC_IDC_STAT_EPISODE_TOTAL_COUNT: 0
MDC_IDC_STAT_EPISODE_TOTAL_COUNT: 1
MDC_IDC_STAT_EPISODE_TOTAL_COUNT: 1
MDC_IDC_STAT_EPISODE_TOTAL_COUNT_DTM_END: NORMAL
MDC_IDC_STAT_EPISODE_TOTAL_COUNT_DTM_START: NORMAL
MDC_IDC_STAT_EPISODE_TYPE: NORMAL

## 2022-11-28 ENCOUNTER — TELEPHONE (OUTPATIENT)
Dept: CARDIOLOGY | Facility: CLINIC | Age: 62
End: 2022-11-28

## 2022-11-28 NOTE — TELEPHONE ENCOUNTER
Pt had called wanting advise about whether or not it would be safe to get a carotid US with PPM.  Left  with [employer] number and also clinic number.

## 2022-11-28 NOTE — TELEPHONE ENCOUNTER
Pt had called and stated that [employer] hotline was unable to answer question for him.  Called [employer] rep (elsie) who clarified with tech services that it was ok to have the carotid US as long as the US probe was not pointed directly over PPM.     Called pt and left a VM updating him in regards to this information.

## 2022-11-29 NOTE — PROGRESS NOTES
Page 2 of adverse events.    [facility]-CVOT Study [Effect of tirzepatide versus Dulaglutide on Major Cardiovascular Events in Patients with Type 2 Diabetes]    Diagnosis/event description (diagnosis preferred):  Nausea   Start date: 10/01/2022  Date resolved: 10/05/2022    Intensity: ([] mild /[x]  moderate / [] severe)     Study Medication adjustment:  [] Yes   [x] No    Outcome: ([x] resolved [with or without sequelae] /[] recovering / [] not recovered / [] death / [] unknown)      Date study team aware of event: 11/17/2022    Was treatment given for this event:  [x] Yes   [] No   If yes, provide details Zofran    Serious adverse event?    Yes   [x] No    Special interest event?  [] Yes   [x] No    Endpoint? [] Yes   [x] No     Fatal event?  [] Yes   [x] No      Dr. Elias: Reasonable possibility that AE is related to study treatment    [x] Yes   [] No    Mis Husain RN    [facility]-CVOT Study [Effect of tirzepatide versus Dulaglutide on Major Cardiovascular Events in Patients with Type 2 Diabetes]    Diagnosis/event description (diagnosis preferred):  Nausea   Start date: 10/08/2022   Date resolved: 10/12/2022     Intensity: ([] mild /[x]  moderate / [] severe)     Study Medication adjustment:  [] Yes   [x] No    Outcome: ([x] resolved [with or without sequelae] /[] recovering / [] not recovered / [] death / [] unknown)      Date study team aware of event: 11/17/2022    Was treatment given for this event:  [x] Yes   [] No   If yes, provide details Zofran    Serious adverse event?    Yes   [x] No    Special interest event?  [] Yes   [x] No    Endpoint? [] Yes   [x] No     Fatal event?  [] Yes   [x] No    Dr. Elias: Reasonable possibility that AE is related to study treatment    [x] Yes   [] No    Mis Husain RN    [facility]-CVOT Study [Effect of tirzepatide versus Dulaglutide on Major Cardiovascular Events in Patients with Type 2 Diabetes]    Diagnosis/event description (diagnosis preferred):  Nausea  Start date:  10/15/2022   Date resolved: 10/19/2022    Intensity: ([] mild /[x]  moderate / [] severe)     Study Medication adjustment:  [] Yes   [x] No    Outcome: ([x] resolved [with or without sequelae] /[] recovering / [] not recovered / [] death / [] unknown)      Date study team aware of event: 11/17/2022    Was treatment given for this event:  [x] Yes   [] No   If yes, provide details Zofran    Serious adverse event?    Yes   [x] No    Special interest event?  [] Yes   [x] No    Endpoint? [] Yes   [x] No     Fatal event?  [] Yes   [x] No      Dr. Elias: Reasonable possibility that AE is related to study treatment    [x] Yes   [] No    Mis Husain RN    [facility] [employer] [facility]-CVOT Study [Effect of tirzepatide versus Dulaglutide on Major Cardiovascular Events in Patients with Type 2 Diabetes]    Diagnosis/event description (diagnosis preferred):  Nausea  Start date: 10/22/2022   Date resolved: 10/26/2022    Intensity: ([] mild /[x]  moderate / [] severe)     Study Medication adjustment:  [] Yes   [x] No    Outcome: ([x] resolved [with or without sequelae] /[] recovering / [] not recovered / [] death / [] unknown)      Date study team aware of event: 11/17/2022    Was treatment given for this event:  [x] Yes   [] No   If yes, provide details Zofran    Serious adverse event?    Yes   [x] No    Special interest event?  [] Yes   [x] No    Endpoint? [] Yes   [x] No     Fatal event?  [] Yes   [x] No      Dr. Elias: Reasonable possibility that AE is related to study treatment    [x] Yes   [] No    Mis Husain RN    [facility]-CVOT Study [Effect of tirzepatide versus Dulaglutide on Major Cardiovascular Events in Patients with Type 2 Diabetes]    Diagnosis/event description (diagnosis preferred):  Nausea  Start date: 10/29/2022  Date resolved:  11/02/2022     Intensity: ([] mild /[x]  moderate / [] severe)     Study Medication adjustment:  [] Yes   [x] No    Outcome: ([x] resolved [with or without sequelae] /[] recovering / [] not recovered /  [] death / [] unknown)      Date study team aware of event: 11/17/2022    Was treatment given for this event:  [x] Yes   [] No   If yes, provide details Zofran    Serious adverse event?    Yes   [x] No    Special interest event?  [] Yes   [x] No    Endpoint? [] Yes   [x] No     Fatal event?  [] Yes   [x] No      Dr. Elias: Reasonable possibility that AE is related to study treatment    [x] Yes   [] No    Mis Husain RN    [facility]CVOT Study [Effect of tirzepatide versus Dulaglutide on Major Cardiovascular Events in Patients with Type 2 Diabetes]    Diagnosis/event description (diagnosis preferred):  Nausea  Start date: 11/05/2022   Date resolved: 11/09/2022    Intensity: ([] mild /[x]  moderate / [] severe)     Study Medication adjustment:  [] Yes   [x] No    Outcome: ([x] resolved [with or without sequelae] /[] recovering / [] not recovered / [] death / [] unknown)      Date study team aware of event: 11/17/2022    Was treatment given for this event:  [x] Yes   [] No   If yes, provide details  Zofran    Serious adverse event?    Yes   [x] No    Special interest event?  [] Yes   [x] No    Endpoint? [] Yes   [x] No     Fatal event?  [] Yes   [x] No      Dr. Elias: Reasonable possibility that AE is related to study treatment    [x] Yes   [] No    Mis Husain RN    [facility]-CVOT Study [Effect of tirzepatide versus Dulaglutide on Major Cardiovascular Events in Patients with Type 2 Diabetes]    Diagnosis/event description (diagnosis preferred):  Nausea  Start date: 11/12/2022  Date resolved: 11/16/2022     Intensity: ([] mild /[x]  moderate / [] severe)     Study Medication adjustment:  [] Yes   [x] No    Outcome: ([x] resolved [with or without sequelae] /[] recovering / [] not recovered / [] death / [] unknown)      Date study team aware of event: 11/17/2022    Was treatment given for this event:  [x] Yes   [] No   If yes, provide details Zofran    Serious adverse event?    Yes   [x] No    Special interest event?   [] Yes   [x] No    Endpoint? [] Yes   [x] No     Fatal event?  [] Yes   [x] No      Dr. Elias: Reasonable possibility that AE is related to study treatment    [x] Yes   [] No    Mis Husain RN      [employer] [facility]-CVOT Study [Effect of tirzepatide versus Dulaglutide on Major Cardiovascular Events in Patients with Type 2 Diabetes]    Diagnosis/event description (diagnosis preferred):  Dyspnea on Exertion  Start date: 07/31/2022  Date resolved:     Intensity: ([x] mild /[]  moderate / [] severe)     Study Medication adjustment:  [] Yes   [x] No    Outcome: ([] resolved [with or without sequelae] /[x] recovering / [] not recovered / [] death / [] unknown)      Date study team aware of event: 11/172022    Was treatment given for this event:  [] Yes   [x] No   If yes, provide details     Serious adverse event?    Yes   [x] No    Special interest event?  [] Yes   [x] No    Endpoint? [] Yes   [x] No     Fatal event?  [] Yes   [x] No      Dr. Elias: Reasonable possibility that AE is related to study treatment    [] Yes   [x] No    Mis Husain RN    [employer] [facility]-CVOT Study [Effect of tirzepatide versus Dulaglutide on Major Cardiovascular Events in Patients with Type 2 Diabetes]    Diagnosis/event description (diagnosis preferred):  Sinus Bradycardia  Start date: 02/17/2022  Date resolved:      Intensity: ([x] mild /[]  moderate / [] severe)     Study Medication adjustment:  [] Yes   [x] No    Outcome: ([] resolved [with or without sequelae] /[x] recovering / [] not recovered / [] death / [] unknown)      Date study team aware of event: 11/17/2022    Was treatment given for this event:  [] Yes   [x] No   If yes, provide details    Serious adverse event?    Yes   [x] No    Special interest event?  [] Yes   [x] No    Endpoint? [] Yes   [x] No     Fatal event?  [] Yes   [x] No      Dr. Elias: Reasonable possibility that AE is related to study treatment    [] Yes   [x] No    Mis Husain RN    [employer] [facility]-CVOT Study [Effect of  tirzepatide versus Dulaglutide on Major Cardiovascular Events in Patients with Type 2 Diabetes]    Diagnosis/event description (diagnosis preferred):  Atrial Flutter  Start date: 08/31/2022  Date resolved: 09/09/2022    Intensity: ([] mild /[x]  moderate / [] severe)     Study Medication adjustment:  [] Yes   [x] No    Outcome: ([x] resolved [with or without sequelae] /[] recovering / [] not recovered / [] death / [] unknown)      Date study team aware of event: 11/17/2022    Was treatment given for this event:  [x] Yes   [] No   If yes, provide details Atrial Flutter ablation and Eliquis - started 08/31/2022    Serious adverse event?    Yes   [x] No    Special interest event?  [] Yes   [x] No    Endpoint? [] Yes   [x] No     Fatal event?  [] Yes   [x] No      Dr. Elias: Reasonable possibility that AE is related to study treatment    [] Yes   [x] No    Mis Husain RN      [facility]-CVOT Study [Effect of tirzepatide versus Dulaglutide on Major Cardiovascular Events in Patients with Type 2 Diabetes]    Diagnosis/event description (diagnosis preferred):  3rd degree heart block  Start date: 09/16/2022  Date resolved: 11/07/2022    Intensity: ([x] mild /[]  moderate / [] severe)     Study Medication adjustment:  [] Yes   [x] No    Outcome: ([] resolved [with or without sequelae] /[] recovering / [] not recovered / [] death / [] unknown)      Date study team aware of event: 11/17/2022    Was treatment given for this event:  [] Yes   [x] No   If yes, provide details    Serious adverse event?    Yes   [x] No    Special interest event?  [] Yes   [x] No    Endpoint? [] Yes   [x] No     Fatal event?  [] Yes   [x] No      Dr. Elias: Reasonable possibility that AE is related to study treatment    [] Yes   [x] No    Mis Husain RN    [employer]-CVOT Study [Effect of tirzepatide versus Dulaglutide on Major Cardiovascular Events in Patients with Type 2 Diabetes]    Diagnosis/event description (diagnosis preferred):  Atrial  Flutter  Start date: 9/16/2022  Date resolved: 11/07/2022    Intensity: ([x] mild /[]  moderate / [] severe)     Study Medication adjustment:  [] Yes   [x] No    Outcome: ([x] resolved [with or without sequelae] /[] recovering / [] not recovered / [] death / [] unknown)      Date study team aware of event: 11/17/2022    Was treatment given for this event:  [] Yes   [x] No   If yes, provide details    Serious adverse event?    Yes   [x] No    Special interest event?  [] Yes   [x] No    Endpoint? [] Yes   [x] No     Fatal event?  [] Yes   [x] No      Dr. Elias: Reasonable possibility that AE is related to study treatment    [] Yes   [x] No    Mis Husain RN    [employer]-CVOT Study [Effect of tirzepatide versus Dulaglutide on Major Cardiovascular Events in Patients with Type 2 Diabetes]    Diagnosis/event description (diagnosis preferred):  Atrial Tachycardia  Start date: 9.16/2022  Date resolved: 11/07/2022     Intensity: ([x] mild /[]  moderate / [] severe)     Study Medication adjustment:  [] Yes   [x] No    Outcome: ([] resolved [with or without sequelae] /[] recovering / [] not recovered / [] death / [] unknown)      Date study team aware of event: 11/17/2022    Was treatment given for this event:  [] Yes   [x] No   If yes, provide details    Serious adverse event?    Yes   [x] No    Special interest event?  [] Yes   [x] No    Endpoint? [] Yes   [x] No     Fatal event?  [] Yes   [x] No      Dr. Elias: Reasonable possibility that AE is related to study treatment    [] Yes   [x] No    Mis Husain RN    [facility]-CVOT Study [Effect of tirzepatide versus Dulaglutide on Major Cardiovascular Events in Patients with Type 2 Diabetes]    Diagnosis/event description (diagnosis preferred):  Atrial Fibrillation  Start date: 09/16/2022   Date resolved:     Intensity: ([] mild /[x]  moderate / [] severe)     Study Medication adjustment:  [] Yes   [] No    Outcome: ([] resolved [with or without sequelae] /[] recovering / [x]  not recovered / [] death / [] unknown)      Date study team aware of event: 11/17/2022    Was treatment given for this event:  [] Yes   [x] No   If yes, provide details    Serious adverse event?    Yes   [x] No    Special interest event?  [] Yes   [x] No    Endpoint? [] Yes   [x] No     Fatal event?  [] Yes   [x] No      Dr. Elias: Reasonable possibility that AE is related to study treatment    [] Yes   [x] No    Mis Husain RN    [employer]-CVOT Study [Effect of tirzepatide versus Dulaglutide on Major Cardiovascular Events in Patients with Type 2 Diabetes]    Diagnosis/event description (diagnosis preferred):  Covid 19  Start date: 10/08/2022   Date resolved: 10/15/2022    Intensity: ([x] mild /[]  moderate / [] severe)     Study Medication adjustment:  [] Yes   [x] No    Outcome: ([x] resolved [with or without sequelae] /[] recovering / [] not recovered / [] death / [] unknown)      Date study team aware of event: 11/17/2022    Was treatment given for this event:  [] Yes   [x] No   If yes, provide details    Serious adverse event?    Yes   [x] No    Special interest event?  [] Yes   [x] No    Endpoint? [] Yes   [x] No     Fatal event?  [] Yes   [x] No      Dr. Elias: Reasonable possibility that AE is related to study treatment    [] Yes   [x] No    Mis Husain RN    [employer]-CVOT Study [Effect of tirzepatide versus Dulaglutide on Major Cardiovascular Events in Patients with Type 2 Diabetes]    Diagnosis/event description (diagnosis preferred):  Atrial Flutter Ablation  Start date: 09/09/2022  Date resolved: 09/09/2022    Intensity: ([] mild /[x]  moderate / [] severe)     Study Medication adjustment:  [] Yes   [x] No    Outcome: ([x] resolved [with or without sequelae] /[] recovering / [] not recovered / [] death / [] unknown)      Date study team aware of event: 11/17/2022    Was treatment given for this event:  [] Yes   [x] No   If yes, provide details    Serious adverse event?    Yes   [x] No    Special  interest event?  [] Yes   [x] No    Endpoint? [] Yes   [x] No     Fatal event?  [] Yes   [x] No      Dr. Elias: Reasonable possibility that AE is related to study treatment    [] Yes   [x] No    Mis Husain RN    [facility]CVOT Study [Effect of tirzepatide versus Dulaglutide on Major Cardiovascular Events in Patients with Type 2 Diabetes]    Diagnosis/event description (diagnosis preferred):  [employer] Cardiac Monitor  Start date: 09/16/2022  Date resolved: 09/29/2022    Intensity: ([x] mild /[]  moderate / [] severe)    Study Medication adjustment:  [] Yes   [x] No    Outcome: ([x] resolved [with or without sequelae] /[] recovering / [] not recovered / [] death / [] unknown)      Date study team aware of event: 11/17/2021    Was treatment given for this event:  [] Yes   [x] No   If yes, provide details     Serious adverse event?    Yes   [x] No    Special interest event?  [] Yes   [x] No    Endpoint? [] Yes   [x] No     Fatal event?  [] Yes   [x] No      Dr. Elias: Reasonable possibility that AE is related to study treatment    [] Yes   [x] No    Mis Husain RN    [facility]-CVOT Study [Effect of tirzepatide versus Dulaglutide on Major Cardiovascular Events in Patients with Type 2 Diabetes]    Diagnosis/event description (diagnosis preferred):  Pacemaker and Lead Placement  Start date: 11/07/2022  Date resolved: 11/07/2022    Intensity: ([] mild /[x]  moderate / [] severe)     Study Medication adjustment:  [] Yes   [x] No    Outcome: ([x] resolved [with or without sequelae] /[] recovering / [] not recovered / [] death / [] unknown)      Date study team aware of event: 11/17/2022    Was treatment given for this event:  [] Yes   [x] No   If yes, provide details    Serious adverse event?    Yes   [x] No    Special interest event?  [] Yes   [x] No    Endpoint? [] Yes   [x] No     Fatal event?  [] Yes   [x] No      Dr. Elias: Reasonable possibility that AE is related to study treatment    [] Yes   [x] No    Mis  SACHIN Husain

## 2022-11-30 ENCOUNTER — HOSPITAL ENCOUNTER (OUTPATIENT)
Dept: ULTRASOUND IMAGING | Facility: CLINIC | Age: 62
Discharge: HOME | End: 2022-11-30
Attending: SURGERY | Admitting: SURGERY
Payer: COMMERCIAL

## 2022-11-30 ENCOUNTER — TELEPHONE (OUTPATIENT)
Dept: OTHER | Facility: CLINIC | Age: 62
End: 2022-11-30

## 2022-11-30 DIAGNOSIS — I65.22 LEFT CAROTID ARTERY STENOSIS: ICD-10-CM

## 2022-11-30 DIAGNOSIS — Z98.890 HISTORY OF LEFT-SIDED CAROTID ENDARTERECTOMY: ICD-10-CM

## 2022-11-30 PROCEDURE — 93880 EXTRACRANIAL BILAT STUDY: CPT

## 2022-11-30 NOTE — TELEPHONE ENCOUNTER
Patient wants help with rescheduling his appointment that is scheduled with Dr. Stubbs on 12/2/22. Please call patient to reschedule [phone].

## 2022-11-30 NOTE — TELEPHONE ENCOUNTER
Future Appointments   Date Time Provider Department Center   12/21/2022  3:00 PM Semaj Stubbs MD [facility]

## 2022-12-12 ENCOUNTER — TELEPHONE (OUTPATIENT)
Dept: CARDIOLOGY | Facility: CLINIC | Age: 62
End: 2022-12-12

## 2022-12-12 DIAGNOSIS — I44.30 AV BLOCK: ICD-10-CM

## 2022-12-12 RX ORDER — METOPROLOL SUCCINATE 25 MG/1
25 TABLET, EXTENDED RELEASE ORAL DAILY
Qty: 90 TABLET | Refills: 2 | Status: SHIPPED | OUTPATIENT
Start: 2022-12-12 | End: 2023-01-31

## 2022-12-12 NOTE — TELEPHONE ENCOUNTER
Wife Carmina (on consent to communicate) called requesting 90 day refill for metoprolol.  Sent refill to [facility] pharmacy as requested.  SACHIN Erwin

## 2022-12-20 ENCOUNTER — OFFICE VISIT (OUTPATIENT)
Dept: CARDIOLOGY | Facility: CLINIC | Age: 62
End: 2022-12-20
Payer: COMMERCIAL

## 2022-12-20 ENCOUNTER — TELEPHONE (OUTPATIENT)
Dept: OTHER | Facility: CLINIC | Age: 62
End: 2022-12-20

## 2022-12-20 ENCOUNTER — TELEPHONE (OUTPATIENT)
Dept: CARDIOLOGY | Facility: CLINIC | Age: 62
End: 2022-12-20

## 2022-12-20 ENCOUNTER — ANCILLARY PROCEDURE (OUTPATIENT)
Dept: CARDIOLOGY | Facility: CLINIC | Age: 62
End: 2022-12-20
Attending: INTERNAL MEDICINE
Payer: COMMERCIAL

## 2022-12-20 VITALS
BODY MASS INDEX: 21.77 KG/M2 | HEART RATE: 58 BPM | DIASTOLIC BLOOD PRESSURE: 72 MMHG | SYSTOLIC BLOOD PRESSURE: 130 MMHG | WEIGHT: 155.5 LBS | HEIGHT: 71 IN | OXYGEN SATURATION: 98 %

## 2022-12-20 DIAGNOSIS — R06.02 SOB (SHORTNESS OF BREATH): ICD-10-CM

## 2022-12-20 DIAGNOSIS — Z95.0 CARDIAC PACEMAKER IN SITU: Primary | ICD-10-CM

## 2022-12-20 DIAGNOSIS — I10 BENIGN ESSENTIAL HYPERTENSION: ICD-10-CM

## 2022-12-20 DIAGNOSIS — Z95.0 CARDIAC PACEMAKER IN SITU: ICD-10-CM

## 2022-12-20 DIAGNOSIS — I10 BENIGN ESSENTIAL HYPERTENSION: Primary | ICD-10-CM

## 2022-12-20 DIAGNOSIS — I44.30 AV BLOCK: ICD-10-CM

## 2022-12-20 LAB
MDC_IDC_LEAD_IMPLANT_DT: NORMAL
MDC_IDC_LEAD_IMPLANT_DT: NORMAL
MDC_IDC_LEAD_LOCATION: NORMAL
MDC_IDC_LEAD_LOCATION: NORMAL
MDC_IDC_LEAD_LOCATION_DETAIL_1: NORMAL
MDC_IDC_LEAD_LOCATION_DETAIL_1: NORMAL
MDC_IDC_LEAD_MFG: NORMAL
MDC_IDC_LEAD_MFG: NORMAL
MDC_IDC_LEAD_MODEL: NORMAL
MDC_IDC_LEAD_MODEL: NORMAL
MDC_IDC_LEAD_POLARITY_TYPE: NORMAL
MDC_IDC_LEAD_POLARITY_TYPE: NORMAL
MDC_IDC_LEAD_SERIAL: NORMAL
MDC_IDC_LEAD_SERIAL: NORMAL
MDC_IDC_MSMT_BATTERY_DTM: NORMAL
MDC_IDC_MSMT_BATTERY_REMAINING_LONGEVITY: 182 MO
MDC_IDC_MSMT_BATTERY_RRT_TRIGGER: 2.62
MDC_IDC_MSMT_BATTERY_STATUS: NORMAL
MDC_IDC_MSMT_BATTERY_VOLTAGE: 3.22 V
MDC_IDC_MSMT_LEADCHNL_RA_IMPEDANCE_VALUE: 342 OHM
MDC_IDC_MSMT_LEADCHNL_RA_IMPEDANCE_VALUE: 551 OHM
MDC_IDC_MSMT_LEADCHNL_RA_SENSING_INTR_AMPL: 4.62 MV
MDC_IDC_MSMT_LEADCHNL_RA_SENSING_INTR_AMPL: 4.88 MV
MDC_IDC_MSMT_LEADCHNL_RV_IMPEDANCE_VALUE: 380 OHM
MDC_IDC_MSMT_LEADCHNL_RV_IMPEDANCE_VALUE: 494 OHM
MDC_IDC_MSMT_LEADCHNL_RV_PACING_THRESHOLD_AMPLITUDE: 0.5 V
MDC_IDC_MSMT_LEADCHNL_RV_PACING_THRESHOLD_PULSEWIDTH: 0.4 MS
MDC_IDC_MSMT_LEADCHNL_RV_SENSING_INTR_AMPL: 7.62 MV
MDC_IDC_MSMT_LEADCHNL_RV_SENSING_INTR_AMPL: 9.38 MV
MDC_IDC_PG_IMPLANT_DTM: NORMAL
MDC_IDC_PG_MFG: NORMAL
MDC_IDC_PG_MODEL: NORMAL
MDC_IDC_PG_SERIAL: NORMAL
MDC_IDC_PG_TYPE: NORMAL
MDC_IDC_SESS_CLINIC_NAME: NORMAL
MDC_IDC_SESS_DTM: NORMAL
MDC_IDC_SESS_TYPE: NORMAL
MDC_IDC_SET_BRADY_AT_MODE_SWITCH_RATE: 171 {BEATS}/MIN
MDC_IDC_SET_BRADY_HYSTRATE: NORMAL
MDC_IDC_SET_BRADY_LOWRATE: 50 {BEATS}/MIN
MDC_IDC_SET_BRADY_MAX_SENSOR_RATE: 130 {BEATS}/MIN
MDC_IDC_SET_BRADY_MAX_TRACKING_RATE: 130 {BEATS}/MIN
MDC_IDC_SET_BRADY_MODE: NORMAL
MDC_IDC_SET_BRADY_PAV_DELAY_LOW: 300 MS
MDC_IDC_SET_BRADY_SAV_DELAY_LOW: 300 MS
MDC_IDC_SET_LEADCHNL_RA_PACING_AMPLITUDE: 3.5 V
MDC_IDC_SET_LEADCHNL_RA_PACING_ANODE_ELECTRODE_1: NORMAL
MDC_IDC_SET_LEADCHNL_RA_PACING_ANODE_LOCATION_1: NORMAL
MDC_IDC_SET_LEADCHNL_RA_PACING_CAPTURE_MODE: NORMAL
MDC_IDC_SET_LEADCHNL_RA_PACING_CATHODE_ELECTRODE_1: NORMAL
MDC_IDC_SET_LEADCHNL_RA_PACING_CATHODE_LOCATION_1: NORMAL
MDC_IDC_SET_LEADCHNL_RA_PACING_POLARITY: NORMAL
MDC_IDC_SET_LEADCHNL_RA_PACING_PULSEWIDTH: 0.4 MS
MDC_IDC_SET_LEADCHNL_RA_SENSING_ANODE_ELECTRODE_1: NORMAL
MDC_IDC_SET_LEADCHNL_RA_SENSING_ANODE_LOCATION_1: NORMAL
MDC_IDC_SET_LEADCHNL_RA_SENSING_CATHODE_ELECTRODE_1: NORMAL
MDC_IDC_SET_LEADCHNL_RA_SENSING_CATHODE_LOCATION_1: NORMAL
MDC_IDC_SET_LEADCHNL_RA_SENSING_POLARITY: NORMAL
MDC_IDC_SET_LEADCHNL_RA_SENSING_SENSITIVITY: 0.3 MV
MDC_IDC_SET_LEADCHNL_RV_PACING_AMPLITUDE: 2 V
MDC_IDC_SET_LEADCHNL_RV_PACING_ANODE_ELECTRODE_1: NORMAL
MDC_IDC_SET_LEADCHNL_RV_PACING_ANODE_LOCATION_1: NORMAL
MDC_IDC_SET_LEADCHNL_RV_PACING_CAPTURE_MODE: NORMAL
MDC_IDC_SET_LEADCHNL_RV_PACING_CATHODE_ELECTRODE_1: NORMAL
MDC_IDC_SET_LEADCHNL_RV_PACING_CATHODE_LOCATION_1: NORMAL
MDC_IDC_SET_LEADCHNL_RV_PACING_POLARITY: NORMAL
MDC_IDC_SET_LEADCHNL_RV_PACING_PULSEWIDTH: 0.4 MS
MDC_IDC_SET_LEADCHNL_RV_SENSING_ANODE_ELECTRODE_1: NORMAL
MDC_IDC_SET_LEADCHNL_RV_SENSING_ANODE_LOCATION_1: NORMAL
MDC_IDC_SET_LEADCHNL_RV_SENSING_CATHODE_ELECTRODE_1: NORMAL
MDC_IDC_SET_LEADCHNL_RV_SENSING_CATHODE_LOCATION_1: NORMAL
MDC_IDC_SET_LEADCHNL_RV_SENSING_POLARITY: NORMAL
MDC_IDC_SET_LEADCHNL_RV_SENSING_SENSITIVITY: 0.9 MV
MDC_IDC_SET_ZONE_DETECTION_INTERVAL: 350 MS
MDC_IDC_SET_ZONE_DETECTION_INTERVAL: 400 MS
MDC_IDC_SET_ZONE_TYPE: NORMAL
MDC_IDC_STAT_AT_BURDEN_PERCENT: 2.9 %
MDC_IDC_STAT_AT_DTM_END: NORMAL
MDC_IDC_STAT_AT_DTM_START: NORMAL
MDC_IDC_STAT_BRADY_AP_VP_PERCENT: 0 %
MDC_IDC_STAT_BRADY_AP_VS_PERCENT: 0 %
MDC_IDC_STAT_BRADY_AS_VP_PERCENT: 11.04 %
MDC_IDC_STAT_BRADY_AS_VS_PERCENT: 88.96 %
MDC_IDC_STAT_BRADY_DTM_END: NORMAL
MDC_IDC_STAT_BRADY_DTM_START: NORMAL
MDC_IDC_STAT_BRADY_RA_PERCENT_PACED: 0 %
MDC_IDC_STAT_BRADY_RV_PERCENT_PACED: 11.46 %
MDC_IDC_STAT_EPISODE_RECENT_COUNT: 0
MDC_IDC_STAT_EPISODE_RECENT_COUNT: 0
MDC_IDC_STAT_EPISODE_RECENT_COUNT: 2
MDC_IDC_STAT_EPISODE_RECENT_COUNT_DTM_END: NORMAL
MDC_IDC_STAT_EPISODE_RECENT_COUNT_DTM_START: NORMAL
MDC_IDC_STAT_EPISODE_TOTAL_COUNT: 0
MDC_IDC_STAT_EPISODE_TOTAL_COUNT: 0
MDC_IDC_STAT_EPISODE_TOTAL_COUNT: 3
MDC_IDC_STAT_EPISODE_TOTAL_COUNT_DTM_END: NORMAL
MDC_IDC_STAT_EPISODE_TOTAL_COUNT_DTM_START: NORMAL
MDC_IDC_STAT_EPISODE_TYPE: NORMAL

## 2022-12-20 PROCEDURE — 99215 OFFICE O/P EST HI 40 MIN: CPT | Performed by: PHYSICIAN ASSISTANT

## 2022-12-20 NOTE — LETTER
12/20/2022    Danish Land MD  [address]  [place] [address]    RE: Vikash Sharif Loretta       Dear Colleague,     I had the pleasure of seeing Vikash Kole Burgos in the [facility].  [id number]  60 minutes spent on the date of the encounter doing chart review, review of test results, interpretation of tests, patient visit, documentation and discussion with other provider(s)     HPI and Plan:   See dictation    Orders this Visit:  Orders Placed This Encounter   Procedures     Follow-Up with Cardiology REAGAN     No orders of the defined types were placed in this encounter.    Medications Discontinued During This Encounter   Medication Reason     insulin pen needle (BD HEIKE U/F) 32G X 4 MM          Encounter Diagnoses   Name Primary?     Benign essential hypertension Yes     SOB (shortness of breath)        CURRENT MEDICATIONS:  Current Outpatient Medications   Medication Sig Dispense Refill     amLODIPine (NORVASC) 2.5 MG tablet Take 1 tablet (2.5 mg) by mouth daily 90 tablet 3     apixaban ANTICOAGULANT (ELIQUIS) 5 MG tablet Resume on Wednesday 11/9/22 180 tablet 3     B-D U/F 31G X 8 MM insulin pen needle USE ONE PEN NEEDLES DAILY OR AS DIRECTED. 100 each 3     celecoxib (CELEBREX) 100 MG capsule TAKE 1 CAPSULE BY MOUTH EVERY DAY 30 capsule 1     chlorhexidine (PERIDEX) 0.12 % solution Take 15 mLs by mouth 2 times daily as needed   5     cyanocobalamin 1000 MCG SUBL Place 1,000 mcg under the tongue daily       glucosamine-chondroitin 500-400 MG CAPS per capsule Take 1 capsule by mouth daily       lisinopril (ZESTRIL) 20 MG tablet Take 1 tablet (20 mg) by mouth 2 times daily 180 tablet 3     metoprolol succinate ER (TOPROL XL) 25 MG 24 hr tablet Take 1 tablet (25 mg) by mouth daily 90 tablet 2     rosuvastatin (CRESTOR) 20 MG tablet Take 1 tablet (20 mg) by mouth daily 90 tablet 3     Vitamin D, Cholecalciferol, 1000 units TABS Take 1,000 Units by mouth daily       zolpidem (AMBIEN) 5  MG tablet Take 1 tablet (5 mg) by mouth nightly as needed for sleep 30 tablet 0       ALLERGIES     Allergies   Allergen Reactions     Pcn [Penicillins] Rash     Rash with PCN only. Patient has taken amoxicillin with no rash.       PAST MEDICAL HISTORY:  Past Medical History:   Diagnosis Date     Basal cell carcinoma      Carotid stenosis, left     s/p left CEA 2/2020     Cerebral infarction (H)     left CVA 2/2020 post-op carotid endarterectomy     Coronary artery disease     4 vessel bypass November 2010; LIMA ->LAD, SVG-> OM3, SVG -> D2, SVG -> PDA     Diabetes mellitus (H) 2005    neuropathy     Generalized anxiety disorder 05/02/2014     Hepatitis C, chronic (H) 2005     Hyperlipidemia LDL goal < 70      Hypertension      Liver diseases     9/15 Liver is 10 cm in span without left lobe enlargement     Persistent microalbuminuria associated with type 2 diabetes mellitus (H) 05/06/2015     Renal artery stenosis (H)        PAST SURGICAL HISTORY:  Past Surgical History:   Procedure Laterality Date     ARTHROSCOPY KNEE RT/LT      left     COLONOSCOPY       CORONARY ARTERY BYPASS  11/18/201    Coronary artery bypass grafting x 4 with placement of the left internal mammary artery to the distal midportion of the left anterior descending artery, saphenous vein graft from aorta to the third obtuse marginal branch of circumflex coronary artery, saphenous vein graft from aorta to the second diagonal branch, saphenous vein graft from aorta to the posterior descending artery.     ENDARTERECTOMY CAROTID Left 2/21/2020    Procedure: LEFT CAROTID ENDARTERECTOMY WITH EEG;  Surgeon: Semaj Stubbs MD;  Location:  OR     EP ABLATION ATRIAL FLUTTER N/A 9/9/2022    Procedure: Ablation Atrial Flutter;  Surgeon: Tyson Ordonez MD;  Location:  HEART CARDIAC CATH LAB     EP PACEMAKER DEVICE & LEAD IMPLANT- RIGHT ATRIAL & LEFT VENTRICULAR N/A 11/7/2022    Procedure: Pacemaker Device & Lead Implant- Right Atrial &  Left Ventricular;  Surgeon: Tyson Ordonez MD;  Location:  HEART CARDIAC CATH LAB     ESOPHAGOSCOPY, GASTROSCOPY, DUODENOSCOPY (EGD), COMBINED  10/31/2011    Procedure:COMBINED ESOPHAGOSCOPY, GASTROSCOPY, DUODENOSCOPY (EGD); Surgeon:ALONSO ALDANA; Location: GI     ESOPHAGOSCOPY, GASTROSCOPY, DUODENOSCOPY (EGD), COMBINED N/A 3/8/2018    Procedure: COMBINED ESOPHAGOSCOPY, GASTROSCOPY, DUODENOSCOPY (EGD);  EGD;  Surgeon: Angel Luis Justice MD;  Location:  GI     HEART CATH CORONARY ANGIOGRAM W/LV GRAM  9--10    CV Surgery recommended     HEART CATH CORONARY ANGIOGRAM W/LV GRAM  14    Medical management     HERNIA REPAIR, INGUINAL RT/LT      left       FAMILY HISTORY:  Family History   Problem Relation Age of Onset     C.A.D. Father      Cancer Father         bladder     Heart Surgery Father         bypass surgery     Heart Disease Mother        SOCIAL HISTORY:  Social History     Socioeconomic History     Marital status:      Spouse name: None     Number of children: None     Years of education: None     Highest education level: None   Tobacco Use     Smoking status: Former     Packs/day: 0.50     Years: 12.00     Pack years: 6.00     Types: Cigarettes     Start date:      Quit date: 2005     Years since quittin.9     Smokeless tobacco: Never   Substance and Sexual Activity     Alcohol use: Yes     Comment: minimal 1 glass of wine per week     Drug use: No     Sexual activity: Yes     Partners: Female   Other Topics Concern     Parent/sibling w/ CABG, MI or angioplasty before 65F 55M? No     Caffeine Concern Yes     Comment: 2 cups coffee per day     Special Diet Yes     Comment: low carb diet, low sugar     Exercise Yes     Comment: treadmill 40 minutes, walk, daily, bike 30 minutes every other day       Review of Systems:  Skin:        Eyes:       ENT:       Respiratory:  Positive for shortness of breath;dyspnea on exertion;dyspnea at rest  Cardiovascular:     "Positive for;chest pain;fatigue;lightheadedness;dizziness  Gastroenterology:      Genitourinary:       Musculoskeletal:       Neurologic:       Psychiatric:       Heme/Lymph/Imm:       Endocrine:  Positive for diabetes    Physical Exam:  Vitals: /72   Pulse 58   Ht 1.803 m (5' 11\")   Wt 70.5 kg (155 lb 8 oz)   SpO2 98%   BMI 21.69 kg/m     Please refer to dictation for physical exam    Recent Lab Results: all reviewed today  CBC RESULTS:  Lab Results   Component Value Date    WBC 5.3 11/07/2022    WBC 5.3 06/07/2021    RBC 4.65 11/07/2022    RBC 5.00 06/07/2021    HGB 14.8 11/07/2022    HGB 15.3 06/07/2021    HCT 41.3 11/07/2022    HCT 45.1 06/07/2021    MCV 89 11/07/2022    MCV 90 06/07/2021    MCH 31.8 11/07/2022    MCH 30.6 06/07/2021    MCHC 35.8 11/07/2022    MCHC 33.9 06/07/2021    RDW 13.9 11/07/2022    RDW 12.9 06/07/2021     (L) 11/07/2022     (L) 06/07/2021       BMP RESULTS:  Lab Results   Component Value Date     11/07/2022     06/07/2021    POTASSIUM 4.0 11/07/2022    POTASSIUM 4.8 06/07/2021    CHLORIDE 107 11/07/2022    CHLORIDE 108 06/07/2021    CO2 23 11/07/2022    CO2 28 06/07/2021    ANIONGAP 7 11/07/2022    ANIONGAP 5 06/07/2021     (H) 11/07/2022     (H) 06/07/2021    BUN 15 11/07/2022    BUN 17 06/07/2021    CR 0.82 11/07/2022    CR 0.89 06/07/2021    GFRESTIMATED >90 11/07/2022    GFRESTIMATED >60 03/08/2022    GFRESTIMATED >90 06/07/2021    GFRESTBLACK >90 06/07/2021    LIA 9.5 11/07/2022    LIA 9.6 06/07/2021        INR RESULTS:  Lab Results   Component Value Date    INR 1.21 (H) 09/27/2022    INR 1.11 06/07/2021    INR 1.13 12/15/2020           CC  Michaelle Caba PA-C  [address]  [place] [id number]        Service Date: 12/20/2022    PRIMARY CARDIOLOGIST:  Zeb Roberts MD, as well as Tyson Ordonez MD, for rhythm issues.    REASON FOR VISIT:  Hypertension, dyspnea on exertion, and shortness of breath " followup.    HISTORY OF PRESENT ILLNESS:  Kelton is an absolutely delightful 62-year-old gentleman with a past medical history significant for the followin.  Coronary artery disease with 4-vessel CABG in  by Dr. Pruett with LIMA to the LAD, SVG to the OM3, SVG to D2, SVG to the PDA.  Last angiogram in  showed a patent LIMA to the LAD as well as other grafts with just a 60% stenosis after the touchdown of the OM.  Stress test in  showed a small area of inferior ischemia with his anginal equivalent primarily being dyspnea on exertion.  2.  Normal EF at 55% -60% on AGUILA - 2022.  3.  Peripheral arterial disease with history of carotid endarterectomy, complicated by CVA and facial nerve trauma.  4.  Hypertension.    5.  Well controlled type 2 diabetes with the patient now on the SURPASS trial with a dramatic change in his diabetes management over the last 1 year, with him being now completely off insulin and an A1c of 5.0.  6.  Hyperlipidemia.  7.  Hepatitis C diagnosed in  when the patient presented with stage IV liver failure, treated with Pegasys and ribavirin with excellent result.  Followed by Dr. Bedolla with apparent resolution of the fatty infiltration of his liver since being in the SURPASS trial.  8.  Atrial flutter, diagnosed this summer with the patient undergoing flutter ablation in 2022 with recent recurrent episodes on anticoagulation.  9.  Permanent pacemaker in place due to intermittent heart block with implant on 2022.    I would like enough to meet Kelton as part of the SURPASS trial.  We worked on optimizing his blood pressure, but unfortunately he became more bradycardic, was found to have flutter, underwent ablation and now the pacemaker.  Unfortunately, throughout this, while his diabetes numbers have become phenomenal, his fatty infiltration of his liver apparently has resolved, but he has felt very poorly.  His exercise tolerance is bad where he normally could do an hour  on the stationary bike whereas now he can only do 15 minutes twice a day and he really has to push.  He continues to have intermittent sharp chest pains that feel like shocks and last about 10 seconds and are severe in nature.  This is much worse after his device was placed.  He does not go a day without having these happening.  He has not had syncope or presyncope.  He just has no energy and does not feel anywhere close to what he did before.  Of course, this has made him quite frustrated as he is extremely active at baseline, exercising with biking 20 miles at a time, working on the treadmill, etc.  He has not been able to do that since spring.  Unfortunately, things have seemed to, if anything, worsened since his device was put in.  He otherwise denies chest pain that happens with exertion, orthopnea or PND.  He does not feel like he is retaining any fluid.  He does not note any palpitations.    SOCIAL HISTORY:  He is  to my colleague, Carmina.  They have 2 children, a daughter and a son.  Quit smoking in 1993.  Minimal to no alcohol.  At baseline, is biking about 20 miles at a time, but cannot do that at this point.    PHYSICAL EXAMINATION:    GENERAL:  Reveals a well-developed, well-nourished, fit-appearing gentleman who appears frustrated today, but in no acute distress.  HEENT:  Normocephalic, atraumatic.  HEART:  Regular.  I do not appreciate murmur, rub or gallop.  LUNGS:  Clear, without wheezes, rales, or rhonchi.  EXTREMITIES:  Without peripheral edema.  SKIN:  Warm and dry.    Last device check reviewed and showed 70% V paced, VVI at 40.  Underlying rhythm was sinus at 60.  He had had some atrial flutter before that which had spontaneously converted.    Last AGUILA reviewed.  Holter monitor reviewed.    ASSESSMENT AND PLAN:    1.  Dyspnea on exertion and fatigue with extremely poor exercise tolerance with the patient feeling appropriately discouraged by this.  I think there are some things we can do  to try to improve this.  Looking at his percentage in each heart rate, I suspect it is lower than he would have been when he was an active athlete and increasing his lower heart rate may help.  In addition, after discussion with my EP nurse colleagues, we elected to switch him from VVI to DDDR and, hopefully, he will have a more intrinsic pumping function.  We talked about ramping up his rate responsiveness, but at this point we will just make those 2 adjustments.  I would like him to push himself with exercise over the next several weeks.  He was also found to be in atrial flutter today and converted out of that with ATP in the clinic.  2.  Hypertension.  Home blood pressures ranging in the 130s, relatively at goal.  We will keep him on 20 mg of lisinopril twice a day, metoprolol 25 mg daily and amlodipine 2.5.  If need be, we could try adjusting medications and see if that helps with this dyspnea.  3.  Type 2 diabetes, on SURPASS trial, no longer requiring insulin and a hemoglobin A1c at 5.  We discussed that this has been the biggest change lately and it seems that there has been dramatic benefits, both in his diabetes as well as fatty infiltration of his liver, but at some point if he continues to feel poorly, we may want to decrease the dose of that study drug.  4.  Coronary artery disease with dyspnea on exertion being his anginal equivalent.  If he does not feel better with adjustments and, Dr. Ordonez would think it is appropriate, we certainly could send him for a left and right heart catheterization to assess that as well.  5.  Hyperlipidemia, excellent control.    It is my great pleasure to participate in this patient's care.  My hope is that we will be able to get him back to his active self.  He will see Dr. Ordonez as scheduled in about 2 weeks.  I will see him back in about a month.  He will call sooner with concerns.    Michaelle Caba PA-C        D: 12/20/2022   T: 12/20/2022   MT: puneet    Name:      IHSAN SANCHEZ  MRN:      [id number]        Account:      [id number]   :      1960           Service Date: 2022       Document: [id number]      Thank you for allowing me to participate in the care of your patient.      Sincerely,     Michaelle Caba PA-C     [facility]  cc:   Michaelle Caba PA-C  [address]  [place] [address]

## 2022-12-20 NOTE — TELEPHONE ENCOUNTER
"Alert received for, \"AT/AF >0.5h for 1 days.\"  Episode logged 12/19 at 1325 listed as \"still in progress\" at time transmission was send at 1543. No EGM for review but presenting EGM confirms A>V for AF. Rates while in AF are slow from 40-50s  Per previous documentation, \"Pt came into clinic for courtesy check today. Had Aflutter ablation and PPM on 11/7/2022. At his 10 day device check pt was noticed to be in AFlutter. Dr Ordonez wanted pt to be brought back in to have ATP attempted. However pt is now SR. Afib alert programmed on at this time. If pt has recurrent Afib would want him to come to clinic for an EKG and possible ATP attempt and programming. Nima ([employer]) here for device check and did program ATP settings for device (did not turn on at this time) so if Dr Ordonez wishes this to be turned on it is ready to go and only needs to be programmed \"on\" GF\"  At the exact time this encounter was created, Michaelle More entered office to see if this patient could be seen for a courtesy check as she had just been in with him for an appt and he was complaining of significant fatigue, dizziness, and SOB. Michaelle requesting patient's LRL be changed from 40 to 50 and rate response be turned on. Patient coming back in 2 weeks for 6 week device check and a follow up appointment with Dr. Ordonez so she is hoping for changes to be made now to then determine if any changes need to be made at those follow up appts on 1/4.  Patient seen for courtesy check. Confirmed that patient was still in AFl w/ V rates in the 40-50s. Senthil from [employer] notified and came to clinic to attempt atrial ATP therapy. Atrial ATP x1 successful. Rhythm following ATP: SR 70s with frequent PACs and Wenckebach block. Mode changed from VVI 40 to DDDR . Paced AVD changed from 270ms to 300ms; sensed AVD remains at 300ms. (Per Dr. Ordonez on 11/8: \"Kelton's new MDT dual-chamber PM, implanted on 11/7, was programmed VVI 40 ppm.  We initially tried AAI/DDD with " "MVP \"on\" but just in the first few hrs post implant he paced 55% in the RV d/t long first degree... To minimize RV pacing we switched to VVI. Please keep an eye on V pacing and let me know if it exceeds 30% or so.\" % prior to today was 11.5%. Will keep an eye on % and make changes as necessary at 11/4 appointment. Will route changes made to Dr. Ordonez and determine if he would like ATP therapies turned \"ON.\"    Patient had questions regarding rate response and what he should expect to feel. Currently he is expecting significant dizziness and SOB with minimal activity. Explained that the hope is that rate response will help to alleviate or at least minimize these symptoms. He asked if this should help with how he feels while biking. Explained how rate response works and that unfortunately it may not help with how he feels while biking if his symptoms are in fact d/t chronotropic incompetence. Patient verbalized understanding and stated that he will think about changing his exercise regimen to running as opposed to biking.  Patient also endorsing \"shocking\" pains coming from near where his PPM was placed. Explained to patient that this is not an uncommon complaint. Many nerves are in the area of where the incision was made and take a while to heal.   ESTEPHANIE RN  "

## 2022-12-20 NOTE — PATIENT INSTRUCTIONS
Thank you for visiting with me today.    We discussed: we'll work on getting you feeling better, I promise!    Medication changes:  increase celebrex to 1 pill twice a day for 1 week.  Let us know if helps or not.      Follow up: I'll see you in 1 month.       Please call my nurse, Tara at [phone] with any questions or concerns.    Scheduling phone number: [phone]  Reminder: Please bring in all current medications, over the counter supplements and vitamin bottles to your next appointment.

## 2022-12-20 NOTE — TELEPHONE ENCOUNTER
[facility]    Who is the name of the provider?:  Enoc    What is the location you see this provider at/preferred location?: [place]  Person calling: Carmina - wife  Phone number:  [phone]  Nurse call back needed:  YES     Reason for call:   Requesting 12/21 3:00 appointment with Dr Stubbs be changed to virtual.  Phone: [phone]       Pharmacy location:  NA  Outside Imaging: Not Applicable   Can we leave a detailed message on this number?  YES

## 2022-12-20 NOTE — TELEPHONE ENCOUNTER
Telephone visit is fine.  Carotid US is completed and does not appear there is anything urgent.     Deborah TRINH, RN    [facility]  [facility]ascular [facility]  Office: [phone]  Fax: [phone]

## 2022-12-20 NOTE — PROGRESS NOTES
[id number]  60 minutes spent on the date of the encounter doing chart review, review of test results, interpretation of tests, patient visit, documentation and discussion with other provider(s)     HPI and Plan:   See dictation    Orders this Visit:  Orders Placed This Encounter   Procedures     Follow-Up with Cardiology REAGAN     No orders of the defined types were placed in this encounter.    Medications Discontinued During This Encounter   Medication Reason     insulin pen needle (BD HEIKE U/F) 32G X 4 MM          Encounter Diagnoses   Name Primary?     Benign essential hypertension Yes     SOB (shortness of breath)        CURRENT MEDICATIONS:  Current Outpatient Medications   Medication Sig Dispense Refill     amLODIPine (NORVASC) 2.5 MG tablet Take 1 tablet (2.5 mg) by mouth daily 90 tablet 3     apixaban ANTICOAGULANT (ELIQUIS) 5 MG tablet Resume on Wednesday 11/9/22 180 tablet 3     B-D U/F 31G X 8 MM insulin pen needle USE ONE PEN NEEDLES DAILY OR AS DIRECTED. 100 each 3     celecoxib (CELEBREX) 100 MG capsule TAKE 1 CAPSULE BY MOUTH EVERY DAY 30 capsule 1     chlorhexidine (PERIDEX) 0.12 % solution Take 15 mLs by mouth 2 times daily as needed   5     cyanocobalamin 1000 MCG SUBL Place 1,000 mcg under the tongue daily       glucosamine-chondroitin 500-400 MG CAPS per capsule Take 1 capsule by mouth daily       lisinopril (ZESTRIL) 20 MG tablet Take 1 tablet (20 mg) by mouth 2 times daily 180 tablet 3     metoprolol succinate ER (TOPROL XL) 25 MG 24 hr tablet Take 1 tablet (25 mg) by mouth daily 90 tablet 2     rosuvastatin (CRESTOR) 20 MG tablet Take 1 tablet (20 mg) by mouth daily 90 tablet 3     Vitamin D, Cholecalciferol, 1000 units TABS Take 1,000 Units by mouth daily       zolpidem (AMBIEN) 5 MG tablet Take 1 tablet (5 mg) by mouth nightly as needed for sleep 30 tablet 0       ALLERGIES     Allergies   Allergen Reactions     Pcn [Penicillins] Rash     Rash with PCN only. Patient has taken amoxicillin with no  rash.       PAST MEDICAL HISTORY:  Past Medical History:   Diagnosis Date     Basal cell carcinoma      Carotid stenosis, left     s/p left CEA 2/2020     Cerebral infarction (H)     left CVA 2/2020 post-op carotid endarterectomy     Coronary artery disease     4 vessel bypass November 2010; LIMA ->LAD, SVG-> OM3, SVG -> D2, SVG -> PDA     Diabetes mellitus (H) 2005    neuropathy     Generalized anxiety disorder 05/02/2014     Hepatitis C, chronic (H) 2005     Hyperlipidemia LDL goal < 70      Hypertension      Liver diseases     9/15 Liver is 10 cm in span without left lobe enlargement     Persistent microalbuminuria associated with type 2 diabetes mellitus (H) 05/06/2015     Renal artery stenosis (H)        PAST SURGICAL HISTORY:  Past Surgical History:   Procedure Laterality Date     ARTHROSCOPY KNEE RT/LT      left     COLONOSCOPY       CORONARY ARTERY BYPASS  11/18/201    Coronary artery bypass grafting x 4 with placement of the left internal mammary artery to the distal midportion of the left anterior descending artery, saphenous vein graft from aorta to the third obtuse marginal branch of circumflex coronary artery, saphenous vein graft from aorta to the second diagonal branch, saphenous vein graft from aorta to the posterior descending artery.     ENDARTERECTOMY CAROTID Left 2/21/2020    Procedure: LEFT CAROTID ENDARTERECTOMY WITH EEG;  Surgeon: Semaj Stubbs MD;  Location:  OR     EP ABLATION ATRIAL FLUTTER N/A 9/9/2022    Procedure: Ablation Atrial Flutter;  Surgeon: Tyson Ordonez MD;  Location:  HE[facility] CATH [facility]     EP PACEMAKER DEVICE & LEAD IMPLANT- RIGHT ATRIAL & LEFT VENTRICULAR N/A 11/7/2022    Procedure: Pacemaker Device & Lead Implant- Right Atrial & Left Ventricular;  Surgeon: Tyson Ordonez MD;  Location:  HE[facility] CATH [facility]     ESOPHAGOSCOPY, GASTROSCOPY, DUODENOSCOPY (EGD), COMBINED  10/31/2011    Procedure:COMBINED ESOPHAGOSCOPY, GASTROSCOPY,  DUODENOSCOPY (EGD); Surgeon:ALONSO ALDANA; Location: GI     ESOPHAGOSCOPY, GASTROSCOPY, DUODENOSCOPY (EGD), COMBINED N/A 3/8/2018    Procedure: COMBINED ESOPHAGOSCOPY, GASTROSCOPY, DUODENOSCOPY (EGD);  EGD;  Surgeon: Angel Luis Justice MD;  Location:  GI     HEART CATH CORONARY ANGIOGRAM W/LV GRAM  9--10    CV Surgery recommended     HEART CATH CORONARY ANGIOGRAM W/LV GRAM  14    Medical management     HERNIA REPAIR, INGUINAL RT/LT      left       FAMILY HISTORY:  Family History   Problem Relation Age of Onset     C.A.D. Father      Cancer Father         bladder     Heart Surgery Father         bypass surgery     Heart Disease Mother        SOCIAL HISTORY:  Social History     Socioeconomic History     Marital status:      Spouse name: None     Number of children: None     Years of education: None     Highest education level: None   Tobacco Use     Smoking status: Former     Packs/day: 0.50     Years: 12.00     Pack years: 6.00     Types: Cigarettes     Start date:      Quit date: 2005     Years since quittin.9     Smokeless tobacco: Never   Substance and Sexual Activity     Alcohol use: Yes     Comment: minimal 1 glass of wine per week     Drug use: No     Sexual activity: Yes     Partners: Female   Other Topics Concern     Parent/sibling w/ CABG, MI or angioplasty before 65F 55M? No     Caffeine Concern Yes     Comment: 2 cups coffee per day     Special Diet Yes     Comment: low carb diet, low sugar     Exercise Yes     Comment: treadmill 40 minutes, walk, daily, bike 30 minutes every other day       Review of Systems:  Skin:        Eyes:       ENT:       Respiratory:  Positive for shortness of breath;dyspnea on exertion;dyspnea at rest  Cardiovascular:    Positive for;chest pain;fatigue;lightheadedness;dizziness  Gastroenterology:      Genitourinary:       Musculoskeletal:       Neurologic:       Psychiatric:       Heme/Lymph/Imm:       Endocrine:  Positive for  "diabetes    Physical Exam:  Vitals: /72   Pulse 58   Ht 1.803 m (5' 11\")   Wt 70.5 kg (155 lb 8 oz)   SpO2 98%   BMI 21.69 kg/m     Please refer to dictation for physical exam    Recent Lab Results: all reviewed today  CBC RESULTS:  Lab Results   Component Value Date    WBC 5.3 11/07/2022    WBC 5.3 06/07/2021    RBC 4.65 11/07/2022    RBC 5.00 06/07/2021    HGB 14.8 11/07/2022    HGB 15.3 06/07/2021    HCT 41.3 11/07/2022    HCT 45.1 06/07/2021    MCV 89 11/07/2022    MCV 90 06/07/2021    MCH 31.8 11/07/2022    MCH 30.6 06/07/2021    MCHC 35.8 11/07/2022    MCHC 33.9 06/07/2021    RDW 13.9 11/07/2022    RDW 12.9 06/07/2021     (L) 11/07/2022     (L) 06/07/2021       BMP RESULTS:  Lab Results   Component Value Date     11/07/2022     06/07/2021    POTASSIUM 4.0 11/07/2022    POTASSIUM 4.8 06/07/2021    CHLORIDE 107 11/07/2022    CHLORIDE 108 06/07/2021    CO2 23 11/07/2022    CO2 28 06/07/2021    ANIONGAP 7 11/07/2022    ANIONGAP 5 06/07/2021     (H) 11/07/2022     (H) 06/07/2021    BUN 15 11/07/2022    BUN 17 06/07/2021    CR 0.82 11/07/2022    CR 0.89 06/07/2021    GFRESTIMATED >90 11/07/2022    GFRESTIMATED >60 03/08/2022    GFRESTIMATED >90 06/07/2021    GFRESTBLACK >90 06/07/2021    LIA 9.5 11/07/2022    LIA 9.6 06/07/2021        INR RESULTS:  Lab Results   Component Value Date    INR 1.21 (H) 09/27/2022    INR 1.11 06/07/2021    INR 1.13 12/15/2020           CC  Michaelle Caba, PAKotaC  [id number]  [place] [id number]      "

## 2022-12-21 ENCOUNTER — CARE COORDINATION (OUTPATIENT)
Dept: CARDIOLOGY | Facility: CLINIC | Age: 62
End: 2022-12-21

## 2022-12-21 ENCOUNTER — VIRTUAL VISIT (OUTPATIENT)
Dept: OTHER | Facility: CLINIC | Age: 62
End: 2022-12-21
Attending: SURGERY
Payer: COMMERCIAL

## 2022-12-21 DIAGNOSIS — M19.041 PRIMARY OSTEOARTHRITIS OF BOTH HANDS: ICD-10-CM

## 2022-12-21 DIAGNOSIS — M19.042 PRIMARY OSTEOARTHRITIS OF BOTH HANDS: ICD-10-CM

## 2022-12-21 DIAGNOSIS — Z98.890 HISTORY OF LEFT-SIDED CAROTID ENDARTERECTOMY: Primary | ICD-10-CM

## 2022-12-21 PROCEDURE — 99213 OFFICE O/P EST LOW 20 MIN: CPT | Mod: TEL | Performed by: SURGERY

## 2022-12-21 NOTE — PROGRESS NOTES
12/21/2022 Call from patient. Patient asks for clarification of medication changes from Michaelle Caba PA=C visit on 12/20//22.    Michaelle Caba instructions from 12/20/22 visit:  Medication changes:  increase celebrex to 1 pill twice a day for 1 week.  Let us know if helps or not.      Medication list reports on ordered by Dr Land:  celecoxib (CELEBREX) 100 MG capsule  TAKE 1 CAPSULE BY MOUTH EVERY DAY,     Called back to patient and informed celecoxib is generic name for celebrex. Patient reports understanding and that he does not need a refill as he has plenty of medication.  Tara Hawthorne RN 12/21/22 2:32 PM

## 2022-12-21 NOTE — PROGRESS NOTES
Vikash Burgos is a 62-year-old gentleman with metabolic syndrome and prior CABG who is status post an asymptomatic left carotid endarterectomy on 2/21/2020.  He awoke from that procedure neurologically intact except for some moderate left tongue deviation.  Several hours later he had right eye ptosis with right-sided weakness.  A code stroke was called and he underwent neurology consultation.  MRI of the brain was negative for infarct.  CTA showed a widely patent left carotid artery with no associated abnormalities.  Neurology could not find an organic cause for the right-sided weakness and did not feel that he had suffered an infarct.  Swallow studies were abnormal and he was discharged home on a dysphagia diet.     Kelton was evaluated by Dr. Jean-Baptiste at the [facility] in March 2020.  MRI of the brain was negative for infarct but clinically he may have suffered a stroke.  Past medical history is otherwise significant for stage IV liver disease with HCV.  He is followed in transplant hepatology.  He is also status post CABG about 10 years ago.    Over the past year sanjay was diagnosed with atrial flutter and underwent an ablation procedure.  He required implantation of the pacemaker.  He is still dealing with some dyspnea on exertion and is undergoing some changes in his medical regimen for this.  He reports that his upper lip numbness and right body weakness is improved compared to last year.  Over the past year he denies stroke, symptoms of TIA, or amaurosis.  He underwent annual carotid ultrasound on 11/30/2022.  Due to the pending blizzard, I am conducting a telephone interview with him today to discuss the carotid ultrasound results.    Imaging:  [facility]  DATE: 11/30/2022     EXAM: DUPLEX CAROTID ULTRASOUND     INDICATION: Severe carotid artery stenosis status post left carotid  endarterectomy      TECHNIQUE: Duplex imaging of the carotid arteries was performed,  including  gray-scale and color flow imaging, Doppler waveform  analysis, and spectral Doppler imaging.     COMPARISON: 10/20/2021.     FINDINGS:   RIGHT CAROTID SYSTEM: Mild mixed atheromatous plaque in the right  carotid bulb and proximal bifurcation vessels.       The following velocities were obtained in the RIGHT carotid system  (cm/s).  CCA: ; EDV 18  ICA: ; EDV  17   ECA: ; EDV  13   ICA/CCA ratios: PS 0.93     LEFT CAROTID SYSTEM: Postsurgical changes of endarterectomy slight  intimal thickening suggestive of hyperplasia.     The following velocities were obtained in the LEFT carotid system  (cm/s).  CCA: ; EDV 33   ICA: ; EDV 27   ECA: ; EDV 13   ICA/CCA ratios: PS 1.01     SUBCLAVIAN AND VERTEBRAL SYSTEM: The subclavian and vertebral arteries  are patent bilaterally with normal waveforms proximally and antegrade  flow.     CONCLUSION:   1.  RIGHT CAROTID: Less than 50% stenosis based on NASCET criteria.  2.  LEFT CAROTID: 50-69% stenosis based on velocity criteria. However,  when compared to ultrasound dated 10/20/2021 the measured velocities  appear improved in the left internal carotid artery proximally. There  is continued vascular wall thickening suggestive of intimal  hyperplasia which is not significantly changed.  3.  Right external carotid artery stenosis, not significantly changed.  4.  Antegrade flow within the vertebral arteries bilaterally.     ASSESSMENT:  Nearly 3 years status post left carotid endarterectomy with subsequent postoperative right face, arm, and leg weakness with associated numbness.  MRI of the brain was normal.  Varying neurologic opinions as to whether or not he truly suffered a stroke.  He continues with steady clinical improvement.  Mild left carotid intimal hyperplasia with minimal recurrent stenosis.  Left-sided velocities are actually somewhat improved compared to last year.    RECOMMENDATION:  I reviewed all the above with Kelton.  Relative  to his carotid disease I have no concerns.  He will continue his medical regimen which includes daily Eliquis.  Vascular surgical follow-up will be with me in 1 year for repeat bilateral carotid ultrasonography.    Total length of this telephone conversation was 15 minutes.    Devyn Stubbs MD

## 2022-12-21 NOTE — TELEPHONE ENCOUNTER
Gerson Hyatt, thank you for the update.    I am very sorry that Kelton is not feeling well...  But it is hard to imagine this is primarily related to the PM, especially since he paced so little (10-11% while VVI 40, I suspect mostly during sleep)...      The pacemaker was initially set VVI 40 because when we programmed it DDD immediately post implant it looked like he would be pacing in the RV a lot d/t prolonged AV conduction.  My main goal with the PM was to prevent (possible) syncope related to AVB and bradycardia, not correct chronotropic incompetence, etc.   Of course, I do agree with the change in mode to DDDR and bumping the lower rate up on 12/20.  Do expect high % RV pacing at next device clinic visit.    Also:  Kelton did not feel all that well after aflutter ablation (in SR and without a PM at the time), he mentioned that when I saw him in follow-up.  So whether in SR or aflutter/fib, with or without a PM, he has just not felt right for a while...    I am wondering whether the Toprol XL 25 mg that I added AFTER the pacemaker was implanted is the culprit.  Or whether there is something else (?extracardiac) going on.    Plan:  - Stop Toprol XL  - Limited echocardiogram - please do before the apt with me in January  - I am technically on vacation today, but will be in tomorrow, please let me know if other issues come up    Michaelle, this is FYI only.    CHANTALE

## 2022-12-21 NOTE — PROGRESS NOTES
Service Date: 2022    PRIMARY CARDIOLOGIST:  Zeb Roberts MD, as well as Tyson Ordonez MD, for rhythm issues.    REASON FOR VISIT:  Hypertension, dyspnea on exertion, and shortness of breath followup.    HISTORY OF PRESENT ILLNESS:  Kelton is an absolutely delightful 62-year-old gentleman with a past medical history significant for the followin.  Coronary artery disease with 4-vessel CABG in  by Dr. Pruett with LIMA to the LAD, SVG to the OM3, SVG to D2, SVG to the PDA.  Last angiogram in  showed a patent LIMA to the LAD as well as other grafts with just a 60% stenosis after the touchdown of the OM.  Stress test in  showed a small area of inferior ischemia with his anginal equivalent primarily being dyspnea on exertion.  2.  Normal EF at 55% -60% on AGUILA - 2022.  3.  Peripheral arterial disease with history of carotid endarterectomy, complicated by CVA and facial nerve trauma.  4.  Hypertension.    5.  Well controlled type 2 diabetes with the patient now on the SURPASS trial with a dramatic change in his diabetes management over the last 1 year, with him being now completely off insulin and an A1c of 5.0.  6.  Hyperlipidemia.  7.  Hepatitis C diagnosed in  when the patient presented with stage IV liver failure, treated with Pegasys and ribavirin with excellent result.  Followed by Dr. Bedolla with apparent resolution of the fatty infiltration of his liver since being in the SURPASS trial.  8.  Atrial flutter, diagnosed this summer with the patient undergoing flutter ablation in 2022 with recent recurrent episodes on anticoagulation.  9.  Permanent pacemaker in place due to intermittent heart block with implant on 2022.    I would like enough to meet Kelton as part of the SURPASS trial.  We worked on optimizing his blood pressure, but unfortunately he became more bradycardic, was found to have flutter, underwent ablation and now the pacemaker.  Unfortunately,  throughout this, while his diabetes numbers have become phenomenal, his fatty infiltration of his liver apparently has resolved, but he has felt very poorly.  His exercise tolerance is bad where he normally could do an hour on the stationary bike whereas now he can only do 15 minutes twice a day and he really has to push.  He continues to have intermittent sharp chest pains that feel like shocks and last about 10 seconds and are severe in nature.  This is much worse after his device was placed.  He does not go a day without having these happening.  He has not had syncope or presyncope.  He just has no energy and does not feel anywhere close to what he did before.  Of course, this has made him quite frustrated as he is extremely active at baseline, exercising with biking 20 miles at a time, working on the treadmill, etc.  He has not been able to do that since spring.  Unfortunately, things have seemed to, if anything, worsened since his device was put in.  He otherwise denies chest pain that happens with exertion, orthopnea or PND.  He does not feel like he is retaining any fluid.  He does not note any palpitations.    SOCIAL HISTORY:  He is  to my colleague, Carmina.  They have 2 children, a daughter and a son.  Quit smoking in 1993.  Minimal to no alcohol.  At baseline, is biking about 20 miles at a time, but cannot do that at this point.    PHYSICAL EXAMINATION:    GENERAL:  Reveals a well-developed, well-nourished, fit-appearing gentleman who appears frustrated today, but in no acute distress.  HEENT:  Normocephalic, atraumatic.  HEART:  Regular.  I do not appreciate murmur, rub or gallop.  LUNGS:  Clear, without wheezes, rales, or rhonchi.  EXTREMITIES:  Without peripheral edema.  SKIN:  Warm and dry.    Last device check reviewed and showed 70% V paced, VVI at 40.  Underlying rhythm was sinus at 60.  He had had some atrial flutter before that which had spontaneously converted.    Last AGUILA reviewed.   Holter monitor reviewed.    ASSESSMENT AND PLAN:    1.  Dyspnea on exertion and fatigue with extremely poor exercise tolerance with the patient feeling appropriately discouraged by this.  I think there are some things we can do to try to improve this.  Looking at his percentage in each heart rate, I suspect it is lower than he would have been when he was an active athlete and increasing his lower heart rate may help.  In addition, after discussion with my EP nurse colleagues, we elected to switch him from VVI to DDDR and, hopefully, he will have a more intrinsic pumping function.  We talked about ramping up his rate responsiveness, but at this point we will just make those 2 adjustments.  I would like him to push himself with exercise over the next several weeks.  He was also found to be in atrial flutter today and converted out of that with ATP in the clinic.  2.  Hypertension.  Home blood pressures ranging in the 130s, relatively at goal.  We will keep him on 20 mg of lisinopril twice a day, metoprolol 25 mg daily and amlodipine 2.5.  If need be, we could try adjusting medications and see if that helps with this dyspnea.  3.  Type 2 diabetes, on SURPASS trial, no longer requiring insulin and a hemoglobin A1c at 5.  We discussed that this has been the biggest change lately and it seems that there has been dramatic benefits, both in his diabetes as well as fatty infiltration of his liver, but at some point if he continues to feel poorly, we may want to decrease the dose of that study drug.  4.  Coronary artery disease with dyspnea on exertion being his anginal equivalent.  If he does not feel better with adjustments and, Dr. Ordonez would think it is appropriate, we certainly could send him for a left and right heart catheterization to assess that as well.  5.  Hyperlipidemia, excellent control.    It is my great pleasure to participate in this patient's care.  My hope is that we will be able to get him back to his  active self.  He will see Dr. Ordonez as scheduled in about 2 weeks.  I will see him back in about a month.  He will call sooner with concerns.    Michaelle Caba PA-C        D: 2022   T: 2022   MT: puneet    Name:     IHSAN SANCHEZ  MRN:      [id number]        Account:      [id number]   :      1960           Service Date: 2022       Document: [id number]

## 2022-12-21 NOTE — TELEPHONE ENCOUNTER
Received call back from Kelton and reviewed Dr. Ordonez' recommendations to stop the Toprol XL and have a limited echocardiogram completed prior to his appointment on 1/4/23 with Dr. Ordonez.  Patient verbalized understanding and agreement with plan.  He is currently in another appointment and will reach out to scheduling to schedule the limited echocardiogram scheduled later today.  He will notify us if he has any other issues.     SACHIN Torres

## 2022-12-21 NOTE — TELEPHONE ENCOUNTER
Called CHRIS Melara requesting call back so that we can go over recommendations outlined below by Dr. Ordonez. Order for limited echo placed.    ESTEPHANIE STEPHENSON

## 2022-12-21 NOTE — PROGRESS NOTES
Kelton is a 62 year old who is being evaluated via a billable telephone visit.      What phone number would you like to be contacted at? [phone]  How would you like to obtain your AVS? Rob Quiroz

## 2022-12-23 ENCOUNTER — HOSPITAL ENCOUNTER (OUTPATIENT)
Dept: CARDIOLOGY | Facility: CLINIC | Age: 62
Discharge: HOME | End: 2022-12-23
Attending: INTERNAL MEDICINE | Admitting: INTERNAL MEDICINE
Payer: COMMERCIAL

## 2022-12-23 DIAGNOSIS — I44.30 AV BLOCK: ICD-10-CM

## 2022-12-23 DIAGNOSIS — I10 BENIGN ESSENTIAL HYPERTENSION: ICD-10-CM

## 2022-12-23 DIAGNOSIS — Z95.0 CARDIAC PACEMAKER IN SITU: ICD-10-CM

## 2022-12-23 LAB — LVEF ECHO: NORMAL

## 2022-12-23 PROCEDURE — 93306 TTE W/DOPPLER COMPLETE: CPT

## 2022-12-23 PROCEDURE — 93306 TTE W/DOPPLER COMPLETE: CPT | Mod: 26 | Performed by: INTERNAL MEDICINE

## 2022-12-28 DIAGNOSIS — Z98.890 HISTORY OF LEFT-SIDED CAROTID ENDARTERECTOMY: Primary | ICD-10-CM

## 2022-12-28 DIAGNOSIS — I65.22 LEFT CAROTID ARTERY STENOSIS: ICD-10-CM

## 2022-12-29 NOTE — PROGRESS NOTES
Pt left message stating that he increased his celebrex to 1 pill twice daily for the past week as instructed by Michaelle Caba, but he can't really tell if it has helped. He wants to know what Michaelle thinks his next step should be since he hasn't noticed much of a change with the increase in his celebrex dose. I will route an update to Michaelle Caba. Rosy STEPHENSON December 29, 2022, 9:00 AM

## 2022-12-29 NOTE — PROGRESS NOTES
I'm sorry to hear it hasn't helped. He can go back to his normal daily dosing.  Please have him try heat or ice on his chest 20 min tid and see if that gets things settled down.

## 2022-12-29 NOTE — PROGRESS NOTES
I left message for pt with recommendations from Michaelle Caba. Pt to go back to daily celebrex dosing if it has not helped. Pt to try heat or ice on chest for 20 minutes three times daily to see if that gets things settled down. Pt has 1/4/23 visit with . Pt to call if he has any questions or concerns prior to next OV. Rosy STEPHENSON December 29, 2022, 2:14 PM

## 2023-01-02 NOTE — TELEPHONE ENCOUNTER
"Alert received for, \"AT/AF >= 0.5 hr for 1 days.\"  Mode switch logged 1/1 at 1710 marked as still in progress at time transmission received at 1744. Presenting EGM confirms A>V for AF with V rates in the 50s. Episode lasted at least 35m. Patient coming into clinic on 1/4 for 6wk check (followed by appt with Dr. Ordonez). Will route update to Dr. Ordonez to get clarification on whether he would like atrial ATP turned on for this patient.     up to 67.8% since mode was changed from DDDR . Call out to [place] to assess for any changes/improvements in symptoms since this chance was made. LVM requesting a call back.    ESTEPHANIE RN  "

## 2023-01-02 NOTE — TELEPHONE ENCOUNTER
Pt returned call. He reports that he is not positive if the rate response helped or not. He reports it isn't worse.     He is still trying to decipher when he is in AF. He reports intermittent chest discomfort and SOB with the episode that began yesterday. He was wondering if he was in AF yesterday. Confirmed with him that he was.     Pt has appt on Wednesday to address with Dr. Ordonez and device nurse. SH/RN

## 2023-01-02 NOTE — TELEPHONE ENCOUNTER
"It is reasonable to turn atrial ATP \"on\", though I expect it to somewhat effective only for his atrial flutter.  Thanks, DI  "

## 2023-01-04 ENCOUNTER — OFFICE VISIT (OUTPATIENT)
Dept: CARDIOLOGY | Facility: CLINIC | Age: 63
End: 2023-01-04
Payer: COMMERCIAL

## 2023-01-04 ENCOUNTER — ANCILLARY PROCEDURE (OUTPATIENT)
Dept: CARDIOLOGY | Facility: CLINIC | Age: 63
End: 2023-01-04
Attending: INTERNAL MEDICINE
Payer: COMMERCIAL

## 2023-01-04 VITALS
HEART RATE: 91 BPM | BODY MASS INDEX: 21.28 KG/M2 | WEIGHT: 152 LBS | DIASTOLIC BLOOD PRESSURE: 86 MMHG | SYSTOLIC BLOOD PRESSURE: 135 MMHG | HEIGHT: 71 IN

## 2023-01-04 DIAGNOSIS — I48.4 ATYPICAL ATRIAL FLUTTER (H): ICD-10-CM

## 2023-01-04 DIAGNOSIS — Z95.0 CARDIAC PACEMAKER IN SITU: ICD-10-CM

## 2023-01-04 DIAGNOSIS — I48.0 PAROXYSMAL ATRIAL FIBRILLATION (H): Primary | ICD-10-CM

## 2023-01-04 DIAGNOSIS — I44.2 COMPLETE ATRIOVENTRICULAR BLOCK (H): Primary | ICD-10-CM

## 2023-01-04 PROCEDURE — 99215 OFFICE O/P EST HI 40 MIN: CPT | Mod: 24 | Performed by: INTERNAL MEDICINE

## 2023-01-04 PROCEDURE — 93280 PM DEVICE PROGR EVAL DUAL: CPT | Performed by: INTERNAL MEDICINE

## 2023-01-04 NOTE — LETTER
1/4/2023    Danish Land MD  [address]  [place] [address]    RE: Vikash Burgos       Dear Colleague,     I had the pleasure of seeing Vikash Burgos in the [facility].  PHYSICIAN NOTE:  This visit was completed in person at the [facility].      I had the pleasure evaluating Mr. Kelton Burgos today.       Kelton is a delightful 62-year-old gentleman with past medical history significant for:  1.  Coronary artery disease with 4-vessel CABG in 2010 (LIMA to the LAD, SVG to the OM3, SVG to D2, SVG to the PDA).  Last cor angiogram in 2014 showed patent LIMA to the LAD.  Patent vein grafts with only 60% stenosis of the OM after touchdown of graft.  Stress test in 2021 showed a small area of inferior ischemia.   2.  Most recent LVEF 60-65% (echocardiogram in 12/2022).  3.  Peripheral arterial disease.  Carotid endarterectomy 02/2021 was complicated by CVA and facial nerve trauma.  He sees Dr. Stubbs.  4.  Hypertension.  5.  Well-controlled type 2 diabetes.  6.  Hyperlipidemia.  7.  Hepatitis C diagnosed in 2005 when the patient presented with stage IV liver failure, treated with Pegasys and ribavirin with excellent results.  Followed by Dr. Bedolla.    8.  SURPASS trial.  9.  CTI flutter with atypical electrocardiographic appearance due to large RA scar.  Successful CTI ablation on 9/9/2022.  Documentation of paroxysmal atrial fibrillation following ablation of atrial flutter. Recurrence of atrial flutter in 11/2022 lasting several days, unclear if CTI or other mechanism (no available 12-lead ECG; cycle length was longer than prior CTI flutter.)  10. [id number] score of at least 3. On apixaban.      Kelton has not felt well lately.  His main symptom has been exercise intolerance.  He believes this became worse after the pacemaker was implanted.  On the day of device implantation the pacemaker was programmed VVI 40 ppm, essentially to prevent long pauses and  "possible syncope related to AV block.  The rationale for VVI instead of DDD programming had to do with Kelton's long intrinsic AV conduction time.  I anticipated very frequent RV pacing had we programmed it DDD.  After pacemaker implantation, I also prescribed Toprol-XL 25 mg daily because a 14-day cardiac monitor (performed after atrial flutter ablation) had shown 7% AF burden with periods of RVR.     Given Kelton's concerns 2 interventions were reecntly done:    1. His pacemaker was reprogrammed to DDDR.  I will note that during VVI 40 ppm mode, pacing requirement was minimal, only 11%.    2. Metoprolol XL was stopped.    Following the above interventions, Kelton's fatigue and exercise intolerance marginally improved.  Today he complained of a discomfort near the left nipple.  A few weeks ago he had a sharp chest sensation as well but this has resolved.      PHYSICAL EXAMINATION:  Vital signs: 135/86, 91, 180 cm, BMI 21.2  General: Extremely pleasant gentleman, in no apparent distress  ENT/Mouth:  no nasal discharge.  Eyes:  normal conjunctivae.   Neck:  no thyromegaly or lymphadenopathy.  Chest/Lungs:  patient is not dyspneic.  Lungs CTA, without rales or wheezing.    Cardiovascular: The pacemaker pocket has healed perfectly.  Normal JVP, rhythm is regular.  No gallop, murmur or rub.    Abdomen:  no abdominal distention.  Soft, negative HJR.    Extremities:  no edema  Skin:  no xanthelasma.  No facial laceration.  Neurologic:  alert & oriented x 3.  Normal arm motion bilateral, no tremors.    Vascular:  2+ carotids without bruits.  2+ radials.        DIAGNOSTIC STUDIES:  Device interrogation: Estimated longevity 14.5 years.  67% V pacing.  2 episode of atrial arrhythmia, longest of approximately 20 hours.  Echocardiogram: Normal LVEF.  \"Severe eccentric LVH\" noted.  1+ TR.  Normal IVC collapsibility suggesting normal RA pressure.      IMPRESSION:  1. Exercise intolerance, fatigue.  I do not have a clear cardiac reason " "that explains why he now feels worse than a few months earlier.  He is not in CHF by exam.  On his recent echocardiogram, LV/RV function were normal and IVC size/collapsibility were also normal.  Pacemaker reprogramming to DDDR has not made a significant difference in symptoms.  Kelton seems anxious and concerned about every little thing.  I am starting to wonder whether anxiety contributes to his symptomatology.  2. Paroxysmal atrial arrhythmias.  We have recently documented both paroxysmal atrial fibrillation as well as atrial flutter (mechanism of flutter unclear but cycle length is longer than the previously ablated CTI flutter).  The atrial arrhythmias are not symptomatic enough and the burden is not high enough to justify an antiarrhythmic drug at the moment.  3. Second-degree AV block.  As expected, ventricular pacing has increased from 11% to 67% after switching pacing mode from VVI to DDDR.  We left pacing mode as is today.  4. Atypical chest discomfort.  I am wondering whether on occasion he feels his atrial arrhythmias.  Alternatively, this symptom may not be cardiac.  It does not sound ischemic.  5. \"Severe eccentric LV hypertrophy\".  I  reviewed his recent echocardiogram as well as recent AGUILA.  There appears to be some LVH.  I personally do not see severe LVH.  I asked Kelton to discuss this with Dr. Roberts whom he is seeing in 1 month.    RECOMMENDATIONS:  1. Reassurance.  Continue current medications.  2. I asked him to gradually return back to a regular exercise routine.  3. See Dr. Roberts in early February, as scheduled.    It was my pleasure seeing this delightful patient.  Please feel free to call with any questions.   Total time spent today, including time for chart review and documentation, was 45 minutes.      Tyson Ordonez MD, [facility]    Thank you for allowing me to participate in the care of your patient.      Sincerely,     Tyson Ordonez MD     [facility]" [facility]  cc:   No referring provider defined for this encounter.

## 2023-01-04 NOTE — PROGRESS NOTES
PHYSICIAN NOTE:  This visit was completed in person at the [facility].      I had the pleasure evaluating Mr. Kelton Burgos today.       Kelton is a delightful 62-year-old gentleman with past medical history significant for:  1.  Coronary artery disease with 4-vessel CABG in 2010 (LIMA to the LAD, SVG to the OM3, SVG to D2, SVG to the PDA).  Last cor angiogram in 2014 showed patent LIMA to the LAD.  Patent vein grafts with only 60% stenosis of the OM after touchdown of graft.  Stress test in 2021 showed a small area of inferior ischemia.   2.  Most recent LVEF 60-65% (echocardiogram in 12/2022).  3.  Peripheral arterial disease.  Carotid endarterectomy 02/2021 was complicated by CVA and facial nerve trauma.  He sees Dr. Stubbs.  4.  Hypertension.  5.  Well-controlled type 2 diabetes.  6.  Hyperlipidemia.  7.  Hepatitis C diagnosed in 2005 when the patient presented with stage IV liver failure, treated with Pegasys and ribavirin with excellent results.  Followed by Dr. Bedolla.    8.  SURPASS trial.  9.  CTI flutter with atypical electrocardiographic appearance due to large RA scar.  Acutely successful CTI ablation on 9/9/2022.  Paroxysmal atrial fibrillation documented after flutter ablation. Recurrence of atrial flutter in 11/2022 lasting several days with spontaneous termination, unclear if CTI or other mechanism (no available 12-lead ECG; cycle length was significantly longer than prior CTI flutter.)  10. [id number] score of at least 3. On apixaban.      Kelton has not felt well lately.  His main symptom has been exercise intolerance.  He believes this became worse after the pacemaker was implanted.  On the day of device implantation the pacemaker was programmed VVI 40 ppm, essentially to prevent long pauses and possible syncope related to AV block.  The rationale for VVI instead of DDD programming had to do with Kelton's long intrinsic AV conduction time.  I anticipated frequent RV pacing had we  "programmed it DDD.  After pacemaker implantation, I also prescribed Toprol-XL 25 mg daily because a 14-day cardiac monitor (performed after atrial flutter ablation) showed 7% AF with periods of RVR.     Given Kelton's concerns, 2 interventions were reecntly done:    1. His pacemaker was reprogrammed to DDDR.  I will note that during VVI 40 ppm mode, pacing requirement was only 11%.    2. Metoprolol XL was stopped.    Following the above interventions, Kelton's fatigue and exercise intolerance only marginally improved.  Today he also complained of a discomfort near the left nipple.  A few weeks ago he felt sharp chest sensation as well, but this has resolved.      PHYSICAL EXAMINATION:  Vital signs: 135/86, 91, 180 cm, BMI 21.2  General: Extremely pleasant gentleman, in no apparent distress  ENT/Mouth:  no nasal discharge.  Eyes:  normal conjunctivae.   Neck:  no thyromegaly or lymphadenopathy.  Chest/Lungs:  patient is not dyspneic.  Lungs CTA, without rales or wheezing.    Cardiovascular: The pacemaker pocket has healed perfectly.  Normal JVP, rhythm is regular.  No gallop, murmur or rub.    Abdomen:  no abdominal distention.  Soft, negative HJR.    Extremities:  no edema  Skin:  no xanthelasma.  No facial laceration.  Neurologic:  alert & oriented x 3.  Normal arm motion bilateral, no tremors.    Vascular:  2+ carotids without bruits.  2+ radials.        DIAGNOSTIC STUDIES:  Device interrogation: Estimated longevity 14.5 years.  67% V pacing.  2 episode of atrial arrhythmia, longest of approximately 20 hours.  Echocardiogram: Normal LVEF.  \"Severe eccentric LVH\" noted.  1+ TR.  Normal IVC collapsibility suggesting normal RA pressure.      IMPRESSION:  1. Exercise intolerance, fatigue.  I do not have a clear cardiac reason that explains why Ketlon feels worse than a few months earlier.  He is not in CHF by exam.  On his recent echocardiogram, LV/RV function were normal and IVC size/collapsibility were also normal.  " "Pacemaker reprogramming to DDDR has not made a significant difference in symptoms.  Kelton seems concerned about every little thing.  I am starting to wonder whether anxiety contributes to his symptomatology.  2. Paroxysmal atrial arrhythmias.  We have recently documented both paroxysmal atrial fibrillation as well as atrial flutter (mechanism of flutter unclear but cycle length was longer than the previously ablated CTI flutter).  The atrial arrhythmias are not symptomatic enough and the burden is not high enough to justify an antiarrhythmic drug at the moment.  3. Second-degree AV block.  As expected, ventricular pacing has increased from 11% to 67% after switching pacing mode from VVI to DDDR.  We left pacing mode as is today.  4. Atypical chest discomfort.  I am wondering whether on occasion he perecives his atrial arrhythmias.  Alternatively, this symptom may not be cardiac.  It does not sound ischemic.  5. \"Severe eccentric LV hypertrophy\".  I  reviewed his recent echocardiogram as well as recent AGUILA.  There appears to be some LVH.  I personally do not see severe LVH.  I asked Kelton to review this with Dr. Roberts whom he is seeing in 1 month.    RECOMMENDATIONS:  1. Reassurance.  Continue current medications.  2. I asked him to gradually return back to a regular exercise routine.  3. See Dr. Roberts in early February, as scheduled.    It was my pleasure seeing this delightful patient.  Please feel free to call with any questions.   Total time spent today, including time for chart review and documentation, was 45 minutes.      Tyson Ordonez MD, FACC      "

## 2023-01-13 LAB
MDC_IDC_EPISODE_DTM: NORMAL
MDC_IDC_EPISODE_DTM: NORMAL
MDC_IDC_EPISODE_DURATION: 13 S
MDC_IDC_EPISODE_DURATION: 53 S
MDC_IDC_EPISODE_ID: 11
MDC_IDC_EPISODE_ID: 12
MDC_IDC_EPISODE_TYPE: NORMAL
MDC_IDC_EPISODE_TYPE: NORMAL
MDC_IDC_LEAD_IMPLANT_DT: NORMAL
MDC_IDC_LEAD_IMPLANT_DT: NORMAL
MDC_IDC_LEAD_LOCATION: NORMAL
MDC_IDC_LEAD_LOCATION: NORMAL
MDC_IDC_LEAD_LOCATION_DETAIL_1: NORMAL
MDC_IDC_LEAD_LOCATION_DETAIL_1: NORMAL
MDC_IDC_LEAD_MFG: NORMAL
MDC_IDC_LEAD_MFG: NORMAL
MDC_IDC_LEAD_MODEL: NORMAL
MDC_IDC_LEAD_MODEL: NORMAL
MDC_IDC_LEAD_POLARITY_TYPE: NORMAL
MDC_IDC_LEAD_POLARITY_TYPE: NORMAL
MDC_IDC_LEAD_SERIAL: NORMAL
MDC_IDC_LEAD_SERIAL: NORMAL
MDC_IDC_MSMT_BATTERY_DTM: NORMAL
MDC_IDC_MSMT_BATTERY_REMAINING_LONGEVITY: 176 MO
MDC_IDC_MSMT_BATTERY_RRT_TRIGGER: 2.62
MDC_IDC_MSMT_BATTERY_STATUS: NORMAL
MDC_IDC_MSMT_BATTERY_VOLTAGE: 3.21 V
MDC_IDC_MSMT_LEADCHNL_RA_IMPEDANCE_VALUE: 342 OHM
MDC_IDC_MSMT_LEADCHNL_RA_IMPEDANCE_VALUE: 570 OHM
MDC_IDC_MSMT_LEADCHNL_RA_PACING_THRESHOLD_AMPLITUDE: 0.5 V
MDC_IDC_MSMT_LEADCHNL_RA_PACING_THRESHOLD_PULSEWIDTH: 0.4 MS
MDC_IDC_MSMT_LEADCHNL_RA_SENSING_INTR_AMPL: 3 MV
MDC_IDC_MSMT_LEADCHNL_RA_SENSING_INTR_AMPL: 3.38 MV
MDC_IDC_MSMT_LEADCHNL_RV_IMPEDANCE_VALUE: 361 OHM
MDC_IDC_MSMT_LEADCHNL_RV_IMPEDANCE_VALUE: 456 OHM
MDC_IDC_MSMT_LEADCHNL_RV_PACING_THRESHOLD_AMPLITUDE: 0.62 V
MDC_IDC_MSMT_LEADCHNL_RV_PACING_THRESHOLD_PULSEWIDTH: 0.4 MS
MDC_IDC_MSMT_LEADCHNL_RV_SENSING_INTR_AMPL: 8.5 MV
MDC_IDC_MSMT_LEADCHNL_RV_SENSING_INTR_AMPL: 9.38 MV
MDC_IDC_PG_IMPLANT_DTM: NORMAL
MDC_IDC_PG_MFG: NORMAL
MDC_IDC_PG_MODEL: NORMAL
MDC_IDC_PG_SERIAL: NORMAL
MDC_IDC_PG_TYPE: NORMAL
MDC_IDC_SESS_CLINIC_NAME: NORMAL
MDC_IDC_SESS_DTM: NORMAL
MDC_IDC_SESS_TYPE: NORMAL
MDC_IDC_SET_BRADY_HYSTRATE: NORMAL
MDC_IDC_SET_BRADY_LOWRATE: 40 {BEATS}/MIN
MDC_IDC_SET_BRADY_MODE: NORMAL
MDC_IDC_SET_LEADCHNL_RA_SENSING_ANODE_ELECTRODE_1: NORMAL
MDC_IDC_SET_LEADCHNL_RA_SENSING_ANODE_LOCATION_1: NORMAL
MDC_IDC_SET_LEADCHNL_RA_SENSING_CATHODE_ELECTRODE_1: NORMAL
MDC_IDC_SET_LEADCHNL_RA_SENSING_CATHODE_LOCATION_1: NORMAL
MDC_IDC_SET_LEADCHNL_RA_SENSING_POLARITY: NORMAL
MDC_IDC_SET_LEADCHNL_RA_SENSING_SENSITIVITY: 0.3 MV
MDC_IDC_SET_LEADCHNL_RV_PACING_AMPLITUDE: 2 V
MDC_IDC_SET_LEADCHNL_RV_PACING_ANODE_ELECTRODE_1: NORMAL
MDC_IDC_SET_LEADCHNL_RV_PACING_ANODE_LOCATION_1: NORMAL
MDC_IDC_SET_LEADCHNL_RV_PACING_CAPTURE_MODE: NORMAL
MDC_IDC_SET_LEADCHNL_RV_PACING_CATHODE_ELECTRODE_1: NORMAL
MDC_IDC_SET_LEADCHNL_RV_PACING_CATHODE_LOCATION_1: NORMAL
MDC_IDC_SET_LEADCHNL_RV_PACING_POLARITY: NORMAL
MDC_IDC_SET_LEADCHNL_RV_PACING_PULSEWIDTH: 0.4 MS
MDC_IDC_SET_LEADCHNL_RV_SENSING_ANODE_ELECTRODE_1: NORMAL
MDC_IDC_SET_LEADCHNL_RV_SENSING_ANODE_LOCATION_1: NORMAL
MDC_IDC_SET_LEADCHNL_RV_SENSING_CATHODE_ELECTRODE_1: NORMAL
MDC_IDC_SET_LEADCHNL_RV_SENSING_CATHODE_LOCATION_1: NORMAL
MDC_IDC_SET_LEADCHNL_RV_SENSING_POLARITY: NORMAL
MDC_IDC_SET_LEADCHNL_RV_SENSING_SENSITIVITY: 0.9 MV
MDC_IDC_SET_ZONE_DETECTION_INTERVAL: 200 MS
MDC_IDC_SET_ZONE_DETECTION_INTERVAL: 350 MS
MDC_IDC_SET_ZONE_DETECTION_INTERVAL: 400 MS
MDC_IDC_SET_ZONE_TYPE: NORMAL
MDC_IDC_STAT_AT_BURDEN_PERCENT: 1.4 %
MDC_IDC_STAT_AT_DTM_END: NORMAL
MDC_IDC_STAT_AT_DTM_START: NORMAL
MDC_IDC_STAT_BRADY_AP_VP_PERCENT: 15.94 %
MDC_IDC_STAT_BRADY_AP_VS_PERCENT: 3.38 %
MDC_IDC_STAT_BRADY_AS_VP_PERCENT: 51.39 %
MDC_IDC_STAT_BRADY_AS_VS_PERCENT: 29.28 %
MDC_IDC_STAT_BRADY_DTM_END: NORMAL
MDC_IDC_STAT_BRADY_DTM_START: NORMAL
MDC_IDC_STAT_BRADY_RA_PERCENT_PACED: 20.77 %
MDC_IDC_STAT_BRADY_RV_PERCENT_PACED: 67.28 %
MDC_IDC_STAT_EPISODE_RECENT_COUNT: 0
MDC_IDC_STAT_EPISODE_RECENT_COUNT: 1
MDC_IDC_STAT_EPISODE_RECENT_COUNT: 1
MDC_IDC_STAT_EPISODE_RECENT_COUNT_DTM_END: NORMAL
MDC_IDC_STAT_EPISODE_RECENT_COUNT_DTM_START: NORMAL
MDC_IDC_STAT_EPISODE_TOTAL_COUNT: 0
MDC_IDC_STAT_EPISODE_TOTAL_COUNT: 1
MDC_IDC_STAT_EPISODE_TOTAL_COUNT: 4
MDC_IDC_STAT_EPISODE_TOTAL_COUNT_DTM_END: NORMAL
MDC_IDC_STAT_EPISODE_TOTAL_COUNT_DTM_START: NORMAL
MDC_IDC_STAT_EPISODE_TYPE: NORMAL

## 2023-01-16 ENCOUNTER — RESEARCH ENCOUNTER (OUTPATIENT)
Dept: CARDIOLOGY | Facility: CLINIC | Age: 63
End: 2023-01-16
Payer: COMMERCIAL

## 2023-01-16 DIAGNOSIS — I25.10 CORONARY ARTERY DISEASE INVOLVING NATIVE CORONARY ARTERY OF NATIVE HEART WITHOUT ANGINA PECTORIS: Primary | ICD-10-CM

## 2023-01-16 DIAGNOSIS — Z00.6 EXAMINATION OF PARTICIPANT OR CONTROL IN CLINICAL RESEARCH: ICD-10-CM

## 2023-01-16 DIAGNOSIS — E11.9 DIABETES MELLITUS (H): ICD-10-CM

## 2023-01-24 PROCEDURE — 82274 ASSAY TEST FOR BLOOD FECAL: CPT | Performed by: INTERNAL MEDICINE

## 2023-01-25 LAB — HEMOCCULT STL QL IA: NEGATIVE

## 2023-01-25 NOTE — RESULT ENCOUNTER NOTE
The following letter pertains to your most recent diagnostic tests:    Good news! Your stool test for colon cancer screening is negative.  This should be repeated in one year.          Sincerely,    Dr. Land

## 2023-01-26 ENCOUNTER — LAB (OUTPATIENT)
Dept: LAB | Facility: CLINIC | Age: 63
End: 2023-01-26
Payer: COMMERCIAL

## 2023-01-26 DIAGNOSIS — E78.2 MIXED HYPERLIPIDEMIA: ICD-10-CM

## 2023-01-26 DIAGNOSIS — R06.02 SOB (SHORTNESS OF BREATH): ICD-10-CM

## 2023-01-26 DIAGNOSIS — I10 BENIGN ESSENTIAL HYPERTENSION: ICD-10-CM

## 2023-01-26 DIAGNOSIS — I25.10 CORONARY ARTERY DISEASE INVOLVING NATIVE CORONARY ARTERY OF NATIVE HEART WITHOUT ANGINA PECTORIS: Chronic | ICD-10-CM

## 2023-01-26 LAB
ALT SERPL W P-5'-P-CCNC: 31 U/L (ref 10–50)
CHOLEST SERPL-MCNC: 109 MG/DL
HDLC SERPL-MCNC: 51 MG/DL
LDLC SERPL CALC-MCNC: 48 MG/DL
NONHDLC SERPL-MCNC: 58 MG/DL
TRIGL SERPL-MCNC: 52 MG/DL

## 2023-01-26 PROCEDURE — 36415 COLL VENOUS BLD VENIPUNCTURE: CPT | Performed by: INTERNAL MEDICINE

## 2023-01-26 PROCEDURE — 84460 ALANINE AMINO (ALT) (SGPT): CPT | Performed by: INTERNAL MEDICINE

## 2023-01-26 PROCEDURE — 80061 LIPID PANEL: CPT | Performed by: INTERNAL MEDICINE

## 2023-01-31 ENCOUNTER — OFFICE VISIT (OUTPATIENT)
Dept: CARDIOLOGY | Facility: CLINIC | Age: 63
End: 2023-01-31
Attending: INTERNAL MEDICINE
Payer: COMMERCIAL

## 2023-01-31 VITALS
DIASTOLIC BLOOD PRESSURE: 67 MMHG | WEIGHT: 153 LBS | BODY MASS INDEX: 21.42 KG/M2 | HEART RATE: 87 BPM | OXYGEN SATURATION: 99 % | HEIGHT: 71 IN | SYSTOLIC BLOOD PRESSURE: 132 MMHG

## 2023-01-31 DIAGNOSIS — E78.2 MIXED HYPERLIPIDEMIA: ICD-10-CM

## 2023-01-31 DIAGNOSIS — I10 BENIGN ESSENTIAL HYPERTENSION: ICD-10-CM

## 2023-01-31 DIAGNOSIS — R07.89 ATYPICAL CHEST PAIN: Primary | Chronic | ICD-10-CM

## 2023-01-31 DIAGNOSIS — R94.39 ABNORMAL CARDIOVASCULAR STRESS TEST: ICD-10-CM

## 2023-01-31 DIAGNOSIS — R06.02 SOB (SHORTNESS OF BREATH): ICD-10-CM

## 2023-01-31 DIAGNOSIS — I48.0 PAROXYSMAL ATRIAL FIBRILLATION (H): ICD-10-CM

## 2023-01-31 DIAGNOSIS — I25.10 CORONARY ARTERY DISEASE INVOLVING NATIVE CORONARY ARTERY OF NATIVE HEART WITHOUT ANGINA PECTORIS: Chronic | ICD-10-CM

## 2023-01-31 PROCEDURE — 99215 OFFICE O/P EST HI 40 MIN: CPT | Performed by: INTERNAL MEDICINE

## 2023-01-31 RX ORDER — BLOOD SUGAR DIAGNOSTIC
STRIP MISCELLANEOUS
COMMUNITY
Start: 2022-12-08

## 2023-01-31 NOTE — PROGRESS NOTES
HPI and Plan:   See dictation    Today's clinic visit entailed:  Review of the result(s) of each unique test - Device interrogation, echocardiogram, lab  The following tests were independently interpreted by me as noted in my documentation: Echocardiogram  Ordering of each unique test  Prescription drug management  45 minutes spent on the date of the encounter doing chart review, history and exam, documentation and further activities per the note  Provider  Link to MDM Help Grid     The level of medical decision making during this visit was of high complexity.      Orders Placed This Encounter   Procedures     Basic metabolic panel     Lipid Profile     ALT     Follow-Up with Cardiology     Echocardiogram Complete       Orders Placed This Encounter   Medications     ONETOUCH VERIO IQ test strip       Medications Discontinued During This Encounter   Medication Reason     metoprolol succinate ER (TOPROL XL) 25 MG 24 hr tablet Stopped by Patient (No AVS)         Encounter Diagnoses   Name Primary?     Atypical chest pain Yes     Coronary artery disease involving native coronary artery of native heart without angina pectoris      Benign essential hypertension      SOB (shortness of breath)      Mixed hyperlipidemia      Abnormal cardiovascular stress test      Paroxysmal atrial fibrillation (H)        CURRENT MEDICATIONS:  Current Outpatient Medications   Medication Sig Dispense Refill     amLODIPine (NORVASC) 2.5 MG tablet Take 1 tablet (2.5 mg) by mouth daily 90 tablet 3     apixaban ANTICOAGULANT (ELIQUIS) 5 MG tablet Take 5 mg by mouth 2 times daily Resume on Wednesday 11/9/22. 180 tablet 3     celecoxib (CELEBREX) 100 MG capsule TAKE 1 CAPSULE BY MOUTH EVERY DAY 30 capsule 1     chlorhexidine (PERIDEX) 0.12 % solution Take 15 mLs by mouth 2 times daily as needed   5     cyanocobalamin 1000 MCG SUBL Place 1,000 mcg under the tongue daily       glucosamine-chondroitin 500-400 MG CAPS per capsule Take 1 capsule by  mouth daily       lisinopril (ZESTRIL) 20 MG tablet Take 1 tablet (20 mg) by mouth 2 times daily 180 tablet 3     rosuvastatin (CRESTOR) 20 MG tablet Take 1 tablet (20 mg) by mouth daily 90 tablet 3     Vitamin D, Cholecalciferol, 1000 units TABS Take 1,000 Units by mouth daily       zolpidem (AMBIEN) 5 MG tablet Take 1 tablet (5 mg) by mouth nightly as needed for sleep 30 tablet 0     B-D U/F 31G X 8 MM insulin pen needle USE ONE PEN NEEDLES DAILY OR AS DIRECTED. (Patient not taking: Reported on 1/31/2023) 100 each 3     ONETOUCH VERIO IQ test strip          ALLERGIES     Allergies   Allergen Reactions     Pcn [Penicillins] Rash     Rash with PCN only. Patient has taken amoxicillin with no rash.       PAST MEDICAL HISTORY:  Past Medical History:   Diagnosis Date     Basal cell carcinoma      Carotid stenosis, left     s/p left CEA 2/2020     Cerebral infarction (H)     left CVA 2/2020 post-op carotid endarterectomy     Coronary artery disease     4 vessel bypass November 2010; LIMA ->LAD, SVG-> OM3, SVG -> D2, SVG -> PDA     Diabetes mellitus (H) 2005    neuropathy     Generalized anxiety disorder 05/02/2014     Hepatitis C, chronic (H) 2005     Hyperlipidemia LDL goal < 70      Hypertension      Liver diseases     9/15 Liver is 10 cm in span without left lobe enlargement     Persistent microalbuminuria associated with type 2 diabetes mellitus (H) 05/06/2015     Renal artery stenosis (H)        PAST SURGICAL HISTORY:  Past Surgical History:   Procedure Laterality Date     ARTHROSCOPY KNEE RT/LT      left     COLONOSCOPY       CORONARY ARTERY BYPASS  11/18/201    Coronary artery bypass grafting x 4 with placement of the left internal mammary artery to the distal midportion of the left anterior descending artery, saphenous vein graft from aorta to the third obtuse marginal branch of circumflex coronary artery, saphenous vein graft from aorta to the second diagonal branch, saphenous vein graft from aorta to the  posterior descending artery.     ENDARTERECTOMY CAROTID Left 2020    Procedure: LEFT CAROTID ENDARTERECTOMY WITH EEG;  Surgeon: Semaj Stubbs MD;  Location:  OR     EP ABLATION ATRIAL FLUTTER N/A 2022    Procedure: Ablation Atrial Flutter;  Surgeon: Tyson Ordonez MD;  Location:  HEART CARDIAC CATH LAB     EP PACEMAKER DEVICE & LEAD IMPLANT- RIGHT ATRIAL & LEFT VENTRICULAR N/A 2022    Procedure: Pacemaker Device & Lead Implant- Right Atrial & Left Ventricular;  Surgeon: Tyson Ordonez MD;  Location:  HEART CARDIAC CATH LAB     ESOPHAGOSCOPY, GASTROSCOPY, DUODENOSCOPY (EGD), COMBINED  10/31/2011    Procedure:COMBINED ESOPHAGOSCOPY, GASTROSCOPY, DUODENOSCOPY (EGD); Surgeon:ALONSO ALDANA; Location: GI     ESOPHAGOSCOPY, GASTROSCOPY, DUODENOSCOPY (EGD), COMBINED N/A 3/8/2018    Procedure: COMBINED ESOPHAGOSCOPY, GASTROSCOPY, DUODENOSCOPY (EGD);  EGD;  Surgeon: Angel Luis Justice MD;  Location:  GI     HEART CATH CORONARY ANGIOGRAM W/LV GRAM  9--10    CV Surgery recommended     HEART CATH CORONARY ANGIOGRAM W/LV GRAM  -14    Medical management     HERNIA REPAIR, INGUINAL RT/LT      left       FAMILY HISTORY:  Family History   Problem Relation Age of Onset     C.A.D. Father      Cancer Father         bladder     Heart Surgery Father         bypass surgery     Heart Disease Mother        SOCIAL HISTORY:  Social History     Socioeconomic History     Marital status:      Spouse name: None     Number of children: None     Years of education: None     Highest education level: None   Tobacco Use     Smoking status: Former     Packs/day: 0.50     Years: 12.00     Pack years: 6.00     Types: Cigarettes     Start date:      Quit date: 2005     Years since quittin.0     Smokeless tobacco: Never   Substance and Sexual Activity     Alcohol use: Yes     Comment: minimal 1 glass of wine per week     Drug use: No     Sexual activity: Yes      "Partners: Female   Other Topics Concern     Parent/sibling w/ CABG, MI or angioplasty before 65F 55M? No     Caffeine Concern Yes     Comment: 2 cups coffee per day     Special Diet Yes     Comment: low carb diet, low sugar     Exercise Yes     Comment: treadmill 40 minutes, walk, daily, bike 30 minutes every other day       Review of Systems:  Skin:          Eyes:         ENT:         Respiratory:  Negative       Cardiovascular:  Negative;syncope or near-syncope;cyanosis;fatigue;exercise intolerance;edema palpitations;chest pain;Positive for;lightheadedness;dizziness    Gastroenterology:        Genitourinary:         Musculoskeletal:         Neurologic:         Psychiatric:         Heme/Lymph/Imm:         Endocrine:           Physical Exam:  Vitals: /67   Pulse 87   Ht 1.803 m (5' 11\")   Wt 69.4 kg (153 lb)   SpO2 99%   BMI 21.34 kg/m      Constitutional:  cooperative, alert and oriented, well developed, well nourished, in no acute distress appears anxious      Skin:  warm and dry to the touch, no apparent skin lesions or masses noted          Head:  normocephalic, no masses or lesions        Eyes:  pupils equal and round, conjunctivae and lids unremarkable, sclera white, no xanthalasma, EOMS intact, no nystagmus        Lymph:      ENT:  no pallor or cyanosis, dentition good        Neck:  JVP normal;no carotid bruit        Respiratory:  clear to auscultation         Cardiac: regular rhythm;no murmurs, gallops or rubs detected                pulses full and equal, no bruits auscultated                                        GI:           Extremities and Muscular Skeletal:  no edema;no spinal abnormalities noted;normal muscle strength and tone              Neurological:  no gross motor deficits        Psych:  affect appropriate, oriented to time, person and place Anxious      CC  Zeb Roberts MD  [address]  [place] [id number]              "

## 2023-01-31 NOTE — PROGRESS NOTES
Service Date: 01/31/2023    CLINIC VISIT    HISTORY OF PRESENT ILLNESS:  Kelton is a very nice 62-year-old gentleman with past medical history significant for diabetes mellitus, mixed hyperlipidemia, hypertension, coronary artery disease, carotid disease, atrial flutter, atrial flutter ablation, paroxysmal atrial fibrillation.    Coronary history dates back to 2010 when he underwent coronary bypass grafting x4 by Dr. Marshal Pruett.  A 2014 angiogram at the [facility] demonstrated all bypass grafts to be widely patent; however, he had diffuse small-caliber diabetic coronary arteries.  Stress nuclear scan in 01/2020.  He was able to exercise 13 minutes of standard Wade protocol with ejection fraction of 51%.  This did demonstrate a very small lateral apical defect and a very small inferolateral defect.  This was done for preoperative surgery.  I cleared him for surgery and he did well.    A left carotid endarterectomy by Dr. Devyn Stubbs was complicated by some focal nerve trauma and a probable cerebrovascular accident with some postoperative weakness in his right arm, right leg and some disequilibrium.  Over time, this has improved dramatically.  More recently, he underwent a CTI flutter ablation and was found to have a large amount of right atrial scar.  This was acutely successful.  However, subsequently, he had paroxysms of atrial fibrillation and then subsequently a recurrence of his atrial flutter in 11/2020.  He is on apixaban.    Kelton was complaining of some exercise intolerance for which Dr. Ordonez performed some pacemaker setting adjustments and discontinued his Toprol.  A followup echocardiogram raised the question of severe eccentric left ventricular hypertrophy with normal filling parameters and normal strain pattern.    Kelton returns to clinic at this time and his complaint is more of an atypical chest pain.  He describes a very superficial pain that occurs in a flash and then goes away.  He states he does not  have any deep discomfort.  He does have shortness of breath sometimes just sitting, and if he takes a deep breath, that usually takes care of it.  He has been doing quite a bit of bike riding and has been holding back, mostly because he does not want to trigger the pain.  He thinks his exercise tolerance is good.  He states he has [place] the [place] without any endurance issues.    He is spooked by the echocardiogram that demonstrates severe eccentric left ventricular hypertrophy and is worried about this.    Kelton has an atypical chest pain.  We discussed whether we want to do a stress test, and he states he is not particularly interested because he is not worried about his coronaries.  He thinks he can exercise to a very high workload without any problem.  He does not appear to have any symptoms suggestive of heart failure.    Interrogation of his device does show occasional mode switches, longest lasted almost 20 hours and was terminated by manual antitachycardic pacing but Dr. Ordonez has now activated that.  Pacemaker has since been programmed to DDDR mode.    Blood pressure is very well controlled at this time at 132/67 with a pulse of 87.    Weight 153 pounds.  He has lost a significant amount of weight since I have seen him by intention to help control his diabetes.    Fasting lipid profile is outstanding.  Total cholesterol is 109, HDL is 51, LDL is 48, triglycerides are 52.    Creatinine is normal 0.82 with normal electrolytes.    We talked about the left ventricular hypertrophy which I suspect is probably due to his poorly controlled hypertension over the years.  I do think it is more concentric than eccentric.  I did get a chance to review it after he left, as the next patient was late in arrival.  I will discuss this with Kelton.  At this time, I do not think any further evaluation is needed.  If he is still quite anxious about it, we could do an MRI which is a more accurate way to measure this.  I  think his measurement on his echocardiogram is somewhat tangential.    I told the most important thing is regular exercise, keep his weight down and get good control of his blood pressure.  He does have a defibrillator.  Otherwise, we will plan on followup in 1 year.  If he should have any problems, I would be glad to see him sooner.  He will follow in the Device Clinic.    He is tolerating his apixaban quite well and will continue that given his paroxysmal atrial fibrillation.    Zeb Roberts MD, [facility]        D: 2023   T: 2023   MT: carolynn    Name:     IHSAN SANCHEZ  MRN:      [id number]        Account:      [id number]   :      1960           Service Date: 2023       Document: [id number]

## 2023-01-31 NOTE — LETTER
1/31/2023    Danish Land MD  [address]  [place] [address]    RE: Vikash Burgos       Dear Colleague,     I had the pleasure of seeing Vikash Sharif Loretta in the [facility].  HPI and Plan:   See dictation    Today's clinic visit entailed:  Review of the result(s) of each unique test - Device interrogation, echocardiogram, lab  The following tests were independently interpreted by me as noted in my documentation: Echocardiogram  Ordering of each unique test  Prescription drug management  45 minutes spent on the date of the encounter doing chart review, history and exam, documentation and further activities per the note  Provider  Link to [employer] Help Grid     The level of medical decision making during this visit was of high complexity.      Orders Placed This Encounter   Procedures     Basic metabolic panel     Lipid Profile     ALT     Follow-Up with Cardiology     Echocardiogram Complete       Orders Placed This Encounter   Medications     ONETOUCH VERIO IQ test strip       Medications Discontinued During This Encounter   Medication Reason     metoprolol succinate ER (TOPROL XL) 25 MG 24 hr tablet Stopped by Patient (No AVS)         Encounter Diagnoses   Name Primary?     Atypical chest pain Yes     Coronary artery disease involving native coronary artery of native heart without angina pectoris      Benign essential hypertension      SOB (shortness of breath)      Mixed hyperlipidemia      Abnormal cardiovascular stress test      Paroxysmal atrial fibrillation (H)        CURRENT MEDICATIONS:  Current Outpatient Medications   Medication Sig Dispense Refill     amLODIPine (NORVASC) 2.5 MG tablet Take 1 tablet (2.5 mg) by mouth daily 90 tablet 3     apixaban ANTICOAGULANT (ELIQUIS) 5 MG tablet Take 5 mg by mouth 2 times daily Resume on Wednesday 11/9/22. 180 tablet 3     celecoxib (CELEBREX) 100 MG capsule TAKE 1 CAPSULE BY MOUTH EVERY DAY 30 capsule 1     chlorhexidine  (PERIDEX) 0.12 % solution Take 15 mLs by mouth 2 times daily as needed   5     cyanocobalamin 1000 MCG SUBL Place 1,000 mcg under the tongue daily       glucosamine-chondroitin 500-400 MG CAPS per capsule Take 1 capsule by mouth daily       lisinopril (ZESTRIL) 20 MG tablet Take 1 tablet (20 mg) by mouth 2 times daily 180 tablet 3     rosuvastatin (CRESTOR) 20 MG tablet Take 1 tablet (20 mg) by mouth daily 90 tablet 3     Vitamin D, Cholecalciferol, 1000 units TABS Take 1,000 Units by mouth daily       zolpidem (AMBIEN) 5 MG tablet Take 1 tablet (5 mg) by mouth nightly as needed for sleep 30 tablet 0     B-D U/F 31G X 8 MM insulin pen needle USE ONE PEN NEEDLES DAILY OR AS DIRECTED. (Patient not taking: Reported on 1/31/2023) 100 each 3     ONETOUCH VERIO IQ test strip          ALLERGIES     Allergies   Allergen Reactions     Pcn [Penicillins] Rash     Rash with PCN only. Patient has taken amoxicillin with no rash.       PAST MEDICAL HISTORY:  Past Medical History:   Diagnosis Date     Basal cell carcinoma      Carotid stenosis, left     s/p left CEA 2/2020     Cerebral infarction (H)     left CVA 2/2020 post-op carotid endarterectomy     Coronary artery disease     4 vessel bypass November 2010; LIMA ->LAD, SVG-> OM3, SVG -> D2, SVG -> PDA     Diabetes mellitus (H) 2005    neuropathy     Generalized anxiety disorder 05/02/2014     Hepatitis C, chronic (H) 2005     Hyperlipidemia LDL goal < 70      Hypertension      Liver diseases     9/15 Liver is 10 cm in span without left lobe enlargement     Persistent microalbuminuria associated with type 2 diabetes mellitus (H) 05/06/2015     Renal artery stenosis (H)        PAST SURGICAL HISTORY:  Past Surgical History:   Procedure Laterality Date     ARTHROSCOPY KNEE RT/LT      left     COLONOSCOPY       CORONARY ARTERY BYPASS  11/18/201    Coronary artery bypass grafting x 4 with placement of the left internal mammary artery to the distal midportion of the left anterior  descending artery, saphenous vein graft from aorta to the third obtuse marginal branch of circumflex coronary artery, saphenous vein graft from aorta to the second diagonal branch, saphenous vein graft from aorta to the posterior descending artery.     ENDARTERECTOMY CAROTID Left 2/21/2020    Procedure: LEFT CAROTID ENDARTERECTOMY WITH EEG;  Surgeon: Semaj Stubbs MD;  Location: [facility] OR     EP ABLATION ATRIAL FLUTTER N/A 9/9/2022    Procedure: Ablation Atrial Flutter;  Surgeon: Tyson Ordonez MD;  Location:  HEART CARDIAC CATH LAB     EP PACEMAKER DEVICE & LEAD IMPLANT- RIGHT ATRIAL & LEFT VENTRICULAR N/A 11/7/2022    Procedure: Pacemaker Device & Lead Implant- Right Atrial & Left Ventricular;  Surgeon: Tyson Ordonez MD;  Location:  HEART CARDIAC CATH LAB     ESOPHAGOSCOPY, GASTROSCOPY, DUODENOSCOPY (EGD), COMBINED  10/31/2011    Procedure:COMBINED ESOPHAGOSCOPY, GASTROSCOPY, DUODENOSCOPY (EGD); Surgeon:ALONSO ALDANA; Location: GI     ESOPHAGOSCOPY, GASTROSCOPY, DUODENOSCOPY (EGD), COMBINED N/A 3/8/2018    Procedure: COMBINED ESOPHAGOSCOPY, GASTROSCOPY, DUODENOSCOPY (EGD);  EGD;  Surgeon: Angel Luis Justice MD;  Location:  GI     HEART CATH CORONARY ANGIOGRAM W/LV GRAM  9-11-10    CV Surgery recommended     HEART CATH CORONARY ANGIOGRAM W/LV GRAM  2-28-14    Medical management     HERNIA REPAIR, INGUINAL RT/LT      left       FAMILY HISTORY:  Family History   Problem Relation Age of Onset     C.A.D. Father      Cancer Father         bladder     Heart Surgery Father         bypass surgery     Heart Disease Mother        SOCIAL HISTORY:  Social History     Socioeconomic History     Marital status:      Spouse name: None     Number of children: None     Years of education: None     Highest education level: None   Tobacco Use     Smoking status: Former     Packs/day: 0.50     Years: 12.00     Pack years: 6.00     Types: Cigarettes     Start date: 1993     Quit  "date: 2005     Years since quittin.0     Smokeless tobacco: Never   Substance and Sexual Activity     Alcohol use: Yes     Comment: minimal 1 glass of wine per week     Drug use: No     Sexual activity: Yes     Partners: Female   Other Topics Concern     Parent/sibling w/ CABG, MI or angioplasty before 65F 55M? No     Caffeine Concern Yes     Comment: 2 cups coffee per day     Special Diet Yes     Comment: low carb diet, low sugar     Exercise Yes     Comment: treadmill 40 minutes, walk, daily, bike 30 minutes every other day       Review of Systems:  Skin:          Eyes:         ENT:         Respiratory:  Negative       Cardiovascular:  Negative;syncope or near-syncope;cyanosis;fatigue;exercise intolerance;edema palpitations;chest pain;Positive for;lightheadedness;dizziness    Gastroenterology:        Genitourinary:         Musculoskeletal:         Neurologic:         Psychiatric:         Heme/Lymph/Imm:         Endocrine:           Physical Exam:  Vitals: /67   Pulse 87   Ht 1.803 m (5' 11\")   Wt 69.4 kg (153 lb)   SpO2 99%   BMI 21.34 kg/m      Constitutional:  cooperative, alert and oriented, well developed, well nourished, in no acute distress appears anxious      Skin:  warm and dry to the touch, no apparent skin lesions or masses noted          Head:  normocephalic, no masses or lesions        Eyes:  pupils equal and round, conjunctivae and lids unremarkable, sclera white, no xanthalasma, EOMS intact, no nystagmus        Lymph:      ENT:  no pallor or cyanosis, dentition good        Neck:  JVP normal;no carotid bruit        Respiratory:  clear to auscultation         Cardiac: regular rhythm;no murmurs, gallops or rubs detected                pulses full and equal, no bruits auscultated                                        GI:           Extremities and Muscular Skeletal:  no edema;no spinal abnormalities noted;normal muscle strength and tone              Neurological:  no gross motor " deficits        Psych:  affect appropriate, oriented to time, person and place Anxious      CC  Zeb Roberts MD  [address]  [place] [address]                Service Date: 01/31/2023    CLINIC VISIT    HISTORY OF PRESENT ILLNESS:  Kelton is a very nice 62-year-old gentleman with past medical history significant for diabetes mellitus, mixed hyperlipidemia, hypertension, coronary artery disease, carotid disease, atrial flutter, atrial flutter ablation, paroxysmal atrial fibrillation.    Coronary history dates back to 2010 when he underwent coronary bypass grafting x4 by Dr. Marshal Pruett.  A 2014 angiogram at the [facility] demonstrated all bypass grafts to be widely patent; however, he had diffuse small-caliber diabetic coronary arteries.  Stress nuclear scan in 01/2020.  He was able to exercise 13 minutes of standard Wade protocol with ejection fraction of 51%.  This did demonstrate a very small lateral apical defect and a very small inferolateral defect.  This was done for preoperative surgery.  I cleared him for surgery and he did well.    A left carotid endarterectomy by Dr. Devyn Stubbs was complicated by some focal nerve trauma and a probable cerebrovascular accident with some postoperative weakness in his right arm, right leg and some disequilibrium.  Over time, this has improved dramatically.  More recently, he underwent a CTI flutter ablation and was found to have a large amount of right atrial scar.  This was acutely successful.  However, subsequently, he had paroxysms of atrial fibrillation and then subsequently a recurrence of his atrial flutter in 11/2020.  He is on apixaban.    Kelton was complaining of some exercise intolerance for which Dr. Ordonez performed some pacemaker setting adjustments and discontinued his Toprol.  A followup echocardiogram raised the question of severe eccentric left ventricular hypertrophy with normal filling parameters and normal strain pattern.    Kelton returns to  clinic at this time and his complaint is more of an atypical chest pain.  He describes a very superficial pain that occurs in a flash and then goes away.  He states he does not have any deep discomfort.  He does have shortness of breath sometimes just sitting, and if he takes a deep breath, that usually takes care of it.  He has been doing quite a bit of bike riding and has been holding back, mostly because he does not want to trigger the pain.  He thinks his exercise tolerance is good.  He states [place] without any endurance issues.    He is spooked by the echocardiogram that demonstrates severe eccentric left ventricular hypertrophy and is worried about this.    Kelton has an atypical chest pain.  We discussed whether we want to do a stress test, and he states he is not particularly interested because he is not worried about his coronaries.  He thinks he can exercise to a very high workload without any problem.  He does not appear to have any symptoms suggestive of heart failure.    Interrogation of his device does show occasional mode switches, longest lasted almost 20 hours and was terminated by manual antitachycardic pacing but Dr. Ordonez has now activated that.  Pacemaker has since been programmed to DDDR mode.    Blood pressure is very well controlled at this time at 132/67 with a pulse of 87.    Weight 153 pounds.  He has lost a significant amount of weight since I have seen him by intention to help control his diabetes.    Fasting lipid profile is outstanding.  Total cholesterol is 109, HDL is 51, LDL is 48, triglycerides are 52.    Creatinine is normal 0.82 with normal electrolytes.    We talked about the left ventricular hypertrophy which I suspect is probably due to his poorly controlled hypertension over the years.  I do think it is more concentric than eccentric.  I did get a chance to review it after he left, as the next patient was late in arrival.  I will discuss this with  Kelton.  At this time, I do not think any further evaluation is needed.  If he is still quite anxious about it, we could do an MRI which is a more accurate way to measure this.  I think his measurement on his echocardiogram is somewhat tangential.    I told the most important thing is regular exercise, keep his weight down and get good control of his blood pressure.  He does have a defibrillator.  Otherwise, we will plan on followup in 1 year.  If he should have any problems, I would be glad to see him sooner.  He will follow in the Device Clinic.    He is tolerating his apixaban quite well and will continue that given his paroxysmal atrial fibrillation.    Zeb Roberts MD, [facility]        D: 2023   T: 2023   MT: carolynn    Name:     IHSAN SANCHEZ  MRN:      [id number]        Account:      [id number]   :      1960           Service Date: 2023       Document: [id number]      Thank you for allowing me to participate in the care of your patient.      Sincerely,     Zeb Roberts MD     [facility]  cc:   Zeb Roberts MD  [address]  [place] [address]

## 2023-02-05 DIAGNOSIS — M19.042 PRIMARY OSTEOARTHRITIS OF BOTH HANDS: ICD-10-CM

## 2023-02-05 DIAGNOSIS — M19.041 PRIMARY OSTEOARTHRITIS OF BOTH HANDS: ICD-10-CM

## 2023-02-06 RX ORDER — CELECOXIB 100 MG/1
CAPSULE ORAL
Qty: 30 CAPSULE | Refills: 1 | OUTPATIENT
Start: 2023-02-06

## 2023-02-06 RX ORDER — CELECOXIB 100 MG/1
100 CAPSULE ORAL DAILY
Qty: 90 CAPSULE | Refills: 0 | OUTPATIENT
Start: 2023-02-06

## 2023-02-06 NOTE — TELEPHONE ENCOUNTER
"Pt calling about a follow up request for celebrex.     He would like to have a refill for celebrex to help manage his pain for arthritis. Reviewed note from provider:    \"It would be safer for him to use APAP for pain rather then celebrex when on blood thinners\"    Pt stating tylenol does not relieve his arthritic pain. He has talked to 2 of his cardiology doctors about taking Celebrex, and they have approved of him taking this medication for pain. Reviewed recent changes to pt's medical history, pt states that there has been no changes since his last office visit with Dr. Land 11/14/22. Pt's had understood that the provider would be willing to prescribe celebrex at last office visit.     Pt requesting 90 day refill. Med request pending for review.     Routing to provider, please review/recommend.   "

## 2023-02-06 NOTE — TELEPHONE ENCOUNTER
Again, probably safer to use APAP rather than CISSE-2 inhibitor for pain given the fact that he is on Eliquis

## 2023-02-07 NOTE — TELEPHONE ENCOUNTER
"Spoke with patient     States he can't take Acetaminophen - he has stage IV liver disease and his hepatologist \"is dead set against this\"     Asking what else he can take for pain since he is on Eliquis and has liver disease    Would be agreeable to a virtual visit if needed     Going to [place] for several months - leaving end of Feb     Detailed message oktashia CHARLTON, Triage RN  [facility]     "

## 2023-02-08 RX ORDER — CELECOXIB 100 MG/1
100 CAPSULE ORAL DAILY PRN
Qty: 30 CAPSULE | Refills: 11 | Status: SHIPPED | OUTPATIENT
Start: 2023-02-08 | End: 2024-02-13

## 2023-02-08 NOTE — TELEPHONE ENCOUNTER
OK, then perhaps we should start a medication called omeprazole to take with the celebrex to protect the intestinal tract from bleeding risk while taking celebrex and apixiban.  I sent an prescription for omeprazole (prilosec) to the pharmacy along with celebrex.  Please inform patient.

## 2023-02-08 NOTE — TELEPHONE ENCOUNTER
Ok for detailed message (see below)     Left patient a detailed message with PCP response below    Asked that he call us back if he has any further questions/concerns     Mercedes CHARLTON, Triage RN  [facility]

## 2023-02-08 NOTE — TELEPHONE ENCOUNTER
Patient Contact    Attempt # 1    Was call answered?  No.  Left message on voicemail with information to call me back.    Upon call back, please relay provider message.

## 2023-02-18 ENCOUNTER — HEALTH MAINTENANCE LETTER (OUTPATIENT)
Age: 63
End: 2023-02-18

## 2023-02-20 DIAGNOSIS — I10 BENIGN ESSENTIAL HYPERTENSION: ICD-10-CM

## 2023-02-20 DIAGNOSIS — I48.92 ATRIAL FLUTTER, UNSPECIFIED TYPE (H): ICD-10-CM

## 2023-02-20 RX ORDER — LISINOPRIL 20 MG/1
20 TABLET ORAL 2 TIMES DAILY
Qty: 180 TABLET | Refills: 3 | Status: SHIPPED | OUTPATIENT
Start: 2023-02-20 | End: 2023-12-29

## 2023-02-20 NOTE — PROGRESS NOTES
REFILL REQUEST    [facility] Refill Guideline reviewed.  Medication meets criteria for refill.    Tara Hawthorne RN 02/20/23 4:05 PM

## 2023-02-27 ENCOUNTER — DOCUMENTATION ONLY (OUTPATIENT)
Dept: CARDIOLOGY | Facility: CLINIC | Age: 63
End: 2023-02-27

## 2023-02-27 ENCOUNTER — OFFICE VISIT (OUTPATIENT)
Dept: CARDIOLOGY | Facility: CLINIC | Age: 63
End: 2023-02-27
Payer: COMMERCIAL

## 2023-02-27 ENCOUNTER — ALLIED HEALTH/NURSE VISIT (OUTPATIENT)
Dept: CARDIOLOGY | Facility: CLINIC | Age: 63
End: 2023-02-27

## 2023-02-27 VITALS
HEART RATE: 63 BPM | SYSTOLIC BLOOD PRESSURE: 136 MMHG | BODY MASS INDEX: 20.99 KG/M2 | HEIGHT: 71 IN | DIASTOLIC BLOOD PRESSURE: 70 MMHG | WEIGHT: 149.91 LBS

## 2023-02-27 DIAGNOSIS — Z00.6 EXAMINATION OF PARTICIPANT OR CONTROL IN CLINICAL RESEARCH: Primary | ICD-10-CM

## 2023-02-27 DIAGNOSIS — E11.9 DIABETES MELLITUS (H): ICD-10-CM

## 2023-02-27 DIAGNOSIS — Z00.6 EXAMINATION OF PARTICIPANT OR CONTROL IN CLINICAL RESEARCH: ICD-10-CM

## 2023-02-27 DIAGNOSIS — E11.9 DIABETES MELLITUS (H): Primary | ICD-10-CM

## 2023-02-27 DIAGNOSIS — I25.10 CORONARY ARTERY DISEASE INVOLVING NATIVE CORONARY ARTERY OF NATIVE HEART WITHOUT ANGINA PECTORIS: Primary | ICD-10-CM

## 2023-02-27 DIAGNOSIS — I25.10 CORONARY ARTERY DISEASE INVOLVING NATIVE CORONARY ARTERY OF NATIVE HEART WITHOUT ANGINA PECTORIS: ICD-10-CM

## 2023-02-27 PROCEDURE — 99207 PR NO CHARGE-RESEARCH SERVICE: CPT

## 2023-02-27 NOTE — PROGRESS NOTES
"  SURPASS-CVOT Study [Effect of tirzepatide versus Dulaglutide on Major Cardiovascular Events in Patients with Type 2Diabetes]    Subject seen for Visit 16/Mo 12    Vital signs were done, measured after 5 minutes resting in a seated position - two readings taken one minute apart using automated cuff.    Vitals:    02/27/23 0906 02/27/23 0907   BP: 131/75 136/70   BP Location: Left arm Left arm   Patient Position: Sitting Sitting   Cuff Size: Adult Regular Adult Regular   Pulse: 71 63   Weight:  68 kg (149 lb 14.6 oz)   Height:  1.803 m (5' 10.98\")     Waist Circ 95.5 and 94.5    Patient denies hypoglycemia and visual disturbances since last visit.     Subject queried for adverse events, endpoints and medication changes. Med changes include Toprol discontinued 12/21/2022 and Influenza Vaccine given 0.5ccs IM 11/14/2022.  The patients following AE's have resolved.    Dizziness 11/7/2022   Tinnitus  11/7/2022   Othostatic Hypotension 11/7/2022   Atrial Fib 11/21/2022       New AE's include:    SURPASS-CVOT Study [Effect of tirzepatide versus Dulaglutide on Major Cardiovascular Events in Patients with Type 2 Diabetes]    Diagnosis/event description (diagnosis preferred):  Exercise Intolerance  Start date: 05/22/2022  Date resolved:     Intensity: ([] mild /[x]  moderate / [] severe)     Study Medication adjustment:  [] Yes   [x] No    Outcome: ([] resolved [with or without sequelae] /[] recovering / [x] not recovered / [] death / [] unknown)      Date study team aware of event: 02/27/2023    Was treatment given for this event:  [] Yes   [x] No   If yes, provide details  Serious adverse event?    Yes   [x] No    Special interest event?  [] Yes   [x] No    Endpoint? [] Yes   [x] No     Fatal event?  [] Yes   [x] No      Dr. Elias: Reasonable possibility that AE is related to study treatment    [] Yes   [x] No    Mis Husain RN    SURPASS-CVOT Study [Effect of tirzepatide versus Dulaglutide on Major Cardiovascular " Events in Patients with Type 2 Diabetes]    Diagnosis/event description (diagnosis preferred):  Trace Aortic Valve Regurgitation  Start date: 09/09/2022  Date resolved:     Intensity: ([x] mild /[]  moderate / [] severe)     Study Medication adjustment:  [] Yes   [x] No    Outcome: ([] resolved [with or without sequelae] /[] recovering / [x] not recovered / [] death / [] unknown)      Date study team aware of event: 02/27/2023    Was treatment given for this event:  [] Yes   [x] No   If yes, provide details  Serious adverse event?    Yes   [x] No    Special interest event?  [] Yes   [x] No    Endpoint? [] Yes   [x] No     Fatal event?  [] Yes   [x] No      Dr. Elias: Reasonable possibility that AE is related to study treatment    [] Yes   [x] No    Mis Husain RN    [employer]-CVOT Study [Effect of tirzepatide versus Dulaglutide on Major Cardiovascular Events in Patients with Type 2 Diabetes]    Diagnosis/event description (diagnosis preferred):  Weight Loss  Start date: 05/16/2022  Date resolved:     Intensity: ([] mild /[x]  moderate / [] severe)     Study Medication adjustment:  [] Yes   [x] No    Outcome: ([] resolved [with or without sequelae] /[] recovering / [x] not recovered / [] death / [] unknown)      Date study team aware of event: 02/27/2023    Was treatment given for this event:  [] Yes   [x] No   If yes, provide details  Serious adverse event?    Yes   [x] No    Special interest event?  [] Yes   [x] No    Endpoint? [] Yes   [x] No     Fatal event?  [] Yes   [x] No      Dr. Elias: Reasonable possibility that AE is related to study treatment    [x] Yes   [] No    Mis Husain RN    [employer]-CVOT Study [Effect of tirzepatide versus Dulaglutide on Major Cardiovascular Events in Patients with Type 2 Diabetes]    Diagnosis/event description (diagnosis preferred):  Intermittent Bradycardia  Start date: 06/27/2022  Date resolved: 11/07/2022    Intensity: ([x] mild /[]  moderate / [] severe)     Study  Medication adjustment:  [] Yes   [x] No    Outcome: ([x] resolved [with or without sequelae] /[] recovering / [] not recovered / [] death / [] unknown)      Date study team aware of event: 02/27/2023    Was treatment given for this event:  [x] Yes   [] No   If yes, provide details Toprol discontinue/d and pacemaker set @ 60 for Low HR    Serious adverse event?    Yes   [x] No    Special interest event?  [] Yes   [x] No    Endpoint? [] Yes   [x] No     Fatal event?  [] Yes   [x] No      Dr. Elias: Reasonable possibility that AE is related to study treatment    [] Yes   [x] No    Mis Husain RN    SURPASS-CVOT Study [Effect of tirzepatide versus Dulaglutide on Major Cardiovascular Events in Patients with Type 2 Diabetes]    Diagnosis/event description (diagnosis preferred):  Nausea  Start date: 11/19/2022  Date resolved: 11/21/2022    Intensity: ([x] mild /[]  moderate / [] severe)     Study Medication adjustment:  [] Yes   [x] No    Outcome: ([x] resolved [with or without sequelae] /[] recovering / [] not recovered / [] death / [] unknown)      Date study team aware of event: 02/27/2023    Was treatment given for this event:  [] Yes   [x] No   If yes, provide details  Serious adverse event?    Yes   [x] No    Special interest event?  [] Yes   [x] No    Endpoint? [] Yes   [x] No     Fatal event?  [] Yes   [x] No      Dr. Elias: Reasonable possibility that AE is related to study treatment    [x] Yes   [] No    Mis Husain RN    [employer]-CVOT Study [Effect of tirzepatide versus Dulaglutide on Major Cardiovascular Events in Patients with Type 2 Diabetes]    Diagnosis/event description (diagnosis preferred):  Nausea  Start date: 11/26/2022   Date resolved: 11/28/2022     Intensity: ([x] mild /[]  moderate / [] severe)     Study Medication adjustment:  [] Yes   [x] No    Outcome: ([x] resolved [with or without sequelae] /[] recovering / [] not recovered / [] death / [] unknown)      Date study team aware of event:  02/27/2023    Was treatment given for this event:  [] Yes   [x] No   If yes, provide details  Serious adverse event?    Yes   [x] No    Special interest event?  [] Yes   [x] No    Endpoint? [] Yes   [x] No     Fatal event?  [] Yes   [x] No      Dr. Elias: Reasonable possibility that AE is related to study treatment    [x] Yes   [] No    Mis Husain RN    SURPASS-CVOT Study [Effect of tirzepatide versus Dulaglutide on Major Cardiovascular Events in Patients with Type 2 Diabetes]    Diagnosis/event description (diagnosis preferred):  Nausea  Start date: 12/03/2022  Date resolved: 12/05/2022     Intensity: ([x] mild /[]  moderate / [] severe)     Study Medication adjustment:  [] Yes   [x] No    Outcome: ([x] resolved [with or without sequelae] /[] recovering / [] not recovered / [] death / [] unknown)      Date study team aware of event: 02/27/2023    Was treatment given for this event:  [] Yes   [x] No   If yes, provide details  Serious adverse event?    Yes   [x] No    Special interest event?  [] Yes   [x] No    Endpoint? [] Yes   [x] No     Fatal event?  [] Yes   [x] No      Dr. Elias: Reasonable possibility that AE is related to study treatment    [x] Yes   [] No    Mis Husain RN    [employer]-CVOT Study [Effect of tirzepatide versus Dulaglutide on Major Cardiovascular Events in Patients with Type 2 Diabetes]    Diagnosis/event description (diagnosis preferred):  Nausea  Start date: 12/10/2022   Date resolved: 12/12/2022    Intensity: ([x] mild /[]  moderate / [] severe)     Study Medication adjustment:  [] Yes   [x] No    Outcome: ([x] resolved [with or without sequelae] /[] recovering / [] not recovered / [] death / [] unknown)      Date study team aware of event: 02/27/2023    Was treatment given for this event:  [] Yes   [x] No   If yes, provide details  Serious adverse event?    Yes   [x] No    Special interest event?  [] Yes   [x] No    Endpoint? [] Yes   [x] No     Fatal event?  [] Yes   [x]  No      Dr. Elias: Reasonable possibility that AE is related to study treatment    [x] Yes   [] No    Mis Husain RN      Adherence/lifestyle reinforcement done     Fasting labs drawn and sent to central lab per protocol.    No of pens dispensed at last visit 16  Any Returned medication 0  Date of first dose since last visit: 11/17/2022  Date of last dose taken since last visit: 02/23/2023      Follow up in clinic in 3 months.

## 2023-02-27 NOTE — LETTER
"2/27/2023    Danish Land MD  [address]  [place] [address]    RE: [place] Kole Burgos       Dear Colleague,     I had the pleasure of seeing Vikash Burgos in the [facility].    SURPASS-CVOT Study [Effect of tirzepatide versus Dulaglutide on Major Cardiovascular Events in Patients with Type 2Diabetes]    Subject seen for Visit 16/Mo 12    Vital signs were done, measured after 5 minutes resting in a seated position - two readings taken one minute apart using automated cuff.    Vitals:    02/27/23 0906 02/27/23 0907   BP: 131/75 136/70   BP Location: Left arm Left arm   Patient Position: Sitting Sitting   Cuff Size: Adult Regular Adult Regular   Pulse: 71 63   Weight:  68 kg (149 lb 14.6 oz)   Height:  1.803 m (5' 10.98\")     Waist Circ 95.5 and 94.5    Patient denies hypoglycemia and visual disturbances since last visit.     Subject queried for adverse events, endpoints and medication changes. Med changes include Toprol discontinued 12/21/2022 and Influenza Vaccine given 0.5ccs IM 11/14/2022.  The patients following AE's have resolved.    Dizziness 11/7/2022   Tinnitus  11/7/2022   Othostatic Hypotension 11/7/2022   Atrial Fib 11/21/2022       New AE's include:    SURPASS-CVOT Study [Effect of tirzepatide versus Dulaglutide on Major Cardiovascular Events in Patients with Type 2 Diabetes]    Diagnosis/event description (diagnosis preferred):  Exercise Intolerance  Start date: 05/22/2022  Date resolved:     Intensity: ([] mild /[x]  moderate / [] severe)     Study Medication adjustment:  [] Yes   [x] No    Outcome: ([] resolved [with or without sequelae] /[] recovering / [x] not recovered / [] death / [] unknown)      Date study team aware of event: 02/27/2023    Was treatment given for this event:  [] Yes   [x] No   If yes, provide details  Serious adverse event?    Yes   [x] No    Special interest event?  [] Yes   [x] No    Endpoint? [] Yes   [x] No     Fatal event?  [] Yes  "  [x] No      Dr. Elias: Reasonable possibility that AE is related to study treatment    [] Yes   [x] No    Mis Husain RN    [employer]-CVOT Study [Effect of tirzepatide versus Dulaglutide on Major Cardiovascular Events in Patients with Type 2 Diabetes]    Diagnosis/event description (diagnosis preferred):  Trace Aortic Valve Regurgitation  Start date: 09/09/2022  Date resolved:     Intensity: ([x] mild /[]  moderate / [] severe)     Study Medication adjustment:  [] Yes   [x] No    Outcome: ([] resolved [with or without sequelae] /[] recovering / [x] not recovered / [] death / [] unknown)      Date study team aware of event: 02/27/2023    Was treatment given for this event:  [] Yes   [x] No   If yes, provide details  Serious adverse event?    Yes   [x] No    Special interest event?  [] Yes   [x] No    Endpoint? [] Yes   [x] No     Fatal event?  [] Yes   [x] No      Dr. Elias: Reasonable possibility that AE is related to study treatment    [] Yes   [x] No    Mis Husain RN    [employer]-CVOT Study [Effect of tirzepatide versus Dulaglutide on Major Cardiovascular Events in Patients with Type 2 Diabetes]    Diagnosis/event description (diagnosis preferred):  Weight Loss  Start date: 05/16/2022  Date resolved:     Intensity: ([] mild /[x]  moderate / [] severe)     Study Medication adjustment:  [] Yes   [x] No    Outcome: ([] resolved [with or without sequelae] /[] recovering / [x] not recovered / [] death / [] unknown)      Date study team aware of event: 02/27/2023    Was treatment given for this event:  [] Yes   [x] No   If yes, provide details  Serious adverse event?    Yes   [x] No    Special interest event?  [] Yes   [x] No    Endpoint? [] Yes   [x] No     Fatal event?  [] Yes   [x] No      Dr. Elias: Reasonable possibility that AE is related to study treatment    [x] Yes   [] No    Mis Husain RN    SURPASS-CVOT Study [Effect of tirzepatide versus Dulaglutide on Major Cardiovascular Events in Patients with  Type 2 Diabetes]    Diagnosis/event description (diagnosis preferred):  Intermittent Bradycardia  Start date: 06/27/2022  Date resolved: 11/07/2022    Intensity: ([x] mild /[]  moderate / [] severe)     Study Medication adjustment:  [] Yes   [x] No    Outcome: ([x] resolved [with or without sequelae] /[] recovering / [] not recovered / [] death / [] unknown)      Date study team aware of event: 02/27/2023    Was treatment given for this event:  [x] Yes   [] No   If yes, provide details Toprol discontinue/d and pacemaker set @ 60 for Low HR    Serious adverse event?    Yes   [x] No    Special interest event?  [] Yes   [x] No    Endpoint? [] Yes   [x] No     Fatal event?  [] Yes   [x] No      Dr. Elias: Reasonable possibility that AE is related to study treatment    [] Yes   [x] No    Mis Husain RN    [employer]-CVOT Study [Effect of tirzepatide versus Dulaglutide on Major Cardiovascular Events in Patients with Type 2 Diabetes]    Diagnosis/event description (diagnosis preferred):  Nausea  Start date: 11/19/2022  Date resolved: 11/21/2022    Intensity: ([x] mild /[]  moderate / [] severe)     Study Medication adjustment:  [] Yes   [x] No    Outcome: ([x] resolved [with or without sequelae] /[] recovering / [] not recovered / [] death / [] unknown)      Date study team aware of event: 02/27/2023    Was treatment given for this event:  [] Yes   [x] No   If yes, provide details  Serious adverse event?    Yes   [x] No    Special interest event?  [] Yes   [x] No    Endpoint? [] Yes   [x] No     Fatal event?  [] Yes   [x] No      Dr. Elias: Reasonable possibility that AE is related to study treatment    [x] Yes   [] No    Mis Husain RN    [employer]-CVOT Study [Effect of tirzepatide versus Dulaglutide on Major Cardiovascular Events in Patients with Type 2 Diabetes]    Diagnosis/event description (diagnosis preferred):  Nausea  Start date: 11/26/2022   Date resolved: 11/28/2022     Intensity: ([x] mild /[]  moderate / []  severe)     Study Medication adjustment:  [] Yes   [x] No    Outcome: ([x] resolved [with or without sequelae] /[] recovering / [] not recovered / [] death / [] unknown)      Date study team aware of event: 02/27/2023    Was treatment given for this event:  [] Yes   [x] No   If yes, provide details  Serious adverse event?    Yes   [x] No    Special interest event?  [] Yes   [x] No    Endpoint? [] Yes   [x] No     Fatal event?  [] Yes   [x] No      Dr. Elias: Reasonable possibility that AE is related to study treatment    [x] Yes   [] No    Mis Husain RN    [employer]-CVOT Study [Effect of tirzepatide versus Dulaglutide on Major Cardiovascular Events in Patients with Type 2 Diabetes]    Diagnosis/event description (diagnosis preferred):  Nausea  Start date: 12/03/2022  Date resolved: 12/05/2022     Intensity: ([x] mild /[]  moderate / [] severe)     Study Medication adjustment:  [] Yes   [x] No    Outcome: ([x] resolved [with or without sequelae] /[] recovering / [] not recovered / [] death / [] unknown)      Date study team aware of event: 02/27/2023    Was treatment given for this event:  [] Yes   [x] No   If yes, provide details  Serious adverse event?    Yes   [x] No    Special interest event?  [] Yes   [x] No    Endpoint? [] Yes   [x] No     Fatal event?  [] Yes   [x] No      Dr. Elias: Reasonable possibility that AE is related to study treatment    [x] Yes   [] No    Mis Husain RN    [employer]-CVOT Study [Effect of tirzepatide versus Dulaglutide on Major Cardiovascular Events in Patients with Type 2 Diabetes]    Diagnosis/event description (diagnosis preferred):  Nausea  Start date: 12/10/2022   Date resolved: 12/12/2022    Intensity: ([x] mild /[]  moderate / [] severe)     Study Medication adjustment:  [] Yes   [x] No    Outcome: ([x] resolved [with or without sequelae] /[] recovering / [] not recovered / [] death / [] unknown)      Date study team aware of event: 02/27/2023    Was treatment given for  this event:  [] Yes   [x] No   If yes, provide details  Serious adverse event?    Yes   [x] No    Special interest event?  [] Yes   [x] No    Endpoint? [] Yes   [x] No     Fatal event?  [] Yes   [x] No      Dr. Elias: Reasonable possibility that AE is related to study treatment    [x] Yes   [] No    Mis Husain RN      Adherence/lifestyle reinforcement done     Fasting labs drawn and sent to central lab per protocol.    No of pens dispensed at last visit 16  Any Returned medication 0  Date of first dose since last visit: 11/17/2022  Date of last dose taken since last visit: 02/23/2023      Follow up in clinic in 3 months.      Thank you for allowing me to participate in the care of your patient.      Sincerely,     Mis Husain RN     [facility]  cc:   No referring provider defined for this encounter.

## 2023-02-27 NOTE — PROGRESS NOTES
[facility]-CVOT Study [Effect of tirzepatide versus Dulaglutide on Major Cardiovascular Events in Patients with Type 2 Diabetes]    Diagnosis/event description (diagnosis preferred):  Nausea  Start date: 12/17/2022  Date resolved: 12/19/2022    Intensity: ([x] mild /[]  moderate / [] severe)     Study Medication adjustment:  [] Yes   [x] No    Outcome: ([x] resolved [with or without sequelae] /[] recovering / [] not recovered / [] death / [] unknown)    Date study team aware of event: 02/27/2023    Was treatment given for this event:  [] Yes   [x] No   If yes, provide details  Serious adverse event?    Yes   [x] No    Special interest event?  [] Yes   [x] No    Endpoint? [] Yes   [x] No     Fatal event?  [] Yes   [x] No      Dr. Elias: Reasonable possibility that AE is related to study treatment    [x] Yes   [] No    Mis Husain RN    SURPASS-CVOT Study [Effect of tirzepatide versus Dulaglutide on Major Cardiovascular Events in Patients with Type 2 Diabetes]    Diagnosis/event description (diagnosis preferred):  Nausea  Start date: 12/24/2022  Date resolved: 12/26/2022    Intensity: ([x] mild /[]  moderate / [] severe)     Study Medication adjustment:  [] Yes   [x] No    Outcome: ([x] resolved [with or without sequelae] /[] recovering / [] not recovered / [] death / [] unknown)    Date study team aware of event: 02/27/2023    Was treatment given for this event:  [] Yes   [x] No   If yes, provide details  Serious adverse event?    Yes   [x] No    Special interest event?  [] Yes   [x] No    Endpoint? [] Yes   [x] No     Fatal event?  [] Yes   [x] No    Dr. Elias: Reasonable possibility that AE is related to study treatment    [x] Yes   [] No    [employer]-CVOT Study [Effect of tirzepatide versus Dulaglutide on Major Cardiovascular Events in Patients with Type 2 Diabetes]    Diagnosis/event description (diagnosis preferred):  Nausea  Start date: 12/31/2022  Date resolved: 01/02/2023    Intensity:  ([x] mild /[]  moderate / [] severe)     Study Medication adjustment:  [] Yes   [x] No    Outcome: ([x] resolved [with or without sequelae] /[] recovering / [] not recovered / [] death / [] unknown)    Date study team aware of event: 02/27/2023    Was treatment given for this event:  [] Yes   [x] No   If yes, provide details  Serious adverse event?    Yes   [x] No    Special interest event?  [] Yes   [x] No    Endpoint? [] Yes   [x] No     Fatal event?  [] Yes   [x] No    Dr. Elias: Reasonable possibility that AE is related to study treatment    [x] Yes   [] No    Mis Husain RN    "[employer]-CVOT Study [Effect of tirzepatide versus Dulaglutide on Major Cardiovascular Events in Patients with Type 2 Diabetes]    Diagnosis/event description (diagnosis preferred):  Nausea  Start date: 01/07/2023  Date resolved: 01/09/2023    Intensity: ([x] mild /[]  moderate / [] severe)     Study Medication adjustment:  [] Yes   [x] No    Outcome: ([x] resolved [with or without sequelae] /[] recovering / [] not recovered / [] death / [] unknown)    Date study team aware of event: 02/27/2023    Was treatment given for this event:  [] Yes   [x] No   If yes, provide details  Serious adverse event?    Yes   [x] No    Special interest event?  [] Yes   [x] No    Endpoint? [] Yes   [x] No     Fatal event?  [] Yes   [x] No      Dr. Elias: Reasonable possibility that AE is related to study treatment    [x] Yes   [] No    Mis Husain RN    "[employer]-CVOT Study [Effect of tirzepatide versus Dulaglutide on Major Cardiovascular Events in Patients with Type 2 Diabetes]    Diagnosis/event description (diagnosis preferred):  Nausea  Start date: 01/14/2023   Date resolved: 01/16/2023    Intensity: ([x] mild /[]  moderate / [] severe)     Study Medication adjustment:  [] Yes   [x] No    Outcome: ([x] resolved [with or without sequelae] /[] recovering / [] not recovered / [] death / [] unknown)    Date study team aware of event: 02/27/2023    Was  treatment given for this event:  [] Yes   [x] No   If yes, provide details  Serious adverse event?    Yes   [x] No    Special interest event?  [] Yes   [x] No    Endpoint? [] Yes   [x] No     Fatal event?  [] Yes   [x] No      Dr. Elias: Reasonable possibility that AE is related to study treatment    [x] Yes   [] No    Mis Husain RN    SURPASS-CVOT Study [Effect of tirzepatide versus Dulaglutide on Major Cardiovascular Events in Patients with Type 2 Diabetes]    Diagnosis/event description (diagnosis preferred):  Nausea  Start date: 01/21/2023   Date resolved: 01/23/2023    Intensity: ([x] mild /[]  moderate / [] severe)     Study Medication adjustment:  [] Yes   [x] No    Outcome: ([x] resolved [with or without sequelae] /[] recovering / [] not recovered / [] death / [] unknown)    Date study team aware of event: 02/27/2023    Was treatment given for this event:  [] Yes   [x] No   If yes, provide details  Serious adverse event?    Yes   [x] No    Special interest event?  [] Yes   [x] No    Endpoint? [] Yes   [x] No     Fatal event?  [] Yes   [x] No    Dr. Elias: Reasonable possibility that AE is related to study treatment    [x] Yes   [] No    Mis Husain RN    [employer]-CVOT Study [Effect of tirzepatide versus Dulaglutide on Major Cardiovascular Events in Patients with Type 2 Diabetes]    Diagnosis/event description (diagnosis preferred):  Nausea  Start date: 01/28/2023   Date resolved: 01/30/2023    Intensity: ([x] mild /[]  moderate / [] severe)     Study Medication adjustment:  [] Yes   [x] No    Outcome: ([x] resolved [with or without sequelae] /[] recovering / [] not recovered / [] death / [] unknown)    Date study team aware of event: 02/27/2023    Was treatment given for this event:  [] Yes   [x] No   If yes, provide details  Serious adverse event?    Yes   [x] No    Special interest event?  [] Yes   [x] No    Endpoint? [] Yes   [x] No     Fatal event?  [] Yes   [x] No      Dr. Elias: Reasonable  possibility that AE is related to study treatment    [x] Yes   [] No    Mis Husain RN    [employer]-CVOT Study [Effect of tirzepatide versus Dulaglutide on Major Cardiovascular Events in Patients with Type 2 Diabetes]    Diagnosis/event description (diagnosis preferred):  Nausea  Start date: 02/04/2023   Date resolved: 02/06/2023    Intensity: ([x] mild /[]  moderate / [] severe)     Study Medication adjustment:  [] Yes   [x] No    Outcome: ([x] resolved [with or without sequelae] /[] recovering / [] not recovered / [] death / [] unknown)    Date study team aware of event: 02/27/2023    Was treatment given for this event:  [] Yes   [x] No   If yes, provide details  Serious adverse event?    Yes   [x] No    Special interest event?  [] Yes   [x] No    Endpoint? [] Yes   [x] No     Fatal event?  [] Yes   [x] No      Dr. Elias: Reasonable possibility that AE is related to study treatment    [x] Yes   [] No    Mis Husain RN    [employer]-CVOT Study [Effect of tirzepatide versus Dulaglutide on Major Cardiovascular Events in Patients with Type 2 Diabetes]    Diagnosis/event description (diagnosis preferred):  Nausea  Start date: 02/11/2023   Date resolved: 02/13/2023    Intensity: ([x] mild /[]  moderate / [] severe)     Study Medication adjustment:  [] Yes   [x] No    Outcome: ([x] resolved [with or without sequelae] /[] recovering / [] not recovered / [] death / [] unknown)    Date study team aware of event: 02/27/2023    Was treatment given for this event:  [] Yes   [x] No   If yes, provide details  Serious adverse event?    Yes   [x] No    Special interest event?  [] Yes   [x] No    Endpoint? [] Yes   [x] No     Fatal event?  [] Yes   [x] No      Dr. Elias: Reasonable possibility that AE is related to study treatment    [x] Yes   [] No    Mis Hsuain RN    [employer]-CVOT Study [Effect of tirzepatide versus Dulaglutide on Major Cardiovascular Events in Patients with Type 2 Diabetes]    Diagnosis/event description  (diagnosis preferred):  Nausea  Start date: 02/18/2023   Date resolved: 02/20/2023    Intensity: ([x] mild /[]  moderate / [] severe)     Study Medication adjustment:  [] Yes   [x] No    Outcome: ([x] resolved [with or without sequelae] /[] recovering / [] not recovered / [] death / [] unknown)    Date study team aware of event: 02/27/2023    Was treatment given for this event:  [] Yes   [x] No   If yes, provide details  Serious adverse event?    Yes   [x] No    Special interest event?  [] Yes   [x] No    Endpoint? [] Yes   [x] No     Fatal event?  [] Yes   [x] No      Dr. Elias: Reasonable possibility that AE is related to study treatment    [x] Yes   [] No    Mis Husain RN    SURPASS-CVOT Study [Effect of tirzepatide versus Dulaglutide on Major Cardiovascular Events in Patients with Type 2 Diabetes]    Diagnosis/event description (diagnosis preferred):  Nausea  Start date: 02/25/2023   Date resolved: 02/27/2023    Intensity: ([x] mild /[]  moderate / [] severe)     Study Medication adjustment:  [] Yes   [x] No    Outcome: ([x] resolved [with or without sequelae] /[] recovering / [] not recovered / [] death / [] unknown)    Date study team aware of event: 02/27/2023    Was treatment given for this event:  [] Yes   [x] No   If yes, provide details  Serious adverse event?    Yes   [x] No    Special interest event?  [] Yes   [x] No    Endpoint? [] Yes   [x] No     Fatal event?  [] Yes   [x] No      Dr. Elias: Reasonable possibility that AE is related to study treatment    [x] Yes   [] No    Mis Husain RN

## 2023-03-10 ENCOUNTER — TRANSFERRED RECORDS (OUTPATIENT)
Dept: CARDIOLOGY | Facility: CLINIC | Age: 63
End: 2023-03-10
Payer: COMMERCIAL

## 2023-04-05 ENCOUNTER — ANCILLARY PROCEDURE (OUTPATIENT)
Dept: CARDIOLOGY | Facility: CLINIC | Age: 63
End: 2023-04-05
Attending: INTERNAL MEDICINE
Payer: COMMERCIAL

## 2023-04-05 DIAGNOSIS — Z95.0 CARDIAC PACEMAKER IN SITU: ICD-10-CM

## 2023-04-05 DIAGNOSIS — I44.2 COMPLETE ATRIOVENTRICULAR BLOCK (H): ICD-10-CM

## 2023-04-05 PROCEDURE — 93296 REM INTERROG EVL PM/IDS: CPT | Performed by: INTERNAL MEDICINE

## 2023-04-05 PROCEDURE — 93294 REM INTERROG EVL PM/LDLS PM: CPT | Performed by: INTERNAL MEDICINE

## 2023-04-14 LAB
MDC_IDC_EPISODE_DTM: NORMAL
MDC_IDC_EPISODE_DURATION: 0 S
MDC_IDC_EPISODE_DURATION: 1 S
MDC_IDC_EPISODE_DURATION: 1 S
MDC_IDC_EPISODE_DURATION: 130 S
MDC_IDC_EPISODE_DURATION: 2 S
MDC_IDC_EPISODE_DURATION: 2 S
MDC_IDC_EPISODE_DURATION: 26 S
MDC_IDC_EPISODE_DURATION: 302 S
MDC_IDC_EPISODE_DURATION: 37 S
MDC_IDC_EPISODE_DURATION: 4355 S
MDC_IDC_EPISODE_DURATION: 476 S
MDC_IDC_EPISODE_DURATION: 5812 S
MDC_IDC_EPISODE_DURATION: 6944 S
MDC_IDC_EPISODE_DURATION: 72 S
MDC_IDC_EPISODE_DURATION: 76 S
MDC_IDC_EPISODE_DURATION: 84 S
MDC_IDC_EPISODE_DURATION: 968 S
MDC_IDC_EPISODE_DURATION: NORMAL S
MDC_IDC_EPISODE_DURATION: NORMAL S
MDC_IDC_EPISODE_ID: 42
MDC_IDC_EPISODE_ID: 43
MDC_IDC_EPISODE_ID: 44
MDC_IDC_EPISODE_ID: 45
MDC_IDC_EPISODE_ID: 46
MDC_IDC_EPISODE_ID: 47
MDC_IDC_EPISODE_ID: 48
MDC_IDC_EPISODE_ID: 49
MDC_IDC_EPISODE_ID: 50
MDC_IDC_EPISODE_ID: 51
MDC_IDC_EPISODE_ID: 52
MDC_IDC_EPISODE_ID: 53
MDC_IDC_EPISODE_ID: 54
MDC_IDC_EPISODE_ID: 55
MDC_IDC_EPISODE_ID: 56
MDC_IDC_EPISODE_ID: 57
MDC_IDC_EPISODE_ID: 58
MDC_IDC_EPISODE_ID: 59
MDC_IDC_EPISODE_ID: 60
MDC_IDC_EPISODE_TYPE: NORMAL
MDC_IDC_LEAD_IMPLANT_DT: NORMAL
MDC_IDC_LEAD_IMPLANT_DT: NORMAL
MDC_IDC_LEAD_LOCATION: NORMAL
MDC_IDC_LEAD_LOCATION: NORMAL
MDC_IDC_LEAD_LOCATION_DETAIL_1: NORMAL
MDC_IDC_LEAD_LOCATION_DETAIL_1: NORMAL
MDC_IDC_LEAD_MFG: NORMAL
MDC_IDC_LEAD_MFG: NORMAL
MDC_IDC_LEAD_MODEL: NORMAL
MDC_IDC_LEAD_MODEL: NORMAL
MDC_IDC_LEAD_POLARITY_TYPE: NORMAL
MDC_IDC_LEAD_POLARITY_TYPE: NORMAL
MDC_IDC_LEAD_SERIAL: NORMAL
MDC_IDC_LEAD_SERIAL: NORMAL
MDC_IDC_MSMT_BATTERY_DTM: NORMAL
MDC_IDC_MSMT_BATTERY_REMAINING_LONGEVITY: 157 MO
MDC_IDC_MSMT_BATTERY_RRT_TRIGGER: 2.62
MDC_IDC_MSMT_BATTERY_STATUS: NORMAL
MDC_IDC_MSMT_BATTERY_VOLTAGE: 3.18 V
MDC_IDC_MSMT_LEADCHNL_RA_IMPEDANCE_VALUE: 342 OHM
MDC_IDC_MSMT_LEADCHNL_RA_IMPEDANCE_VALUE: 532 OHM
MDC_IDC_MSMT_LEADCHNL_RA_PACING_THRESHOLD_AMPLITUDE: 0.5 V
MDC_IDC_MSMT_LEADCHNL_RA_PACING_THRESHOLD_PULSEWIDTH: 0.4 MS
MDC_IDC_MSMT_LEADCHNL_RA_SENSING_INTR_AMPL: 3.25 MV
MDC_IDC_MSMT_LEADCHNL_RA_SENSING_INTR_AMPL: 3.25 MV
MDC_IDC_MSMT_LEADCHNL_RV_IMPEDANCE_VALUE: 342 OHM
MDC_IDC_MSMT_LEADCHNL_RV_IMPEDANCE_VALUE: 418 OHM
MDC_IDC_MSMT_LEADCHNL_RV_PACING_THRESHOLD_AMPLITUDE: 0.62 V
MDC_IDC_MSMT_LEADCHNL_RV_PACING_THRESHOLD_PULSEWIDTH: 0.4 MS
MDC_IDC_MSMT_LEADCHNL_RV_SENSING_INTR_AMPL: 7.88 MV
MDC_IDC_MSMT_LEADCHNL_RV_SENSING_INTR_AMPL: 7.88 MV
MDC_IDC_PG_IMPLANT_DTM: NORMAL
MDC_IDC_PG_MFG: NORMAL
MDC_IDC_PG_MODEL: NORMAL
MDC_IDC_PG_SERIAL: NORMAL
MDC_IDC_PG_TYPE: NORMAL
MDC_IDC_SESS_CLINIC_NAME: NORMAL
MDC_IDC_SESS_DTM: NORMAL
MDC_IDC_SESS_TYPE: NORMAL
MDC_IDC_SET_BRADY_AT_MODE_SWITCH_RATE: 171 {BEATS}/MIN
MDC_IDC_SET_BRADY_HYSTRATE: NORMAL
MDC_IDC_SET_BRADY_LOWRATE: 50 {BEATS}/MIN
MDC_IDC_SET_BRADY_MAX_SENSOR_RATE: 130 {BEATS}/MIN
MDC_IDC_SET_BRADY_MAX_TRACKING_RATE: 130 {BEATS}/MIN
MDC_IDC_SET_BRADY_MODE: NORMAL
MDC_IDC_SET_BRADY_PAV_DELAY_LOW: 300 MS
MDC_IDC_SET_BRADY_SAV_DELAY_LOW: 300 MS
MDC_IDC_SET_LEADCHNL_RA_PACING_AMPLITUDE: 1.5 V
MDC_IDC_SET_LEADCHNL_RA_PACING_ANODE_ELECTRODE_1: NORMAL
MDC_IDC_SET_LEADCHNL_RA_PACING_ANODE_LOCATION_1: NORMAL
MDC_IDC_SET_LEADCHNL_RA_PACING_CAPTURE_MODE: NORMAL
MDC_IDC_SET_LEADCHNL_RA_PACING_CATHODE_ELECTRODE_1: NORMAL
MDC_IDC_SET_LEADCHNL_RA_PACING_CATHODE_LOCATION_1: NORMAL
MDC_IDC_SET_LEADCHNL_RA_PACING_POLARITY: NORMAL
MDC_IDC_SET_LEADCHNL_RA_PACING_PULSEWIDTH: 0.4 MS
MDC_IDC_SET_LEADCHNL_RA_SENSING_ANODE_ELECTRODE_1: NORMAL
MDC_IDC_SET_LEADCHNL_RA_SENSING_ANODE_LOCATION_1: NORMAL
MDC_IDC_SET_LEADCHNL_RA_SENSING_CATHODE_ELECTRODE_1: NORMAL
MDC_IDC_SET_LEADCHNL_RA_SENSING_CATHODE_LOCATION_1: NORMAL
MDC_IDC_SET_LEADCHNL_RA_SENSING_POLARITY: NORMAL
MDC_IDC_SET_LEADCHNL_RA_SENSING_SENSITIVITY: 0.3 MV
MDC_IDC_SET_LEADCHNL_RV_PACING_AMPLITUDE: 2 V
MDC_IDC_SET_LEADCHNL_RV_PACING_ANODE_ELECTRODE_1: NORMAL
MDC_IDC_SET_LEADCHNL_RV_PACING_ANODE_LOCATION_1: NORMAL
MDC_IDC_SET_LEADCHNL_RV_PACING_CAPTURE_MODE: NORMAL
MDC_IDC_SET_LEADCHNL_RV_PACING_CATHODE_ELECTRODE_1: NORMAL
MDC_IDC_SET_LEADCHNL_RV_PACING_CATHODE_LOCATION_1: NORMAL
MDC_IDC_SET_LEADCHNL_RV_PACING_POLARITY: NORMAL
MDC_IDC_SET_LEADCHNL_RV_PACING_PULSEWIDTH: 0.4 MS
MDC_IDC_SET_LEADCHNL_RV_SENSING_ANODE_ELECTRODE_1: NORMAL
MDC_IDC_SET_LEADCHNL_RV_SENSING_ANODE_LOCATION_1: NORMAL
MDC_IDC_SET_LEADCHNL_RV_SENSING_CATHODE_ELECTRODE_1: NORMAL
MDC_IDC_SET_LEADCHNL_RV_SENSING_CATHODE_LOCATION_1: NORMAL
MDC_IDC_SET_LEADCHNL_RV_SENSING_POLARITY: NORMAL
MDC_IDC_SET_LEADCHNL_RV_SENSING_SENSITIVITY: 0.9 MV
MDC_IDC_SET_ZONE_DETECTION_INTERVAL: 200 MS
MDC_IDC_SET_ZONE_DETECTION_INTERVAL: 350 MS
MDC_IDC_SET_ZONE_DETECTION_INTERVAL: 400 MS
MDC_IDC_SET_ZONE_TYPE: NORMAL
MDC_IDC_STAT_AT_BURDEN_PERCENT: 2.6 %
MDC_IDC_STAT_AT_DTM_END: NORMAL
MDC_IDC_STAT_AT_DTM_START: NORMAL
MDC_IDC_STAT_BRADY_AP_VP_PERCENT: 17.17 %
MDC_IDC_STAT_BRADY_AP_VS_PERCENT: 1.31 %
MDC_IDC_STAT_BRADY_AS_VP_PERCENT: 59.16 %
MDC_IDC_STAT_BRADY_AS_VS_PERCENT: 22.38 %
MDC_IDC_STAT_BRADY_DTM_END: NORMAL
MDC_IDC_STAT_BRADY_DTM_START: NORMAL
MDC_IDC_STAT_BRADY_RA_PERCENT_PACED: 18.5 %
MDC_IDC_STAT_BRADY_RV_PERCENT_PACED: 76.14 %
MDC_IDC_STAT_EPISODE_RECENT_COUNT: 0
MDC_IDC_STAT_EPISODE_RECENT_COUNT: 0
MDC_IDC_STAT_EPISODE_RECENT_COUNT: 10
MDC_IDC_STAT_EPISODE_RECENT_COUNT: 4
MDC_IDC_STAT_EPISODE_RECENT_COUNT: 5
MDC_IDC_STAT_EPISODE_RECENT_COUNT_DTM_END: NORMAL
MDC_IDC_STAT_EPISODE_RECENT_COUNT_DTM_START: NORMAL
MDC_IDC_STAT_EPISODE_TOTAL_COUNT: 0
MDC_IDC_STAT_EPISODE_TOTAL_COUNT: 0
MDC_IDC_STAT_EPISODE_TOTAL_COUNT: 18
MDC_IDC_STAT_EPISODE_TOTAL_COUNT: 19
MDC_IDC_STAT_EPISODE_TOTAL_COUNT: 23
MDC_IDC_STAT_EPISODE_TOTAL_COUNT_DTM_END: NORMAL
MDC_IDC_STAT_EPISODE_TOTAL_COUNT_DTM_START: NORMAL
MDC_IDC_STAT_EPISODE_TYPE: NORMAL

## 2023-05-01 DIAGNOSIS — E78.2 MIXED HYPERLIPIDEMIA: ICD-10-CM

## 2023-05-01 DIAGNOSIS — I25.10 CORONARY ARTERY DISEASE INVOLVING NATIVE CORONARY ARTERY OF NATIVE HEART WITHOUT ANGINA PECTORIS: ICD-10-CM

## 2023-05-01 RX ORDER — ROSUVASTATIN CALCIUM 20 MG/1
20 TABLET, COATED ORAL DAILY
Qty: 90 TABLET | Refills: 2 | Status: SHIPPED | OUTPATIENT
Start: 2023-05-01 | End: 2023-12-29

## 2023-05-15 ENCOUNTER — OFFICE VISIT (OUTPATIENT)
Dept: FAMILY MEDICINE | Facility: CLINIC | Age: 63
End: 2023-05-15
Payer: COMMERCIAL

## 2023-05-15 VITALS
RESPIRATION RATE: 20 BRPM | TEMPERATURE: 96.9 F | SYSTOLIC BLOOD PRESSURE: 127 MMHG | WEIGHT: 167 LBS | HEIGHT: 72 IN | HEART RATE: 81 BPM | DIASTOLIC BLOOD PRESSURE: 78 MMHG | BODY MASS INDEX: 22.62 KG/M2 | OXYGEN SATURATION: 99 %

## 2023-05-15 DIAGNOSIS — Z86.73 HISTORY OF STROKE: ICD-10-CM

## 2023-05-15 DIAGNOSIS — K72.10 CHRONIC LIVER FAILURE WITHOUT HEPATIC COMA (H): ICD-10-CM

## 2023-05-15 DIAGNOSIS — I10 BENIGN ESSENTIAL HYPERTENSION: ICD-10-CM

## 2023-05-15 DIAGNOSIS — E78.2 MIXED HYPERLIPIDEMIA: ICD-10-CM

## 2023-05-15 DIAGNOSIS — I25.10 CORONARY ARTERY DISEASE INVOLVING NATIVE CORONARY ARTERY OF NATIVE HEART WITHOUT ANGINA PECTORIS: Chronic | ICD-10-CM

## 2023-05-15 DIAGNOSIS — Z00.00 ROUTINE GENERAL MEDICAL EXAMINATION AT A HEALTH CARE FACILITY: Primary | ICD-10-CM

## 2023-05-15 DIAGNOSIS — I48.0 PAROXYSMAL ATRIAL FIBRILLATION (H): ICD-10-CM

## 2023-05-15 DIAGNOSIS — E08.42 DIABETIC POLYNEUROPATHY ASSOCIATED WITH DIABETES MELLITUS DUE TO UNDERLYING CONDITION (H): ICD-10-CM

## 2023-05-15 DIAGNOSIS — I73.9 PERIPHERAL VASCULAR DISEASE, UNSPECIFIED (H): ICD-10-CM

## 2023-05-15 LAB — HBA1C MFR BLD: 5 % (ref 0–5.6)

## 2023-05-15 PROCEDURE — 36415 COLL VENOUS BLD VENIPUNCTURE: CPT | Performed by: INTERNAL MEDICINE

## 2023-05-15 PROCEDURE — 83036 HEMOGLOBIN GLYCOSYLATED A1C: CPT | Performed by: INTERNAL MEDICINE

## 2023-05-15 PROCEDURE — 99396 PREV VISIT EST AGE 40-64: CPT | Performed by: INTERNAL MEDICINE

## 2023-05-15 ASSESSMENT — ENCOUNTER SYMPTOMS
COUGH: 0
CHILLS: 0
ABDOMINAL PAIN: 0
HEMATURIA: 0
CONSTIPATION: 0
HEMATOCHEZIA: 0

## 2023-05-15 ASSESSMENT — PAIN SCALES - GENERAL: PAINLEVEL: NO PAIN (0)

## 2023-05-15 NOTE — Clinical Note
Please abstract the following data from this visit with this patient into the appropriate field in Epic:  Tests that can be patient reported without a hard copy:  Eye exam with ophthalmology on this date: 05/17/2022 at [facility]

## 2023-05-15 NOTE — PROGRESS NOTES
SUBJECTIVE:   CC: Kelton is an 63 year old who presents for preventative health visit.                Patient has been advised of split billing requirements and indicates understanding: Yes  Healthy Habits:     Getting at least 3 servings of Calcium per day:  Yes    Bi-annual eye exam:  Yes    Dental care twice a year:  Yes    Sleep apnea or symptoms of sleep apnea:  Daytime drowsiness    Diet:  Diabetic    Frequency of exercise:  6-7 days/week    Duration of exercise:  30-45 minutes    Taking medications regularly:  Yes    Barriers to taking medications:  None    Medication side effects:  Muscle aches and Lightheadedness    PHQ-2 Total Score: 0    Additional concerns today:  No          Today's PHQ-2 Score:       5/15/2023     7:05 AM   PHQ-2 (  [employer])   Q1: Little interest or pleasure in doing things 0   Q2: Feeling down, depressed or hopeless 0   PHQ-2 Score 0   Q1: Little interest or pleasure in doing things Not at all   Q2: Feeling down, depressed or hopeless Not at all   PHQ-2 Score 0           Social History     Tobacco Use     Smoking status: Former     Packs/day: 0.50     Years: 12.00     Pack years: 6.00     Types: Cigarettes     Start date:      Quit date: 2005     Years since quittin.3     Smokeless tobacco: Never   Vaping Use     Vaping status: Never Used   Substance Use Topics     Alcohol use: Yes     Comment: minimal 1 glass of wine per week             5/15/2023     7:04 AM   Alcohol Use   Prescreen: >3 drinks/day or >7 drinks/week? No       Last PSA:   Prostate Specific Antigen Screen   Date Value Ref Range Status   2022 1.29 0.00 - 4.00 ug/L Final       Reviewed orders with patient. 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)     Atypical chest pain     Family history of malignant melanoma of skin     SOB (shortness of breath)     Diabetic polyneuropathy associated with diabetes mellitus due to underlying condition (H)     HDL deficiency     Pain of right upper extremity     Abnormal cardiovascular stress test     Left carotid artery stenosis     Cirrhosis of liver without ascites, unspecified hepatic cirrhosis type (H)     Biceps tendinitis of right upper extremity     History of stroke     Paroxysmal atrial fibrillation (H)     Chronic liver failure without hepatic coma (H)     Past Surgical History:   Procedure Laterality Date     ARTHROSCOPY KNEE RT/LT      left     COLONOSCOPY       CORONARY ARTERY BYPASS  11/18/201    Coronary artery bypass grafting x 4 with placement of the left internal mammary artery to the distal midportion of the left anterior descending artery, saphenous vein graft from aorta to the third obtuse marginal branch of circumflex coronary artery, saphenous vein graft from aorta to the second diagonal branch, saphenous vein graft from aorta to the posterior descending artery.     ENDARTERECTOMY CAROTID Left 2/21/2020    Procedure: LEFT CAROTID ENDARTERECTOMY WITH EEG;  Surgeon: Semaj Stubbs MD;  Location:  OR     EP ABLATION ATRIAL FLUTTER N/A 9/9/2022    Procedure: Ablation Atrial Flutter;  Surgeon: Tyson Ordonez MD;  Location:  HEART [facility]ARD[facility] CATH [facility]     EP PACEMAKER DEVICE & LEAD IMPLANT- RIGHT ATRIAL & LEFT VENTRICULAR N/A 11/7/2022    Procedure: Pacemaker Device & Lead Implant- Right Atrial & Left Ventricular;  Surgeon: Tyson Ordonez MD;  Location:  HEART [facility] CATH [facility]     ESOPHAGOSCOPY, GASTROSCOPY, DUODENOSCOPY (EGD), COMBINED  10/31/2011    Procedure:COMBINED ESOPHAGOSCOPY, GASTROSCOPY, DUODENOSCOPY (EGD);  Surgeon:ALONSO ALDANA; Location: GI     ESOPHAGOSCOPY, GASTROSCOPY, DUODENOSCOPY (EGD), COMBINED N/A 3/8/2018    Procedure: COMBINED ESOPHAGOSCOPY, GASTROSCOPY, DUODENOSCOPY (EGD);  EGD;  Surgeon: Angel Luis Justice MD;  Location:  GI     HEART CATH CORONARY ANGIOGRAM W/LV GRAM  9-11-10    CV Surgery recommended     HEART CATH CORONARY ANGIOGRAM W/LV GRAM  2--14    Medical management     HERNIA REPAIR, INGUINAL RT/LT      left       Social History     Tobacco Use     Smoking status: Former     Packs/day: 0.50     Years: 12.00     Pack years: 6.00     Types: Cigarettes     Start date:      Quit date: 2005     Years since quittin.3     Smokeless tobacco: Never   Vaping Use     Vaping status: Never Used   Substance Use Topics     Alcohol use: Yes     Comment: minimal 1 glass of wine per week     Family History   Problem Relation Age of Onset     C.A.D. Father      Cancer Father         bladder     Heart Surgery Father         bypass surgery     Heart Disease Mother          Current Outpatient Medications   Medication Sig Dispense Refill     amLODIPine (NORVASC) 2.5 MG tablet Take 1 tablet (2.5 mg) by mouth daily 90 tablet 3     apixaban ANTICOAGULANT (ELIQUIS) 5 MG tablet Take 1 tablet (5 mg) by mouth 2 times daily 180 tablet 3     ASPIRIN NOT PRESCRIBED (INTENTIONAL) Please choose reason not prescribed from choices below.       B-D U/F 31G X 8 MM insulin pen needle USE ONE PEN NEEDLES DAILY OR AS DIRECTED. 100 each 3     celecoxib (CELEBREX) 100 MG capsule Take 1 capsule (100 mg) by mouth daily as needed for moderate pain (4-6) 30 capsule 11     chlorhexidine (PERIDEX) 0.12 % solution Take 15 mLs by mouth 2 times daily as needed   5     cyanocobalamin 1000 MCG SUBL Place 1,000 mcg under the tongue daily       glucosamine-chondroitin 500-400 MG CAPS per capsule Take 1 capsule by mouth daily       lisinopril (ZESTRIL) 20 MG tablet Take 1 tablet (20 mg) by mouth 2 times daily 180 tablet 3      omeprazole (PRILOSEC) 20 MG DR capsule Take 1 capsule (20 mg) by mouth daily 90 capsule 3     ONETOUCH VERIO IQ test strip        rosuvastatin (CRESTOR) 20 MG tablet Take 1 tablet (20 mg) by mouth daily 90 tablet 2     Vitamin D, Cholecalciferol, 1000 units TABS Take 1,000 Units by mouth daily       zolpidem (AMBIEN) 5 MG tablet Take 1 tablet (5 mg) by mouth nightly as needed for sleep 30 tablet 0     Allergies   Allergen Reactions     Pcn [Penicillins] Rash     Rash with PCN only. Patient has taken amoxicillin with no rash.       Reviewed and updated as needed this visit by clinical staff   Tobacco  Allergies  Meds      Soc Hx        Reviewed and updated as needed this visit by Provider                 Past Medical History:   Diagnosis Date     Basal cell carcinoma      Carotid stenosis, left     s/p left CEA 2/2020     Cerebral infarction (H)     left CVA 2/2020 post-op carotid endarterectomy     Coronary artery disease     4 vessel bypass November 2010; LIMA ->LAD, SVG-> OM3, SVG -> D2, SVG -> PDA     Diabetes mellitus (H) 2005    neuropathy     Generalized anxiety disorder 05/02/2014     Hepatitis C, chronic (H) 2005     Hyperlipidemia LDL goal < 70      Hypertension      Liver diseases     9/15 Liver is 10 cm in span without left lobe enlargement     Persistent microalbuminuria associated with type 2 diabetes mellitus (H) 05/06/2015     Renal artery stenosis (H)       Past Surgical History:   Procedure Laterality Date     ARTHROSCOPY KNEE RT/LT      left     COLONOSCOPY       CORONARY ARTERY BYPASS  11/18/201    Coronary artery bypass grafting x 4 with placement of the left internal mammary artery to the distal midportion of the left anterior descending artery, saphenous vein graft from aorta to the third obtuse marginal branch of circumflex coronary artery, saphenous vein graft from aorta to the second diagonal branch, saphenous vein graft from aorta to the posterior descending artery.     ENDARTERECTOMY  "CAROTID Left 2/21/2020    Procedure: LEFT CAROTID ENDARTERECTOMY WITH EEG;  Surgeon: Semaj Stubbs MD;  Location:  OR     EP ABLATION ATRIAL FLUTTER N/A 9/9/2022    Procedure: Ablation Atrial Flutter;  Surgeon: Tyson Ordonez MD;  Location:  HEART [facility]ARD[facility] CATH LAB     EP PACEMAKER DEVICE & LEAD IMPLANT- RIGHT ATRIAL & LEFT VENTRICULAR N/A 11/7/2022    Procedure: Pacemaker Device & Lead Implant- Right Atrial & Left Ventricular;  Surgeon: Tyson Ordonez MD;  Location:  HEART CARDIAC CATH LAB     ESOPHAGOSCOPY, GASTROSCOPY, DUODENOSCOPY (EGD), COMBINED  10/31/2011    Procedure:COMBINED ESOPHAGOSCOPY, GASTROSCOPY, DUODENOSCOPY (EGD); Surgeon:ALONSO ALDANA; Location: GI     ESOPHAGOSCOPY, GASTROSCOPY, DUODENOSCOPY (EGD), COMBINED N/A 3/8/2018    Procedure: COMBINED ESOPHAGOSCOPY, GASTROSCOPY, DUODENOSCOPY (EGD);  EGD;  Surgeon: Angel Luis Justice MD;  Location:  GI     HEART CATH CORONARY ANGIOGRAM W/LV GRAM  9-11-10    CV Surgery recommended     HEART CATH CORONARY ANGIOGRAM W/LV GRAM  2-28-14    Medical management     HERNIA REPAIR, INGUINAL RT/LT      left       Review of Systems   Constitutional: Negative for chills.   HENT: Negative for congestion.    Respiratory: Negative for cough.    Cardiovascular: Negative for chest pain.   Gastrointestinal: Negative for abdominal pain, constipation and hematochezia.   Genitourinary: Negative for hematuria.         OBJECTIVE:   /78 (BP Location: Left arm, Patient Position: Sitting, Cuff Size: Adult Regular)   Pulse 81   Temp 96.9  F (36.1  C) (Temporal)   Resp 20   Ht 1.819 m (5' 11.6\")   Wt 75.8 kg (167 lb)   SpO2 99%   BMI 22.90 kg/m      Physical Exam  GENERAL: healthy, alert and no distress  EYES: Eyes grossly normal to inspection, PERRL and conjunctivae and sclerae normal  HENT: ear canals and TM's normal, nose and mouth without ulcers or lesions  NECK: no adenopathy, no asymmetry, masses, or scars and " thyroid normal to palpation  RESP: lungs clear to auscultation - no rales, rhonchi or wheezes  CV: regular rate and rhythm, normal S1 S2, no S3 or S4, no murmur, click or rub, no peripheral edema and peripheral pulses strong  ABDOMEN: soft, nontender, no hepatosplenomegaly, no masses and bowel sounds normal  RECTAL: he declined exam today   MS: no gross musculoskeletal defects noted, no edema  SKIN: no suspicious lesions or rashes  NEURO: Normal strength and tone, mentation intact and speech normal  PSYCH: mentation appears normal, affect normal/bright  Diabetic foot exam: normal DP and PT pulses, no trophic changes or ulcerative lesions and normal sensory exam        ASSESSMENT/PLAN:       ICD-10-CM    1. Routine general medical examination at a health care facility  Z00.00       2. Diabetic polyneuropathy associated with diabetes mellitus due to underlying condition (H)  E08.42 HEMOGLOBIN A1C     FOOT EXAM      3. Chronic liver failure without hepatic coma (H)  K72.10       4. Peripheral vascular disease, unspecified (H)  I73.9       5. Paroxysmal atrial fibrillation (H)  I48.0       6. Benign essential hypertension  I10       7. Mixed hyperlipidemia  E78.2       8. Coronary artery disease involving native coronary artery of native heart without angina pectoris  I25.10       9. History of stroke  Z86.73       Recheck a1c; last A1c was excellent on study drug   He will continue follow up with his liver specialist   His carotids were stable last fall and he will see vascular surgery again next fall  Hi is on a DOAC for prophylaxis rate is controlled today   Blood pressure is great today   Lipids were OK in Jan  CAD stable and he is followed closely by cardiology       Patient has been advised of split billing requirements and indicates understanding: Yes      COUNSELING:   Reviewed preventive health counseling, as reflected in patient instructions  Special attention given to:          Regular exercise       Healthy  diet/nutrition       Immunizations    I recommended a COVID booster   ; he is a little reluctant wants to check with cardiology providers before getting this.           Consider Hep C screening for all patients one time for ages 18-79 years       HIV screeninx in teen years, 1x in adult years, and at intervals if high risk       Colorectal cancer screening; he submitted a FIT test last fall       Prostate cancer screening; PSA today        Consider lung cancer screening for ages 55-80 years (77 for Medicare) and 20 pack-year smoking history ; he quit 18 years ago < 20 pack year smoking history         He reports that he quit smoking about 18 years ago. His smoking use included cigarettes. He started smoking about 30 years ago. He has a 6.00 pack-year smoking history. He has never used smokeless tobacco.        Danish Land MD  [facility]

## 2023-05-15 NOTE — RESULT ENCOUNTER NOTE
The following letter pertains to your most recent diagnostic tests:    -Your hemoglobin A1c test which is a diabetes blood test that represents and average of your blood sugars over the last 3 months returned at 5 which is at your goal and unchanged since last check.  Things are looking very good in the diabetes control department.  We should recheck in 6 months.      Sincerely,    Dr. Land

## 2023-05-18 ENCOUNTER — OFFICE VISIT (OUTPATIENT)
Dept: CARDIOLOGY | Facility: CLINIC | Age: 63
End: 2023-05-18
Payer: COMMERCIAL

## 2023-05-18 VITALS
HEART RATE: 72 BPM | WEIGHT: 160 LBS | HEIGHT: 72 IN | SYSTOLIC BLOOD PRESSURE: 130 MMHG | BODY MASS INDEX: 21.67 KG/M2 | DIASTOLIC BLOOD PRESSURE: 68 MMHG | OXYGEN SATURATION: 98 %

## 2023-05-18 DIAGNOSIS — E11.9 DIABETES MELLITUS (H): ICD-10-CM

## 2023-05-18 DIAGNOSIS — I25.10 CORONARY ARTERY DISEASE INVOLVING NATIVE CORONARY ARTERY OF NATIVE HEART WITHOUT ANGINA PECTORIS: Primary | ICD-10-CM

## 2023-05-18 DIAGNOSIS — Z00.6 EXAMINATION OF PARTICIPANT OR CONTROL IN CLINICAL RESEARCH: ICD-10-CM

## 2023-05-18 DIAGNOSIS — Z00.6 EXAMINATION OF PARTICIPANT IN CLINICAL TRIAL: ICD-10-CM

## 2023-05-18 PROCEDURE — 99207 PR NO CHARGE-RESEARCH SERVICE: CPT | Performed by: INTERNAL MEDICINE

## 2023-05-18 PROCEDURE — 99207 PR NO CHARGE-RESEARCH SERVICE: CPT

## 2023-05-18 NOTE — PROGRESS NOTES
SURPASS-CVOT Study [Effect of tirzepatide versus Dulaglutide on Major Cardiovascular Events in Patients with Type 2Diabetes]    Subject seen for Visit 17    Patient was consented to updated ICF. [employer] approved version 04/06/2023. Copy was given to patient.      Subject queried for adverse events, endpoints and medication changes. If present, noted below. None reported or noted in chart.  Denies vision changes and no hypoglycemic events.     Ongoing nausea for 2 days post injection. No vomiting. Encouraged to take Reglan to see if that helps.    Also, reports his weight loss has stabilized.    SURPASS-CVOT Study [Effect of tirzepatide versus Dulaglutide on Major Cardiovascular Events in Patients with Type 2 Diabetes]    Diagnosis/event description (diagnosis preferred):  NAUSEA  Start date: 03/04/2023  Date resolved: 03/06/2023    Intensity: ([x] mild /[]  moderate / [] severe)     Study Medication adjustment:  [] Yes   [x] No    Outcome: ([x] resolved [with or without sequelae] /[] recovering / [] not recovered / [] death / [] unknown)      Date study team aware of event: 05/18/2023    Was treatment given for this event:  [] Yes   [x] No   If yes, provide details  Serious adverse event?    Yes   [x] No    Special interest event?  [] Yes   [x] No    Endpoint? [] Yes   [x] No     Fatal event?  [] Yes   [x] No      Dr. BENJAMIN: Reasonable possibility that AE is related to study treatment    [x] Yes   [] No    Mis Husain RN      SURPASS-CVOT Study [Effect of tirzepatide versus Dulaglutide on Major Cardiovascular Events in Patients with Type 2 Diabetes]    Diagnosis/event description (diagnosis preferred):  NAUSEA  Start date: 03/11/2023  Date resolved: 03/13/2023     Intensity: ([x] mild /[]  moderate / [] severe)     Study Medication adjustment:  [x] Yes   [] No    Outcome: ([x] resolved [with or without sequelae] /[] recovering / [] not recovered / [] death / [] unknown)      Date study team aware of event:  05/18/2023    Was treatment given for this event:  [] Yes   [x] No   If yes, provide details  Serious adverse event?    Yes   [x] No    Special interest event?  [] Yes   [x] No    Endpoint? [] Yes   [x] No     Fatal event?  [] Yes   [x] No      Dr. BENJAMIN: Reasonable possibility that AE is related to study treatment    [x] Yes   [] No    Mis Husain RN    SURPASS-CVOT Study [Effect of tirzepatide versus Dulaglutide on Major Cardiovascular Events in Patients with Type 2 Diabetes]    Diagnosis/event description (diagnosis preferred):  NAUSEA  Start date: 03/18/2023  Date resolved: 03/20/2023     Intensity: ([x] mild /[]  moderate / [] severe)     Study Medication adjustment:  [] Yes   [x] No    Outcome: ([x] resolved [with or without sequelae] /[] recovering / [] not recovered / [] death / [] unknown)      Date study team aware of event: 05/18/2023    Was treatment given for this event:  [] Yes   [x] No   If yes, provide details  Serious adverse event?    Yes   [x] No    Special interest event?  [] Yes   [x] No    Endpoint? [] Yes   [x] No     Fatal event?  [] Yes   [x] No      Dr. BENJAMIN: Reasonable possibility that AE is related to study treatment    [x] Yes   [] No    Mis Husain RN    [employer]-CVOT Study [Effect of tirzepatide versus Dulaglutide on Major Cardiovascular Events in Patients with Type 2 Diabetes]    Diagnosis/event description (diagnosis preferred):  NAUSEA  Start date: 03/25/2023  Date resolved: 03/27/2023     Intensity: ([x] mild /[]  moderate / [] severe)     Study Medication adjustment:  [] Yes   [x] No    Outcome: ([x] resolved [with or without sequelae] /[] recovering / [] not recovered / [] death / [] unknown)      Date study team aware of event: 05/8/2023    Was treatment given for this event:  [] Yes   [x] No   If yes, provide details  Serious adverse event?    Yes   [x] No    Special interest event?  [] Yes   [x] No    Endpoint? [] Yes   [x] No     Fatal event?  [] Yes   [x] No        CASSIDY: Reasonable possibility that AE is related to study treatment    [x] Yes   [] No    Mis Husain RN    [employer]-CVOT Study [Effect of tirzepatide versus Dulaglutide on Major Cardiovascular Events in Patients with Type 2 Diabetes]    Diagnosis/event description (diagnosis preferred):  NAUSEA  Start date: 04/01/2023   Date resolved: 04/03/2023     Intensity: ([x] mild /[]  moderate / [] severe)     Study Medication adjustment:  [] Yes   [x] No    Outcome: ([x] resolved [with or without sequelae] /[] recovering / [] not recovered / [] death / [] unknown)      Date study team aware of event: 05/18/2023    Was treatment given for this event:  [] Yes   [x] No   If yes, provide details  Serious adverse event?    Yes   [x] No    Special interest event?  [] Yes   [x] No    Endpoint? [] Yes   [x] No     Fatal event?  [] Yes   [x] No      Dr. BENJAMIN: Reasonable possibility that AE is related to study treatment    [x] Yes   [] No    Mis Husain RN    [employer]-CVOT Study [Effect of tirzepatide versus Dulaglutide on Major Cardiovascular Events in Patients with Type 2 Diabetes]    Diagnosis/event description (diagnosis preferred):  NAUSEA  Start date: 04/08/2023  Date resolved: 04/10/2023     Intensity: ([x] mild /[]  moderate / [] severe)     Study Medication adjustment:  [] Yes   [x] No    Outcome: ([x] resolved [with or without sequelae] /[] recovering / [] not recovered / [] death / [] unknown)      Date study team aware of event: 05/18/2023    Was treatment given for this event:  [] Yes   [x] No   If yes, provide details  Serious adverse event?    Yes   [x] No    Special interest event?  [] Yes   [x] No    Endpoint? [] Yes   [x] No     Fatal event?  [] Yes   [x] No      Dr. BENJAMIN: Reasonable possibility that AE is related to study treatment    [x] Yes   [] No    Mis Husain RN    [employer]-CVOT Study [Effect of tirzepatide versus Dulaglutide on Major Cardiovascular Events in Patients with Type 2  Diabetes]    Diagnosis/event description (diagnosis preferred):  NAUSEA  Start date: 04/15/2023  Date resolved: 04/17/2023     Intensity: ([x] mild /[]  moderate / [] severe)     Study Medication adjustment:  [] Yes   [x] No    Outcome: ([x] resolved [with or without sequelae] /[] recovering / [] not recovered / [] death / [] unknown)      Date study team aware of event: 05/18/2023    Was treatment given for this event:  [] Yes   [x] No   If yes, provide details  Serious adverse event?    Yes   [x] No    Special interest event?  [] Yes   [x] No    Endpoint? [] Yes   [x] No     Fatal event?  [] Yes   [x] No      Dr. BENJAMIN: Reasonable possibility that AE is related to study treatment    [x] Yes   [] No    Mis Husain RN    [employer]-CVOT Study [Effect of tirzepatide versus Dulaglutide on Major Cardiovascular Events in Patients with Type 2 Diabetes]    Diagnosis/event description (diagnosis preferred):  NAUSEA  Start date: 04/22/2023  Date resolved: 04/24/2023    Intensity: ([x] mild /[]  moderate / [] severe)     Study Medication adjustment:  [] Yes   [x] No    Outcome: ([x] resolved [with or without sequelae] /[] recovering / [] not recovered / [] death / [] unknown)      Date study team aware of event: 05/18/2023    Was treatment given for this event:  [] Yes   [x] No   If yes, provide details  Serious adverse event?    Yes   [x] No    Special interest event?  [] Yes   [x] No    Endpoint? [] Yes   [x] No     Fatal event?  [] Yes   [x] No      Dr. BENJAMIN: Reasonable possibility that AE is related to study treatment    [x] Yes   [] No    Mis Husain RN    [employer]-CVOT Study [Effect of tirzepatide versus Dulaglutide on Major Cardiovascular Events in Patients with Type 2 Diabetes]    Diagnosis/event description (diagnosis preferred):  NAUSEA  Start date: 04/29/2023  Date resolved: 05/01/2023    Intensity: ([x] mild /[]  moderate / [] severe)     Study Medication adjustment:  [] Yes   [x] No    Outcome: ([x]  resolved [with or without sequelae] /[] recovering / [] not recovered / [] death / [] unknown)      Date study team aware of event: 05/18/2023    Was treatment given for this event:  [] Yes   [x] No   If yes, provide details  Serious adverse event?    Yes   [x] No    Special interest event?  [] Yes   [x] No    Endpoint? [] Yes   [x] No     Fatal event?  [] Yes   [x] No      Dr. BENJAMIN: Reasonable possibility that AE is related to study treatment    [x] Yes   [] No    Mis Husain RN    SURPASS-CVOT Study [Effect of tirzepatide versus Dulaglutide on Major Cardiovascular Events in Patients with Type 2 Diabetes]    Diagnosis/event description (diagnosis preferred):  NAUSEA  Start date: 05/06/2023  Date resolved: 05/08/2023    Intensity: ([x] mild /[]  moderate / [] severe)     Study Medication adjustment:  [] Yes   [x] No    Outcome: ([x] resolved [with or without sequelae] /[] recovering / [] not recovered / [] death / [] unknown)      Date study team aware of event: 05/18/2023    Was treatment given for this event:  [] Yes   [x] No   If yes, provide details  Serious adverse event?    Yes   [x] No    Special interest event?  [] Yes   [x] No    Endpoint? [] Yes   [x] No     Fatal event?  [] Yes   [x] No      Dr. BENJAMIN: Reasonable possibility that AE is related to study treatment    [x] Yes   [] No    Mis Husain RN    [employer]-CVOT Study [Effect of tirzepatide versus Dulaglutide on Major Cardiovascular Events in Patients with Type 2 Diabetes]    Diagnosis/event description (diagnosis preferred):  NAUSEA  Start date: 05/13/2023  Date resolved: 05/15/2023    Intensity: ([x] mild /[]  moderate / [] severe)     Study Medication adjustment:  [] Yes   [x] No    Outcome: ([x] resolved [with or without sequelae] /[] recovering / [] not recovered / [] death / [] unknown)      Date study team aware of event: 05/18/2023    Was treatment given for this event:  [] Yes   [x] No   If yes, provide details  Serious adverse event?     Yes   [x] No    Special interest event?  [] Yes   [x] No    Endpoint? [] Yes   [x] No     Fatal event?  [] Yes   [x] No      Dr. BENJAMIN: Reasonable possibility that AE is related to study treatment    [x] Yes   [] No    Mis Husain RN    Adherence/lifestyle reinforcement done     No of pens dispensedat last visit 16  Any Returned medication 0  Date of first dose since last visit: 02/27/2023  Date of last dose taken since last visit: 05/11/2023    Subject drug dispensed.    Will see him again in clinic in 3 months.    Mis Husain RN

## 2023-05-19 NOTE — PROGRESS NOTES
IGNORE DUPLICATE              SURPASS-CVOT Study [Effect of tirzepatide versus Dulaglutide on Major Cardiovascular Events in Patients with Type 2 Diabetes]    Diagnosis/event description (diagnosis preferred):  NAUSEA  Start date: 12/17/2022  Date resolved: 12/19/2022     Intensity: ([x] mild /[]  moderate / [] severe)     Study Medication adjustment:  [] Yes   [x] No    Outcome: ([x] resolved [with or without sequelae] /[] recovering / [] not recovered / [] death / [] unknown)    Date study team aware of event: 02/27/2023    Was treatment given for this event:  [] Yes   [x] No   If yes, provide details  Serious adverse event?    Yes   [x] No    Special interest event?  [] Yes   [x] No    Endpoint? [] Yes   [x] No     Fatal event?  [] Yes   [x] No      Dr. BENJAMIN: Reasonable possibility that AE is related to study treatment    [] Yes   [] No    Mis Husain RN      SURPASS-CVOT Study [Effect of tirzepatide versus Dulaglutide on Major Cardiovascular Events in Patients with Type 2 Diabetes]    Diagnosis/event description (diagnosis preferred):  NAUSEA  Start date: 12/24/2022  Date resolved:  12/26/2022    Intensity: ([x] mild /[]  moderate / [] severe)     Study Medication adjustment:  [] Yes   [x] No    Outcome: ([x] resolved [with or without sequelae] /[] recovering / [] not recovered / [] death / [] unknown)      Date study team aware of event: 02/27/2023    Was treatment given for this event:  [] Yes   [x] No   If yes, provide details  Serious adverse event?    Yes   [x] No    Special interest event?  [] Yes   [x] No    Endpoint? [] Yes   [x] No     Fatal event?  [] Yes   [x] No      Dr. BENJAMIN: Reasonable possibility that AE is related to study treatment    [] Yes   [] No    Mis Husain RN    SURPASS-CVOT Study [Effect of tirzepatide versus Dulaglutide on Major Cardiovascular Events in Patients with Type 2 Diabetes]    Diagnosis/event description (diagnosis preferred):  NAUSEA  Start date: 12/31/2022  Date  resolved: 01/02/2023    Intensity: ([x] mild /[]  moderate / [] severe)     Study Medication adjustment:  [] Yes   [x] No    Outcome: ([x] resolved [with or without sequelae] /[] recovering / [] not recovered / [] death / [] unknown)      Date study team aware of event: 02/27/2023    Was treatment given for this event:  [] Yes   [x] No   If yes, provide details  Serious adverse event?    Yes   [x] No    Special interest event?  [] Yes   [x] No    Endpoint? [] Yes   [x] No     Fatal event?  [] Yes   [x] No      Dr. BENJAMIN: Reasonable possibility that AE is related to study treatment    [] Yes   [] No    Mis Husain RN    [email or web address] Study [Effect of tirzepatide versus Dulaglutide on Major Cardiovascular Events in Patients with Type 2 Diabetes]    Diagnosis/event description (diagnosis preferred):  NAUSEA  Start date:  01/07/2023  Date resolved: 01/09/2023    Intensity: ([x] mild /[]  moderate / [] severe)     Study Medication adjustment:  [] Yes   [x] No    Outcome: ([x] resolved [with or without sequelae] /[] recovering / [] not recovered / [] death / [] unknown)      Date study team aware of event: 02/27/2023    Was treatment given for this event:  [] Yes   [x] No   If yes, provide details  Serious adverse event?    Yes   [x] No    Special interest event?  [] Yes   [x] No    Endpoint? [] Yes   [x] No     Fatal event?  [] Yes   [x] No      Dr. BENJAMIN: Reasonable possibility that AE is related to study treatment    [] Yes   [] No    Mis Husain RN    [email or web address] Study [Effect of tirzepatide versus Dulaglutide on Major Cardiovascular Events in Patients with Type 2 Diabetes]    Diagnosis/event description (diagnosis preferred):  NAUSEA  Start date: 01/14/23  Date resolved: 01/16/2023    Intensity: ([x] mild /[]  moderate / [] severe)     Study Medication adjustment:  [] Yes   [x] No    Outcome: ([x] resolved [with or without sequelae] /[] recovering / [] not recovered / [] death / [] unknown)      Date study  team aware of event: 02/27/2023    Was treatment given for this event:  [] Yes   [x] No   If yes, provide details  Serious adverse event?    Yes   [x] No    Special interest event?  [] Yes   [x] No    Endpoint? [] Yes   [x] No     Fatal event?  [] Yes   [x] No      Dr. BENJAMIN: Reasonable possibility that AE is related to study treatment    [] Yes   [] No    Mis Husain RN    SURPASS-CVOT Study [Effect of tirzepatide versus Dulaglutide on Major Cardiovascular Events in Patients with Type 2 Diabetes]    Diagnosis/event description (diagnosis preferred):  NAUSEA  Start date: 01/21/2023  Date resolved: 01/23/2023    Intensity: ([x] mild /[]  moderate / [] severe)     Study Medication adjustment:  [] Yes   [x] No    Outcome: ([x] resolved [with or without sequelae] /[] recovering / [] not recovered / [] death / [] unknown)      Date study team aware of event: 02/27/2023    Was treatment given for this event:  [] Yes   [x] No   If yes, provide details  Serious adverse event?    Yes   [x] No    Special interest event?  [] Yes   [x] No    Endpoint? [] Yes   [x] No     Fatal event?  [] Yes   [x] No      Dr. BENJAMIN: Reasonable possibility that AE is related to study treatment    [] Yes   [] No    Mis Husain RN    [employer]-CVOT Study [Effect of tirzepatide versus Dulaglutide on Major Cardiovascular Events in Patients with Type 2 Diabetes]    Diagnosis/event description (diagnosis preferred):  NAUSEA  Start date: 01/28/2023  Date resolved: 01/30/2023     Intensity: ([x] mild /[]  moderate / [] severe)     Study Medication adjustment:  [] Yes   [x] No    Outcome: ([x] resolved [with or without sequelae] /[] recovering / [] not recovered / [] death / [] unknown)      Date study team aware of event:02/27/2023    Was treatment given for this event:  [] Yes   [x] No   If yes, provide details  Serious adverse event?    Yes   [x] No    Special interest event?  [] Yes   [x] No    Endpoint? [] Yes   [x] No     Fatal event?  [] Yes    [x] No      Dr. BENJAMIN: Reasonable possibility that AE is related to study treatment    [] Yes   [] No    Mis Husain RN    [employer]-CVOT Study [Effect of tirzepatide versus Dulaglutide on Major Cardiovascular Events in Patients with Type 2 Diabetes]    Diagnosis/event description (diagnosis preferred):  NAUSEA  Start date: 02/04/2023   Date resolved: 02/06/2023    Intensity: ([x] mild /[]  moderate / [] severe)     Study Medication adjustment:  [] Yes   [x] No    Outcome: ([x] resolved [with or without sequelae] /[] recovering / [] not recovered / [] death / [] unknown)      Date study team aware of event: 02/27/2023    Was treatment given for this event:  [] Yes   [x] No   If yes, provide details  Serious adverse event?    Yes   [x] No    Special interest event?  [] Yes   [x] No    Endpoint? [] Yes   [x] No     Fatal event?  [] Yes   [x] No      Dr. BENJAMIN: Reasonable possibility that AE is related to study treatment    [] Yes   [] No    Mis Husain RN    [employer]-CVOT Study [Effect of tirzepatide versus Dulaglutide on Major Cardiovascular Events in Patients with Type 2 Diabetes]    Diagnosis/event description (diagnosis preferred):  NAUSEA  Start date: 02/11/2023   Date resolved: 02/13/2023     Intensity: ([x] mild /[]  moderate / [] severe)     Study Medication adjustment:  [] Yes   [x] No    Outcome: ([x] resolved [with or without sequelae] /[] recovering / [] not recovered / [] death / [] unknown)      Date study team aware of event: 02/27/2023    Was treatment given for this event:  [] Yes   [x] No   If yes, provide details  Serious adverse event?    Yes   [x] No    Special interest event?  [] Yes   [x] No    Endpoint? [] Yes   [x] No     Fatal event?  [] Yes   [x] No      Dr. BENJAMIN: Reasonable possibility that AE is related to study treatment    [] Yes   [] No    Mis Husain RN    [employer]-CVOT Study [Effect of tirzepatide versus Dulaglutide on Major Cardiovascular Events in Patients with Type 2  Diabetes]    Diagnosis/event description (diagnosis preferred):  NAUSEA  Start date: 02/18/2023   Date resolved: 02/20/2023    Intensity: ([x] mild /[]  moderate / [] severe)    Study Medication adjustment:  [] Yes   [x] No    Outcome: ([x] resolved [with or without sequelae] /[] recovering / [] not recovered / [] death / [] unknown)     Date study team aware of event: 02/27/2023    Was treatment given for this event:  [] Yes   [x] No   If yes, provide details  Serious adverse event?    Yes   [x] No    Special interest event?  [] Yes   [x] No    Endpoint? [] Yes   [x] No     Fatal event?  [] Yes   [x] No      Dr. BENJAMIN: Reasonable possibility that AE is related to study treatment    [] Yes   [] No    Mis Husain RN    [facility]-CVOT Study [Effect of tirzepatide versus Dulaglutide on Major Cardiovascular Events in Patients with Type 2 Diabetes]    Diagnosis/event description (diagnosis preferred):  NAUSEA  Start date: 02/25/2023  Date resolved: 02/27/2023    Intensity: ([x] mild /[]  moderate / [] severe)     Study Medication adjustment:  [] Yes   [x] No    Outcome: ([x] resolved [with or without sequelae] /[] recovering / [] not recovered / [] death / [] unknown)      Date study team aware of event: 02/27/2023    Was treatment given for this event:  [] Yes   [x] No   If yes, provide details  Serious adverse event?    Yes   [x] No    Special interest event?  [] Yes   [x] No    Endpoint? [] Yes   [x] No     Fatal event?  [] Yes   [x] No      Dr. BENJAMIN: Reasonable possibility that AE is related to study treatment    [] Yes   [] No    Mis Husain RN

## 2023-06-12 ENCOUNTER — TELEPHONE (OUTPATIENT)
Dept: CARDIOLOGY | Facility: CLINIC | Age: 63
End: 2023-06-12
Payer: COMMERCIAL

## 2023-06-12 NOTE — TELEPHONE ENCOUNTER
"6/12/2023 Patient received letter from [facility] informing him the eliquis will not be on auto-renewal with suggestion to use [facility] to renew.     Called to [facility] pharmacy - per pharmacist, due to expense of drug, Eliquis is not a \"auto-renewal\" drug and patient needs to call pharmacy when refill needed. This is to reduce cost and re-shelving risk for pharmacy of more expensive medications.  Patient does not need to use the [facility], but will need to call the pharmacy for refills to be generated.    Update to patient through wife.  Tara Hawthorne RN 06/12/23 9:18 AM      "

## 2023-06-29 ENCOUNTER — TELEPHONE (OUTPATIENT)
Dept: CARDIOLOGY | Facility: CLINIC | Age: 63
End: 2023-06-29
Payer: COMMERCIAL

## 2023-07-12 ENCOUNTER — ANCILLARY PROCEDURE (OUTPATIENT)
Dept: CARDIOLOGY | Facility: CLINIC | Age: 63
End: 2023-07-12
Attending: INTERNAL MEDICINE
Payer: COMMERCIAL

## 2023-07-12 DIAGNOSIS — Z95.0 CARDIAC PACEMAKER IN SITU: ICD-10-CM

## 2023-07-12 DIAGNOSIS — I44.2 COMPLETE ATRIOVENTRICULAR BLOCK (H): ICD-10-CM

## 2023-07-12 LAB
MDC_IDC_EPISODE_DTM: NORMAL
MDC_IDC_EPISODE_DURATION: 1 S
MDC_IDC_EPISODE_DURATION: 110 S
MDC_IDC_EPISODE_DURATION: 115 S
MDC_IDC_EPISODE_DURATION: 1297 S
MDC_IDC_EPISODE_DURATION: 1734 S
MDC_IDC_EPISODE_DURATION: 1955 S
MDC_IDC_EPISODE_DURATION: 1967 S
MDC_IDC_EPISODE_DURATION: 24 S
MDC_IDC_EPISODE_DURATION: 2647 S
MDC_IDC_EPISODE_DURATION: 288 S
MDC_IDC_EPISODE_DURATION: 351 S
MDC_IDC_EPISODE_DURATION: 3552 S
MDC_IDC_EPISODE_DURATION: 36 S
MDC_IDC_EPISODE_DURATION: 3614 S
MDC_IDC_EPISODE_DURATION: 4864 S
MDC_IDC_EPISODE_DURATION: 49 S
MDC_IDC_EPISODE_DURATION: 49 S
MDC_IDC_EPISODE_DURATION: 4929 S
MDC_IDC_EPISODE_DURATION: 520 S
MDC_IDC_EPISODE_DURATION: 5406 S
MDC_IDC_EPISODE_DURATION: 627 S
MDC_IDC_EPISODE_DURATION: 6637 S
MDC_IDC_EPISODE_DURATION: 71 S
MDC_IDC_EPISODE_DURATION: 83 S
MDC_IDC_EPISODE_DURATION: 87 S
MDC_IDC_EPISODE_DURATION: 933 S
MDC_IDC_EPISODE_DURATION: 978 S
MDC_IDC_EPISODE_ID: 61
MDC_IDC_EPISODE_ID: 62
MDC_IDC_EPISODE_ID: 63
MDC_IDC_EPISODE_ID: 64
MDC_IDC_EPISODE_ID: 65
MDC_IDC_EPISODE_ID: 66
MDC_IDC_EPISODE_ID: 67
MDC_IDC_EPISODE_ID: 68
MDC_IDC_EPISODE_ID: 69
MDC_IDC_EPISODE_ID: 70
MDC_IDC_EPISODE_ID: 71
MDC_IDC_EPISODE_ID: 72
MDC_IDC_EPISODE_ID: 73
MDC_IDC_EPISODE_ID: 74
MDC_IDC_EPISODE_ID: 75
MDC_IDC_EPISODE_ID: 76
MDC_IDC_EPISODE_ID: 77
MDC_IDC_EPISODE_ID: 78
MDC_IDC_EPISODE_ID: 79
MDC_IDC_EPISODE_ID: 80
MDC_IDC_EPISODE_ID: 81
MDC_IDC_EPISODE_ID: 82
MDC_IDC_EPISODE_ID: 83
MDC_IDC_EPISODE_ID: 84
MDC_IDC_EPISODE_ID: 85
MDC_IDC_EPISODE_ID: 86
MDC_IDC_EPISODE_ID: 87
MDC_IDC_EPISODE_ID: 88
MDC_IDC_EPISODE_ID: 89
MDC_IDC_EPISODE_ID: 90
MDC_IDC_EPISODE_TYPE: NORMAL
MDC_IDC_LEAD_IMPLANT_DT: NORMAL
MDC_IDC_LEAD_IMPLANT_DT: NORMAL
MDC_IDC_LEAD_LOCATION: NORMAL
MDC_IDC_LEAD_LOCATION: NORMAL
MDC_IDC_LEAD_LOCATION_DETAIL_1: NORMAL
MDC_IDC_LEAD_LOCATION_DETAIL_1: NORMAL
MDC_IDC_LEAD_MFG: NORMAL
MDC_IDC_LEAD_MFG: NORMAL
MDC_IDC_LEAD_MODEL: NORMAL
MDC_IDC_LEAD_MODEL: NORMAL
MDC_IDC_LEAD_POLARITY_TYPE: NORMAL
MDC_IDC_LEAD_POLARITY_TYPE: NORMAL
MDC_IDC_LEAD_SERIAL: NORMAL
MDC_IDC_LEAD_SERIAL: NORMAL
MDC_IDC_MSMT_BATTERY_DTM: NORMAL
MDC_IDC_MSMT_BATTERY_REMAINING_LONGEVITY: 150 MO
MDC_IDC_MSMT_BATTERY_RRT_TRIGGER: 2.62
MDC_IDC_MSMT_BATTERY_STATUS: NORMAL
MDC_IDC_MSMT_BATTERY_VOLTAGE: 3.13 V
MDC_IDC_MSMT_LEADCHNL_RA_IMPEDANCE_VALUE: 323 OHM
MDC_IDC_MSMT_LEADCHNL_RA_IMPEDANCE_VALUE: 494 OHM
MDC_IDC_MSMT_LEADCHNL_RA_PACING_THRESHOLD_AMPLITUDE: 0.5 V
MDC_IDC_MSMT_LEADCHNL_RA_PACING_THRESHOLD_PULSEWIDTH: 0.4 MS
MDC_IDC_MSMT_LEADCHNL_RA_SENSING_INTR_AMPL: 3.12 MV
MDC_IDC_MSMT_LEADCHNL_RA_SENSING_INTR_AMPL: 3.12 MV
MDC_IDC_MSMT_LEADCHNL_RV_IMPEDANCE_VALUE: 342 OHM
MDC_IDC_MSMT_LEADCHNL_RV_IMPEDANCE_VALUE: 418 OHM
MDC_IDC_MSMT_LEADCHNL_RV_PACING_THRESHOLD_AMPLITUDE: 0.62 V
MDC_IDC_MSMT_LEADCHNL_RV_PACING_THRESHOLD_PULSEWIDTH: 0.4 MS
MDC_IDC_MSMT_LEADCHNL_RV_SENSING_INTR_AMPL: 5.75 MV
MDC_IDC_MSMT_LEADCHNL_RV_SENSING_INTR_AMPL: 7.5 MV
MDC_IDC_PG_IMPLANT_DTM: NORMAL
MDC_IDC_PG_MFG: NORMAL
MDC_IDC_PG_MODEL: NORMAL
MDC_IDC_PG_SERIAL: NORMAL
MDC_IDC_PG_TYPE: NORMAL
MDC_IDC_SESS_CLINIC_NAME: NORMAL
MDC_IDC_SESS_DTM: NORMAL
MDC_IDC_SESS_TYPE: NORMAL
MDC_IDC_SET_BRADY_AT_MODE_SWITCH_RATE: 171 {BEATS}/MIN
MDC_IDC_SET_BRADY_HYSTRATE: NORMAL
MDC_IDC_SET_BRADY_LOWRATE: 50 {BEATS}/MIN
MDC_IDC_SET_BRADY_MAX_SENSOR_RATE: 130 {BEATS}/MIN
MDC_IDC_SET_BRADY_MAX_TRACKING_RATE: 130 {BEATS}/MIN
MDC_IDC_SET_BRADY_MODE: NORMAL
MDC_IDC_SET_BRADY_PAV_DELAY_LOW: 300 MS
MDC_IDC_SET_BRADY_SAV_DELAY_LOW: 300 MS
MDC_IDC_SET_LEADCHNL_RA_PACING_AMPLITUDE: 1.5 V
MDC_IDC_SET_LEADCHNL_RA_PACING_ANODE_ELECTRODE_1: NORMAL
MDC_IDC_SET_LEADCHNL_RA_PACING_ANODE_LOCATION_1: NORMAL
MDC_IDC_SET_LEADCHNL_RA_PACING_CAPTURE_MODE: NORMAL
MDC_IDC_SET_LEADCHNL_RA_PACING_CATHODE_ELECTRODE_1: NORMAL
MDC_IDC_SET_LEADCHNL_RA_PACING_CATHODE_LOCATION_1: NORMAL
MDC_IDC_SET_LEADCHNL_RA_PACING_POLARITY: NORMAL
MDC_IDC_SET_LEADCHNL_RA_PACING_PULSEWIDTH: 0.4 MS
MDC_IDC_SET_LEADCHNL_RA_SENSING_ANODE_ELECTRODE_1: NORMAL
MDC_IDC_SET_LEADCHNL_RA_SENSING_ANODE_LOCATION_1: NORMAL
MDC_IDC_SET_LEADCHNL_RA_SENSING_CATHODE_ELECTRODE_1: NORMAL
MDC_IDC_SET_LEADCHNL_RA_SENSING_CATHODE_LOCATION_1: NORMAL
MDC_IDC_SET_LEADCHNL_RA_SENSING_POLARITY: NORMAL
MDC_IDC_SET_LEADCHNL_RA_SENSING_SENSITIVITY: 0.3 MV
MDC_IDC_SET_LEADCHNL_RV_PACING_AMPLITUDE: 2 V
MDC_IDC_SET_LEADCHNL_RV_PACING_ANODE_ELECTRODE_1: NORMAL
MDC_IDC_SET_LEADCHNL_RV_PACING_ANODE_LOCATION_1: NORMAL
MDC_IDC_SET_LEADCHNL_RV_PACING_CAPTURE_MODE: NORMAL
MDC_IDC_SET_LEADCHNL_RV_PACING_CATHODE_ELECTRODE_1: NORMAL
MDC_IDC_SET_LEADCHNL_RV_PACING_CATHODE_LOCATION_1: NORMAL
MDC_IDC_SET_LEADCHNL_RV_PACING_POLARITY: NORMAL
MDC_IDC_SET_LEADCHNL_RV_PACING_PULSEWIDTH: 0.4 MS
MDC_IDC_SET_LEADCHNL_RV_SENSING_ANODE_ELECTRODE_1: NORMAL
MDC_IDC_SET_LEADCHNL_RV_SENSING_ANODE_LOCATION_1: NORMAL
MDC_IDC_SET_LEADCHNL_RV_SENSING_CATHODE_ELECTRODE_1: NORMAL
MDC_IDC_SET_LEADCHNL_RV_SENSING_CATHODE_LOCATION_1: NORMAL
MDC_IDC_SET_LEADCHNL_RV_SENSING_POLARITY: NORMAL
MDC_IDC_SET_LEADCHNL_RV_SENSING_SENSITIVITY: 0.9 MV
MDC_IDC_SET_ZONE_DETECTION_INTERVAL: 200 MS
MDC_IDC_SET_ZONE_DETECTION_INTERVAL: 350 MS
MDC_IDC_SET_ZONE_DETECTION_INTERVAL: 400 MS
MDC_IDC_SET_ZONE_TYPE: NORMAL
MDC_IDC_STAT_AT_BURDEN_PERCENT: 0.5 %
MDC_IDC_STAT_AT_DTM_END: NORMAL
MDC_IDC_STAT_AT_DTM_START: NORMAL
MDC_IDC_STAT_BRADY_AP_VP_PERCENT: 19.28 %
MDC_IDC_STAT_BRADY_AP_VS_PERCENT: 1.94 %
MDC_IDC_STAT_BRADY_AS_VP_PERCENT: 63.54 %
MDC_IDC_STAT_BRADY_AS_VS_PERCENT: 15.25 %
MDC_IDC_STAT_BRADY_DTM_END: NORMAL
MDC_IDC_STAT_BRADY_DTM_START: NORMAL
MDC_IDC_STAT_BRADY_RA_PERCENT_PACED: 21.44 %
MDC_IDC_STAT_BRADY_RV_PERCENT_PACED: 82.79 %
MDC_IDC_STAT_EPISODE_RECENT_COUNT: 0
MDC_IDC_STAT_EPISODE_RECENT_COUNT: 0
MDC_IDC_STAT_EPISODE_RECENT_COUNT: 1
MDC_IDC_STAT_EPISODE_RECENT_COUNT: 25
MDC_IDC_STAT_EPISODE_RECENT_COUNT: 4
MDC_IDC_STAT_EPISODE_RECENT_COUNT_DTM_END: NORMAL
MDC_IDC_STAT_EPISODE_RECENT_COUNT_DTM_START: NORMAL
MDC_IDC_STAT_EPISODE_TOTAL_COUNT: 0
MDC_IDC_STAT_EPISODE_TOTAL_COUNT: 0
MDC_IDC_STAT_EPISODE_TOTAL_COUNT: 22
MDC_IDC_STAT_EPISODE_TOTAL_COUNT: 24
MDC_IDC_STAT_EPISODE_TOTAL_COUNT: 44
MDC_IDC_STAT_EPISODE_TOTAL_COUNT_DTM_END: NORMAL
MDC_IDC_STAT_EPISODE_TOTAL_COUNT_DTM_START: NORMAL
MDC_IDC_STAT_EPISODE_TYPE: NORMAL

## 2023-07-12 PROCEDURE — 93296 REM INTERROG EVL PM/IDS: CPT | Performed by: INTERNAL MEDICINE

## 2023-07-12 PROCEDURE — 93294 REM INTERROG EVL PM/LDLS PM: CPT | Performed by: INTERNAL MEDICINE

## 2023-07-21 ENCOUNTER — LAB (OUTPATIENT)
Dept: LAB | Facility: CLINIC | Age: 63
End: 2023-07-21
Payer: COMMERCIAL

## 2023-07-21 ENCOUNTER — OFFICE VISIT (OUTPATIENT)
Dept: GASTROENTEROLOGY | Facility: CLINIC | Age: 63
End: 2023-07-21
Attending: INTERNAL MEDICINE
Payer: COMMERCIAL

## 2023-07-21 ENCOUNTER — ANCILLARY PROCEDURE (OUTPATIENT)
Dept: ULTRASOUND IMAGING | Facility: CLINIC | Age: 63
End: 2023-07-21
Attending: INTERNAL MEDICINE
Payer: COMMERCIAL

## 2023-07-21 VITALS
BODY MASS INDEX: 21.57 KG/M2 | DIASTOLIC BLOOD PRESSURE: 68 MMHG | OXYGEN SATURATION: 99 % | HEART RATE: 74 BPM | WEIGHT: 157.38 LBS | SYSTOLIC BLOOD PRESSURE: 135 MMHG

## 2023-07-21 DIAGNOSIS — K74.60 CIRRHOSIS OF LIVER WITHOUT ASCITES, UNSPECIFIED HEPATIC CIRRHOSIS TYPE (H): ICD-10-CM

## 2023-07-21 DIAGNOSIS — K74.60 CIRRHOSIS OF LIVER WITHOUT ASCITES, UNSPECIFIED HEPATIC CIRRHOSIS TYPE (H): Primary | ICD-10-CM

## 2023-07-21 LAB
AFP SERPL-MCNC: <1.8 NG/ML
ALBUMIN SERPL BCG-MCNC: 4.6 G/DL (ref 3.5–5.2)
ALP SERPL-CCNC: 59 U/L (ref 40–129)
ALT SERPL W P-5'-P-CCNC: 42 U/L (ref 0–70)
ANION GAP SERPL CALCULATED.3IONS-SCNC: 9 MMOL/L (ref 7–15)
AST SERPL W P-5'-P-CCNC: 30 U/L (ref 0–45)
BILIRUB DIRECT SERPL-MCNC: 0.41 MG/DL (ref 0–0.3)
BILIRUB SERPL-MCNC: 1.6 MG/DL
BUN SERPL-MCNC: 14.6 MG/DL (ref 8–23)
CALCIUM SERPL-MCNC: 9.9 MG/DL (ref 8.8–10.2)
CHLORIDE SERPL-SCNC: 105 MMOL/L (ref 98–107)
CREAT SERPL-MCNC: 0.99 MG/DL (ref 0.67–1.17)
DEPRECATED HCO3 PLAS-SCNC: 27 MMOL/L (ref 22–29)
ERYTHROCYTE [DISTWIDTH] IN BLOOD BY AUTOMATED COUNT: 12.9 % (ref 10–15)
GFR SERPL CREATININE-BSD FRML MDRD: 86 ML/MIN/1.73M2
GLUCOSE SERPL-MCNC: 97 MG/DL (ref 70–99)
HCT VFR BLD AUTO: 45.5 % (ref 40–53)
HGB BLD-MCNC: 15.5 G/DL (ref 13.3–17.7)
INR PPP: 1.41 (ref 0.85–1.15)
MCH RBC QN AUTO: 31.4 PG (ref 26.5–33)
MCHC RBC AUTO-ENTMCNC: 34.1 G/DL (ref 31.5–36.5)
MCV RBC AUTO: 92 FL (ref 78–100)
PLATELET # BLD AUTO: 134 10E3/UL (ref 150–450)
POTASSIUM SERPL-SCNC: 4.9 MMOL/L (ref 3.4–5.3)
PROT SERPL-MCNC: 7.3 G/DL (ref 6.4–8.3)
RBC # BLD AUTO: 4.94 10E6/UL (ref 4.4–5.9)
SODIUM SERPL-SCNC: 141 MMOL/L (ref 136–145)
WBC # BLD AUTO: 4.3 10E3/UL (ref 4–11)

## 2023-07-21 PROCEDURE — 85610 PROTHROMBIN TIME: CPT | Performed by: PATHOLOGY

## 2023-07-21 PROCEDURE — G0463 HOSPITAL OUTPT CLINIC VISIT: HCPCS | Performed by: INTERNAL MEDICINE

## 2023-07-21 PROCEDURE — 82105 ALPHA-FETOPROTEIN SERUM: CPT | Performed by: INTERNAL MEDICINE

## 2023-07-21 PROCEDURE — 99215 OFFICE O/P EST HI 40 MIN: CPT | Performed by: INTERNAL MEDICINE

## 2023-07-21 PROCEDURE — 76700 US EXAM ABDOM COMPLETE: CPT | Mod: GC | Performed by: RADIOLOGY

## 2023-07-21 PROCEDURE — 80053 COMPREHEN METABOLIC PANEL: CPT | Performed by: PATHOLOGY

## 2023-07-21 PROCEDURE — 85027 COMPLETE CBC AUTOMATED: CPT | Performed by: PATHOLOGY

## 2023-07-21 PROCEDURE — 36415 COLL VENOUS BLD VENIPUNCTURE: CPT | Performed by: PATHOLOGY

## 2023-07-21 PROCEDURE — 82248 BILIRUBIN DIRECT: CPT | Performed by: PATHOLOGY

## 2023-07-21 PROCEDURE — 99000 SPECIMEN HANDLING OFFICE-LAB: CPT | Performed by: PATHOLOGY

## 2023-07-21 ASSESSMENT — PAIN SCALES - GENERAL: PAINLEVEL: NO PAIN (0)
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
## 2023-07-25 NOTE — TELEPHONE ENCOUNTER
I called Kelton because I received a refill request for his Amlodipine from [facility] in [place], [place]. I left him a voicemail message asking him to call back and let me know if he is on [place] and/or if he needs a refill of his Amlodipine.

## 2023-07-26 DIAGNOSIS — I10 HYPERTENSION, UNSPECIFIED TYPE: ICD-10-CM

## 2023-07-26 RX ORDER — AMLODIPINE BESYLATE 2.5 MG/1
2.5 TABLET ORAL DAILY
Qty: 90 TABLET | Refills: 3 | Status: SHIPPED | OUTPATIENT
Start: 2023-07-26 | End: 2023-11-27

## 2023-08-17 ENCOUNTER — OFFICE VISIT (OUTPATIENT)
Dept: CARDIOLOGY | Facility: CLINIC | Age: 63
End: 2023-08-17
Payer: COMMERCIAL

## 2023-08-17 VITALS
WEIGHT: 152 LBS | HEART RATE: 75 BPM | HEIGHT: 71 IN | DIASTOLIC BLOOD PRESSURE: 71 MMHG | SYSTOLIC BLOOD PRESSURE: 128 MMHG | BODY MASS INDEX: 21.28 KG/M2

## 2023-08-17 DIAGNOSIS — Z00.6 EXAMINATION OF PARTICIPANT IN CLINICAL TRIAL: Primary | ICD-10-CM

## 2023-08-17 DIAGNOSIS — Z00.6 EXAMINATION OF PARTICIPANT OR CONTROL IN CLINICAL RESEARCH: Primary | ICD-10-CM

## 2023-08-17 DIAGNOSIS — I25.10 CORONARY ARTERY DISEASE INVOLVING NATIVE CORONARY ARTERY OF NATIVE HEART WITHOUT ANGINA PECTORIS: ICD-10-CM

## 2023-08-17 DIAGNOSIS — E11.9 DIABETES MELLITUS (H): ICD-10-CM

## 2023-08-17 PROCEDURE — 99207 PR NO CHARGE-RESEARCH SERVICE: CPT | Performed by: INTERNAL MEDICINE

## 2023-08-17 PROCEDURE — 99207 PR NO CHARGE-RESEARCH SERVICE: CPT

## 2023-08-17 NOTE — LETTER
8/17/2023    Danish Land MD  [address]  [facility] [address]    RE: Vikash Sharif Loretta       Dear Colleague,     I had the pleasure of seeing Vikash Sharif Loretta in the [facility].  SURPASS-CVOT Study [Effect of tirzepatide versus Dulaglutide on Major Cardiovascular Events in Patients with Type 2Diabetes]    Subject seen for Visit 18    Vital signs were done, including waist circumference.measured after 5 minutes resting in a seated position - two readings taken one minute apart using automated cuff.    Subject queried for adverse events, endpoints and medication changes. If present, noted below. Patient continues to c/o ongoing nausea starting 2 days after injection and last through day 5. He has a rx for Reglan is is encouraged to us it BID or as directed.    Denies med changes, vision problems and hypoglycemic events. Still waiting to have his cataract surgery done.     SURPASS-CVOT Study [Effect of tirzepatide versus Dulaglutide on Major Cardiovascular Events in Patients with Type 2 Diabetes]    Diagnosis/event description (diagnosis preferred):  NAUSEA  Start date: 05/20/2023  Date resolved: 05/23/2023    Intensity: ([x] mild /[]  moderate / [] severe)     Study Medication adjustment:  [] Yes   [x] No    Outcome: ([x] resolved [with or without sequelae] /[] recovering / [] not recovered / [] death / [] unknown)      Date study team aware of event: 08/17/2023    Was treatment given for this event:  [] Yes   [x] No   If yes, provide details    Serious adverse event?    Yes   [x] No    Special interest event?  [] Yes   [x] No    Endpoint? [] Yes   [x] No     Fatal event?  [] Yes   [x] No      Dr. Elias: Reasonable possibility that AE is related to study treatment    [] Yes   [] No    Mis Husain, RN     SURPASS-CVOT Study [Effect of tirzepatide versus Dulaglutide on Major Cardiovascular Events in Patients with Type 2 Diabetes]    Diagnosis/event description (diagnosis  preferred):  NAUSEA  Start date: 05/27/2023  Date resolved: 05/30/2023    Intensity: ([x] mild /[]  moderate / [] severe)     Study Medication adjustment:  [] Yes   [x] No    Outcome: ([x] resolved [with or without sequelae] /[] recovering / [] not recovered / [] death / [] unknown)  /    Date study team aware of event: 08/17/2023    Was treatment given for this event:  [] Yes   [x] No   If yes, provide details    Serious adverse event?    Yes   [x] No    Special interest event?  [] Yes   [x] No    Endpoint? [] Yes   [x] No     Fatal event?  [] Yes  [x]  No      Dr. Elias: Reasonable possibility that AE is related to study treatment    [] Yes   [] No    Mis Husain RN     [employer]-CVOT Study [Effect of tirzepatide versus Dulaglutide on Major Cardiovascular Events in Patients with Type 2 Diabetes]    Diagnosis/event description (diagnosis preferred):  NAUSEA  Start date: 06/03/2023  Date resolved: 06/06/2023    Intensity: ([x] mild /[]  moderate / [] severe)     Study Medication adjustment:  [] Yes   [x] No    Outcome: ([x] resolved [with or without sequelae] /[] recovering / [] not recovered / [] death / [] unknown)      Date study team aware of event: 08/17/2023    Was treatment given for this event:  [] Yes   [x] No   If yes, provide details    Serious adverse event?    Yes   [x] No    Special interest event?  [] Yes   [x] No    Endpoint? [] Yes   [x] No     Fatal event?  [] Yes   [x] No      Dr. Elias: Reasonable possibility that AE is related to study treatment    [] Yes   [] No    Mis Husain RN     [employer]-CVOT Study [Effect of tirzepatide versus Dulaglutide on Major Cardiovascular Events in Patients with Type 2 Diabetes]    Diagnosis/event description (diagnosis preferred):  NAUSEA  Start date: 06/10/2023  Date resolved: 06/13/2023    Intensity: ([x] mild /[]  moderate / [] severe)     Study Medication adjustment:  [] Yes   [x] No    Outcome: ([x] resolved [with or without sequelae] /[] recovering / []  not recovered / [] death / [] unknown)      Date study team aware of event: 08/17/2023    Was treatment given for this event:  [] Yes   [x] No   If yes, provide details    Serious adverse event?    Yes   [x] No    Special interest event?  [] Yes   [x] No    Endpoint? [] Yes   [x] No     Fatal event?  [] Yes   [x] No      Dr. Elias: Reasonable possibility that AE is related to study treatment    [] Yes   [] No    Mis Husain RN     [employer]-CVOT Study [Effect of tirzepatide versus Dulaglutide on Major Cardiovascular Events in Patients with Type 2 Diabetes]    Diagnosis/event description (diagnosis preferred):  NAUSEA  Start date: 06/17/2023   Date resolved: 06/20/2023    Intensity: ([x] mild /[]  moderate / [] severe)     Study Medication adjustment:  [] Yes   [x] No    Outcome: ([x] resolved [with or without sequelae] /[] recovering / [] not recovered / [] death / [] unknown)      Date study team aware of event: 08/17/2023    Was treatment given for this event:  [] Yes   [x] No   If yes, provide details    Serious adverse event?    Yes   [x] No    Special interest event?  [] Yes   [x] No    Endpoint? [] Yes   [x] No     Fatal event?  [] Yes   [x] No      Dr. Elias: Reasonable possibility that AE is related to study treatment    [] Yes   [] No    Mis Husain RN     [employer]-CVOT Study [Effect of tirzepatide versus Dulaglutide on Major Cardiovascular Events in Patients with Type 2 Diabetes]    Diagnosis/event description (diagnosis preferred):  NAUSEA  Start date: 06/24/2023  Date resolved: 06/27/2023    Intensity: ([x] mild /[]  moderate / [] severe)     Study Medication adjustment:  [] Yes   [x] No    Outcome: ([x] resolved [with or without sequelae] /[] recovering / [] not recovered / [] death / [] unknown)      Date study team aware of event: 08/17/2023    Was treatment given for this event:  [] Yes   [x] No   If yes, provide details    Serious adverse event?    Yes   [x] No    Special interest event?  []  Yes   [x] No    Endpoint? [] Yes   [x] No     Fatal event?  [] Yes   [x] No      Dr. Elias: Reasonable possibility that AE is related to study treatment    [] Yes   [] No    Mis Husain RN     [employer]-CVOT Study [Effect of tirzepatide versus Dulaglutide on Major Cardiovascular Events in Patients with Type 2 Diabetes]    Diagnosis/event description (diagnosis preferred):  NAUSEA  Start date: 07/01/2023  Date resolved: 07/04/2023    Intensity: ([x] mild /[]  moderate / [] severe)     Study Medication adjustment:  [] Yes   [x] No    OOutcome: ([x] resolved [with or without sequelae] /[] recovering / [] not recovered / [] death / [] unknown)      Date study team aware of event: 08/17/2023    Was treatment given for this event:  [] Yes   [x] No   If yes, provide details    Serious adverse event?    Yes   [x] No    Special interest event?  [] Yes   [x] No    Endpoint? [] Yes   [x] No     Fatal event?  [] Yes   [x] No      Dr. Lopez: Reasonable possibility that AE is related to study treatment    [] Yes   [] No    Mis Husain RN    [employer]-CVOT Study [Effect of tirzepatide versus Dulaglutide on Major Cardiovascular Events in Patients with Type 2 Diabetes]    Diagnosis/event description (diagnosis preferred):  NAUSEA  Start date: 7/8/2023  Date resolved: 7/11/2023    Intensity: ([x] mild /[]  moderate / [] severe)     Study Medication adjustment:  [] Yes   [x] No    Outcome: ([x] resolved [with or without sequelae] /[] recovering / [] not recovered / [] death / [] unknown)      Date study team aware of event: 08/17/2023    Was treatment given for this event:  [] Yes   [x] No   If yes, provide details    Serious adverse event?    Yes   [x] No    Special interest event?  [] Yes   [x] No    Endpoint? [] Yes   [x] No     Fatal event?  [] Yes   [x] No      Dr. Elias: Reasonable possibility that AE is related to study treatment    [] Yes   [] No    Mis Husain RN      [employer]-CVOT Study [Effect of tirzepatide versus  Dulaglutide on Major Cardiovascular Events in Patients with Type 2 Diabetes]    Diagnosis/event description (diagnosis preferred):  NAUSEA  Start date: 7/15/2023  Date resolved:7/18/2023    Intensity: ([x] mild /[]  moderate / [] severe)     Study Medication adjustment:  [] Yes   [x] No    Outcome: ([x] resolved [with or without sequelae] /[] recovering / [] not recovered / [] death / [] unknown)      Date study team aware of event: 8/17/2023    Was treatment given for this event:  [] Yes   [x] No   If yes, provide details    Serious adverse event?    Yes   [x] No    Special interest event?  [] Yes   [x] No    Endpoint? [] Yes   [x] No     Fatal event?  [] Yes   [x] No      Dr. Elias: Reasonable possibility that AE is related to study treatment    [] Yes   [] No    Mis Husain RN    [employer]-CVOT Study [Effect of tirzepatide versus Dulaglutide on Major Cardiovascular Events in Patients with Type 2 Diabetes]    Diagnosis/event description (diagnosis preferred):  NAUSEA  Start date: 7/22/2023  Date resolved: 7/25/2023    Intensity: ([x] mild /[]  moderate / [] severe)     Study Medication adjustment:  [] Yes   [x] No    Outcome: ([x] resolved [with or without sequelae] /[] recovering / [] not recovered / [] death / [] unknown)      Date study team aware of event: 8/17/2023    Was treatment given for this event:  [] Yes   [x] No   If yes, provide details    Serious adverse event?    Yes   [x] No    Special interest event?  [] Yes   [x] No    Endpoint? [] Yes   [x] No     Fatal event?  [] Yes   [x] No      Dr. Elias: Reasonable possibility that AE is related to study treatment    [] Yes   [] No    Mis Husain RN     [employer]-CVOT Study [Effect of tirzepatide versus Dulaglutide on Major Cardiovascular Events in Patients with Type 2 Diabetes]    Diagnosis/event description (diagnosis preferred):  NAUSEA  Start date: 7/29/2023  Date resolved: 8/1/2023    Intensity: ([x] mild /[]  moderate / [] severe)     Study  Medication adjustment:  [] Yes   [x] No    Outcome: ([x] resolved [with or without sequelae] /[] recovering / [] not recovered / [] death / [] unknown)      Date study team aware of event: 8/17/2023    Was treatment given for this event:  [] Yes   [x] No   If yes, provide details    Serious adverse event?    Yes   [x] No    Special interest event?  [] Yes   [x] No    Endpoint? [] Yes   [x] No     Fatal event?  [] Yes   [x] No      Dr. Elias: Reasonable possibility that AE is related to study treatment    [] Yes   [] No    Mis Husain RN    SURPASS-CVOT Study [Effect of tirzepatide versus Dulaglutide on Major Cardiovascular Events in Patients with Type 2 Diabetes]    Diagnosis/event description (diagnosis preferred):  NAUSEA  Start date: 8/5/2023  Date resolved: 8/11/2023    Intensity: ([x] mild /[]  moderate / [] severe)     Study Medication adjustment:  [] Yes   [x] No    Outcome: ([x] resolved [with or without sequelae] /[] recovering / [] not recovered / [] death / [] unknown)      Date study team aware of event: 8/17/2023    Was treatment given for this event:  [] Yes   [x] No   If yes, provide details    Serious adverse event?    Yes   [x] No    Special interest event?  [] Yes   [x] No    Endpoint? [] Yes   [x] No     Fatal event?  [] Yes   [x] No      Dr. Elias: Reasonable possibility that AE is related to study treatment    [] Yes   [] No    Mis Husain RN    SURPASS-CVOT Study [Effect of tirzepatide versus Dulaglutide on Major Cardiovascular Events in Patients with Type 2 Diabetes]    Diagnosis/event description (diagnosis preferred):  NAUSEA  Start date: 8/12/2023  Date resolved: 8/15/55245    Intensity: ([x] mild /[]  moderate / [] severe)     Study Medication adjustment:  [] Yes   [x] No    Outcome: ([x] resolved [with or without sequelae] /[] recovering / [] not recovered / [] death / [] unknown)      Date study team aware of event: 8/17/2023    Was treatment given for this event:  [] Yes   [x]  "No   If yes, provide details    Serious adverse event?    Yes   [x] No    Special interest event?  [] Yes   [x] No    Endpoint? [] Yes   [x] No     Fatal event?  [] Yes   [x] No      Dr. Elias: Reasonable possibility that AE is related to study treatment    [] Yes   [] No    Mis Husain RN      Adherence/lifestyle reinforcement done     No of pens dispensed at last visit 16    Any Returned medication No    Date of first dose since last visit: 08/10/2023    Vitals:    08/17/23 0906 08/17/23 0909   BP: 125/77 128/71   BP Location: Left arm Left arm   Patient Position: Sitting Sitting   Cuff Size: Adult Regular Adult Regular   Pulse: 73 75   Weight: 68.9 kg (152 lb)    Height: 1.803 m (5' 11\")         Date of last dose taken since last visit: 08//10/2023    Dispensed kit #'s [id number], [id number].    Will see again in clinic in 3 months.       Thank you for allowing me to participate in the care of your patient.      Sincerely,     Mis Husain RN     [facility]  "

## 2023-08-17 NOTE — PROGRESS NOTES
[facility]PASS-CVOT Study [Effect of tirzepatide versus Dulaglutide on Major Cardiovascular Events in Patients with Type 2Diabetes]    Subject seen for Visit 18    Vital signs were done, including waist circumference.measured after 5 minutes resting in a seated position - two readings taken one minute apart using automated cuff.    Subject queried for adverse events, endpoints and medication changes. If present, noted below. Patient continues to c/o ongoing nausea starting 2 days after injection and last through day 5. He has a rx for Reglan is is encouraged to us it BID or as directed.    Denies med changes, vision problems and hypoglycemic events. Still waiting to have his cataract surgery done.     SURPASS-CVOT Study [Effect of tirzepatide versus Dulaglutide on Major Cardiovascular Events in Patients with Type 2 Diabetes]    Diagnosis/event description (diagnosis preferred):  NAUSEA  Start date: 05/20/2023  Date resolved: 05/23/2023    Intensity: ([x] mild /[]  moderate / [] severe)     Study Medication adjustment:  [] Yes   [x] No    Outcome: ([x] resolved [with or without sequelae] /[] recovering / [] not recovered / [] death / [] unknown)      Date study team aware of event: 08/17/2023    Was treatment given for this event:  [] Yes   [x] No   If yes, provide details    Serious adverse event?    Yes   [x] No    Special interest event?  [] Yes   [x] No    Endpoint? [] Yes   [x] No     Fatal event?  [] Yes   [x] No      Dr. Elias: Reasonable possibility that AE is related to study treatment    [x] Yes   [] No    Mis Husain RN     SURPASS-CVOT Study [Effect of tirzepatide versus Dulaglutide on Major Cardiovascular Events in Patients with Type 2 Diabetes]    Diagnosis/event description (diagnosis preferred):  NAUSEA  Start date: 05/27/2023  Date resolved: 05/30/2023    Intensity: ([x] mild /[]  moderate / [] severe)     Study Medication adjustment:  [] Yes   [x] No    Outcome: ([x] resolved [with or without sequelae]  /[] recovering / [] not recovered / [] death / [] unknown)  /    Date study team aware of event: 08/17/2023    Was treatment given for this event:  [] Yes   [x] No   If yes, provide details    Serious adverse event?    Yes   [x] No    Special interest event?  [] Yes   [x] No    Endpoint? [] Yes   [x] No     Fatal event?  [] Yes  [x]  No      Dr. Elias: Reasonable possibility that AE is related to study treatment    [x] Yes   [] No    Mis Husain RN     SURPASS-CVOT Study [Effect of tirzepatide versus Dulaglutide on Major Cardiovascular Events in Patients with Type 2 Diabetes]    Diagnosis/event description (diagnosis preferred):  NAUSEA  Start date: 06/03/2023  Date resolved: 06/06/2023    Intensity: ([x] mild /[]  moderate / [] severe)     Study Medication adjustment:  [] Yes   [x] No    Outcome: ([x] resolved [with or without sequelae] /[] recovering / [] not recovered / [] death / [] unknown)      Date study team aware of event: 08/17/2023    Was treatment given for this event:  [] Yes   [x] No   If yes, provide details    Serious adverse event?    Yes   [x] No    Special interest event?  [] Yes   [x] No    Endpoint? [] Yes   [x] No     Fatal event?  [] Yes   [x] No      Dr. Elias: Reasonable possibility that AE is related to study treatment    [x] Yes   [] No    Mis Husain RN     [employer]-CVOT Study [Effect of tirzepatide versus Dulaglutide on Major Cardiovascular Events in Patients with Type 2 Diabetes]    Diagnosis/event description (diagnosis preferred):  NAUSEA  Start date: 06/10/2023  Date resolved: 06/13/2023    Intensity: ([x] mild /[]  moderate / [] severe)     Study Medication adjustment:  [] Yes   [x] No    Outcome: ([x] resolved [with or without sequelae] /[] recovering / [] not recovered / [] death / [] unknown)      Date study team aware of event: 08/17/2023    Was treatment given for this event:  [] Yes   [x] No   If yes, provide details    Serious adverse event?    Yes   [x] No    Special  interest event?  [] Yes   [x] No    Endpoint? [] Yes   [x] No     Fatal event?  [] Yes   [x] No      Dr. Elias: Reasonable possibility that AE is related to study treatment    [x] Yes   [] No    Mis Husain RN     [employer]-CVOT Study [Effect of tirzepatide versus Dulaglutide on Major Cardiovascular Events in Patients with Type 2 Diabetes]    Diagnosis/event description (diagnosis preferred):  NAUSEA  Start date: 06/17/2023   Date resolved: 06/20/2023    Intensity: ([x] mild /[]  moderate / [] severe)     Study Medication adjustment:  [] Yes   [x] No    Outcome: ([x] resolved [with or without sequelae] /[] recovering / [] not recovered / [] death / [] unknown)      Date study team aware of event: 08/17/2023    Was treatment given for this event:  [] Yes   [x] No   If yes, provide details    Serious adverse event?    Yes   [x] No    Special interest event?  [] Yes   [x] No    Endpoint? [] Yes   [x] No     Fatal event?  [] Yes   [x] No      Dolores Elias: Reasonable possibility that AE is related to study treatment    [x] Yes   [] No    Mis Husain RN     [employer]-CVOT Study [Effect of tirzepatide versus Dulaglutide on Major Cardiovascular Events in Patients with Type 2 Diabetes]    Diagnosis/event description (diagnosis preferred):  NAUSEA  Start date: 06/24/2023  Date resolved: 06/27/2023    Intensity: ([x] mild /[]  moderate / [] severe)     Study Medication adjustment:  [] Yes   [x] No    Outcome: ([x] resolved [with or without sequelae] /[] recovering / [] not recovered / [] death / [] unknown)      Date study team aware of event: 08/17/2023    Was treatment given for this event:  [] Yes   [x] No   If yes, provide details    Serious adverse event?    Yes   [x] No    Special interest event?  [] Yes   [x] No    Endpoint? [] Yes   [x] No     Fatal event?  [] Yes   [x] No      Dr. Elias: Reasonable possibility that AE is related to study treatment    [x] Yes   [] No    Mis Husain RN     [employer] Study  [Effect of tirzepatide versus Dulaglutide on Major Cardiovascular Events in Patients with Type 2 Diabetes]    Diagnosis/event description (diagnosis preferred):  NAUSEA  Start date: 07/01/2023  Date resolved: 07/04/2023    Intensity: ([x] mild /[]  moderate / [] severe)     Study Medication adjustment:  [] Yes   [x] No    OOutcome: ([x] resolved [with or without sequelae] /[] recovering / [] not recovered / [] death / [] unknown)      Date study team aware of event: 08/17/2023    Was treatment given for this event:  [] Yes   [x] No   If yes, provide details    Serious adverse event?    Yes   [x] No    Special interest event?  [] Yes   [x] No    Endpoint? [] Yes   [x] No     Fatal event?  [] Yes   [x] No      Dr. Lopez: Reasonable possibility that AE is related to study treatment    [x] Yes   [] No    Mis Husain RN    [employer]-CVOT Study [Effect of tirzepatide versus Dulaglutide on Major Cardiovascular Events in Patients with Type 2 Diabetes]    Diagnosis/event description (diagnosis preferred):  NAUSEA  Start date: 7/8/2023  Date resolved: 7/11/2023    Intensity: ([x] mild /[]  moderate / [] severe)     Study Medication adjustment:  [] Yes   [x] No    Outcome: ([x] resolved [with or without sequelae] /[] recovering / [] not recovered / [] death / [] unknown)      Date study team aware of event: 08/17/2023    Was treatment given for this event:  [] Yes   [x] No   If yes, provide details    Serious adverse event?    Yes   [x] No    Special interest event?  [] Yes   [x] No    Endpoint? [] Yes   [x] No     Fatal event?  [] Yes   [x] No      Dr. Elias: Reasonable possibility that AE is related to study treatment    [x] Yes   [] No    Mis Husain RN      [employer]-CVOT Study [Effect of tirzepatide versus Dulaglutide on Major Cardiovascular Events in Patients with Type 2 Diabetes]    Diagnosis/event description (diagnosis preferred):  NAUSEA  Start date: 7/15/2023  Date resolved:7/18/2023    Intensity: ([x] mild /[]   moderate / [] severe)     Study Medication adjustment:  [] Yes   [x] No    Outcome: ([x] resolved [with or without sequelae] /[] recovering / [] not recovered / [] death / [] unknown)      Date study team aware of event: 8/17/2023    Was treatment given for this event:  [] Yes   [x] No   If yes, provide details    Serious adverse event?    Yes   [x] No    Special interest event?  [] Yes   [x] No    Endpoint? [] Yes   [x] No     Fatal event?  [] Yes   [x] No      Dr. Elias: Reasonable possibility that AE is related to study treatment    [x] Yes   [] No    Mis Husain RN    [employer]-CVOT Study [Effect of tirzepatide versus Dulaglutide on Major Cardiovascular Events in Patients with Type 2 Diabetes]    Diagnosis/event description (diagnosis preferred):  NAUSEA  Start date: 7/22/2023  Date resolved: 7/25/2023    Intensity: ([x] mild /[]  moderate / [] severe)     Study Medication adjustment:  [] Yes   [x] No    Outcome: ([x] resolved [with or without sequelae] /[] recovering / [] not recovered / [] death / [] unknown)      Date study team aware of event: 8/17/2023    Was treatment given for this event:  [] Yes   [x] No   If yes, provide details    Serious adverse event?    Yes   [x] No    Special interest event?  [] Yes   [x] No    Endpoint? [] Yes   [x] No     Fatal event?  [] Yes   [x] No      Dr. Elias: Reasonable possibility that AE is related to study treatment    [x] Yes   [] No    Mis Husain RN     [employer]-CVOT Study [Effect of tirzepatide versus Dulaglutide on Major Cardiovascular Events in Patients with Type 2 Diabetes]    Diagnosis/event description (diagnosis preferred):  NAUSEA  Start date: 7/29/2023  Date resolved: 8/1/2023    Intensity: ([x] mild /[]  moderate / [] severe)     Study Medication adjustment:  [] Yes   [x] No    Outcome: ([x] resolved [with or without sequelae] /[] recovering / [] not recovered / [] death / [] unknown)      Date study team aware of event: 8/17/2023    Was treatment  given for this event:  [] Yes   [x] No   If yes, provide details    Serious adverse event?    Yes   [x] No    Special interest event?  [] Yes   [x] No    Endpoint? [] Yes   [x] No     Fatal event?  [] Yes   [x] No      Dr. Elias: Reasonable possibility that AE is related to study treatment    [x] Yes   [] No    Mis Husain RN    [facility]-CVOT Study [Effect of tirzepatide versus Dulaglutide on Major Cardiovascular Events in Patients with Type 2 Diabetes]    Diagnosis/event description (diagnosis preferred):  NAUSEA  Start date: 8/5/2023  Date resolved: 8/11/2023    Intensity: ([x] mild /[]  moderate / [] severe)     Study Medication adjustment:  [] Yes   [x] No    Outcome: ([x] resolved [with or without sequelae] /[] recovering / [] not recovered / [] death / [] unknown)      Date study team aware of event: 8/17/2023    Was treatment given for this event:  [] Yes   [x] No   If yes, provide details    Serious adverse event?    Yes   [x] No    Special interest event?  [] Yes   [x] No    Endpoint? [] Yes   [x] No     Fatal event?  [] Yes   [x] No      Dr. Elias: Reasonable possibility that AE is related to study treatment    [x] Yes   [] No    Mis Husain RN    [employer]-CVOT Study [Effect of tirzepatide versus Dulaglutide on Major Cardiovascular Events in Patients with Type 2 Diabetes]    Diagnosis/event description (diagnosis preferred):  NAUSEA  Start date: 8/12/2023  Date resolved: 8/15/79946    Intensity: ([x] mild /[]  moderate / [] severe)     Study Medication adjustment:  [] Yes   [x] No    Outcome: ([x] resolved [with or without sequelae] /[] recovering / [] not recovered / [] death / [] unknown)      Date study team aware of event: 8/17/2023    Was treatment given for this event:  [] Yes   [x] No   If yes, provide details    Serious adverse event?    Yes   [x] No    Special interest event?  [] Yes   [x] No    Endpoint? [] Yes   [x] No     Fatal event?  [] Yes   [x] No      Dr. Elias: Reasonable  "possibility that AE is related to study treatment    [x] Yes   [] No    Mis Husain RN      Adherence/lifestyle reinforcement done     No of pens dispensed at last visit 16    Any Returned medication No    Date of first dose since last visit: 08/10/2023    Vitals:    08/17/23 0906 08/17/23 0909   BP: 125/77 128/71   BP Location: Left arm Left arm   Patient Position: Sitting Sitting   Cuff Size: Adult Regular Adult Regular   Pulse: 73 75   Weight: 68.9 kg (152 lb)    Height: 1.803 m (5' 11\")         Date of last dose taken since last visit: 08//10/2023    Dispensed kit #'s [id number], [id number].    Will see again in clinic in 3 months.   "

## 2023-09-20 ENCOUNTER — TRANSFERRED RECORDS (OUTPATIENT)
Dept: FAMILY MEDICINE | Facility: CLINIC | Age: 63
End: 2023-09-20
Payer: COMMERCIAL

## 2023-09-20 LAB — RETINOPATHY: POSITIVE

## 2023-09-27 ENCOUNTER — TELEPHONE (OUTPATIENT)
Dept: FAMILY MEDICINE | Facility: CLINIC | Age: 63
End: 2023-09-27
Payer: COMMERCIAL

## 2023-09-27 NOTE — TELEPHONE ENCOUNTER
Reason for Call:  Appointment Request    Patient requesting this type of appt: Pre-op    Requested provider: Danish Land    Reason patient unable to be scheduled: Not within requested timeframe    When does patient want to be seen/preferred time: Same day    Comments:pre op cataract 10/18 and 11/1     Could we send this information to you in [employer] or would you prefer to receive a phone call?:   Patient would prefer a phone call   Okay to leave a detailed message?: Yes at Cell number on file:    Telephone Information:   Mobile [phone]       Call taken on 9/27/2023 at 12:56 PM by Teresita Weiss

## 2023-10-03 ENCOUNTER — OFFICE VISIT (OUTPATIENT)
Dept: FAMILY MEDICINE | Facility: CLINIC | Age: 63
End: 2023-10-03
Payer: COMMERCIAL

## 2023-10-03 VITALS
HEART RATE: 59 BPM | OXYGEN SATURATION: 96 % | WEIGHT: 147 LBS | TEMPERATURE: 97.9 F | DIASTOLIC BLOOD PRESSURE: 71 MMHG | BODY MASS INDEX: 20.58 KG/M2 | RESPIRATION RATE: 16 BRPM | HEIGHT: 71 IN | SYSTOLIC BLOOD PRESSURE: 134 MMHG

## 2023-10-03 DIAGNOSIS — I10 BENIGN ESSENTIAL HYPERTENSION: ICD-10-CM

## 2023-10-03 DIAGNOSIS — E11.49 DM TYPE 2 CAUSING NEUROLOGICAL DISEASE (H): ICD-10-CM

## 2023-10-03 DIAGNOSIS — H26.9 CATARACT OF BOTH EYES, UNSPECIFIED CATARACT TYPE: Primary | ICD-10-CM

## 2023-10-03 DIAGNOSIS — I48.0 PAROXYSMAL ATRIAL FIBRILLATION (H): ICD-10-CM

## 2023-10-03 DIAGNOSIS — Z23 NEED FOR INFLUENZA VACCINATION: ICD-10-CM

## 2023-10-03 DIAGNOSIS — I25.10 CORONARY ARTERY DISEASE INVOLVING NATIVE CORONARY ARTERY OF NATIVE HEART WITHOUT ANGINA PECTORIS: Chronic | ICD-10-CM

## 2023-10-03 DIAGNOSIS — Z86.73 HISTORY OF STROKE: ICD-10-CM

## 2023-10-03 DIAGNOSIS — E78.2 MIXED HYPERLIPIDEMIA: ICD-10-CM

## 2023-10-03 DIAGNOSIS — I73.9 PERIPHERAL VASCULAR DISEASE, UNSPECIFIED (H): ICD-10-CM

## 2023-10-03 DIAGNOSIS — K74.60 CIRRHOSIS OF LIVER WITHOUT ASCITES, UNSPECIFIED HEPATIC CIRRHOSIS TYPE (H): ICD-10-CM

## 2023-10-03 PROCEDURE — 90471 IMMUNIZATION ADMIN: CPT | Performed by: INTERNAL MEDICINE

## 2023-10-03 PROCEDURE — 90682 RIV4 VACC RECOMBINANT DNA IM: CPT | Performed by: INTERNAL MEDICINE

## 2023-10-03 PROCEDURE — 99214 OFFICE O/P EST MOD 30 MIN: CPT | Mod: 25 | Performed by: INTERNAL MEDICINE

## 2023-10-03 ASSESSMENT — PAIN SCALES - GENERAL: PAINLEVEL: NO PAIN (0)

## 2023-10-03 NOTE — PROGRESS NOTES
[address], [address]  [facility] [address]  Phone: [phone]  Primary Provider: Esther Padron  Pre-op Performing Provider: ESTHER PADRON      PREOPERATIVE EVALUATION:  Today's date: 10/3/2023    Kelton is a 63 year old male who presents for a preoperative evaluation.      Surgical Information:  Surgery/Procedure: CATARACT REMOVAL  Surgery Location: [facility]  Surgeon: DR.SCOTT EVANS  Surgery Date: 10/18/23 & 11/1/23  Time of Surgery: TBD  Where patient plans to recover: At home with family  Fax number for surgical facility: [phone]    Assessment & Plan     The proposed surgical procedure is considered LOW risk.    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  Etiology of cirrhosis is probably metabolic fatty liver disease.    Benign essential hypertension  Blood pressure well controlled    Mixed hyperlipidemia  On statin therapy; lipids were at goal in January    History of stroke      Need for influenza vaccination    - INFLUENZA VACCINE 18-64Y (FLUBLOK)    Recommended new COVID shot     - No identified additional risk factors other than previously addressed    Antiplatelet or Anticoagulation Medication Instructions:   - Bleeding risk is low for this procedure (e.g. dental, skin, cataract).    Additional Medication Instructions:  Patient is to take all scheduled medications on the day of surgery    RECOMMENDATION:  APPROVAL GIVEN to proceed with proposed  procedure, without further diagnostic evaluation.      No LOS data to display   Time spent by me doing chart review, history and exam, documentation and further activities per the note      Subjective       HPI related to upcoming procedure: Desires cataract repair bilaterally.          11/14/2022     8:31 AM   Preop Questions   1. Have you ever had a heart attack or stroke? YES -    2. Have you ever had surgery on your heart or blood vessels, such as a stent placement, a coronary artery bypass, or surgery on an artery in your head, neck, heart, or legs? YES -    3. Do you have chest pain with activity? He bikes 20 to 30 miles per day without chest pain or dyspnea   4. Do you have a history of  heart failure? YES -    5. Do you currently have a cold, bronchitis or symptoms of other infection? No   6. Do you have a cough, shortness of breath, or wheezing? No   7. Do you or anyone in your family have previous history of blood clots? No   8. Do you or does anyone in your family have a serious bleeding problem such as prolonged bleeding following surgeries or cuts? No   9. Have you ever had problems with anemia or been told to take iron pills? No   10. Have you had any abnormal blood loss such as black, tarry or bloody stools? No   11. Have you ever had a blood transfusion? No   12. Are you willing to have a blood transfusion if it is medically needed before, during, or after your surgery? Yes   13. Have you or any of your relatives ever had problems with anesthesia? No   14. Do you have sleep apnea, excessive snoring or daytime drowsiness? No   15. Do you have any artifical heart valves or other implanted medical devices like a pacemaker, defibrillator, or continuous glucose monitor? YES -    15a. What type of device do you have? pacemaker   15b. Name of the clinic that manages your device:  [facility]   16. Do you have artificial joints? No   17. Are you allergic to latex? No       Review of  Systems  Constitutional, neuro, ENT, endocrine, pulmonary, cardiac, gastrointestinal, genitourinary, musculoskeletal, integument and psychiatric systems are negative, except as otherwise noted.    Patient Active Problem List    Diagnosis Date Noted    Chronic liver failure without hepatic coma (H) 05/15/2023     Priority: Medium    History of stroke 11/14/2022     Priority: Medium    Paroxysmal atrial fibrillation (H) 11/14/2022     Priority: Medium    Biceps tendinitis of right upper extremity 05/16/2022     Priority: Medium    Cirrhosis of liver without ascites, unspecified hepatic cirrhosis type (H) 03/14/2022     Priority: Medium    Left carotid artery stenosis 02/21/2020     Priority: Medium     s/p left CEA 2/2020      Pain of right upper extremity 02/04/2020     Priority: Medium    Abnormal cardiovascular stress test 02/04/2020     Priority: Medium    HDL deficiency 01/23/2019     Priority: Medium    Diabetic polyneuropathy associated with diabetes mellitus due to underlying condition (H) 08/21/2018     Priority: Medium    SOB (shortness of breath) 02/07/2018     Priority: Medium    Family history of malignant melanoma of skin 09/16/2016     Priority: Medium    Atypical chest pain 11/20/2015     Priority: Medium    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 Provider to review and confirm.      Peripheral vascular disease, unspecified (H24) 08/10/2012     Priority: Medium     Problem list name updated by automated process. Provider to review      Fatty liver disease, nonalcoholic 03/15/2012     Priority: Medium    Benign essential hypertension      Priority: Medium    Coronary artery disease involving native  coronary artery of native heart without angina pectoris      Priority: Medium     [facility] on 11/18/10 where he underwent a coronary artery bypass grafting x4. He had a LIMA to his LAD, saphenous vein graft to his OM-3, saphenous vein graft to his diagonal 2 and a saphenous vein graft to his PDA. His intraoperative findings showed heavily diseased coronary arteries, diffuse calcified plaque, small coronary targets to his PDA and diagonal and his ramus was too small to graft.        Past Medical History:   Diagnosis Date    Basal cell carcinoma     Carotid stenosis, left     s/p left CEA 2/2020    Cerebral infarction (H)     left CVA 2/2020 post-op carotid endarterectomy    Coronary artery disease     4 vessel bypass November 2010; LIMA ->LAD, SVG-> OM3, SVG -> D2, SVG -> PDA    Diabetes mellitus (H) 2005    neuropathy    Generalized anxiety disorder 05/02/2014    Hepatitis C, chronic (H) 2005    Hyperlipidemia LDL goal < 70     Hypertension     Liver diseases     9/15 Liver is 10 cm in span without left lobe enlargement    Persistent microalbuminuria associated with type 2 diabetes mellitus (H) 05/06/2015    Renal artery stenosis (H24)      Past Surgical History:   Procedure Laterality Date    ARTHROSCOPY KNEE RT/LT      left    COLONOSCOPY      CORONARY ARTERY BYPASS  11/18/201    Coronary artery bypass grafting x 4 with placement of the left internal mammary artery to the distal midportion of the left anterior descending artery, saphenous vein graft from aorta to the third obtuse marginal branch of circumflex coronary artery, saphenous vein graft from aorta to the second diagonal branch, saphenous vein graft from aorta to the posterior descending artery.    ENDARTERECTOMY CAROTID Left 2/21/2020    Procedure: LEFT CAROTID ENDARTERECTOMY WITH EEG;  Surgeon: Semaj Stubbs MD;  Location: [facility] OR    EP ABLATION ATRIAL FLUTTER N/A 9/9/2022    Procedure: Ablation Atrial Flutter;  Surgeon: Mery  Tyson Edward MD;  Location:  HEART CARDIAC CATH LAB    EP PACEMAKER DEVICE & LEAD IMPLANT- RIGHT ATRIAL & LEFT VENTRICULAR N/A 11/7/2022    Procedure: Pacemaker Device & Lead Implant- Right Atrial & Left Ventricular;  Surgeon: Tyson Ordonez MD;  Location:  HEART CARDIAC CATH LAB    ESOPHAGOSCOPY, GASTROSCOPY, DUODENOSCOPY (EGD), COMBINED  10/31/2011    Procedure:COMBINED ESOPHAGOSCOPY, GASTROSCOPY, DUODENOSCOPY (EGD); Surgeon:ALONSO ALDANA; Location: GI    ESOPHAGOSCOPY, GASTROSCOPY, DUODENOSCOPY (EGD), COMBINED N/A 3/8/2018    Procedure: COMBINED ESOPHAGOSCOPY, GASTROSCOPY, DUODENOSCOPY (EGD);  EGD;  Surgeon: Angel Luis Jsutice MD;  Location:  GI    HEART CATH CORONARY ANGIOGRAM W/LV GRAM  9-11-10    CV Surgery recommended    HEART CATH CORONARY ANGIOGRAM W/LV GRAM  2-28-14    Medical management    HERNIA REPAIR, INGUINAL RT/LT      left     Current Outpatient Medications   Medication Sig Dispense Refill    amLODIPine (NORVASC) 2.5 MG tablet Take 1 tablet (2.5 mg) by mouth daily 90 tablet 3    apixaban ANTICOAGULANT (ELIQUIS) 5 MG tablet Take 1 tablet (5 mg) by mouth 2 times daily 180 tablet 3    ASPIRIN NOT PRESCRIBED (INTENTIONAL) Please choose reason not prescribed from choices below.      B-D U/F 31G X 8 MM insulin pen needle USE ONE PEN NEEDLES DAILY OR AS DIRECTED. 100 each 3    celecoxib (CELEBREX) 100 MG capsule Take 1 capsule (100 mg) by mouth daily as needed for moderate pain (4-6) 30 capsule 11    chlorhexidine (PERIDEX) 0.12 % solution Take 15 mLs by mouth 2 times daily as needed   5    cyanocobalamin 1000 MCG SUBL Place 1,000 mcg under the tongue daily      glucosamine-chondroitin 500-400 MG CAPS per capsule Take 1 capsule by mouth daily      lisinopril (ZESTRIL) 20 MG tablet Take 1 tablet (20 mg) by mouth 2 times daily 180 tablet 3    omeprazole (PRILOSEC) 20 MG DR capsule Take 1 capsule (20 mg) by mouth daily 90 capsule 3    ONETOUCH VERIO IQ test strip        "rosuvastatin (CRESTOR) 20 MG tablet Take 1 tablet (20 mg) by mouth daily 90 tablet 2    Vitamin D, Cholecalciferol, 1000 units TABS Take 1,000 Units by mouth daily      zolpidem (AMBIEN) 5 MG tablet Take 1 tablet (5 mg) by mouth nightly as needed for sleep 30 tablet 0       No Known Allergies       Social History     Tobacco Use    Smoking status: Former     Packs/day: 0.50     Years: 12.00     Pack years: 6.00     Types: Cigarettes     Start date:      Quit date: 2005     Years since quittin.6    Smokeless tobacco: Never   Substance Use Topics    Alcohol use: Yes     Comment: minimal 1 glass of wine per week     Family History   Problem Relation Age of Onset    C.A.D. Father     Cancer Father         bladder    Heart Surgery Father         bypass surgery    Heart Disease Mother      History   Drug Use No         Objective     /71 (BP Location: Right arm, Patient Position: Sitting, Cuff Size: Adult Regular)   Pulse 59   Temp 97.9  F (36.6  C) (Oral)   Resp 16   Ht 1.803 m (5' 11\")   Wt 66.7 kg (147 lb)   SpO2 96%   BMI 20.50 kg/m      Physical Exam  GENERAL APPEARANCE: healthy, alert and no distress  HENT: ear canals and TM's normal and nose and mouth without ulcers or lesions  RESP: lungs clear to auscultation - no rales, rhonchi or wheezes  CV: regular rate and rhythm, normal S1 S2, no S3 or S4 and no murmur, click or rub   ABDOMEN: soft, nontender, no HSM or masses and bowel sounds normal  NEURO: Normal strength and tone, sensory exam grossly normal, mentation intact and speech normal    Recent Labs   Lab Test 23  0836 05/15/23  0815 22  1011 22  0906 22  0639   HGB 15.5  --   --  14.8 15.8   *  --   --  141* 141*   INR 1.41*  --   --   --  1.21*     --   --  137 138   POTASSIUM 4.9  --   --  4.0 4.6   CR 0.99  --   --  0.82 0.84   A1C  --  5.0 5.0  --   --         Diagnostics:  No labs were ordered during this visit.   No EKG required for low risk " surgery (cataract, skin procedure, breast biopsy, etc).    Revised Cardiac Risk Index (RCRI):  The patient has the following serious cardiovascular risks for perioperative complications:   - No serious cardiac risks = 0 points     RCRI Interpretation: 0 points: Class I (very low risk - 0.4% complication rate)         Signed Electronically by: Danish Land MD  Copy of this evaluation report is provided to requesting physician.

## 2023-10-05 ENCOUNTER — TRANSFERRED RECORDS (OUTPATIENT)
Dept: FAMILY MEDICINE | Facility: CLINIC | Age: 63
End: 2023-10-05
Payer: COMMERCIAL

## 2023-10-18 ENCOUNTER — ANCILLARY PROCEDURE (OUTPATIENT)
Dept: CARDIOLOGY | Facility: CLINIC | Age: 63
End: 2023-10-18
Attending: INTERNAL MEDICINE
Payer: COMMERCIAL

## 2023-10-18 DIAGNOSIS — I44.2 COMPLETE ATRIOVENTRICULAR BLOCK (H): ICD-10-CM

## 2023-10-18 DIAGNOSIS — Z95.0 CARDIAC PACEMAKER IN SITU: Primary | ICD-10-CM

## 2023-10-18 DIAGNOSIS — Z95.0 CARDIAC PACEMAKER IN SITU: ICD-10-CM

## 2023-10-18 PROCEDURE — 93296 REM INTERROG EVL PM/IDS: CPT | Performed by: INTERNAL MEDICINE

## 2023-10-18 PROCEDURE — 93294 REM INTERROG EVL PM/LDLS PM: CPT | Performed by: INTERNAL MEDICINE

## 2023-10-26 LAB
MDC_IDC_EPISODE_DTM: NORMAL
MDC_IDC_EPISODE_DURATION: 0 S
MDC_IDC_EPISODE_DURATION: 1 S
MDC_IDC_EPISODE_DURATION: 1012 S
MDC_IDC_EPISODE_DURATION: 1024 S
MDC_IDC_EPISODE_DURATION: 117 S
MDC_IDC_EPISODE_DURATION: 1173 S
MDC_IDC_EPISODE_DURATION: 1234 S
MDC_IDC_EPISODE_DURATION: 127 S
MDC_IDC_EPISODE_DURATION: 1282 S
MDC_IDC_EPISODE_DURATION: 13 S
MDC_IDC_EPISODE_DURATION: 13 S
MDC_IDC_EPISODE_DURATION: 14 S
MDC_IDC_EPISODE_DURATION: 15 S
MDC_IDC_EPISODE_DURATION: 1556 S
MDC_IDC_EPISODE_DURATION: 1653 S
MDC_IDC_EPISODE_DURATION: 1701 S
MDC_IDC_EPISODE_DURATION: 178 S
MDC_IDC_EPISODE_DURATION: 18 S
MDC_IDC_EPISODE_DURATION: 1892 S
MDC_IDC_EPISODE_DURATION: 19 S
MDC_IDC_EPISODE_DURATION: 1906 S
MDC_IDC_EPISODE_DURATION: 1907 S
MDC_IDC_EPISODE_DURATION: 2 S
MDC_IDC_EPISODE_DURATION: 2067 S
MDC_IDC_EPISODE_DURATION: 22 S
MDC_IDC_EPISODE_DURATION: 2209 S
MDC_IDC_EPISODE_DURATION: 221 S
MDC_IDC_EPISODE_DURATION: 2226 S
MDC_IDC_EPISODE_DURATION: 2263 S
MDC_IDC_EPISODE_DURATION: 2385 S
MDC_IDC_EPISODE_DURATION: 2408 S
MDC_IDC_EPISODE_DURATION: 2463 S
MDC_IDC_EPISODE_DURATION: 2594 S
MDC_IDC_EPISODE_DURATION: 2624 S
MDC_IDC_EPISODE_DURATION: 264 S
MDC_IDC_EPISODE_DURATION: 2832 S
MDC_IDC_EPISODE_DURATION: 2950 S
MDC_IDC_EPISODE_DURATION: 300 S
MDC_IDC_EPISODE_DURATION: 311 S
MDC_IDC_EPISODE_DURATION: 348 S
MDC_IDC_EPISODE_DURATION: 35 S
MDC_IDC_EPISODE_DURATION: 3777 S
MDC_IDC_EPISODE_DURATION: 38 S
MDC_IDC_EPISODE_DURATION: 3992 S
MDC_IDC_EPISODE_DURATION: 400 S
MDC_IDC_EPISODE_DURATION: 4288 S
MDC_IDC_EPISODE_DURATION: 433 S
MDC_IDC_EPISODE_DURATION: 486 S
MDC_IDC_EPISODE_DURATION: 5 S
MDC_IDC_EPISODE_DURATION: 513 S
MDC_IDC_EPISODE_DURATION: 53 S
MDC_IDC_EPISODE_DURATION: 5424 S
MDC_IDC_EPISODE_DURATION: 58 S
MDC_IDC_EPISODE_DURATION: 6 S
MDC_IDC_EPISODE_DURATION: 6180 S
MDC_IDC_EPISODE_DURATION: 62 S
MDC_IDC_EPISODE_DURATION: 6210 S
MDC_IDC_EPISODE_DURATION: 69 S
MDC_IDC_EPISODE_DURATION: 696 S
MDC_IDC_EPISODE_DURATION: 7082 S
MDC_IDC_EPISODE_DURATION: 749 S
MDC_IDC_EPISODE_DURATION: 79 S
MDC_IDC_EPISODE_DURATION: 791 S
MDC_IDC_EPISODE_DURATION: 794 S
MDC_IDC_EPISODE_DURATION: 799 S
MDC_IDC_EPISODE_DURATION: 80 S
MDC_IDC_EPISODE_DURATION: 85 S
MDC_IDC_EPISODE_DURATION: 856 S
MDC_IDC_EPISODE_DURATION: 86 S
MDC_IDC_EPISODE_DURATION: 8602 S
MDC_IDC_EPISODE_DURATION: 94 S
MDC_IDC_EPISODE_DURATION: 96 S
MDC_IDC_EPISODE_DURATION: NORMAL S
MDC_IDC_EPISODE_ID: 103
MDC_IDC_EPISODE_ID: 104
MDC_IDC_EPISODE_ID: 105
MDC_IDC_EPISODE_ID: 106
MDC_IDC_EPISODE_ID: 107
MDC_IDC_EPISODE_ID: 108
MDC_IDC_EPISODE_ID: 109
MDC_IDC_EPISODE_ID: 110
MDC_IDC_EPISODE_ID: 111
MDC_IDC_EPISODE_ID: 112
MDC_IDC_EPISODE_ID: 113
MDC_IDC_EPISODE_ID: 114
MDC_IDC_EPISODE_ID: 115
MDC_IDC_EPISODE_ID: 116
MDC_IDC_EPISODE_ID: 117
MDC_IDC_EPISODE_ID: 118
MDC_IDC_EPISODE_ID: 119
MDC_IDC_EPISODE_ID: 120
MDC_IDC_EPISODE_ID: 121
MDC_IDC_EPISODE_ID: 122
MDC_IDC_EPISODE_ID: 123
MDC_IDC_EPISODE_ID: 124
MDC_IDC_EPISODE_ID: 125
MDC_IDC_EPISODE_ID: 126
MDC_IDC_EPISODE_ID: 127
MDC_IDC_EPISODE_ID: 128
MDC_IDC_EPISODE_ID: 129
MDC_IDC_EPISODE_ID: 130
MDC_IDC_EPISODE_ID: 131
MDC_IDC_EPISODE_ID: 132
MDC_IDC_EPISODE_ID: 133
MDC_IDC_EPISODE_ID: 134
MDC_IDC_EPISODE_ID: 135
MDC_IDC_EPISODE_ID: 136
MDC_IDC_EPISODE_ID: 137
MDC_IDC_EPISODE_ID: 138
MDC_IDC_EPISODE_ID: 139
MDC_IDC_EPISODE_ID: 140
MDC_IDC_EPISODE_ID: 141
MDC_IDC_EPISODE_ID: 142
MDC_IDC_EPISODE_ID: 143
MDC_IDC_EPISODE_ID: 144
MDC_IDC_EPISODE_ID: 145
MDC_IDC_EPISODE_ID: 146
MDC_IDC_EPISODE_ID: 147
MDC_IDC_EPISODE_ID: 148
MDC_IDC_EPISODE_ID: 149
MDC_IDC_EPISODE_ID: 150
MDC_IDC_EPISODE_ID: 151
MDC_IDC_EPISODE_ID: 152
MDC_IDC_EPISODE_ID: 153
MDC_IDC_EPISODE_ID: 154
MDC_IDC_EPISODE_ID: 155
MDC_IDC_EPISODE_ID: 156
MDC_IDC_EPISODE_ID: 157
MDC_IDC_EPISODE_ID: 158
MDC_IDC_EPISODE_ID: 159
MDC_IDC_EPISODE_ID: 160
MDC_IDC_EPISODE_ID: 161
MDC_IDC_EPISODE_ID: 162
MDC_IDC_EPISODE_ID: 163
MDC_IDC_EPISODE_ID: 164
MDC_IDC_EPISODE_ID: 165
MDC_IDC_EPISODE_ID: 166
MDC_IDC_EPISODE_ID: 167
MDC_IDC_EPISODE_ID: 168
MDC_IDC_EPISODE_ID: 169
MDC_IDC_EPISODE_ID: 170
MDC_IDC_EPISODE_ID: 171
MDC_IDC_EPISODE_ID: 172
MDC_IDC_EPISODE_ID: 173
MDC_IDC_EPISODE_ID: 91
MDC_IDC_EPISODE_ID: 97
MDC_IDC_EPISODE_TYPE: NORMAL
MDC_IDC_LEAD_CONNECTION_STATUS: NORMAL
MDC_IDC_LEAD_CONNECTION_STATUS: NORMAL
MDC_IDC_LEAD_IMPLANT_DT: NORMAL
MDC_IDC_LEAD_IMPLANT_DT: NORMAL
MDC_IDC_LEAD_LOCATION: NORMAL
MDC_IDC_LEAD_LOCATION: NORMAL
MDC_IDC_LEAD_LOCATION_DETAIL_1: NORMAL
MDC_IDC_LEAD_LOCATION_DETAIL_1: NORMAL
MDC_IDC_LEAD_MFG: NORMAL
MDC_IDC_LEAD_MFG: NORMAL
MDC_IDC_LEAD_MODEL: NORMAL
MDC_IDC_LEAD_MODEL: NORMAL
MDC_IDC_LEAD_POLARITY_TYPE: NORMAL
MDC_IDC_LEAD_POLARITY_TYPE: NORMAL
MDC_IDC_LEAD_SERIAL: NORMAL
MDC_IDC_LEAD_SERIAL: NORMAL
MDC_IDC_MSMT_BATTERY_DTM: NORMAL
MDC_IDC_MSMT_BATTERY_REMAINING_LONGEVITY: 147 MO
MDC_IDC_MSMT_BATTERY_RRT_TRIGGER: 2.62
MDC_IDC_MSMT_BATTERY_STATUS: NORMAL
MDC_IDC_MSMT_BATTERY_VOLTAGE: 3.07 V
MDC_IDC_MSMT_LEADCHNL_RA_IMPEDANCE_VALUE: 342 OHM
MDC_IDC_MSMT_LEADCHNL_RA_IMPEDANCE_VALUE: 532 OHM
MDC_IDC_MSMT_LEADCHNL_RA_PACING_THRESHOLD_AMPLITUDE: 0.5 V
MDC_IDC_MSMT_LEADCHNL_RA_PACING_THRESHOLD_PULSEWIDTH: 0.4 MS
MDC_IDC_MSMT_LEADCHNL_RA_SENSING_INTR_AMPL: 2.62 MV
MDC_IDC_MSMT_LEADCHNL_RA_SENSING_INTR_AMPL: 2.62 MV
MDC_IDC_MSMT_LEADCHNL_RV_IMPEDANCE_VALUE: 399 OHM
MDC_IDC_MSMT_LEADCHNL_RV_IMPEDANCE_VALUE: 494 OHM
MDC_IDC_MSMT_LEADCHNL_RV_PACING_THRESHOLD_AMPLITUDE: 0.62 V
MDC_IDC_MSMT_LEADCHNL_RV_PACING_THRESHOLD_PULSEWIDTH: 0.4 MS
MDC_IDC_MSMT_LEADCHNL_RV_SENSING_INTR_AMPL: 8.38 MV
MDC_IDC_MSMT_LEADCHNL_RV_SENSING_INTR_AMPL: 8.38 MV
MDC_IDC_PG_IMPLANT_DTM: NORMAL
MDC_IDC_PG_MFG: NORMAL
MDC_IDC_PG_MODEL: NORMAL
MDC_IDC_PG_SERIAL: NORMAL
MDC_IDC_PG_TYPE: NORMAL
MDC_IDC_SESS_CLINIC_NAME: NORMAL
MDC_IDC_SESS_DTM: NORMAL
MDC_IDC_SESS_TYPE: NORMAL
MDC_IDC_SET_BRADY_AT_MODE_SWITCH_RATE: 171 {BEATS}/MIN
MDC_IDC_SET_BRADY_HYSTRATE: NORMAL
MDC_IDC_SET_BRADY_LOWRATE: 50 {BEATS}/MIN
MDC_IDC_SET_BRADY_MAX_SENSOR_RATE: 130 {BEATS}/MIN
MDC_IDC_SET_BRADY_MAX_TRACKING_RATE: 130 {BEATS}/MIN
MDC_IDC_SET_BRADY_MODE: NORMAL
MDC_IDC_SET_BRADY_PAV_DELAY_LOW: 300 MS
MDC_IDC_SET_BRADY_SAV_DELAY_LOW: 300 MS
MDC_IDC_SET_LEADCHNL_RA_PACING_AMPLITUDE: 1.5 V
MDC_IDC_SET_LEADCHNL_RA_PACING_ANODE_ELECTRODE_1: NORMAL
MDC_IDC_SET_LEADCHNL_RA_PACING_ANODE_LOCATION_1: NORMAL
MDC_IDC_SET_LEADCHNL_RA_PACING_CAPTURE_MODE: NORMAL
MDC_IDC_SET_LEADCHNL_RA_PACING_CATHODE_ELECTRODE_1: NORMAL
MDC_IDC_SET_LEADCHNL_RA_PACING_CATHODE_LOCATION_1: NORMAL
MDC_IDC_SET_LEADCHNL_RA_PACING_POLARITY: NORMAL
MDC_IDC_SET_LEADCHNL_RA_PACING_PULSEWIDTH: 0.4 MS
MDC_IDC_SET_LEADCHNL_RA_SENSING_ANODE_ELECTRODE_1: NORMAL
MDC_IDC_SET_LEADCHNL_RA_SENSING_ANODE_LOCATION_1: NORMAL
MDC_IDC_SET_LEADCHNL_RA_SENSING_CATHODE_ELECTRODE_1: NORMAL
MDC_IDC_SET_LEADCHNL_RA_SENSING_CATHODE_LOCATION_1: NORMAL
MDC_IDC_SET_LEADCHNL_RA_SENSING_POLARITY: NORMAL
MDC_IDC_SET_LEADCHNL_RA_SENSING_SENSITIVITY: 0.3 MV
MDC_IDC_SET_LEADCHNL_RV_PACING_AMPLITUDE: 2 V
MDC_IDC_SET_LEADCHNL_RV_PACING_ANODE_ELECTRODE_1: NORMAL
MDC_IDC_SET_LEADCHNL_RV_PACING_ANODE_LOCATION_1: NORMAL
MDC_IDC_SET_LEADCHNL_RV_PACING_CAPTURE_MODE: NORMAL
MDC_IDC_SET_LEADCHNL_RV_PACING_CATHODE_ELECTRODE_1: NORMAL
MDC_IDC_SET_LEADCHNL_RV_PACING_CATHODE_LOCATION_1: NORMAL
MDC_IDC_SET_LEADCHNL_RV_PACING_POLARITY: NORMAL
MDC_IDC_SET_LEADCHNL_RV_PACING_PULSEWIDTH: 0.4 MS
MDC_IDC_SET_LEADCHNL_RV_SENSING_ANODE_ELECTRODE_1: NORMAL
MDC_IDC_SET_LEADCHNL_RV_SENSING_ANODE_LOCATION_1: NORMAL
MDC_IDC_SET_LEADCHNL_RV_SENSING_CATHODE_ELECTRODE_1: NORMAL
MDC_IDC_SET_LEADCHNL_RV_SENSING_CATHODE_LOCATION_1: NORMAL
MDC_IDC_SET_LEADCHNL_RV_SENSING_POLARITY: NORMAL
MDC_IDC_SET_LEADCHNL_RV_SENSING_SENSITIVITY: 0.9 MV
MDC_IDC_SET_ZONE_DETECTION_INTERVAL: 200 MS
MDC_IDC_SET_ZONE_DETECTION_INTERVAL: 350 MS
MDC_IDC_SET_ZONE_DETECTION_INTERVAL: 400 MS
MDC_IDC_SET_ZONE_STATUS: NORMAL
MDC_IDC_SET_ZONE_TYPE: NORMAL
MDC_IDC_SET_ZONE_VENDOR_TYPE: NORMAL
MDC_IDC_STAT_AT_BURDEN_PERCENT: 1.7 %
MDC_IDC_STAT_AT_DTM_END: NORMAL
MDC_IDC_STAT_AT_DTM_START: NORMAL
MDC_IDC_STAT_BRADY_AP_VP_PERCENT: 34.98 %
MDC_IDC_STAT_BRADY_AP_VS_PERCENT: 1.95 %
MDC_IDC_STAT_BRADY_AS_VP_PERCENT: 53.37 %
MDC_IDC_STAT_BRADY_AS_VS_PERCENT: 9.75 %
MDC_IDC_STAT_BRADY_DTM_END: NORMAL
MDC_IDC_STAT_BRADY_DTM_START: NORMAL
MDC_IDC_STAT_BRADY_RA_PERCENT_PACED: 37.4 %
MDC_IDC_STAT_BRADY_RV_PERCENT_PACED: 88.18 %
MDC_IDC_STAT_EPISODE_RECENT_COUNT: 0
MDC_IDC_STAT_EPISODE_RECENT_COUNT: 0
MDC_IDC_STAT_EPISODE_RECENT_COUNT: 14
MDC_IDC_STAT_EPISODE_RECENT_COUNT: 3
MDC_IDC_STAT_EPISODE_RECENT_COUNT: 66
MDC_IDC_STAT_EPISODE_RECENT_COUNT_DTM_END: NORMAL
MDC_IDC_STAT_EPISODE_RECENT_COUNT_DTM_START: NORMAL
MDC_IDC_STAT_EPISODE_TOTAL_COUNT: 0
MDC_IDC_STAT_EPISODE_TOTAL_COUNT: 0
MDC_IDC_STAT_EPISODE_TOTAL_COUNT: 110
MDC_IDC_STAT_EPISODE_TOTAL_COUNT: 25
MDC_IDC_STAT_EPISODE_TOTAL_COUNT: 38
MDC_IDC_STAT_EPISODE_TOTAL_COUNT_DTM_END: NORMAL
MDC_IDC_STAT_EPISODE_TOTAL_COUNT_DTM_START: NORMAL
MDC_IDC_STAT_EPISODE_TYPE: NORMAL
MDC_IDC_STAT_TACHYTHERAPY_RECENT_DTM_END: NORMAL
MDC_IDC_STAT_TACHYTHERAPY_RECENT_DTM_START: NORMAL
MDC_IDC_STAT_TACHYTHERAPY_TOTAL_DTM_END: NORMAL
MDC_IDC_STAT_TACHYTHERAPY_TOTAL_DTM_START: NORMAL

## 2023-11-07 NOTE — NURSING NOTE
"Vikash Burgos is a 60 year old male who presents for:  Chief Complaint   Patient presents with     RECHECK     1st PO to 2/21/2020 LEFT CAROTID ENDARTERECTOMY WITH EEG         Vitals:    Vitals:    03/11/20 0927   BP: 135/78   BP Location: Left arm   Patient Position: Chair   Cuff Size: Adult Regular   Pulse: 61   Temp: 97.5  F (36.4  C)       BMI:  Estimated body mass index is 27.73 kg/m  as calculated from the following:    Height as of 2/21/20: 5' 11\" (1.803 m).    Weight as of 2/22/20: 198 lb 13.7 oz (90.2 kg).    Pain Score:  Data Unavailable        Lisa Florentino MA    " Called [facility] and spoke to  to Mo moore supervisor at [facility]  .    Explained to  Mo what pt had told me about needing the small dose of fentayl and steroid injection to alleviate her cough , as this has worked in the past .    Pt offers that she was told if Dr No sent the fentanyl order over then it could be given to her at the outpatient clinic.    Mo offers that the oupatient clinic would not be giving fentanyl injection as they do not stock the medication  and they close at 5 pm not 7 pm as pt had advised me .    Mo, the [facility] in house supervisor  offers that  they will not accept orders for opioids from a physician that is not on the hospital staff there .    She offers that the pt may return to [facility] for  assistance with cough relief , but it will be totally up to the doctor who sees her if they will order her an opoid .  She offers she  will  discuss the patient with her emergency room physicians .    Thanked Mo for her assistance and will call and advise pt to return to [facility]  .

## 2023-11-09 ENCOUNTER — OFFICE VISIT (OUTPATIENT)
Dept: CARDIOLOGY | Facility: CLINIC | Age: 63
End: 2023-11-09
Payer: COMMERCIAL

## 2023-11-09 VITALS
DIASTOLIC BLOOD PRESSURE: 75 MMHG | OXYGEN SATURATION: 98 % | SYSTOLIC BLOOD PRESSURE: 125 MMHG | BODY MASS INDEX: 19.46 KG/M2 | WEIGHT: 139 LBS | HEIGHT: 71 IN | HEART RATE: 75 BPM

## 2023-11-09 DIAGNOSIS — Z00.6 EXAMINATION OF PARTICIPANT IN CLINICAL TRIAL: ICD-10-CM

## 2023-11-09 DIAGNOSIS — E11.9 DIABETES MELLITUS, TYPE 2 (H): ICD-10-CM

## 2023-11-09 DIAGNOSIS — Z00.6 EXAMINATION OF PARTICIPANT IN CLINICAL TRIAL: Primary | ICD-10-CM

## 2023-11-09 PROCEDURE — 99207 PR NO CHARGE-RESEARCH SERVICE: CPT | Performed by: INTERNAL MEDICINE

## 2023-11-09 NOTE — PROGRESS NOTES
SURPASS-CVOT Study [Effect of tirzepatide versus Dulaglutide on Major Cardiovascular Events in Patients with Type 2Diabetes]    Subject seen for Visit 19    Subject queried for adverse events, endpoints and medication changes. If present, noted below. Patient reports that he had cataracts removed and FU for retinopathy. Had endocrinology Follow-up with no new orders. No other issues or concerns other than continued nausea on the 3rd day of his injection last 3 days with resolution. He has been taking Reglan since last visit and says his nausea is some better and is able to eat more. Weight stable. Rcd Influenza Vaccine 0.5 ml's IM  10/03/2023.    Adherence/lifestyle reinforcement done     No of pens dispensed at last visit 16  Any Returned medication 0, left 1 box left at home, reminded to bring ALL med back.  Date of first dose since last visit: 08/17/2023  Date of last dose taken since last visit: 11/02/2023    Subject drug dispensed  Kit # [id number], [id number], [id number] and [id number]    SURPASS-CVOT Study [Effect of tirzepatide versus Dulaglutide on Major Cardiovascular Events in Patients with Type 2 Diabetes]    Diagnosis/event description (diagnosis preferred):  Nausea  Start date: 08/19/2023   Date resolved: 08/21/2023    Intensity: ([x] mild /[]  moderate / [] severe)     Study Medication adjustment:  [] Yes   [x] No    Outcome: ([x] resolved [with or without sequelae] /[] recovering / [] not recovered / [] death / [] unknown)      Date study team aware of event: 11/09/2023    Was treatment given for this event:  [] Yes   [x] No   If yes, provide details  Serious adverse event?    Yes   [x] No    Special interest event?  [] Yes   [x] No    Endpoint? [] Yes   [x] No     Fatal event?  [] Yes   [x] No      Dr. Elias: Reasonable possibility that AE is related to study treatment    [x] Yes   [] No    SURPASS-CVOT Study [Effect of tirzepatide versus Dulaglutide on Major Cardiovascular Events in Patients with Type 2  Diabetes]    Diagnosis/event description (diagnosis preferred):  Nausea  Start date: 08/26/2023  Date resolved: 08/28/2023     Intensity: ([x] mild /[]  moderate / [] severe)     Study Medication adjustment:  [] Yes   [x] No    Outcome: ([x] resolved [with or without sequelae] /[] recovering / [] not recovered / [] death / [] unknown)      Date study team aware of event: 11/09/2023    Was treatment given for this event:  [] Yes   [x] No   If yes, provide details  Serious adverse event?    Yes   [x] No    Special interest event?  [] Yes   [x] No    Endpoint? [] Yes   [x] No     Fatal event?  [] Yes   [x] No      Dr. Elias: Reasonable possibility that AE is related to study treatment    [x] Yes   [] No    SURPASS-CVOT Study [Effect of tirzepatide versus Dulaglutide on Major Cardiovascular Events in Patients with Type 2 Diabetes]    Diagnosis/event description (diagnosis preferred):  Nausea  Start date: 09/02/2023   Date resolved: 09/04/2023     Intensity: ([x] mild /[]  moderate / [] severe)     Study Medication adjustment:  [] Yes   [x] No    Outcome: ([x] resolved [with or without sequelae] /[] recovering / [] not recovered / [] death / [] unknown)      Date study team aware of event: 11/09/2023    Was treatment given for this event:  [] Yes   [x] No   If yes, provide details  Serious adverse event?    Yes   [x] No    Special interest event?  [] Yes   [x] No    Endpoint? [] Yes   [x] No     Fatal event?  [] Yes   [x] No      Dr. Elias: Reasonable possibility that AE is related to study treatment    [x] Yes   [] NoSURPASS-CVOT Study [Effect of tirzepatide versus Dulaglutide on Major Cardiovascular Events in Patients with Type 2 Diabetes]    Diagnosis/event description (diagnosis preferred):  Nausea  Start date: 09/09/2023   Date resolved: 09/11/2023     Intensity: ([x] mild /[]  moderate / [] severe)     Study Medication adjustment:  [] Yes   [x] No    Outcome: ([x] resolved [with or without sequelae] /[]  recovering / [] not recovered / [] death / [] unknown)      Date study team aware of event: 11/09/2023    Was treatment given for this event:  [] Yes   [x] No   If yes, provide details  Serious adverse event?    Yes   [x] No    Special interest event?  [] Yes   [x] No    Endpoint? [] Yes   [x] No     Fatal event?  [] Yes   [x] No      Dr. Elias: Reasonable possibility that AE is related to study treatment    [x] Yes   [] No    SURPASS-CVOT Study [Effect of tirzepatide versus Dulaglutide on Major Cardiovascular Events in Patients with Type 2 Diabetes]    Diagnosis/event description (diagnosis preferred):  Nausea  Start date: 09/16/2023   Date resolved: 09/18/2023     Intensity: ([x] mild /[]  moderate / [] severe)     Study Medication adjustment:  [] Yes   [x] No    Outcome: ([x] resolved [with or without sequelae] /[] recovering / [] not recovered / [] death / [] unknown)      Date study team aware of event: 11/09/2023    Was treatment given for this event:  [] Yes   [x] No   If yes, provide details  Serious adverse event?    Yes   [x] No    Special interest event?  [] Yes   [x] No    Endpoint? [] Yes   [x] No     Fatal event?  [] Yes   [x] No      Dr. Elias: Reasonable possibility that AE is related to study treatment    [x] Yes   [] No    SURPASS-CVOT Study [Effect of tirzepatide versus Dulaglutide on Major Cardiovascular Events in Patients with Type 2 Diabetes]    Diagnosis/event description (diagnosis preferred):  Nausea  Start date: 09/23/2023   Date resolved: 09/25/2023     Intensity: ([x] mild /[]  moderate / [] severe)     Study Medication adjustment:  [] Yes   [x] No    Outcome: ([x] resolved [with or without sequelae] /[] recovering / [] not recovered / [] death / [] unknown)      Date study team aware of event: 11/09/2023    Was treatment given for this event:  [] Yes   [x] No   If yes, provide details  Serious adverse event?    Yes   [x] No    Special interest event?  [] Yes   [x] No    Endpoint? []  Yes   [x] No     Fatal event?  [] Yes   [x] No      Dr. Elias: Reasonable possibility that AE is related to study treatment    [x] Yes   [] No    SURPASS-CVOT Study [Effect of tirzepatide versus Dulaglutide on Major Cardiovascular Events in Patients with Type 2 Diabetes]    Diagnosis/event description (diagnosis preferred):  Nausea  Start date: 09/30/2023   Date resolved: 10/02/2023     Intensity: ([x] mild /[]  moderate / [] severe)     Study Medication adjustment:  [] Yes   [x] No    Outcome: ([x] resolved [with or without sequelae] /[] recovering / [] not recovered / [] death / [] unknown)      Date study team aware of event: 11/09/2023    Was treatment given for this event:  [] Yes   [x] No   If yes, provide details  Serious adverse event?    Yes   [x] No    Special interest event?  [] Yes   [x] No    Endpoint? [] Yes   [x] No     Fatal event?  [] Yes   [x] No      Dr. Elias: Reasonable possibility that AE is related to study treatment    [x] Yes   [] No    SURPASS-CVOT Study [Effect of tirzepatide versus Dulaglutide on Major Cardiovascular Events in Patients with Type 2 Diabetes]    Diagnosis/event description (diagnosis preferred):  Nausea  Start date: 10/07/2023   Date resolved: 10/09/2023     Intensity: ([x] mild /[]  moderate / [] severe)     Study Medication adjustment:  [] Yes   [x] No    Outcome: ([x] resolved [with or without sequelae] /[] recovering / [] not recovered / [] death / [] unknown)      Date study team aware of event: 11/09/2023    Was treatment given for this event:  [] Yes   [x] No   If yes, provide details  Serious adverse event?    Yes   [x] No    Special interest event?  [] Yes   [x] No    Endpoint? [] Yes   [x] No     Fatal event?  [] Yes   [x] No      Dr. Elias: Reasonable possibility that AE is related to study treatment    [x] Yes   [] No    SURPASS-CVOT Study [Effect of tirzepatide versus Dulaglutide on Major Cardiovascular Events in Patients with Type 2  Diabetes]    Diagnosis/event description (diagnosis preferred):  Nausea  Start date: 10/14/2023   Date resolved: 10/16/2023     Intensity: ([x] mild /[]  moderate / [] severe)     Study Medication adjustment:  [] Yes   [x] No    Outcome: ([x] resolved [with or without sequelae] /[] recovering / [] not recovered / [] death / [] unknown)      Date study team aware of event: 11/09/2023    Was treatment given for this event:  [] Yes   [x] No   If yes, provide details  Serious adverse event?    Yes   [x] No    Special interest event?  [] Yes   [x] No    Endpoint? [] Yes   [x] No     Fatal event?  [] Yes   [x] No      Dr. Elias: Reasonable possibility that AE is related to study treatment    [x] Yes   [] No    SURPASS-CVOT Study [Effect of tirzepatide versus Dulaglutide on Major Cardiovascular Events in Patients with Type 2 Diabetes]    Diagnosis/event description (diagnosis preferred):  Nausea  Start date: 10/21/2023   Date resolved: 10/23/2023     Intensity: ([x] mild /[]  moderate / [] severe)     Study Medication adjustment:  [] Yes   [x] No    Outcome: ([x] resolved [with or without sequelae] /[] recovering / [] not recovered / [] death / [] unknown)      Date study team aware of event: 11/09/2023    Was treatment given for this event:  [] Yes   [x] No   If yes, provide details  Serious adverse event?    Yes   [x] No    Special interest event?  [] Yes   [x] No    Endpoint? [] Yes   [x] No     Fatal event?  [] Yes   [x] No      Dr. Elias: Reasonable possibility that AE is related to study treatment    [x] Yes   [] No    SURPASS-CVOT Study [Effect of tirzepatide versus Dulaglutide on Major Cardiovascular Events in Patients with Type 2 Diabetes]    Diagnosis/event description (diagnosis preferred):  Nausea  Start date: 10/28/2023   Date resolved: 10/30/2023     Intensity: ([x] mild /[]  moderate / [] severe)     Study Medication adjustment:  [] Yes   [x] No    Outcome: ([x] resolved [with or without sequelae] /[]  recovering / [] not recovered / [] death / [] unknown)      Date study team aware of event: 11/09/2023    Was treatment given for this event:  [] Yes   [x] No   If yes, provide details  Serious adverse event?    Yes   [x] No    Special interest event?  [] Yes   [x] No    Endpoint? [] Yes   [x] No     Fatal event?  [] Yes   [x] No      Dr. Elias: Reasonable possibility that AE is related to study treatment    [x] Yes   [] No    SURPASS-CVOT Study [Effect of tirzepatide versus Dulaglutide on Major Cardiovascular Events in Patients with Type 2 Diabetes]    Diagnosis/event description (diagnosis preferred):  Nausea  Start date: 11/04/2023   Date resolved: 11/06/2023     Intensity: ([x] mild /[]  moderate / [] severe)     Study Medication adjustment:  [] Yes   [x] No    Outcome: ([x] resolved [with or without sequelae] /[] recovering / [] not recovered / [] death / [] unknown)      Date study team aware of event: 11/09/2023    Was treatment given for this event:  [] Yes   [x] No   If yes, provide details  Serious adverse event?    Yes   [x] No    Special interest event?  [] Yes   [x] No    Endpoint? [] Yes   [x] No     Fatal event?  [] Yes   [x] No      Dr. Elias: Reasonable possibility that AE is related to study treatment    [x] Yes   [] No      Will see again in clinic in 3 months.     Mis Husain RN

## 2023-11-27 ENCOUNTER — OFFICE VISIT (OUTPATIENT)
Dept: CARDIOLOGY | Facility: CLINIC | Age: 63
End: 2023-11-27
Payer: COMMERCIAL

## 2023-11-27 VITALS
OXYGEN SATURATION: 98 % | BODY MASS INDEX: 19.39 KG/M2 | SYSTOLIC BLOOD PRESSURE: 154 MMHG | HEART RATE: 67 BPM | HEIGHT: 71 IN | DIASTOLIC BLOOD PRESSURE: 81 MMHG

## 2023-11-27 DIAGNOSIS — I10 UNCONTROLLED HYPERTENSION: ICD-10-CM

## 2023-11-27 DIAGNOSIS — I11.9 HYPERTENSIVE LEFT VENTRICULAR HYPERTROPHY, WITHOUT HEART FAILURE: Primary | ICD-10-CM

## 2023-11-27 DIAGNOSIS — I25.810 CORONARY ARTERY DISEASE INVOLVING CORONARY BYPASS GRAFT OF NATIVE HEART WITHOUT ANGINA PECTORIS: ICD-10-CM

## 2023-11-27 DIAGNOSIS — E78.5 HYPERLIPIDEMIA LDL GOAL <70: ICD-10-CM

## 2023-11-27 DIAGNOSIS — E11.40 TYPE 2 DIABETES MELLITUS WITH DIABETIC NEUROPATHY, WITHOUT LONG-TERM CURRENT USE OF INSULIN (H): ICD-10-CM

## 2023-11-27 DIAGNOSIS — R07.2 PRECORDIAL CHEST PAIN: ICD-10-CM

## 2023-11-27 PROCEDURE — 99215 OFFICE O/P EST HI 40 MIN: CPT | Performed by: INTERNAL MEDICINE

## 2023-11-27 RX ORDER — AMLODIPINE BESYLATE 2.5 MG/1
2.5 TABLET ORAL 2 TIMES DAILY
Qty: 60 TABLET | Refills: 1 | Status: SHIPPED | OUTPATIENT
Start: 2023-11-27 | End: 2023-12-29

## 2023-11-27 NOTE — Clinical Note
11/27/2023    Danish Land MD  [address]  [place] [address]    RE: Vikash Burgos       Dear Colleague,     I had the pleasure of seeing Vikash Burgos in the [facility].    SERVICE DATE: November 27, 2023      PRIMARY CARE AND REFERRING PROVIDER:  Danish Land  [address]   [place] [address]    REASON FOR VISIT:  ***    HISTORY OF PRESENT ILLNESS:  Vikash Burgos is 63 year old male, ***    BP ***, pulse *** BPM.    ECG done today shows normal sinus rhythm with normal cardiac intervals.  I independently interpreted the ECG dated ***.  Shows normal sinus rhythm with normal cardiac intervals.    I reviewed the results of transthoracic echocardiogram dated ***. Shows normal left ventricular size and systolic function, EF 65%, no regional wall motion abnormalities, normal right ventricular size and systolic function, normal atrial size, no significant valve disease normal inferior vena cava.    I independently interpreted the transthoracic echocardiogram dated ***. Shows normal left ventricular size and systolic function, EF 65%, no regional wall motion abnormalities, normal right ventricular size and systolic function, normal atrial size, no significant valve disease, normal inferior vena cava.    On exam - Regular heart sounds, no murmur, no carotid bruit.  Lungs are clear to auscultation.  No lower extremity edema.    DIAGNOSES/ASSESSMENT:  ***    PLAN:  Increase amlodipine from 2.5 mg once daily to 2 times daily.  Cardiac MRI with stress.  I note that his renal function is normal with a creatinine of 0.9 and his pacemaker is MRI compatible.  Extensive patient counseling, education, review of testing, need for medication changes, reassurance about chronic chest wall pain and reiteration that it is safe and necessary to exercise regularly.  Total counseling time 50 minutes.  Follow-up with me after completion of testing.      Geetha Alexander,  "MD      New patient.  Established patient.   Today's clinic visit entailed:  {[facility] 2021 Documentation (Optional):699212}  {2021 E&M time:260463}  Provider  Link to [facility] Help Grid     {[facility] Level:902902}          Vitals: BP (!) 161/96   Pulse 67   Ht 1.803 m (5' 11\")   SpO2 98%   BMI 19.39 kg/m    Wt Readings from Last 5 Encounters:   11/09/23 63 kg (139 lb)   10/03/23 66.7 kg (147 lb)   08/17/23 68.9 kg (152 lb)   07/21/23 71.4 kg (157 lb 6 oz)   05/18/23 72.6 kg (160 lb)         No orders of the defined types were placed in this encounter.          CURRENT MEDICATIONS:  Current Outpatient Medications   Medication Sig Dispense Refill    amLODIPine (NORVASC) 2.5 MG tablet Take 1 tablet (2.5 mg) by mouth daily 90 tablet 3    apixaban ANTICOAGULANT (ELIQUIS) 5 MG tablet Take 1 tablet (5 mg) by mouth 2 times daily 180 tablet 3    B-D U/F 31G X 8 MM insulin pen needle USE ONE PEN NEEDLES DAILY OR AS DIRECTED. 100 each 3    celecoxib (CELEBREX) 100 MG capsule Take 1 capsule (100 mg) by mouth daily as needed for moderate pain (4-6) 30 capsule 11    chlorhexidine (PERIDEX) 0.12 % solution Take 15 mLs by mouth 2 times daily as needed   5    cyanocobalamin 1000 MCG SUBL Place 1,000 mcg under the tongue daily      glucosamine-chondroitin 500-400 MG CAPS per capsule Take 1 capsule by mouth daily      lisinopril (ZESTRIL) 20 MG tablet Take 1 tablet (20 mg) by mouth 2 times daily 180 tablet 3    omeprazole (PRILOSEC) 20 MG DR capsule Take 1 capsule (20 mg) by mouth daily 90 capsule 3    ONETOUCH VERIO IQ test strip       rosuvastatin (CRESTOR) 20 MG tablet Take 1 tablet (20 mg) by mouth daily 90 tablet 2    Vitamin D, Cholecalciferol, 1000 units TABS Take 1,000 Units by mouth daily      zolpidem (AMBIEN) 5 MG tablet Take 1 tablet (5 mg) by mouth nightly as needed for sleep 30 tablet 0    ASPIRIN NOT PRESCRIBED (INTENTIONAL) Please choose reason not prescribed from choices below. (Patient not taking: Reported on 11/27/2023)   "         ALLERGIES:  No Known Allergies    PAST MEDICAL HISTORY:    Past Medical History:   Diagnosis Date    Basal cell carcinoma     Carotid stenosis, left     s/p left CEA 2/2020    Cerebral infarction (H)     left CVA 2/2020 post-op carotid endarterectomy    Coronary artery disease     4 vessel bypass November 2010; LIMA ->LAD, SVG-> OM3, SVG -> D2, SVG -> PDA    Diabetes mellitus (H) 2005    neuropathy    Generalized anxiety disorder 05/02/2014    Hepatitis C, chronic (H) 2005    Hyperlipidemia LDL goal < 70     Hypertension     Liver diseases     9/15 Liver is 10 cm in span without left lobe enlargement    Persistent microalbuminuria associated with type 2 diabetes mellitus (H) 05/06/2015    Renal artery stenosis (H24)        PAST SURGICAL HISTORY:    Past Surgical History:   Procedure Laterality Date    ARTHROSCOPY KNEE RT/LT      left    COLONOSCOPY      CORONARY ARTERY BYPASS  11/18/201    Coronary artery bypass grafting x 4 with placement of the left internal mammary artery to the distal midportion of the left anterior descending artery, saphenous vein graft from aorta to the third obtuse marginal branch of circumflex coronary artery, saphenous vein graft from aorta to the second diagonal branch, saphenous vein graft from aorta to the posterior descending artery.    ENDARTERECTOMY CAROTID Left 2/21/2020    Procedure: LEFT CAROTID ENDARTERECTOMY WITH EEG;  Surgeon: Semaj Stubbs MD;  Location:  OR    EP ABLATION ATRIAL FLUTTER N/A 9/9/2022    Procedure: Ablation Atrial Flutter;  Surgeon: Tyson Ordonez MD;  Location:  HEART CARDIAC CATH LAB    EP PACEMAKER DEVICE & LEAD IMPLANT- RIGHT ATRIAL & LEFT VENTRICULAR N/A 11/7/2022    Procedure: Pacemaker Device & Lead Implant- Right Atrial & Left Ventricular;  Surgeon: Tyson Ordonez MD;  Location:  HEART CARDIAC CATH LAB    ESOPHAGOSCOPY, GASTROSCOPY, DUODENOSCOPY (EGD), COMBINED  10/31/2011    Procedure:COMBINED  ESOPHAGOSCOPY, GASTROSCOPY, DUODENOSCOPY (EGD); Surgeon:ALONSO ALDANA; Location: GI    ESOPHAGOSCOPY, GASTROSCOPY, DUODENOSCOPY (EGD), COMBINED N/A 3/8/2018    Procedure: COMBINED ESOPHAGOSCOPY, GASTROSCOPY, DUODENOSCOPY (EGD);  EGD;  Surgeon: Angel Luis Justice MD;  Location: [facility]    HEART CATH CORONARY ANGIOGRAM W/LV GRAM  9--10    CV Surgery recommended    HEART CATH CORONARY ANGIOGRAM W/LV GRAM  -14    Medical management    HERNIA REPAIR, INGUINAL RT/LT      left       FAMILY HISTORY:    Family History   Problem Relation Age of Onset    C.A.D. Father     Cancer Father         bladder    Heart Surgery Father         bypass surgery    Heart Disease Mother        SOCIAL HISTORY:    Social History     Socioeconomic History    Marital status:      Spouse name: None    Number of children: None    Years of education: None    Highest education level: None   Tobacco Use    Smoking status: Former     Packs/day: 0.50     Years: 12.00     Additional pack years: 0.00     Total pack years: 6.00     Types: Cigarettes     Start date:      Quit date: 2005     Years since quittin.8    Smokeless tobacco: Never   Vaping Use    Vaping Use: Never used   Substance and Sexual Activity    Alcohol use: Yes     Comment: minimal 1 glass of wine per week    Drug use: No    Sexual activity: Yes     Partners: Female   Other Topics Concern    Parent/sibling w/ CABG, MI or angioplasty before 65F 55M? No    Caffeine Concern Yes     Comment: 2 cups coffee per day    Special Diet Yes     Comment: low carb diet, low sugar    Exercise Yes     Comment: treadmill 40 minutes, walk, daily, bike 30 minutes every other day     Social Determinants of Health     Interpersonal Safety: Low Risk  (10/3/2023)    Interpersonal Safety     Do you feel physically and emotionally safe where you currently live?: Yes     Within the past 12 months, have you been hit, slapped, kicked or otherwise physically hurt by someone?: No      Within the past 12 months, have you been humiliated or emotionally abused in other ways by your partner or ex-partner?: No                           Thank you for allowing me to participate in the care of your patient.      Sincerely,     Geetha Alexander MD     [facility]  cc:   No referring provider defined for this encounter.

## 2023-11-27 NOTE — PROGRESS NOTES
SERVICE DATE: November 27, 2023      PRIMARY CARE AND REFERRING PROVIDER:  Danish Land  [address]  [facility] [id number]    REASON FOR VISIT:  Second opinion for left ventricular hypertrophy on echocardiogram, chronic chest pain, hypertension management.    HISTORY OF PRESENT ILLNESS:  Vikash Burgos is 63 year old male, spouse of one of our schedulers, used to follow long-term with my partner, Dr. Cowart.  Kelton is retired, him and his wife have a physically active lifestyle and enjoy exercising and hiking.    He is here for a second opinion regarding several issues including chronic chest pain, uncontrolled hypertension and recent finding of severe concentric left ventricular hypertrophy on echocardiogram.  I note that my partner, Dr. Cowart has addressed all of these in excellent detail over the last several visits.    Patient's history is significant for chronic coronary artery disease status post CABG X4 in 2010 by Dr. Costa Corona with subsequent coronary angiogram in 2014 demonstrating all bypass grafts to be patent but he had diffuse small caliber native coronary arteries.  Other comorbidities are carotid artery disease status post left carotid artery endarterectomy for asymptomatic 75% left carotid stenosis by Dr. Stubbs in February 2020 complicated by some focal nerve trauma and postoperative weakness in his right arm and right leg that have resolved over time.  He follows with Dr. Ordonez for atrial arrhythmias and has apixaban anticoagulated paroxysmal atrial fibrillation, status post catheter ablation of atrial flutter in August 2022 and implantation of dual-chamber pacemaker in October 2022 for high-grade AV block.  He has a long history of atypical left-sided chest pain, hypertension with lability and suboptimal control..  He has hyperlipidemia with that has been optimally treated with rosuvastatin 20 mg with an LDL of 48.  Prior history of type 2 diabetes mellitus with  normalization of hemoglobin A1c to 5% with significant lifestyle modification several years ago and his current BMI is 19 with a weight of 140.  Chronic mild thrombocytopenia of unclear etiology with platelet counts of 130,000-140,000, no bleeding issues on apixaban.  He is a 25-pack-year smoker who quit in 2005.    One of the major test results that have concerned him is the finding of severe eccentric left ventricular hypertrophy noted on his echocardiogram dated 12/23/2022.  I independently interpreted these images.  His echocardiographic windows are challenging, LVEF is preserved and he has left ventricular hypertrophy but I am not sure the measurements are accurate because of technical difficulties.  RV systolic function is normal, LV regional wall motion abnormalities could not be commented upon due to poor visualization, he does not have any significant valve disease or aortopathy.  Dr. Cowart had appropriately opined that this was likely hypertensive heart disease due to years of labile hypertension.  Indeed patient's BP today is 154/81 and he says over the years it has been significantly up-and-down and he worries about this.  He is currently on amlodipine 2.5 mg daily and lisinopril 20 mg daily.  He checks his blood pressure at home frequently.  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  Clinical probability of sleep apnea is low.    Other concern is his chronic left-sided chest pain and difficulty in adduction of his left arm.  Multiple historical notes and these have been addressed I reviewed multiple historical notes.  They did not sound cardiac.  Can last for hours, has been present for years, he thinks they may be related to his cardiac surgery or pacemaker.  Did not result in exercise limitation.  He has exquisite chest wall tenderness on palpation.     His lab parameters are very good.  Lipid panel is  excellent with an LDL of 48, triglycerides 52, HDL 51, hemoglobin A1c 5%, potassium 4.9, creatinine 0.9, hemoglobin 15.5, normal liver enzymes.    I reviewed pertinent notes, test results dating back to almost 20 years.    DIAGNOSES/ASSESSMENT:  Stable coronary artery disease without angina status post remote CABG in 2010 with all grafts patent on last coronary angiogram in 2014.  Chronic atypical left-sided chest and left arm pain with reproducible chest wall tenderness on palpation.    Question of severe left ventricular hypertrophy on echocardiogram in a technically suboptimal study.    Uncontrolled hypertension.  Patient had multiple questions about classes of medications and need for up titration.  These were answered.  He should increase amlodipine and is already on maximal dose of lisinopril.  Dual-chamber pacemaker in situ for intermittent AV block.   Hyperlipidemia with goal LDL less than 70.  LDL is at goal at 48.  Continue rosuvastatin 20 mg daily.  Paroxysmal atrial fibrillation and atrial flutter status post successful catheter ablation.  Continue apixaban 5 Mg twice daily.  Carotid artery disease status post left carotid endarterectomy in 2020.  Blood pressure optimization is key.  LDL is at goal.  No tobacco use.    PLAN:  Extensive discussion and counseling today.  Patient revisited several details of his historical medical history dating back to almost 3 decades.  Expressed frustration and worry that some of his care may have fallen through the cracks.  I listened to all his concerns, and reiterated that in my professional opinion, he has received excellent care.  I reassured him that I do, believe that his chronic chest pain is noncardiac and not related to the pacemaker implantation.  To address the question of left ventricular hypertrophy, he needs a definitive test.  I recommend cardiac MRI with stress testing.  He does a lot of [employer] research about his diagnoses and has ideas about his  "hypertension management.  I did my best to  cannot that optimizing blood pressure control is vital in him to goal BP of 130/70 or below because he has significant vascular disease which includes coronary artery disease and carotid artery disease.  After much discussion, he is open to increasing amlodipine to 2.5 mg twice daily.  Continue lisinopril 20 Mg twice daily.  Follow-up with test results.      Geetha Alexander MD      Established patient.   70 minutes spent by me on the date of the encounter doing chart review, history and exam, documentation and further activities per the note.          Vitals: BP (!) 154/81   Pulse 67   Ht 1.803 m (5' 11\")   SpO2 98%   BMI 19.39 kg/m    Wt Readings from Last 5 Encounters:   12/29/23 63.5 kg (140 lb)   11/09/23 63 kg (139 lb)   10/03/23 66.7 kg (147 lb)   08/17/23 68.9 kg (152 lb)   07/21/23 71.4 kg (157 lb 6 oz)         Orders Placed This Encounter   Procedures    Follow-Up with Cardiology           CURRENT MEDICATIONS:  Current Outpatient Medications   Medication Sig Dispense Refill    B-D U/F 31G X 8 MM insulin pen needle USE ONE PEN NEEDLES DAILY OR AS DIRECTED. 100 each 3    celecoxib (CELEBREX) 100 MG capsule Take 1 capsule (100 mg) by mouth daily as needed for moderate pain (4-6) 30 capsule 11    chlorhexidine (PERIDEX) 0.12 % solution Take 15 mLs by mouth 2 times daily as needed   5    cyanocobalamin 1000 MCG SUBL Place 1,000 mcg under the tongue daily      glucosamine-chondroitin 500-400 MG CAPS per capsule Take 1 capsule by mouth daily      omeprazole (PRILOSEC) 20 MG DR capsule Take 1 capsule (20 mg) by mouth daily 90 capsule 3    ONETOUCH VERIO IQ test strip       Vitamin D, Cholecalciferol, 1000 units TABS Take 1,000 Units by mouth daily      zolpidem (AMBIEN) 5 MG tablet Take 1 tablet (5 mg) by mouth nightly as needed for sleep 30 tablet 0    amLODIPine (NORVASC) 2.5 MG tablet Take 1 tablet (2.5 mg) by mouth 2 times daily 200 tablet 6    " apixaban ANTICOAGULANT (ELIQUIS) 5 MG tablet Take 1 tablet (5 mg) by mouth 2 times daily 200 tablet 6    lisinopril (ZESTRIL) 20 MG tablet Take 1 tablet (20 mg) by mouth 2 times daily 200 tablet 6    rosuvastatin (CRESTOR) 20 MG tablet Take 1 tablet (20 mg) by mouth daily 100 tablet 6         ALLERGIES:  No Known Allergies    PAST MEDICAL HISTORY:    Past Medical History:   Diagnosis Date    Basal cell carcinoma     Carotid stenosis, left     s/p left CEA 2/2020    Coronary artery disease     4 vessel bypass November 2010; LIMA ->LAD, SVG-> OM3, SVG -> D2, SVG -> PDA    Diabetes mellitus (H) 2005    Generalized anxiety disorder 05/02/2014    Hepatitis C, chronic (H) 2005    Hyperlipidemia LDL goal < 70     Hypertension     Liver diseases     9/15 Liver is 10 cm in span without left lobe enlargement    Persistent microalbuminuria associated with type 2 diabetes mellitus (H) 05/06/2015       PAST SURGICAL HISTORY:    Past Surgical History:   Procedure Laterality Date    ARTHROSCOPY KNEE RT/LT      left    COLONOSCOPY      CORONARY ARTERY BYPASS  11/18/201    Coronary artery bypass grafting x 4 with placement of the left internal mammary artery to the distal midportion of the left anterior descending artery, saphenous vein graft from aorta to the third obtuse marginal branch of circumflex coronary artery, saphenous vein graft from aorta to the second diagonal branch, saphenous vein graft from aorta to the posterior descending artery.    ENDARTERECTOMY CAROTID Left 2/21/2020    Procedure: LEFT CAROTID ENDARTERECTOMY WITH EEG;  Surgeon: Semaj Sutbbs MD;  Location:  OR    EP ABLATION ATRIAL FLUTTER N/A 9/9/2022    Procedure: Ablation Atrial Flutter;  Surgeon: Tyson Ordonez MD;  Location:  HEART CARDIAC CATH LAB    EP PACEMAKER DEVICE & LEAD IMPLANT- RIGHT ATRIAL & LEFT VENTRICULAR N/A 11/7/2022    Procedure: Pacemaker Device & Lead Implant- Right Atrial & Left Ventricular;  Surgeon: Mery  Tyson Edward MD;  Location:  HEART CARDIAC CATH LAB    ESOPHAGOSCOPY, GASTROSCOPY, DUODENOSCOPY (EGD), COMBINED  10/31/2011    Procedure:COMBINED ESOPHAGOSCOPY, GASTROSCOPY, DUODENOSCOPY (EGD); Surgeon:ALONSO ALDANA; Location: GI    ESOPHAGOSCOPY, GASTROSCOPY, DUODENOSCOPY (EGD), COMBINED N/A 3/8/2018    Procedure: COMBINED ESOPHAGOSCOPY, GASTROSCOPY, DUODENOSCOPY (EGD);  EGD;  Surgeon: Angel Luis Justice MD;  Location:  GI    HEART CATH CORONARY ANGIOGRAM W/LV GRAM  9--10    CV Surgery recommended    HEART CATH CORONARY ANGIOGRAM W/LV GRAM  -    Medical management    HERNIA REPAIR, INGUINAL RT/LT      left       FAMILY HISTORY:    Family History   Problem Relation Age of Onset    C.A.D. Father     Cancer Father         bladder    Heart Surgery Father         bypass surgery    Heart Disease Mother        SOCIAL HISTORY:    Social History     Socioeconomic History    Marital status:      Spouse name: None    Number of children: None    Years of education: None    Highest education level: None   Tobacco Use    Smoking status: Former     Packs/day: 1.00     Years: 25.00     Additional pack years: 0.00     Total pack years: 25.00     Types: Cigarettes     Start date:      Quit date: 2005     Years since quittin.9    Smokeless tobacco: Never   Vaping Use    Vaping Use: Never used   Substance and Sexual Activity    Alcohol use: Yes     Comment: minimal 1 glass of wine per week    Drug use: No    Sexual activity: Yes     Partners: Female   Other Topics Concern    Parent/sibling w/ CABG, MI or angioplasty before 65F 55M? No    Caffeine Concern Yes     Comment: 2 cups coffee per day    Special Diet Yes     Comment: low carb diet, low sugar    Exercise Yes     Comment: treadmill 40 minutes, walk, daily, bike 30 minutes every other day     Social Determinants of Health     Interpersonal Safety: Low Risk  (10/3/2023)    Interpersonal Safety     Do you feel physically and  emotionally safe where you currently live?: Yes     Within the past 12 months, have you been hit, slapped, kicked or otherwise physically hurt by someone?: No     Within the past 12 months, have you been humiliated or emotionally abused in other ways by your partner or ex-partner?: No

## 2023-11-28 ENCOUNTER — DOCUMENTATION ONLY (OUTPATIENT)
Dept: CARDIOLOGY | Facility: CLINIC | Age: 63
End: 2023-11-28

## 2023-11-28 NOTE — PROGRESS NOTES
Received MRI Cardiology Clearance form from [facility] CV-MRI department.  Form completed, signed by MD, and faxed back to MRI at [phone].

## 2023-11-29 ENCOUNTER — HOSPITAL ENCOUNTER (OUTPATIENT)
Dept: GENERAL RADIOLOGY | Facility: CLINIC | Age: 63
Discharge: HOME | End: 2023-11-29
Attending: INTERNAL MEDICINE | Admitting: INTERNAL MEDICINE
Payer: COMMERCIAL

## 2023-11-29 DIAGNOSIS — Z01.89 ENCOUNTER FOR IMAGING TO SCREEN FOR METAL PRIOR TO MRI: ICD-10-CM

## 2023-11-29 DIAGNOSIS — I25.810 CORONARY ARTERY DISEASE INVOLVING CORONARY BYPASS GRAFT OF NATIVE HEART WITHOUT ANGINA PECTORIS: ICD-10-CM

## 2023-11-29 DIAGNOSIS — I11.9 HYPERTENSIVE LEFT VENTRICULAR HYPERTROPHY, WITHOUT HEART FAILURE: ICD-10-CM

## 2023-11-29 PROCEDURE — 71046 X-RAY EXAM CHEST 2 VIEWS: CPT

## 2023-12-12 ENCOUNTER — HOSPITAL ENCOUNTER (OUTPATIENT)
Dept: CARDIOLOGY | Facility: CLINIC | Age: 63
Discharge: HOME | End: 2023-12-12
Attending: INTERNAL MEDICINE | Admitting: INTERNAL MEDICINE
Payer: COMMERCIAL

## 2023-12-12 VITALS — DIASTOLIC BLOOD PRESSURE: 50 MMHG | HEART RATE: 60 BPM | SYSTOLIC BLOOD PRESSURE: 109 MMHG

## 2023-12-12 DIAGNOSIS — I10 BENIGN ESSENTIAL HYPERTENSION: Primary | ICD-10-CM

## 2023-12-12 DIAGNOSIS — I11.9 HYPERTENSIVE LEFT VENTRICULAR HYPERTROPHY, WITHOUT HEART FAILURE: ICD-10-CM

## 2023-12-12 DIAGNOSIS — I25.810 CORONARY ARTERY DISEASE INVOLVING CORONARY BYPASS GRAFT OF NATIVE HEART WITHOUT ANGINA PECTORIS: ICD-10-CM

## 2023-12-12 PROCEDURE — 93016 CV STRESS TEST SUPVJ ONLY: CPT | Performed by: INTERNAL MEDICINE

## 2023-12-12 PROCEDURE — 255N000002 HC RX 255 OP 636: Performed by: INTERNAL MEDICINE

## 2023-12-12 PROCEDURE — 75563 CARD MRI W/STRESS IMG & DYE: CPT | Mod: 26 | Performed by: INTERNAL MEDICINE

## 2023-12-12 PROCEDURE — A9585 GADOBUTROL INJECTION: HCPCS | Performed by: INTERNAL MEDICINE

## 2023-12-12 PROCEDURE — 250N000011 HC RX IP 250 OP 636: Mod: JZ | Performed by: INTERNAL MEDICINE

## 2023-12-12 PROCEDURE — 93018 CV STRESS TEST I&R ONLY: CPT | Performed by: INTERNAL MEDICINE

## 2023-12-12 PROCEDURE — 93017 CV STRESS TEST TRACING ONLY: CPT

## 2023-12-12 RX ORDER — CAFFEINE CITRATE 20 MG/ML
60 SOLUTION INTRAVENOUS
Status: DISCONTINUED | OUTPATIENT
Start: 2023-12-12 | End: 2023-12-13 | Stop reason: HOSPADM

## 2023-12-12 RX ORDER — DIAZEPAM 5 MG
5 TABLET ORAL EVERY 30 MIN PRN
Status: DISCONTINUED | OUTPATIENT
Start: 2023-12-12 | End: 2023-12-13 | Stop reason: HOSPADM

## 2023-12-12 RX ORDER — ALBUTEROL SULFATE 90 UG/1
2 AEROSOL, METERED RESPIRATORY (INHALATION) EVERY 5 MIN PRN
Status: DISCONTINUED | OUTPATIENT
Start: 2023-12-12 | End: 2023-12-13 | Stop reason: HOSPADM

## 2023-12-12 RX ORDER — GADOBUTROL 604.72 MG/ML
15 INJECTION INTRAVENOUS ONCE
Status: COMPLETED | OUTPATIENT
Start: 2023-12-12 | End: 2023-12-12

## 2023-12-12 RX ORDER — ACYCLOVIR 200 MG/1
0-1 CAPSULE ORAL
Status: DISCONTINUED | OUTPATIENT
Start: 2023-12-12 | End: 2023-12-13 | Stop reason: HOSPADM

## 2023-12-12 RX ORDER — AMINOPHYLLINE 25 MG/ML
100 INJECTION, SOLUTION INTRAVENOUS ONCE
Status: DISCONTINUED | OUTPATIENT
Start: 2023-12-12 | End: 2023-12-13 | Stop reason: HOSPADM

## 2023-12-12 RX ORDER — DIPHENHYDRAMINE HYDROCHLORIDE 50 MG/ML
25-50 INJECTION INTRAMUSCULAR; INTRAVENOUS
Status: DISCONTINUED | OUTPATIENT
Start: 2023-12-12 | End: 2023-12-13 | Stop reason: HOSPADM

## 2023-12-12 RX ORDER — METHYLPREDNISOLONE SODIUM SUCCINATE 125 MG/2ML
125 INJECTION, POWDER, LYOPHILIZED, FOR SOLUTION INTRAMUSCULAR; INTRAVENOUS
Status: DISCONTINUED | OUTPATIENT
Start: 2023-12-12 | End: 2023-12-13 | Stop reason: HOSPADM

## 2023-12-12 RX ORDER — DIPHENHYDRAMINE HCL 25 MG
25 CAPSULE ORAL
Status: DISCONTINUED | OUTPATIENT
Start: 2023-12-12 | End: 2023-12-13 | Stop reason: HOSPADM

## 2023-12-12 RX ORDER — ONDANSETRON 2 MG/ML
4 INJECTION INTRAMUSCULAR; INTRAVENOUS
Status: DISCONTINUED | OUTPATIENT
Start: 2023-12-12 | End: 2023-12-13 | Stop reason: HOSPADM

## 2023-12-12 RX ORDER — REGADENOSON 0.08 MG/ML
0.4 INJECTION, SOLUTION INTRAVENOUS ONCE
Status: COMPLETED | OUTPATIENT
Start: 2023-12-12 | End: 2023-12-12

## 2023-12-12 RX ADMIN — GADOBUTROL 15 ML: 604.72 INJECTION INTRAVENOUS at 08:52

## 2023-12-12 RX ADMIN — REGADENOSON 0.4 MG: 0.08 INJECTION, SOLUTION INTRAVENOUS at 08:22

## 2023-12-19 ENCOUNTER — ANCILLARY PROCEDURE (OUTPATIENT)
Dept: CARDIOLOGY | Facility: CLINIC | Age: 63
End: 2023-12-19
Attending: INTERNAL MEDICINE
Payer: COMMERCIAL

## 2023-12-19 DIAGNOSIS — Z95.0 CARDIAC PACEMAKER IN SITU: ICD-10-CM

## 2023-12-19 DIAGNOSIS — I44.2 COMPLETE ATRIOVENTRICULAR BLOCK (H): ICD-10-CM

## 2023-12-19 PROCEDURE — 93280 PM DEVICE PROGR EVAL DUAL: CPT | Performed by: INTERNAL MEDICINE

## 2023-12-19 NOTE — PROGRESS NOTES
SubC Controltronic [id number] Renata XT DR CHAVEZ (D) Pacemaker Device Check  Patient seen in clinic for device evaluation and iterative programming.   AP: *** %    : *** %    Mode: ***    Underlying Rhythm: ***    Heart Rate: ***    Sensing: ***    Pacing Threshold: ***    Impedance: ***    Battery Status: ***    Device Site: ***    Atrial Arrhythmia: ***    Ventricular Arrhythmia: ***    Setting Change: ***    Care Plan: ***      I have reviewed and interpreted the device interrogation, settings, programming and nurse's summary. The device is functioning within normal device parameters. I agree with the current findings, assessment and plan.

## 2023-12-26 LAB
MDC_IDC_LEAD_CONNECTION_STATUS: NORMAL
MDC_IDC_LEAD_CONNECTION_STATUS: NORMAL
MDC_IDC_LEAD_IMPLANT_DT: NORMAL
MDC_IDC_LEAD_IMPLANT_DT: NORMAL
MDC_IDC_LEAD_LOCATION: NORMAL
MDC_IDC_LEAD_LOCATION: NORMAL
MDC_IDC_LEAD_LOCATION_DETAIL_1: NORMAL
MDC_IDC_LEAD_LOCATION_DETAIL_1: NORMAL
MDC_IDC_LEAD_MFG: NORMAL
MDC_IDC_LEAD_MFG: NORMAL
MDC_IDC_LEAD_MODEL: NORMAL
MDC_IDC_LEAD_MODEL: NORMAL
MDC_IDC_LEAD_POLARITY_TYPE: NORMAL
MDC_IDC_LEAD_POLARITY_TYPE: NORMAL
MDC_IDC_LEAD_SERIAL: NORMAL
MDC_IDC_LEAD_SERIAL: NORMAL
MDC_IDC_MSMT_BATTERY_DTM: NORMAL
MDC_IDC_MSMT_BATTERY_REMAINING_LONGEVITY: 142 MO
MDC_IDC_MSMT_BATTERY_RRT_TRIGGER: 2.62
MDC_IDC_MSMT_BATTERY_STATUS: NORMAL
MDC_IDC_MSMT_BATTERY_VOLTAGE: 3.04 V
MDC_IDC_MSMT_LEADCHNL_RA_IMPEDANCE_VALUE: 342 OHM
MDC_IDC_MSMT_LEADCHNL_RA_IMPEDANCE_VALUE: 551 OHM
MDC_IDC_MSMT_LEADCHNL_RA_PACING_THRESHOLD_AMPLITUDE: 0.5 V
MDC_IDC_MSMT_LEADCHNL_RA_PACING_THRESHOLD_PULSEWIDTH: 0.4 MS
MDC_IDC_MSMT_LEADCHNL_RA_SENSING_INTR_AMPL: 3.25 MV
MDC_IDC_MSMT_LEADCHNL_RA_SENSING_INTR_AMPL: 3.38 MV
MDC_IDC_MSMT_LEADCHNL_RV_IMPEDANCE_VALUE: 361 OHM
MDC_IDC_MSMT_LEADCHNL_RV_IMPEDANCE_VALUE: 437 OHM
MDC_IDC_MSMT_LEADCHNL_RV_PACING_THRESHOLD_AMPLITUDE: 0.62 V
MDC_IDC_MSMT_LEADCHNL_RV_PACING_THRESHOLD_PULSEWIDTH: 0.4 MS
MDC_IDC_MSMT_LEADCHNL_RV_SENSING_INTR_AMPL: 5.88 MV
MDC_IDC_MSMT_LEADCHNL_RV_SENSING_INTR_AMPL: 6 MV
MDC_IDC_PG_IMPLANT_DTM: NORMAL
MDC_IDC_PG_MFG: NORMAL
MDC_IDC_PG_MODEL: NORMAL
MDC_IDC_PG_SERIAL: NORMAL
MDC_IDC_PG_TYPE: NORMAL
MDC_IDC_SESS_CLINIC_NAME: NORMAL
MDC_IDC_SESS_DTM: NORMAL
MDC_IDC_SESS_TYPE: NORMAL
MDC_IDC_SET_BRADY_AT_MODE_SWITCH_RATE: 171 {BEATS}/MIN
MDC_IDC_SET_BRADY_HYSTRATE: NORMAL
MDC_IDC_SET_BRADY_LOWRATE: 50 {BEATS}/MIN
MDC_IDC_SET_BRADY_MAX_SENSOR_RATE: 130 {BEATS}/MIN
MDC_IDC_SET_BRADY_MAX_TRACKING_RATE: 130 {BEATS}/MIN
MDC_IDC_SET_BRADY_MODE: NORMAL
MDC_IDC_SET_BRADY_PAV_DELAY_LOW: 300 MS
MDC_IDC_SET_BRADY_SAV_DELAY_LOW: 300 MS
MDC_IDC_SET_LEADCHNL_RA_PACING_AMPLITUDE: 1.5 V
MDC_IDC_SET_LEADCHNL_RA_PACING_ANODE_ELECTRODE_1: NORMAL
MDC_IDC_SET_LEADCHNL_RA_PACING_ANODE_LOCATION_1: NORMAL
MDC_IDC_SET_LEADCHNL_RA_PACING_CAPTURE_MODE: NORMAL
MDC_IDC_SET_LEADCHNL_RA_PACING_CATHODE_ELECTRODE_1: NORMAL
MDC_IDC_SET_LEADCHNL_RA_PACING_CATHODE_LOCATION_1: NORMAL
MDC_IDC_SET_LEADCHNL_RA_PACING_POLARITY: NORMAL
MDC_IDC_SET_LEADCHNL_RA_PACING_PULSEWIDTH: 0.4 MS
MDC_IDC_SET_LEADCHNL_RA_SENSING_ANODE_ELECTRODE_1: NORMAL
MDC_IDC_SET_LEADCHNL_RA_SENSING_ANODE_LOCATION_1: NORMAL
MDC_IDC_SET_LEADCHNL_RA_SENSING_CATHODE_ELECTRODE_1: NORMAL
MDC_IDC_SET_LEADCHNL_RA_SENSING_CATHODE_LOCATION_1: NORMAL
MDC_IDC_SET_LEADCHNL_RA_SENSING_POLARITY: NORMAL
MDC_IDC_SET_LEADCHNL_RA_SENSING_SENSITIVITY: 0.3 MV
MDC_IDC_SET_LEADCHNL_RV_PACING_AMPLITUDE: 2 V
MDC_IDC_SET_LEADCHNL_RV_PACING_ANODE_ELECTRODE_1: NORMAL
MDC_IDC_SET_LEADCHNL_RV_PACING_ANODE_LOCATION_1: NORMAL
MDC_IDC_SET_LEADCHNL_RV_PACING_CAPTURE_MODE: NORMAL
MDC_IDC_SET_LEADCHNL_RV_PACING_CATHODE_ELECTRODE_1: NORMAL
MDC_IDC_SET_LEADCHNL_RV_PACING_CATHODE_LOCATION_1: NORMAL
MDC_IDC_SET_LEADCHNL_RV_PACING_POLARITY: NORMAL
MDC_IDC_SET_LEADCHNL_RV_PACING_PULSEWIDTH: 0.4 MS
MDC_IDC_SET_LEADCHNL_RV_SENSING_ANODE_ELECTRODE_1: NORMAL
MDC_IDC_SET_LEADCHNL_RV_SENSING_ANODE_LOCATION_1: NORMAL
MDC_IDC_SET_LEADCHNL_RV_SENSING_CATHODE_ELECTRODE_1: NORMAL
MDC_IDC_SET_LEADCHNL_RV_SENSING_CATHODE_LOCATION_1: NORMAL
MDC_IDC_SET_LEADCHNL_RV_SENSING_POLARITY: NORMAL
MDC_IDC_SET_LEADCHNL_RV_SENSING_SENSITIVITY: 0.9 MV
MDC_IDC_SET_ZONE_DETECTION_INTERVAL: 200 MS
MDC_IDC_SET_ZONE_DETECTION_INTERVAL: 350 MS
MDC_IDC_SET_ZONE_DETECTION_INTERVAL: 400 MS
MDC_IDC_SET_ZONE_STATUS: NORMAL
MDC_IDC_SET_ZONE_TYPE: NORMAL
MDC_IDC_SET_ZONE_VENDOR_TYPE: NORMAL
MDC_IDC_STAT_AT_BURDEN_PERCENT: 0 %
MDC_IDC_STAT_AT_DTM_END: NORMAL
MDC_IDC_STAT_AT_DTM_START: NORMAL
MDC_IDC_STAT_BRADY_AP_VP_PERCENT: 83.46 %
MDC_IDC_STAT_BRADY_AP_VS_PERCENT: 2.92 %
MDC_IDC_STAT_BRADY_AS_VP_PERCENT: 8.97 %
MDC_IDC_STAT_BRADY_AS_VS_PERCENT: 4.65 %
MDC_IDC_STAT_BRADY_DTM_END: NORMAL
MDC_IDC_STAT_BRADY_DTM_START: NORMAL
MDC_IDC_STAT_BRADY_RA_PERCENT_PACED: 87.91 %
MDC_IDC_STAT_BRADY_RV_PERCENT_PACED: 92.43 %
MDC_IDC_STAT_EPISODE_RECENT_COUNT: 0
MDC_IDC_STAT_EPISODE_RECENT_COUNT_DTM_END: NORMAL
MDC_IDC_STAT_EPISODE_RECENT_COUNT_DTM_START: NORMAL
MDC_IDC_STAT_EPISODE_TOTAL_COUNT: 0
MDC_IDC_STAT_EPISODE_TOTAL_COUNT: 0
MDC_IDC_STAT_EPISODE_TOTAL_COUNT: 129
MDC_IDC_STAT_EPISODE_TOTAL_COUNT: 32
MDC_IDC_STAT_EPISODE_TOTAL_COUNT: 38
MDC_IDC_STAT_EPISODE_TOTAL_COUNT_DTM_END: NORMAL
MDC_IDC_STAT_EPISODE_TOTAL_COUNT_DTM_START: NORMAL
MDC_IDC_STAT_EPISODE_TYPE: NORMAL
MDC_IDC_STAT_TACHYTHERAPY_RECENT_DTM_END: NORMAL
MDC_IDC_STAT_TACHYTHERAPY_RECENT_DTM_START: NORMAL
MDC_IDC_STAT_TACHYTHERAPY_TOTAL_DTM_END: NORMAL
MDC_IDC_STAT_TACHYTHERAPY_TOTAL_DTM_START: NORMAL

## 2023-12-29 ENCOUNTER — OFFICE VISIT (OUTPATIENT)
Dept: CARDIOLOGY | Facility: CLINIC | Age: 63
End: 2023-12-29
Attending: INTERNAL MEDICINE
Payer: COMMERCIAL

## 2023-12-29 VITALS
HEIGHT: 71 IN | OXYGEN SATURATION: 98 % | DIASTOLIC BLOOD PRESSURE: 80 MMHG | SYSTOLIC BLOOD PRESSURE: 155 MMHG | WEIGHT: 140 LBS | BODY MASS INDEX: 19.6 KG/M2 | HEART RATE: 64 BPM

## 2023-12-29 DIAGNOSIS — I25.810 CORONARY ARTERY DISEASE INVOLVING CORONARY BYPASS GRAFT OF NATIVE HEART WITHOUT ANGINA PECTORIS: Primary | ICD-10-CM

## 2023-12-29 DIAGNOSIS — I10 UNCONTROLLED HYPERTENSION: ICD-10-CM

## 2023-12-29 DIAGNOSIS — R07.89 ATYPICAL CHEST PAIN: ICD-10-CM

## 2023-12-29 DIAGNOSIS — E78.5 HYPERLIPIDEMIA LDL GOAL <70: ICD-10-CM

## 2023-12-29 PROBLEM — I65.22 LEFT CAROTID ARTERY STENOSIS: Status: RESOLVED | Noted: 2020-02-21 | Resolved: 2023-12-29

## 2023-12-29 PROBLEM — R94.39 ABNORMAL CARDIOVASCULAR STRESS TEST: Status: RESOLVED | Noted: 2020-02-04 | Resolved: 2023-12-29

## 2023-12-29 PROBLEM — Z86.73 HISTORY OF STROKE: Status: RESOLVED | Noted: 2022-11-14 | Resolved: 2023-12-29

## 2023-12-29 PROCEDURE — 99215 OFFICE O/P EST HI 40 MIN: CPT | Performed by: INTERNAL MEDICINE

## 2023-12-29 RX ORDER — AMLODIPINE BESYLATE 2.5 MG/1
2.5 TABLET ORAL 2 TIMES DAILY
Qty: 200 TABLET | Refills: 6 | Status: SHIPPED | OUTPATIENT
Start: 2023-12-29 | End: 2024-09-16

## 2023-12-29 RX ORDER — LISINOPRIL 20 MG/1
20 TABLET ORAL 2 TIMES DAILY
Qty: 200 TABLET | Refills: 6 | Status: SHIPPED | OUTPATIENT
Start: 2023-12-29 | End: 2024-09-16

## 2023-12-29 RX ORDER — ROSUVASTATIN CALCIUM 20 MG/1
20 TABLET, COATED ORAL DAILY
Qty: 100 TABLET | Refills: 6 | Status: SHIPPED | OUTPATIENT
Start: 2023-12-29 | End: 2024-09-16

## 2023-12-29 NOTE — LETTER
12/29/2023    Danish Land MD  [address]  [place] [address]    RE: Vikash Burgos       Dear Colleague,     I had the pleasure of seeing Vikash Burgos in the [facility].    SERVICE DATE: December 29, 2023      PRIMARY CARE AND REFERRING PROVIDER:  Danish Land  [address]   [place] [address]    REASON FOR VISIT:  Follow-up with test results.  Questions about pacing.    HISTORY OF PRESENT ILLNESS:  Vikash Burgos is 63 year old male, spouse of one of our schedulers, used to follow long-term with my partner, Dr. Cowart.  Kelton is retired, him and his wife later very physically active lifestyle and enjoy exercising and hiking.    His history is significant for chronic coronary artery disease status post CABG X4 in 2010 by Dr. Costa Corona with subsequent coronary angiogram in 2014 demonstrating all bypass grafts to be patent but he had diffuse small caliber native coronary arteries.  Other comorbidities are carotid artery disease status post left carotid artery endarterectomy for asymptomatic 75% left carotid stenosis by Dr. Stubbs in February 2020 complicated by some focal nerve trauma and postoperative weakness in his right arm and right leg that have resolved over time.  He follows with Dr. Odronez for atrial arrhythmias and has apixaban anticoagulated paroxysmal atrial fibrillation, status post catheter ablation of atrial flutter in August 2022 and implantation of dual-chamber pacemaker in October 2022 for high-grade AV block.  He has a long history of atypical left-sided chest pain, hypertension with lability and suboptimal control..  He has hyperlipidemia with that has been optimally treated with rosuvastatin 20 mg with an LDL of 48.  Prior history of type 2 diabetes mellitus with normalization of hemoglobin A1c to 5% with significant lifestyle modification several years ago and his current BMI is 19 with a weight of 140.  Chronic mild  "thrombocytopenia of unclear etiology with platelet counts of 130,000-140,000, no bleeding issues on apixaban.  He is a 25-pack-year smoker who quit in 2005.    At his recent visit with me on 11/27/2023, there had been extensive discussion to address patient's multiple concerns about his historical cardiovascular care, left-sided chest pain and is concerned about the finding of significant left ventricular hypertrophy on echocardiogram.    I had referred him for cardiac MRI with stress testing to address this.  I personally reviewed the results of the coronary MRI dated 12/12/2023 and they are very reassuring.  Results show  normal biventricular size with normal systolic function.  LVEF is 58%.  RVEF is 55%. Mild concentric left ventricular hypertrophy. Stress perfusion is negative for ischemia. Delayed hyperenhancement reveals a small subendocardial scar in the mid inferolateral segment consistent  with small scar in the circumflex distribution.  There also is a small subepicardial, non-CAD scar in the  mid inferior region.    At his last visit, his blood pressure was suboptimally controlled at 154/81 and after extensive shared decision making, he was agreeable to increase amlodipine from 2.5 mg daily to 2 times daily while continuing his established lisinopril 20 mg daily.  Due to some issues with insurance, he says he only increased amlodipine to twice daily dosing a few days ago.  Not surprisingly, his blood pressure today remains elevated at 155/80 with a pulse of 64 bpm.    He remains physically active.  His lab parameters are very good.  Lipid panel is excellent with an LDL of 48, triglycerides 52, HDL 51, hemoglobin A1c 5%, potassium 4.9, creatinine 0.9, hemoglobin 15.5, normal liver enzymes.    He expressed concerns about his recent device interrogation.  He was told by the pacemaker nurse that his pacing requirements have gone from 60% to 92% and this is \"an alarming finding\".  He is 88% a paced, 92% V " "paced and his underlying rhythm is sinus rhythm in the 60s with intermittent AV block, histograms are stable, battery status 12 years, no atrial or ventricular arrhythmias.      DIAGNOSES/ASSESSMENT:  Stable coronary artery disease without angina status post remote CABG in 2010 with all grafts patent on last coronary angiogram in 2014 and no reversible ischemia on recent cardiac MRI stress testing.  Chronic atypical left-sided chest and left arm pain.  Not cardiac.  Kelton again expressed concerns that \"many details of my care have fallen through the cracks\".  I did my best to reassure him that he has received excellent cardiac care by his team and multiple testings have revealed that his chest pain and left arm adduction restriction is not coronary or related to his pacemaker.    Mild concentric left ventricular hypertrophy on cardiac MRI.  I agree with Dr. Cowart that this is most likely due to uncontrolled hypertension.  Goal /70 or below.  Benign essential hypertension, suboptimally controlled.  Advised to increase amlodipine to 2.5 mg twice daily a month ago but Kelton has only been able to do it for the last few days.  Already on lisinopril 20 mg twice daily.  He asked why he is on 2 medications that are similar.  Explained that they are not similar.  There are 2 separate classes of drugs and optimization of hypertension is essential to goal of resting /70 or below.  This is particularly important since she is concerned about the mild concentric left ventricular hypertrophy.   Dual-chamber pacemaker in situ for intermittent AV block. Kelton is concerned that his pacing requirements have gone up.  I explained to them that this is the natural history of AV block and over time pacing requirements to go up but it is not impacting his cardiac function as demonstrated by his recent cardiac MRI and it is not modifiable as he is not on any AV slowing drugs, enjoys good exercise capacity and has no arrhythmias.  " "If he has further questions about this, recommend he touch base with his electrophysiologist.  Hyperlipidemia with goal LDL less than 70.  LDL is at goal at 48.  Continue rosuvastatin 20 mg daily.  Paroxysmal atrial fibrillation and atrial flutter status post successful catheter ablation.  Continue apixaban 5 Mg twice daily.  Carotid artery disease status post left carotid endarterectomy in 2020.  Blood pressure optimization is key.  LDL is at goal.  No tobacco use.    PLAN:  Extensive discussion and counseling as documented above.  This included detailed explanation of cardiac MRI results and reassurance, addressing his concerns about the pacemaker, hypertension management in particular reiterating that adherence to medications as prescribed and explanation of 2 different classes of meds and achieving goal BP, discussion of chronic atypical chest pain.    I have renewed his prescriptions for a year.  Kelton will follow-up with cardiology [facility] in 1 year and see me every 2 years.  If there are further questions about his pacing, follow-up with electrophysiology team.      Geetha Alexander MD      Established patient.   Today's clinic visit entailed:  65 minutes spent by me on the date of the encounter doing chart review, history and exam, documentation and further activities per the note          Vitals: BP (!) 155/80   Pulse 64   Ht 1.803 m (5' 11\")   Wt 63.5 kg (140 lb)   SpO2 98%   BMI 19.53 kg/m    Wt Readings from Last 5 Encounters:   12/29/23 63.5 kg (140 lb)   11/09/23 63 kg (139 lb)   10/03/23 66.7 kg (147 lb)   08/17/23 68.9 kg (152 lb)   07/21/23 71.4 kg (157 lb 6 oz)         Orders Placed This Encounter   Procedures    Follow-Up with Cardiology [facility]           CURRENT MEDICATIONS:  Current Outpatient Medications   Medication Sig Dispense Refill    amLODIPine (NORVASC) 2.5 MG tablet Take 1 tablet (2.5 mg) by mouth 2 times daily 200 tablet 6    apixaban ANTICOAGULANT (ELIQUIS) 5 MG tablet Take 1 tablet " (5 mg) by mouth 2 times daily 200 tablet 6    B-D U/F 31G X 8 MM insulin pen needle USE ONE PEN NEEDLES DAILY OR AS DIRECTED. 100 each 3    celecoxib (CELEBREX) 100 MG capsule Take 1 capsule (100 mg) by mouth daily as needed for moderate pain (4-6) 30 capsule 11    chlorhexidine (PERIDEX) 0.12 % solution Take 15 mLs by mouth 2 times daily as needed   5    cyanocobalamin 1000 MCG SUBL Place 1,000 mcg under the tongue daily      glucosamine-chondroitin 500-400 MG CAPS per capsule Take 1 capsule by mouth daily      lisinopril (ZESTRIL) 20 MG tablet Take 1 tablet (20 mg) by mouth 2 times daily 200 tablet 6    omeprazole (PRILOSEC) 20 MG DR capsule Take 1 capsule (20 mg) by mouth daily 90 capsule 3    ONETOUCH VERIO IQ test strip       rosuvastatin (CRESTOR) 20 MG tablet Take 1 tablet (20 mg) by mouth daily 100 tablet 6    Vitamin D, Cholecalciferol, 1000 units TABS Take 1,000 Units by mouth daily      zolpidem (AMBIEN) 5 MG tablet Take 1 tablet (5 mg) by mouth nightly as needed for sleep 30 tablet 0         ALLERGIES:  No Known Allergies    PAST MEDICAL HISTORY:    Past Medical History:   Diagnosis Date    Basal cell carcinoma     Carotid stenosis, left     s/p left CEA 2/2020    Coronary artery disease     4 vessel bypass November 2010; LIMA ->LAD, SVG-> OM3, SVG -> D2, SVG -> PDA    Diabetes mellitus (H) 2005    Generalized anxiety disorder 05/02/2014    Hepatitis C, chronic (H) 2005    Hyperlipidemia LDL goal < 70     Hypertension     Liver diseases     9/15 Liver is 10 cm in span without left lobe enlargement    Persistent microalbuminuria associated with type 2 diabetes mellitus (H) 05/06/2015       PAST SURGICAL HISTORY:    Past Surgical History:   Procedure Laterality Date    ARTHROSCOPY KNEE RT/LT      left    COLONOSCOPY      CORONARY ARTERY BYPASS  11/18/201    Coronary artery bypass grafting x 4 with placement of the left internal mammary artery to the distal midportion of the left anterior descending artery,  saphenous vein graft from aorta to the third obtuse marginal branch of circumflex coronary artery, saphenous vein graft from aorta to the second diagonal branch, saphenous vein graft from aorta to the posterior descending artery.    ENDARTERECTOMY CAROTID Left 2/21/2020    Procedure: LEFT CAROTID ENDARTERECTOMY WITH EEG;  Surgeon: Semaj Stubbs MD;  Location: [facility] OR    EP ABLATION ATRIAL FLUTTER N/A 9/9/2022    Procedure: Ablation Atrial Flutter;  Surgeon: Tyson Ordonez MD;  Location:  [facility]    EP PACEMAKER DEVICE & LEAD IMPLANT- RIGHT ATRIAL & LEFT VENTRICULAR N/A 11/7/2022    Procedure: Pacemaker Device & Lead Implant- Right Atrial & Left Ventricular;  Surgeon: Tyson Ordonez MD;  Location:  [facility]ATH [facility]    ESOPHAGOSCOPY, GASTROSCOPY, DUODENOSCOPY (EGD), COMBINED  10/31/2011    Procedure:COMBINED ESOPHAGOSCOPY, GASTROSCOPY, DUODENOSCOPY (EGD); Surgeon:ALONSO ALDANA; Location: GI    ESOPHAGOSCOPY, GASTROSCOPY, DUODENOSCOPY (EGD), COMBINED N/A 3/8/2018    Procedure: COMBINED ESOPHAGOSCOPY, GASTROSCOPY, DUODENOSCOPY (EGD);  EGD;  Surgeon: Angel Luis Justice MD;  Location:  GI    HEART CATH CORONARY ANGIOGRAM W/LV GRAM  9-11-10    CV Surgery recommended    HEART CATH CORONARY ANGIOGRAM W/LV GRAM  2-28-14    Medical management    HERNIA REPAIR, INGUINAL RT/LT      left       FAMILY HISTORY:    Family History   Problem Relation Age of Onset    C.A.D. Father     Cancer Father         bladder    Heart Surgery Father         bypass surgery    Heart Disease Mother        SOCIAL HISTORY:    Social History     Socioeconomic History    Marital status:      Spouse name: None    Number of children: None    Years of education: None    Highest education level: None   Tobacco Use    Smoking status: Former     Packs/day: 1.00     Years: 25.00     Additional pack years: 0.00     Total pack years: 25.00     Types: Cigarettes     Start date: 1980      Quit date: 2005     Years since quittin.9    Smokeless tobacco: Never   Vaping Use    Vaping Use: Never used   Substance and Sexual Activity    Alcohol use: Yes     Comment: minimal 1 glass of wine per week    Drug use: No    Sexual activity: Yes     Partners: Female   Other Topics Concern    Parent/sibling w/ CABG, MI or angioplasty before 65F 55M? No    Caffeine Concern Yes     Comment: 2 cups coffee per day    Special Diet Yes     Comment: low carb diet, low sugar    Exercise Yes     Comment: treadmill 40 minutes, walk, daily, bike 30 minutes every other day     Social Determinants of Health     Interpersonal Safety: Low Risk  (10/3/2023)    Interpersonal Safety     Do you feel physically and emotionally safe where you currently live?: Yes     Within the past 12 months, have you been hit, slapped, kicked or otherwise physically hurt by someone?: No     Within the past 12 months, have you been humiliated or emotionally abused in other ways by your partner or ex-partner?: No       Thank you for allowing me to participate in the care of your patient.      Sincerely,     Geetha Aleaxnder MD     [facility]  cc:   Geetha Alexander MD  [address] [place] [id number]

## 2023-12-29 NOTE — PROGRESS NOTES
SERVICE DATE: December 29, 2023      PRIMARY CARE AND REFERRING PROVIDER:  Danish Land  [address]  [facility] [address]    REASON FOR VISIT:  Follow-up with test results.  Questions about pacing.    HISTORY OF PRESENT ILLNESS:  Vikash Burgos is 63 year old male, spouse of one of our schedulers, used to follow long-term with my partner, Dr. Cowart.  Kelton is retired, him and his wife have a physically active lifestyle and enjoy exercising and hiking.    His history is significant for chronic coronary artery disease status post CABG X4 in 2010 by Dr. Costa Corona with subsequent coronary angiogram in 2014 demonstrating all bypass grafts to be patent but he had diffuse small caliber native coronary arteries.  Other comorbidities are carotid artery disease status post left carotid artery endarterectomy for asymptomatic 75% left carotid stenosis by Dr. Stubbs in February 2020 complicated by some focal nerve trauma and postoperative weakness in his right arm and right leg that have resolved over time.  He follows with Dr. Ordonez for atrial arrhythmias and has apixaban anticoagulated paroxysmal atrial fibrillation, status post catheter ablation of atrial flutter in August 2022 and implantation of dual-chamber pacemaker in October 2022 for high-grade AV block.  He has a long history of atypical left-sided chest pain, hypertension with lability and suboptimal control..  He has hyperlipidemia with that has been optimally treated with rosuvastatin 20 mg with an LDL of 48.  Prior history of type 2 diabetes mellitus with normalization of hemoglobin A1c to 5% with significant lifestyle modification several years ago and his current BMI is 19 with a weight of 140.  Chronic mild thrombocytopenia of unclear etiology with platelet counts of 130,000-140,000, no bleeding issues on apixaban.  He is a 25-pack-year smoker who quit in 2005.    At his recent visit with me on 11/27/2023, there had been extensive  "discussion to address patient's multiple concerns about his historical cardiovascular care, left-sided chest pain and is concerned about the finding of significant left ventricular hypertrophy on echocardiogram.    I had referred him for cardiac MRI with stress testing to address this.  I personally reviewed the results of the coronary MRI dated 12/12/2023 and they are very reassuring.  Results show  normal biventricular size with normal systolic function.  LVEF is 58%.  RVEF is 55%. Mild concentric left ventricular hypertrophy. Stress perfusion is negative for ischemia. Delayed hyperenhancement reveals a small subendocardial scar in the mid inferolateral segment consistent  with small scar in the circumflex distribution.  There also is a small subepicardial, non-CAD scar in the  mid inferior region.    At his last visit, his blood pressure was suboptimally controlled at 154/81 and after extensive shared decision making, he was agreeable to increase amlodipine from 2.5 mg daily to 2 times daily while continuing his established lisinopril 20 mg daily.  Due to some issues with insurance, he says he only increased amlodipine to twice daily dosing a few days ago.  Not surprisingly, his blood pressure today remains elevated at 155/80 with a pulse of 64 bpm.    He remains physically active.  His lab parameters are very good.  Lipid panel is excellent with an LDL of 48, triglycerides 52, HDL 51, hemoglobin A1c 5%, potassium 4.9, creatinine 0.9, hemoglobin 15.5, normal liver enzymes.    He expressed concerns about his recent device interrogation.  He was told by the pacemaker nurse that his pacing requirements have gone from 60% to 92% and this is \"an alarming finding\".  He is 88% a paced, 92% V paced and his underlying rhythm is sinus rhythm in the 60s with intermittent AV block, histograms are stable, battery status 12 years, no atrial or ventricular arrhythmias.      DIAGNOSES/ASSESSMENT:  Stable coronary artery disease " "without angina status post remote CABG in 2010 with all grafts patent on last coronary angiogram in 2014 and no reversible ischemia on recent cardiac MRI stress testing.  Chronic atypical left-sided chest and left arm pain.  Not cardiac.  Kelton again expressed concerns that \"many details of my care have fallen through the cracks\".  I did my best to reassure him that he has received excellent cardiac care by his team and multiple testings have revealed that his chest pain and left arm adduction restriction is not coronary or related to his pacemaker.    Mild concentric left ventricular hypertrophy on cardiac MRI.  I agree with Dr. Cowart that this is most likely due to uncontrolled hypertension.  Goal /70 or below.  Benign essential hypertension, suboptimally controlled.  Advised to increase amlodipine to 2.5 mg twice daily a month ago but Kelton has only been able to do it for the last few days.  Already on lisinopril 20 mg twice daily.  He asked why he is on 2 medications that are similar.  Explained that they are not similar.  There are 2 separate classes of drugs and optimization of hypertension is essential to goal of resting /70 or below.  This is particularly important since she is concerned about the mild concentric left ventricular hypertrophy.   Dual-chamber pacemaker in situ for intermittent AV block. Kelton is concerned that his pacing requirements have gone up.  I explained to them that this is the natural history of AV block and over time pacing requirements to go up but it is not impacting his cardiac function as demonstrated by his recent cardiac MRI and it is not modifiable as he is not on any AV slowing drugs, enjoys good exercise capacity and has no arrhythmias.  If he has further questions about this, recommend he touch base with his electrophysiologist.  Hyperlipidemia with goal LDL less than 70.  LDL is at goal at 48.  Continue rosuvastatin 20 mg daily.  Paroxysmal atrial fibrillation " "and atrial flutter status post successful catheter ablation.  Continue apixaban 5 Mg twice daily.  Carotid artery disease status post left carotid endarterectomy in 2020.  Blood pressure optimization is key.  LDL is at goal.  No tobacco use.    PLAN:  Extensive discussion and counseling as documented above.  This included detailed explanation of cardiac MRI results and reassurance, addressing his concerns about the pacemaker, hypertension management in particular reiterating that adherence to medications as prescribed and explanation of 2 different classes of meds and achieving goal BP, discussion of chronic atypical chest pain.    I have renewed his prescriptions for a year.  Kelton will follow-up with cardiology [facility] in 1 year and see me every 2 years.  If there are further questions about his pacing, follow-up with electrophysiology team.      Geetha Alexander MD      Established patient.   Today's clinic visit entailed:  65 minutes spent by me on the date of the encounter doing chart review, history and exam, documentation and further activities per the note          Vitals: BP (!) 155/80   Pulse 64   Ht 1.803 m (5' 11\")   Wt 63.5 kg (140 lb)   SpO2 98%   BMI 19.53 kg/m    Wt Readings from Last 5 Encounters:   12/29/23 63.5 kg (140 lb)   11/09/23 63 kg (139 lb)   10/03/23 66.7 kg (147 lb)   08/17/23 68.9 kg (152 lb)   07/21/23 71.4 kg (157 lb 6 oz)         Orders Placed This Encounter   Procedures    Follow-Up with Cardiology [facility]           CURRENT MEDICATIONS:  Current Outpatient Medications   Medication Sig Dispense Refill    amLODIPine (NORVASC) 2.5 MG tablet Take 1 tablet (2.5 mg) by mouth 2 times daily 200 tablet 6    apixaban ANTICOAGULANT (ELIQUIS) 5 MG tablet Take 1 tablet (5 mg) by mouth 2 times daily 200 tablet 6    B-D U/F 31G X 8 MM insulin pen needle USE ONE PEN NEEDLES DAILY OR AS DIRECTED. 100 each 3    celecoxib (CELEBREX) 100 MG capsule Take 1 capsule (100 mg) by mouth daily as needed for " moderate pain (4-6) 30 capsule 11    chlorhexidine (PERIDEX) 0.12 % solution Take 15 mLs by mouth 2 times daily as needed   5    cyanocobalamin 1000 MCG SUBL Place 1,000 mcg under the tongue daily      glucosamine-chondroitin 500-400 MG CAPS per capsule Take 1 capsule by mouth daily      lisinopril (ZESTRIL) 20 MG tablet Take 1 tablet (20 mg) by mouth 2 times daily 200 tablet 6    omeprazole (PRILOSEC) 20 MG DR capsule Take 1 capsule (20 mg) by mouth daily 90 capsule 3    ONETOUCH VERIO IQ test strip       rosuvastatin (CRESTOR) 20 MG tablet Take 1 tablet (20 mg) by mouth daily 100 tablet 6    Vitamin D, Cholecalciferol, 1000 units TABS Take 1,000 Units by mouth daily      zolpidem (AMBIEN) 5 MG tablet Take 1 tablet (5 mg) by mouth nightly as needed for sleep 30 tablet 0         ALLERGIES:  No Known Allergies    PAST MEDICAL HISTORY:    Past Medical History:   Diagnosis Date    Basal cell carcinoma     Carotid stenosis, left     s/p left CEA 2/2020    Coronary artery disease     4 vessel bypass November 2010; LIMA ->LAD, SVG-> OM3, SVG -> D2, SVG -> PDA    Diabetes mellitus (H) 2005    Generalized anxiety disorder 05/02/2014    Hepatitis C, chronic (H) 2005    Hyperlipidemia LDL goal < 70     Hypertension     Liver diseases     9/15 Liver is 10 cm in span without left lobe enlargement    Persistent microalbuminuria associated with type 2 diabetes mellitus (H) 05/06/2015       PAST SURGICAL HISTORY:    Past Surgical History:   Procedure Laterality Date    ARTHROSCOPY KNEE RT/LT      left    COLONOSCOPY      CORONARY ARTERY BYPASS  11/18/201    Coronary artery bypass grafting x 4 with placement of the left internal mammary artery to the distal midportion of the left anterior descending artery, saphenous vein graft from aorta to the third obtuse marginal branch of circumflex coronary artery, saphenous vein graft from aorta to the second diagonal branch, saphenous vein graft from aorta to the posterior descending  artery.    ENDARTERECTOMY CAROTID Left 2020    Procedure: LEFT CAROTID ENDARTERECTOMY WITH EEG;  Surgeon: Semaj Stubbs MD;  Location:  OR    EP ABLATION ATRIAL FLUTTER N/A 2022    Procedure: Ablation Atrial Flutter;  Surgeon: Tyson Ordonez MD;  Location:  HEART CARDIAC CATH LAB    EP PACEMAKER DEVICE & LEAD IMPLANT- RIGHT ATRIAL & LEFT VENTRICULAR N/A 2022    Procedure: Pacemaker Device & Lead Implant- Right Atrial & Left Ventricular;  Surgeon: Tyson Ordonez MD;  Location:  HEART CARDIAC CATH LAB    ESOPHAGOSCOPY, GASTROSCOPY, DUODENOSCOPY (EGD), COMBINED  10/31/2011    Procedure:COMBINED ESOPHAGOSCOPY, GASTROSCOPY, DUODENOSCOPY (EGD); Surgeon:ALONSO ALDANA; Location: GI    ESOPHAGOSCOPY, GASTROSCOPY, DUODENOSCOPY (EGD), COMBINED N/A 3/8/2018    Procedure: COMBINED ESOPHAGOSCOPY, GASTROSCOPY, DUODENOSCOPY (EGD);  EGD;  Surgeon: Angel Luis Justice MD;  Location:  GI    HEART CATH CORONARY ANGIOGRAM W/LV GRAM  --10    CV Surgery recommended    HEART CATH CORONARY ANGIOGRAM W/LV GRAM  14    Medical management    HERNIA REPAIR, INGUINAL RT/LT      left       FAMILY HISTORY:    Family History   Problem Relation Age of Onset    C.A.D. Father     Cancer Father         bladder    Heart Surgery Father         bypass surgery    Heart Disease Mother        SOCIAL HISTORY:    Social History     Socioeconomic History    Marital status:      Spouse name: None    Number of children: None    Years of education: None    Highest education level: None   Tobacco Use    Smoking status: Former     Packs/day: 1.00     Years: 25.00     Additional pack years: 0.00     Total pack years: 25.00     Types: Cigarettes     Start date:      Quit date: 2005     Years since quittin.9    Smokeless tobacco: Never   Vaping Use    Vaping Use: Never used   Substance and Sexual Activity    Alcohol use: Yes     Comment: minimal 1 glass of wine per week     Drug use: No    Sexual activity: Yes     Partners: Female   Other Topics Concern    Parent/sibling w/ CABG, MI or angioplasty before 65F 55M? No    Caffeine Concern Yes     Comment: 2 cups coffee per day    Special Diet Yes     Comment: low carb diet, low sugar    Exercise Yes     Comment: treadmill 40 minutes, walk, daily, bike 30 minutes every other day     Social Determinants of Health     Interpersonal Safety: Low Risk  (10/3/2023)    Interpersonal Safety     Do you feel physically and emotionally safe where you currently live?: Yes     Within the past 12 months, have you been hit, slapped, kicked or otherwise physically hurt by someone?: No     Within the past 12 months, have you been humiliated or emotionally abused in other ways by your partner or ex-partner?: No

## 2024-02-13 DIAGNOSIS — M19.042 PRIMARY OSTEOARTHRITIS OF BOTH HANDS: ICD-10-CM

## 2024-02-13 DIAGNOSIS — M19.041 PRIMARY OSTEOARTHRITIS OF BOTH HANDS: ICD-10-CM

## 2024-02-13 RX ORDER — CELECOXIB 100 MG/1
100 CAPSULE ORAL DAILY PRN
Qty: 90 CAPSULE | Refills: 3 | Status: SHIPPED | OUTPATIENT
Start: 2024-02-13

## 2024-02-22 ENCOUNTER — DOCUMENTATION ONLY (OUTPATIENT)
Dept: CARDIOLOGY | Facility: CLINIC | Age: 64
End: 2024-02-22
Payer: COMMERCIAL

## 2024-02-22 DIAGNOSIS — E11.9 DIABETES MELLITUS (H): ICD-10-CM

## 2024-02-22 DIAGNOSIS — I25.810 CORONARY ARTERY DISEASE INVOLVING CORONARY BYPASS GRAFT OF NATIVE HEART WITHOUT ANGINA PECTORIS: Primary | ICD-10-CM

## 2024-02-22 DIAGNOSIS — Z00.6 EXAMINATION OF PARTICIPANT IN CLINICAL TRIAL: ICD-10-CM

## 2024-02-22 PROCEDURE — 99207 PR NO CHARGE-RESEARCH SERVICE: CPT | Performed by: INTERNAL MEDICINE

## 2024-02-22 NOTE — PROGRESS NOTES
SURPASS-CVOT Study [Effect of tirzepatide versus Dulaglutide on Major Cardiovascular Events in Patients with Type 2 Diabetes]    [place] and I spoke with him on the phone. He is doing well with no complaints. He stated he doesn't need any medication as he has enough to get him through until next visit. His med kits have an expiration of 05/2024 and he will be back in April. Plan to review with [facility] if his V20 labs and scales can be done at his next visit.    Patient reported outcomes testing [APPADL, EQ-5D-5L, and IW-SP] were NOT completed at this visit as patient did not have time to do them.    Subject queried for adverse events, endpoints and medication changes. If present, noted below. None. Patient was seen by cardiology in November for concerns regarding long standing mild L chest pain and HTN. He was to add Amlodipine 2.5 mg for his HTN. Referral for cardiac MRI done.    Follow-up in December with cardiologist post MRI. No concerns on MRI, patient had not started his Amlodipine until approx 12/22/2023 due to insurance issues. He was told that his L chest pain is likely due to HTN.    He monitors his daily glucose and states that he has been running around 100 with his lower level @ 89. He states he has never had such good, stable numbers.    Adherence/lifestyle reinforcement done     Labs were not completed as this was a phone visit.    No of pens dispensed at last visit 16  Any Returned medication NA  Date of first dose since last visit: 11/09/2023  Date of last dose taken since last visit: 02/22/2024    Subject will not be dispensed any study drug for this visit.     Will see in clinic in 3 months.

## 2024-04-02 ENCOUNTER — ANCILLARY PROCEDURE (OUTPATIENT)
Dept: CARDIOLOGY | Facility: CLINIC | Age: 64
End: 2024-04-02
Attending: INTERNAL MEDICINE
Payer: COMMERCIAL

## 2024-04-02 DIAGNOSIS — I44.2 COMPLETE ATRIOVENTRICULAR BLOCK (H): ICD-10-CM

## 2024-04-02 DIAGNOSIS — Z95.0 CARDIAC PACEMAKER IN SITU: ICD-10-CM

## 2024-04-02 PROCEDURE — 93294 REM INTERROG EVL PM/LDLS PM: CPT | Performed by: INTERNAL MEDICINE

## 2024-04-02 PROCEDURE — 93296 REM INTERROG EVL PM/IDS: CPT | Performed by: INTERNAL MEDICINE

## 2024-04-04 LAB
MDC_IDC_EPISODE_DTM: NORMAL
MDC_IDC_EPISODE_DURATION: 0 S
MDC_IDC_EPISODE_DURATION: 1 S
MDC_IDC_EPISODE_DURATION: 109 S
MDC_IDC_EPISODE_DURATION: 112 S
MDC_IDC_EPISODE_DURATION: 116 S
MDC_IDC_EPISODE_DURATION: 1264 S
MDC_IDC_EPISODE_DURATION: 1281 S
MDC_IDC_EPISODE_DURATION: 1291 S
MDC_IDC_EPISODE_DURATION: 1323 S
MDC_IDC_EPISODE_DURATION: 1379 S
MDC_IDC_EPISODE_DURATION: 1489 S
MDC_IDC_EPISODE_DURATION: 1537 S
MDC_IDC_EPISODE_DURATION: 1681 S
MDC_IDC_EPISODE_DURATION: 170 S
MDC_IDC_EPISODE_DURATION: 1708 S
MDC_IDC_EPISODE_DURATION: 1783 S
MDC_IDC_EPISODE_DURATION: 1833 S
MDC_IDC_EPISODE_DURATION: 1834 S
MDC_IDC_EPISODE_DURATION: 1890 S
MDC_IDC_EPISODE_DURATION: 1923 S
MDC_IDC_EPISODE_DURATION: 2200 S
MDC_IDC_EPISODE_DURATION: 222 S
MDC_IDC_EPISODE_DURATION: 2246 S
MDC_IDC_EPISODE_DURATION: 2277 S
MDC_IDC_EPISODE_DURATION: 2408 S
MDC_IDC_EPISODE_DURATION: 2494 S
MDC_IDC_EPISODE_DURATION: 2500 S
MDC_IDC_EPISODE_DURATION: 2576 S
MDC_IDC_EPISODE_DURATION: 2674 S
MDC_IDC_EPISODE_DURATION: 2695 S
MDC_IDC_EPISODE_DURATION: 3267 S
MDC_IDC_EPISODE_DURATION: 3327 S
MDC_IDC_EPISODE_DURATION: 3369 S
MDC_IDC_EPISODE_DURATION: 344 S
MDC_IDC_EPISODE_DURATION: 345 S
MDC_IDC_EPISODE_DURATION: 377 S
MDC_IDC_EPISODE_DURATION: 38 S
MDC_IDC_EPISODE_DURATION: 3963 S
MDC_IDC_EPISODE_DURATION: 4060 S
MDC_IDC_EPISODE_DURATION: 4482 S
MDC_IDC_EPISODE_DURATION: 46 S
MDC_IDC_EPISODE_DURATION: 4654 S
MDC_IDC_EPISODE_DURATION: 4759 S
MDC_IDC_EPISODE_DURATION: 4921 S
MDC_IDC_EPISODE_DURATION: 4982 S
MDC_IDC_EPISODE_DURATION: 5045 S
MDC_IDC_EPISODE_DURATION: 52 S
MDC_IDC_EPISODE_DURATION: 52 S
MDC_IDC_EPISODE_DURATION: 531 S
MDC_IDC_EPISODE_DURATION: 548 S
MDC_IDC_EPISODE_DURATION: 61 S
MDC_IDC_EPISODE_DURATION: 6150 S
MDC_IDC_EPISODE_DURATION: 6229 S
MDC_IDC_EPISODE_DURATION: 6412 S
MDC_IDC_EPISODE_DURATION: 69 S
MDC_IDC_EPISODE_DURATION: 7306 S
MDC_IDC_EPISODE_DURATION: 78 S
MDC_IDC_EPISODE_DURATION: 787 S
MDC_IDC_EPISODE_DURATION: 80 S
MDC_IDC_EPISODE_DURATION: 8238 S
MDC_IDC_EPISODE_DURATION: 904 S
MDC_IDC_EPISODE_DURATION: 97 S
MDC_IDC_EPISODE_DURATION: 973 S
MDC_IDC_EPISODE_DURATION: 987 S
MDC_IDC_EPISODE_ID: 215
MDC_IDC_EPISODE_ID: 233
MDC_IDC_EPISODE_ID: 234
MDC_IDC_EPISODE_ID: 245
MDC_IDC_EPISODE_ID: 247
MDC_IDC_EPISODE_ID: 249
MDC_IDC_EPISODE_ID: 252
MDC_IDC_EPISODE_ID: 255
MDC_IDC_EPISODE_ID: 256
MDC_IDC_EPISODE_ID: 257
MDC_IDC_EPISODE_ID: 261
MDC_IDC_EPISODE_ID: 262
MDC_IDC_EPISODE_ID: 279
MDC_IDC_EPISODE_ID: 281
MDC_IDC_EPISODE_ID: 282
MDC_IDC_EPISODE_ID: 283
MDC_IDC_EPISODE_ID: 284
MDC_IDC_EPISODE_ID: 285
MDC_IDC_EPISODE_ID: 286
MDC_IDC_EPISODE_ID: 287
MDC_IDC_EPISODE_ID: 288
MDC_IDC_EPISODE_ID: 289
MDC_IDC_EPISODE_ID: 290
MDC_IDC_EPISODE_ID: 291
MDC_IDC_EPISODE_ID: 292
MDC_IDC_EPISODE_ID: 293
MDC_IDC_EPISODE_ID: 294
MDC_IDC_EPISODE_ID: 295
MDC_IDC_EPISODE_ID: 296
MDC_IDC_EPISODE_ID: 297
MDC_IDC_EPISODE_ID: 298
MDC_IDC_EPISODE_ID: 299
MDC_IDC_EPISODE_ID: 300
MDC_IDC_EPISODE_ID: 301
MDC_IDC_EPISODE_ID: 302
MDC_IDC_EPISODE_ID: 303
MDC_IDC_EPISODE_ID: 304
MDC_IDC_EPISODE_ID: 305
MDC_IDC_EPISODE_ID: 306
MDC_IDC_EPISODE_ID: 307
MDC_IDC_EPISODE_ID: 308
MDC_IDC_EPISODE_ID: 309
MDC_IDC_EPISODE_ID: 310
MDC_IDC_EPISODE_ID: 311
MDC_IDC_EPISODE_ID: 312
MDC_IDC_EPISODE_ID: 313
MDC_IDC_EPISODE_ID: 314
MDC_IDC_EPISODE_ID: 315
MDC_IDC_EPISODE_ID: 316
MDC_IDC_EPISODE_ID: 317
MDC_IDC_EPISODE_ID: 318
MDC_IDC_EPISODE_ID: 319
MDC_IDC_EPISODE_ID: 320
MDC_IDC_EPISODE_ID: 321
MDC_IDC_EPISODE_ID: 322
MDC_IDC_EPISODE_ID: 323
MDC_IDC_EPISODE_ID: 324
MDC_IDC_EPISODE_ID: 325
MDC_IDC_EPISODE_ID: 326
MDC_IDC_EPISODE_ID: 327
MDC_IDC_EPISODE_ID: 328
MDC_IDC_EPISODE_ID: 329
MDC_IDC_EPISODE_ID: 330
MDC_IDC_EPISODE_ID: 331
MDC_IDC_EPISODE_ID: 332
MDC_IDC_EPISODE_ID: 333
MDC_IDC_EPISODE_ID: 334
MDC_IDC_EPISODE_ID: 335
MDC_IDC_EPISODE_ID: 336
MDC_IDC_EPISODE_ID: 337
MDC_IDC_EPISODE_ID: 338
MDC_IDC_EPISODE_ID: 339
MDC_IDC_EPISODE_ID: 340
MDC_IDC_EPISODE_ID: 341
MDC_IDC_EPISODE_ID: 342
MDC_IDC_EPISODE_ID: 343
MDC_IDC_EPISODE_ID: 344
MDC_IDC_EPISODE_TYPE: NORMAL
MDC_IDC_LEAD_CONNECTION_STATUS: NORMAL
MDC_IDC_LEAD_CONNECTION_STATUS: NORMAL
MDC_IDC_LEAD_IMPLANT_DT: NORMAL
MDC_IDC_LEAD_IMPLANT_DT: NORMAL
MDC_IDC_LEAD_LOCATION: NORMAL
MDC_IDC_LEAD_LOCATION: NORMAL
MDC_IDC_LEAD_LOCATION_DETAIL_1: NORMAL
MDC_IDC_LEAD_LOCATION_DETAIL_1: NORMAL
MDC_IDC_LEAD_MFG: NORMAL
MDC_IDC_LEAD_MFG: NORMAL
MDC_IDC_LEAD_MODEL: NORMAL
MDC_IDC_LEAD_MODEL: NORMAL
MDC_IDC_LEAD_POLARITY_TYPE: NORMAL
MDC_IDC_LEAD_POLARITY_TYPE: NORMAL
MDC_IDC_LEAD_SERIAL: NORMAL
MDC_IDC_LEAD_SERIAL: NORMAL
MDC_IDC_MSMT_BATTERY_DTM: NORMAL
MDC_IDC_MSMT_BATTERY_REMAINING_LONGEVITY: 136 MO
MDC_IDC_MSMT_BATTERY_RRT_TRIGGER: 2.62
MDC_IDC_MSMT_BATTERY_STATUS: NORMAL
MDC_IDC_MSMT_BATTERY_VOLTAGE: 3.03 V
MDC_IDC_MSMT_LEADCHNL_RA_IMPEDANCE_VALUE: 304 OHM
MDC_IDC_MSMT_LEADCHNL_RA_IMPEDANCE_VALUE: 456 OHM
MDC_IDC_MSMT_LEADCHNL_RA_PACING_THRESHOLD_AMPLITUDE: 0.38 V
MDC_IDC_MSMT_LEADCHNL_RA_PACING_THRESHOLD_PULSEWIDTH: 0.4 MS
MDC_IDC_MSMT_LEADCHNL_RA_SENSING_INTR_AMPL: 3.25 MV
MDC_IDC_MSMT_LEADCHNL_RA_SENSING_INTR_AMPL: 3.25 MV
MDC_IDC_MSMT_LEADCHNL_RV_IMPEDANCE_VALUE: 342 OHM
MDC_IDC_MSMT_LEADCHNL_RV_IMPEDANCE_VALUE: 418 OHM
MDC_IDC_MSMT_LEADCHNL_RV_PACING_THRESHOLD_AMPLITUDE: 0.5 V
MDC_IDC_MSMT_LEADCHNL_RV_PACING_THRESHOLD_PULSEWIDTH: 0.4 MS
MDC_IDC_MSMT_LEADCHNL_RV_SENSING_INTR_AMPL: 6 MV
MDC_IDC_MSMT_LEADCHNL_RV_SENSING_INTR_AMPL: 6 MV
MDC_IDC_PG_IMPLANT_DTM: NORMAL
MDC_IDC_PG_MFG: NORMAL
MDC_IDC_PG_MODEL: NORMAL
MDC_IDC_PG_SERIAL: NORMAL
MDC_IDC_PG_TYPE: NORMAL
MDC_IDC_SESS_CLINIC_NAME: NORMAL
MDC_IDC_SESS_DTM: NORMAL
MDC_IDC_SESS_TYPE: NORMAL
MDC_IDC_SET_BRADY_AT_MODE_SWITCH_RATE: 171 {BEATS}/MIN
MDC_IDC_SET_BRADY_HYSTRATE: NORMAL
MDC_IDC_SET_BRADY_LOWRATE: 50 {BEATS}/MIN
MDC_IDC_SET_BRADY_MAX_SENSOR_RATE: 130 {BEATS}/MIN
MDC_IDC_SET_BRADY_MAX_TRACKING_RATE: 130 {BEATS}/MIN
MDC_IDC_SET_BRADY_MODE: NORMAL
MDC_IDC_SET_BRADY_PAV_DELAY_LOW: 300 MS
MDC_IDC_SET_BRADY_SAV_DELAY_LOW: 300 MS
MDC_IDC_SET_LEADCHNL_RA_PACING_AMPLITUDE: 1.5 V
MDC_IDC_SET_LEADCHNL_RA_PACING_ANODE_ELECTRODE_1: NORMAL
MDC_IDC_SET_LEADCHNL_RA_PACING_ANODE_LOCATION_1: NORMAL
MDC_IDC_SET_LEADCHNL_RA_PACING_CAPTURE_MODE: NORMAL
MDC_IDC_SET_LEADCHNL_RA_PACING_CATHODE_ELECTRODE_1: NORMAL
MDC_IDC_SET_LEADCHNL_RA_PACING_CATHODE_LOCATION_1: NORMAL
MDC_IDC_SET_LEADCHNL_RA_PACING_POLARITY: NORMAL
MDC_IDC_SET_LEADCHNL_RA_PACING_PULSEWIDTH: 0.4 MS
MDC_IDC_SET_LEADCHNL_RA_SENSING_ANODE_ELECTRODE_1: NORMAL
MDC_IDC_SET_LEADCHNL_RA_SENSING_ANODE_LOCATION_1: NORMAL
MDC_IDC_SET_LEADCHNL_RA_SENSING_CATHODE_ELECTRODE_1: NORMAL
MDC_IDC_SET_LEADCHNL_RA_SENSING_CATHODE_LOCATION_1: NORMAL
MDC_IDC_SET_LEADCHNL_RA_SENSING_POLARITY: NORMAL
MDC_IDC_SET_LEADCHNL_RA_SENSING_SENSITIVITY: 0.3 MV
MDC_IDC_SET_LEADCHNL_RV_PACING_AMPLITUDE: 2 V
MDC_IDC_SET_LEADCHNL_RV_PACING_ANODE_ELECTRODE_1: NORMAL
MDC_IDC_SET_LEADCHNL_RV_PACING_ANODE_LOCATION_1: NORMAL
MDC_IDC_SET_LEADCHNL_RV_PACING_CAPTURE_MODE: NORMAL
MDC_IDC_SET_LEADCHNL_RV_PACING_CATHODE_ELECTRODE_1: NORMAL
MDC_IDC_SET_LEADCHNL_RV_PACING_CATHODE_LOCATION_1: NORMAL
MDC_IDC_SET_LEADCHNL_RV_PACING_POLARITY: NORMAL
MDC_IDC_SET_LEADCHNL_RV_PACING_PULSEWIDTH: 0.4 MS
MDC_IDC_SET_LEADCHNL_RV_SENSING_ANODE_ELECTRODE_1: NORMAL
MDC_IDC_SET_LEADCHNL_RV_SENSING_ANODE_LOCATION_1: NORMAL
MDC_IDC_SET_LEADCHNL_RV_SENSING_CATHODE_ELECTRODE_1: NORMAL
MDC_IDC_SET_LEADCHNL_RV_SENSING_CATHODE_LOCATION_1: NORMAL
MDC_IDC_SET_LEADCHNL_RV_SENSING_POLARITY: NORMAL
MDC_IDC_SET_LEADCHNL_RV_SENSING_SENSITIVITY: 0.9 MV
MDC_IDC_SET_ZONE_DETECTION_INTERVAL: 200 MS
MDC_IDC_SET_ZONE_DETECTION_INTERVAL: 350 MS
MDC_IDC_SET_ZONE_DETECTION_INTERVAL: 400 MS
MDC_IDC_SET_ZONE_STATUS: NORMAL
MDC_IDC_SET_ZONE_TYPE: NORMAL
MDC_IDC_SET_ZONE_VENDOR_TYPE: NORMAL
MDC_IDC_STAT_AT_BURDEN_PERCENT: 5.3 %
MDC_IDC_STAT_AT_DTM_END: NORMAL
MDC_IDC_STAT_AT_DTM_START: NORMAL
MDC_IDC_STAT_BRADY_AP_VP_PERCENT: 77.76 %
MDC_IDC_STAT_BRADY_AP_VS_PERCENT: 2.56 %
MDC_IDC_STAT_BRADY_AS_VP_PERCENT: 13.76 %
MDC_IDC_STAT_BRADY_AS_VS_PERCENT: 6.03 %
MDC_IDC_STAT_BRADY_DTM_END: NORMAL
MDC_IDC_STAT_BRADY_DTM_START: NORMAL
MDC_IDC_STAT_BRADY_RA_PERCENT_PACED: 77.68 %
MDC_IDC_STAT_BRADY_RV_PERCENT_PACED: 91.11 %
MDC_IDC_STAT_EPISODE_RECENT_COUNT: 0
MDC_IDC_STAT_EPISODE_RECENT_COUNT: 123
MDC_IDC_STAT_EPISODE_RECENT_COUNT: 19
MDC_IDC_STAT_EPISODE_RECENT_COUNT_DTM_END: NORMAL
MDC_IDC_STAT_EPISODE_RECENT_COUNT_DTM_START: NORMAL
MDC_IDC_STAT_EPISODE_TOTAL_COUNT: 0
MDC_IDC_STAT_EPISODE_TOTAL_COUNT: 0
MDC_IDC_STAT_EPISODE_TOTAL_COUNT: 255
MDC_IDC_STAT_EPISODE_TOTAL_COUNT: 38
MDC_IDC_STAT_EPISODE_TOTAL_COUNT: 51
MDC_IDC_STAT_EPISODE_TOTAL_COUNT_DTM_END: NORMAL
MDC_IDC_STAT_EPISODE_TOTAL_COUNT_DTM_START: NORMAL
MDC_IDC_STAT_EPISODE_TYPE: NORMAL
MDC_IDC_STAT_TACHYTHERAPY_RECENT_DTM_END: NORMAL
MDC_IDC_STAT_TACHYTHERAPY_RECENT_DTM_START: NORMAL
MDC_IDC_STAT_TACHYTHERAPY_TOTAL_DTM_END: NORMAL
MDC_IDC_STAT_TACHYTHERAPY_TOTAL_DTM_START: NORMAL
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
## 2024-05-16 ENCOUNTER — ANCILLARY PROCEDURE (OUTPATIENT)
Dept: ULTRASOUND IMAGING | Facility: CLINIC | Age: 64
End: 2024-05-16
Attending: INTERNAL MEDICINE
Payer: COMMERCIAL

## 2024-05-16 ENCOUNTER — OFFICE VISIT (OUTPATIENT)
Dept: GASTROENTEROLOGY | Facility: CLINIC | Age: 64
End: 2024-05-16
Attending: INTERNAL MEDICINE
Payer: COMMERCIAL

## 2024-05-16 ENCOUNTER — LAB (OUTPATIENT)
Dept: LAB | Facility: CLINIC | Age: 64
End: 2024-05-16
Attending: INTERNAL MEDICINE
Payer: COMMERCIAL

## 2024-05-16 VITALS
DIASTOLIC BLOOD PRESSURE: 67 MMHG | BODY MASS INDEX: 21.06 KG/M2 | SYSTOLIC BLOOD PRESSURE: 135 MMHG | HEART RATE: 61 BPM | OXYGEN SATURATION: 97 % | WEIGHT: 151 LBS

## 2024-05-16 DIAGNOSIS — K74.60 CIRRHOSIS OF LIVER WITHOUT ASCITES, UNSPECIFIED HEPATIC CIRRHOSIS TYPE (H): Primary | ICD-10-CM

## 2024-05-16 DIAGNOSIS — K74.60 CIRRHOSIS OF LIVER WITHOUT ASCITES, UNSPECIFIED HEPATIC CIRRHOSIS TYPE (H): ICD-10-CM

## 2024-05-16 LAB
AFP SERPL-MCNC: 2.5 NG/ML
ALBUMIN SERPL BCG-MCNC: 4.5 G/DL (ref 3.5–5.2)
ALP SERPL-CCNC: 61 U/L (ref 40–150)
ALT SERPL W P-5'-P-CCNC: 38 U/L (ref 0–70)
ANION GAP SERPL CALCULATED.3IONS-SCNC: 8 MMOL/L (ref 7–15)
AST SERPL W P-5'-P-CCNC: 29 U/L (ref 0–45)
BILIRUB DIRECT SERPL-MCNC: 0.41 MG/DL (ref 0–0.3)
BILIRUB SERPL-MCNC: 1.2 MG/DL
BUN SERPL-MCNC: 14 MG/DL (ref 8–23)
CALCIUM SERPL-MCNC: 9.8 MG/DL (ref 8.8–10.2)
CHLORIDE SERPL-SCNC: 103 MMOL/L (ref 98–107)
CREAT SERPL-MCNC: 1.04 MG/DL (ref 0.67–1.17)
DEPRECATED HCO3 PLAS-SCNC: 27 MMOL/L (ref 22–29)
EGFRCR SERPLBLD CKD-EPI 2021: 80 ML/MIN/1.73M2
ERYTHROCYTE [DISTWIDTH] IN BLOOD BY AUTOMATED COUNT: 12.8 % (ref 10–15)
GLUCOSE SERPL-MCNC: 135 MG/DL (ref 70–99)
HCT VFR BLD AUTO: 46.4 % (ref 40–53)
HGB BLD-MCNC: 15.9 G/DL (ref 13.3–17.7)
INR PPP: 1.35 (ref 0.85–1.15)
MCH RBC QN AUTO: 31.1 PG (ref 26.5–33)
MCHC RBC AUTO-ENTMCNC: 34.3 G/DL (ref 31.5–36.5)
MCV RBC AUTO: 91 FL (ref 78–100)
PLATELET # BLD AUTO: 139 10E3/UL (ref 150–450)
POTASSIUM SERPL-SCNC: 5.2 MMOL/L (ref 3.4–5.3)
PROT SERPL-MCNC: 7.1 G/DL (ref 6.4–8.3)
RBC # BLD AUTO: 5.12 10E6/UL (ref 4.4–5.9)
SODIUM SERPL-SCNC: 138 MMOL/L (ref 135–145)
WBC # BLD AUTO: 4.4 10E3/UL (ref 4–11)

## 2024-05-16 PROCEDURE — 85610 PROTHROMBIN TIME: CPT | Performed by: PATHOLOGY

## 2024-05-16 PROCEDURE — 99000 SPECIMEN HANDLING OFFICE-LAB: CPT | Performed by: PATHOLOGY

## 2024-05-16 PROCEDURE — 82248 BILIRUBIN DIRECT: CPT | Performed by: PATHOLOGY

## 2024-05-16 PROCEDURE — 80053 COMPREHEN METABOLIC PANEL: CPT | Performed by: PATHOLOGY

## 2024-05-16 PROCEDURE — 99214 OFFICE O/P EST MOD 30 MIN: CPT | Performed by: INTERNAL MEDICINE

## 2024-05-16 PROCEDURE — 82105 ALPHA-FETOPROTEIN SERUM: CPT | Performed by: INTERNAL MEDICINE

## 2024-05-16 PROCEDURE — 99213 OFFICE O/P EST LOW 20 MIN: CPT | Performed by: INTERNAL MEDICINE

## 2024-05-16 PROCEDURE — 85027 COMPLETE CBC AUTOMATED: CPT | Performed by: PATHOLOGY

## 2024-05-16 PROCEDURE — 76705 ECHO EXAM OF ABDOMEN: CPT | Mod: GC | Performed by: RADIOLOGY

## 2024-05-16 PROCEDURE — 36415 COLL VENOUS BLD VENIPUNCTURE: CPT | Performed by: PATHOLOGY

## 2024-05-16 ASSESSMENT — PAIN SCALES - GENERAL: PAINLEVEL: MILD PAIN (3)

## 2024-05-16 NOTE — NURSING NOTE
Chief Complaint   Patient presents with    RECHECK     7 month follow up. Liver pain 3. Buring sensation has gone up.      Vitals:    05/16/24 0806   BP: 135/67   BP Location: Right arm   Patient Position: Sitting   Cuff Size: Adult Regular   Pulse: 61   SpO2: 97%   Weight: 68.5 kg (151 lb)       BP Readings from Last 3 Encounters:   05/16/24 135/67   12/29/23 (!) 155/80   12/12/23 109/50       /67 (BP Location: Right arm, Patient Position: Sitting, Cuff Size: Adult Regular)   Pulse 61   Wt 68.5 kg (151 lb)   SpO2 97%   BMI 21.06 kg/m       Sunshine Heymwilber

## 2024-05-16 NOTE — LETTER
5/16/2024         RE: Vikash Burgos  [address]  [place] [address]        Dear Colleague,    Thank you for referring your patient, Vikash Burgos, to the [facility] [place]. Please see a copy of my visit note below.    Hepatology Follow-Up Visit:     HISTORY OF PRESENT ILLNESS:   I had the pleasure of seeing Vikash Burgos for followup in the Liver Clinic at the [facility] on May 16, 2024. Mr. 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     He is doing fairly well.  He does note some ongoing right upper quadrant burning pain.  He does report it may be a little bit worse than it has been in the past but not significantly changed.  He denies any itching or skin rash.  He does occasionally nap in the afternoon.  He denies any increased abdominal girth or lower extremity edema.     He denies any fevers or chills.  He denies any cough or shortness of breath. He denies any nausea or vomiting, diarrhea or constipation.  His appetite has been somewhat diminished through the change in his diabetes medication.  He has lost weight and is maintaining his current weight, which includes a BMI of 21.1.    Medications:   Current Outpatient Medications   Medication Sig Dispense Refill     amLODIPine (NORVASC) 2.5 MG tablet Take 1 tablet (2.5 mg) by mouth 2 times daily 200 tablet 6     apixaban ANTICOAGULANT (ELIQUIS) 5 MG tablet Take 1 tablet (5 mg) by mouth 2 times daily 200 tablet 6     B-D U/F 31G X 8 MM insulin pen needle USE ONE PEN NEEDLES DAILY OR AS DIRECTED. 100 each 3     celecoxib (CELEBREX) 100 MG capsule TAKE 1 CAPSULE (100 MG) BY MOUTH DAILY AS NEEDED FOR MODERATE PAIN (4-6) 90 capsule 3     chlorhexidine (PERIDEX) 0.12 % solution Take 15 mLs by mouth 2 times daily as needed   5     cyanocobalamin 1000 MCG SUBL Place 1,000 mcg under the tongue daily        glucosamine-chondroitin 500-400 MG CAPS per capsule Take 1 capsule by mouth daily       lisinopril (ZESTRIL) 20 MG tablet Take 1 tablet (20 mg) by mouth 2 times daily 200 tablet 6     omeprazole (PRILOSEC) 20 MG DR capsule Take 1 capsule (20 mg) by mouth daily 90 capsule 3     ONETOUCH VERIO IQ test strip        rosuvastatin (CRESTOR) 20 MG tablet Take 1 tablet (20 mg) by mouth daily 100 tablet 6     Vitamin D, Cholecalciferol, 1000 units TABS Take 1,000 Units by mouth daily       zolpidem (AMBIEN) 5 MG tablet Take 1 tablet (5 mg) by mouth nightly as needed for sleep 30 tablet 0     Current Facility-Administered Medications   Medication Dose Route Frequency Provider Last Rate Last Admin     study - tirzepatide 2.5-15 mg or dulaglutide 1.5 mg (SURPASS) (IDS# 5849) injection 1 Pen  1 Pen Subcutaneous Q7 Days Mando Elias MD         study - tirzepatide 2.5-15 mg or dulaglutide 1.5 mg (SURPASS) (IDS# 5849) injection 1.5-15 mg  1.5-15 mg Subcutaneous Q7 Days Mando Elias MD          Vitals:   /67 (BP Location: Right arm, Patient Position: Sitting, Cuff Size: Adult Regular)   Pulse 61   Wt 68.5 kg (151 lb)   SpO2 97%   BMI 21.06 kg/m      Physical Exam:   In general he looks quite well. HEENT exam shows no scleral icterus or temporal muscle wasting. Chest is clear. Abdominal exam shows no increase in girth. No masses or tenderness to palpation are present. Liver is 10-11 cm in span without left lobe enlargement. No spleen tip is palpable. Extremity exam shows no edema. Skin exam shows no stigmata of chronic liver disease. Neurologic exam shows no asterixis.     Labs:   Lab Results   Component Value Date     05/16/2024    POTASSIUM 5.2 05/16/2024    CHLORIDE 103 05/16/2024    ANIONGAP 8 05/16/2024    CO2 27 05/16/2024    BUN 14.0 05/16/2024    CR 1.04 05/16/2024    GFRESTIMATED 80 05/16/2024    LIA 9.8 05/16/2024      Lab Results   Component Value Date    WBC 4.4 05/16/2024    HGB 15.9 05/16/2024     HCT 46.4 05/16/2024    MCV 91 05/16/2024    MCH 31.1 05/16/2024    MCHC 34.3 05/16/2024    RDW 12.8 05/16/2024     (L) 05/16/2024     Lab Results   Component Value Date    ALBUMIN 4.5 05/16/2024    ALKPHOS 61 05/16/2024    AST 29 05/16/2024    BILICONJ 0.0 03/14/2014     Lab Results   Component Value Date    INR 1.35 (H) 05/16/2024     MELD 3.0: 10 at 5/16/2024  6:37 AM  MELD-Na: 11 at 5/16/2024  6:37 AM  Calculated from:  Serum Creatinine: 1.04 mg/dL at 5/16/2024  6:37 AM  Serum Sodium: 138 mmol/L (Using max of 137 mmol/L) at 5/16/2024  6:37 AM  Total Bilirubin: 1.2 mg/dL at 5/16/2024  6:37 AM  Serum Albumin: 4.5 g/dL (Using max of 3.5 g/dL) at 5/16/2024  6:37 AM  INR(ratio): 1.35 at 5/16/2024  6:37 AM  Age at listing (hypothetical): 64 years  Sex: Male at 5/16/2024  6:37 AM    Imaging:   EXAMINATION: US ABDOMEN LIMITED, 5/16/2024 7:33 AM      INDICATION: Patient at high risk for HCC. Cirrhosis of liver without ascites, unspecified hepatic cirrhosis type (H)     COMPARISON: 7/21/2023     TECHNIQUE: The abdomen right upper quadrant was scanned in the standard fashion with specialized ultrasound transducer(s) using both gray scale and limited color/spectral Doppler techniques.     FINDINGS:   Fluid: No evidence of ascites or pleural effusions.     Liver: The liver demonstrates increased echogenicity with coarsening of the hepatic parenchyma and surface nodularity, measuring 13.7 cm in craniocaudal dimension. No focal hepatic mass. No intrahepatic biliary dilatation. The main portal vein is patent with antegrade flow.     US visualization score: A - No or minimal limitations     Gallbladder: The gallbladder is well distended and of normal morphology. No wall thickening, pericholecystic fluid, sonographic Mora's sign, or evidence of cholelithiasis.     Bile Ducts: Normal caliber intra and extrahepatic biliary tree. The common bile duct measures 4.4 mm in diameter.     Pancreas: Visualized portions of the  pancreas are unremarkable.      Kidney: The right kidney measures 12.4 cm in long dimension. No hydronephrosis, hydroureter, shadowing renal calculi, or solid mass. The capsule and parenchyma demonstrate normal echogenicity.     Aorta and IVC: The visualized portions of the aorta and IVC are unremarkable.                                                                    IMPRESSION:   1. Cirrhosis without focal liver lesion. Increased echogenicity may also represent a component of hepatic steatosis.  a. LI-RADS US Category: US-1 Negative: No US evidence of HCC  b. Recommend continued surveillance US.    Assessment/Plan:   IMPRESSION:   Mr. Burgos has very well-compensated cirrhosis, most likely caused by metabolic associated steatotic liver disease.  His liver tests are excellent.  His ultrasound shows no mass lesions or evidence of ascites.     I will not be making any change to his medical regimen.  He is up to date with regard to vaccines and other cancer screening and my plan will be to see him back in the clinic for repeat imaging and blood work in 6 months.     I did spend a total of 30 minutes (on the date of the encounter), including 20 minutes of face-to-face clinic time including counseling. The rest of the time was spent in documentation and review of records.    Thank you very much for allowing me to participate in the care of this patient.  If you have any questions regarding my recommendations, please do not hesitate to contact me.      Sincerely,       Kevin Bedolla MD      Professor of Medicine  [employer]      Executive Medical Director, Solid Organ Transplant Program  [facility]

## 2024-05-16 NOTE — PROGRESS NOTES
Hepatology Follow-Up Visit:     HISTORY OF PRESENT ILLNESS:   I had the pleasure of seeing Vikash Burgos for followup in the Liver Clinic at the [facility] on May 16, 2024. Mr. 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     He is doing fairly well.  He does note some ongoing right upper quadrant burning pain.  He does report it may be a little bit worse than it has been in the past but not significantly changed.  He denies any itching or skin rash.  He does occasionally nap in the afternoon.  He denies any increased abdominal girth or lower extremity edema.     He denies any fevers or chills.  He denies any cough or shortness of breath. He denies any nausea or vomiting, diarrhea or constipation.  His appetite has been somewhat diminished through the change in his diabetes medication.  He has lost weight and is maintaining his current weight, which includes a BMI of 21.1.    Medications:   Current Outpatient Medications   Medication Sig Dispense Refill    amLODIPine (NORVASC) 2.5 MG tablet Take 1 tablet (2.5 mg) by mouth 2 times daily 200 tablet 6    apixaban ANTICOAGULANT (ELIQUIS) 5 MG tablet Take 1 tablet (5 mg) by mouth 2 times daily 200 tablet 6    B-D U/F 31G X 8 MM insulin pen needle USE ONE PEN NEEDLES DAILY OR AS DIRECTED. 100 each 3    celecoxib (CELEBREX) 100 MG capsule TAKE 1 CAPSULE (100 MG) BY MOUTH DAILY AS NEEDED FOR MODERATE PAIN (4-6) 90 capsule 3    chlorhexidine (PERIDEX) 0.12 % solution Take 15 mLs by mouth 2 times daily as needed   5    cyanocobalamin 1000 MCG SUBL Place 1,000 mcg under the tongue daily      glucosamine-chondroitin 500-400 MG CAPS per capsule Take 1 capsule by mouth daily      lisinopril (ZESTRIL) 20 MG tablet Take 1 tablet (20 mg) by mouth 2 times daily 200 tablet 6    omeprazole (PRILOSEC) 20 MG DR capsule Take 1 capsule (20 mg) by mouth daily 90 capsule 3    ONETOUCH VERIO IQ test  strip       rosuvastatin (CRESTOR) 20 MG tablet Take 1 tablet (20 mg) by mouth daily 100 tablet 6    Vitamin D, Cholecalciferol, 1000 units TABS Take 1,000 Units by mouth daily      zolpidem (AMBIEN) 5 MG tablet Take 1 tablet (5 mg) by mouth nightly as needed for sleep 30 tablet 0     Current Facility-Administered Medications   Medication Dose Route Frequency Provider Last Rate Last Admin    study - tirzepatide 2.5-15 mg or dulaglutide 1.5 mg (SURPASS) (IDS# 5849) injection 1 Pen  1 Pen Subcutaneous Q7 Days Mando Elias MD        study - tirzepatide 2.5-15 mg or dulaglutide 1.5 mg (SURPASS) (IDS# 5849) injection 1.5-15 mg  1.5-15 mg Subcutaneous Q7 Days Mando Elias MD          Vitals:   /67 (BP Location: Right arm, Patient Position: Sitting, Cuff Size: Adult Regular)   Pulse 61   Wt 68.5 kg (151 lb)   SpO2 97%   BMI 21.06 kg/m      Physical Exam:   In general he looks quite well. HEENT exam shows no scleral icterus or temporal muscle wasting. Chest is clear. Abdominal exam shows no increase in girth. No masses or tenderness to palpation are present. Liver is 10-11 cm in span without left lobe enlargement. No spleen tip is palpable. Extremity exam shows no edema. Skin exam shows no stigmata of chronic liver disease. Neurologic exam shows no asterixis.     Labs:   Lab Results   Component Value Date     05/16/2024    POTASSIUM 5.2 05/16/2024    CHLORIDE 103 05/16/2024    ANIONGAP 8 05/16/2024    CO2 27 05/16/2024    BUN 14.0 05/16/2024    CR 1.04 05/16/2024    GFRESTIMATED 80 05/16/2024    LIA 9.8 05/16/2024      Lab Results   Component Value Date    WBC 4.4 05/16/2024    HGB 15.9 05/16/2024    HCT 46.4 05/16/2024    MCV 91 05/16/2024    MCH 31.1 05/16/2024    MCHC 34.3 05/16/2024    RDW 12.8 05/16/2024     (L) 05/16/2024     Lab Results   Component Value Date    ALBUMIN 4.5 05/16/2024    ALKPHOS 61 05/16/2024    AST 29 05/16/2024    BILICONJ 0.0 03/14/2014     Lab Results   Component  Value Date    INR 1.35 (H) 05/16/2024     MELD 3.0: 10 at 5/16/2024  6:37 AM  MELD-Na: 11 at 5/16/2024  6:37 AM  Calculated from:  Serum Creatinine: 1.04 mg/dL at 5/16/2024  6:37 AM  Serum Sodium: 138 mmol/L (Using max of 137 mmol/L) at 5/16/2024  6:37 AM  Total Bilirubin: 1.2 mg/dL at 5/16/2024  6:37 AM  Serum Albumin: 4.5 g/dL (Using max of 3.5 g/dL) at 5/16/2024  6:37 AM  INR(ratio): 1.35 at 5/16/2024  6:37 AM  Age at listing (hypothetical): 64 years  Sex: Male at 5/16/2024  6:37 AM    Imaging:   EXAMINATION: US ABDOMEN LIMITED, 5/16/2024 7:33 AM      INDICATION: Patient at high risk for HCC. Cirrhosis of liver without ascites, unspecified hepatic cirrhosis type (H)     COMPARISON: 7/21/2023     TECHNIQUE: The abdomen right upper quadrant was scanned in the standard fashion with specialized ultrasound transducer(s) using both gray scale and limited color/spectral Doppler techniques.     FINDINGS:   Fluid: No evidence of ascites or pleural effusions.     Liver: The liver demonstrates increased echogenicity with coarsening of the hepatic parenchyma and surface nodularity, measuring 13.7 cm in craniocaudal dimension. No focal hepatic mass. No intrahepatic biliary dilatation. The main portal vein is patent with antegrade flow.     US visualization score: A - No or minimal limitations     Gallbladder: The gallbladder is well distended and of normal morphology. No wall thickening, pericholecystic fluid, sonographic Mora's sign, or evidence of cholelithiasis.     Bile Ducts: Normal caliber intra and extrahepatic biliary tree. The common bile duct measures 4.4 mm in diameter.     Pancreas: Visualized portions of the pancreas are unremarkable.      Kidney: The right kidney measures 12.4 cm in long dimension. No hydronephrosis, hydroureter, shadowing renal calculi, or solid mass. The capsule and parenchyma demonstrate normal echogenicity.     Aorta and IVC: The visualized portions of the aorta and IVC are unremarkable.                                                                     IMPRESSION:   1. Cirrhosis without focal liver lesion. Increased echogenicity may also represent a component of hepatic steatosis.  a. LI-RADS US Category: US-1 Negative: No US evidence of HCC  b. Recommend continued surveillance US.    Assessment/Plan:   IMPRESSION:   Mr. Burgos has very well-compensated cirrhosis, most likely caused by metabolic associated steatotic liver disease.  His liver tests are excellent.  His ultrasound shows no mass lesions or evidence of ascites.     I will not be making any change to his medical regimen.  He is up to date with regard to vaccines and other cancer screening and my plan will be to see him back in the clinic for repeat imaging and blood work in 6 months.     I did spend a total of 30 minutes (on the date of the encounter), including 20 minutes of face-to-face clinic time including counseling. The rest of the time was spent in documentation and review of records.    Thank you very much for allowing me to participate in the care of this patient.  If you have any questions regarding my recommendations, please do not hesitate to contact me.      Sincerely,       Kevin Bedolla MD      Professor of Medicine  [employer]      Executive Medical Director, Solid Organ Transplant Program  [facility]

## 2024-05-21 ENCOUNTER — OFFICE VISIT (OUTPATIENT)
Dept: CARDIOLOGY | Facility: CLINIC | Age: 64
End: 2024-05-21
Payer: COMMERCIAL

## 2024-05-21 VITALS
HEART RATE: 58 BPM | WEIGHT: 151.01 LBS | BODY MASS INDEX: 21.06 KG/M2 | DIASTOLIC BLOOD PRESSURE: 68 MMHG | SYSTOLIC BLOOD PRESSURE: 137 MMHG

## 2024-05-21 DIAGNOSIS — E11.40 TYPE 2 DIABETES MELLITUS WITH DIABETIC NEUROPATHY, WITHOUT LONG-TERM CURRENT USE OF INSULIN (H): ICD-10-CM

## 2024-05-21 DIAGNOSIS — Z00.6 EXAMINATION OF PARTICIPANT IN CLINICAL TRIAL: ICD-10-CM

## 2024-05-21 DIAGNOSIS — E11.9 DIABETES MELLITUS (H): Primary | ICD-10-CM

## 2024-05-21 DIAGNOSIS — E78.5 HYPERLIPIDEMIA LDL GOAL <70: Primary | ICD-10-CM

## 2024-05-21 DIAGNOSIS — Z00.6 EXAMINATION OF PARTICIPANT OR CONTROL IN CLINICAL RESEARCH: ICD-10-CM

## 2024-05-21 DIAGNOSIS — E78.5 HYPERLIPIDEMIA LDL GOAL <70: ICD-10-CM

## 2024-05-21 PROCEDURE — 99207 PR NO CHARGE-RESEARCH SERVICE: CPT | Performed by: INTERNAL MEDICINE

## 2024-05-21 NOTE — PROGRESS NOTES
SURPASS-CVOT Study [Effect of tirzepatide versus Dulaglutide on Major Cardiovascular Events in Patients with Type 2Diabetes]    Subject seen for Visit 21 with V20 Labs and questionnaires as he wintered in [place]    Subject queried for adverse events, endpoints and medication changes. If present, noted below. None noted since last visit. Follow-up with Hepatology and doing well with cirrhosis.    Adherence/lifestyle reinforcement done     No of pens dispensed at last visit 0 had multiple kits left over from previous visits and used them. All were within expiration date of May 2024.  Any Returned medication 0  Date of first dose since last visit: 02/28/2024   Date of last dose taken since last visit: 05/16/2024    Subject drug dispensed  kit #'s [id number]                                                    [id number]                                                    [id number]                                                    [id number]    Will see again in clinic in 3 months.     Mis Husain RN

## 2024-05-25 ENCOUNTER — HEALTH MAINTENANCE LETTER (OUTPATIENT)
Age: 64
End: 2024-05-25

## 2024-05-27 SDOH — HEALTH STABILITY: PHYSICAL HEALTH: ON AVERAGE, HOW MANY MINUTES DO YOU ENGAGE IN EXERCISE AT THIS LEVEL?: 40 MIN

## 2024-05-27 SDOH — HEALTH STABILITY: PHYSICAL HEALTH: ON AVERAGE, HOW MANY DAYS PER WEEK DO YOU ENGAGE IN MODERATE TO STRENUOUS EXERCISE (LIKE A BRISK WALK)?: 7 DAYS

## 2024-05-27 ASSESSMENT — SOCIAL DETERMINANTS OF HEALTH (SDOH): HOW OFTEN DO YOU GET TOGETHER WITH FRIENDS OR RELATIVES?: TWICE A WEEK

## 2024-05-28 ENCOUNTER — OFFICE VISIT (OUTPATIENT)
Dept: FAMILY MEDICINE | Facility: CLINIC | Age: 64
End: 2024-05-28
Payer: COMMERCIAL

## 2024-05-28 VITALS
OXYGEN SATURATION: 97 % | BODY MASS INDEX: 20.32 KG/M2 | DIASTOLIC BLOOD PRESSURE: 66 MMHG | RESPIRATION RATE: 16 BRPM | SYSTOLIC BLOOD PRESSURE: 114 MMHG | TEMPERATURE: 97.5 F | HEART RATE: 56 BPM | WEIGHT: 150 LBS | HEIGHT: 72 IN

## 2024-05-28 DIAGNOSIS — Z12.11 SCREEN FOR COLON CANCER: ICD-10-CM

## 2024-05-28 DIAGNOSIS — I48.0 PAROXYSMAL ATRIAL FIBRILLATION (H): ICD-10-CM

## 2024-05-28 DIAGNOSIS — Z13.89 SCREENING FOR DIABETIC PERIPHERAL NEUROPATHY: ICD-10-CM

## 2024-05-28 DIAGNOSIS — N52.9 MALE ERECTILE DISORDER: ICD-10-CM

## 2024-05-28 DIAGNOSIS — Z12.5 SCREENING FOR PROSTATE CANCER: ICD-10-CM

## 2024-05-28 DIAGNOSIS — I10 BENIGN ESSENTIAL HYPERTENSION: ICD-10-CM

## 2024-05-28 DIAGNOSIS — Z00.00 ROUTINE GENERAL MEDICAL EXAMINATION AT A HEALTH CARE FACILITY: Primary | ICD-10-CM

## 2024-05-28 DIAGNOSIS — E78.5 HYPERLIPIDEMIA LDL GOAL <70: ICD-10-CM

## 2024-05-28 DIAGNOSIS — K74.60 CIRRHOSIS OF LIVER WITHOUT ASCITES, UNSPECIFIED HEPATIC CIRRHOSIS TYPE (H): ICD-10-CM

## 2024-05-28 DIAGNOSIS — I25.10 CORONARY ARTERY DISEASE INVOLVING NATIVE CORONARY ARTERY OF NATIVE HEART WITHOUT ANGINA PECTORIS: ICD-10-CM

## 2024-05-28 DIAGNOSIS — E11.49 DM TYPE 2 CAUSING NEUROLOGICAL DISEASE (H): ICD-10-CM

## 2024-05-28 DIAGNOSIS — E08.42 DIABETIC POLYNEUROPATHY ASSOCIATED WITH DIABETES MELLITUS DUE TO UNDERLYING CONDITION (H): ICD-10-CM

## 2024-05-28 PROBLEM — K72.10 CHRONIC LIVER FAILURE WITHOUT HEPATIC COMA (H): Status: RESOLVED | Noted: 2023-05-15 | Resolved: 2024-05-28

## 2024-05-28 PROBLEM — R06.02 SOB (SHORTNESS OF BREATH): Status: RESOLVED | Noted: 2018-02-07 | Resolved: 2024-05-28

## 2024-05-28 PROBLEM — M75.21 BICEPS TENDINITIS OF RIGHT UPPER EXTREMITY: Status: RESOLVED | Noted: 2022-05-16 | Resolved: 2024-05-28

## 2024-05-28 PROBLEM — M79.601 PAIN OF RIGHT UPPER EXTREMITY: Status: RESOLVED | Noted: 2020-02-04 | Resolved: 2024-05-28

## 2024-05-28 LAB
CHOLEST SERPL-MCNC: 116 MG/DL
CREAT UR-MCNC: 66.3 MG/DL
FASTING STATUS PATIENT QL REPORTED: NO
HBA1C MFR BLD: 5.2 % (ref 0–5.6)
HDLC SERPL-MCNC: 46 MG/DL
LDLC SERPL CALC-MCNC: 54 MG/DL
MICROALBUMIN UR-MCNC: <12 MG/L
MICROALBUMIN/CREAT UR: NORMAL MG/G{CREAT}
NONHDLC SERPL-MCNC: 70 MG/DL
PSA SERPL DL<=0.01 NG/ML-MCNC: 1.26 NG/ML (ref 0–4.5)
TRIGL SERPL-MCNC: 79 MG/DL

## 2024-05-28 PROCEDURE — 83036 HEMOGLOBIN GLYCOSYLATED A1C: CPT | Performed by: INTERNAL MEDICINE

## 2024-05-28 PROCEDURE — 36415 COLL VENOUS BLD VENIPUNCTURE: CPT | Performed by: INTERNAL MEDICINE

## 2024-05-28 PROCEDURE — 99396 PREV VISIT EST AGE 40-64: CPT | Performed by: INTERNAL MEDICINE

## 2024-05-28 PROCEDURE — G0103 PSA SCREENING: HCPCS | Performed by: INTERNAL MEDICINE

## 2024-05-28 PROCEDURE — 82043 UR ALBUMIN QUANTITATIVE: CPT | Performed by: INTERNAL MEDICINE

## 2024-05-28 PROCEDURE — 99214 OFFICE O/P EST MOD 30 MIN: CPT | Mod: 25 | Performed by: INTERNAL MEDICINE

## 2024-05-28 PROCEDURE — 82570 ASSAY OF URINE CREATININE: CPT | Performed by: INTERNAL MEDICINE

## 2024-05-28 PROCEDURE — 99207 PR FOOT EXAM NO CHARGE: CPT | Performed by: INTERNAL MEDICINE

## 2024-05-28 PROCEDURE — 80061 LIPID PANEL: CPT | Performed by: INTERNAL MEDICINE

## 2024-05-28 RX ORDER — SILDENAFIL 50 MG/1
50 TABLET, FILM COATED ORAL DAILY PRN
Qty: 30 TABLET | Refills: 11 | Status: SHIPPED | OUTPATIENT
Start: 2024-05-28

## 2024-05-28 ASSESSMENT — PAIN SCALES - GENERAL: PAINLEVEL: NO PAIN (0)

## 2024-05-28 NOTE — PATIENT INSTRUCTIONS
"You should get the RSV (Respiratory Syncytial Virus) vaccine at a pharmacy.     Preventive Care Advice   This is general advice we often give to help people stay healthy. Your care team may have specific advice just for you. Please talk to your care team about your own preventive care needs.  Lifestyle  Exercise at least 150 minutes each week (30 minutes a day, 5 days a week).  Do muscle strengthening activities 2 days a week. These help control your weight and prevent disease.  No smoking.  Wear sunscreen to prevent skin cancer.  Have your home tested for radon every 2 to 5 years. Radon is a colorless, odorless gas that can harm your lungs. To learn more, go to www.health.[facility].[place].us and search for \"Radon in Homes.\"  Keep guns unloaded and locked up in a safe place like a safe or gun vault, or, use a gun lock and hide the keys. Always lock away bullets separately. To learn more, visit [employer].[place].gov and search for \"safe gun storage.\"  Nutrition  Eat 5 or more servings of fruits and vegetables each day.  Try wheat bread, brown rice and whole grain pasta (instead of white bread, rice, and pasta).  Get enough calcium and vitamin D. Check the label on foods and aim for 100% of the RDA (recommended daily allowance).  Regular exams  Have a dental exam and cleaning every 6 months.  See your health care team every year to talk about:  Any changes in your health.  Any medicines your care team has prescribed.  Preventive care, family planning, and ways to prevent chronic diseases.  Shots (vaccines)   HPV shots (up to age 26), if you've never had them before.  Hepatitis B shots (up to age 59), if you've never had them before.  COVID-19 shot: Get this shot when it's due.  Flu shot: Get a flu shot every year.  Tetanus shot: Get a tetanus shot every 10 years.  Pneumococcal, hepatitis A, and RSV shots: Ask your care team if you need these based on your risk.  Shingles shot (for age 50 and up).  General health tests  Diabetes " screening:  Starting at age 35, Get screened for diabetes at least every 3 years.  If you are younger than age 35, ask your care team if you should be screened for diabetes.  Cholesterol test: At age 39, start having a cholesterol test every 5 years, or more often if advised.  Bone density scan (DEXA): At age 50, ask your care team if you should have this scan for osteoporosis (brittle bones).  Hepatitis C: Get tested at least once in your life.  Abdominal aortic aneurysm screening: Talk to your doctor about having this screening if you:  Have ever smoked; and  Are biologically male; and  Are between the ages of 65 and 75.  STIs (sexually transmitted infections)  Before age 24: Ask your care team if you should be screened for STIs.  After age 24: Get screened for STIs if you're at risk. You are at risk for STIs (including HIV) if:  You are sexually active with more than one person.  You don't use condoms every time.  You or a partner was diagnosed with a sexually transmitted infection.  If you are at risk for HIV, ask about PrEP medicine to prevent HIV.  Get tested for HIV at least once in your life, whether you are at risk for HIV or not.  Cancer screening tests  Cervical cancer screening: If you have a cervix, begin getting regular cervical cancer screening tests at age 21. Most people who have regular screenings with normal results can stop after age 65. Talk about this with your provider.  Breast cancer scan (mammogram): If you've ever had breasts, begin having regular mammograms starting at age 40. This is a scan to check for breast cancer.  Colon cancer screening: It is important to start screening for colon cancer at age 45.  Have a colonoscopy test every 10 years (or more often if you're at risk) Or, ask your provider about stool tests like a FIT test every year or Cologuard test every 3 years.  To learn more about your testing options, visit: www.[employer]/712062.pdf.  For help making a decision, visit:  axel.ly/xo94962.  Prostate cancer screening test: If you have a prostate and are age 55 to 69, ask your provider if you would benefit from a yearly prostate cancer screening test.  Lung cancer screening: If you are a current or former smoker age 50 to 80, ask your care team if ongoing lung cancer screenings are right for you.  For informational purposes only. Not to replace the advice of your health care provider. Copyright   2023 [facility]. All rights reserved. Clinically reviewed by the [facility] Transitions Program. [employer] [id number] - REV 04/24.

## 2024-05-28 NOTE — PROGRESS NOTES
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)  Followed by endo; checking A1c  - OFFICE/OUTPT VISIT,ESTLEVL IV    Diabetic polyneuropathy associated with diabetes mellitus due to underlying condition (H)    - Albumin Random Urine Quantitative with Creat Ratio; Future  - HEMOGLOBIN A1C; Future    Coronary artery disease involving native coronary artery of native heart without angina pectoris  Stable   - Lipid panel reflex to direct LDL Non-fasting; Future    Paroxysmal atrial fibrillation (H)  Stable     Cirrhosis of liver without ascites, unspecified hepatic cirrhosis type (H)  Stable     Benign essential hypertension  Well controlled  - OFFICE/OUTPT VISIT,ESTLEVL IV    Hyperlipidemia LDL goal <70  On statin therapy rechecking   - OFFICE/OUTPT VISIT,ESTLEVL IV    Screen for colon cancer  Reminded him to submit stool based test   - Fecal colorectal cancer screen FIT; Future    Screening for prostate cancer    - PROSTATE SPEC ANTIGEN SCREEN; Future    Screening for diabetic peripheral neuropathy    - FOOT EXAM  NO CHARGE [46320.114]            Counseling  Appropriate preventive services were discussed with this patient, including applicable screening as appropriate for fall prevention, nutrition, physical activity, Tobacco-use cessation, weight loss and cognition.  Checklist reviewing preventive services available has been given to the patient.  Reviewed patient's diet, addressing  concerns and/or questions.   -recommended RSV shot and COVID shot at pharmacy     FUTURE APPOINTMENTS:       - Follow-up for annual visit or as needed    Subjective   Kelton is a 64 year old, presenting for the following:  Physical         Health Care Directive  Patient does not have a Health Care Directive or Living Will: Patient states has Advance Directive and will bring in a copy to clinic.    HPI              5/27/2024   General Health   How would you rate your overall physical health? Good   Feel stress (tense, anxious, or unable to sleep) Not at all         5/27/2024   Nutrition   Three or more servings of calcium each day? Yes   Diet: Low salt    Diabetic   How many servings of fruit and vegetables per day? (!) 2-3   How many sweetened beverages each day? 0-1         5/27/2024   Exercise   Days per week of moderate/strenous exercise 7 days   Average minutes spent exercising at this level 40 min         5/27/2024   Social Factors   Frequency of gathering with friends or relatives Twice a week   Worry food won't last until get money to buy more No   Food not last or not have enough money for food? No   Do you have housing?  Yes   Are you worried about losing your housing? No   Lack of transportation? No   Unable to get utilities (heat,electricity)? No         5/27/2024   Fall Risk   Fallen 2 or more times in the past year? No   Trouble with walking or balance? No          5/27/2024   Dental   Dentist two times every year? Yes         5/27/2024   TB Screening   Were you born outside of the [place]? No         Today's PHQ-2 Score:       5/27/2024    12:31 PM   PHQ-2 ( 1999 [employer])   Q1: Little interest or pleasure in doing things 0   Q2: Feeling down, depressed or hopeless 0   PHQ-2 Score 0   Q1: Little interest or pleasure in doing things Not at all   Q2: Feeling down, depressed or hopeless Not at all   PHQ-2 Score 0           5/27/2024   Substance Use   Alcohol more than 3/day or more than 7/wk No   Do you use any  other substances recreationally? No     Social History     Tobacco Use    Smoking status: Former     Current packs/day: 0.00     Average packs/day: 1 pack/day for 25.1 years (25.1 ttl pk-yrs)     Types: Cigarettes     Start date:      Quit date: 2005     Years since quittin.3    Smokeless tobacco: Never   Vaping Use    Vaping status: Never Used   Substance Use Topics    Alcohol use: Yes     Comment: minimal 1 glass of wine per week    Drug use: No           2024   STI Screening   New sexual partner(s) since last STI/HIV test? No   Last PSA:   Prostate Specific Antigen Screen   Date Value Ref Range Status   2022 1.29 0.00 - 4.00 ug/L Final     ASCVD Risk   The ASCVD Risk score (Rosalinda DAUGHERTY, et al., 2019) failed to calculate for the following reasons:    The patient has a prior MI or stroke diagnosis           Reviewed and updated as needed this visit by Provider     Meds  Problems               Past Medical History:   Diagnosis Date    Basal cell carcinoma     Carotid stenosis, left     s/p left CEA 2020    Coronary artery disease     4 vessel bypass 2010; LIMA ->LAD, SVG-> OM3, SVG -> D2, SVG -> PDA    Diabetes mellitus (H)     Generalized anxiety disorder 2014    Hepatitis C, chronic (H)     Hyperlipidemia LDL goal < 70     Hypertension     Liver diseases     9/15 Liver is 10 cm in span without left lobe enlargement    Persistent microalbuminuria associated with type 2 diabetes mellitus (H) 2015     Past Surgical History:   Procedure Laterality Date    ARTHROSCOPY KNEE RT/LT      left    COLONOSCOPY      CORONARY ARTERY BYPASS      Coronary artery bypass grafting x 4 with placement of the left internal mammary artery to the distal midportion of the left anterior descending artery, saphenous vein graft from aorta to the third obtuse marginal branch of circumflex coronary artery, saphenous vein graft from aorta to the second diagonal branch,  saphenous vein graft from aorta to the posterior descending artery.    ENDARTERECTOMY CAROTID Left 2/21/2020    Procedure: LEFT CAROTID ENDARTERECTOMY WITH EEG;  Surgeon: Semaj Stubbs MD;  Location:  OR    EP ABLATION ATRIAL FLUTTER N/A 9/9/2022    Procedure: Ablation Atrial Flutter;  Surgeon: Tyson Ordonez MD;  Location:  HEART CARDIAC CATH LAB    EP PACEMAKER DEVICE & LEAD IMPLANT- RIGHT ATRIAL & LEFT VENTRICULAR N/A 11/7/2022    Procedure: Pacemaker Device & Lead Implant- Right Atrial & Left Ventricular;  Surgeon: Tyson Ordonez MD;  Location:  HEART CARDIAC CATH LAB    ESOPHAGOSCOPY, GASTROSCOPY, DUODENOSCOPY (EGD), COMBINED  10/31/2011    Procedure:COMBINED ESOPHAGOSCOPY, GASTROSCOPY, DUODENOSCOPY (EGD); Surgeon:ALONSO ALDANA; Location: GI    ESOPHAGOSCOPY, GASTROSCOPY, DUODENOSCOPY (EGD), COMBINED N/A 3/8/2018    Procedure: COMBINED ESOPHAGOSCOPY, GASTROSCOPY, DUODENOSCOPY (EGD);  EGD;  Surgeon: Angel Luis Justice MD;  Location:  GI    HEART CATH CORONARY ANGIOGRAM W/LV GRAM  9-11-10    CV Surgery recommended    HEART CATH CORONARY ANGIOGRAM W/LV GRAM  2-28-14    Medical management    HERNIA REPAIR, INGUINAL RT/LT      left     BP Readings from Last 3 Encounters:   05/28/24 114/66   05/21/24 137/68   05/16/24 135/67    Wt Readings from Last 3 Encounters:   05/28/24 68 kg (150 lb)   05/21/24 68.5 kg (151 lb 0.2 oz)   05/16/24 68.5 kg (151 lb)                  Patient Active Problem List   Diagnosis    Benign essential hypertension    Coronary artery disease involving native coronary artery of native heart without angina pectoris    Fatty liver disease, nonalcoholic    Hyperlipidemia LDL goal <70    Pulmonary nodules    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)    Diabetic polyneuropathy associated with diabetes mellitus due to underlying condition (H)    HDL deficiency    Cirrhosis of liver without ascites, unspecified hepatic cirrhosis type (H)     Paroxysmal atrial fibrillation (H)     Past Surgical History:   Procedure Laterality Date    ARTHROSCOPY KNEE RT/LT      left    COLONOSCOPY      CORONARY ARTERY BYPASS  11/18/201    Coronary artery bypass grafting x 4 with placement of the left internal mammary artery to the distal midportion of the left anterior descending artery, saphenous vein graft from aorta to the third obtuse marginal branch of circumflex coronary artery, saphenous vein graft from aorta to the second diagonal branch, saphenous vein graft from aorta to the posterior descending artery.    ENDARTERECTOMY CAROTID Left 2/21/2020    Procedure: LEFT CAROTID ENDARTERECTOMY WITH EEG;  Surgeon: Semaj Stubbs MD;  Location:  OR    EP ABLATION ATRIAL FLUTTER N/A 9/9/2022    Procedure: Ablation Atrial Flutter;  Surgeon: Tyson Ordonez MD;  Location:  HEART CARDIAC CATH LAB    EP PACEMAKER DEVICE & LEAD IMPLANT- RIGHT ATRIAL & LEFT VENTRICULAR N/A 11/7/2022    Procedure: Pacemaker Device & Lead Implant- Right Atrial & Left Ventricular;  Surgeon: Tyson Ordonez MD;  Location:  HEART CARDIAC CATH LAB    ESOPHAGOSCOPY, GASTROSCOPY, DUODENOSCOPY (EGD), COMBINED  10/31/2011    Procedure:COMBINED ESOPHAGOSCOPY, GASTROSCOPY, DUODENOSCOPY (EGD); Surgeon:ALONSO ALDANA; Location: GI    ESOPHAGOSCOPY, GASTROSCOPY, DUODENOSCOPY (EGD), COMBINED N/A 3/8/2018    Procedure: COMBINED ESOPHAGOSCOPY, GASTROSCOPY, DUODENOSCOPY (EGD);  EGD;  Surgeon: Angel Luis Justice MD;  Location:  GI    HEART CATH CORONARY ANGIOGRAM W/LV GRAM  9-11-10    CV Surgery recommended    HEART CATH CORONARY ANGIOGRAM W/LV GRAM  2-28-14    Medical management    HERNIA REPAIR, INGUINAL RT/LT      left       Social History     Tobacco Use    Smoking status: Former     Current packs/day: 0.00     Average packs/day: 1 pack/day for 25.1 years (25.1 ttl pk-yrs)     Types: Cigarettes     Start date: 1980     Quit date: 1/26/2005     Years since  quittin.3    Smokeless tobacco: Never   Substance Use Topics    Alcohol use: Yes     Comment: minimal 1 glass of wine per week     Family History   Problem Relation Age of Onset    C.A.D. Father     Cancer Father         bladder    Heart Surgery Father         bypass surgery    Heart Disease Mother          Current Outpatient Medications   Medication Sig Dispense Refill    amLODIPine (NORVASC) 2.5 MG tablet Take 1 tablet (2.5 mg) by mouth 2 times daily 200 tablet 6    apixaban ANTICOAGULANT (ELIQUIS) 5 MG tablet Take 1 tablet (5 mg) by mouth 2 times daily 200 tablet 6    B-D U/F 31G X 8 MM insulin pen needle USE ONE PEN NEEDLES DAILY OR AS DIRECTED. 100 each 3    celecoxib (CELEBREX) 100 MG capsule TAKE 1 CAPSULE (100 MG) BY MOUTH DAILY AS NEEDED FOR MODERATE PAIN (4-6) 90 capsule 3    cyanocobalamin 1000 MCG SUBL Place 1,000 mcg under the tongue daily      glucosamine-chondroitin 500-400 MG CAPS per capsule Take 1 capsule by mouth daily      lisinopril (ZESTRIL) 20 MG tablet Take 1 tablet (20 mg) by mouth 2 times daily 200 tablet 6    omeprazole (PRILOSEC) 20 MG DR capsule Take 1 capsule (20 mg) by mouth daily 90 capsule 3    ONETOUCH VERIO IQ test strip       rosuvastatin (CRESTOR) 20 MG tablet Take 1 tablet (20 mg) by mouth daily 100 tablet 6    sildenafil (VIAGRA) 50 MG tablet Take 1 tablet (50 mg) by mouth daily as needed 30 tablet 11    Vitamin D, Cholecalciferol, 1000 units TABS Take 1,000 Units by mouth daily      zolpidem (AMBIEN) 5 MG tablet Take 1 tablet (5 mg) by mouth nightly as needed for sleep 30 tablet 0     No Known Allergies         Objective    Exam  /66 (BP Location: Left arm, Patient Position: Sitting, Cuff Size: Adult Large)   Pulse 56   Temp 97.5  F (36.4  C) (Tympanic)   Resp 16   Ht 1.829 m (6')   Wt 68 kg (150 lb)   SpO2 97%   BMI 20.34 kg/m     Estimated body mass index is 20.34 kg/m  as calculated from the following:    Height as of this encounter: 1.829 m (6').     Weight as of this encounter: 68 kg (150 lb).    Physical Exam  GENERAL: alert and no distress  EYES: Eyes grossly normal to inspection, PERRL and conjunctivae and sclerae normal  HENT: ear canals and TM's normal, nose and mouth without ulcers or lesions  NECK: no adenopathy, no asymmetry, masses, or scars  RESP: lungs clear to auscultation - no rales, rhonchi or wheezes  CV: regular rate and rhythm, normal S1 S2, no S3 or S4, no murmur, click or rub, no peripheral edema  ABDOMEN: soft, nontender, no hepatosplenomegaly, no masses and bowel sounds normal  RECTAL: declined exam   MS: no gross musculoskeletal defects noted, no edema  SKIN: no suspicious lesions or rashes  NEURO: Normal strength and tone, mentation intact and speech normal  PSYCH: mentation appears normal, affect normal/bright  Diabetic foot exam: normal DP and PT pulses, no trophic changes or ulcerative lesions, and normal sensory exam        Signed Electronically by: Danish Land MD

## 2024-05-28 NOTE — RESULT ENCOUNTER NOTE
The following letter pertains to your most recent diagnostic tests:    -Your cholesterol panel looks healthy.     -Your prostate specific antigen (PSA) test result returned normal.     -Your microalbumin level which is a diabetes urine test to check for any evidence of early kidney injury from diabetes returned negative.    -Your hemoglobin A1c test which averages your blood sugars over the last 3 months returned normal.          Sincerely,    Dr. Land

## 2024-05-29 ENCOUNTER — TRANSFERRED RECORDS (OUTPATIENT)
Dept: CARDIOLOGY | Facility: CLINIC | Age: 64
End: 2024-05-29
Payer: COMMERCIAL

## 2024-05-30 NOTE — PROGRESS NOTES
Research laboratory data from May 21 reviewed and results not clinically significant including low total and LDL cholesterol as well as mildly high bilirubin, minimally high pancreatic amylase.  LF

## 2024-07-23 ENCOUNTER — ANCILLARY PROCEDURE (OUTPATIENT)
Dept: CARDIOLOGY | Facility: CLINIC | Age: 64
End: 2024-07-23
Attending: INTERNAL MEDICINE
Payer: COMMERCIAL

## 2024-07-23 DIAGNOSIS — Z95.0 CARDIAC PACEMAKER IN SITU: ICD-10-CM

## 2024-07-23 DIAGNOSIS — I44.2 COMPLETE ATRIOVENTRICULAR BLOCK (H): ICD-10-CM

## 2024-07-23 PROCEDURE — 93296 REM INTERROG EVL PM/IDS: CPT | Performed by: INTERNAL MEDICINE

## 2024-07-23 PROCEDURE — 93294 REM INTERROG EVL PM/LDLS PM: CPT | Performed by: INTERNAL MEDICINE

## 2024-07-25 LAB
MDC_IDC_EPISODE_DTM: NORMAL
MDC_IDC_EPISODE_DURATION: 0 S
MDC_IDC_EPISODE_DURATION: 1 S
MDC_IDC_EPISODE_DURATION: 1025 S
MDC_IDC_EPISODE_DURATION: 1094 S
MDC_IDC_EPISODE_DURATION: 117 S
MDC_IDC_EPISODE_DURATION: 118 S
MDC_IDC_EPISODE_DURATION: 121 S
MDC_IDC_EPISODE_DURATION: 1224 S
MDC_IDC_EPISODE_DURATION: 1251 S
MDC_IDC_EPISODE_DURATION: 1310 S
MDC_IDC_EPISODE_DURATION: 143 S
MDC_IDC_EPISODE_DURATION: 1431 S
MDC_IDC_EPISODE_DURATION: 1517 S
MDC_IDC_EPISODE_DURATION: 16 S
MDC_IDC_EPISODE_DURATION: 168 S
MDC_IDC_EPISODE_DURATION: 176 S
MDC_IDC_EPISODE_DURATION: 1766 S
MDC_IDC_EPISODE_DURATION: 1869 S
MDC_IDC_EPISODE_DURATION: 188 S
MDC_IDC_EPISODE_DURATION: 191 S
MDC_IDC_EPISODE_DURATION: 2 S
MDC_IDC_EPISODE_DURATION: 22 S
MDC_IDC_EPISODE_DURATION: 2658 S
MDC_IDC_EPISODE_DURATION: 28 S
MDC_IDC_EPISODE_DURATION: 2810 S
MDC_IDC_EPISODE_DURATION: 2847 S
MDC_IDC_EPISODE_DURATION: 299 S
MDC_IDC_EPISODE_DURATION: 3734 S
MDC_IDC_EPISODE_DURATION: 42 S
MDC_IDC_EPISODE_DURATION: 4485 S
MDC_IDC_EPISODE_DURATION: 45 S
MDC_IDC_EPISODE_DURATION: 4555 S
MDC_IDC_EPISODE_DURATION: 5 S
MDC_IDC_EPISODE_DURATION: 5449 S
MDC_IDC_EPISODE_DURATION: 55 S
MDC_IDC_EPISODE_DURATION: 557 S
MDC_IDC_EPISODE_DURATION: 58 S
MDC_IDC_EPISODE_DURATION: 599 S
MDC_IDC_EPISODE_DURATION: 6 S
MDC_IDC_EPISODE_DURATION: 60 S
MDC_IDC_EPISODE_DURATION: 6102 S
MDC_IDC_EPISODE_DURATION: 6401 S
MDC_IDC_EPISODE_DURATION: 652 S
MDC_IDC_EPISODE_DURATION: 668 S
MDC_IDC_EPISODE_DURATION: 7 S
MDC_IDC_EPISODE_DURATION: 7153 S
MDC_IDC_EPISODE_DURATION: 7343 S
MDC_IDC_EPISODE_DURATION: 7559 S
MDC_IDC_EPISODE_DURATION: 7588 S
MDC_IDC_EPISODE_DURATION: 7659 S
MDC_IDC_EPISODE_DURATION: 7697 S
MDC_IDC_EPISODE_DURATION: 8 S
MDC_IDC_EPISODE_DURATION: 830 S
MDC_IDC_EPISODE_DURATION: 8491 S
MDC_IDC_EPISODE_DURATION: 851 S
MDC_IDC_EPISODE_DURATION: 8534 S
MDC_IDC_EPISODE_DURATION: 893 S
MDC_IDC_EPISODE_DURATION: 9063 S
MDC_IDC_EPISODE_DURATION: 9122 S
MDC_IDC_EPISODE_DURATION: 9532 S
MDC_IDC_EPISODE_DURATION: NORMAL S
MDC_IDC_EPISODE_ID: 367
MDC_IDC_EPISODE_ID: 368
MDC_IDC_EPISODE_ID: 382
MDC_IDC_EPISODE_ID: 383
MDC_IDC_EPISODE_ID: 384
MDC_IDC_EPISODE_ID: 385
MDC_IDC_EPISODE_ID: 386
MDC_IDC_EPISODE_ID: 387
MDC_IDC_EPISODE_ID: 388
MDC_IDC_EPISODE_ID: 389
MDC_IDC_EPISODE_ID: 390
MDC_IDC_EPISODE_ID: 391
MDC_IDC_EPISODE_ID: 392
MDC_IDC_EPISODE_ID: 393
MDC_IDC_EPISODE_ID: 394
MDC_IDC_EPISODE_ID: 395
MDC_IDC_EPISODE_ID: 396
MDC_IDC_EPISODE_ID: 397
MDC_IDC_EPISODE_ID: 398
MDC_IDC_EPISODE_ID: 399
MDC_IDC_EPISODE_ID: 400
MDC_IDC_EPISODE_ID: 401
MDC_IDC_EPISODE_ID: 402
MDC_IDC_EPISODE_ID: 403
MDC_IDC_EPISODE_ID: 404
MDC_IDC_EPISODE_ID: 405
MDC_IDC_EPISODE_ID: 406
MDC_IDC_EPISODE_ID: 407
MDC_IDC_EPISODE_ID: 408
MDC_IDC_EPISODE_ID: 409
MDC_IDC_EPISODE_ID: 410
MDC_IDC_EPISODE_ID: 411
MDC_IDC_EPISODE_ID: 412
MDC_IDC_EPISODE_ID: 413
MDC_IDC_EPISODE_ID: 414
MDC_IDC_EPISODE_ID: 415
MDC_IDC_EPISODE_ID: 416
MDC_IDC_EPISODE_ID: 417
MDC_IDC_EPISODE_ID: 418
MDC_IDC_EPISODE_ID: 419
MDC_IDC_EPISODE_ID: 420
MDC_IDC_EPISODE_ID: 421
MDC_IDC_EPISODE_ID: 422
MDC_IDC_EPISODE_ID: 423
MDC_IDC_EPISODE_ID: 424
MDC_IDC_EPISODE_ID: 425
MDC_IDC_EPISODE_ID: 426
MDC_IDC_EPISODE_ID: 427
MDC_IDC_EPISODE_ID: 428
MDC_IDC_EPISODE_ID: 429
MDC_IDC_EPISODE_ID: 430
MDC_IDC_EPISODE_ID: 431
MDC_IDC_EPISODE_ID: 432
MDC_IDC_EPISODE_ID: 433
MDC_IDC_EPISODE_ID: 434
MDC_IDC_EPISODE_ID: 435
MDC_IDC_EPISODE_ID: 436
MDC_IDC_EPISODE_ID: 437
MDC_IDC_EPISODE_ID: 438
MDC_IDC_EPISODE_ID: 439
MDC_IDC_EPISODE_ID: 440
MDC_IDC_EPISODE_ID: 441
MDC_IDC_EPISODE_ID: 442
MDC_IDC_EPISODE_ID: 443
MDC_IDC_EPISODE_ID: 444
MDC_IDC_EPISODE_ID: 445
MDC_IDC_EPISODE_ID: 446
MDC_IDC_EPISODE_ID: 447
MDC_IDC_EPISODE_ID: 448
MDC_IDC_EPISODE_ID: 449
MDC_IDC_EPISODE_ID: 450
MDC_IDC_EPISODE_ID: 451
MDC_IDC_EPISODE_ID: 452
MDC_IDC_EPISODE_ID: 453
MDC_IDC_EPISODE_ID: 454
MDC_IDC_EPISODE_ID: 455
MDC_IDC_EPISODE_ID: 456
MDC_IDC_EPISODE_ID: 457
MDC_IDC_EPISODE_ID: 458
MDC_IDC_EPISODE_ID: 459
MDC_IDC_EPISODE_ID: 460
MDC_IDC_EPISODE_TYPE: NORMAL
MDC_IDC_LEAD_CONNECTION_STATUS: NORMAL
MDC_IDC_LEAD_CONNECTION_STATUS: NORMAL
MDC_IDC_LEAD_IMPLANT_DT: NORMAL
MDC_IDC_LEAD_IMPLANT_DT: NORMAL
MDC_IDC_LEAD_LOCATION: NORMAL
MDC_IDC_LEAD_LOCATION: NORMAL
MDC_IDC_LEAD_LOCATION_DETAIL_1: NORMAL
MDC_IDC_LEAD_LOCATION_DETAIL_1: NORMAL
MDC_IDC_LEAD_MFG: NORMAL
MDC_IDC_LEAD_MFG: NORMAL
MDC_IDC_LEAD_MODEL: NORMAL
MDC_IDC_LEAD_MODEL: NORMAL
MDC_IDC_LEAD_POLARITY_TYPE: NORMAL
MDC_IDC_LEAD_POLARITY_TYPE: NORMAL
MDC_IDC_LEAD_SERIAL: NORMAL
MDC_IDC_LEAD_SERIAL: NORMAL
MDC_IDC_MSMT_BATTERY_DTM: NORMAL
MDC_IDC_MSMT_BATTERY_REMAINING_LONGEVITY: 131 MO
MDC_IDC_MSMT_BATTERY_RRT_TRIGGER: 2.62
MDC_IDC_MSMT_BATTERY_STATUS: NORMAL
MDC_IDC_MSMT_BATTERY_VOLTAGE: 3.02 V
MDC_IDC_MSMT_LEADCHNL_RA_IMPEDANCE_VALUE: 342 OHM
MDC_IDC_MSMT_LEADCHNL_RA_IMPEDANCE_VALUE: 475 OHM
MDC_IDC_MSMT_LEADCHNL_RA_PACING_THRESHOLD_AMPLITUDE: 0.38 V
MDC_IDC_MSMT_LEADCHNL_RA_PACING_THRESHOLD_PULSEWIDTH: 0.4 MS
MDC_IDC_MSMT_LEADCHNL_RA_SENSING_INTR_AMPL: 3.38 MV
MDC_IDC_MSMT_LEADCHNL_RA_SENSING_INTR_AMPL: 3.38 MV
MDC_IDC_MSMT_LEADCHNL_RV_IMPEDANCE_VALUE: 342 OHM
MDC_IDC_MSMT_LEADCHNL_RV_IMPEDANCE_VALUE: 418 OHM
MDC_IDC_MSMT_LEADCHNL_RV_PACING_THRESHOLD_AMPLITUDE: 0.62 V
MDC_IDC_MSMT_LEADCHNL_RV_PACING_THRESHOLD_PULSEWIDTH: 0.4 MS
MDC_IDC_MSMT_LEADCHNL_RV_SENSING_INTR_AMPL: 4.5 MV
MDC_IDC_MSMT_LEADCHNL_RV_SENSING_INTR_AMPL: 4.5 MV
MDC_IDC_PG_IMPLANT_DTM: NORMAL
MDC_IDC_PG_MFG: NORMAL
MDC_IDC_PG_MODEL: NORMAL
MDC_IDC_PG_SERIAL: NORMAL
MDC_IDC_PG_TYPE: NORMAL
MDC_IDC_SESS_CLINIC_NAME: NORMAL
MDC_IDC_SESS_DTM: NORMAL
MDC_IDC_SESS_TYPE: NORMAL
MDC_IDC_SET_BRADY_AT_MODE_SWITCH_RATE: 171 {BEATS}/MIN
MDC_IDC_SET_BRADY_HYSTRATE: NORMAL
MDC_IDC_SET_BRADY_LOWRATE: 50 {BEATS}/MIN
MDC_IDC_SET_BRADY_MAX_SENSOR_RATE: 130 {BEATS}/MIN
MDC_IDC_SET_BRADY_MAX_TRACKING_RATE: 130 {BEATS}/MIN
MDC_IDC_SET_BRADY_MODE: NORMAL
MDC_IDC_SET_BRADY_PAV_DELAY_LOW: 300 MS
MDC_IDC_SET_BRADY_SAV_DELAY_LOW: 300 MS
MDC_IDC_SET_LEADCHNL_RA_PACING_AMPLITUDE: 1.5 V
MDC_IDC_SET_LEADCHNL_RA_PACING_ANODE_ELECTRODE_1: NORMAL
MDC_IDC_SET_LEADCHNL_RA_PACING_ANODE_LOCATION_1: NORMAL
MDC_IDC_SET_LEADCHNL_RA_PACING_CAPTURE_MODE: NORMAL
MDC_IDC_SET_LEADCHNL_RA_PACING_CATHODE_ELECTRODE_1: NORMAL
MDC_IDC_SET_LEADCHNL_RA_PACING_CATHODE_LOCATION_1: NORMAL
MDC_IDC_SET_LEADCHNL_RA_PACING_POLARITY: NORMAL
MDC_IDC_SET_LEADCHNL_RA_PACING_PULSEWIDTH: 0.4 MS
MDC_IDC_SET_LEADCHNL_RA_SENSING_ANODE_ELECTRODE_1: NORMAL
MDC_IDC_SET_LEADCHNL_RA_SENSING_ANODE_LOCATION_1: NORMAL
MDC_IDC_SET_LEADCHNL_RA_SENSING_CATHODE_ELECTRODE_1: NORMAL
MDC_IDC_SET_LEADCHNL_RA_SENSING_CATHODE_LOCATION_1: NORMAL
MDC_IDC_SET_LEADCHNL_RA_SENSING_POLARITY: NORMAL
MDC_IDC_SET_LEADCHNL_RA_SENSING_SENSITIVITY: 0.3 MV
MDC_IDC_SET_LEADCHNL_RV_PACING_AMPLITUDE: 2 V
MDC_IDC_SET_LEADCHNL_RV_PACING_ANODE_ELECTRODE_1: NORMAL
MDC_IDC_SET_LEADCHNL_RV_PACING_ANODE_LOCATION_1: NORMAL
MDC_IDC_SET_LEADCHNL_RV_PACING_CAPTURE_MODE: NORMAL
MDC_IDC_SET_LEADCHNL_RV_PACING_CATHODE_ELECTRODE_1: NORMAL
MDC_IDC_SET_LEADCHNL_RV_PACING_CATHODE_LOCATION_1: NORMAL
MDC_IDC_SET_LEADCHNL_RV_PACING_POLARITY: NORMAL
MDC_IDC_SET_LEADCHNL_RV_PACING_PULSEWIDTH: 0.4 MS
MDC_IDC_SET_LEADCHNL_RV_SENSING_ANODE_ELECTRODE_1: NORMAL
MDC_IDC_SET_LEADCHNL_RV_SENSING_ANODE_LOCATION_1: NORMAL
MDC_IDC_SET_LEADCHNL_RV_SENSING_CATHODE_ELECTRODE_1: NORMAL
MDC_IDC_SET_LEADCHNL_RV_SENSING_CATHODE_LOCATION_1: NORMAL
MDC_IDC_SET_LEADCHNL_RV_SENSING_POLARITY: NORMAL
MDC_IDC_SET_LEADCHNL_RV_SENSING_SENSITIVITY: 0.9 MV
MDC_IDC_SET_ZONE_DETECTION_INTERVAL: 200 MS
MDC_IDC_SET_ZONE_DETECTION_INTERVAL: 350 MS
MDC_IDC_SET_ZONE_DETECTION_INTERVAL: 400 MS
MDC_IDC_SET_ZONE_STATUS: NORMAL
MDC_IDC_SET_ZONE_TYPE: NORMAL
MDC_IDC_SET_ZONE_VENDOR_TYPE: NORMAL
MDC_IDC_STAT_AT_BURDEN_PERCENT: 4.3 %
MDC_IDC_STAT_AT_DTM_END: NORMAL
MDC_IDC_STAT_AT_DTM_START: NORMAL
MDC_IDC_STAT_BRADY_AP_VP_PERCENT: 67.66 %
MDC_IDC_STAT_BRADY_AP_VS_PERCENT: 3.07 %
MDC_IDC_STAT_BRADY_AS_VP_PERCENT: 21.84 %
MDC_IDC_STAT_BRADY_AS_VS_PERCENT: 7.5 %
MDC_IDC_STAT_BRADY_DTM_END: NORMAL
MDC_IDC_STAT_BRADY_DTM_START: NORMAL
MDC_IDC_STAT_BRADY_RA_PERCENT_PACED: 68.61 %
MDC_IDC_STAT_BRADY_RV_PERCENT_PACED: 89.12 %
MDC_IDC_STAT_EPISODE_RECENT_COUNT: 0
MDC_IDC_STAT_EPISODE_RECENT_COUNT: 0
MDC_IDC_STAT_EPISODE_RECENT_COUNT: 12
MDC_IDC_STAT_EPISODE_RECENT_COUNT: 18
MDC_IDC_STAT_EPISODE_RECENT_COUNT: 86
MDC_IDC_STAT_EPISODE_RECENT_COUNT_DTM_END: NORMAL
MDC_IDC_STAT_EPISODE_RECENT_COUNT_DTM_START: NORMAL
MDC_IDC_STAT_EPISODE_TOTAL_COUNT: 0
MDC_IDC_STAT_EPISODE_TOTAL_COUNT: 0
MDC_IDC_STAT_EPISODE_TOTAL_COUNT: 341
MDC_IDC_STAT_EPISODE_TOTAL_COUNT: 50
MDC_IDC_STAT_EPISODE_TOTAL_COUNT: 69
MDC_IDC_STAT_EPISODE_TOTAL_COUNT_DTM_END: NORMAL
MDC_IDC_STAT_EPISODE_TOTAL_COUNT_DTM_START: NORMAL
MDC_IDC_STAT_EPISODE_TYPE: NORMAL
MDC_IDC_STAT_TACHYTHERAPY_RECENT_DTM_END: NORMAL
MDC_IDC_STAT_TACHYTHERAPY_RECENT_DTM_START: NORMAL
MDC_IDC_STAT_TACHYTHERAPY_TOTAL_DTM_END: NORMAL
MDC_IDC_STAT_TACHYTHERAPY_TOTAL_DTM_START: NORMAL

## 2024-08-01 DIAGNOSIS — Z95.0 CARDIAC PACEMAKER IN SITU: ICD-10-CM

## 2024-08-01 DIAGNOSIS — I11.9 HYPERTENSIVE LEFT VENTRICULAR HYPERTROPHY, WITHOUT HEART FAILURE: ICD-10-CM

## 2024-08-01 DIAGNOSIS — I48.0 PAROXYSMAL ATRIAL FIBRILLATION (H): ICD-10-CM

## 2024-08-01 DIAGNOSIS — E78.5 HYPERLIPIDEMIA LDL GOAL <70: ICD-10-CM

## 2024-08-01 DIAGNOSIS — I44.2 COMPLETE ATRIOVENTRICULAR BLOCK (H): ICD-10-CM

## 2024-08-01 DIAGNOSIS — I25.10 CORONARY ARTERY DISEASE INVOLVING NATIVE CORONARY ARTERY OF NATIVE HEART WITHOUT ANGINA PECTORIS: Primary | Chronic | ICD-10-CM

## 2024-08-13 NOTE — PLAN OF CARE
Discharge Planner PT   Patient plan for discharge: Home. Agreeable to OPPT if needed.   Current status: Patient seen for initial eval and treatment provided. [facility] [place] [facility] without a [facility]. Patient is retired and is typically independent and active including exercising daily. 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     Currently patient is able to transfers up to sit with just supervision and sit to stand with CGA. He was able to amb 300 feet with CGA with very slow speed initially but increased with speed,step length and confidence with increased distance. Able to go up and down 2 steps with HHA leading with left initially but eventually even able to lead with the right.   Left sitting in chair with wife present. Educated patient, wife and nurse that patient was safe to amb with nurse and should amb several more today.   Barriers to return to prior living situation: Steps to enter without rail but wife able to assist.   Recommendations for discharge: Home with assist of wife on stairs and OPPT.  Rationale for recommendations: Patient currently needs min HHA on stairs and wife able to assist. Currently would benefit from OPPT to progress strength and speed of movement right LE but if improves as quickly from today to tomorrow as he did from yesterday to today may be served just as well with ex for home.        Entered by: Mareln Lam 02/23/2020 12:31 PM       Orthopedic Surgery

## 2024-08-15 ENCOUNTER — OFFICE VISIT (OUTPATIENT)
Dept: CARDIOLOGY | Facility: CLINIC | Age: 64
End: 2024-08-15
Payer: COMMERCIAL

## 2024-08-15 VITALS
WEIGHT: 150 LBS | BODY MASS INDEX: 20.34 KG/M2 | DIASTOLIC BLOOD PRESSURE: 70 MMHG | HEART RATE: 51 BPM | SYSTOLIC BLOOD PRESSURE: 120 MMHG

## 2024-08-15 DIAGNOSIS — I25.10 CORONARY ARTERY DISEASE INVOLVING NATIVE CORONARY ARTERY OF NATIVE HEART WITHOUT ANGINA PECTORIS: Primary | ICD-10-CM

## 2024-08-15 DIAGNOSIS — Z00.6 EXAMINATION OF PARTICIPANT OR CONTROL IN CLINICAL RESEARCH: ICD-10-CM

## 2024-08-15 DIAGNOSIS — E11.40 TYPE 2 DIABETES MELLITUS WITH DIABETIC NEUROPATHY, WITHOUT LONG-TERM CURRENT USE OF INSULIN (H): ICD-10-CM

## 2024-08-15 DIAGNOSIS — E78.5 HYPERLIPIDEMIA LDL GOAL <70: ICD-10-CM

## 2024-08-15 PROCEDURE — 99207 PR NO CHARGE-RESEARCH SERVICE: CPT | Performed by: INTERNAL MEDICINE

## 2024-08-15 NOTE — PROGRESS NOTES
SURPASS-CVOT Study [Effect of tirzepatide versus Dulaglutide on Major Cardiovascular Events in Patients with Type 2Diabetes]    Subject seen for Visit 22    Research nurse met with subject to discuss participation in the above noted study.     The study discussion included the following:   Changes made with protocol amendments and specific changes to consent form     Subject asked questions and agreed that he received answers that satisfied.    Consent form [Version: [id number] - date: [id number]] signed.      A copy was offered to the subject & a copy was forwarded to medical records.      Vital signs were done.    Vitals:    08/15/24 0915 08/15/24 0917   BP: 124/69 120/70   BP Location: Left arm Left arm   Patient Position: Sitting Sitting   Cuff Size: Adult Regular Adult Regular   Pulse: 53 51   Weight: 68 kg (150 lb)         BP measured after 5 minutes resting in a seated position - two readings taken one minute apart using automated cuff.    Subject queried for adverse events, endpoints and medication changes. If present, noted below. No issus or concerns, seen by hepatology for 6 month Follow-up since last visit. Patient had reported RUQ burning pain, he states its the same as its been for some time and has a had a workup with no findings.     Adherence/lifestyle reinforcement done     No of pens dispensed at last visit 16    Any Returned medication 0 has 1 box at home, reminded to bring all 4 back at each visit.    Date of first dose since last visit: 05/23/2024    Date of last dose taken since last visit: 08/08/2024 shot due today, takes in the evening.      Subject drug dispensed  kit # [id number]              [id number]              [id number]              [id number]      Will see again in clinic in 3 months.    Mis Husain RN       Current Outpatient Medications:     amLODIPine (NORVASC) 2.5 MG tablet, Take 1 tablet (2.5 mg) by mouth 2 times daily, Disp: 200 tablet, Rfl: 6    apixaban ANTICOAGULANT (ELIQUIS) 5 MG  tablet, Take 1 tablet (5 mg) by mouth 2 times daily, Disp: 200 tablet, Rfl: 6    B-D U/F 31G X 8 MM insulin pen needle, USE ONE PEN NEEDLES DAILY OR AS DIRECTED., Disp: 100 each, Rfl: 3    celecoxib (CELEBREX) 100 MG capsule, TAKE 1 CAPSULE (100 MG) BY MOUTH DAILY AS NEEDED FOR MODERATE PAIN (4-6), Disp: 90 capsule, Rfl: 3    cyanocobalamin 1000 MCG SUBL, Place 1,000 mcg under the tongue daily, Disp: , Rfl:     glucosamine-chondroitin 500-400 MG CAPS per capsule, Take 1 capsule by mouth daily, Disp: , Rfl:     lisinopril (ZESTRIL) 20 MG tablet, Take 1 tablet (20 mg) by mouth 2 times daily, Disp: 200 tablet, Rfl: 6    omeprazole (PRILOSEC) 20 MG DR capsule, Take 1 capsule (20 mg) by mouth daily, Disp: 90 capsule, Rfl: 3    ONETOUCH VERIO IQ test strip, , Disp: , Rfl:     rosuvastatin (CRESTOR) 20 MG tablet, Take 1 tablet (20 mg) by mouth daily, Disp: 100 tablet, Rfl: 6    sildenafil (VIAGRA) 50 MG tablet, Take 1 tablet (50 mg) by mouth daily as needed, Disp: 30 tablet, Rfl: 11    Vitamin D, Cholecalciferol, 1000 units TABS, Take 1,000 Units by mouth daily, Disp: , Rfl:     zolpidem (AMBIEN) 5 MG tablet, Take 1 tablet (5 mg) by mouth nightly as needed for sleep, Disp: 30 tablet, Rfl: 0    Current Facility-Administered Medications:     study - tirzepatide 2.5-15 mg or dulaglutide 1.5 mg (SURPASS) (IDS# [id number]) injection 1.5-15 mg, 1.5-15 mg, Subcutaneous, Q7 Days, Mariah Elias MD

## 2024-09-16 ENCOUNTER — OFFICE VISIT (OUTPATIENT)
Dept: CARDIOLOGY | Facility: CLINIC | Age: 64
End: 2024-09-16
Payer: COMMERCIAL

## 2024-09-16 VITALS
DIASTOLIC BLOOD PRESSURE: 64 MMHG | HEIGHT: 72 IN | WEIGHT: 153 LBS | HEART RATE: 73 BPM | BODY MASS INDEX: 20.72 KG/M2 | SYSTOLIC BLOOD PRESSURE: 119 MMHG

## 2024-09-16 DIAGNOSIS — E11.40 TYPE 2 DIABETES MELLITUS WITH DIABETIC NEUROPATHY, WITHOUT LONG-TERM CURRENT USE OF INSULIN (H): Primary | ICD-10-CM

## 2024-09-16 DIAGNOSIS — E78.5 HYPERLIPIDEMIA LDL GOAL <70: ICD-10-CM

## 2024-09-16 DIAGNOSIS — I10 UNCONTROLLED HYPERTENSION: ICD-10-CM

## 2024-09-16 DIAGNOSIS — I25.10 CORONARY ARTERY DISEASE INVOLVING NATIVE CORONARY ARTERY OF NATIVE HEART WITHOUT ANGINA PECTORIS: Chronic | ICD-10-CM

## 2024-09-16 DIAGNOSIS — Z95.0 CARDIAC PACEMAKER IN SITU: ICD-10-CM

## 2024-09-16 DIAGNOSIS — I10 BENIGN ESSENTIAL HYPERTENSION: ICD-10-CM

## 2024-09-16 DIAGNOSIS — I44.2 COMPLETE ATRIOVENTRICULAR BLOCK (H): ICD-10-CM

## 2024-09-16 DIAGNOSIS — I48.0 PAROXYSMAL ATRIAL FIBRILLATION (H): ICD-10-CM

## 2024-09-16 DIAGNOSIS — K76.0 FATTY LIVER DISEASE, NONALCOHOLIC: ICD-10-CM

## 2024-09-16 PROCEDURE — 99215 OFFICE O/P EST HI 40 MIN: CPT | Performed by: INTERNAL MEDICINE

## 2024-09-16 RX ORDER — AMLODIPINE BESYLATE 2.5 MG/1
2.5 TABLET ORAL 2 TIMES DAILY
Qty: 180 TABLET | Refills: 3 | Status: SHIPPED | OUTPATIENT
Start: 2024-09-16

## 2024-09-16 RX ORDER — ROSUVASTATIN CALCIUM 20 MG/1
20 TABLET, COATED ORAL DAILY
Qty: 90 TABLET | Refills: 3 | Status: SHIPPED | OUTPATIENT
Start: 2024-09-16

## 2024-09-16 RX ORDER — LISINOPRIL 20 MG/1
20 TABLET ORAL 2 TIMES DAILY
Qty: 180 TABLET | Refills: 3 | Status: SHIPPED | OUTPATIENT
Start: 2024-09-16

## 2024-09-16 NOTE — PROGRESS NOTES
General Cardiology Clinic Progress Note  Vikash Burgos MRN# [id number]   YOB: 1960 Age: 64 year old       Reason for visit: Coronary artery disease    History of presenting illness:    I had the opportunity to see Vikash Burgos at [facility] today for reevaluation of coronary artery disease, carotid vascular disease, paroxysmal atrial fibrillation, complete heart block with permanent pacemaker, and cardiac risk factors.    He is a 64-year-old gentleman with history of coronary artery bypass surgery in 2010 with 4 grafts which were all patent based on his last coronary angiogram in 2014.  He underwent a stress MRI in 2023 which showed normal biventricular size and function with an left ventricular ejection fraction of 58% and no evidence of Lexiscan induced myocardial ischemia.  There was a small subendocardial scar within the mid inferolateral segment, and a small subepicardial none coronary disease scar in the mid inferior region.  He has had some mild atypical symptoms in the left side of the chest from time to time, but no typical angina.  He feels that the left chest is somewhat sensitive to touch but he has no pain with exertion suggestive of angina.  When he rides his bike, he notices that he cannot quite get as deep of a breath as he had before when he was younger, but still has reasonably good stamina and can complete 15 to 20 miles a day.    He underwent left carotid endarterectomy in 2020 and has followed up with Dr. Enoc boyer since then.  He has some moderate 50 to 69% residual stenosis in the left carotid, probably related to scar tissue.    He has paroxysmal atrial fibrillation following atrial fibrillation ablation in the past.  He has high-grade AV block with a permanent pacemaker in place and we continue to see a small A-fib burden on his device checks.  His most recent device check showed 69% atrial pacing, 89% ventricular pacing, and 4 mode switch  episodes comprising 4.3% of the time with the longest A-fib episode of 6 hours and 7 minutes.  Heart rates are controlled during these A-fib episodes typically between 60 and 100 bpm.  He also has some atrial high rate episodes that look like atrial tachycardia or atrial flutter at heart rates of 160-200.  No duration is given for these, but he indicates that he is asymptomatic with his arrhythmia episodes.  He remains on Eliquis for stroke prevention.    His cardiac risk factors include type 2 diabetes, hypertension, and dyslipidemia.  He is on study medication, tirzepatide or dulaglutide, for his diabetes and he has lost about 60 or 70 pounds and no longer needs insulin.  He also had fatty liver issues and that has resolved with weight loss with these medications.  His hemoglobin A1c is down to 5.2 and stable.  His cholesterol numbers are consistently excellent with an LDL of 54 this year.  His blood pressure has been more challenging to control and tends to be high at times in the office.  This morning his blood pressure was 119/64, but here in the office it is 154/75.  He takes his blood pressure frequently and his average is 131/67 at home.    On examination here today, His heart rate is 73 and weight 153 pounds.  His lungs are clear.  Heart rhythm is regular.  He has no significant murmur.  He has some irregular heartbeats.  He has no carotid bruits.               Assessment and Plan:     ASSESSMENT:    Mr. Kelton Burgos is a 64-year-old gentleman with coronary artery disease status post bypass surgery who has normal left ventricular function and a normal stress test as of a year ago.  He also has carotid vascular disease with a history of left carotid endarterectomy.  He is followed closely by Dr. Stubbs and will have an ultrasound of his carotid arteries again later this fall.  He has paroxysmal atrial fibrillation with high-grade AV heart block, although it looks like he still has some conduction at times.   He continues to have episodes of atrial fibrillation with reasonable rate control despite a history of A-fib ablation, as well as some episodes of more regular one-to-one AV conduction tachycardia such as atrial flutter or atrial tachycardia.  These are asymptomatic and appear brief.  He will continue Eliquis.  Will monitor that issue and consider adding a beta-blocker if he has more rapid heart rate issues.    His cardiac risk factors include hypertension, dyslipidemia and type 2 diabetes.  These are all under good control.  His blood pressure is somewhat borderline, with a goal of 130/80 or less, but I do not think we need to change his medications at this time.  He will continue to monitor that issue.    He continues exercising daily, riding his bike up to 20 miles a day with no concerning cardiac symptoms.    I will plan to see him back again in 1 year with blood testing and a stress echocardiogram again at that time.    Esteban Walters MD           Orders this Visit:  Orders Placed This Encounter   Procedures    Basic metabolic panel    CBC with platelets    Hemoglobin A1c    Lipid Profile    ALT    Follow-Up with Cardiology    Exercise Stress Echocardiogram     Orders Placed This Encounter   Medications    amLODIPine (NORVASC) 2.5 MG tablet     Sig: Take 1 tablet (2.5 mg) by mouth 2 times daily.     Dispense:  180 tablet     Refill:  3    apixaban ANTICOAGULANT (ELIQUIS) 5 MG tablet     Sig: Take 1 tablet (5 mg) by mouth 2 times daily.     Dispense:  180 tablet     Refill:  3    lisinopril (ZESTRIL) 20 MG tablet     Sig: Take 1 tablet (20 mg) by mouth 2 times daily.     Dispense:  180 tablet     Refill:  3    rosuvastatin (CRESTOR) 20 MG tablet     Sig: Take 1 tablet (20 mg) by mouth daily.     Dispense:  90 tablet     Refill:  3     Medications Discontinued During This Encounter   Medication Reason    amLODIPine (NORVASC) 2.5 MG tablet Reorder (No AVS)    apixaban ANTICOAGULANT (ELIQUIS) 5 MG tablet      lisinopril (ZESTRIL) 20 MG tablet Reorder (No AVS)    rosuvastatin (CRESTOR) 20 MG tablet Reorder (No AVS)       Today's clinic visit entailed:    45 minutes spent by me on the date of the encounter doing chart review, history and exam, documentation and further activities per the note  Provider  Link to [facility] Help Grid     The level of medical decision making during this visit was of high complexity.           Review of Systems:     Review of Systems:  Skin:        Eyes:       ENT:       Respiratory:  Negative    Cardiovascular:  Negative    Gastroenterology:      Genitourinary:       Musculoskeletal:       Neurologic:       Psychiatric:       Heme/Lymph/Imm:       Endocrine:  Positive for diabetes            Physical Exam:     Vitals: BP (!) 154/75   Pulse 73   Ht 1.829 m (6')   Wt 69.4 kg (153 lb)   BMI 20.75 kg/m    Constitutional: Well nourished and in no apparent distress.  Eyes: Pupils equal, round. Sclerae anicteric.   HEENT: Normocephalic, atraumatic.   Neck: Supple. JVD   Respiratory: Breathing non-labored. Lungs clear to auscultation bilaterally. No crackles, wheezes, rhonchi, or rales.  Cardiovascular:  Regular rate and rhythm, normal S1 and S2. No murmur, rub, or gallop.  Skin: Warm, dry. No rashes, cyanosis, or xanthelasma.  Extremities: No edema.  Neurologic: No gross motor deficits. Alert, awake, and oriented to person, place and time.  Psychiatric: Affect appropriate.             Medications:     Current Outpatient Medications   Medication Sig Dispense Refill    amLODIPine (NORVASC) 2.5 MG tablet Take 1 tablet (2.5 mg) by mouth 2 times daily. 180 tablet 3    apixaban ANTICOAGULANT (ELIQUIS) 5 MG tablet Take 1 tablet (5 mg) by mouth 2 times daily. 180 tablet 3    B-D U/F 31G X 8 MM insulin pen needle USE ONE PEN NEEDLES DAILY OR AS DIRECTED. 100 each 3    celecoxib (CELEBREX) 100 MG capsule TAKE 1 CAPSULE (100 MG) BY MOUTH DAILY AS NEEDED FOR MODERATE PAIN (4-6) (Patient taking differently: Take  100 mg by mouth daily.) 90 capsule 3    cyanocobalamin 1000 MCG SUBL Place 1,000 mcg under the tongue daily      glucosamine-chondroitin 500-400 MG CAPS per capsule Take 1 capsule by mouth daily      lisinopril (ZESTRIL) 20 MG tablet Take 1 tablet (20 mg) by mouth 2 times daily. 180 tablet 3    omeprazole (PRILOSEC) 20 MG DR capsule Take 1 capsule (20 mg) by mouth daily 90 capsule 3    ONETOUCH VERIO IQ test strip       rosuvastatin (CRESTOR) 20 MG tablet Take 1 tablet (20 mg) by mouth daily. 90 tablet 3    sildenafil (VIAGRA) 50 MG tablet Take 1 tablet (50 mg) by mouth daily as needed 30 tablet 11    Vitamin D, Cholecalciferol, 1000 units TABS Take 1,000 Units by mouth daily      zolpidem (AMBIEN) 5 MG tablet Take 1 tablet (5 mg) by mouth nightly as needed for sleep 30 tablet 0       Family History   Problem Relation Age of Onset    C.A.D. Father     Cancer Father         bladder    Heart Surgery Father         bypass surgery    Heart Disease Mother        Social History     Socioeconomic History    Marital status:      Spouse name: Not on file    Number of children: Not on file    Years of education: Not on file    Highest education level: Not on file   Occupational History    Not on file   Tobacco Use    Smoking status: Former     Current packs/day: 0.00     Average packs/day: 1 pack/day for 25.1 years (25.1 ttl pk-yrs)     Types: Cigarettes     Start date:      Quit date: 2005     Years since quittin.6    Smokeless tobacco: Never   Vaping Use    Vaping status: Never Used   Substance and Sexual Activity    Alcohol use: Yes     Comment: minimal 1 glass of wine per week    Drug use: No    Sexual activity: Yes     Partners: Female   Other Topics Concern    Parent/sibling w/ CABG, MI or angioplasty before 65F 55M? No     Service Not Asked    Blood Transfusions Not Asked    Caffeine Concern Yes     Comment: 2 cups coffee per day    Occupational Exposure Not Asked    Hobby Hazards Not Asked     Sleep Concern Not Asked    Stress Concern Not Asked    Weight Concern Not Asked    Special Diet Yes     Comment: low carb diet, low sugar    Back Care Not Asked    Exercise Yes     Comment: treadmill 40 minutes, walk, daily, bike 30 minutes every other day    Bike Helmet Not Asked    Seat Belt Not Asked    Self-Exams Not Asked   Social History Narrative    Not on file     Social Determinants of Health     Financial Resource Strain: Low Risk  (5/27/2024)    Financial Resource Strain     Within the past 12 months, have you or your family members you live with been unable to get utilities (heat, electricity) when it was really needed?: No   Food Insecurity: Low Risk  (5/27/2024)    Food Insecurity     Within the past 12 months, did you worry that your food would run out before you got money to buy more?: No     Within the past 12 months, did the food you bought just not last and you didn t have money to get more?: No   Transportation Needs: Low Risk  (5/27/2024)    Transportation Needs     Within the past 12 months, has lack of transportation kept you from medical appointments, getting your medicines, non-medical meetings or appointments, work, or from getting things that you need?: No   Physical Activity: Sufficiently Active (5/27/2024)    Exercise Vital Sign     Days of Exercise per Week: 7 days     Minutes of Exercise per Session: 40 min   Stress: No Stress Concern Present (5/27/2024)    [place] [employer] - Occupational Stress Questionnaire     Feeling of Stress : Not at all   Social Connections: Unknown (5/27/2024)    Social Connection and Isolation Panel [NHANES]     Frequency of Communication with Friends and Family: Not on file     Frequency of Social Gatherings with Friends and Family: Twice a week     Attends Alevism Services: Not on file     Active Member of Clubs or Organizations: Not on file     Attends Club or Organization Meetings: Not on file     Marital Status: Not on file    Interpersonal Safety: Low Risk  (5/28/2024)    Interpersonal Safety     Do you feel physically and emotionally safe where you currently live?: Yes     Within the past 12 months, have you been hit, slapped, kicked or otherwise physically hurt by someone?: No     Within the past 12 months, have you been humiliated or emotionally abused in other ways by your partner or ex-partner?: No   Housing Stability: Low Risk  (5/27/2024)    Housing Stability     Do you have housing? : Yes     Are you worried about losing your housing?: No            Past Medical History:     Past Medical History:   Diagnosis Date    Basal cell carcinoma     Carotid stenosis, left     s/p left CEA 2/2020    Coronary artery disease     4 vessel bypass November 2010; LIMA ->LAD, SVG-> OM3, SVG -> D2, SVG -> PDA    Diabetes mellitus (H) 2005    Generalized anxiety disorder 05/02/2014    Hepatitis C, chronic (H) 2005    Hyperlipidemia LDL goal < 70     Hypertension     Liver diseases     9/15 Liver is 10 cm in span without left lobe enlargement    Persistent microalbuminuria associated with type 2 diabetes mellitus (H) 05/06/2015              Past Surgical History:     Past Surgical History:   Procedure Laterality Date    ARTHROSCOPY KNEE RT/LT      left    COLONOSCOPY      CORONARY ARTERY BYPASS  11/18/201    Coronary artery bypass grafting x 4 with placement of the left internal mammary artery to the distal midportion of the left anterior descending artery, saphenous vein graft from aorta to the third obtuse marginal branch of circumflex coronary artery, saphenous vein graft from aorta to the second diagonal branch, saphenous vein graft from aorta to the posterior descending artery.    ENDARTERECTOMY CAROTID Left 2/21/2020    Procedure: LEFT CAROTID ENDARTERECTOMY WITH EEG;  Surgeon: Semaj Stubbs MD;  Location: [facility] OR    EP ABLATION ATRIAL FLUTTER N/A 9/9/2022    Procedure: Ablation Atrial Flutter;  Surgeon: Tyson Ordonez MD;   Location:  HEART CARDIAC CATH LAB    EP PACEMAKER DEVICE & LEAD IMPLANT- RIGHT ATRIAL & LEFT VENTRICULAR N/A 11/7/2022    Procedure: Pacemaker Device & Lead Implant- Right Atrial & Left Ventricular;  Surgeon: Tyson Ordonez MD;  Location:  HEART CARDIAC CATH LAB    ESOPHAGOSCOPY, GASTROSCOPY, DUODENOSCOPY (EGD), COMBINED  10/31/2011    Procedure:COMBINED ESOPHAGOSCOPY, GASTROSCOPY, DUODENOSCOPY (EGD); Surgeon:ALONSO ALDANA; Location: GI    ESOPHAGOSCOPY, GASTROSCOPY, DUODENOSCOPY (EGD), COMBINED N/A 3/8/2018    Procedure: COMBINED ESOPHAGOSCOPY, GASTROSCOPY, DUODENOSCOPY (EGD);  EGD;  Surgeon: Angel Luis Justice MD;  Location:  GI    HEART CATH CORONARY ANGIOGRAM W/LV GRAM  9-11-10    CV Surgery recommended    HEART CATH CORONARY ANGIOGRAM W/LV GRAM  2-28-14    Medical management    HERNIA REPAIR, INGUINAL RT/LT      left              Allergies:   Patient has no known allergies.       Data:   All laboratory data reviewed:    Recent Labs   Lab Test 05/28/24  0911 01/26/23  0805 03/29/22  0722 01/23/19  0727 03/28/18  1200   LDL 54 48 40   < >  --    HDL 46 51 36*   < >  --    NHDL 70 58 57   < >  --    CHOL 116 109 93   < >  --    TRIG 79 52 87   < >  --    NTBNP  --   --   --   --  118    < > = values in this interval not displayed.       Lab Results   Component Value Date    WBC 4.4 05/16/2024    WBC 5.3 06/07/2021    RBC 5.12 05/16/2024    RBC 5.00 06/07/2021    HGB 15.9 05/16/2024    HGB 15.3 06/07/2021    HCT 46.4 05/16/2024    HCT 45.1 06/07/2021    MCV 91 05/16/2024    MCV 90 06/07/2021    MCH 31.1 05/16/2024    MCH 30.6 06/07/2021    MCHC 34.3 05/16/2024    MCHC 33.9 06/07/2021    RDW 12.8 05/16/2024    RDW 12.9 06/07/2021     (L) 05/16/2024     (L) 06/07/2021       Lab Results   Component Value Date     05/16/2024     06/07/2021    POTASSIUM 5.2 05/16/2024    POTASSIUM 4.0 11/07/2022    POTASSIUM 4.8 06/07/2021    CHLORIDE 103 05/16/2024    CHLORIDE  107 11/07/2022    CHLORIDE 108 06/07/2021    CO2 27 05/16/2024    CO2 23 11/07/2022    CO2 28 06/07/2021    ANIONGAP 8 05/16/2024    ANIONGAP 7 11/07/2022    ANIONGAP 5 06/07/2021     (H) 05/16/2024     (H) 11/07/2022     (H) 06/07/2021    BUN 14.0 05/16/2024    BUN 15 11/07/2022    BUN 17 06/07/2021    CR 1.04 05/16/2024    CR 0.89 06/07/2021    GFRESTIMATED 80 05/16/2024    GFRESTIMATED >60 03/08/2022    GFRESTIMATED >90 06/07/2021    GFRESTBLACK >90 06/07/2021    LIA 9.8 05/16/2024    LIA 9.6 06/07/2021      Lab Results   Component Value Date    AST 29 05/16/2024    AST 28 06/07/2021    ALT 38 05/16/2024    ALT 46 06/07/2021       Lab Results   Component Value Date    A1C 5.2 05/28/2024    A1C 6.9 (H) 11/13/2020       Lab Results   Component Value Date    INR 1.35 (H) 05/16/2024    INR 1.41 (H) 07/21/2023    INR 1.11 06/07/2021    INR 1.13 12/15/2020         SHEREE TRUONG MD  [facility]

## 2024-09-16 NOTE — LETTER
9/16/2024    Danish Land MD  [address]  [facility] [address]    RE: Vikash Burgos       Dear Colleague,     I had the pleasure of seeing Vikash Burgos in the [facility].    General Cardiology Clinic Progress Note  Vikash Burgos MRN# [id number]   YOB: 1960 Age: 64 year old       Reason for visit: Coronary artery disease    History of presenting illness:    I had the opportunity to see Vikash Burgos at [facility] today for reevaluation of coronary artery disease, carotid vascular disease, paroxysmal atrial fibrillation, complete heart block with permanent pacemaker, and cardiac risk factors.    He is a 64-year-old gentleman with history of coronary artery bypass surgery in 2010 with 4 grafts which were all patent based on his last coronary angiogram in 2014.  He underwent a stress MRI in 2023 which showed normal biventricular size and function with an left ventricular ejection fraction of 58% and no evidence of Lexiscan induced myocardial ischemia.  There was a small subendocardial scar within the mid inferolateral segment, and a small subepicardial none coronary disease scar in the mid inferior region.  He has had some mild atypical symptoms in the left side of the chest from time to time, but no typical angina.  He feels that the left chest is somewhat sensitive to touch but he has no pain with exertion suggestive of angina.  When he rides his bike, he notices that he cannot quite get as deep of a breath as he had before when he was younger, but still has reasonably good stamina and can complete 15 to 20 miles a day.    He underwent left carotid endarterectomy in 2020 and has followed up with Dr. Enoc boyer since then.  He has some moderate 50 to 69% residual stenosis in the left carotid, probably related to scar tissue.    He has paroxysmal atrial fibrillation following atrial fibrillation ablation in the past.  He has  high-grade AV block with a permanent pacemaker in place and we continue to see a small A-fib burden on his device checks.  His most recent device check showed 69% atrial pacing, 89% ventricular pacing, and 4 mode switch episodes comprising 4.3% of the time with the longest A-fib episode of 6 hours and 7 minutes.  Heart rates are controlled during these A-fib episodes typically between 60 and 100 bpm.  He also has some atrial high rate episodes that look like atrial tachycardia or atrial flutter at heart rates of 160-200.  No duration is given for these, but he indicates that he is asymptomatic with his arrhythmia episodes.  He remains on Eliquis for stroke prevention.    His cardiac risk factors include type 2 diabetes, hypertension, and dyslipidemia.  He is on study medication, tirzepatide or dulaglutide, for his diabetes and he has lost about 60 or 70 pounds and no longer needs insulin.  He also had fatty liver issues and that has resolved with weight loss with these medications.  His hemoglobin A1c is down to 5.2 and stable.  His cholesterol numbers are consistently excellent with an LDL of 54 this year.  His blood pressure has been more challenging to control and tends to be high at times in the office.  This morning his blood pressure was 119/64, but here in the office it is 154/75.  He takes his blood pressure frequently and his average is 131/67 at home.    On examination here today, His heart rate is 73 and weight 153 pounds.  His lungs are clear.  Heart rhythm is regular.  He has no significant murmur.  He has some irregular heartbeats.  He has no carotid bruits.               Assessment and Plan:     ASSESSMENT:    Mr. Kelton Burgos is a 64-year-old gentleman with coronary artery disease status post bypass surgery who has normal left ventricular function and a normal stress test as of a year ago.  He also has carotid vascular disease with a history of left carotid endarterectomy.  He is followed closely  by Dr. Stubbs and will have an ultrasound of his carotid arteries again later this fall.  He has paroxysmal atrial fibrillation with high-grade AV heart block, although it looks like he still has some conduction at times.  He continues to have episodes of atrial fibrillation with reasonable rate control despite a history of A-fib ablation, as well as some episodes of more regular one-to-one AV conduction tachycardia such as atrial flutter or atrial tachycardia.  These are asymptomatic and appear brief.  He will continue Eliquis.  Will monitor that issue and consider adding a beta-blocker if he has more rapid heart rate issues.    His cardiac risk factors include hypertension, dyslipidemia and type 2 diabetes.  These are all under good control.  His blood pressure is somewhat borderline, with a goal of 130/80 or less, but I do not think we need to change his medications at this time.  He will continue to monitor that issue.    He continues exercising daily, riding his bike up to 20 miles a day with no concerning cardiac symptoms.    I will plan to see him back again in 1 year with blood testing and a stress echocardiogram again at that time.    Esteban Walters MD           Orders this Visit:  Orders Placed This Encounter   Procedures     Basic metabolic panel     CBC with platelets     Hemoglobin A1c     Lipid Profile     ALT     Follow-Up with Cardiology     Exercise Stress Echocardiogram     Orders Placed This Encounter   Medications     amLODIPine (NORVASC) 2.5 MG tablet     Sig: Take 1 tablet (2.5 mg) by mouth 2 times daily.     Dispense:  180 tablet     Refill:  3     apixaban ANTICOAGULANT (ELIQUIS) 5 MG tablet     Sig: Take 1 tablet (5 mg) by mouth 2 times daily.     Dispense:  180 tablet     Refill:  3     lisinopril (ZESTRIL) 20 MG tablet     Sig: Take 1 tablet (20 mg) by mouth 2 times daily.     Dispense:  180 tablet     Refill:  3     rosuvastatin (CRESTOR) 20 MG tablet     Sig: Take 1 tablet (20 mg) by mouth  daily.     Dispense:  90 tablet     Refill:  3     Medications Discontinued During This Encounter   Medication Reason     amLODIPine (NORVASC) 2.5 MG tablet Reorder (No AVS)     apixaban ANTICOAGULANT (ELIQUIS) 5 MG tablet      lisinopril (ZESTRIL) 20 MG tablet Reorder (No AVS)     rosuvastatin (CRESTOR) 20 MG tablet Reorder (No AVS)       Today's clinic visit entailed:    45 minutes spent by me on the date of the encounter doing chart review, history and exam, documentation and further activities per the note  Provider  Link to [facility] Help Grid     The level of medical decision making during this visit was of high complexity.           Review of Systems:     Review of Systems:  Skin:        Eyes:       ENT:       Respiratory:  Negative    Cardiovascular:  Negative    Gastroenterology:      Genitourinary:       Musculoskeletal:       Neurologic:       Psychiatric:       Heme/Lymph/Imm:       Endocrine:  Positive for diabetes            Physical Exam:     Vitals: BP (!) 154/75   Pulse 73   Ht 1.829 m (6')   Wt 69.4 kg (153 lb)   BMI 20.75 kg/m    Constitutional: Well nourished and in no apparent distress.  Eyes: Pupils equal, round. Sclerae anicteric.   HEENT: Normocephalic, atraumatic.   Neck: Supple. JVD   Respiratory: Breathing non-labored. Lungs clear to auscultation bilaterally. No crackles, wheezes, rhonchi, or rales.  Cardiovascular:  Regular rate and rhythm, normal S1 and S2. No murmur, rub, or gallop.  Skin: Warm, dry. No rashes, cyanosis, or xanthelasma.  Extremities: No edema.  Neurologic: No gross motor deficits. Alert, awake, and oriented to person, place and time.  Psychiatric: Affect appropriate.             Medications:     Current Outpatient Medications   Medication Sig Dispense Refill     amLODIPine (NORVASC) 2.5 MG tablet Take 1 tablet (2.5 mg) by mouth 2 times daily. 180 tablet 3     apixaban ANTICOAGULANT (ELIQUIS) 5 MG tablet Take 1 tablet (5 mg) by mouth 2 times daily. 180 tablet 3     B-D  U/F 31G X 8 MM insulin pen needle USE ONE PEN NEEDLES DAILY OR AS DIRECTED. 100 each 3     celecoxib (CELEBREX) 100 MG capsule TAKE 1 CAPSULE (100 MG) BY MOUTH DAILY AS NEEDED FOR MODERATE PAIN (4-6) (Patient taking differently: Take 100 mg by mouth daily.) 90 capsule 3     cyanocobalamin 1000 MCG SUBL Place 1,000 mcg under the tongue daily       glucosamine-chondroitin 500-400 MG CAPS per capsule Take 1 capsule by mouth daily       lisinopril (ZESTRIL) 20 MG tablet Take 1 tablet (20 mg) by mouth 2 times daily. 180 tablet 3     omeprazole (PRILOSEC) 20 MG DR capsule Take 1 capsule (20 mg) by mouth daily 90 capsule 3     ONETOUCH VERIO IQ test strip        rosuvastatin (CRESTOR) 20 MG tablet Take 1 tablet (20 mg) by mouth daily. 90 tablet 3     sildenafil (VIAGRA) 50 MG tablet Take 1 tablet (50 mg) by mouth daily as needed 30 tablet 11     Vitamin D, Cholecalciferol, 1000 units TABS Take 1,000 Units by mouth daily       zolpidem (AMBIEN) 5 MG tablet Take 1 tablet (5 mg) by mouth nightly as needed for sleep 30 tablet 0       Family History   Problem Relation Age of Onset     C.A.D. Father      Cancer Father         bladder     Heart Surgery Father         bypass surgery     Heart Disease Mother        Social History     Socioeconomic History     Marital status:      Spouse name: Not on file     Number of children: Not on file     Years of education: Not on file     Highest education level: Not on file   Occupational History     Not on file   Tobacco Use     Smoking status: Former     Current packs/day: 0.00     Average packs/day: 1 pack/day for 25.1 years (25.1 ttl pk-yrs)     Types: Cigarettes     Start date:      Quit date: 2005     Years since quittin.6     Smokeless tobacco: Never   Vaping Use     Vaping status: Never Used   Substance and Sexual Activity     Alcohol use: Yes     Comment: minimal 1 glass of wine per week     Drug use: No     Sexual activity: Yes     Partners: Female   Other  Topics Concern     Parent/sibling w/ CABG, MI or angioplasty before 65F 55M? No      Service Not Asked     Blood Transfusions Not Asked     Caffeine Concern Yes     Comment: 2 cups coffee per day     Occupational Exposure Not Asked     Hobby Hazards Not Asked     Sleep Concern Not Asked     Stress Concern Not Asked     Weight Concern Not Asked     Special Diet Yes     Comment: low carb diet, low sugar     Back Care Not Asked     Exercise Yes     Comment: treadmill 40 minutes, walk, daily, bike 30 minutes every other day     Bike Helmet Not Asked     Seat Belt Not Asked     Self-Exams Not Asked   Social History Narrative     Not on file     Social Determinants of Health     Financial Resource Strain: Low Risk  (5/27/2024)    Financial Resource Strain      Within the past 12 months, have you or your family members you live with been unable to get utilities (heat, electricity) when it was really needed?: No   Food Insecurity: Low Risk  (5/27/2024)    Food Insecurity      Within the past 12 months, did you worry that your food would run out before you got money to buy more?: No      Within the past 12 months, did the food you bought just not last and you didn t have money to get more?: No   Transportation Needs: Low Risk  (5/27/2024)    Transportation Needs      Within the past 12 months, has lack of transportation kept you from medical appointments, getting your medicines, non-medical meetings or appointments, work, or from getting things that you need?: No   Physical Activity: Sufficiently Active (5/27/2024)    Exercise Vital Sign      Days of Exercise per Week: 7 days      Minutes of Exercise per Session: 40 min   Stress: No Stress Concern Present (5/27/2024)    [employer] [place] [employer] - Occupational Stress Questionnaire      Feeling of Stress : Not at all   Social Connections: Unknown (5/27/2024)    Social Connection and Isolation Panel [NHANES]      Frequency of Communication with Friends  and Family: Not on file      Frequency of Social Gatherings with Friends and Family: Twice a week      Attends Jehovah's witness Services: Not on file      Active Member of Clubs or Organizations: Not on file      Attends Club or Organization Meetings: Not on file      Marital Status: Not on file   Interpersonal Safety: Low Risk  (5/28/2024)    Interpersonal Safety      Do you feel physically and emotionally safe where you currently live?: Yes      Within the past 12 months, have you been hit, slapped, kicked or otherwise physically hurt by someone?: No      Within the past 12 months, have you been humiliated or emotionally abused in other ways by your partner or ex-partner?: No   Housing Stability: Low Risk  (5/27/2024)    Housing Stability      Do you have housing? : Yes      Are you worried about losing your housing?: No            Past Medical History:     Past Medical History:   Diagnosis Date     Basal cell carcinoma      Carotid stenosis, left     s/p left CEA 2/2020     Coronary artery disease     4 vessel bypass November 2010; LIMA ->LAD, SVG-> OM3, SVG -> D2, SVG -> PDA     Diabetes mellitus (H) 2005     Generalized anxiety disorder 05/02/2014     Hepatitis C, chronic (H) 2005     Hyperlipidemia LDL goal < 70      Hypertension      Liver diseases     9/15 Liver is 10 cm in span without left lobe enlargement     Persistent microalbuminuria associated with type 2 diabetes mellitus (H) 05/06/2015              Past Surgical History:     Past Surgical History:   Procedure Laterality Date     ARTHROSCOPY KNEE RT/LT      left     COLONOSCOPY       CORONARY ARTERY BYPASS  11/18/201    Coronary artery bypass grafting x 4 with placement of the left internal mammary artery to the distal midportion of the left anterior descending artery, saphenous vein graft from aorta to the third obtuse marginal branch of circumflex coronary artery, saphenous vein graft from aorta to the second diagonal branch, saphenous vein graft from  aorta to the posterior descending artery.     ENDARTERECTOMY CAROTID Left 2/21/2020    Procedure: LEFT CAROTID ENDARTERECTOMY WITH EEG;  Surgeon: Semaj Stubbs MD;  Location:  OR     EP ABLATION ATRIAL FLUTTER N/A 9/9/2022    Procedure: Ablation Atrial Flutter;  Surgeon: Tyson Ordonez MD;  Location:  HEART CARDIAC CATH LAB     EP PACEMAKER DEVICE & LEAD IMPLANT- RIGHT ATRIAL & LEFT VENTRICULAR N/A 11/7/2022    Procedure: Pacemaker Device & Lead Implant- Right Atrial & Left Ventricular;  Surgeon: Tyson Ordonez MD;  Location:  HEART CARDIAC CATH LAB     ESOPHAGOSCOPY, GASTROSCOPY, DUODENOSCOPY (EGD), COMBINED  10/31/2011    Procedure:COMBINED ESOPHAGOSCOPY, GASTROSCOPY, DUODENOSCOPY (EGD); Surgeon:ALONSO ALDANA; Location: GI     ESOPHAGOSCOPY, GASTROSCOPY, DUODENOSCOPY (EGD), COMBINED N/A 3/8/2018    Procedure: COMBINED ESOPHAGOSCOPY, GASTROSCOPY, DUODENOSCOPY (EGD);  EGD;  Surgeon: Angel Luis Justice MD;  Location:  GI     HEART CATH CORONARY ANGIOGRAM W/LV GRAM  9-11-10    CV Surgery recommended     HEART CATH CORONARY ANGIOGRAM W/LV GRAM  2-28-14    Medical management     HERNIA REPAIR, INGUINAL RT/LT      left              Allergies:   Patient has no known allergies.       Data:   All laboratory data reviewed:    Recent Labs   Lab Test 05/28/24  0911 01/26/23  0805 03/29/22  0722 01/23/19  0727 03/28/18  1200   LDL 54 48 40   < >  --    HDL 46 51 36*   < >  --    NHDL 70 58 57   < >  --    CHOL 116 109 93   < >  --    TRIG 79 52 87   < >  --    NTBNP  --   --   --   --  118    < > = values in this interval not displayed.       Lab Results   Component Value Date    WBC 4.4 05/16/2024    WBC 5.3 06/07/2021    RBC 5.12 05/16/2024    RBC 5.00 06/07/2021    HGB 15.9 05/16/2024    HGB 15.3 06/07/2021    HCT 46.4 05/16/2024    HCT 45.1 06/07/2021    MCV 91 05/16/2024    MCV 90 06/07/2021    MCH 31.1 05/16/2024    MCH 30.6 06/07/2021    MCHC 34.3 05/16/2024    MCHC  33.9 06/07/2021    RDW 12.8 05/16/2024    RDW 12.9 06/07/2021     (L) 05/16/2024     (L) 06/07/2021       Lab Results   Component Value Date     05/16/2024     06/07/2021    POTASSIUM 5.2 05/16/2024    POTASSIUM 4.0 11/07/2022    POTASSIUM 4.8 06/07/2021    CHLORIDE 103 05/16/2024    CHLORIDE 107 11/07/2022    CHLORIDE 108 06/07/2021    CO2 27 05/16/2024    CO2 23 11/07/2022    CO2 28 06/07/2021    ANIONGAP 8 05/16/2024    ANIONGAP 7 11/07/2022    ANIONGAP 5 06/07/2021     (H) 05/16/2024     (H) 11/07/2022     (H) 06/07/2021    BUN 14.0 05/16/2024    BUN 15 11/07/2022    BUN 17 06/07/2021    CR 1.04 05/16/2024    CR 0.89 06/07/2021    GFRESTIMATED 80 05/16/2024    GFRESTIMATED >60 03/08/2022    GFRESTIMATED >90 06/07/2021    GFRESTBLACK >90 06/07/2021    LIA 9.8 05/16/2024    LIA 9.6 06/07/2021      Lab Results   Component Value Date    AST 29 05/16/2024    AST 28 06/07/2021    ALT 38 05/16/2024    ALT 46 06/07/2021       Lab Results   Component Value Date    A1C 5.2 05/28/2024    A1C 6.9 (H) 11/13/2020       Lab Results   Component Value Date    INR 1.35 (H) 05/16/2024    INR 1.41 (H) 07/21/2023    INR 1.11 06/07/2021    INR 1.13 12/15/2020         SHEREE TRUONG MD  [facility]    Thank you for allowing me to participate in the care of your patient.      Sincerely,     SHEREE TRUONG MD     [facility]  cc:   Danish Land MD  [address]  [place] [address]

## 2024-09-16 NOTE — PATIENT INSTRUCTIONS
It was a pleasure seeing you today and thank you for allowing me to be a part of your health care team.  Should you have any questions regarding your visit or future needs please feel free to reach out to my care team for assistance.      Thank you, Dr. Esteban Walters        **Nursing: [phone]       **Scheduling: [phone]]

## 2024-09-25 ENCOUNTER — TRANSFERRED RECORDS (OUTPATIENT)
Dept: HEALTH INFORMATION MANAGEMENT | Facility: CLINIC | Age: 64
End: 2024-09-25
Payer: COMMERCIAL

## 2024-09-25 LAB — RETINOPATHY: POSITIVE

## 2024-10-02 ENCOUNTER — TRANSFERRED RECORDS (OUTPATIENT)
Dept: HEALTH INFORMATION MANAGEMENT | Facility: CLINIC | Age: 64
End: 2024-10-02
Payer: COMMERCIAL

## 2024-10-02 LAB — HBA1C MFR BLD: 5.5 % (ref 4.8–5.6)

## 2024-10-23 ENCOUNTER — ANCILLARY PROCEDURE (OUTPATIENT)
Dept: CARDIOLOGY | Facility: CLINIC | Age: 64
End: 2024-10-23
Attending: INTERNAL MEDICINE
Payer: COMMERCIAL

## 2024-10-23 DIAGNOSIS — Z95.0 CARDIAC PACEMAKER IN SITU: Primary | ICD-10-CM

## 2024-10-23 DIAGNOSIS — I44.1 SECOND DEGREE ATRIOVENTRICULAR BLOCK: ICD-10-CM

## 2024-10-23 DIAGNOSIS — I44.2 COMPLETE ATRIOVENTRICULAR BLOCK (H): ICD-10-CM

## 2024-10-23 DIAGNOSIS — Z95.0 CARDIAC PACEMAKER IN SITU: ICD-10-CM

## 2024-10-23 PROCEDURE — 93294 REM INTERROG EVL PM/LDLS PM: CPT | Performed by: INTERNAL MEDICINE

## 2024-10-23 PROCEDURE — 93296 REM INTERROG EVL PM/IDS: CPT | Performed by: INTERNAL MEDICINE

## 2024-10-24 LAB
MDC_IDC_EPISODE_DTM: NORMAL
MDC_IDC_EPISODE_DURATION: 0 S
MDC_IDC_EPISODE_DURATION: 1 S
MDC_IDC_EPISODE_DURATION: 1006 S
MDC_IDC_EPISODE_DURATION: 102 S
MDC_IDC_EPISODE_DURATION: 1068 S
MDC_IDC_EPISODE_DURATION: 109 S
MDC_IDC_EPISODE_DURATION: 116 S
MDC_IDC_EPISODE_DURATION: 117 S
MDC_IDC_EPISODE_DURATION: 1232 S
MDC_IDC_EPISODE_DURATION: 1250 S
MDC_IDC_EPISODE_DURATION: 1260 S
MDC_IDC_EPISODE_DURATION: 127 S
MDC_IDC_EPISODE_DURATION: 131 S
MDC_IDC_EPISODE_DURATION: 1313 S
MDC_IDC_EPISODE_DURATION: 1386 S
MDC_IDC_EPISODE_DURATION: 142 S
MDC_IDC_EPISODE_DURATION: 1592 S
MDC_IDC_EPISODE_DURATION: 1753 S
MDC_IDC_EPISODE_DURATION: 183 S
MDC_IDC_EPISODE_DURATION: 1860 S
MDC_IDC_EPISODE_DURATION: 2 S
MDC_IDC_EPISODE_DURATION: 2 S
MDC_IDC_EPISODE_DURATION: 202 S
MDC_IDC_EPISODE_DURATION: 2046 S
MDC_IDC_EPISODE_DURATION: 2296 S
MDC_IDC_EPISODE_DURATION: 234 S
MDC_IDC_EPISODE_DURATION: 2376 S
MDC_IDC_EPISODE_DURATION: 2572 S
MDC_IDC_EPISODE_DURATION: 2605 S
MDC_IDC_EPISODE_DURATION: 2969 S
MDC_IDC_EPISODE_DURATION: 2982 S
MDC_IDC_EPISODE_DURATION: 31 S
MDC_IDC_EPISODE_DURATION: 3313 S
MDC_IDC_EPISODE_DURATION: 3338 S
MDC_IDC_EPISODE_DURATION: 34 S
MDC_IDC_EPISODE_DURATION: 35 S
MDC_IDC_EPISODE_DURATION: 3558 S
MDC_IDC_EPISODE_DURATION: 3593 S
MDC_IDC_EPISODE_DURATION: 3690 S
MDC_IDC_EPISODE_DURATION: 3710 S
MDC_IDC_EPISODE_DURATION: 3841 S
MDC_IDC_EPISODE_DURATION: 397 S
MDC_IDC_EPISODE_DURATION: 4195 S
MDC_IDC_EPISODE_DURATION: 43 S
MDC_IDC_EPISODE_DURATION: 46 S
MDC_IDC_EPISODE_DURATION: 4694 S
MDC_IDC_EPISODE_DURATION: 4747 S
MDC_IDC_EPISODE_DURATION: 49 S
MDC_IDC_EPISODE_DURATION: 5015 S
MDC_IDC_EPISODE_DURATION: 532 S
MDC_IDC_EPISODE_DURATION: 532 S
MDC_IDC_EPISODE_DURATION: 5330 S
MDC_IDC_EPISODE_DURATION: 54 S
MDC_IDC_EPISODE_DURATION: 5685 S
MDC_IDC_EPISODE_DURATION: 61 S
MDC_IDC_EPISODE_DURATION: 610 S
MDC_IDC_EPISODE_DURATION: 66 S
MDC_IDC_EPISODE_DURATION: 6640 S
MDC_IDC_EPISODE_DURATION: 689 S
MDC_IDC_EPISODE_DURATION: 7371 S
MDC_IDC_EPISODE_DURATION: 74 S
MDC_IDC_EPISODE_DURATION: 8005 S
MDC_IDC_EPISODE_DURATION: 87 S
MDC_IDC_EPISODE_DURATION: 88 S
MDC_IDC_EPISODE_DURATION: 92 S
MDC_IDC_EPISODE_DURATION: 942 S
MDC_IDC_EPISODE_DURATION: 95 S
MDC_IDC_EPISODE_DURATION: 9728 S
MDC_IDC_EPISODE_DURATION: NORMAL S
MDC_IDC_EPISODE_DURATION: NORMAL S
MDC_IDC_EPISODE_ID: 476
MDC_IDC_EPISODE_ID: 478
MDC_IDC_EPISODE_ID: 489
MDC_IDC_EPISODE_ID: 502
MDC_IDC_EPISODE_ID: 507
MDC_IDC_EPISODE_ID: 512
MDC_IDC_EPISODE_ID: 520
MDC_IDC_EPISODE_ID: 527
MDC_IDC_EPISODE_ID: 536
MDC_IDC_EPISODE_ID: 537
MDC_IDC_EPISODE_ID: 560
MDC_IDC_EPISODE_ID: 565
MDC_IDC_EPISODE_ID: 569
MDC_IDC_EPISODE_ID: 573
MDC_IDC_EPISODE_ID: 580
MDC_IDC_EPISODE_ID: 594
MDC_IDC_EPISODE_ID: 595
MDC_IDC_EPISODE_ID: 596
MDC_IDC_EPISODE_ID: 597
MDC_IDC_EPISODE_ID: 598
MDC_IDC_EPISODE_ID: 599
MDC_IDC_EPISODE_ID: 600
MDC_IDC_EPISODE_ID: 601
MDC_IDC_EPISODE_ID: 602
MDC_IDC_EPISODE_ID: 603
MDC_IDC_EPISODE_ID: 604
MDC_IDC_EPISODE_ID: 605
MDC_IDC_EPISODE_ID: 606
MDC_IDC_EPISODE_ID: 607
MDC_IDC_EPISODE_ID: 608
MDC_IDC_EPISODE_ID: 609
MDC_IDC_EPISODE_ID: 610
MDC_IDC_EPISODE_ID: 611
MDC_IDC_EPISODE_ID: 612
MDC_IDC_EPISODE_ID: 613
MDC_IDC_EPISODE_ID: 614
MDC_IDC_EPISODE_ID: 615
MDC_IDC_EPISODE_ID: 616
MDC_IDC_EPISODE_ID: 617
MDC_IDC_EPISODE_ID: 618
MDC_IDC_EPISODE_ID: 619
MDC_IDC_EPISODE_ID: 620
MDC_IDC_EPISODE_ID: 621
MDC_IDC_EPISODE_ID: 622
MDC_IDC_EPISODE_ID: 623
MDC_IDC_EPISODE_ID: 624
MDC_IDC_EPISODE_ID: 625
MDC_IDC_EPISODE_ID: 626
MDC_IDC_EPISODE_ID: 627
MDC_IDC_EPISODE_ID: 628
MDC_IDC_EPISODE_ID: 629
MDC_IDC_EPISODE_ID: 630
MDC_IDC_EPISODE_ID: 631
MDC_IDC_EPISODE_ID: 632
MDC_IDC_EPISODE_ID: 633
MDC_IDC_EPISODE_ID: 634
MDC_IDC_EPISODE_ID: 635
MDC_IDC_EPISODE_ID: 636
MDC_IDC_EPISODE_ID: 637
MDC_IDC_EPISODE_ID: 638
MDC_IDC_EPISODE_ID: 639
MDC_IDC_EPISODE_ID: 640
MDC_IDC_EPISODE_ID: 641
MDC_IDC_EPISODE_ID: 642
MDC_IDC_EPISODE_ID: 643
MDC_IDC_EPISODE_ID: 644
MDC_IDC_EPISODE_ID: 645
MDC_IDC_EPISODE_ID: 646
MDC_IDC_EPISODE_ID: 647
MDC_IDC_EPISODE_ID: 648
MDC_IDC_EPISODE_TYPE: NORMAL
MDC_IDC_LEAD_CONNECTION_STATUS: NORMAL
MDC_IDC_LEAD_CONNECTION_STATUS: NORMAL
MDC_IDC_LEAD_IMPLANT_DT: NORMAL
MDC_IDC_LEAD_IMPLANT_DT: NORMAL
MDC_IDC_LEAD_LOCATION: NORMAL
MDC_IDC_LEAD_LOCATION: NORMAL
MDC_IDC_LEAD_LOCATION_DETAIL_1: NORMAL
MDC_IDC_LEAD_LOCATION_DETAIL_1: NORMAL
MDC_IDC_LEAD_MFG: NORMAL
MDC_IDC_LEAD_MFG: NORMAL
MDC_IDC_LEAD_MODEL: NORMAL
MDC_IDC_LEAD_MODEL: NORMAL
MDC_IDC_LEAD_POLARITY_TYPE: NORMAL
MDC_IDC_LEAD_POLARITY_TYPE: NORMAL
MDC_IDC_LEAD_SERIAL: NORMAL
MDC_IDC_LEAD_SERIAL: NORMAL
MDC_IDC_MSMT_BATTERY_DTM: NORMAL
MDC_IDC_MSMT_BATTERY_REMAINING_LONGEVITY: 126 MO
MDC_IDC_MSMT_BATTERY_RRT_TRIGGER: 2.62
MDC_IDC_MSMT_BATTERY_STATUS: NORMAL
MDC_IDC_MSMT_BATTERY_VOLTAGE: 3.02 V
MDC_IDC_MSMT_LEADCHNL_RA_IMPEDANCE_VALUE: 323 OHM
MDC_IDC_MSMT_LEADCHNL_RA_IMPEDANCE_VALUE: 437 OHM
MDC_IDC_MSMT_LEADCHNL_RA_PACING_THRESHOLD_AMPLITUDE: 0.38 V
MDC_IDC_MSMT_LEADCHNL_RA_PACING_THRESHOLD_PULSEWIDTH: 0.4 MS
MDC_IDC_MSMT_LEADCHNL_RA_SENSING_INTR_AMPL: 3.88 MV
MDC_IDC_MSMT_LEADCHNL_RA_SENSING_INTR_AMPL: 3.88 MV
MDC_IDC_MSMT_LEADCHNL_RV_IMPEDANCE_VALUE: 323 OHM
MDC_IDC_MSMT_LEADCHNL_RV_IMPEDANCE_VALUE: 399 OHM
MDC_IDC_MSMT_LEADCHNL_RV_PACING_THRESHOLD_AMPLITUDE: 0.62 V
MDC_IDC_MSMT_LEADCHNL_RV_PACING_THRESHOLD_PULSEWIDTH: 0.4 MS
MDC_IDC_MSMT_LEADCHNL_RV_SENSING_INTR_AMPL: 3.62 MV
MDC_IDC_MSMT_LEADCHNL_RV_SENSING_INTR_AMPL: 3.62 MV
MDC_IDC_PG_IMPLANT_DTM: NORMAL
MDC_IDC_PG_MFG: NORMAL
MDC_IDC_PG_MODEL: NORMAL
MDC_IDC_PG_SERIAL: NORMAL
MDC_IDC_PG_TYPE: NORMAL
MDC_IDC_SESS_CLINIC_NAME: NORMAL
MDC_IDC_SESS_DTM: NORMAL
MDC_IDC_SESS_TYPE: NORMAL
MDC_IDC_SET_BRADY_AT_MODE_SWITCH_RATE: 171 {BEATS}/MIN
MDC_IDC_SET_BRADY_HYSTRATE: NORMAL
MDC_IDC_SET_BRADY_LOWRATE: 50 {BEATS}/MIN
MDC_IDC_SET_BRADY_MAX_SENSOR_RATE: 130 {BEATS}/MIN
MDC_IDC_SET_BRADY_MAX_TRACKING_RATE: 130 {BEATS}/MIN
MDC_IDC_SET_BRADY_MODE: NORMAL
MDC_IDC_SET_BRADY_PAV_DELAY_LOW: 300 MS
MDC_IDC_SET_BRADY_SAV_DELAY_LOW: 300 MS
MDC_IDC_SET_LEADCHNL_RA_PACING_AMPLITUDE: 1.5 V
MDC_IDC_SET_LEADCHNL_RA_PACING_ANODE_ELECTRODE_1: NORMAL
MDC_IDC_SET_LEADCHNL_RA_PACING_ANODE_LOCATION_1: NORMAL
MDC_IDC_SET_LEADCHNL_RA_PACING_CAPTURE_MODE: NORMAL
MDC_IDC_SET_LEADCHNL_RA_PACING_CATHODE_ELECTRODE_1: NORMAL
MDC_IDC_SET_LEADCHNL_RA_PACING_CATHODE_LOCATION_1: NORMAL
MDC_IDC_SET_LEADCHNL_RA_PACING_POLARITY: NORMAL
MDC_IDC_SET_LEADCHNL_RA_PACING_PULSEWIDTH: 0.4 MS
MDC_IDC_SET_LEADCHNL_RA_SENSING_ANODE_ELECTRODE_1: NORMAL
MDC_IDC_SET_LEADCHNL_RA_SENSING_ANODE_LOCATION_1: NORMAL
MDC_IDC_SET_LEADCHNL_RA_SENSING_CATHODE_ELECTRODE_1: NORMAL
MDC_IDC_SET_LEADCHNL_RA_SENSING_CATHODE_LOCATION_1: NORMAL
MDC_IDC_SET_LEADCHNL_RA_SENSING_POLARITY: NORMAL
MDC_IDC_SET_LEADCHNL_RA_SENSING_SENSITIVITY: 0.3 MV
MDC_IDC_SET_LEADCHNL_RV_PACING_AMPLITUDE: 2 V
MDC_IDC_SET_LEADCHNL_RV_PACING_ANODE_ELECTRODE_1: NORMAL
MDC_IDC_SET_LEADCHNL_RV_PACING_ANODE_LOCATION_1: NORMAL
MDC_IDC_SET_LEADCHNL_RV_PACING_CAPTURE_MODE: NORMAL
MDC_IDC_SET_LEADCHNL_RV_PACING_CATHODE_ELECTRODE_1: NORMAL
MDC_IDC_SET_LEADCHNL_RV_PACING_CATHODE_LOCATION_1: NORMAL
MDC_IDC_SET_LEADCHNL_RV_PACING_POLARITY: NORMAL
MDC_IDC_SET_LEADCHNL_RV_PACING_PULSEWIDTH: 0.4 MS
MDC_IDC_SET_LEADCHNL_RV_SENSING_ANODE_ELECTRODE_1: NORMAL
MDC_IDC_SET_LEADCHNL_RV_SENSING_ANODE_LOCATION_1: NORMAL
MDC_IDC_SET_LEADCHNL_RV_SENSING_CATHODE_ELECTRODE_1: NORMAL
MDC_IDC_SET_LEADCHNL_RV_SENSING_CATHODE_LOCATION_1: NORMAL
MDC_IDC_SET_LEADCHNL_RV_SENSING_POLARITY: NORMAL
MDC_IDC_SET_LEADCHNL_RV_SENSING_SENSITIVITY: 0.9 MV
MDC_IDC_SET_ZONE_DETECTION_INTERVAL: 200 MS
MDC_IDC_SET_ZONE_DETECTION_INTERVAL: 350 MS
MDC_IDC_SET_ZONE_DETECTION_INTERVAL: 400 MS
MDC_IDC_SET_ZONE_STATUS: NORMAL
MDC_IDC_SET_ZONE_TYPE: NORMAL
MDC_IDC_SET_ZONE_VENDOR_TYPE: NORMAL
MDC_IDC_STAT_AT_BURDEN_PERCENT: 8.7 %
MDC_IDC_STAT_AT_DTM_END: NORMAL
MDC_IDC_STAT_AT_DTM_START: NORMAL
MDC_IDC_STAT_BRADY_AP_VP_PERCENT: 62.29 %
MDC_IDC_STAT_BRADY_AP_VS_PERCENT: 2.38 %
MDC_IDC_STAT_BRADY_AS_VP_PERCENT: 30.4 %
MDC_IDC_STAT_BRADY_AS_VS_PERCENT: 5.09 %
MDC_IDC_STAT_BRADY_DTM_END: NORMAL
MDC_IDC_STAT_BRADY_DTM_START: NORMAL
MDC_IDC_STAT_BRADY_RA_PERCENT_PACED: 59.76 %
MDC_IDC_STAT_BRADY_RV_PERCENT_PACED: 92.35 %
MDC_IDC_STAT_EPISODE_RECENT_COUNT: 0
MDC_IDC_STAT_EPISODE_RECENT_COUNT: 184
MDC_IDC_STAT_EPISODE_RECENT_COUNT: 4
MDC_IDC_STAT_EPISODE_RECENT_COUNT_DTM_END: NORMAL
MDC_IDC_STAT_EPISODE_RECENT_COUNT_DTM_START: NORMAL
MDC_IDC_STAT_EPISODE_TOTAL_COUNT: 0
MDC_IDC_STAT_EPISODE_TOTAL_COUNT: 0
MDC_IDC_STAT_EPISODE_TOTAL_COUNT: 50
MDC_IDC_STAT_EPISODE_TOTAL_COUNT: 525
MDC_IDC_STAT_EPISODE_TOTAL_COUNT: 73
MDC_IDC_STAT_EPISODE_TOTAL_COUNT_DTM_END: NORMAL
MDC_IDC_STAT_EPISODE_TOTAL_COUNT_DTM_START: NORMAL
MDC_IDC_STAT_EPISODE_TYPE: NORMAL
MDC_IDC_STAT_TACHYTHERAPY_RECENT_DTM_END: NORMAL
MDC_IDC_STAT_TACHYTHERAPY_RECENT_DTM_START: NORMAL
MDC_IDC_STAT_TACHYTHERAPY_TOTAL_DTM_END: NORMAL
MDC_IDC_STAT_TACHYTHERAPY_TOTAL_DTM_START: NORMAL

## 2024-10-25 ENCOUNTER — TELEPHONE (OUTPATIENT)
Dept: OTHER | Facility: CLINIC | Age: 64
End: 2024-10-25
Payer: COMMERCIAL

## 2024-10-25 NOTE — TELEPHONE ENCOUNTER
Pt is currently scheduled at the [facility] with Dr. Stubbs for his carotid follow up.    This needs to be changed to either 12:30pm at [facility] or patient can be switched to Shanon's schedule.    Nikole Vivas, BENJYN, RN, CV-BC, CNOR  [facility]

## 2024-10-30 ENCOUNTER — HOSPITAL ENCOUNTER (OUTPATIENT)
Dept: ULTRASOUND IMAGING | Facility: CLINIC | Age: 64
Discharge: HOME | End: 2024-10-30
Attending: SURGERY | Admitting: SURGERY
Payer: COMMERCIAL

## 2024-10-30 DIAGNOSIS — Z98.890 HISTORY OF LEFT-SIDED CAROTID ENDARTERECTOMY: ICD-10-CM

## 2024-10-30 DIAGNOSIS — I65.22 LEFT CAROTID ARTERY STENOSIS: ICD-10-CM

## 2024-10-30 PROCEDURE — 93880 EXTRACRANIAL BILAT STUDY: CPT

## 2024-10-30 NOTE — TELEPHONE ENCOUNTER
Spoke to patient he will call when he is ready to reschedule his appointment.Patient was given the phone number.

## 2024-11-04 ENCOUNTER — MYC MEDICAL ADVICE (OUTPATIENT)
Dept: FAMILY MEDICINE | Facility: CLINIC | Age: 64
End: 2024-11-04
Payer: COMMERCIAL

## 2024-11-13 ENCOUNTER — OFFICE VISIT (OUTPATIENT)
Dept: CARDIOLOGY | Facility: CLINIC | Age: 64
End: 2024-11-13
Payer: COMMERCIAL

## 2024-11-13 VITALS
DIASTOLIC BLOOD PRESSURE: 66 MMHG | BODY MASS INDEX: 20.89 KG/M2 | HEART RATE: 58 BPM | WEIGHT: 154 LBS | SYSTOLIC BLOOD PRESSURE: 120 MMHG

## 2024-11-13 DIAGNOSIS — E78.5 HYPERLIPIDEMIA LDL GOAL <70: ICD-10-CM

## 2024-11-13 DIAGNOSIS — Z00.6 EXAMINATION OF PARTICIPANT OR CONTROL IN CLINICAL RESEARCH: ICD-10-CM

## 2024-11-13 DIAGNOSIS — I25.10 CORONARY ARTERY DISEASE INVOLVING NATIVE CORONARY ARTERY OF NATIVE HEART WITHOUT ANGINA PECTORIS: Primary | ICD-10-CM

## 2024-11-13 PROCEDURE — 99207 PR NO CHARGE-RESEARCH SERVICE: CPT | Performed by: INTERNAL MEDICINE

## 2024-11-13 NOTE — PROGRESS NOTES
SURPASS-CVOT Study [Effect of tirzepatide versus Dulaglutide on Major Cardiovascular Events in Patients with Type 2 Diabetes]    Subject seen for Visit 23    Subject queried for adverse events, endpoints and medication changes. If present, noted below. None noted, saw his endocrinologist and cardiologist since last visit. No new orders or concerns.    Patient was informed the study would be closing 1st quarter of 2025, so likely January or February. He stated they are leaving January 10th for [place].     Adherence/lifestyle reinforcement done.    Vitals:    11/13/24 0920 11/13/24 0922   BP: 111/64 120/66   BP Location: Right arm Right arm   Patient Position: Chair Chair   Cuff Size: Adult Regular Adult Regular   Pulse: 59 58   Weight: 69.9 kg (154 lb)         No of pens dispensed at last visit 16  Any Returned medication 3 empty boxes - 0  Date of first dose since last visit: 08/15/2024  Date of last dose taken since last visit: 11/07/2024    Subject drug dispensed     Will see again in clinic in 3 months.

## 2024-11-26 ENCOUNTER — ANCILLARY PROCEDURE (OUTPATIENT)
Dept: ULTRASOUND IMAGING | Facility: CLINIC | Age: 64
End: 2024-11-26
Attending: INTERNAL MEDICINE
Payer: COMMERCIAL

## 2024-11-26 ENCOUNTER — LAB (OUTPATIENT)
Dept: LAB | Facility: CLINIC | Age: 64
End: 2024-11-26
Attending: INTERNAL MEDICINE
Payer: COMMERCIAL

## 2024-11-26 ENCOUNTER — OFFICE VISIT (OUTPATIENT)
Dept: GASTROENTEROLOGY | Facility: CLINIC | Age: 64
End: 2024-11-26
Attending: INTERNAL MEDICINE
Payer: COMMERCIAL

## 2024-11-26 VITALS
DIASTOLIC BLOOD PRESSURE: 77 MMHG | HEART RATE: 78 BPM | BODY MASS INDEX: 21.02 KG/M2 | OXYGEN SATURATION: 98 % | WEIGHT: 155 LBS | TEMPERATURE: 97.5 F | SYSTOLIC BLOOD PRESSURE: 151 MMHG

## 2024-11-26 DIAGNOSIS — K74.60 CIRRHOSIS OF LIVER WITHOUT ASCITES, UNSPECIFIED HEPATIC CIRRHOSIS TYPE (H): ICD-10-CM

## 2024-11-26 DIAGNOSIS — K74.60 CIRRHOSIS OF LIVER WITHOUT ASCITES, UNSPECIFIED HEPATIC CIRRHOSIS TYPE (H): Primary | ICD-10-CM

## 2024-11-26 LAB
AFP SERPL-MCNC: 1.9 NG/ML
ALBUMIN SERPL BCG-MCNC: 4.7 G/DL (ref 3.5–5.2)
ALP SERPL-CCNC: 61 U/L (ref 40–150)
ALT SERPL W P-5'-P-CCNC: 34 U/L (ref 0–70)
ANION GAP SERPL CALCULATED.3IONS-SCNC: 7 MMOL/L (ref 7–15)
AST SERPL W P-5'-P-CCNC: 27 U/L (ref 0–45)
BILIRUB DIRECT SERPL-MCNC: 0.4 MG/DL (ref 0–0.3)
BILIRUB SERPL-MCNC: 1.2 MG/DL
BUN SERPL-MCNC: 16.2 MG/DL (ref 8–23)
CALCIUM SERPL-MCNC: 10.1 MG/DL (ref 8.8–10.4)
CHLORIDE SERPL-SCNC: 104 MMOL/L (ref 98–107)
CREAT SERPL-MCNC: 1.03 MG/DL (ref 0.67–1.17)
EGFRCR SERPLBLD CKD-EPI 2021: 81 ML/MIN/1.73M2
ERYTHROCYTE [DISTWIDTH] IN BLOOD BY AUTOMATED COUNT: 12.8 % (ref 10–15)
GLUCOSE SERPL-MCNC: 109 MG/DL (ref 70–99)
HCO3 SERPL-SCNC: 28 MMOL/L (ref 22–29)
HCT VFR BLD AUTO: 50.2 % (ref 40–53)
HGB BLD-MCNC: 17.1 G/DL (ref 13.3–17.7)
INR PPP: 1.34 (ref 0.85–1.15)
MCH RBC QN AUTO: 31 PG (ref 26.5–33)
MCHC RBC AUTO-ENTMCNC: 34.1 G/DL (ref 31.5–36.5)
MCV RBC AUTO: 91 FL (ref 78–100)
PLATELET # BLD AUTO: 143 10E3/UL (ref 150–450)
POTASSIUM SERPL-SCNC: 5.2 MMOL/L (ref 3.4–5.3)
PROT SERPL-MCNC: 7.7 G/DL (ref 6.4–8.3)
RBC # BLD AUTO: 5.52 10E6/UL (ref 4.4–5.9)
SODIUM SERPL-SCNC: 139 MMOL/L (ref 135–145)
WBC # BLD AUTO: 5.9 10E3/UL (ref 4–11)

## 2024-11-26 PROCEDURE — 99214 OFFICE O/P EST MOD 30 MIN: CPT | Performed by: INTERNAL MEDICINE

## 2024-11-26 PROCEDURE — 85027 COMPLETE CBC AUTOMATED: CPT | Performed by: PATHOLOGY

## 2024-11-26 PROCEDURE — 76705 ECHO EXAM OF ABDOMEN: CPT | Mod: GC | Performed by: STUDENT IN AN ORGANIZED HEALTH CARE EDUCATION/TRAINING PROGRAM

## 2024-11-26 PROCEDURE — 82105 ALPHA-FETOPROTEIN SERUM: CPT | Performed by: INTERNAL MEDICINE

## 2024-11-26 PROCEDURE — 36415 COLL VENOUS BLD VENIPUNCTURE: CPT | Performed by: PATHOLOGY

## 2024-11-26 PROCEDURE — 99213 OFFICE O/P EST LOW 20 MIN: CPT | Performed by: INTERNAL MEDICINE

## 2024-11-26 PROCEDURE — 82248 BILIRUBIN DIRECT: CPT | Performed by: PATHOLOGY

## 2024-11-26 PROCEDURE — 85610 PROTHROMBIN TIME: CPT | Performed by: PATHOLOGY

## 2024-11-26 PROCEDURE — 99000 SPECIMEN HANDLING OFFICE-LAB: CPT | Performed by: PATHOLOGY

## 2024-11-26 PROCEDURE — 80053 COMPREHEN METABOLIC PANEL: CPT | Performed by: PATHOLOGY

## 2024-11-26 ASSESSMENT — PAIN SCALES - GENERAL: PAINLEVEL_OUTOF10: NO PAIN (0)

## 2024-11-26 NOTE — LETTER
11/26/2024      Vikash Burgso  [address]  [place] [address]      Dear Colleague,    Thank you for referring your patient, Vikash Burgos, to the [facility] [place]. Please see a copy of my visit note below.    Hepatology Follow-Up Visit:     HISTORY OF PRESENT ILLNESS:   I had the pleasure of seeing Vikash Burgos for followup in the Liver Clinic at the [facility] on November 26, 2024. Mr. 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     He is doing fairly well.  He does note some ongoing right upper quadrant burning pain.  He does report it may be a little bit worse than it has been in the past but not significantly changed.  He denies any itching or skin rash.  He does occasionally nap in the afternoon.  He denies any increased abdominal girth or lower extremity edema.     He denies any fevers or chills.  He denies any cough or shortness of breath. He denies any nausea or vomiting, diarrhea or constipation.  His appetite has been somewhat diminished through the change in his diabetes medication.  He has lost weight and is maintaining his current weight.    Medications:   Current Outpatient Medications   Medication Sig Dispense Refill     amLODIPine (NORVASC) 2.5 MG tablet Take 1 tablet (2.5 mg) by mouth 2 times daily. 180 tablet 3     apixaban ANTICOAGULANT (ELIQUIS) 5 MG tablet Take 1 tablet (5 mg) by mouth 2 times daily. 180 tablet 3     B-D U/F 31G X 8 MM insulin pen needle USE ONE PEN NEEDLES DAILY OR AS DIRECTED. 100 each 3     celecoxib (CELEBREX) 100 MG capsule TAKE 1 CAPSULE (100 MG) BY MOUTH DAILY AS NEEDED FOR MODERATE PAIN (4-6) (Patient taking differently: Take 100 mg by mouth daily.) 90 capsule 3     cyanocobalamin 1000 MCG SUBL Place 1,000 mcg under the tongue daily       glucosamine-chondroitin 500-400 MG CAPS per capsule Take 1 capsule by mouth daily        lisinopril (ZESTRIL) 20 MG tablet Take 1 tablet (20 mg) by mouth 2 times daily. 180 tablet 3     omeprazole (PRILOSEC) 20 MG DR capsule Take 1 capsule (20 mg) by mouth daily 90 capsule 3     ONETOUCH VERIO IQ test strip        rosuvastatin (CRESTOR) 20 MG tablet Take 1 tablet (20 mg) by mouth daily. 90 tablet 3     sildenafil (VIAGRA) 50 MG tablet Take 1 tablet (50 mg) by mouth daily as needed 30 tablet 11     Vitamin D, Cholecalciferol, 1000 units TABS Take 1,000 Units by mouth daily       zolpidem (AMBIEN) 5 MG tablet Take 1 tablet (5 mg) by mouth nightly as needed for sleep 30 tablet 0     Current Facility-Administered Medications   Medication Dose Route Frequency Provider Last Rate Last Admin     study - tirzepatide 2.5-15 mg or dulaglutide 1.5 mg (SURPASS) (IDS# 5849) injection 1 Pen  1 Pen Subcutaneous Q7 Days Mariah Elias MD         study - tirzepatide 2.5-15 mg or dulaglutide 1.5 mg (SURPASS) (IDS# 5849) injection 1 Pen  1 Pen Subcutaneous Q7 Days Mariah Elias MD         study - tirzepatide 2.5-15 mg or dulaglutide 1.5 mg (SURPASS) (IDS# 5849) injection 1 Pen  1 Pen Subcutaneous Q7 Days Mariah Elias MD         study - tirzepatide 2.5-15 mg or dulaglutide 1.5 mg (SURPASS) (IDS# 5849) injection 1.5-15 mg  1.5-15 mg Subcutaneous Q7 Days Mariah Elias MD          Vitals:   BP (!) 151/77   Pulse 78   Temp 97.5  F (36.4  C) (Oral)   Wt 70.3 kg (155 lb)   SpO2 98%   BMI 21.02 kg/m      Physical Exam:   In general he looks quite well. HEENT exam shows no scleral icterus or temporal muscle wasting. Chest is clear. Abdominal exam shows no increase in girth. No masses or tenderness to palpation are present. Liver is 10 cm in span without left lobe enlargement. No spleen tip is palpable. Extremity exam shows no edema. Skin exam shows no stigmata of chronic liver disease. Neurologic exam shows no asterixis.     Labs:   Lab Results   Component Value Date     11/26/2024    POTASSIUM 5.2  11/26/2024    CHLORIDE 104 11/26/2024    ANIONGAP 7 11/26/2024    CO2 28 11/26/2024    BUN 16.2 11/26/2024    CR 1.03 11/26/2024    GFRESTIMATED 81 11/26/2024    LIA 10.1 11/26/2024      Lab Results   Component Value Date    WBC 5.9 11/26/2024    HGB 17.1 11/26/2024    HCT 50.2 11/26/2024    MCV 91 11/26/2024    MCH 31.0 11/26/2024    MCHC 34.1 11/26/2024    RDW 12.8 11/26/2024     (L) 11/26/2024     Lab Results   Component Value Date    ALBUMIN 4.7 11/26/2024    ALKPHOS 61 11/26/2024    AST 27 11/26/2024    BILICONJ 0.0 03/14/2014     Lab Results   Component Value Date    INR 1.34 (H) 11/26/2024     MELD 3.0: 10 at 11/26/2024  8:05 AM  MELD-Na: 10 at 11/26/2024  8:05 AM  Calculated from:  Serum Creatinine: 1.03 mg/dL at 11/26/2024  8:05 AM  Serum Sodium: 139 mmol/L (Using max of 137 mmol/L) at 11/26/2024  8:05 AM  Total Bilirubin: 1.2 mg/dL at 11/26/2024  8:05 AM  Serum Albumin: 4.7 g/dL (Using max of 3.5 g/dL) at 11/26/2024  8:05 AM  INR(ratio): 1.34 at 11/26/2024  8:05 AM  Age at listing (hypothetical): 64 years  Sex: Male at 11/26/2024  8:05 AM    Imaging:   EXAMINATION: US ABDOMEN LIMITED, 11/26/2024 7:55 AM      INDICATION: Patient at high risk for HCC. HCC screen; Cirrhosis of liver without ascites, unspecified hepatic cirrhosis type (H)     COMPARISON: Abdominal ultrasound 5/16/2024, 7/21/2023     TECHNIQUE: The abdomen right upper quadrant was scanned in the standard fashion with specialized ultrasound transducer(s) using both gray scale and limited color/spectral Doppler techniques.     FINDINGS:   Fluid: No evidence of ascites or pleural effusions.     Liver: The liver shows increased parenchymal echogenicity, measuring 13.8 cm in craniocaudal dimension. Geographic areas of hyperechogenicity is seen adjacent to the mona hepatis (image #38) and gallbladder fossa image 37, favored to represent focal fatty sparing. Echogenic ossification is seen adjacent to the gallbladder on  cine clip labelled long  GB supine image 53, measuring 5 mm. No intrahepatic biliary dilatation demonstrated. The main portal vein is patent with antegrade flow measuring 1 cm in diameter.     US visualization score: B - Moderate limitations     Gallbladder: The gallbladder is well distended and of normal morphology. No wall thickening, pericholecystic fluid, sonographic Mora's sign, or evidence of cholelithiasis.     Bile Ducts: The visualized common bile duct measures 5 mm in diameter.     Pancreas: Visualized portions of the pancreas are unremarkable. Pancreas is partially obscured     Kidney: The right kidney measures 12.3 cm in long dimension. No hydronephrosis, hydroureter, shadowing renal calculi, or solid mass. The capsule and parenchyma demonstrate normal echogenicity.     Aorta and IVC: The visualized portions of the aorta and IVC are unremarkable. Proximal aorta measures 2.2 cm.                                                                   IMPRESSION:      Increased hepatic echogenicity which may be seen with parenchymal liver disease including the steatosis and/or fibrosis. Ill marginated areas of hypoechogenicity near the mona hepatis and gallbladder fossa favored to represent focal fatty sparing. Echogenic focal observation adjacent to the gallbladder measuring up to 5 mm  a. LI-RADS US Category: US-2 Subthreshold: Observation(s) detected that may warrant short-term US surveillance  b. Recommend short-term repeat surveillance US in 3-6 months.     Assessment/Plan:   IMPRESSION:   Mr. Burgos has very well-compensated cirrhosis, most likely caused by metabolic associated steatotic liver disease.  His liver tests are excellent.  His ultrasound shows no mass lesions or evidence of ascites.     I will not be making any change to his medical regimen.  He is up to date with regard to vaccines and other cancer screening and my plan will be to see him back in the clinic for repeat imaging and blood work in 6 months.     I did  spend a total of 30 minutes (on the date of the encounter), including 20 minutes of face-to-face clinic time including counseling. The rest of the time was spent in documentation and review of records.    Thank you very much for allowing me to participate in the care of this patient.  If you have any questions regarding my recommendations, please do not hesitate to contact me.      Sincerely,       Kevin Bedolla MD      Professor of Medicine  [employer]      Executive Medical Director, Solid Organ Transplant Program  [facility]        Again, thank you for allowing me to participate in the care of your patient.        Sincerely,        Kevin Bedolla MD

## 2024-11-26 NOTE — PROGRESS NOTES
Hepatology Follow-Up Visit:     HISTORY OF PRESENT ILLNESS:   I had the pleasure of seeing Vikash Burgos for followup in the Liver Clinic at the [facility] on November 26, 2024. Mr. 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     He is doing fairly well.  He does note some ongoing right upper quadrant burning pain.  He does report it may be a little bit worse than it has been in the past but not significantly changed.  He denies any itching or skin rash.  He does occasionally nap in the afternoon.  He denies any increased abdominal girth or lower extremity edema.     He denies any fevers or chills.  He denies any cough or shortness of breath. He denies any nausea or vomiting, diarrhea or constipation.  His appetite has been somewhat diminished through the change in his diabetes medication.  He has lost weight and is maintaining his current weight.    Medications:   Current Outpatient Medications   Medication Sig Dispense Refill    amLODIPine (NORVASC) 2.5 MG tablet Take 1 tablet (2.5 mg) by mouth 2 times daily. 180 tablet 3    apixaban ANTICOAGULANT (ELIQUIS) 5 MG tablet Take 1 tablet (5 mg) by mouth 2 times daily. 180 tablet 3    B-D U/F 31G X 8 MM insulin pen needle USE ONE PEN NEEDLES DAILY OR AS DIRECTED. 100 each 3    celecoxib (CELEBREX) 100 MG capsule TAKE 1 CAPSULE (100 MG) BY MOUTH DAILY AS NEEDED FOR MODERATE PAIN (4-6) (Patient taking differently: Take 100 mg by mouth daily.) 90 capsule 3    cyanocobalamin 1000 MCG SUBL Place 1,000 mcg under the tongue daily      glucosamine-chondroitin 500-400 MG CAPS per capsule Take 1 capsule by mouth daily      lisinopril (ZESTRIL) 20 MG tablet Take 1 tablet (20 mg) by mouth 2 times daily. 180 tablet 3    omeprazole (PRILOSEC) 20 MG DR capsule Take 1 capsule (20 mg) by mouth daily 90 capsule 3    ONETOUCH VERIO IQ test strip       rosuvastatin (CRESTOR) 20 MG tablet Take 1  tablet (20 mg) by mouth daily. 90 tablet 3    sildenafil (VIAGRA) 50 MG tablet Take 1 tablet (50 mg) by mouth daily as needed 30 tablet 11    Vitamin D, Cholecalciferol, 1000 units TABS Take 1,000 Units by mouth daily      zolpidem (AMBIEN) 5 MG tablet Take 1 tablet (5 mg) by mouth nightly as needed for sleep 30 tablet 0     Current Facility-Administered Medications   Medication Dose Route Frequency Provider Last Rate Last Admin    study - tirzepatide 2.5-15 mg or dulaglutide 1.5 mg (SURPASS) (IDS# 5849) injection 1 Pen  1 Pen Subcutaneous Q7 Days Mariah Elias MD        study - tirzepatide 2.5-15 mg or dulaglutide 1.5 mg (SURPASS) (IDS# 5849) injection 1 Pen  1 Pen Subcutaneous Q7 Days Mariah Elias MD        study - tirzepatide 2.5-15 mg or dulaglutide 1.5 mg (SURPASS) (IDS# 5849) injection 1 Pen  1 Pen Subcutaneous Q7 Days Mariah Elias MD        study - tirzepatide 2.5-15 mg or dulaglutide 1.5 mg (SURPASS) (IDS# 5849) injection 1.5-15 mg  1.5-15 mg Subcutaneous Q7 Days Mariah Elias MD          Vitals:   BP (!) 151/77   Pulse 78   Temp 97.5  F (36.4  C) (Oral)   Wt 70.3 kg (155 lb)   SpO2 98%   BMI 21.02 kg/m      Physical Exam:   In general he looks quite well. HEENT exam shows no scleral icterus or temporal muscle wasting. Chest is clear. Abdominal exam shows no increase in girth. No masses or tenderness to palpation are present. Liver is 10 cm in span without left lobe enlargement. No spleen tip is palpable. Extremity exam shows no edema. Skin exam shows no stigmata of chronic liver disease. Neurologic exam shows no asterixis.     Labs:   Lab Results   Component Value Date     11/26/2024    POTASSIUM 5.2 11/26/2024    CHLORIDE 104 11/26/2024    ANIONGAP 7 11/26/2024    CO2 28 11/26/2024    BUN 16.2 11/26/2024    CR 1.03 11/26/2024    GFRESTIMATED 81 11/26/2024    LIA 10.1 11/26/2024      Lab Results   Component Value Date    WBC 5.9 11/26/2024    HGB 17.1 11/26/2024    HCT 50.2  11/26/2024    MCV 91 11/26/2024    MCH 31.0 11/26/2024    MCHC 34.1 11/26/2024    RDW 12.8 11/26/2024     (L) 11/26/2024     Lab Results   Component Value Date    ALBUMIN 4.7 11/26/2024    ALKPHOS 61 11/26/2024    AST 27 11/26/2024    BILICONJ 0.0 03/14/2014     Lab Results   Component Value Date    INR 1.34 (H) 11/26/2024     MELD 3.0: 10 at 11/26/2024  8:05 AM  MELD-Na: 10 at 11/26/2024  8:05 AM  Calculated from:  Serum Creatinine: 1.03 mg/dL at 11/26/2024  8:05 AM  Serum Sodium: 139 mmol/L (Using max of 137 mmol/L) at 11/26/2024  8:05 AM  Total Bilirubin: 1.2 mg/dL at 11/26/2024  8:05 AM  Serum Albumin: 4.7 g/dL (Using max of 3.5 g/dL) at 11/26/2024  8:05 AM  INR(ratio): 1.34 at 11/26/2024  8:05 AM  Age at listing (hypothetical): 64 years  Sex: Male at 11/26/2024  8:05 AM    Imaging:   EXAMINATION: US ABDOMEN LIMITED, 11/26/2024 7:55 AM      INDICATION: Patient at high risk for HCC. HCC screen; Cirrhosis of liver without ascites, unspecified hepatic cirrhosis type (H)     COMPARISON: Abdominal ultrasound 5/16/2024, 7/21/2023     TECHNIQUE: The abdomen right upper quadrant was scanned in the standard fashion with specialized ultrasound transducer(s) using both gray scale and limited color/spectral Doppler techniques.     FINDINGS:   Fluid: No evidence of ascites or pleural effusions.     Liver: The liver shows increased parenchymal echogenicity, measuring 13.8 cm in craniocaudal dimension. Geographic areas of hyperechogenicity is seen adjacent to the mona hepatis (image #38) and gallbladder fossa image 37, favored to represent focal fatty sparing. Echogenic ossification is seen adjacent to the gallbladder on  cine clip labelled long GB supine image 53, measuring 5 mm. No intrahepatic biliary dilatation demonstrated. The main portal vein is patent with antegrade flow measuring 1 cm in diameter.     US visualization score: B - Moderate limitations     Gallbladder: The gallbladder is well distended and of  normal morphology. No wall thickening, pericholecystic fluid, sonographic Mora's sign, or evidence of cholelithiasis.     Bile Ducts: The visualized common bile duct measures 5 mm in diameter.     Pancreas: Visualized portions of the pancreas are unremarkable. Pancreas is partially obscured     Kidney: The right kidney measures 12.3 cm in long dimension. No hydronephrosis, hydroureter, shadowing renal calculi, or solid mass. The capsule and parenchyma demonstrate normal echogenicity.     Aorta and IVC: The visualized portions of the aorta and IVC are unremarkable. Proximal aorta measures 2.2 cm.                                                                   IMPRESSION:      Increased hepatic echogenicity which may be seen with parenchymal liver disease including the steatosis and/or fibrosis. Ill marginated areas of hypoechogenicity near the mona hepatis and gallbladder fossa favored to represent focal fatty sparing. Echogenic focal observation adjacent to the gallbladder measuring up to 5 mm  a. LI-RADS US Category: US-2 Subthreshold: Observation(s) detected that may warrant short-term US surveillance  b. Recommend short-term repeat surveillance US in 3-6 months.     Assessment/Plan:   IMPRESSION:   Mr. Burgos has very well-compensated cirrhosis, most likely caused by metabolic associated steatotic liver disease.  His liver tests are excellent.  His ultrasound shows no mass lesions or evidence of ascites.     I will not be making any change to his medical regimen.  He is up to date with regard to vaccines and other cancer screening and my plan will be to see him back in the clinic for repeat imaging and blood work in 6 months.     I did spend a total of 30 minutes (on the date of the encounter), including 20 minutes of face-to-face clinic time including counseling. The rest of the time was spent in documentation and review of records.    Thank you very much for allowing me to participate in the care of  this patient.  If you have any questions regarding my recommendations, please do not hesitate to contact me.      Sincerely,       Kevin Bedolla MD      Professor of Medicine  [employer]      Executive Medical Director, Solid Organ Transplant Program  [facility]

## 2025-01-03 DIAGNOSIS — I25.10 CORONARY ARTERY DISEASE INVOLVING NATIVE CORONARY ARTERY OF NATIVE HEART WITHOUT ANGINA PECTORIS: ICD-10-CM

## 2025-01-03 DIAGNOSIS — Z00.6 EXAMINATION OF PARTICIPANT OR CONTROL IN CLINICAL RESEARCH: ICD-10-CM

## 2025-01-03 DIAGNOSIS — E11.40 TYPE 2 DIABETES MELLITUS WITH DIABETIC NEUROPATHY, WITHOUT LONG-TERM CURRENT USE OF INSULIN (H): ICD-10-CM

## 2025-01-07 ENCOUNTER — OFFICE VISIT (OUTPATIENT)
Dept: CARDIOLOGY | Facility: CLINIC | Age: 65
End: 2025-01-07
Payer: COMMERCIAL

## 2025-01-07 VITALS
WEIGHT: 157 LBS | BODY MASS INDEX: 21.29 KG/M2 | SYSTOLIC BLOOD PRESSURE: 144 MMHG | DIASTOLIC BLOOD PRESSURE: 72 MMHG | HEART RATE: 55 BPM

## 2025-01-07 DIAGNOSIS — I25.10 CORONARY ARTERY DISEASE INVOLVING NATIVE CORONARY ARTERY OF NATIVE HEART WITHOUT ANGINA PECTORIS: Primary | ICD-10-CM

## 2025-01-07 DIAGNOSIS — Z00.6 EXAMINATION OF PARTICIPANT OR CONTROL IN CLINICAL RESEARCH: ICD-10-CM

## 2025-01-07 DIAGNOSIS — E11.40 TYPE 2 DIABETES MELLITUS WITH DIABETIC NEUROPATHY, WITHOUT LONG-TERM CURRENT USE OF INSULIN (H): ICD-10-CM

## 2025-01-07 NOTE — PROGRESS NOTES
SURPASS-CVOT Study [Effect of tirzepatide versus Dulaglutide on Major Cardiovascular Events in Patients with Type 2Diabetes]    Subject seen for Visit 24 early as approved by sponsor as subject christy in [place].     Patient reported outcomes testing [APPADL, EQ-5D-5L, and IW-SP] was completed prior to other evaluations. Not required at this visit    Vital signs were done, including waist circumference.measured after 5 minutes resting in a seated position - two readings taken one minute apart using automated cuff.    Vitals:    01/07/25 0815 01/07/25 0816   BP: (!) 149/75 (!) 144/72   BP Location: Right arm Left arm   Patient Position: Chair Chair   Cuff Size: Adult Regular Adult Regular   Pulse: 60 55   Weight: 71.2 kg (157 lb)        Waist Circumference 98 cm and 100 cm    Subject queried for adverse events, endpoints and medication changes. If present, noted below. None, saw hepatology clinic for Follow-up liver cirrhosis, no concerns and no changes.     Adherence/lifestyle reinforcement done     Non-fasting labs done.    Dispensed kit # pending receipt at the pharmacy. Son will .    No of pens dispensed at last visit 16  Any Returned medication 0  Date of first dose since last visit: 11/14/2024  Date of last dose taken since last visit: 01/02/2024    Will see again in clinicin 3 months.     Mis Husain RN

## 2025-01-20 ENCOUNTER — TRANSFERRED RECORDS (OUTPATIENT)
Dept: CARDIOLOGY | Facility: CLINIC | Age: 65
End: 2025-01-20
Payer: COMMERCIAL

## 2025-01-20 NOTE — PROGRESS NOTES
Research laboratory data from January 7 reviewed and results not clinically significant, this includes mildly increased total bilirubin as well as mildly increased pancreatic amylase.  LF

## 2025-01-29 DIAGNOSIS — M19.041 PRIMARY OSTEOARTHRITIS OF BOTH HANDS: ICD-10-CM

## 2025-01-29 DIAGNOSIS — M19.042 PRIMARY OSTEOARTHRITIS OF BOTH HANDS: ICD-10-CM

## 2025-01-29 RX ORDER — CELECOXIB 100 MG/1
CAPSULE ORAL
Qty: 90 CAPSULE | Refills: 0 | Status: SHIPPED | OUTPATIENT
Start: 2025-01-29

## 2025-02-03 ENCOUNTER — MYC MEDICAL ADVICE (OUTPATIENT)
Dept: CARDIOLOGY | Facility: CLINIC | Age: 65
End: 2025-02-03
Payer: COMMERCIAL

## 2025-02-10 ENCOUNTER — ANCILLARY PROCEDURE (OUTPATIENT)
Dept: CARDIOLOGY | Facility: CLINIC | Age: 65
End: 2025-02-10
Attending: INTERNAL MEDICINE
Payer: COMMERCIAL

## 2025-02-10 DIAGNOSIS — Z95.0 CARDIAC PACEMAKER IN SITU: ICD-10-CM

## 2025-02-10 DIAGNOSIS — I44.2 COMPLETE ATRIOVENTRICULAR BLOCK (H): ICD-10-CM

## 2025-02-10 LAB
MDC_IDC_EPISODE_DTM: NORMAL
MDC_IDC_EPISODE_DURATION: 0 S
MDC_IDC_EPISODE_DURATION: 1 S
MDC_IDC_EPISODE_DURATION: 1094 S
MDC_IDC_EPISODE_DURATION: 1094 S
MDC_IDC_EPISODE_DURATION: 117 S
MDC_IDC_EPISODE_DURATION: 1206 S
MDC_IDC_EPISODE_DURATION: 1227 S
MDC_IDC_EPISODE_DURATION: 1258 S
MDC_IDC_EPISODE_DURATION: 1420 S
MDC_IDC_EPISODE_DURATION: 1429 S
MDC_IDC_EPISODE_DURATION: 1462 S
MDC_IDC_EPISODE_DURATION: 1488 S
MDC_IDC_EPISODE_DURATION: 1617 S
MDC_IDC_EPISODE_DURATION: 1653 S
MDC_IDC_EPISODE_DURATION: 17 S
MDC_IDC_EPISODE_DURATION: 1978 S
MDC_IDC_EPISODE_DURATION: 253 S
MDC_IDC_EPISODE_DURATION: 2585 S
MDC_IDC_EPISODE_DURATION: 26 S
MDC_IDC_EPISODE_DURATION: 27 S
MDC_IDC_EPISODE_DURATION: 28 S
MDC_IDC_EPISODE_DURATION: 28 S
MDC_IDC_EPISODE_DURATION: 30 S
MDC_IDC_EPISODE_DURATION: 3036 S
MDC_IDC_EPISODE_DURATION: 3053 S
MDC_IDC_EPISODE_DURATION: 306 S
MDC_IDC_EPISODE_DURATION: 3092 S
MDC_IDC_EPISODE_DURATION: 3148 S
MDC_IDC_EPISODE_DURATION: 34 S
MDC_IDC_EPISODE_DURATION: 344 S
MDC_IDC_EPISODE_DURATION: 35 S
MDC_IDC_EPISODE_DURATION: 36 S
MDC_IDC_EPISODE_DURATION: 3642 S
MDC_IDC_EPISODE_DURATION: 37 S
MDC_IDC_EPISODE_DURATION: 39 S
MDC_IDC_EPISODE_DURATION: 392 S
MDC_IDC_EPISODE_DURATION: 3978 S
MDC_IDC_EPISODE_DURATION: 40 S
MDC_IDC_EPISODE_DURATION: 4061 S
MDC_IDC_EPISODE_DURATION: 4183 S
MDC_IDC_EPISODE_DURATION: 4297 S
MDC_IDC_EPISODE_DURATION: 4348 S
MDC_IDC_EPISODE_DURATION: 44 S
MDC_IDC_EPISODE_DURATION: 44 S
MDC_IDC_EPISODE_DURATION: 4476 S
MDC_IDC_EPISODE_DURATION: 45 S
MDC_IDC_EPISODE_DURATION: 450 S
MDC_IDC_EPISODE_DURATION: 4554 S
MDC_IDC_EPISODE_DURATION: 46 S
MDC_IDC_EPISODE_DURATION: 4649 S
MDC_IDC_EPISODE_DURATION: 4663 S
MDC_IDC_EPISODE_DURATION: 47 S
MDC_IDC_EPISODE_DURATION: 48 S
MDC_IDC_EPISODE_DURATION: 49 S
MDC_IDC_EPISODE_DURATION: 50 S
MDC_IDC_EPISODE_DURATION: 5131 S
MDC_IDC_EPISODE_DURATION: 54 S
MDC_IDC_EPISODE_DURATION: 54 S
MDC_IDC_EPISODE_DURATION: 56 S
MDC_IDC_EPISODE_DURATION: 56 S
MDC_IDC_EPISODE_DURATION: 5727 S
MDC_IDC_EPISODE_DURATION: 58 S
MDC_IDC_EPISODE_DURATION: 5831 S
MDC_IDC_EPISODE_DURATION: 5884 S
MDC_IDC_EPISODE_DURATION: 6128 S
MDC_IDC_EPISODE_DURATION: 63 S
MDC_IDC_EPISODE_DURATION: 65 S
MDC_IDC_EPISODE_DURATION: 67 S
MDC_IDC_EPISODE_DURATION: 67 S
MDC_IDC_EPISODE_DURATION: 6848 S
MDC_IDC_EPISODE_DURATION: 6912 S
MDC_IDC_EPISODE_DURATION: 70 S
MDC_IDC_EPISODE_DURATION: 70 S
MDC_IDC_EPISODE_DURATION: 71 S
MDC_IDC_EPISODE_DURATION: 71 S
MDC_IDC_EPISODE_DURATION: 72 S
MDC_IDC_EPISODE_DURATION: 7468 S
MDC_IDC_EPISODE_DURATION: 75 S
MDC_IDC_EPISODE_DURATION: 75 S
MDC_IDC_EPISODE_DURATION: 79 S
MDC_IDC_EPISODE_DURATION: 80 S
MDC_IDC_EPISODE_DURATION: 802 S
MDC_IDC_EPISODE_DURATION: 8226 S
MDC_IDC_EPISODE_DURATION: 836 S
MDC_IDC_EPISODE_DURATION: 84 S
MDC_IDC_EPISODE_DURATION: 86 S
MDC_IDC_EPISODE_DURATION: 88 S
MDC_IDC_EPISODE_DURATION: 88 S
MDC_IDC_EPISODE_DURATION: 89 S
MDC_IDC_EPISODE_DURATION: 90 S
MDC_IDC_EPISODE_DURATION: 90 S
MDC_IDC_EPISODE_DURATION: 95 S
MDC_IDC_EPISODE_DURATION: 9680 S
MDC_IDC_EPISODE_DURATION: 9970 S
MDC_IDC_EPISODE_DURATION: NORMAL S
MDC_IDC_EPISODE_ID: 1000
MDC_IDC_EPISODE_ID: 1001
MDC_IDC_EPISODE_ID: 1010
MDC_IDC_EPISODE_ID: 1024
MDC_IDC_EPISODE_ID: 1030
MDC_IDC_EPISODE_ID: 1038
MDC_IDC_EPISODE_ID: 1047
MDC_IDC_EPISODE_ID: 1048
MDC_IDC_EPISODE_ID: 1050
MDC_IDC_EPISODE_ID: 1061
MDC_IDC_EPISODE_ID: 1067
MDC_IDC_EPISODE_ID: 1075
MDC_IDC_EPISODE_ID: 1090
MDC_IDC_EPISODE_ID: 1093
MDC_IDC_EPISODE_ID: 1122
MDC_IDC_EPISODE_ID: 1131
MDC_IDC_EPISODE_ID: 1137
MDC_IDC_EPISODE_ID: 1138
MDC_IDC_EPISODE_ID: 1146
MDC_IDC_EPISODE_ID: 1150
MDC_IDC_EPISODE_ID: 1152
MDC_IDC_EPISODE_ID: 1153
MDC_IDC_EPISODE_ID: 1182
MDC_IDC_EPISODE_ID: 1183
MDC_IDC_EPISODE_ID: 1184
MDC_IDC_EPISODE_ID: 1185
MDC_IDC_EPISODE_ID: 1186
MDC_IDC_EPISODE_ID: 1187
MDC_IDC_EPISODE_ID: 1188
MDC_IDC_EPISODE_ID: 1189
MDC_IDC_EPISODE_ID: 1190
MDC_IDC_EPISODE_ID: 1191
MDC_IDC_EPISODE_ID: 1192
MDC_IDC_EPISODE_ID: 1193
MDC_IDC_EPISODE_ID: 1194
MDC_IDC_EPISODE_ID: 1195
MDC_IDC_EPISODE_ID: 1196
MDC_IDC_EPISODE_ID: 1197
MDC_IDC_EPISODE_ID: 1198
MDC_IDC_EPISODE_ID: 1199
MDC_IDC_EPISODE_ID: 1200
MDC_IDC_EPISODE_ID: 1201
MDC_IDC_EPISODE_ID: 1202
MDC_IDC_EPISODE_ID: 1203
MDC_IDC_EPISODE_ID: 1204
MDC_IDC_EPISODE_ID: 1205
MDC_IDC_EPISODE_ID: 1206
MDC_IDC_EPISODE_ID: 1207
MDC_IDC_EPISODE_ID: 1208
MDC_IDC_EPISODE_ID: 1209
MDC_IDC_EPISODE_ID: 1210
MDC_IDC_EPISODE_ID: 1211
MDC_IDC_EPISODE_ID: 1212
MDC_IDC_EPISODE_ID: 1213
MDC_IDC_EPISODE_ID: 1214
MDC_IDC_EPISODE_ID: 1215
MDC_IDC_EPISODE_ID: 1216
MDC_IDC_EPISODE_ID: 1217
MDC_IDC_EPISODE_ID: 1218
MDC_IDC_EPISODE_ID: 1219
MDC_IDC_EPISODE_ID: 1220
MDC_IDC_EPISODE_ID: 1221
MDC_IDC_EPISODE_ID: 1222
MDC_IDC_EPISODE_ID: 1223
MDC_IDC_EPISODE_ID: 1224
MDC_IDC_EPISODE_ID: 1225
MDC_IDC_EPISODE_ID: 1226
MDC_IDC_EPISODE_ID: 1227
MDC_IDC_EPISODE_ID: 1228
MDC_IDC_EPISODE_ID: 1229
MDC_IDC_EPISODE_ID: 1230
MDC_IDC_EPISODE_ID: 1231
MDC_IDC_EPISODE_ID: 1232
MDC_IDC_EPISODE_ID: 1233
MDC_IDC_EPISODE_ID: 1234
MDC_IDC_EPISODE_ID: 1235
MDC_IDC_EPISODE_ID: 717
MDC_IDC_EPISODE_ID: 737
MDC_IDC_EPISODE_ID: 805
MDC_IDC_EPISODE_ID: 806
MDC_IDC_EPISODE_ID: 874
MDC_IDC_EPISODE_ID: 875
MDC_IDC_EPISODE_ID: 876
MDC_IDC_EPISODE_ID: 880
MDC_IDC_EPISODE_ID: 883
MDC_IDC_EPISODE_ID: 887
MDC_IDC_EPISODE_ID: 888
MDC_IDC_EPISODE_ID: 889
MDC_IDC_EPISODE_ID: 890
MDC_IDC_EPISODE_ID: 893
MDC_IDC_EPISODE_ID: 894
MDC_IDC_EPISODE_ID: 895
MDC_IDC_EPISODE_ID: 904
MDC_IDC_EPISODE_ID: 908
MDC_IDC_EPISODE_ID: 909
MDC_IDC_EPISODE_ID: 924
MDC_IDC_EPISODE_ID: 925
MDC_IDC_EPISODE_ID: 927
MDC_IDC_EPISODE_ID: 932
MDC_IDC_EPISODE_ID: 934
MDC_IDC_EPISODE_ID: 944
MDC_IDC_EPISODE_ID: 946
MDC_IDC_EPISODE_ID: 958
MDC_IDC_EPISODE_ID: 969
MDC_IDC_EPISODE_ID: 971
MDC_IDC_EPISODE_ID: 977
MDC_IDC_EPISODE_ID: 993
MDC_IDC_EPISODE_TYPE: NORMAL
MDC_IDC_LEAD_CONNECTION_STATUS: NORMAL
MDC_IDC_LEAD_CONNECTION_STATUS: NORMAL
MDC_IDC_LEAD_IMPLANT_DT: NORMAL
MDC_IDC_LEAD_IMPLANT_DT: NORMAL
MDC_IDC_LEAD_LOCATION: NORMAL
MDC_IDC_LEAD_LOCATION: NORMAL
MDC_IDC_LEAD_LOCATION_DETAIL_1: NORMAL
MDC_IDC_LEAD_LOCATION_DETAIL_1: NORMAL
MDC_IDC_LEAD_MFG: NORMAL
MDC_IDC_LEAD_MFG: NORMAL
MDC_IDC_LEAD_MODEL: NORMAL
MDC_IDC_LEAD_MODEL: NORMAL
MDC_IDC_LEAD_POLARITY_TYPE: NORMAL
MDC_IDC_LEAD_POLARITY_TYPE: NORMAL
MDC_IDC_LEAD_SERIAL: NORMAL
MDC_IDC_LEAD_SERIAL: NORMAL
MDC_IDC_MSMT_BATTERY_DTM: NORMAL
MDC_IDC_MSMT_BATTERY_REMAINING_LONGEVITY: 123 MO
MDC_IDC_MSMT_BATTERY_RRT_TRIGGER: 2.62
MDC_IDC_MSMT_BATTERY_STATUS: NORMAL
MDC_IDC_MSMT_BATTERY_VOLTAGE: 3 V
MDC_IDC_MSMT_LEADCHNL_RA_IMPEDANCE_VALUE: 323 OHM
MDC_IDC_MSMT_LEADCHNL_RA_IMPEDANCE_VALUE: 437 OHM
MDC_IDC_MSMT_LEADCHNL_RA_PACING_THRESHOLD_AMPLITUDE: 0.38 V
MDC_IDC_MSMT_LEADCHNL_RA_PACING_THRESHOLD_PULSEWIDTH: 0.4 MS
MDC_IDC_MSMT_LEADCHNL_RA_SENSING_INTR_AMPL: 5 MV
MDC_IDC_MSMT_LEADCHNL_RA_SENSING_INTR_AMPL: 5 MV
MDC_IDC_MSMT_LEADCHNL_RV_IMPEDANCE_VALUE: 342 OHM
MDC_IDC_MSMT_LEADCHNL_RV_IMPEDANCE_VALUE: 418 OHM
MDC_IDC_MSMT_LEADCHNL_RV_PACING_THRESHOLD_AMPLITUDE: 0.62 V
MDC_IDC_MSMT_LEADCHNL_RV_PACING_THRESHOLD_PULSEWIDTH: 0.4 MS
MDC_IDC_MSMT_LEADCHNL_RV_SENSING_INTR_AMPL: 5 MV
MDC_IDC_MSMT_LEADCHNL_RV_SENSING_INTR_AMPL: 5 MV
MDC_IDC_PG_IMPLANT_DTM: NORMAL
MDC_IDC_PG_MFG: NORMAL
MDC_IDC_PG_MODEL: NORMAL
MDC_IDC_PG_SERIAL: NORMAL
MDC_IDC_PG_TYPE: NORMAL
MDC_IDC_SESS_CLINIC_NAME: NORMAL
MDC_IDC_SESS_DTM: NORMAL
MDC_IDC_SESS_TYPE: NORMAL
MDC_IDC_SET_BRADY_AT_MODE_SWITCH_RATE: 171 {BEATS}/MIN
MDC_IDC_SET_BRADY_HYSTRATE: NORMAL
MDC_IDC_SET_BRADY_LOWRATE: 50 {BEATS}/MIN
MDC_IDC_SET_BRADY_MAX_SENSOR_RATE: 130 {BEATS}/MIN
MDC_IDC_SET_BRADY_MAX_TRACKING_RATE: 130 {BEATS}/MIN
MDC_IDC_SET_BRADY_MODE: NORMAL
MDC_IDC_SET_BRADY_PAV_DELAY_LOW: 300 MS
MDC_IDC_SET_BRADY_SAV_DELAY_LOW: 300 MS
MDC_IDC_SET_LEADCHNL_RA_PACING_AMPLITUDE: 1.5 V
MDC_IDC_SET_LEADCHNL_RA_PACING_ANODE_ELECTRODE_1: NORMAL
MDC_IDC_SET_LEADCHNL_RA_PACING_ANODE_LOCATION_1: NORMAL
MDC_IDC_SET_LEADCHNL_RA_PACING_CAPTURE_MODE: NORMAL
MDC_IDC_SET_LEADCHNL_RA_PACING_CATHODE_ELECTRODE_1: NORMAL
MDC_IDC_SET_LEADCHNL_RA_PACING_CATHODE_LOCATION_1: NORMAL
MDC_IDC_SET_LEADCHNL_RA_PACING_POLARITY: NORMAL
MDC_IDC_SET_LEADCHNL_RA_PACING_PULSEWIDTH: 0.4 MS
MDC_IDC_SET_LEADCHNL_RA_SENSING_ANODE_ELECTRODE_1: NORMAL
MDC_IDC_SET_LEADCHNL_RA_SENSING_ANODE_LOCATION_1: NORMAL
MDC_IDC_SET_LEADCHNL_RA_SENSING_CATHODE_ELECTRODE_1: NORMAL
MDC_IDC_SET_LEADCHNL_RA_SENSING_CATHODE_LOCATION_1: NORMAL
MDC_IDC_SET_LEADCHNL_RA_SENSING_POLARITY: NORMAL
MDC_IDC_SET_LEADCHNL_RA_SENSING_SENSITIVITY: 0.3 MV
MDC_IDC_SET_LEADCHNL_RV_PACING_AMPLITUDE: 2 V
MDC_IDC_SET_LEADCHNL_RV_PACING_ANODE_ELECTRODE_1: NORMAL
MDC_IDC_SET_LEADCHNL_RV_PACING_ANODE_LOCATION_1: NORMAL
MDC_IDC_SET_LEADCHNL_RV_PACING_CAPTURE_MODE: NORMAL
MDC_IDC_SET_LEADCHNL_RV_PACING_CATHODE_ELECTRODE_1: NORMAL
MDC_IDC_SET_LEADCHNL_RV_PACING_CATHODE_LOCATION_1: NORMAL
MDC_IDC_SET_LEADCHNL_RV_PACING_POLARITY: NORMAL
MDC_IDC_SET_LEADCHNL_RV_PACING_PULSEWIDTH: 0.4 MS
MDC_IDC_SET_LEADCHNL_RV_SENSING_ANODE_ELECTRODE_1: NORMAL
MDC_IDC_SET_LEADCHNL_RV_SENSING_ANODE_LOCATION_1: NORMAL
MDC_IDC_SET_LEADCHNL_RV_SENSING_CATHODE_ELECTRODE_1: NORMAL
MDC_IDC_SET_LEADCHNL_RV_SENSING_CATHODE_LOCATION_1: NORMAL
MDC_IDC_SET_LEADCHNL_RV_SENSING_POLARITY: NORMAL
MDC_IDC_SET_LEADCHNL_RV_SENSING_SENSITIVITY: 0.9 MV
MDC_IDC_SET_ZONE_DETECTION_INTERVAL: 200 MS
MDC_IDC_SET_ZONE_DETECTION_INTERVAL: 350 MS
MDC_IDC_SET_ZONE_DETECTION_INTERVAL: 400 MS
MDC_IDC_SET_ZONE_STATUS: NORMAL
MDC_IDC_SET_ZONE_TYPE: NORMAL
MDC_IDC_SET_ZONE_VENDOR_TYPE: NORMAL
MDC_IDC_STAT_AT_BURDEN_PERCENT: 18.5 %
MDC_IDC_STAT_AT_DTM_END: NORMAL
MDC_IDC_STAT_AT_DTM_START: NORMAL
MDC_IDC_STAT_BRADY_AP_VP_PERCENT: 65.34 %
MDC_IDC_STAT_BRADY_AP_VS_PERCENT: 2.39 %
MDC_IDC_STAT_BRADY_AS_VP_PERCENT: 25.25 %
MDC_IDC_STAT_BRADY_AS_VS_PERCENT: 7.43 %
MDC_IDC_STAT_BRADY_DTM_END: NORMAL
MDC_IDC_STAT_BRADY_DTM_START: NORMAL
MDC_IDC_STAT_BRADY_RA_PERCENT_PACED: 57.46 %
MDC_IDC_STAT_BRADY_RV_PERCENT_PACED: 89.91 %
MDC_IDC_STAT_EPISODE_RECENT_COUNT: 0
MDC_IDC_STAT_EPISODE_RECENT_COUNT: 0
MDC_IDC_STAT_EPISODE_RECENT_COUNT: 3
MDC_IDC_STAT_EPISODE_RECENT_COUNT: 4
MDC_IDC_STAT_EPISODE_RECENT_COUNT: 580
MDC_IDC_STAT_EPISODE_RECENT_COUNT_DTM_END: NORMAL
MDC_IDC_STAT_EPISODE_RECENT_COUNT_DTM_START: NORMAL
MDC_IDC_STAT_EPISODE_TOTAL_COUNT: 0
MDC_IDC_STAT_EPISODE_TOTAL_COUNT: 0
MDC_IDC_STAT_EPISODE_TOTAL_COUNT: 1105
MDC_IDC_STAT_EPISODE_TOTAL_COUNT: 53
MDC_IDC_STAT_EPISODE_TOTAL_COUNT: 77
MDC_IDC_STAT_EPISODE_TOTAL_COUNT_DTM_END: NORMAL
MDC_IDC_STAT_EPISODE_TOTAL_COUNT_DTM_START: NORMAL
MDC_IDC_STAT_EPISODE_TYPE: NORMAL
MDC_IDC_STAT_TACHYTHERAPY_RECENT_DTM_END: NORMAL
MDC_IDC_STAT_TACHYTHERAPY_RECENT_DTM_START: NORMAL
MDC_IDC_STAT_TACHYTHERAPY_TOTAL_DTM_END: NORMAL
MDC_IDC_STAT_TACHYTHERAPY_TOTAL_DTM_START: NORMAL

## 2025-02-10 PROCEDURE — 93294 REM INTERROG EVL PM/LDLS PM: CPT | Performed by: INTERNAL MEDICINE

## 2025-02-10 PROCEDURE — 93296 REM INTERROG EVL PM/IDS: CPT | Performed by: INTERNAL MEDICINE

## 2025-02-12 DIAGNOSIS — E11.40 TYPE 2 DIABETES MELLITUS WITH DIABETIC NEUROPATHY, WITHOUT LONG-TERM CURRENT USE OF INSULIN (H): Primary | ICD-10-CM

## 2025-02-12 DIAGNOSIS — Z00.6 EXAMINATION OF PARTICIPANT OR CONTROL IN CLINICAL RESEARCH: ICD-10-CM

## 2025-02-12 DIAGNOSIS — E78.5 HYPERLIPIDEMIA LDL GOAL <70: ICD-10-CM

## 2025-02-12 PROCEDURE — 99207 PR NO CHARGE-RESEARCH SERVICE: CPT | Performed by: INTERNAL MEDICINE

## 2025-03-13 ENCOUNTER — DOCUMENTATION ONLY (OUTPATIENT)
Dept: CARDIOLOGY | Facility: CLINIC | Age: 65
End: 2025-03-13
Payer: COMMERCIAL

## 2025-03-13 DIAGNOSIS — E11.40 TYPE 2 DIABETES MELLITUS WITH DIABETIC NEUROPATHY, WITHOUT LONG-TERM CURRENT USE OF INSULIN (H): ICD-10-CM

## 2025-03-13 DIAGNOSIS — Z00.6 EXAMINATION OF PARTICIPANT OR CONTROL IN CLINICAL RESEARCH: ICD-10-CM

## 2025-03-13 DIAGNOSIS — I25.10 CORONARY ARTERY DISEASE INVOLVING NATIVE CORONARY ARTERY OF NATIVE HEART WITHOUT ANGINA PECTORIS: Primary | ICD-10-CM

## 2025-03-13 PROCEDURE — 99207 PR NO CHARGE-RESEARCH SERVICE: CPT | Performed by: INTERNAL MEDICINE

## 2025-03-17 ENCOUNTER — TELEPHONE (OUTPATIENT)
Dept: CARDIOLOGY | Facility: CLINIC | Age: 65
End: 2025-03-17
Payer: COMMERCIAL

## 2025-03-17 DIAGNOSIS — I25.10 CORONARY ARTERY DISEASE INVOLVING NATIVE CORONARY ARTERY OF NATIVE HEART WITHOUT ANGINA PECTORIS: Primary | ICD-10-CM

## 2025-03-17 NOTE — TELEPHONE ENCOUNTER
Wife called as the pt had nausea and vomiting for 3-4 days.  This is typical for after dosing with the research drug vs placebo.  This time he was more sick than typical.      Missed doses of eliquis because of this.  Pts wife wanted to know how to restart Eliquis.  Ok to start just 1 pill twice a day.  No loading dose required.      Recommend he discuss with endocrine next tsteps for dm and possibility of going off med.      Also apparently SURPASS trial has ended (unclear if as scheduled or early).  I'll reach out to research team to get more info on this.      I'd like to see pt back in clinic when the return from [place] in 1 month.

## 2025-03-20 ENCOUNTER — DOCUMENTATION ONLY (OUTPATIENT)
Dept: CARDIOLOGY | Facility: CLINIC | Age: 65
End: 2025-03-20
Payer: COMMERCIAL

## 2025-03-20 DIAGNOSIS — I25.10 CORONARY ARTERY DISEASE INVOLVING NATIVE CORONARY ARTERY OF NATIVE HEART WITHOUT ANGINA PECTORIS: Primary | ICD-10-CM

## 2025-03-20 DIAGNOSIS — Z00.6 EXAMINATION OF PARTICIPANT OR CONTROL IN CLINICAL RESEARCH: ICD-10-CM

## 2025-03-20 DIAGNOSIS — E11.40 TYPE 2 DIABETES MELLITUS WITH DIABETIC NEUROPATHY, WITHOUT LONG-TERM CURRENT USE OF INSULIN (H): ICD-10-CM

## 2025-03-20 PROCEDURE — 99207 PR NO CHARGE-RESEARCH SERVICE: CPT | Performed by: INTERNAL MEDICINE

## 2025-03-20 NOTE — TELEPHONE ENCOUNTER
Surpass study ended as endpoints met. He has his last visit May 1. I have told him to stop the study drug. TY   Per Mis Estrella

## 2025-03-31 NOTE — PROGRESS NOTES
Patient contacted me and reported that he has been sick with nausea every day since January with no relief between injections and is vomiting 4 days a week. He states he has used medication the past to help with these side effects but they never worked.  He is concerned about stopping the study IP as he wonders what will happen to his A1C and other positive results while taking meds. He is going to discuss with his hepatologist and endocrinologist. Plans to continue injections for now.    SURPASS-CVOT Study [Effect of tirzepatide versus Dulaglutide on Major Cardiovascular Events in Patients with Type 2 Diabetes]    Diagnosis/event description (diagnosis preferred):  NAUSEA    Start date: 01/04/2025   Date resolved:      Date site aware of event: 03/13/2025     Intensity: ([x] mild /[]  moderate / [] severe)     Study Medication adjustment:  [] Yes   [x] No    Outcome: ([] resolved [with or without sequelae] /[] recovering / [x] not recovered / [] death / [] unknown)      Is Event related to study device?    [] Yes   [x] No    Was treatment given for this event:  [] Yes   [x] No   If yes, provide details    Serious adverse event?    Yes   [x] No    Special interest event?  [] Yes   [x] No    Endpoint? [] Yes   [x] No     Fatal event?  [] Yes   [x] No    Dr. Elias: Reasonable possibility that AE is related to study treatment    [x] Yes   [] No    Dr. Elias: Is AE related to study device?   [] Yes   [x] No      SURPASS-CVOT Study [Effect of tirzepatide versus Dulaglutide on Major Cardiovascular Events in Patients with Type 2 Diabetes]    Diagnosis/event description (diagnosis preferred):  VOMITING    Start date: 01/04/2025   Date resolved: 01/08/2025     Date site aware of event: 03/13/2025     Intensity: ([x] mild /[]  moderate / [] severe)     Study Medication adjustment:  [] Yes   [x] No    Outcome: ([x] resolved [with or without sequelae] /[] recovering / [] not recovered / [] death / [] unknown)      Is Event  related to study device?    [] Yes   [x] No    Was treatment given for this event:  [] Yes   [x] No   If yes, provide details    Serious adverse event?    Yes   [x] No    Special interest event?  [] Yes   [x] No    Endpoint? [] Yes   [x] No     Fatal event?  [] Yes   [x] No    Dr. Elias: Reasonable possibility that AE is related to study treatment    [x] Yes   [] No    Dr. Elias: Is AE related to study device?   [] Yes   [x] No    SURPASS-CVOT Study [Effect of tirzepatide versus Dulaglutide on Major Cardiovascular Events in Patients with Type 2 Diabetes]    Diagnosis/event description (diagnosis preferred):  Vomiting    Start date: 01/11/2025   Date resolved: 01/15/2025     Date site aware of event: 03/13/2025     Intensity: ([x] mild /[]  moderate / [] severe)     Study Medication adjustment:  [] Yes   [x] No    Outcome: ([x] resolved [with or without sequelae] /[] recovering / [] not recovered / [] death / [] unknown)      Is Event related to study device?    [] Yes   [x] No    Was treatment given for this event:  [] Yes   [x] No   If yes, provide details  Serious adverse event?    Yes   [x] No    Special interest event?  [] Yes   [x] No    Endpoint? [] Yes   [x] No     Fatal event?  [] Yes   Jada: Reasonable possibility that AE is related to study treatment    [x] Yes   [] No    Dr. Elias: Is AE related to study device?   [] Yes   [x] No    SURPASS-CVOT Study [Effect of tirzepatide versus Dulaglutide on Major Cardiovascular Events in Patients with Type 2 Diabetes]    Diagnosis/event description (diagnosis preferred):  NAUSEA    Start date: 01/18/2025   Date resolved: 01/22/2025     Date site aware of event: 03/13/2025     Intensity: ([x] mild /[]  moderate / [] severe)     Study Medication adjustment:  [] Yes   [x] No    Outcome: ([x] resolved [with or without sequelae] /[] recovering / [] not recovered / [] death / [] unknown)      Is Event related to study device?    [] Yes   [x] No    Was treatment  given for this event:  [] Yes   [x] No   If yes, provide details    Serious adverse event?    Yes   [x] No    Special interest event?  [] Yes   [x] No    Endpoint? [] Yes   [x] No     Fatal event?  [] Yes   [x] No    Dr. Elias: Reasonable possibility that AE is related to study treatment    [x] Yes   [] No    Dr. Elias: Is AE related to study device?   [] Yes   [x] No      SURPASS-CVOT Study [Effect of tirzepatide versus Dulaglutide on Major Cardiovascular Events in Patients with Type 2 Diabetes]    Diagnosis/event description (diagnosis preferred):  Vomiting    Start date: 01/25/2025   Date resolved: 01/29/2025     Date site aware of event: 03/13/2025     Intensity: ([x] mild /[]  moderate / [] severe)     Study Medication adjustment:  [] Yes   [x] No    Outcome: ([] resolved [with or without sequelae] /[] recovering / [] not recovered / [] death / [] unknown)      Is Event related to study device?    [] Yes   [x] No    Was treatment given for this event:  [] Yes   [x] No   If yes, provide details  Serious adverse event?    Yes   [x] No    Special interest event?  [] Yes   [x] No    Endpoint? [] Yes   [x] No     Fatal event?  [] Yes   [x] No    Dr. Elias: Reasonable possibility that AE is related to study treatment    [x] Yes   [] No    Dr. Elias: Is AE related to study device?   [] Yes   [x] No    SURPASS-CVOT Study [Effect of tirzepatide versus Dulaglutide on Major Cardiovascular Events in Patients with Type 2 Diabetes]    Diagnosis/event description (diagnosis preferred):  VOMITING    Start date: 02/01/2025   Date resolved: 02/05/2025     Date site aware of event: 03/13/2025     Intensity: ([x] mild /[]  moderate / [] severe)      Study Medication adjustment:  [] Yes   [x] No    Outcome: ([x] resolved [with or without sequelae] /[] recovering / [] not recovered / [] death / [] unknown)      Is Event related to study device?    [] Yes   [x] No    Was treatment given for this event:  [] Yes   [x] No   If  yes, provide details    Serious adverse event?    Yes   [x] No    Special interest event?  [] Yes   [x] No    Endpoint? [] Yes   [x] No     Fatal event?  [] Yes   [x] No    Dr. Elias: Reasonable possibility that AE is related to study treatment    [x] Yes   [] No    Dr. Elias: Is AE related to study device?   [] Yes   [x] No      SURPASS-CVOT Study [Effect of tirzepatide versus Dulaglutide on Major Cardiovascular Events in Patients with Type 2 Diabetes]    Diagnosis/event description (diagnosis preferred):  VOMITING    Start date: 02/08/2025   Date resolved: 02/12/2025     Date site aware of event: 03/13/2025     Intensity: ([x] mild /[]  moderate / [] severe)     Study Medication adjustment:  [] Yes   [x] No    Outcome: ([x] resolved [with or without sequelae] /[] recovering / [] not recovered / [] death / [] unknown)      Is Event related to study device?    [] Yes   [x] No    Was treatment given for this event:  [] Yes   [x] No   If yes, provide details    Serious adverse event?    Yes   [x] No    Special interest event?  [] Yes   [x] No    Endpoint? [] Yes   [] No     Fatal event?  [] Yes   [x] No    Dr. Elias: Reasonable possibility that AE is related to study treatment    [x] Yes   [] No    Dr. Elias: Is AE related to study device?   [] Yes   [x] No      SURPASS-CVOT Study [Effect of tirzepatide versus Dulaglutide on Major Cardiovascular Events in Patients with Type 2 Diabetes]    Diagnosis/event description (diagnosis preferred):  VOMITING    Start date: 02/15/2025   Date resolved: 02/19/2025     Date site aware of event: 03/13/2025     Intensity: ([x] mild /[]  moderate / [] severe)     Study Medication adjustment:  [] Yes   [x] No    Outcome: ([x] resolved [with or without sequelae] /[] recovering / [] not recovered / [] death / [] unknown)      Is Event related to study device?    [] Yes   [x] No    Was treatment given for this event:  [] Yes   [x] No   If yes, provide details    Serious adverse  event?    Yes   [x] No    Special interest event?  [] Yes   [x] No    Endpoint? [] Yes   [x] No     Fatal event?  [] Yes   [x] No    Dr. Elias: Reasonable possibility that AE is related to study treatment    [x] Yes   [] No    Dr. Elias: Is AE related to study device?   [] Yes   [x] No      SURPASS-CVOT Study [Effect of tirzepatide versus Dulaglutide on Major Cardiovascular Events in Patients with Type 2 Diabetes]    Diagnosis/event description (diagnosis preferred):  VOMITING    Start date: 02/22/2025   Date resolved: 02/26/2025     Date site aware of event: 03/13/2025     Intensity: ([x] mild /[]  moderate / [] severe)     Study Medication adjustment:  [] Yes   [x] No    Outcome: ([] resolved [with or without sequelae] /[] recovering / [] not recovered / [] death / [] unknown)     Is Event related to study device?    [] Yes   [x] No    Was treatment given for this event:  [] Yes   [x] No   If yes, provide details    Serious adverse event?    Yes   [x] No    Special interest event?  [] Yes   [x] No    Endpoint? [] Yes   [x] No     Fatal event?  [] Yes   [x] No    Dr. Elias: Reasonable possibility that AE is related to study treatment    [x] Yes   [] No    Dr. Elias: Is AE related to study device?   [] Yes   [x] No    SURPASS-CVOT Study [Effect of tirzepatide versus Dulaglutide on Major Cardiovascular Events in Patients with Type 2 Diabetes]    Diagnosis/event description (diagnosis preferred):  VOMITING    Start date: 03/01/2025   Date resolved: 03/05/2025     Date site aware of event: 03/13/2025     Intensity: ([x] mild /[]  moderate / [] severe)     Study Medication adjustment:  [] Yes   [x] No    Outcome: ([x] resolved [with or without sequelae] /[] recovering / [] not recovered / [] death / [] unknown)      Is Event related to study device?    [] Yes   [x] No    Was treatment given for this event:  [] Yes   [x] No   If yes, provide details    Serious adverse event?    Yes   [x] No    Special interest  event?  [] Yes   [x] No    Endpoint? [] Yes   [x] No     Fatal event?  [] Yes   [x] No    Dr. Elias: Reasonable possibility that AE is related to study treatment    [x] Yes   [] No    Dr. Elias: Is AE related to study device?   [] Yes   [x] No    SURPASS-CVOT Study [Effect of tirzepatide versus Dulaglutide on Major Cardiovascular Events in Patients with Type 2 Diabetes]    Diagnosis/event description (diagnosis preferred):  VOMITING    Start date: 03/08/2025   Date resolved: 03/12/2025     Date site aware of event: 03/13/2025     Intensity: ([x] mild /[]  moderate / [] severe)     Study Medication adjustment:  [] Yes   [x] No    Outcome: ([x] resolved [with or without sequelae] /[] recovering / [] not recovered / [] death / [] unknown)      Is Event related to study device?    [] Yes   [x] No    Was treatment given for this event:  [] Yes   [x] No   If yes, provide details    Serious adverse event?    Yes   [x] No    Special interest event?  [] Yes   [x] No    Endpoint? [] Yes   [x] No     Fatal event?  [] Yes   [x] No    Dr. Elias: Reasonable possibility that AE is related to study treatment    [x] Yes   [] No    Dr. Elias: Is AE related to study device?   [] Yes   [x] No    Mis Husain RN

## 2025-03-31 NOTE — PROGRESS NOTES
Patient contacted me and stated that he is considering stopping study IP and asked if I recommended this. I stated absolutely he should stop due to the ongoing side effects. He is due to take his injection today and has agreed to stop it starting today. I asked him to please let me know next week about his side effects, he stated he would.    SURPASS-CVOT Study [Effect of tirzepatide versus Dulaglutide on Major Cardiovascular Events in Patients with Type 2 Diabetes]    Diagnosis/event description (diagnosis preferred):  VOMITING    Start date: 03/15/2025   Date resolved: 03/19/2025     Date site aware of event: 03/20/2025     Intensity: ([x] mild /[]  moderate / [] severe)     Study Medication adjustment:  [x] Yes   [] No    Outcome: ([x] resolved [with or without sequelae] /[] recovering / [] not recovered / [] death / [] unknown)      Is Event related to study device?    [] Yes   [x] No    Was treatment given for this event:  [] Yes   [x] No   If yes, provide details    Serious adverse event?    Yes   [x] No    Special interest event?  [] Yes   [x] No    Endpoint? [] Yes   [x] No     Fatal event?  [] Yes   [x] No    Dr. Elias: Reasonable possibility that AE is related to study treatment    [x] Yes   [] No    Dr. Elias: Is AE related to study device?   [] Yes   [x] No    Mis Husain RN

## 2025-04-01 NOTE — LETTER
-- DO NOT REPLY / DO NOT REPLY ALL --  -- This inbox is not monitored. If this was sent to the wrong provider or department, reroute message to P E[facility] Reroute pool. --  -- Message is from Engagement Center Operations ([facility]O) --    Order Request  X Ray of AP and leteral views of lower left leg      Message / reason: R/O Foreign body     Insurance type: See Below   Payor:  [employer] [employer] / Plan:  BE  PREFERRED MEDICARE HMO [id number] / Product Type: [employer] MEDICARE ADVANTAGE    Preferred Delivery Method  Input in Epic     Caller Information       Contact Date/Time Type Contact Phone/Fax    04/01/2025 02:23 PM CDT Phone (Incoming) [facility] [phone]            Alternative phone number:     Can a detailed message be left? No  Patient has been advised the message will be addressed within 2-3 business days.    Copied from CRM #[id number]. Topic: MW Referral/Order - MW Referral/Order Request  >> Apr 1, 2025  2:25 PM Nicolas RODRÍGUEZ wrote:  [facility] called regarding a Referral (or Service to Order).      New referral/ service-to order requested discussed previously with provider; Selected 'Wrap Up CRM' and created new Telephone Encounter after clicking 'Convert to Clinical Call'. Selected reason for call 'Referral'. Sent Referral message and routed as routine priority per Clinician KB page to appropriate clinician Pool.   1/23/2020    Ana Olvera MD  [address] [place] [address]    RE: Vikash Reevesmackenzie       Dear Colleague,    I had the pleasure of seeing Vikash Reevesmackenzie in the [facility].    HPI and Plan:   See dictation    Orders Placed This Encounter   Procedures     CT Neck Angio w/o & w Contrast     Lipid Profile     ALT     Basic metabolic panel     Lipid Profile     Follow-Up with Cardiac Advanced Practice Provider     Follow-Up with Cardiologist       Orders Placed This Encounter   Medications     lisinopril (PRINIVIL/ZESTRIL) 10 MG tablet     Sig: Take 1 tablet (10 mg) by mouth daily     Dispense:  90 tablet     Refill:  3     nebivolol (BYSTOLIC) 5 MG tablet     Sig: Take 1 tablet (5 mg) by mouth daily     Dispense:  90 tablet     Refill:  3     rosuvastatin (CRESTOR) 20 MG tablet     Sig: Take 1 tablet (20 mg) by mouth daily     Dispense:  90 tablet     Refill:  3       Medications Discontinued During This Encounter   Medication Reason     atorvastatin (LIPITOR) 20 MG tablet Alternate therapy     lisinopril (PRINIVIL/ZESTRIL) 10 MG tablet Reorder     nebivolol (BYSTOLIC) 10 MG tablet Reorder         Encounter Diagnoses   Name Primary?     Coronary artery disease involving native coronary artery of native heart without angina pectoris Yes     Atherosclerosis of left carotid artery      Mixed hyperlipidemia      HDL deficiency      Benign essential hypertension        CURRENT MEDICATIONS:  Current Outpatient Medications   Medication Sig Dispense Refill     aspirin 81 MG tablet Take 1 tablet by mouth daily.       chlorhexidine (PERIDEX) 0.12 % solution SWISH 1/2 OZ FOR 30 SECONDS THEN SPIT TWICE A DAY  5     cyanocobalamin 1000 MCG SUBL Place 1,000 mcg under the tongue daily       insulin detemir (LEVEMIR FLEXTOUCH) 100 UNIT/ML pen INJECT 50 UNITS SUBCUTANEOUS AT BEDTIME 15 mL 1     insulin pen needle (B-D U/F) 31G X 8 MM miscellaneous Use one pen needles daily or as  directed. 100 each 3     insulin pen needle (BD HEIKE U/F) 32G X 4 MM Use 1 daily or as directed. 100 each prn     lisinopril (PRINIVIL/ZESTRIL) 10 MG tablet Take 1 tablet (10 mg) by mouth daily 90 tablet 3     nebivolol (BYSTOLIC) 5 MG tablet Take 1 tablet (5 mg) by mouth daily 90 tablet 3     rosuvastatin (CRESTOR) 20 MG tablet Take 1 tablet (20 mg) by mouth daily 90 tablet 3     Vitamin D, Cholecalciferol, 1000 units TABS Take 1,000 Units by mouth daily       zolpidem (AMBIEN) 10 MG tablet TAKE 1 TAB ORALLY AS NEEDED FOR SLEEP 30 tablet 0       ALLERGIES     Allergies   Allergen Reactions     Chlorthalidone Nausea and Vomiting     dizziness     Penicillins Rash     Rash with PCN only. Patient has taken amoxicillin with no rash.       PAST MEDICAL HISTORY:  Past Medical History:   Diagnosis Date     Basal cell carcinoma      Carotid stenosis, left      Coronary artery disease     4 vessel bypass November 2010; LIMA ->LAD, SVG-> OM3, SVG -> D2, SVG -> PDA     Diabetes mellitus (H) 2005    neuropathy     Generalized anxiety disorder 5/2/2014     Hepatitis C, chronic (H) 2005     Hyperlipidemia LDL goal < 70      Hypertension      Liver diseases     9/15 Liver is 10 cm in span without left lobe enlargement     Persistent microalbuminuria associated with type 2 diabetes mellitus (H) 5/6/2015       PAST SURGICAL HISTORY:  Past Surgical History:   Procedure Laterality Date     ARTHROSCOPY KNEE RT/LT      left     COLONOSCOPY       CORONARY ARTERY BYPASS  11/18/201    Coronary artery bypass grafting x 4 with placement of the left internal mammary artery to the distal midportion of the left anterior descending artery, saphenous vein graft from aorta to the third obtuse marginal branch of circumflex coronary artery, saphenous vein graft from aorta to the second diagonal branch, saphenous vein graft from aorta to the posterior descending artery.     ESOPHAGOSCOPY, GASTROSCOPY, DUODENOSCOPY (EGD), COMBINED  10/31/2011     Procedure:COMBINED ESOPHAGOSCOPY, GASTROSCOPY, DUODENOSCOPY (EGD); Surgeon:ALONSO ALDANA; Location:[facility]     ESOPHAGOSCOPY, GASTROSCOPY, DUODENOSCOPY (EGD), COMBINED N/A 3/8/2018    Procedure: COMBINED ESOPHAGOSCOPY, GASTROSCOPY, DUODENOSCOPY (EGD);  EGD;  Surgeon: Angel Luis Justice MD;  Location: [facility]     HEART CATH CORONARY ANGIOGRAM W/LV GRAM  9-11-10    CV Surgery recommended     HEART CATH CORONARY ANGIOGRAM W/LV GRAM  2-28-14    Medical management     HERNIA REPAIR, INGUINAL RT/LT      left       FAMILY HISTORY:  Family History   Problem Relation Age of Onset     C.A.D. Father      Cancer Father         bladder     Heart Surgery Father         bypass surgery     Heart Disease Mother        SOCIAL HISTORY:  Social History     Socioeconomic History     Marital status:      Spouse name: None     Number of children: None     Years of education: None     Highest education level: None   Occupational History     None   Social Needs     Financial resource strain: None     Food insecurity:     Worry: None     Inability: None     Transportation needs:     Medical: None     Non-medical: None   Tobacco Use     Smoking status: Former Smoker     Packs/day: 0.50     Years: 12.00     Pack years: 6.00     Types: Cigarettes     Start date: 1993     Last attempt to quit: 1/26/2005     Years since quitting: 15.0     Smokeless tobacco: Never Used   Substance and Sexual Activity     Alcohol use: No     Alcohol/week: 0.0 standard drinks     Drug use: No     Sexual activity: None   Lifestyle     Physical activity:     Days per week: None     Minutes per session: None     Stress: None   Relationships     Social connections:     Talks on phone: None     Gets together: None     Attends Church service: None     Active member of club or organization: None     Attends meetings of clubs or organizations: None     Relationship status: None     Intimate partner violence:     Fear of current or ex partner: None      Emotionally abused: None     Physically abused: None     Forced sexual activity: None   Other Topics Concern     Parent/sibling w/ CABG, MI or angioplasty before 65F 55M? Not Asked      Service Not Asked     Blood Transfusions Not Asked     Caffeine Concern Yes     Comment: 2 cups coffee per day     Occupational Exposure Not Asked     Hobby Hazards Not Asked     Sleep Concern Not Asked     Stress Concern Not Asked     Weight Concern Not Asked     Special Diet Yes     Comment: low carb diet, low sugar     Back Care Not Asked     Exercise Yes     Comment: treadmill 40 minutes, walk, daily, bike 30 minutes every other day     Bike Helmet Not Asked     Seat Belt Not Asked     Self-Exams Not Asked   Social History Narrative     None       Review of Systems:  Skin:  Negative       Eyes:  Positive for glasses right eye retnal neuropathy beginning stages   ENT:  Negative      Respiratory:  Positive for   activity decreased in the afternoon since starting Bystolic   Cardiovascular:    Positive for;dizziness;lightheadedness occ.  Gastroenterology: Negative      Genitourinary:  Negative      Musculoskeletal:  Positive for joint pain increasing  Neurologic:  Positive for numbness or tingling of feet;numbness or tingling of hands neuropathy in feet and left hand   Psychiatric:  Negative      Heme/Lymph/Imm:  Negative      Endocrine:  Positive for diabetes      Physical Exam:  Vitals: /84   Pulse 60   Ht 1.829 m (6')   Wt 88.7 kg (195 lb 9.6 oz)   BMI 26.53 kg/m       Constitutional:  cooperative, alert and oriented, well developed, well nourished, in no acute distress        Skin:  warm and dry to the touch, no apparent skin lesions or masses noted          Head:  normocephalic, no masses or lesions        Eyes:  pupils equal and round, conjunctivae and lids unremarkable, sclera white, no xanthalasma, EOMS intact, no nystagmus        Lymph:      ENT:  no pallor or cyanosis, dentition good        Neck:  carotid  pulses are full and equal bilaterally;no carotid bruit        Respiratory:  normal breath sounds, clear to auscultation, normal A-P diameter, normal symmetry, normal respiratory excursion, no use of accessory muscles         Cardiac: regular rhythm;normal S1 and S2;no S3 or S4;no murmurs, gallops or rubs detected                pulses full and equal                                        GI:           Extremities and Muscular Skeletal:  no edema;no spinal abnormalities noted;normal muscle strength and tone              Neurological:  no gross motor deficits        Psych:  affect appropriate, oriented to time, person and place Anxious      CC  Zeb Roberts MD  [address] [place] [id number]                Thank you for allowing me to participate in the care of your patient.      Sincerely,     Zeb Roberts MD     [facility]    cc:   Zeb Roberts MD  [address] [facility]  [place] [id number]

## 2025-04-02 ENCOUNTER — DOCUMENTATION ONLY (OUTPATIENT)
Dept: CARDIOLOGY | Facility: CLINIC | Age: 65
End: 2025-04-02
Payer: COMMERCIAL

## 2025-04-02 DIAGNOSIS — E11.40 TYPE 2 DIABETES MELLITUS WITH DIABETIC NEUROPATHY, WITHOUT LONG-TERM CURRENT USE OF INSULIN (H): ICD-10-CM

## 2025-04-02 DIAGNOSIS — Z00.6 EXAMINATION OF PARTICIPANT OR CONTROL IN CLINICAL RESEARCH: ICD-10-CM

## 2025-04-02 DIAGNOSIS — I25.10 CORONARY ARTERY DISEASE INVOLVING NATIVE CORONARY ARTERY OF NATIVE HEART WITHOUT ANGINA PECTORIS: Primary | ICD-10-CM

## 2025-04-03 NOTE — PROGRESS NOTES
"Patient was contacted regarding whether side effects had resolved after stopping study IP on 03/20/2025.  He reported \" I am much better now. I'm on 2.5 mg of Mounjaro to start out. My daily glucose levels are slightly elevated but still under control. Not nearly the nausea and vomiting.\" Patient asked if I wanted the syringes back for the study and I stated yes please bring to appt May 1, 2025.    Mis Husain RN   "

## 2025-04-12 ENCOUNTER — HEALTH MAINTENANCE LETTER (OUTPATIENT)
Age: 65
End: 2025-04-12

## 2025-04-27 DIAGNOSIS — M19.042 PRIMARY OSTEOARTHRITIS OF BOTH HANDS: ICD-10-CM

## 2025-04-27 DIAGNOSIS — M19.041 PRIMARY OSTEOARTHRITIS OF BOTH HANDS: ICD-10-CM

## 2025-04-28 RX ORDER — CELECOXIB 100 MG/1
CAPSULE ORAL
Qty: 90 CAPSULE | Refills: 0 | Status: SHIPPED | OUTPATIENT
Start: 2025-04-28

## 2025-04-29 DIAGNOSIS — I25.10 CORONARY ARTERY DISEASE INVOLVING NATIVE CORONARY ARTERY OF NATIVE HEART WITHOUT ANGINA PECTORIS: Primary | ICD-10-CM

## 2025-04-29 DIAGNOSIS — E78.5 HYPERLIPIDEMIA LDL GOAL <70: ICD-10-CM

## 2025-04-29 DIAGNOSIS — Z00.6 EXAMINATION OF PARTICIPANT OR CONTROL IN CLINICAL RESEARCH: ICD-10-CM

## 2025-04-29 DIAGNOSIS — I25.10 CORONARY ARTERY DISEASE INVOLVING NATIVE CORONARY ARTERY OF NATIVE HEART WITHOUT ANGINA PECTORIS: ICD-10-CM

## 2025-04-29 PROCEDURE — 99207 PR NO CHARGE-RESEARCH SERVICE: CPT | Performed by: INTERNAL MEDICINE

## 2025-05-02 DIAGNOSIS — N52.9 MALE ERECTILE DISORDER: ICD-10-CM

## 2025-05-02 DIAGNOSIS — M19.041 PRIMARY OSTEOARTHRITIS OF BOTH HANDS: ICD-10-CM

## 2025-05-02 DIAGNOSIS — M19.042 PRIMARY OSTEOARTHRITIS OF BOTH HANDS: ICD-10-CM

## 2025-05-05 DIAGNOSIS — Z00.6 EXAMINATION OF PARTICIPANT OR CONTROL IN CLINICAL RESEARCH: ICD-10-CM

## 2025-05-05 DIAGNOSIS — I25.10 CORONARY ARTERY DISEASE INVOLVING NATIVE CORONARY ARTERY OF NATIVE HEART WITHOUT ANGINA PECTORIS: ICD-10-CM

## 2025-05-05 DIAGNOSIS — I25.10 CORONARY ARTERY DISEASE INVOLVING NATIVE CORONARY ARTERY OF NATIVE HEART WITHOUT ANGINA PECTORIS: Primary | ICD-10-CM

## 2025-05-05 DIAGNOSIS — E11.40 TYPE 2 DIABETES MELLITUS WITH DIABETIC NEUROPATHY, WITHOUT LONG-TERM CURRENT USE OF INSULIN (H): ICD-10-CM

## 2025-05-05 PROCEDURE — 99207 PR NO CHARGE-RESEARCH SERVICE: CPT | Performed by: INTERNAL MEDICINE

## 2025-05-05 RX ORDER — CELECOXIB 100 MG/1
CAPSULE ORAL
Qty: 90 CAPSULE | Refills: 0 | Status: SHIPPED | OUTPATIENT
Start: 2025-05-05

## 2025-05-05 RX ORDER — SILDENAFIL 50 MG/1
50 TABLET, FILM COATED ORAL
Qty: 30 TABLET | Refills: 0 | Status: SHIPPED | OUTPATIENT
Start: 2025-05-05

## 2025-05-07 NOTE — PROGRESS NOTES
Assessment/Plan:   SURPASS-CVOT Study [Effect of tirzepatide versus Dulaglutide on Major Cardiovascular Events in Patients with Type 2Diabetes] and last office visit for this study.     Coronary artery disease involving the native coronary artery of the native heart.   Coronary artery bypass grafting x 4 with placement of the left internal mammary artery to the distal portion of the left anterior descending artery, saphenous vein graft from aorta to the third obtuse marginal branch of the circumflex coronary artery, saphenous vein graft from the aorta to the second diagonal branch, and saphenous vein graft from aorta to the posterior descending artery. 11/18/2010.   The patient had coronary angiography 2014 and all 4 of these grafts were patent.   The patient had a stress MRI December 12, 2023 which showed normal biventricular size with normal systolic function.  Quantitative LVEF is 58%.  Quantitative RVEF is 55%. Regadenoson induced stress perfusion is negative for ischemia.Delayed hyperenhancement reveals a small subendocardial scar in the mid inferolateral segment consistent with small scar in the circumflex distribution.  There also is a small subepicardial, non-CAD scar in the mid inferior region.   Left carotid endarterectomy February 21, 2020   Narrative & Impression   LEFT CAROTID ULTRASOUND   9/1/2020 8:14 AM   HISTORY: History of left carotid endarterectomy on 2/21/2020. Left  carotid artery stenosis.  COMPARISON: 1/20/2020  LEFT CAROTID FINDINGS:  Plaque in the common carotid, carotid bulb and  internal carotid artery.  Left ICA PSV:  132  cm/sec.  Left ICA EDV:  29 cm/sec.  Left ICA/CCA PSV Ratio:  0.96    These indicate  50 - 69%  diameter stenosis of the left ICA.    Left Vertebral: Antegrade flow.   Left ECA: Antegrade flow.   Causes of Decreased Accuracy:   None.                                                              IMPRESSION: 50-69% diameter stenosis of the left ICA relative to the  distal  "ICA diameter.   2. Paroxysmal atrial fibrillation   Ablation of atrial flutter September 9, 2022.  He has a high grade AV block with a permanent pacemaker (placed on November 7, 2022) Dr. Walters noted \"patient has had small A-fib burden on his device checks. His most recent device check showed 69% atrial pacing, 89% ventricular pacing, and 4 mode switch episodes comprising 4.3% of the time with the longest A-fib episode of 6 hours and 7 minutes.  Heart rates are controlled during these A-fib episodes typically between 60 and 100 bpm.  He also has some atrial high rate episodes that look like atrial tachycardia or atrial flutter at heart rates of 160-200.  No duration is given for these, but he indicates that he is asymptomatic with his arrhythmia episodes.\"   He is on Eliquis 5 mg one tablet twice a day  3. Hypertension  4. Dyslipidemia last lipid panel May 5, 2028  5. Diabetes type II  last hemoglobin A1c was 5.5 on October 2, 2024  6. Fatty liver disease, nonalcoholic with cirrhosis of the liver without ascites     Subjective:     Vikash Burgos is seen at [facility] heart clinic today for SURPASS-CVOT study [Effect of tirzepatide versus Dulaglutide on Major Cardiovascular Events in Patients with Type 2Diabetes] and last office visit for this study. The last time the patient took this study medication was on 3/20/2025. Patient states he is currently being prescribed by his physician, Sandy at 2.5 mg which he is taking, having less nausea and no vomiting, and performs daily glucose monitoring. He has no health concerns today and denies fatigue, lightheadedness, shortness of breath, dyspnea on exertion, orthopnea, PND, palpitations, chest pain, abdominal fullness/bloating, and lower extremity edema.    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)    Diabetic polyneuropathy associated with diabetes mellitus due to underlying condition (H)    HDL deficiency    Cirrhosis of liver without ascites, unspecified hepatic cirrhosis type (H)    Paroxysmal atrial fibrillation (H)     Past Medical History:   Diagnosis Date    Basal cell carcinoma     Carotid stenosis, left     s/p left CEA 2/2020    Coronary artery disease     4 vessel bypass November 2010; LIMA ->LAD, SVG-> OM3, SVG -> D2, SVG -> PDA    Diabetes mellitus (H) 2005    Generalized anxiety disorder 05/02/2014    Hepatitis C, chronic (H) 2005    Hyperlipidemia LDL goal < 70     Hypertension     Liver diseases     9/15 Liver is 10 cm in span without left lobe enlargement    Persistent microalbuminuria associated with type 2 diabetes mellitus (H) 05/06/2015     Past Surgical History:   Procedure Laterality Date    ARTHROSCOPY KNEE RT/LT      left    COLONOSCOPY      CORONARY ARTERY BYPASS  11/18/201    Coronary artery bypass grafting x 4 with placement of the left internal mammary artery to the distal midportion of the left anterior descending artery, saphenous vein graft from aorta to the third obtuse marginal branch of circumflex coronary artery, saphenous vein graft from aorta to the second diagonal branch, saphenous vein graft from aorta to the posterior descending artery.    ENDARTERECTOMY CAROTID Left 2/21/2020    Procedure: LEFT CAROTID ENDARTERECTOMY WITH EEG;  Surgeon: Semaj Stubbs MD;  Location:  OR    EP ABLATION ATRIAL FLUTTER N/A 9/9/2022    Procedure: Ablation Atrial Flutter;  Surgeon: Tyson Ordonez MD;  Location: [facility] CARDIAC CATH LAB    EP PACEMAKER DEVICE & LEAD IMPLANT- RIGHT ATRIAL & LEFT VENTRICULAR N/A 11/7/2022    Procedure: Pacemaker Device & Lead Implant- Right Atrial & Left Ventricular;  Surgeon: Tyson Ordonez MD;  Location: [facility]  CARDIAC CATH LAB    ESOPHAGOSCOPY, GASTROSCOPY, DUODENOSCOPY (EGD), COMBINED  10/31/2011    Procedure:COMBINED ESOPHAGOSCOPY, GASTROSCOPY, DUODENOSCOPY (EGD); Surgeon:ALONSO ALDANA; Location: GI    ESOPHAGOSCOPY, GASTROSCOPY, DUODENOSCOPY (EGD), COMBINED N/A 3/8/2018    Procedure: COMBINED ESOPHAGOSCOPY, GASTROSCOPY, DUODENOSCOPY (EGD);  EGD;  Surgeon: Angel Luis Justice MD;  Location:  GI    HEART CATH CORONARY ANGIOGRAM W/LV GRAM  9-11-10    CV Surgery recommended    HEART CATH CORONARY ANGIOGRAM W/LV GRAM  2-28-14    Medical management    HERNIA REPAIR, INGUINAL RT/LT      left       Current Outpatient Medications:     amLODIPine (NORVASC) 2.5 MG tablet, Take 1 tablet (2.5 mg) by mouth 2 times daily., Disp: 180 tablet, Rfl: 3    apixaban ANTICOAGULANT (ELIQUIS) 5 MG tablet, Take 1 tablet (5 mg) by mouth 2 times daily., Disp: 180 tablet, Rfl: 3    B-D U/F 31G X 8 MM insulin pen needle, USE ONE PEN NEEDLES DAILY OR AS DIRECTED., Disp: 100 each, Rfl: 3    celecoxib (CELEBREX) 100 MG capsule, TAKE 1 CAPSULE BY MOUTH ONE TIME DAILY AS NEEDED FOR MODERATE PAIN, Disp: 90 capsule, Rfl: 0    cyanocobalamin 1000 MCG SUBL, Place 1,000 mcg under the tongue daily, Disp: , Rfl:     glucosamine-chondroitin 500-400 MG CAPS per capsule, Take 1 capsule by mouth daily, Disp: , Rfl:     lisinopril (ZESTRIL) 20 MG tablet, Take 1 tablet (20 mg) by mouth 2 times daily., Disp: 180 tablet, Rfl: 3    omeprazole (PRILOSEC) 20 MG DR capsule, Take 1 capsule (20 mg) by mouth daily, Disp: 90 capsule, Rfl: 3    ONETOUCH VERIO IQ test strip, , Disp: , Rfl:     rosuvastatin (CRESTOR) 20 MG tablet, Take 1 tablet (20 mg) by mouth daily., Disp: 90 tablet, Rfl: 3    sildenafil (VIAGRA) 50 MG tablet, TAKE ONE TABLET BY MOUTH DAILY AS NEEDED, Disp: 30 tablet, Rfl: 0    Vitamin D, Cholecalciferol, 1000 units TABS, Take 1,000 Units by mouth daily, Disp: , Rfl:     zolpidem (AMBIEN) 5 MG tablet, Take 1 tablet (5 mg) by mouth nightly as needed  "for sleep, Disp: 30 tablet, Rfl: 0    Current Facility-Administered Medications:     study - tirzepatide 2.5-15 mg or dulaglutide 1.5 mg (SURPASS) (IDS# [id number]) injection 1 Pen, 1 Pen, Subcutaneous, Q7 Days, Mariah Elias MD    study - tirzepatide 2.5-15 mg or dulaglutide 1.5 mg (SURPASS) (IDS# [id number]) injection 1 Pen, 1 Pen, Subcutaneous, Q7 Days, Mariah Elias MD    study - tirzepatide 2.5-15 mg or dulaglutide 1.5 mg (SURPASS) (IDS# [id number]) injection 1 Pen, 1 Pen, Subcutaneous, Q7 Days, Mariah Elias MD    study - tirzepatide 2.5-15 mg or dulaglutide 1.5 mg (SURPASS) (IDS# [id number]) injection 1 Pen, 1 Pen, Subcutaneous, Q7 Days, Mariah Elias MD    study - tirzepatide 2.5-15 mg or dulaglutide 1.5 mg (SURPASS) (IDS# [id number]) injection 1.5-15 mg, 1.5-15 mg, Subcutaneous, Q7 Days, Mariah Elias MD     No Known Allergies    Objective:     BP: (!) 146/70 135/67   BP Location: Right arm Right arm   Patient Position: Sitting Sitting   Cuff Size: Adult Regular Adult Regular   Pulse: 67 57   Weight:   73 kg (161 lb)   Height:   1.803 m (5' 11\")     Wt Readings from Last 4 Encounters:   01/07/25 71.2 kg (157 lb)   11/26/24 70.3 kg (155 lb)   11/13/24 69.9 kg (154 lb)   09/16/24 69.4 kg (153 lb)      General Appearance:   Alert, cooperative and in no acute distress.   HEENT:  No scleral icterus; the mucous membranes were pink and moist. Cervical lymph nodes nontender and nonpalpable.   Neck: JVP normal. No HJR.   Chest: The spine was straight. The chest was symmetric.   Lungs:   Respirations unlabored; the lungs are clear to auscultation.   Cardiovascular:   Regular rhythm. S1 and S2 without murmur, clicks or rubs. Radial, carotid and posterior tibial pulses are intact and symmetrical.  No carotid bruits noted   Abdomen:  Soft, nontender, nondistended, bowel sounds present   Extremities: No cyanosis, clubbing, or edema.   Skin: No bruising or wounds   Neurologic: Mood and affect are appropriate.         Joyce Morales" NP

## 2025-05-08 ENCOUNTER — OFFICE VISIT (OUTPATIENT)
Dept: CARDIOLOGY | Facility: CLINIC | Age: 65
End: 2025-05-08
Payer: COMMERCIAL

## 2025-05-08 VITALS
WEIGHT: 161 LBS | SYSTOLIC BLOOD PRESSURE: 135 MMHG | BODY MASS INDEX: 22.54 KG/M2 | HEIGHT: 71 IN | HEART RATE: 57 BPM | DIASTOLIC BLOOD PRESSURE: 67 MMHG

## 2025-05-08 DIAGNOSIS — E11.40 TYPE 2 DIABETES MELLITUS WITH DIABETIC NEUROPATHY, WITHOUT LONG-TERM CURRENT USE OF INSULIN (H): ICD-10-CM

## 2025-05-08 DIAGNOSIS — Z00.6 EXAMINATION OF PARTICIPANT OR CONTROL IN CLINICAL RESEARCH: Primary | ICD-10-CM

## 2025-05-08 DIAGNOSIS — Z00.6 EXAMINATION OF PARTICIPANT OR CONTROL IN CLINICAL RESEARCH: ICD-10-CM

## 2025-05-08 DIAGNOSIS — I25.10 CORONARY ARTERY DISEASE INVOLVING NATIVE CORONARY ARTERY OF NATIVE HEART WITHOUT ANGINA PECTORIS: Primary | ICD-10-CM

## 2025-05-08 NOTE — LETTER
5/8/2025    Danish Land MD  [address]  [place] [address]    RE: Vikash Sharif Loretta       Dear Colleague,     I had the pleasure of seeing Vikash Kole Burgos in the [facility].  Assessment/Plan:   SURPASS-CVOT Study [Effect of tirzepatide versus Dulaglutide on Major Cardiovascular Events in Patients with Type 2Diabetes] and last office visit for this study.     Coronary artery disease involving the native coronary artery of the native heart.   Coronary artery bypass grafting x 4 with placement of the left internal mammary artery to the distal portion of the left anterior descending artery, saphenous vein graft from aorta to the third obtuse marginal branch of the circumflex coronary artery, saphenous vein graft from the aorta to the second diagonal branch, and saphenous vein graft from aorta to the posterior descending artery. 11/18/2010.   The patient had coronary angiography 2014 and all 4 of these grafts were patent.   The patient had a stress MRI December 12, 2023 which showed normal biventricular size with normal systolic function.  Quantitative LVEF is 58%.  Quantitative RVEF is 55%. Regadenoson induced stress perfusion is negative for ischemia.Delayed hyperenhancement reveals a small subendocardial scar in the mid inferolateral segment consistent with small scar in the circumflex distribution.  There also is a small subepicardial, non-CAD scar in the mid inferior region.   Left carotid endarterectomy February 21, 2020   Narrative & Impression   LEFT CAROTID ULTRASOUND   9/1/2020 8:14 AM   HISTORY: History of left carotid endarterectomy on 2/21/2020. Left  carotid artery stenosis.  COMPARISON: 1/20/2020  LEFT CAROTID FINDINGS:  Plaque in the common carotid, carotid bulb and  internal carotid artery.  Left ICA PSV:  132  cm/sec.  Left ICA EDV:  29 cm/sec.  Left ICA/CCA PSV Ratio:  0.96    These indicate  50 - 69%  diameter stenosis of the left ICA.    Left Vertebral:  "Antegrade flow.   Left ECA: Antegrade flow.   Causes of Decreased Accuracy:   None.                                                              IMPRESSION: 50-69% diameter stenosis of the left ICA relative to the  distal ICA diameter.   2. Paroxysmal atrial fibrillation   Ablation of atrial flutter September 9, 2022.  He has a high grade AV block with a permanent pacemaker (placed on November 7, 2022) Dr. Walters noted \"patient has had small A-fib burden on his device checks. His most recent device check showed 69% atrial pacing, 89% ventricular pacing, and 4 mode switch episodes comprising 4.3% of the time with the longest A-fib episode of 6 hours and 7 minutes.  Heart rates are controlled during these A-fib episodes typically between 60 and 100 bpm.  He also has some atrial high rate episodes that look like atrial tachycardia or atrial flutter at heart rates of 160-200.  No duration is given for these, but he indicates that he is asymptomatic with his arrhythmia episodes.\"   He is on Eliquis 5 mg one tablet twice a day  3. Hypertension  4. Dyslipidemia last lipid panel May 5, 2028  5. Diabetes type II  last hemoglobin A1c was 5.5 on October 2, 2024  6. Fatty liver disease, nonalcoholic with cirrhosis of the liver without ascites     Subjective:     Vikash Burgos is seen at [facility] heart clinic today for SURPASS-CVOT study [Effect of tirzepatide versus Dulaglutide on Major Cardiovascular Events in Patients with Type 2Diabetes] and last office visit for this study. The last time the patient took this study medication was on 3/20/2025. Patient states he is currently being prescribed by his physician, Sandy at 2.5 mg which he is taking, having less nausea and no vomiting, and performs daily glucose monitoring. He has no health concerns today and denies fatigue, lightheadedness, shortness of breath, dyspnea on exertion, orthopnea, PND, palpitations, chest pain, abdominal fullness/bloating, and lower " extremity edema.    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)     Diabetic polyneuropathy associated with diabetes mellitus due to underlying condition (H)     HDL deficiency     Cirrhosis of liver without ascites, unspecified hepatic cirrhosis type (H)     Paroxysmal atrial fibrillation (H)     Past Medical History:   Diagnosis Date     Basal cell carcinoma      Carotid stenosis, left     s/p left CEA 2/2020     Coronary artery disease     4 vessel bypass November 2010; LIMA ->LAD, SVG-> OM3, SVG -> D2, SVG -> PDA     Diabetes mellitus (H) 2005     Generalized anxiety disorder 05/02/2014     Hepatitis C, chronic (H) 2005     Hyperlipidemia LDL goal < 70      Hypertension      Liver diseases     9/15 Liver is 10 cm in span without left lobe enlargement     Persistent microalbuminuria associated with type 2 diabetes mellitus (H) 05/06/2015     Past Surgical History:   Procedure Laterality Date     ARTHROSCOPY KNEE RT/LT      left     COLONOSCOPY       CORONARY ARTERY BYPASS  11/18/201    Coronary artery bypass grafting x 4 with placement of the left internal mammary artery to the distal midportion of the left anterior descending artery, saphenous vein graft from aorta to the third obtuse marginal branch of circumflex coronary artery, saphenous vein graft from aorta to the second diagonal branch, saphenous vein graft from aorta to the posterior descending artery.     ENDARTERECTOMY CAROTID Left 2/21/2020    Procedure: LEFT CAROTID ENDARTERECTOMY WITH EEG;  Surgeon: Semaj Stubbs MD;  Location:  OR     EP ABLATION ATRIAL FLUTTER N/A 9/9/2022    Procedure: Ablation Atrial Flutter;  Surgeon: Tyson Ordonez MD;  Location:  HEART  CARDIAC CATH LAB     EP PACEMAKER DEVICE & LEAD IMPLANT- RIGHT ATRIAL & LEFT VENTRICULAR N/A 11/7/2022    Procedure: Pacemaker Device & Lead Implant- Right Atrial & Left Ventricular;  Surgeon: Tyson Ordonez MD;  Location: [facility] CARDIAC CATH LAB     ESOPHAGOSCOPY, GASTROSCOPY, DUODENOSCOPY (EGD), COMBINED  10/31/2011    Procedure:COMBINED ESOPHAGOSCOPY, GASTROSCOPY, DUODENOSCOPY (EGD); Surgeon:ALONSO ALDANA; Location: GI     ESOPHAGOSCOPY, GASTROSCOPY, DUODENOSCOPY (EGD), COMBINED N/A 3/8/2018    Procedure: COMBINED ESOPHAGOSCOPY, GASTROSCOPY, DUODENOSCOPY (EGD);  EGD;  Surgeon: Angel Luis Justice MD;  Location:  GI     HEART CATH CORONARY ANGIOGRAM W/LV GRAM  9-11-10    CV Surgery recommended     HEART CATH CORONARY ANGIOGRAM W/LV GRAM  2-28-14    Medical management     HERNIA REPAIR, INGUINAL RT/LT      left       Current Outpatient Medications:      amLODIPine (NORVASC) 2.5 MG tablet, Take 1 tablet (2.5 mg) by mouth 2 times daily., Disp: 180 tablet, Rfl: 3     apixaban ANTICOAGULANT (ELIQUIS) 5 MG tablet, Take 1 tablet (5 mg) by mouth 2 times daily., Disp: 180 tablet, Rfl: 3     B-D U/F 31G X 8 MM insulin pen needle, USE ONE PEN NEEDLES DAILY OR AS DIRECTED., Disp: 100 each, Rfl: 3     celecoxib (CELEBREX) 100 MG capsule, TAKE 1 CAPSULE BY MOUTH ONE TIME DAILY AS NEEDED FOR MODERATE PAIN, Disp: 90 capsule, Rfl: 0     cyanocobalamin 1000 MCG SUBL, Place 1,000 mcg under the tongue daily, Disp: , Rfl:      glucosamine-chondroitin 500-400 MG CAPS per capsule, Take 1 capsule by mouth daily, Disp: , Rfl:      lisinopril (ZESTRIL) 20 MG tablet, Take 1 tablet (20 mg) by mouth 2 times daily., Disp: 180 tablet, Rfl: 3     omeprazole (PRILOSEC) 20 MG DR capsule, Take 1 capsule (20 mg) by mouth daily, Disp: 90 capsule, Rfl: 3     ONETOUCH VERIO IQ test strip, , Disp: , Rfl:      rosuvastatin (CRESTOR) 20 MG tablet, Take 1 tablet (20 mg) by mouth daily., Disp: 90 tablet, Rfl: 3     sildenafil  "(VIAGRA) 50 MG tablet, TAKE ONE TABLET BY MOUTH DAILY AS NEEDED, Disp: 30 tablet, Rfl: 0     Vitamin D, Cholecalciferol, 1000 units TABS, Take 1,000 Units by mouth daily, Disp: , Rfl:      zolpidem (AMBIEN) 5 MG tablet, Take 1 tablet (5 mg) by mouth nightly as needed for sleep, Disp: 30 tablet, Rfl: 0    Current Facility-Administered Medications:      study - tirzepatide 2.5-15 mg or dulaglutide 1.5 mg (SURPASS) (IDS# 5849) injection 1 Pen, 1 Pen, Subcutaneous, Q7 Days, Mariah Elias MD     study - tirzepatide 2.5-15 mg or dulaglutide 1.5 mg (SURPASS) (IDS# [id number]) injection 1 Pen, 1 Pen, Subcutaneous, Q7 Days, Mariah Elias MD     study - tirzepatide 2.5-15 mg or dulaglutide 1.5 mg (SURPASS) (IDS# [id number]) injection 1 Pen, 1 Pen, Subcutaneous, Q7 Days, Mariah Elias MD     study - tirzepatide 2.5-15 mg or dulaglutide 1.5 mg (SURPASS) (IDS# [id number]) injection 1 Pen, 1 Pen, Subcutaneous, Q7 Days, Mariah Elias MD     study - tirzepatide 2.5-15 mg or dulaglutide 1.5 mg (SURPASS) (IDS# [id number]) injection 1.5-15 mg, 1.5-15 mg, Subcutaneous, Q7 Days, Mariah Elias MD     No Known Allergies    Objective:     BP: (!) 146/70 135/67   BP Location: Right arm Right arm   Patient Position: Sitting Sitting   Cuff Size: Adult Regular Adult Regular   Pulse: 67 57   Weight:   73 kg (161 lb)   Height:   1.803 m (5' 11\")     Wt Readings from Last 4 Encounters:   01/07/25 71.2 kg (157 lb)   11/26/24 70.3 kg (155 lb)   11/13/24 69.9 kg (154 lb)   09/16/24 69.4 kg (153 lb)      General Appearance:   Alert, cooperative and in no acute distress.   HEENT:  No scleral icterus; the mucous membranes were pink and moist. Cervical lymph nodes nontender and nonpalpable.   Neck: JVP normal. No HJR.   Chest: The spine was straight. The chest was symmetric.   Lungs:   Respirations unlabored; the lungs are clear to auscultation.   Cardiovascular:   Regular rhythm. S1 and S2 without murmur, clicks or rubs. Radial, carotid and posterior tibial " pulses are intact and symmetrical.  No carotid bruits noted   Abdomen:  Soft, nontender, nondistended, bowel sounds present   Extremities: No cyanosis, clubbing, or edema.   Skin: No bruising or wounds   Neurologic: Mood and affect are appropriate.         Joyce Morales NP      Thank you for allowing me to participate in the care of your patient.      Sincerely,     Joyce Morales NP, NP     [facility]  cc:   No referring provider defined for this encounter.

## 2025-05-08 NOTE — LETTER
"5/8/2025    Danish Land MD  [address]  [place] [address]    RE: Vikash Burgos       Dear Colleague,     I had the pleasure of seeing Vikash Burgos in the [facility].  SURPASS-CVOT Study [Effect of tirzepatide versus Dulaglutide on Major Cardiovascular Events in Patients with Type 2Diabetes]    Subject seen for end of study visit.    Patient reported outcomes testing [APPADL, EQ-5D-5L, and IW-SP] was completed prior to other evaluations    Vital signs were done, including waist circumference [measured immediately above iliac crest while subject is standing]  BP measured after 5 minutes resting in a seated position - two readings taken one minute apart using automated cuff.    Vitals:    05/08/25 0906 05/08/25 0908   BP: (!) 146/70 135/67   BP Location: Right arm Right arm   Patient Position: Sitting Sitting   Cuff Size: Adult Regular Adult Regular   Pulse: 67 57   Weight:  73 kg (161 lb)   Height:  1.803 m (5' 11\")      Wait Circumference  102.5 CM     103 CM    Subject queried for adverse events, endpoints and medication changes. If present, noted below. None noted or reported. No MD visits since last seen. Patient is on off study Tirzepatide 2.5 mg. Reports no side effects.     Fasting central labs done.   Adherence/lifestyle reinforcement done.    All study drug collected. Subject thanked for his time and participation.     Mis Husain RN     Thank you for allowing me to participate in the care of your patient.      Sincerely,     Mis Husain RN     [facility]  cc:   No referring provider defined for this encounter.      "

## 2025-05-08 NOTE — PROGRESS NOTES
"SURPASS-CVOT Study [Effect of tirzepatide versus Dulaglutide on Major Cardiovascular Events in Patients with Type 2Diabetes]    Subject seen for end of study visit.    Patient reported outcomes testing [APPADL, EQ-5D-5L, and IW-SP] was completed prior to other evaluations    Vital signs were done, including waist circumference [measured immediately above iliac crest while subject is standing]  BP measured after 5 minutes resting in a seated position - two readings taken one minute apart using automated cuff.    Vitals:    05/08/25 0906 05/08/25 0908   BP: (!) 146/70 135/67   BP Location: Right arm Right arm   Patient Position: Sitting Sitting   Cuff Size: Adult Regular Adult Regular   Pulse: 67 57   Weight:  73 kg (161 lb)   Height:  1.803 m (5' 11\")      Wait Circumference  102.5 CM     103 CM    Subject queried for adverse events, endpoints and medication changes. If present, noted below. No MD visits since last seen. Patient is on off study Tirzepatide 2.5 mg. Reports no side effects.     SURPASS-CVOT Study [Effect of tirzepatide versus Dulaglutide on Major Cardiovascular Events in Patients with Type 2 Diabetes]    Diagnosis/event description (diagnosis preferred):  Erectile Dusfunction    Start date: 05/28/2024     Date resolved:      Date site aware of event: 05/08/2025     Intensity: ([] mild /[]  moderate / [] severe)     Study Medication adjustment:  [] Yes   [] No  NA off IP    Outcome: ([] resolved [with or without sequelae] /[] recovering / [x] not recovered / [] death / [] unknown)      Is Event related to study device?    [] Yes   [] No NA off IP    Was treatment given for this event:  [x] Yes   [] No   If yes, provide details Viagra as directed.    Serious adverse event?    Yes   [x] No    Special interest event?  [] Yes   [x] No    Endpoint? [] Yes   [x] No     Fatal event?  [] Yes   [x] No    Dr. Elias: Reasonable possibility that AE is related to study treatment    [] Yes   [x] No    Dr. Elias: " Is AE related to study device?   [] Yes   [x] No        Mis Husain RN     Fasting central labs done.   Adherence/lifestyle reinforcement done.    All study drug collected. Patient returned 4 boxes with 0 IP and 4 boxes with 14 unused IP syringes.    Subject thanked for his time and participation.     Mis Husain RN

## 2025-05-16 ENCOUNTER — ANCILLARY PROCEDURE (OUTPATIENT)
Dept: CARDIOLOGY | Facility: CLINIC | Age: 65
End: 2025-05-16
Payer: COMMERCIAL

## 2025-05-16 ENCOUNTER — TELEPHONE (OUTPATIENT)
Dept: CARDIOLOGY | Facility: CLINIC | Age: 65
End: 2025-05-16

## 2025-05-16 DIAGNOSIS — Z95.0 CARDIAC PACEMAKER IN SITU: ICD-10-CM

## 2025-05-16 DIAGNOSIS — I44.1 SECOND DEGREE ATRIOVENTRICULAR BLOCK: ICD-10-CM

## 2025-05-16 PROCEDURE — 93280 PM DEVICE PROGR EVAL DUAL: CPT | Performed by: INTERNAL MEDICINE

## 2025-05-16 NOTE — TELEPHONE ENCOUNTER
Patient seen in clinic for his annual device check. Device is stable.    Arrhythmia Data Since: 2/10/25  Atrial Arrhythmia: 270 mode switch episodes logged lasting anywhere from <1min to 17h27m (more recent episodes are longer and/or back-to-back). EGMs for review show AF/AFL w/ controlled V rates per histogram. AT/AF burden 9.7% since 12/19/23. Patient has a known history of pAF, remains on eliquis. He feels like he can maybe feel when he goes into AF at times so distracts himself with activity.  Atrial ATP is on. Anywhere from 1-163 atrial ATP attempts were given for the 207 AF episodes that occurred since 2/10/25. Only 4 episodes were logged as successfully terminated by ATP.... A few of these episodes were after ~1hr so I am doubtful that the ATP was truly successful but more likely that the patient self-converted. Since 12/19/23, 126 of 1119 AT/AF episodes have been documented as successfully pace terminated (11.3%).  Patient did have questions/concerns about what the next steps are with his AF and how more frequent AF episodes might effect his day-to-day life. I encouraged him to set up an appt with his cardiology team to discuss further.    Ventricular Arrhythmia: 12 NSVT episodes logged. 3 EGMs show ST vs SVT in the 150-170's w/ short 2-3 beat runs of NSVT. 1 EGM shows a 6 beats NSVT. 3 EGMs show -120's with frequent PVCs (bigeminal at times) and short runs (2-5 beats)  1 SVT episode logged shows AF w/ RVR w/ occasional PVCs  ^^After review of chart, it appears that NSVT is a new finding. Will route to Dr. Walters for review and recommendation.  Patient is not on a beta blocker. EF 60-65% (12/2022).            NSVT Example                Episode List:

## 2025-05-16 NOTE — TELEPHONE ENCOUNTER
More atrial fibrillation is identified, but heart rates are controlled during those episodes and he remains on Eliquis for stroke prevention.  He is not in any kind of danger because of these episodes, and we often have patients continue to be in atrial fibrillation continuously for long periods of time without problems.  However, if he wants to discuss this issue further, and considers strategies to remain in normal rhythm more consistently, I would encourage him to come in for an office visit with one of our REAGAN's or electrophysiology, if possible.

## 2025-05-17 LAB
MDC_IDC_EPISODE_DTM: NORMAL
MDC_IDC_EPISODE_DURATION: 0 S
MDC_IDC_EPISODE_DURATION: 1 S
MDC_IDC_EPISODE_DURATION: 1065 S
MDC_IDC_EPISODE_DURATION: 1076 S
MDC_IDC_EPISODE_DURATION: 1151 S
MDC_IDC_EPISODE_DURATION: 1178 S
MDC_IDC_EPISODE_DURATION: 123 S
MDC_IDC_EPISODE_DURATION: 1327 S
MDC_IDC_EPISODE_DURATION: 148 S
MDC_IDC_EPISODE_DURATION: 1617 S
MDC_IDC_EPISODE_DURATION: 170 S
MDC_IDC_EPISODE_DURATION: 182 S
MDC_IDC_EPISODE_DURATION: 1836 S
MDC_IDC_EPISODE_DURATION: 1930 S
MDC_IDC_EPISODE_DURATION: 2147 S
MDC_IDC_EPISODE_DURATION: 242 S
MDC_IDC_EPISODE_DURATION: 2458 S
MDC_IDC_EPISODE_DURATION: 297 S
MDC_IDC_EPISODE_DURATION: 3021 S
MDC_IDC_EPISODE_DURATION: 3050 S
MDC_IDC_EPISODE_DURATION: 32 S
MDC_IDC_EPISODE_DURATION: 3208 S
MDC_IDC_EPISODE_DURATION: 3236 S
MDC_IDC_EPISODE_DURATION: 3282 S
MDC_IDC_EPISODE_DURATION: 3321 S
MDC_IDC_EPISODE_DURATION: 3492 S
MDC_IDC_EPISODE_DURATION: 3638 S
MDC_IDC_EPISODE_DURATION: 37 S
MDC_IDC_EPISODE_DURATION: 3731 S
MDC_IDC_EPISODE_DURATION: 3755 S
MDC_IDC_EPISODE_DURATION: 3825 S
MDC_IDC_EPISODE_DURATION: 388 S
MDC_IDC_EPISODE_DURATION: 409 S
MDC_IDC_EPISODE_DURATION: 41 S
MDC_IDC_EPISODE_DURATION: 41 S
MDC_IDC_EPISODE_DURATION: 42 S
MDC_IDC_EPISODE_DURATION: 461 S
MDC_IDC_EPISODE_DURATION: 4731 S
MDC_IDC_EPISODE_DURATION: 477 S
MDC_IDC_EPISODE_DURATION: 523 S
MDC_IDC_EPISODE_DURATION: 5299 S
MDC_IDC_EPISODE_DURATION: 53 S
MDC_IDC_EPISODE_DURATION: 5333 S
MDC_IDC_EPISODE_DURATION: 60 S
MDC_IDC_EPISODE_DURATION: 61 S
MDC_IDC_EPISODE_DURATION: 61 S
MDC_IDC_EPISODE_DURATION: 6209 S
MDC_IDC_EPISODE_DURATION: 6287 S
MDC_IDC_EPISODE_DURATION: 63 S
MDC_IDC_EPISODE_DURATION: 65 S
MDC_IDC_EPISODE_DURATION: 66 S
MDC_IDC_EPISODE_DURATION: 74 S
MDC_IDC_EPISODE_DURATION: 74 S
MDC_IDC_EPISODE_DURATION: 786 S
MDC_IDC_EPISODE_DURATION: 80 S
MDC_IDC_EPISODE_DURATION: 80 S
MDC_IDC_EPISODE_DURATION: 85 S
MDC_IDC_EPISODE_DURATION: 866 S
MDC_IDC_EPISODE_DURATION: 87 S
MDC_IDC_EPISODE_DURATION: 875 S
MDC_IDC_EPISODE_DURATION: 90 S
MDC_IDC_EPISODE_DURATION: 9687 S
MDC_IDC_EPISODE_DURATION: 983 S
MDC_IDC_EPISODE_DURATION: 9981 S
MDC_IDC_EPISODE_DURATION: NORMAL S
MDC_IDC_EPISODE_ID: 1236
MDC_IDC_EPISODE_ID: 1240
MDC_IDC_EPISODE_ID: 1246
MDC_IDC_EPISODE_ID: 1257
MDC_IDC_EPISODE_ID: 1260
MDC_IDC_EPISODE_ID: 1265
MDC_IDC_EPISODE_ID: 1267
MDC_IDC_EPISODE_ID: 1274
MDC_IDC_EPISODE_ID: 1275
MDC_IDC_EPISODE_ID: 1284
MDC_IDC_EPISODE_ID: 1291
MDC_IDC_EPISODE_ID: 1329
MDC_IDC_EPISODE_ID: 1350
MDC_IDC_EPISODE_ID: 1371
MDC_IDC_EPISODE_ID: 1375
MDC_IDC_EPISODE_ID: 1376
MDC_IDC_EPISODE_ID: 1377
MDC_IDC_EPISODE_ID: 1384
MDC_IDC_EPISODE_ID: 1387
MDC_IDC_EPISODE_ID: 1389
MDC_IDC_EPISODE_ID: 1393
MDC_IDC_EPISODE_ID: 1419
MDC_IDC_EPISODE_ID: 1428
MDC_IDC_EPISODE_ID: 1434
MDC_IDC_EPISODE_ID: 1444
MDC_IDC_EPISODE_ID: 1445
MDC_IDC_EPISODE_ID: 1451
MDC_IDC_EPISODE_ID: 1452
MDC_IDC_EPISODE_ID: 1453
MDC_IDC_EPISODE_ID: 1461
MDC_IDC_EPISODE_ID: 1466
MDC_IDC_EPISODE_ID: 1467
MDC_IDC_EPISODE_ID: 1468
MDC_IDC_EPISODE_ID: 1469
MDC_IDC_EPISODE_ID: 1470
MDC_IDC_EPISODE_ID: 1471
MDC_IDC_EPISODE_ID: 1472
MDC_IDC_EPISODE_ID: 1473
MDC_IDC_EPISODE_ID: 1474
MDC_IDC_EPISODE_ID: 1475
MDC_IDC_EPISODE_ID: 1476
MDC_IDC_EPISODE_ID: 1477
MDC_IDC_EPISODE_ID: 1478
MDC_IDC_EPISODE_ID: 1479
MDC_IDC_EPISODE_ID: 1480
MDC_IDC_EPISODE_ID: 1481
MDC_IDC_EPISODE_ID: 1482
MDC_IDC_EPISODE_ID: 1483
MDC_IDC_EPISODE_ID: 1484
MDC_IDC_EPISODE_ID: 1485
MDC_IDC_EPISODE_ID: 1486
MDC_IDC_EPISODE_ID: 1487
MDC_IDC_EPISODE_ID: 1488
MDC_IDC_EPISODE_ID: 1489
MDC_IDC_EPISODE_ID: 1490
MDC_IDC_EPISODE_ID: 1491
MDC_IDC_EPISODE_ID: 1492
MDC_IDC_EPISODE_ID: 1493
MDC_IDC_EPISODE_ID: 1494
MDC_IDC_EPISODE_ID: 1495
MDC_IDC_EPISODE_ID: 1496
MDC_IDC_EPISODE_ID: 1497
MDC_IDC_EPISODE_ID: 1498
MDC_IDC_EPISODE_ID: 1499
MDC_IDC_EPISODE_ID: 1500
MDC_IDC_EPISODE_ID: 1501
MDC_IDC_EPISODE_ID: 1502
MDC_IDC_EPISODE_ID: 1503
MDC_IDC_EPISODE_ID: 1504
MDC_IDC_EPISODE_ID: 1505
MDC_IDC_EPISODE_ID: 1506
MDC_IDC_EPISODE_ID: 1507
MDC_IDC_EPISODE_ID: 1508
MDC_IDC_EPISODE_ID: 1509
MDC_IDC_EPISODE_ID: 1510
MDC_IDC_EPISODE_ID: 1511
MDC_IDC_EPISODE_ID: 1512
MDC_IDC_EPISODE_ID: 1513
MDC_IDC_EPISODE_ID: 1514
MDC_IDC_EPISODE_ID: 1515
MDC_IDC_EPISODE_ID: 1516
MDC_IDC_EPISODE_ID: 1517
MDC_IDC_EPISODE_ID: 1518
MDC_IDC_EPISODE_TYPE: NORMAL
MDC_IDC_EPISODE_TYPE_INDUCED: NO
MDC_IDC_LEAD_CONNECTION_STATUS: NORMAL
MDC_IDC_LEAD_CONNECTION_STATUS: NORMAL
MDC_IDC_LEAD_IMPLANT_DT: NORMAL
MDC_IDC_LEAD_IMPLANT_DT: NORMAL
MDC_IDC_LEAD_LOCATION: NORMAL
MDC_IDC_LEAD_LOCATION: NORMAL
MDC_IDC_LEAD_LOCATION_DETAIL_1: NORMAL
MDC_IDC_LEAD_LOCATION_DETAIL_1: NORMAL
MDC_IDC_LEAD_MFG: NORMAL
MDC_IDC_LEAD_MFG: NORMAL
MDC_IDC_LEAD_MODEL: NORMAL
MDC_IDC_LEAD_MODEL: NORMAL
MDC_IDC_LEAD_POLARITY_TYPE: NORMAL
MDC_IDC_LEAD_POLARITY_TYPE: NORMAL
MDC_IDC_LEAD_SERIAL: NORMAL
MDC_IDC_LEAD_SERIAL: NORMAL
MDC_IDC_MSMT_BATTERY_DTM: NORMAL
MDC_IDC_MSMT_BATTERY_REMAINING_LONGEVITY: 123 MO
MDC_IDC_MSMT_BATTERY_RRT_TRIGGER: 2.62
MDC_IDC_MSMT_BATTERY_STATUS: NORMAL
MDC_IDC_MSMT_BATTERY_VOLTAGE: 3.01 V
MDC_IDC_MSMT_LEADCHNL_RA_IMPEDANCE_VALUE: 342 OHM
MDC_IDC_MSMT_LEADCHNL_RA_IMPEDANCE_VALUE: 513 OHM
MDC_IDC_MSMT_LEADCHNL_RA_PACING_THRESHOLD_AMPLITUDE: 0.38 V
MDC_IDC_MSMT_LEADCHNL_RA_PACING_THRESHOLD_PULSEWIDTH: 0.4 MS
MDC_IDC_MSMT_LEADCHNL_RA_SENSING_INTR_AMPL: 2.88 MV
MDC_IDC_MSMT_LEADCHNL_RA_SENSING_INTR_AMPL: 3.12 MV
MDC_IDC_MSMT_LEADCHNL_RV_IMPEDANCE_VALUE: 380 OHM
MDC_IDC_MSMT_LEADCHNL_RV_IMPEDANCE_VALUE: 456 OHM
MDC_IDC_MSMT_LEADCHNL_RV_PACING_THRESHOLD_AMPLITUDE: 0.62 V
MDC_IDC_MSMT_LEADCHNL_RV_PACING_THRESHOLD_PULSEWIDTH: 0.4 MS
MDC_IDC_MSMT_LEADCHNL_RV_SENSING_INTR_AMPL: 4 MV
MDC_IDC_MSMT_LEADCHNL_RV_SENSING_INTR_AMPL: 5.38 MV
MDC_IDC_PG_IMPLANT_DTM: NORMAL
MDC_IDC_PG_MFG: NORMAL
MDC_IDC_PG_MODEL: NORMAL
MDC_IDC_PG_SERIAL: NORMAL
MDC_IDC_PG_TYPE: NORMAL
MDC_IDC_SESS_CLINIC_NAME: NORMAL
MDC_IDC_SESS_DTM: NORMAL
MDC_IDC_SESS_TYPE: NORMAL
MDC_IDC_SET_BRADY_AT_MODE_SWITCH_RATE: 171 {BEATS}/MIN
MDC_IDC_SET_BRADY_HYSTRATE: NORMAL
MDC_IDC_SET_BRADY_LOWRATE: 50 {BEATS}/MIN
MDC_IDC_SET_BRADY_MAX_SENSOR_RATE: 130 {BEATS}/MIN
MDC_IDC_SET_BRADY_MAX_TRACKING_RATE: 130 {BEATS}/MIN
MDC_IDC_SET_BRADY_MODE: NORMAL
MDC_IDC_SET_BRADY_PAV_DELAY_LOW: 300 MS
MDC_IDC_SET_BRADY_SAV_DELAY_LOW: 300 MS
MDC_IDC_SET_LEADCHNL_RA_PACING_AMPLITUDE: 1.5 V
MDC_IDC_SET_LEADCHNL_RA_PACING_ANODE_ELECTRODE_1: NORMAL
MDC_IDC_SET_LEADCHNL_RA_PACING_ANODE_LOCATION_1: NORMAL
MDC_IDC_SET_LEADCHNL_RA_PACING_CAPTURE_MODE: NORMAL
MDC_IDC_SET_LEADCHNL_RA_PACING_CATHODE_ELECTRODE_1: NORMAL
MDC_IDC_SET_LEADCHNL_RA_PACING_CATHODE_LOCATION_1: NORMAL
MDC_IDC_SET_LEADCHNL_RA_PACING_POLARITY: NORMAL
MDC_IDC_SET_LEADCHNL_RA_PACING_PULSEWIDTH: 0.4 MS
MDC_IDC_SET_LEADCHNL_RA_SENSING_ANODE_ELECTRODE_1: NORMAL
MDC_IDC_SET_LEADCHNL_RA_SENSING_ANODE_LOCATION_1: NORMAL
MDC_IDC_SET_LEADCHNL_RA_SENSING_CATHODE_ELECTRODE_1: NORMAL
MDC_IDC_SET_LEADCHNL_RA_SENSING_CATHODE_LOCATION_1: NORMAL
MDC_IDC_SET_LEADCHNL_RA_SENSING_POLARITY: NORMAL
MDC_IDC_SET_LEADCHNL_RA_SENSING_SENSITIVITY: 0.3 MV
MDC_IDC_SET_LEADCHNL_RV_PACING_AMPLITUDE: 2 V
MDC_IDC_SET_LEADCHNL_RV_PACING_ANODE_ELECTRODE_1: NORMAL
MDC_IDC_SET_LEADCHNL_RV_PACING_ANODE_LOCATION_1: NORMAL
MDC_IDC_SET_LEADCHNL_RV_PACING_CAPTURE_MODE: NORMAL
MDC_IDC_SET_LEADCHNL_RV_PACING_CATHODE_ELECTRODE_1: NORMAL
MDC_IDC_SET_LEADCHNL_RV_PACING_CATHODE_LOCATION_1: NORMAL
MDC_IDC_SET_LEADCHNL_RV_PACING_POLARITY: NORMAL
MDC_IDC_SET_LEADCHNL_RV_PACING_PULSEWIDTH: 0.4 MS
MDC_IDC_SET_LEADCHNL_RV_SENSING_ANODE_ELECTRODE_1: NORMAL
MDC_IDC_SET_LEADCHNL_RV_SENSING_ANODE_LOCATION_1: NORMAL
MDC_IDC_SET_LEADCHNL_RV_SENSING_CATHODE_ELECTRODE_1: NORMAL
MDC_IDC_SET_LEADCHNL_RV_SENSING_CATHODE_LOCATION_1: NORMAL
MDC_IDC_SET_LEADCHNL_RV_SENSING_POLARITY: NORMAL
MDC_IDC_SET_LEADCHNL_RV_SENSING_SENSITIVITY: 0.9 MV
MDC_IDC_SET_ZONE_DETECTION_INTERVAL: 200 MS
MDC_IDC_SET_ZONE_DETECTION_INTERVAL: 350 MS
MDC_IDC_SET_ZONE_DETECTION_INTERVAL: 400 MS
MDC_IDC_SET_ZONE_STATUS: NORMAL
MDC_IDC_SET_ZONE_TYPE: NORMAL
MDC_IDC_SET_ZONE_VENDOR_TYPE: NORMAL
MDC_IDC_STAT_AT_BURDEN_PERCENT: 9.7 %
MDC_IDC_STAT_AT_DTM_END: NORMAL
MDC_IDC_STAT_AT_DTM_START: NORMAL
MDC_IDC_STAT_BRADY_AP_VP_PERCENT: 68.09 %
MDC_IDC_STAT_BRADY_AP_VS_PERCENT: 2.61 %
MDC_IDC_STAT_BRADY_AS_VP_PERCENT: 22.84 %
MDC_IDC_STAT_BRADY_AS_VS_PERCENT: 6.65 %
MDC_IDC_STAT_BRADY_DTM_END: NORMAL
MDC_IDC_STAT_BRADY_DTM_START: NORMAL
MDC_IDC_STAT_BRADY_RA_PERCENT_PACED: 65.35 %
MDC_IDC_STAT_BRADY_RV_PERCENT_PACED: 90.44 %
MDC_IDC_STAT_EPISODE_RECENT_COUNT: 0
MDC_IDC_STAT_EPISODE_RECENT_COUNT: 0
MDC_IDC_STAT_EPISODE_RECENT_COUNT: 1246
MDC_IDC_STAT_EPISODE_RECENT_COUNT: 16
MDC_IDC_STAT_EPISODE_RECENT_COUNT: 57
MDC_IDC_STAT_EPISODE_RECENT_COUNT_DTM_END: NORMAL
MDC_IDC_STAT_EPISODE_RECENT_COUNT_DTM_START: NORMAL
MDC_IDC_STAT_EPISODE_TOTAL_COUNT: 0
MDC_IDC_STAT_EPISODE_TOTAL_COUNT: 0
MDC_IDC_STAT_EPISODE_TOTAL_COUNT: 1375
MDC_IDC_STAT_EPISODE_TOTAL_COUNT: 54
MDC_IDC_STAT_EPISODE_TOTAL_COUNT: 89
MDC_IDC_STAT_EPISODE_TOTAL_COUNT_DTM_END: NORMAL
MDC_IDC_STAT_EPISODE_TOTAL_COUNT_DTM_START: NORMAL
MDC_IDC_STAT_EPISODE_TYPE: NORMAL
MDC_IDC_STAT_TACHYTHERAPY_RECENT_DTM_END: NORMAL
MDC_IDC_STAT_TACHYTHERAPY_RECENT_DTM_START: NORMAL
MDC_IDC_STAT_TACHYTHERAPY_TOTAL_DTM_END: NORMAL
MDC_IDC_STAT_TACHYTHERAPY_TOTAL_DTM_START: NORMAL

## 2025-05-20 ENCOUNTER — TRANSFERRED RECORDS (OUTPATIENT)
Dept: CARDIOLOGY | Facility: CLINIC | Age: 65
End: 2025-05-20
Payer: COMMERCIAL

## 2025-05-20 NOTE — PROGRESS NOTES
Research laboratory data from May 8 reviewed and results not clinically significant including mildly increased pancreatic amylase, mildly increased total and direct bilirubin as well as low cholesterol, LDL cholesterol and triglycerides.  LF

## 2025-05-21 ENCOUNTER — OFFICE VISIT (OUTPATIENT)
Dept: CARDIOLOGY | Facility: CLINIC | Age: 65
End: 2025-05-21
Payer: COMMERCIAL

## 2025-05-21 VITALS
DIASTOLIC BLOOD PRESSURE: 60 MMHG | WEIGHT: 162 LBS | HEIGHT: 71 IN | OXYGEN SATURATION: 98 % | HEART RATE: 75 BPM | BODY MASS INDEX: 22.68 KG/M2 | SYSTOLIC BLOOD PRESSURE: 130 MMHG

## 2025-05-21 DIAGNOSIS — I25.10 CORONARY ARTERY DISEASE INVOLVING NATIVE CORONARY ARTERY OF NATIVE HEART WITHOUT ANGINA PECTORIS: ICD-10-CM

## 2025-05-21 DIAGNOSIS — I10 BENIGN ESSENTIAL HYPERTENSION: ICD-10-CM

## 2025-05-21 DIAGNOSIS — I44.2 COMPLETE ATRIOVENTRICULAR BLOCK (H): ICD-10-CM

## 2025-05-21 DIAGNOSIS — I48.0 PAROXYSMAL ATRIAL FIBRILLATION (H): Primary | ICD-10-CM

## 2025-05-21 NOTE — LETTER
5/21/2025    Danish Land MD  [address]  [facility] [id number]    RE: Vikash Burgos       Dear Colleague,     I had the pleasure of seeing Vikash Burgos in the [facility].  Cardiology Clinic Progress Note  Vikash Burgos MRN# [id number]   YOB: 1960 Age: 65 year old       HPI:    Vikash Burgos is a delightful 65 year old patient with past medical history significant for:    Coronary artery disease with CABG x 4 (LIMA to LAD, SVG to OM 3, SVG to D2, SVG to PDA 11/18/2010)  Paroxysmal atrial fibrillation  [id number] score 4 on apixaban  Atrial flutter with CTI ablation with  9/2022.  High degree AV block with pacemaker implantation 11/7/2022.  Hypertension  Dyslipidemia  DM2 now on GLP  Hepatitis C diagnosed 2005; treated with Pegasys and ribavirin with excellent results. Fatty liver disease, nonalcoholic with cirrhosis of the liver without ascites ( at [facility])   SURPASS-CVOT study, and now on Mounjaro 2.5 mg daily    Is my pleasure seeing Kelton today in follow-up.  I was asked to see him to discuss his increased episodes of atrial fibrillation by Dr. Walters.  Most recent device check showed 270 mode switch episodes lasting less than 1 minute to 17 hours and 27 minutes.  Episodes continue to increase in prevalence.  Patient is on Eliquis for stroke prophylaxis.  He is here today to discuss options for his PAF.    Kelton has always been very active, but states lately he has been very limited with shortness of breath, with increased fatigue and occasional dizziness.  He had a recent episode on the 14th where he was unable to ride his bike as far as he had in the past.  He had increased shortness of breath, no chest pain.  Although it felt very similar to how he felt prior to him having bypass surgery he was also noted to be in persistent A-fib at the time.    Denies chest pain, orthopnea, PND, syncope, lower extremity edema    Diagnotic  studies:  Echocardiogram 12/2022: EF 60 to 65%.  Severe eccentric left ventricular hypertrophy.  RV function normal.  Trace of valvular insufficiency noted.  Carotid ultrasound 10/2024: Less than 50% stenosis in the left and right ICA.  cMRI 12/2023: EF 58%.  RV function 55%.  Negative stress perfusion for ischemia.  Delayed hyperenhancement reveals a small subendocardial scar in the mid inferior lateral segment with small scar in the circumflex distribution.  There is also a small subepicardial, non-CAD scar mid inferior region.  Device interrogation 5/2025:   [employer] [place] (D) Pacemaker Device Check  Patient seen in clinic for device evaluation and iterative programming.     Mode: DDDR   Underlying Rhythm: SB/SR 30-80's w/ Wenckebach block, occasional PVCs       Pacing/Histogram Data Since: 12/19/23  AP: 65.3%    : 90.4%    Heart Rate: stable, good variability. Mostly from the 50-90's per histogram. Patient is extremely active.       Sensing: WNL    Pacing Threshold: WNL    Impedance: WNL    Battery Status: estimated 9.9 (9.5-10.4) years remaining until RRT    Device Site: well healed       Arrhythmia Data Since: 2/10/25  Atrial Arrhythmia: 270 mode switch episodes logged lasting anywhere from <1min to 17h27m (more recent episodes are longer and/or back-to-back). EGMs for review show AF/AFL w/ controlled V rates per histogram. AT/AF burden 9.7% since 12/19/23. Patient has a known history of pAF, remains on eliquis. He feels like he can maybe feel when he goes into AF at times so distracts himself with activity.  Atrial ATP is on. Anywhere from 1-163 atrial ATP attempts were given for each of the 207 AF episodes that occurred since 2/10/25. Only 4 episodes were logged as successfully terminated by ATP.... A few of these episodes were after ~1hr so I am doubtful that the ATP was truly successful but more likely that the patient self-converted. Since 12/19/23, 126 of 1119 AT/AF episodes have been documented  as successfully pace terminated (11.3%).  Patient did have questions/concerns about what the next steps are with his AF and how more frequent AF episodes might effect his day-to-day life. I encouraged him to set up an appt with his cardiology team to discuss further.  Ventricular Arrhythmia: 12 NSVT episodes logged. 3 EGMs show ST vs SVT in the 150-170's w/ short 2-3 beat runs of NSVT. 1 EGM shows a 6 beats NSVT. 3 EGMs show -120's with frequent PVCs (bigeminal at times) and short runs (2-5 beats)  1 SVT episode logged shows AF w/ RVR w/ occasional PVCs  ^^After review of chart, it appears that NSVT is a new finding. Will route to Dr. Walters for review and recommendation.     Setting Change: none       Care Plan: next remote check scheduled on 9/5/25. Due to follow up with Michaelle Caba... I encouraged patient to schedule.     SACHIN Blackman     Latest Reference Range & Units 11/26/24 08:05   Sodium 135 - 145 mmol/L 139   Potassium 3.4 - 5.3 mmol/L 5.2   Chloride 98 - 107 mmol/L 104   Carbon Dioxide (CO2) 22 - 29 mmol/L 28   Urea Nitrogen 8.0 - 23.0 mg/dL 16.2   Creatinine 0.67 - 1.17 mg/dL 1.03   GFR Estimate >60 mL/min/1.73m2 81   Calcium 8.8 - 10.4 mg/dL 10.1   Anion Gap 7 - 15 mmol/L 7   Albumin 3.5 - 5.2 g/dL 4.7   Protein Total 6.4 - 8.3 g/dL 7.7   Alkaline Phosphatase 40 - 150 U/L 61   ALT 0 - 70 U/L 34   AST 0 - 45 U/L 27   AFP tumor marker <=8.3 ng/mL 1.9           Plan:   Paroxysmal atrial fibrillation/AFL noted on EGM's.  Duration and frequency increasing and patient symptomatic.  Discussed medication versus PFA.  Patient feels like he is already on so many medications and prefers to pursue ablation route.  I reviewed the procedure with the patient.  Risks and benefits were also discussed.  He understands this is completed under general anesthesia and is a same-day procedure barring any complications.  Serious risks are less than 2%.  Risks include but are not limited to; bruising, bleeding, vascular  injury from catheter placement, pericarditis, pericardial effusion with possibility of tamponade, traumatic intubation, reaction to general anesthesia, arrhythmias, and a rare incidence of stroke or MI.  Patient understands and is willing to proceed.  Patient is on apixaban 5 mg twice daily.  I would like to reach out to , his EP MD that did his CTI ablation in the past.  I would like to see if he agrees with this plan  History of CAD.  CABG 2010.  No complaints of chest pain.  Patient not on beta-blocker.  Appears to have stopped when he developed bradycardia prior to pacemaker implantation.  GDMT: Amlodipine 2.5 mg twice daily, lisinopril 20 mg twice daily, rosuvastatin 20 mg daily.  Would recommend metoprolol ER or Nebivolol which she has been on both in the past.  We could most likely decrease or discontinue his amlodipine.  Shortness of breath most likely from A-fib, but could be CAD.  Cardiac MRI 12/2023 showed no evidence of ischemia.  Could consider a Lexiscan if he continues to have shortness of breath.  HTN stable  High degree AV block with dual-chamber pacemaker implantation.  Functioning normally.    Thank you for including me in his care. 30 minutes was completed greater than 50% was related to counseling.  An informational booklet was given to the patient to review.  I will be in touch with him once I review further with .    Chen Syed, NP, APRN CNP      Today's clinic visit entailed:  Review of the result(s) of each unique test - Device and cMRI  30 minutes spent by me on the date of the encounter doing chart review, review of test results, patient visit, documentation, and discussion with other provider(s)   Provider  Link to [facility] Help Grid     The level of medical decision making during this visit was of high complexity.      No orders of the defined types were placed in this encounter.    No orders of the defined types were placed in this encounter.    There are no discontinued  medications.      No diagnosis found.    CURRENT MEDICATIONS:  Current Outpatient Medications   Medication Sig Dispense Refill     amLODIPine (NORVASC) 2.5 MG tablet Take 1 tablet (2.5 mg) by mouth 2 times daily. 180 tablet 3     apixaban ANTICOAGULANT (ELIQUIS) 5 MG tablet Take 1 tablet (5 mg) by mouth 2 times daily. 180 tablet 3     B-D U/F 31G X 8 MM insulin pen needle USE ONE PEN NEEDLES DAILY OR AS DIRECTED. 100 each 3     celecoxib (CELEBREX) 100 MG capsule TAKE 1 CAPSULE BY MOUTH ONE TIME DAILY AS NEEDED FOR MODERATE PAIN 90 capsule 0     cyanocobalamin 1000 MCG SUBL Place 1,000 mcg under the tongue daily       glucosamine-chondroitin 500-400 MG CAPS per capsule Take 1 capsule by mouth daily       lisinopril (ZESTRIL) 20 MG tablet Take 1 tablet (20 mg) by mouth 2 times daily. 180 tablet 3     omeprazole (PRILOSEC) 20 MG DR capsule Take 1 capsule (20 mg) by mouth daily 90 capsule 3     ONETOUCH VERIO IQ test strip        rosuvastatin (CRESTOR) 20 MG tablet Take 1 tablet (20 mg) by mouth daily. 90 tablet 3     sildenafil (VIAGRA) 50 MG tablet TAKE ONE TABLET BY MOUTH DAILY AS NEEDED 30 tablet 0     Vitamin D, Cholecalciferol, 1000 units TABS Take 1,000 Units by mouth daily       zolpidem (AMBIEN) 5 MG tablet Take 1 tablet (5 mg) by mouth nightly as needed for sleep 30 tablet 0       ALLERGIES   No Known Allergies    PAST MEDICAL HISTORY:  Past Medical History:   Diagnosis Date     Basal cell carcinoma      Carotid stenosis, left     s/p left CEA 2/2020     Coronary artery disease     4 vessel bypass November 2010; LIMA ->LAD, SVG-> OM3, SVG -> D2, SVG -> PDA     Diabetes mellitus (H) 2005     Generalized anxiety disorder 05/02/2014     Hepatitis C, chronic (H) 2005     Hyperlipidemia LDL goal < 70      Hypertension      Liver diseases     9/15 Liver is 10 cm in span without left lobe enlargement     Persistent microalbuminuria associated with type 2 diabetes mellitus (H) 05/06/2015       PAST SURGICAL  HISTORY:  Past Surgical History:   Procedure Laterality Date     ARTHROSCOPY KNEE RT/LT      left     COLONOSCOPY       CORONARY ARTERY BYPASS  11/18/201    Coronary artery bypass grafting x 4 with placement of the left internal mammary artery to the distal midportion of the left anterior descending artery, saphenous vein graft from aorta to the third obtuse marginal branch of circumflex coronary artery, saphenous vein graft from aorta to the second diagonal branch, saphenous vein graft from aorta to the posterior descending artery.     ENDARTERECTOMY CAROTID Left 2/21/2020    Procedure: LEFT CAROTID ENDARTERECTOMY WITH EEG;  Surgeon: Semaj Stubbs MD;  Location:  OR     EP ABLATION ATRIAL FLUTTER N/A 9/9/2022    Procedure: Ablation Atrial Flutter;  Surgeon: Tyson Ordonez MD;  Location:  HEART CARDIAC CATH LAB     EP PACEMAKER DEVICE & LEAD IMPLANT- RIGHT ATRIAL & LEFT VENTRICULAR N/A 11/7/2022    Procedure: Pacemaker Device & Lead Implant- Right Atrial & Left Ventricular;  Surgeon: Tyson Ordonez MD;  Location:  HEART CARDIAC CATH LAB     ESOPHAGOSCOPY, GASTROSCOPY, DUODENOSCOPY (EGD), COMBINED  10/31/2011    Procedure:COMBINED ESOPHAGOSCOPY, GASTROSCOPY, DUODENOSCOPY (EGD); Surgeon:ALONSO ALDANA; Location: GI     ESOPHAGOSCOPY, GASTROSCOPY, DUODENOSCOPY (EGD), COMBINED N/A 3/8/2018    Procedure: COMBINED ESOPHAGOSCOPY, GASTROSCOPY, DUODENOSCOPY (EGD);  EGD;  Surgeon: Angel Luis Justice MD;  Location:  GI     HEART CATH CORONARY ANGIOGRAM W/LV GRAM  9-11-10    CV Surgery recommended     HEART CATH CORONARY ANGIOGRAM W/LV GRAM  2-28-14    Medical management     HERNIA REPAIR, INGUINAL RT/LT      left       FAMILY HISTORY:  Family History   Problem Relation Age of Onset     C.A.D. Father      Cancer Father         bladder     Heart Surgery Father         bypass surgery     Heart Disease Mother        SOCIAL HISTORY:  Social History     Socioeconomic History      Marital status:      Spouse name: None     Number of children: None     Years of education: None     Highest education level: None   Tobacco Use     Smoking status: Former     Current packs/day: 0.00     Average packs/day: 1 pack/day for 25.1 years (25.1 ttl pk-yrs)     Types: Cigarettes     Start date:      Quit date: 2005     Years since quittin.3     Smokeless tobacco: Never   Vaping Use     Vaping status: Never Used   Substance and Sexual Activity     Alcohol use: Yes     Comment: minimal 1 glass of wine per week     Drug use: No     Sexual activity: Yes     Partners: Female   Other Topics Concern     Parent/sibling w/ CABG, MI or angioplasty before 65F 55M? No     Caffeine Concern Yes     Comment: 2 cups coffee per day     Special Diet Yes     Comment: low carb diet, low sugar     Exercise Yes     Comment: treadmill 40 minutes, walk, daily, bike 30 minutes every other day     Social Drivers of Health     Financial Resource Strain: Low Risk  (2024)    Financial Resource Strain      Within the past 12 months, have you or your family members you live with been unable to get utilities (heat, electricity) when it was really needed?: No   Food Insecurity: Low Risk  (2024)    Food Insecurity      Within the past 12 months, did you worry that your food would run out before you got money to buy more?: No      Within the past 12 months, did the food you bought just not last and you didn t have money to get more?: No   Transportation Needs: Low Risk  (2024)    Transportation Needs      Within the past 12 months, has lack of transportation kept you from medical appointments, getting your medicines, non-medical meetings or appointments, work, or from getting things that you need?: No   Physical Activity: Sufficiently Active (2024)    Exercise Vital Sign      Days of Exercise per Week: 7 days      Minutes of Exercise per Session: 40 min   Stress: No Stress Concern Present (2024)  "   [employer] [place] [employer] - Occupational Stress Questionnaire      Feeling of Stress : Not at all   Social Connections: Unknown (5/27/2024)    Social Connection and Isolation Panel [NHANES]      Frequency of Social Gatherings with Friends and Family: Twice a week   Interpersonal Safety: Low Risk  (5/28/2024)    Interpersonal Safety      Do you feel physically and emotionally safe where you currently live?: Yes      Within the past 12 months, have you been hit, slapped, kicked or otherwise physically hurt by someone?: No      Within the past 12 months, have you been humiliated or emotionally abused in other ways by your partner or ex-partner?: No   Housing Stability: Low Risk  (5/27/2024)    Housing Stability      Do you have housing? : Yes      Are you worried about losing your housing?: No       Review of Systems:  Skin:        Eyes:       ENT:       Respiratory:  Positive for shortness of breath  Cardiovascular:    Positive for, fatigue, dizziness  Gastroenterology:      Genitourinary:       Musculoskeletal:       Neurologic:       Psychiatric:       Heme/Lymph/Imm:       Endocrine:  Positive for diabetes    Physical Exam:    Vitals: /60   Pulse 75   Ht 1.803 m (5' 11\")   Wt 73.5 kg (162 lb)   SpO2 98%   BMI 22.59 kg/m    Constitutional: Well nourished and in no apparent distress.  Eyes: Pupils equal, round. Sclerae anicteric.   HEENT: Normocephalic, atraumatic.   Neck: Supple. No JVD   Respiratory: Breathing non-labored. Lungs clear to auscultation bilaterally. No crackles, wheezes, rhonchi, or rales.  Cardiovascular:  Regular rate and rhythm, normal S1 and S2. No murmur.  Skin: Warm, dry. No rashes, cyanosis, or xanthelasma.  Extremities: No edema.  Neurologic: No gross motor deficits. Alert, awake, and oriented to person, place and time.  Psychiatric: Affect appropriate.        Recent Lab Results:  LIPID RESULTS:  Lab Results   Component Value Date    CHOL 116 05/28/2024    CHOL 104 " 02/03/2021    HDL 46 05/28/2024    HDL 37 (L) 02/03/2021    LDL 54 05/28/2024    LDL 44 02/03/2021    TRIG 79 05/28/2024    TRIG 114 02/03/2021    CHOLHDLRATIO 3.4 08/02/2013       LIVER ENZYME RESULTS:  Lab Results   Component Value Date    AST 27 11/26/2024    AST 28 06/07/2021    ALT 34 11/26/2024    ALT 46 06/07/2021       CBC RESULTS:  Lab Results   Component Value Date    WBC 5.9 11/26/2024    WBC 5.3 06/07/2021    RBC 5.52 11/26/2024    RBC 5.00 06/07/2021    HGB 17.1 11/26/2024    HGB 15.3 06/07/2021    HCT 50.2 11/26/2024    HCT 45.1 06/07/2021    MCV 91 11/26/2024    MCV 90 06/07/2021    MCH 31.0 11/26/2024    MCH 30.6 06/07/2021    MCHC 34.1 11/26/2024    MCHC 33.9 06/07/2021    RDW 12.8 11/26/2024    RDW 12.9 06/07/2021     (L) 11/26/2024     (L) 06/07/2021       BMP RESULTS:  Lab Results   Component Value Date     11/26/2024     06/07/2021    POTASSIUM 5.2 11/26/2024    POTASSIUM 4.0 11/07/2022    POTASSIUM 4.8 06/07/2021    CHLORIDE 104 11/26/2024    CHLORIDE 107 11/07/2022    CHLORIDE 108 06/07/2021    CO2 28 11/26/2024    CO2 23 11/07/2022    CO2 28 06/07/2021    ANIONGAP 7 11/26/2024    ANIONGAP 7 11/07/2022    ANIONGAP 5 06/07/2021     (H) 11/26/2024     (H) 11/07/2022     (H) 06/07/2021    BUN 16.2 11/26/2024    BUN 15 11/07/2022    BUN 17 06/07/2021    CR 1.03 11/26/2024    CR 0.89 06/07/2021    GFRESTIMATED 81 11/26/2024    GFRESTIMATED >60 03/08/2022    GFRESTIMATED >90 06/07/2021    GFRESTBLACK >90 06/07/2021    LIA 10.1 11/26/2024    LIA 9.6 06/07/2021        A1C RESULTS:  Lab Results   Component Value Date    A1C 5.2 05/28/2024    A1C 6.9 (H) 11/13/2020       INR RESULTS:  Lab Results   Component Value Date    INR 1.34 (H) 11/26/2024    INR 1.35 (H) 05/16/2024    INR 1.11 06/07/2021    INR 1.13 12/15/2020           CC  No referring provider defined for this encounter.                  Thank you for allowing me to participate in the care of your  patient.      Sincerely,     Chen Syed NP, APRN CNP     [facility]  cc:   No referring provider defined for this encounter.

## 2025-05-21 NOTE — PATIENT INSTRUCTIONS
Call my nurse with any questions or concerns:  [phone]  *If you have concerns after hours, please call [phone], option 2 to speak with on call Cardiologist.    1. Medication changes from today:    We need to consider a beta blocker in the future.  We could restart Bystolic or Metoprolol ER

## 2025-05-21 NOTE — PROGRESS NOTES
Cardiology Clinic Progress Note  Vikash Burgos MRN# [id number]   YOB: 1960 Age: 65 year old       HPI:    Vikash Burgos is a delightful 65 year old patient with past medical history significant for:    Coronary artery disease with CABG x 4 (LIMA to LAD, SVG to OM 3, SVG to D2, SVG to PDA 11/18/2010)  Paroxysmal atrial fibrillation  APL2XB1-AECu score 4 on apixaban  Atrial flutter with CTI ablation with  9/2022.  High degree AV block with pacemaker implantation 11/7/2022.  Hypertension  Dyslipidemia  DM2 now on GLP  Hepatitis C diagnosed 2005; treated with Pegasys and ribavirin with excellent results. Fatty liver disease, nonalcoholic with cirrhosis of the liver without ascites ( at [facility])   SURPASS-CVOT study, and now on Mounjaro 2.5 mg daily    Is my pleasure seeing Kelton today in follow-up.  I was asked to see him to discuss his increased episodes of atrial fibrillation by Dr. Walters.  Most recent device check showed 270 mode switch episodes lasting less than 1 minute to 17 hours and 27 minutes.  Episodes continue to increase in prevalence.  Patient is on Eliquis for stroke prophylaxis.  He is here today to discuss options for his PAF.    Kelton has always been very active, but states lately he has been very limited with shortness of breath, with increased fatigue and occasional dizziness.  He had a recent episode on the 14th where he was unable to ride his bike as far as he had in the past.  He had increased shortness of breath, no chest pain.  Although it felt very similar to how he felt prior to him having bypass surgery he was also noted to be in persistent A-fib at the time.    Denies chest pain, orthopnea, PND, syncope, lower extremity edema    Diagnotic studies:  Echocardiogram 12/2022: EF 60 to 65%.  Severe eccentric left ventricular hypertrophy.  RV function normal.  Trace of valvular insufficiency noted.  Carotid ultrasound 10/2024: Less than 50% stenosis in the left  and right ICA.  cMRI 12/2023: EF 58%.  RV function 55%.  Negative stress perfusion for ischemia.  Delayed hyperenhancement reveals a small subendocardial scar in the mid inferior lateral segment with small scar in the circumflex distribution.  There is also a small subepicardial, non-CAD scar mid inferior region.  Device interrogation 5/2025:   [employer] [place] (D) Pacemaker Device Check  Patient seen in clinic for device evaluation and iterative programming.     Mode: DDDR   Underlying Rhythm: SB/SR 30-80's w/ Wenckebach block, occasional PVCs       Pacing/Histogram Data Since: 12/19/23  AP: 65.3%    : 90.4%    Heart Rate: stable, good variability. Mostly from the 50-90's per histogram. Patient is extremely active.       Sensing: WNL    Pacing Threshold: WNL    Impedance: WNL    Battery Status: estimated 9.9 (9.5-10.4) years remaining until RRT    Device Site: well healed       Arrhythmia Data Since: 2/10/25  Atrial Arrhythmia: 270 mode switch episodes logged lasting anywhere from <1min to 17h27m (more recent episodes are longer and/or back-to-back). EGMs for review show AF/AFL w/ controlled V rates per histogram. AT/AF burden 9.7% since 12/19/23. Patient has a known history of pAF, remains on eliquis. He feels like he can maybe feel when he goes into AF at times so distracts himself with activity.  Atrial ATP is on. Anywhere from 1-163 atrial ATP attempts were given for each of the 207 AF episodes that occurred since 2/10/25. Only 4 episodes were logged as successfully terminated by ATP.... A few of these episodes were after ~1hr so I am doubtful that the ATP was truly successful but more likely that the patient self-converted. Since 12/19/23, 126 of 1119 AT/AF episodes have been documented as successfully pace terminated (11.3%).  Patient did have questions/concerns about what the next steps are with his AF and how more frequent AF episodes might effect his day-to-day life. I encouraged him to set up an  appt with his cardiology team to discuss further.  Ventricular Arrhythmia: 12 NSVT episodes logged. 3 EGMs show ST vs SVT in the 150-170's w/ short 2-3 beat runs of NSVT. 1 EGM shows a 6 beats NSVT. 3 EGMs show -120's with frequent PVCs (bigeminal at times) and short runs (2-5 beats)  1 SVT episode logged shows AF w/ RVR w/ occasional PVCs  ^^After review of chart, it appears that NSVT is a new finding. Will route to Dr. Walters for review and recommendation.     Setting Change: none       Care Plan: next remote check scheduled on 9/5/25. Due to follow up with Michaelle Caba... I encouraged patient to schedule.     SACHIN Blackman     Latest Reference Range & Units 11/26/24 08:05   Sodium 135 - 145 mmol/L 139   Potassium 3.4 - 5.3 mmol/L 5.2   Chloride 98 - 107 mmol/L 104   Carbon Dioxide (CO2) 22 - 29 mmol/L 28   Urea Nitrogen 8.0 - 23.0 mg/dL 16.2   Creatinine 0.67 - 1.17 mg/dL 1.03   GFR Estimate >60 mL/min/1.73m2 81   Calcium 8.8 - 10.4 mg/dL 10.1   Anion Gap 7 - 15 mmol/L 7   Albumin 3.5 - 5.2 g/dL 4.7   Protein Total 6.4 - 8.3 g/dL 7.7   Alkaline Phosphatase 40 - 150 U/L 61   ALT 0 - 70 U/L 34   AST 0 - 45 U/L 27   AFP tumor marker <=8.3 ng/mL 1.9           Plan:   Paroxysmal atrial fibrillation/AFL noted on EGM's.  Duration and frequency increasing and patient symptomatic.  Discussed medication versus PFA.  Patient feels like he is already on so many medications and prefers to pursue ablation route.  I reviewed the procedure with the patient.  Risks and benefits were also discussed.  He understands this is completed under general anesthesia and is a same-day procedure barring any complications.  Serious risks are less than 2%.  Risks include but are not limited to; bruising, bleeding, vascular injury from catheter placement, pericarditis, pericardial effusion with possibility of tamponade, traumatic intubation, reaction to general anesthesia, arrhythmias, and a rare incidence of stroke or MI.  Patient  understands and is willing to proceed.  Patient is on apixaban 5 mg twice daily.  I would like to reach out to , his EP MD that did his CTI ablation in the past.  I would like to see if he agrees with this plan  History of CAD.  CABG 2010.  No complaints of chest pain.  Patient not on beta-blocker.  Appears to have stopped when he developed bradycardia prior to pacemaker implantation.  GDMT: Amlodipine 2.5 mg twice daily, lisinopril 20 mg twice daily, rosuvastatin 20 mg daily.  Would recommend metoprolol ER or Nebivolol which she has been on both in the past.  We could most likely decrease or discontinue his amlodipine.  Shortness of breath most likely from A-fib, but could be CAD.  Cardiac MRI 12/2023 showed no evidence of ischemia.  Could consider a Lexiscan if he continues to have shortness of breath.  HTN stable  High degree AV block with dual-chamber pacemaker implantation.  Functioning normally.    Thank you for including me in his care. 30 minutes was completed greater than 50% was related to counseling.  An informational booklet was given to the patient to review.  I will be in touch with him once I review further with .    Chen Syed, NP, APRN CNP      Today's clinic visit entailed:  Review of the result(s) of each unique test - Device and cMRI  30 minutes spent by me on the date of the encounter doing chart review, review of test results, patient visit, documentation, and discussion with other provider(s)   Provider  Link to [facility] Help Grid     The level of medical decision making during this visit was of high complexity.      No orders of the defined types were placed in this encounter.    No orders of the defined types were placed in this encounter.    There are no discontinued medications.      No diagnosis found.    CURRENT MEDICATIONS:  Current Outpatient Medications   Medication Sig Dispense Refill    amLODIPine (NORVASC) 2.5 MG tablet Take 1 tablet (2.5 mg) by mouth 2 times daily.  180 tablet 3    apixaban ANTICOAGULANT (ELIQUIS) 5 MG tablet Take 1 tablet (5 mg) by mouth 2 times daily. 180 tablet 3    B-D U/F 31G X 8 MM insulin pen needle USE ONE PEN NEEDLES DAILY OR AS DIRECTED. 100 each 3    celecoxib (CELEBREX) 100 MG capsule TAKE 1 CAPSULE BY MOUTH ONE TIME DAILY AS NEEDED FOR MODERATE PAIN 90 capsule 0    cyanocobalamin 1000 MCG SUBL Place 1,000 mcg under the tongue daily      glucosamine-chondroitin 500-400 MG CAPS per capsule Take 1 capsule by mouth daily      lisinopril (ZESTRIL) 20 MG tablet Take 1 tablet (20 mg) by mouth 2 times daily. 180 tablet 3    omeprazole (PRILOSEC) 20 MG DR capsule Take 1 capsule (20 mg) by mouth daily 90 capsule 3    ONETOUCH VERIO IQ test strip       rosuvastatin (CRESTOR) 20 MG tablet Take 1 tablet (20 mg) by mouth daily. 90 tablet 3    sildenafil (VIAGRA) 50 MG tablet TAKE ONE TABLET BY MOUTH DAILY AS NEEDED 30 tablet 0    Vitamin D, Cholecalciferol, 1000 units TABS Take 1,000 Units by mouth daily      zolpidem (AMBIEN) 5 MG tablet Take 1 tablet (5 mg) by mouth nightly as needed for sleep 30 tablet 0       ALLERGIES   No Known Allergies    PAST MEDICAL HISTORY:  Past Medical History:   Diagnosis Date    Basal cell carcinoma     Carotid stenosis, left     s/p left CEA 2/2020    Coronary artery disease     4 vessel bypass November 2010; LIMA ->LAD, SVG-> OM3, SVG -> D2, SVG -> PDA    Diabetes mellitus (H) 2005    Generalized anxiety disorder 05/02/2014    Hepatitis C, chronic (H) 2005    Hyperlipidemia LDL goal < 70     Hypertension     Liver diseases     9/15 Liver is 10 cm in span without left lobe enlargement    Persistent microalbuminuria associated with type 2 diabetes mellitus (H) 05/06/2015       PAST SURGICAL HISTORY:  Past Surgical History:   Procedure Laterality Date    ARTHROSCOPY KNEE RT/LT      left    COLONOSCOPY      CORONARY ARTERY BYPASS  11/18/201    Coronary artery bypass grafting x 4 with placement of the left internal mammary artery to  the distal midportion of the left anterior descending artery, saphenous vein graft from aorta to the third obtuse marginal branch of circumflex coronary artery, saphenous vein graft from aorta to the second diagonal branch, saphenous vein graft from aorta to the posterior descending artery.    ENDARTERECTOMY CAROTID Left 2/21/2020    Procedure: LEFT CAROTID ENDARTERECTOMY WITH EEG;  Surgeon: Semaj Stubbs MD;  Location: [facility] OR    EP ABLATION ATRIAL FLUTTER N/A 9/9/2022    Procedure: Ablation Atrial Flutter;  Surgeon: Tyson Ordonez MD;  Location:  HEART CARDIAC CATH LAB    EP PACEMAKER DEVICE & LEAD IMPLANT- RIGHT ATRIAL & LEFT VENTRICULAR N/A 11/7/2022    Procedure: Pacemaker Device & Lead Implant- Right Atrial & Left Ventricular;  Surgeon: Tyson Ordonez MD;  Location:  HEART CARDIAC CATH LAB    ESOPHAGOSCOPY, GASTROSCOPY, DUODENOSCOPY (EGD), COMBINED  10/31/2011    Procedure:COMBINED ESOPHAGOSCOPY, GASTROSCOPY, DUODENOSCOPY (EGD); Surgeon:ALONSO ALDANA; Location: [facility]    ESOPHAGOSCOPY, GASTROSCOPY, DUODENOSCOPY (EGD), COMBINED N/A 3/8/2018    Procedure: COMBINED ESOPHAGOSCOPY, GASTROSCOPY, DUODENOSCOPY (EGD);  EGD;  Surgeon: Angel Luis Justice MD;  Location: [facility]    HEART CATH CORONARY ANGIOGRAM W/LV GRAM  9-11-10    CV Surgery recommended    HEART CATH CORONARY ANGIOGRAM W/LV GRAM  2-28-14    Medical management    HERNIA REPAIR, INGUINAL RT/LT      left       FAMILY HISTORY:  Family History   Problem Relation Age of Onset    C.A.D. Father     Cancer Father         bladder    Heart Surgery Father         bypass surgery    Heart Disease Mother        SOCIAL HISTORY:  Social History     Socioeconomic History    Marital status:      Spouse name: None    Number of children: None    Years of education: None    Highest education level: None   Tobacco Use    Smoking status: Former     Current packs/day: 0.00     Average packs/day: 1 pack/day for 25.1 years (25.1 ttl  pk-yrs)     Types: Cigarettes     Start date:      Quit date: 2005     Years since quittin.3    Smokeless tobacco: Never   Vaping Use    Vaping status: Never Used   Substance and Sexual Activity    Alcohol use: Yes     Comment: minimal 1 glass of wine per week    Drug use: No    Sexual activity: Yes     Partners: Female   Other Topics Concern    Parent/sibling w/ CABG, MI or angioplasty before 65F 55M? No    Caffeine Concern Yes     Comment: 2 cups coffee per day    Special Diet Yes     Comment: low carb diet, low sugar    Exercise Yes     Comment: treadmill 40 minutes, walk, daily, bike 30 minutes every other day     Social Drivers of Health     Financial Resource Strain: Low Risk  (2024)    Financial Resource Strain     Within the past 12 months, have you or your family members you live with been unable to get utilities (heat, electricity) when it was really needed?: No   Food Insecurity: Low Risk  (2024)    Food Insecurity     Within the past 12 months, did you worry that your food would run out before you got money to buy more?: No     Within the past 12 months, did the food you bought just not last and you didn t have money to get more?: No   Transportation Needs: Low Risk  (2024)    Transportation Needs     Within the past 12 months, has lack of transportation kept you from medical appointments, getting your medicines, non-medical meetings or appointments, work, or from getting things that you need?: No   Physical Activity: Sufficiently Active (2024)    Exercise Vital Sign     Days of Exercise per Week: 7 days     Minutes of Exercise per Session: 40 min   Stress: No Stress Concern Present (2024)    [place] [place] [employer] - Occupational Stress Questionnaire     Feeling of Stress : Not at all   Social Connections: Unknown (2024)    Social Connection and Isolation Panel [NHANES]     Frequency of Social Gatherings with Friends and Family: Twice a week  "  Interpersonal Safety: Low Risk  (5/28/2024)    Interpersonal Safety     Do you feel physically and emotionally safe where you currently live?: Yes     Within the past 12 months, have you been hit, slapped, kicked or otherwise physically hurt by someone?: No     Within the past 12 months, have you been humiliated or emotionally abused in other ways by your partner or ex-partner?: No   Housing Stability: Low Risk  (5/27/2024)    Housing Stability     Do you have housing? : Yes     Are you worried about losing your housing?: No       Review of Systems:  Skin:        Eyes:       ENT:       Respiratory:  Positive for shortness of breath  Cardiovascular:    Positive for, fatigue, dizziness  Gastroenterology:      Genitourinary:       Musculoskeletal:       Neurologic:       Psychiatric:       Heme/Lymph/Imm:       Endocrine:  Positive for diabetes    Physical Exam:    Vitals: /60   Pulse 75   Ht 1.803 m (5' 11\")   Wt 73.5 kg (162 lb)   SpO2 98%   BMI 22.59 kg/m    Constitutional: Well nourished and in no apparent distress.  Eyes: Pupils equal, round. Sclerae anicteric.   HEENT: Normocephalic, atraumatic.   Neck: Supple. No JVD   Respiratory: Breathing non-labored. Lungs clear to auscultation bilaterally. No crackles, wheezes, rhonchi, or rales.  Cardiovascular:  Regular rate and rhythm, normal S1 and S2. No murmur.  Skin: Warm, dry. No rashes, cyanosis, or xanthelasma.  Extremities: No edema.  Neurologic: No gross motor deficits. Alert, awake, and oriented to person, place and time.  Psychiatric: Affect appropriate.        Recent Lab Results:  LIPID RESULTS:  Lab Results   Component Value Date    CHOL 116 05/28/2024    CHOL 104 02/03/2021    HDL 46 05/28/2024    HDL 37 (L) 02/03/2021    LDL 54 05/28/2024    LDL 44 02/03/2021    TRIG 79 05/28/2024    TRIG 114 02/03/2021    CHOLHDLRATIO 3.4 08/02/2013       LIVER ENZYME RESULTS:  Lab Results   Component Value Date    AST 27 11/26/2024    AST 28 06/07/2021    " ALT 34 11/26/2024    ALT 46 06/07/2021       CBC RESULTS:  Lab Results   Component Value Date    WBC 5.9 11/26/2024    WBC 5.3 06/07/2021    RBC 5.52 11/26/2024    RBC 5.00 06/07/2021    HGB 17.1 11/26/2024    HGB 15.3 06/07/2021    HCT 50.2 11/26/2024    HCT 45.1 06/07/2021    MCV 91 11/26/2024    MCV 90 06/07/2021    MCH 31.0 11/26/2024    MCH 30.6 06/07/2021    MCHC 34.1 11/26/2024    MCHC 33.9 06/07/2021    RDW 12.8 11/26/2024    RDW 12.9 06/07/2021     (L) 11/26/2024     (L) 06/07/2021       BMP RESULTS:  Lab Results   Component Value Date     11/26/2024     06/07/2021    POTASSIUM 5.2 11/26/2024    POTASSIUM 4.0 11/07/2022    POTASSIUM 4.8 06/07/2021    CHLORIDE 104 11/26/2024    CHLORIDE 107 11/07/2022    CHLORIDE 108 06/07/2021    CO2 28 11/26/2024    CO2 23 11/07/2022    CO2 28 06/07/2021    ANIONGAP 7 11/26/2024    ANIONGAP 7 11/07/2022    ANIONGAP 5 06/07/2021     (H) 11/26/2024     (H) 11/07/2022     (H) 06/07/2021    BUN 16.2 11/26/2024    BUN 15 11/07/2022    BUN 17 06/07/2021    CR 1.03 11/26/2024    CR 0.89 06/07/2021    GFRESTIMATED 81 11/26/2024    GFRESTIMATED >60 03/08/2022    GFRESTIMATED >90 06/07/2021    GFRESTBLACK >90 06/07/2021    LIA 10.1 11/26/2024    LIA 9.6 06/07/2021        A1C RESULTS:  Lab Results   Component Value Date    A1C 5.2 05/28/2024    A1C 6.9 (H) 11/13/2020       INR RESULTS:  Lab Results   Component Value Date    INR 1.34 (H) 11/26/2024    INR 1.35 (H) 05/16/2024    INR 1.11 06/07/2021    INR 1.13 12/15/2020           CC  No referring provider defined for this encounter.

## 2025-05-22 ENCOUNTER — TELEPHONE (OUTPATIENT)
Dept: CARDIOLOGY | Facility: CLINIC | Age: 65
End: 2025-05-22
Payer: COMMERCIAL

## 2025-05-22 DIAGNOSIS — I48.0 PAROXYSMAL ATRIAL FIBRILLATION (H): Primary | ICD-10-CM

## 2025-05-22 NOTE — TELEPHONE ENCOUNTER
Per Chen Syed and :  Please schedule his PFA, thank you. He had a recent cMRI, so no CT needed.   Hold Eliquis the am of procedure only, but bring 1 tablet with him at the hospital.     PFA ordered, message sent to scheduling to arrange.  SACHIN Zelaya

## 2025-06-03 ENCOUNTER — ANCILLARY PROCEDURE (OUTPATIENT)
Dept: ULTRASOUND IMAGING | Facility: CLINIC | Age: 65
End: 2025-06-03
Attending: INTERNAL MEDICINE
Payer: COMMERCIAL

## 2025-06-03 ENCOUNTER — OFFICE VISIT (OUTPATIENT)
Dept: GASTROENTEROLOGY | Facility: CLINIC | Age: 65
End: 2025-06-03
Attending: INTERNAL MEDICINE
Payer: COMMERCIAL

## 2025-06-03 ENCOUNTER — LAB (OUTPATIENT)
Dept: LAB | Facility: CLINIC | Age: 65
End: 2025-06-03
Payer: COMMERCIAL

## 2025-06-03 VITALS
DIASTOLIC BLOOD PRESSURE: 75 MMHG | SYSTOLIC BLOOD PRESSURE: 128 MMHG | RESPIRATION RATE: 18 BRPM | HEART RATE: 58 BPM | OXYGEN SATURATION: 98 % | TEMPERATURE: 97.5 F

## 2025-06-03 DIAGNOSIS — E78.5 HYPERLIPIDEMIA LDL GOAL <70: ICD-10-CM

## 2025-06-03 DIAGNOSIS — Z12.5 SCREENING FOR PROSTATE CANCER: ICD-10-CM

## 2025-06-03 DIAGNOSIS — E11.42 TYPE 2 DIABETES MELLITUS WITH DIABETIC POLYNEUROPATHY, WITHOUT LONG-TERM CURRENT USE OF INSULIN (H): ICD-10-CM

## 2025-06-03 DIAGNOSIS — K74.60 CIRRHOSIS OF LIVER WITHOUT ASCITES, UNSPECIFIED HEPATIC CIRRHOSIS TYPE (H): Primary | ICD-10-CM

## 2025-06-03 DIAGNOSIS — K74.60 CIRRHOSIS OF LIVER WITHOUT ASCITES, UNSPECIFIED HEPATIC CIRRHOSIS TYPE (H): ICD-10-CM

## 2025-06-03 LAB
AFP SERPL-MCNC: 2.2 NG/ML
ALBUMIN SERPL BCG-MCNC: 4.6 G/DL (ref 3.5–5.2)
ALP SERPL-CCNC: 60 U/L (ref 40–150)
ALT SERPL W P-5'-P-CCNC: 39 U/L (ref 0–70)
ANION GAP SERPL CALCULATED.3IONS-SCNC: 11 MMOL/L (ref 7–15)
AST SERPL W P-5'-P-CCNC: 29 U/L (ref 0–45)
BILIRUB SERPL-MCNC: 1.7 MG/DL
BILIRUBIN DIRECT (ROCHE PRO & PURE): 0.71 MG/DL (ref 0–0.45)
BUN SERPL-MCNC: 18.8 MG/DL (ref 8–23)
CALCIUM SERPL-MCNC: 10.2 MG/DL (ref 8.8–10.4)
CHLORIDE SERPL-SCNC: 104 MMOL/L (ref 98–107)
CREAT SERPL-MCNC: 1.2 MG/DL (ref 0.67–1.17)
EGFRCR SERPLBLD CKD-EPI 2021: 67 ML/MIN/1.73M2
ERYTHROCYTE [DISTWIDTH] IN BLOOD BY AUTOMATED COUNT: 12.8 % (ref 10–15)
GLUCOSE SERPL-MCNC: 112 MG/DL (ref 70–99)
HCO3 SERPL-SCNC: 23 MMOL/L (ref 22–29)
HCT VFR BLD AUTO: 47.2 % (ref 40–53)
HGB BLD-MCNC: 16.3 G/DL (ref 13.3–17.7)
INR PPP: 1.31 (ref 0.85–1.15)
MCH RBC QN AUTO: 31.5 PG (ref 26.5–33)
MCHC RBC AUTO-ENTMCNC: 34.5 G/DL (ref 31.5–36.5)
MCV RBC AUTO: 91 FL (ref 78–100)
PLATELET # BLD AUTO: 134 10E3/UL (ref 150–450)
POTASSIUM SERPL-SCNC: 5.3 MMOL/L (ref 3.4–5.3)
PROT SERPL-MCNC: 7.3 G/DL (ref 6.4–8.3)
PROTHROMBIN TIME: 16.5 SECONDS (ref 11.8–14.8)
RBC # BLD AUTO: 5.18 10E6/UL (ref 4.4–5.9)
SODIUM SERPL-SCNC: 138 MMOL/L (ref 135–145)
WBC # BLD AUTO: 4.7 10E3/UL (ref 4–11)

## 2025-06-03 PROCEDURE — 82105 ALPHA-FETOPROTEIN SERUM: CPT | Performed by: INTERNAL MEDICINE

## 2025-06-03 PROCEDURE — 82248 BILIRUBIN DIRECT: CPT | Performed by: PATHOLOGY

## 2025-06-03 PROCEDURE — G0103 PSA SCREENING: HCPCS | Performed by: PATHOLOGY

## 2025-06-03 PROCEDURE — 99000 SPECIMEN HANDLING OFFICE-LAB: CPT | Performed by: PATHOLOGY

## 2025-06-03 PROCEDURE — 80061 LIPID PANEL: CPT | Performed by: PATHOLOGY

## 2025-06-03 PROCEDURE — 83036 HEMOGLOBIN GLYCOSYLATED A1C: CPT | Performed by: INTERNAL MEDICINE

## 2025-06-03 PROCEDURE — 85610 PROTHROMBIN TIME: CPT | Performed by: PATHOLOGY

## 2025-06-03 PROCEDURE — 80053 COMPREHEN METABOLIC PANEL: CPT | Performed by: PATHOLOGY

## 2025-06-03 PROCEDURE — 85027 COMPLETE CBC AUTOMATED: CPT | Performed by: PATHOLOGY

## 2025-06-03 PROCEDURE — 99214 OFFICE O/P EST MOD 30 MIN: CPT | Performed by: INTERNAL MEDICINE

## 2025-06-03 PROCEDURE — 36415 COLL VENOUS BLD VENIPUNCTURE: CPT | Performed by: PATHOLOGY

## 2025-06-03 PROCEDURE — 76705 ECHO EXAM OF ABDOMEN: CPT | Mod: GC | Performed by: RADIOLOGY

## 2025-06-03 RX ORDER — TIRZEPATIDE 5 MG/.5ML
5 INJECTION, SOLUTION SUBCUTANEOUS WEEKLY
COMMUNITY
Start: 2025-05-20

## 2025-06-03 ASSESSMENT — PAIN SCALES - GENERAL: PAINLEVEL_OUTOF10: NO PAIN (0)

## 2025-06-03 NOTE — NURSING NOTE
"Chief Complaint   Patient presents with    RECHECK     Cirrhosis      Vital signs:  Temp: 97.5  F (36.4  C) Temp src: Oral BP: 128/75 Pulse: 58   Resp: 18 SpO2: 98 %       Weight:  (Declined, states 160 lbs)  Estimated body mass index is 22.59 kg/m  as calculated from the following:    Height as of 5/21/25: 1.803 m (5' 11\").    Weight as of 5/21/25: 73.5 kg (162 lb).      Maritza Dodson, [facility]  6/3/2025 8:43 AM    "

## 2025-06-03 NOTE — PROGRESS NOTES
Hepatology Follow-Up Visit:     HISTORY OF PRESENT ILLNESS:   I had the pleasure of seeing Vikash Burgos for followup in the Liver Clinic at the [facility] on Joanne 3, 2025. Mr. 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     He is doing fairly well.  He does note some ongoing right upper quadrant burning pain.  He does report it may be a little bit worse than it has been in the past but not significantly changed.  He denies any itching or skin rash.  He does occasionally nap in the afternoon.  He denies any increased abdominal girth or lower extremity edema.     He denies any fevers or chills.  He denies any cough or shortness of breath. He denies any nausea or vomiting, diarrhea or constipation.  His appetite has been somewhat diminished through the change in his diabetes medication.  He has lost weight and is maintaining his current weight.    Medications:   Current Outpatient Medications   Medication Sig Dispense Refill    amLODIPine (NORVASC) 2.5 MG tablet Take 1 tablet (2.5 mg) by mouth 2 times daily. 180 tablet 3    apixaban ANTICOAGULANT (ELIQUIS) 5 MG tablet Take 1 tablet (5 mg) by mouth 2 times daily. 180 tablet 3    B-D U/F 31G X 8 MM insulin pen needle USE ONE PEN NEEDLES DAILY OR AS DIRECTED. 100 each 3    celecoxib (CELEBREX) 100 MG capsule TAKE 1 CAPSULE BY MOUTH ONE TIME DAILY AS NEEDED FOR MODERATE PAIN 90 capsule 0    cyanocobalamin 1000 MCG SUBL Place 1,000 mcg under the tongue daily      glucosamine-chondroitin 500-400 MG CAPS per capsule Take 1 capsule by mouth daily      lisinopril (ZESTRIL) 20 MG tablet Take 1 tablet (20 mg) by mouth 2 times daily. 180 tablet 3    MOUNJARO 5 MG/0.5ML SOAJ auto-injector pen Inject 5 mg subcutaneously once a week.      omeprazole (PRILOSEC) 20 MG DR capsule Take 1 capsule (20 mg) by mouth daily 90 capsule 3    ONETOUCH VERIO IQ test strip       rosuvastatin (CRESTOR) 20 MG  tablet Take 1 tablet (20 mg) by mouth daily. 90 tablet 3    sildenafil (VIAGRA) 50 MG tablet TAKE ONE TABLET BY MOUTH DAILY AS NEEDED 30 tablet 0    Vitamin D, Cholecalciferol, 1000 units TABS Take 1,000 Units by mouth daily      zolpidem (AMBIEN) 5 MG tablet Take 1 tablet (5 mg) by mouth nightly as needed for sleep 30 tablet 0     Current Facility-Administered Medications   Medication Dose Route Frequency Provider Last Rate Last Admin    study - tirzepatide 2.5-15 mg or dulaglutide 1.5 mg (SURPASS) (IDS# 5849) injection 1 Pen  1 Pen Subcutaneous Q7 Days Mariah Elias MD        study - tirzepatide 2.5-15 mg or dulaglutide 1.5 mg (SURPASS) (IDS# [id number]) injection 1 Pen  1 Pen Subcutaneous Q7 Days Mariah Elias MD        study - tirzepatide 2.5-15 mg or dulaglutide 1.5 mg (SURPASS) (IDS# [id number]) injection 1 Pen  1 Pen Subcutaneous Q7 Days Mariah Elias MD        study - tirzepatide 2.5-15 mg or dulaglutide 1.5 mg (SURPASS) (IDS# [id number]) injection 1.5-15 mg  1.5-15 mg Subcutaneous Q7 Days Mariah Elias MD          Vitals:   /75 (BP Location: Right arm, Patient Position: Sitting, Cuff Size: Adult Regular)   Pulse 58   Temp 97.5  F (36.4  C) (Oral)   Resp 18   SpO2 98%     Physical Exam:   In general he looks quite well. HEENT exam shows no scleral icterus or temporal muscle wasting. Chest is clear. Abdominal exam shows no increase in girth. No masses or tenderness to palpation are present. Liver is 10 cm in span without left lobe enlargement. No spleen tip is palpable. Extremity exam shows no edema. Skin exam shows no stigmata of chronic liver disease. Neurologic exam shows no asterixis.     Labs:   Lab Results   Component Value Date     06/03/2025    POTASSIUM 5.3 06/03/2025    CHLORIDE 104 06/03/2025    ANIONGAP 11 06/03/2025    CO2 23 06/03/2025    BUN 18.8 06/03/2025    CR 1.20 (H) 06/03/2025    GFRESTIMATED 67 06/03/2025    LIA 10.2 06/03/2025      Lab Results   Component Value Date    WBC 4.7  06/03/2025    HGB 16.3 06/03/2025    HCT 47.2 06/03/2025    MCV 91 06/03/2025    MCH 31.5 06/03/2025    MCHC 34.5 06/03/2025    RDW 12.8 06/03/2025     (L) 06/03/2025     Lab Results   Component Value Date    ALBUMIN 4.6 06/03/2025    ALKPHOS 60 06/03/2025    AST 29 06/03/2025    BILICONJ 0.0 03/14/2014     Lab Results   Component Value Date    INR 1.31 (H) 06/03/2025     MELD 3.0: 13 at 6/3/2025  7:07 AM  MELD-Na: 13 at 6/3/2025  7:07 AM  Calculated from:  Serum Creatinine: 1.2 mg/dL at 6/3/2025  7:07 AM  Serum Sodium: 138 mmol/L (Using max of 137 mmol/L) at 6/3/2025  7:07 AM  Total Bilirubin: 1.7 mg/dL at 6/3/2025  7:07 AM  Serum Albumin: 4.6 g/dL (Using max of 3.5 g/dL) at 6/3/2025  7:07 AM  INR(ratio): 1.31 at 6/3/2025  7:07 AM  Age at listing (hypothetical): 65 years  Sex: Male at 6/3/2025  7:07 AM    Imaging:   EXAMINATION: US ABDOMEN LIMITED, 6/3/2025 8:09 AM      INDICATION: Patient at high risk for HCC. Hcc SCREEN; Cirrhosis of liver without ascites, unspecified hepatic cirrhosis type (H)     COMPARISON: 11/26/2024     TECHNIQUE: The abdomen right upper quadrant was scanned in the standard fashion with specialized ultrasound transducer(s) using both gray scale and limited color/spectral Doppler techniques.     FINDINGS:   Fluid: No evidence of ascites or pleural effusions.     Liver: The liver increased echogenicity with focal sparing near the mona hepatis the liver measures 10.9 cm in craniocaudal dimension. No focal hepatic mass. No intrahepatic biliary dilatation. The main portal vein is patent with antegrade flow.     US visualization score: A - No or minimal limitations     Gallbladder: The gallbladder is well distended and of normal  morphology. No wall thickening, pericholecystic fluid, sonographic Mora's sign, or evidence of cholelithiasis.     Bile Ducts: Normal caliber intra and extrahepatic biliary tree. The common bile duct measures 3 mm in diameter.     Pancreas: Visualized portions  of the pancreas are unremarkable.      Kidney: The right kidney measures 12.0 cm in long dimension. No hydronephrosis, hydroureter, shadowing renal calculi, or solid mass. The capsule and parenchyma demonstrate normal echogenicity.     Aorta and IVC: The visualized portions of the aorta and IVC are unremarkable.                                                                   IMPRESSION:   1. Hepatic steatosis with focal fatty sparing near the mona hepatis.    Assessment/Plan:   IMPRESSION:   Mr. Burgos has very well-compensated cirrhosis, most likely caused by metabolic associated steatotic liver disease.  His liver tests are excellent.  His ultrasound shows no mass lesions or evidence of ascites.     I will not be making any change to his medical regimen.  He is up to date with regard to vaccines and other cancer screening and my plan will be to see him back in the clinic for repeat imaging and blood work in 6 months.     I did spend a total of 30 minutes (on the date of the encounter), including 20 minutes of face-to-face clinic time including counseling. The rest of the time was spent in documentation and review of records.    Thank you very much for allowing me to participate in the care of this patient.  If you have any questions regarding my recommendations, please do not hesitate to contact me.      Sincerely,       Kevin Bedolla MD      Professor of Medicine  [employer]      Executive Medical Director, Solid Organ Transplant Program  [facility]

## 2025-06-03 NOTE — LETTER
6/3/2025      Vikash Burgos  [address]  [place] [address]      Dear Colleague,    Thank you for referring your patient, Vikash Burgos, to the [facility] [place]. Please see a copy of my visit note below.    Hepatology Follow-Up Visit:     HISTORY OF PRESENT ILLNESS:   I had the pleasure of seeing Vikash Burgos for followup in the Liver Clinic at the [facility] on Joanne 3, 2025. Mr. 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     He is doing fairly well.  He does note some ongoing right upper quadrant burning pain.  He does report it may be a little bit worse than it has been in the past but not significantly changed.  He denies any itching or skin rash.  He does occasionally nap in the afternoon.  He denies any increased abdominal girth or lower extremity edema.     He denies any fevers or chills.  He denies any cough or shortness of breath. He denies any nausea or vomiting, diarrhea or constipation.  His appetite has been somewhat diminished through the change in his diabetes medication.  He has lost weight and is maintaining his current weight.    Medications:   Current Outpatient Medications   Medication Sig Dispense Refill     amLODIPine (NORVASC) 2.5 MG tablet Take 1 tablet (2.5 mg) by mouth 2 times daily. 180 tablet 3     apixaban ANTICOAGULANT (ELIQUIS) 5 MG tablet Take 1 tablet (5 mg) by mouth 2 times daily. 180 tablet 3     B-D U/F 31G X 8 MM insulin pen needle USE ONE PEN NEEDLES DAILY OR AS DIRECTED. 100 each 3     celecoxib (CELEBREX) 100 MG capsule TAKE 1 CAPSULE BY MOUTH ONE TIME DAILY AS NEEDED FOR MODERATE PAIN 90 capsule 0     cyanocobalamin 1000 MCG SUBL Place 1,000 mcg under the tongue daily       glucosamine-chondroitin 500-400 MG CAPS per capsule Take 1 capsule by mouth daily       lisinopril (ZESTRIL) 20 MG tablet Take 1 tablet (20 mg) by mouth 2 times  daily. 180 tablet 3     MOUNJARO 5 MG/0.5ML SOAJ auto-injector pen Inject 5 mg subcutaneously once a week.       omeprazole (PRILOSEC) 20 MG DR capsule Take 1 capsule (20 mg) by mouth daily 90 capsule 3     ONETOUCH VERIO IQ test strip        rosuvastatin (CRESTOR) 20 MG tablet Take 1 tablet (20 mg) by mouth daily. 90 tablet 3     sildenafil (VIAGRA) 50 MG tablet TAKE ONE TABLET BY MOUTH DAILY AS NEEDED 30 tablet 0     Vitamin D, Cholecalciferol, 1000 units TABS Take 1,000 Units by mouth daily       zolpidem (AMBIEN) 5 MG tablet Take 1 tablet (5 mg) by mouth nightly as needed for sleep 30 tablet 0     Current Facility-Administered Medications   Medication Dose Route Frequency Provider Last Rate Last Admin     study - tirzepatide 2.5-15 mg or dulaglutide 1.5 mg (SURPASS) (IDS# 5849) injection 1 Pen  1 Pen Subcutaneous Q7 Days Mariah Elias MD         study - tirzepatide 2.5-15 mg or dulaglutide 1.5 mg (SURPASS) (IDS# [id number]) injection 1 Pen  1 Pen Subcutaneous Q7 Days Mariah Elias MD         study - tirzepatide 2.5-15 mg or dulaglutide 1.5 mg (SURPASS) (IDS# [id number]) injection 1 Pen  1 Pen Subcutaneous Q7 Days Mariah Elias MD         study - tirzepatide 2.5-15 mg or dulaglutide 1.5 mg (SURPASS) (IDS# [id number]) injection 1.5-15 mg  1.5-15 mg Subcutaneous Q7 Days Mariah Elias MD          Vitals:   /75 (BP Location: Right arm, Patient Position: Sitting, Cuff Size: Adult Regular)   Pulse 58   Temp 97.5  F (36.4  C) (Oral)   Resp 18   SpO2 98%     Physical Exam:   In general he looks quite well. HEENT exam shows no scleral icterus or temporal muscle wasting. Chest is clear. Abdominal exam shows no increase in girth. No masses or tenderness to palpation are present. Liver is 10 cm in span without left lobe enlargement. No spleen tip is palpable. Extremity exam shows no edema. Skin exam shows no stigmata of chronic liver disease. Neurologic exam shows no asterixis.     Labs:   Lab Results   Component Value  Date     06/03/2025    POTASSIUM 5.3 06/03/2025    CHLORIDE 104 06/03/2025    ANIONGAP 11 06/03/2025    CO2 23 06/03/2025    BUN 18.8 06/03/2025    CR 1.20 (H) 06/03/2025    GFRESTIMATED 67 06/03/2025    LIA 10.2 06/03/2025      Lab Results   Component Value Date    WBC 4.7 06/03/2025    HGB 16.3 06/03/2025    HCT 47.2 06/03/2025    MCV 91 06/03/2025    MCH 31.5 06/03/2025    MCHC 34.5 06/03/2025    RDW 12.8 06/03/2025     (L) 06/03/2025     Lab Results   Component Value Date    ALBUMIN 4.6 06/03/2025    ALKPHOS 60 06/03/2025    AST 29 06/03/2025    BILICONJ 0.0 03/14/2014     Lab Results   Component Value Date    INR 1.31 (H) 06/03/2025     MELD 3.0: 13 at 6/3/2025  7:07 AM  MELD-Na: 13 at 6/3/2025  7:07 AM  Calculated from:  Serum Creatinine: 1.2 mg/dL at 6/3/2025  7:07 AM  Serum Sodium: 138 mmol/L (Using max of 137 mmol/L) at 6/3/2025  7:07 AM  Total Bilirubin: 1.7 mg/dL at 6/3/2025  7:07 AM  Serum Albumin: 4.6 g/dL (Using max of 3.5 g/dL) at 6/3/2025  7:07 AM  INR(ratio): 1.31 at 6/3/2025  7:07 AM  Age at listing (hypothetical): 65 years  Sex: Male at 6/3/2025  7:07 AM    Imaging:   EXAMINATION: US ABDOMEN LIMITED, 6/3/2025 8:09 AM      INDICATION: Patient at high risk for HCC. Hcc SCREEN; Cirrhosis of liver without ascites, unspecified hepatic cirrhosis type (H)     COMPARISON: 11/26/2024     TECHNIQUE: The abdomen right upper quadrant was scanned in the standard fashion with specialized ultrasound transducer(s) using both gray scale and limited color/spectral Doppler techniques.     FINDINGS:   Fluid: No evidence of ascites or pleural effusions.     Liver: The liver increased echogenicity with focal sparing near the mona hepatis the liver measures 10.9 cm in craniocaudal dimension. No focal hepatic mass. No intrahepatic biliary dilatation. The main portal vein is patent with antegrade flow.     US visualization score: A - No or minimal limitations     Gallbladder: The gallbladder is well  distended and of normal  morphology. No wall thickening, pericholecystic fluid, sonographic Mora's sign, or evidence of cholelithiasis.     Bile Ducts: Normal caliber intra and extrahepatic biliary tree. The common bile duct measures 3 mm in diameter.     Pancreas: Visualized portions of the pancreas are unremarkable.      Kidney: The right kidney measures 12.0 cm in long dimension. No hydronephrosis, hydroureter, shadowing renal calculi, or solid mass. The capsule and parenchyma demonstrate normal echogenicity.     Aorta and IVC: The visualized portions of the aorta and IVC are unremarkable.                                                                   IMPRESSION:   1. Hepatic steatosis with focal fatty sparing near the mona hepatis.    Assessment/Plan:   IMPRESSION:   Mr. Burgos has very well-compensated cirrhosis, most likely caused by metabolic associated steatotic liver disease.  His liver tests are excellent.  His ultrasound shows no mass lesions or evidence of ascites.     I will not be making any change to his medical regimen.  He is up to date with regard to vaccines and other cancer screening and my plan will be to see him back in the clinic for repeat imaging and blood work in 6 months.     I did spend a total of 30 minutes (on the date of the encounter), including 20 minutes of face-to-face clinic time including counseling. The rest of the time was spent in documentation and review of records.    Thank you very much for allowing me to participate in the care of this patient.  If you have any questions regarding my recommendations, please do not hesitate to contact me.      Sincerely,       Kevin Bedolla MD      Professor of Medicine  [employer]      Executive Medical Director, Solid Organ Transplant Program  [facility]        Again, thank you for allowing me to participate in the care of your patient.        Sincerely,        Kevin Bedolla,  MD    Electronically signed

## 2025-06-04 ENCOUNTER — RESULTS FOLLOW-UP (OUTPATIENT)
Dept: GASTROENTEROLOGY | Facility: CLINIC | Age: 65
End: 2025-06-04

## 2025-06-05 ENCOUNTER — ORDERS ONLY (AUTO-RELEASED) (OUTPATIENT)
Dept: FAMILY MEDICINE | Facility: CLINIC | Age: 65
End: 2025-06-05

## 2025-06-05 ENCOUNTER — RESULTS FOLLOW-UP (OUTPATIENT)
Dept: FAMILY MEDICINE | Facility: CLINIC | Age: 65
End: 2025-06-05

## 2025-06-05 ENCOUNTER — OFFICE VISIT (OUTPATIENT)
Dept: FAMILY MEDICINE | Facility: CLINIC | Age: 65
End: 2025-06-05
Payer: COMMERCIAL

## 2025-06-05 VITALS
RESPIRATION RATE: 20 BRPM | DIASTOLIC BLOOD PRESSURE: 76 MMHG | HEIGHT: 71 IN | WEIGHT: 160 LBS | OXYGEN SATURATION: 97 % | TEMPERATURE: 98.2 F | SYSTOLIC BLOOD PRESSURE: 137 MMHG | HEART RATE: 72 BPM | BODY MASS INDEX: 22.4 KG/M2

## 2025-06-05 DIAGNOSIS — K74.60 CIRRHOSIS OF LIVER WITHOUT ASCITES, UNSPECIFIED HEPATIC CIRRHOSIS TYPE (H): ICD-10-CM

## 2025-06-05 DIAGNOSIS — E78.5 HYPERLIPIDEMIA LDL GOAL <70: ICD-10-CM

## 2025-06-05 DIAGNOSIS — I48.0 PAROXYSMAL ATRIAL FIBRILLATION (H): ICD-10-CM

## 2025-06-05 DIAGNOSIS — Z12.5 SCREENING FOR PROSTATE CANCER: ICD-10-CM

## 2025-06-05 DIAGNOSIS — I10 BENIGN ESSENTIAL HYPERTENSION: ICD-10-CM

## 2025-06-05 DIAGNOSIS — Z13.6 SCREENING FOR AAA (ABDOMINAL AORTIC ANEURYSM): ICD-10-CM

## 2025-06-05 DIAGNOSIS — I25.10 CORONARY ARTERY DISEASE INVOLVING NATIVE CORONARY ARTERY OF NATIVE HEART WITHOUT ANGINA PECTORIS: Chronic | ICD-10-CM

## 2025-06-05 DIAGNOSIS — E08.42 DIABETIC POLYNEUROPATHY ASSOCIATED WITH DIABETES MELLITUS DUE TO UNDERLYING CONDITION (H): ICD-10-CM

## 2025-06-05 DIAGNOSIS — E11.42 TYPE 2 DIABETES MELLITUS WITH DIABETIC POLYNEUROPATHY, WITHOUT LONG-TERM CURRENT USE OF INSULIN (H): ICD-10-CM

## 2025-06-05 DIAGNOSIS — Z12.11 SCREEN FOR COLON CANCER: ICD-10-CM

## 2025-06-05 DIAGNOSIS — Z00.00 ROUTINE GENERAL MEDICAL EXAMINATION AT A HEALTH CARE FACILITY: Primary | ICD-10-CM

## 2025-06-05 DIAGNOSIS — E11.42 TYPE 2 DIABETES MELLITUS WITH DIABETIC POLYNEUROPATHY, WITHOUT LONG-TERM CURRENT USE OF INSULIN (H): Primary | ICD-10-CM

## 2025-06-05 LAB
CHOLEST SERPL-MCNC: 102 MG/DL
CREAT UR-MCNC: 84 MG/DL
EST. AVERAGE GLUCOSE BLD GHB EST-MCNC: 117 MG/DL
HBA1C MFR BLD: 5.7 %
HDLC SERPL-MCNC: 49 MG/DL
LDLC SERPL CALC-MCNC: 40 MG/DL
MICROALBUMIN UR-MCNC: 89.5 MG/L
MICROALBUMIN/CREAT UR: 106.55 MG/G CR (ref 0–17)
NONHDLC SERPL-MCNC: 53 MG/DL
PSA SERPL DL<=0.01 NG/ML-MCNC: 1.27 NG/ML (ref 0–4.5)
TRIGL SERPL-MCNC: 64 MG/DL

## 2025-06-05 SDOH — HEALTH STABILITY: PHYSICAL HEALTH: ON AVERAGE, HOW MANY DAYS PER WEEK DO YOU ENGAGE IN MODERATE TO STRENUOUS EXERCISE (LIKE A BRISK WALK)?: 6 DAYS

## 2025-06-05 ASSESSMENT — SOCIAL DETERMINANTS OF HEALTH (SDOH): HOW OFTEN DO YOU GET TOGETHER WITH FRIENDS OR RELATIVES?: TWICE A WEEK

## 2025-06-05 ASSESSMENT — PAIN SCALES - GENERAL: PAINLEVEL_OUTOF10: NO PAIN (0)

## 2025-06-05 NOTE — RESULT ENCOUNTER NOTE
The following letter pertains to your most recent diagnostic tests:    The diabetes urine test is mildly elevated when compared to the level observed about a year ago.  We should recheck this test in 3 to 6 months to see if it is a persistent elevation.  Transient elevations are not of concern, but if the elevation is persistent, we could consider adding a medication called Farxiga (dapagliflozin) To your diabetes regimen to help protect the kidneys from adverse effects from diabetes.    Schedule lab appointment in 3 to 6 months to recheck the diabetes urine test (microalbumin level).    Sincerely,    Dr. Land

## 2025-06-05 NOTE — NURSING NOTE
Prior to immunization administration, verified patients identity using patient s name and date of birth. Please see Immunization Activity for additional information.     Screening Questionnaire for Adult Immunization    Are you sick today?   No   Do you have allergies to medications, food, a vaccine component or latex?   No   Have you ever had a serious reaction after receiving a vaccination?   No   Do you have a long-term health problem with heart, lung, kidney, or metabolic disease (e.g., diabetes), asthma, a blood disorder, no spleen, complement component deficiency, a cochlear implant, or a spinal fluid leak?  Are you on long-term aspirin therapy?   Yes   Do you have cancer, leukemia, HIV/AIDS, or any other immune system problem?   No   Do you have a parent, brother, or sister with an immune system problem?   No   In the past 3 months, have you taken medications that affect  your immune system, such as prednisone, other steroids, or anticancer drugs; drugs for the treatment of rheumatoid arthritis, Crohn s disease, or psoriasis; or have you had radiation treatments?   No   Have you had a seizure, or a brain or other nervous system problem?   No   During the past year, have you received a transfusion of blood or blood    products, or been given immune (gamma) globulin or antiviral drug?   No   For women: Are you pregnant or is there a chance you could become       pregnant during the next month?   No   Have you received any vaccinations in the past 4 weeks?   No     Immunization questionnaire was positive for at least one answer.  Notified Dr. Land.     Patient is receiving PCV 20.      Patient instructed to remain in clinic for 15 minutes afterwards, and to report any adverse reactions.     Screening performed by Miguel Ramírez MA on 6/5/2025 at 9:55 AM.

## 2025-06-05 NOTE — PATIENT INSTRUCTIONS
Patient Education   Preventive Care Advice   This is general advice given by our system to help you stay healthy. However, your care team may have specific advice just for you. Please talk to your care team about your preventive care needs.  Nutrition  Eat 5 or more servings of fruits and vegetables each day.  Try wheat bread, brown rice and whole grain pasta (instead of white bread, rice, and pasta).  Get enough calcium and vitamin D. Check the label on foods and aim for 100% of the RDA (recommended daily allowance).  Lifestyle  Exercise at least 150 minutes each week  (30 minutes a day, 5 days a week).  Do muscle strengthening activities 2 days a week. These help control your weight and prevent disease.  No smoking.  Wear sunscreen to prevent skin cancer.  Have a dental exam and cleaning every 6 months.  Yearly exams  See your health care team every year to talk about:  Any changes in your health.  Any medicines your care team has prescribed.  Preventive care, family planning, and ways to prevent chronic diseases.  Shots (vaccines)   HPV shots (up to age 26), if you've never had them before.  Hepatitis B shots (up to age 59), if you've never had them before.  COVID-19 shot: Get this shot when it's due.  Flu shot: Get a flu shot every year.  Tetanus shot: Get a tetanus shot every 10 years.  Pneumococcal, hepatitis A, and RSV shots: Ask your care team if you need these based on your risk.  Shingles shot (for age 50 and up)  General health tests  Diabetes screening:  Starting at age 35, Get screened for diabetes at least every 3 years.  If you are younger than age 35, ask your care team if you should be screened for diabetes.  Cholesterol test: At age 39, start having a cholesterol test every 5 years, or more often if advised.  Bone density scan (DEXA): At age 50, ask your care team if you should have this scan for osteoporosis (brittle bones).  Hepatitis C: Get tested at least once in your life.  STIs (sexually  transmitted infections)  Before age 24: Ask your care team if you should be screened for STIs.  After age 24: Get screened for STIs if you're at risk. You are at risk for STIs (including HIV) if:  You are sexually active with more than one person.  You don't use condoms every time.  You or a partner was diagnosed with a sexually transmitted infection.  If you are at risk for HIV, ask about PrEP medicine to prevent HIV.  Get tested for HIV at least once in your life, whether you are at risk for HIV or not.  Cancer screening tests  Cervical cancer screening: If you have a cervix, begin getting regular cervical cancer screening tests starting at age 21.  Breast cancer scan (mammogram): If you've ever had breasts, begin having regular mammograms starting at age 40. This is a scan to check for breast cancer.  Colon cancer screening: It is important to start screening for colon cancer at age 45.  Have a colonoscopy test every 10 years (or more often if you're at risk) Or, ask your provider about stool tests like a FIT test every year or Cologuard test every 3 years.  To learn more about your testing options, visit:   .  For help making a decision, visit:   https://bit.ly/nv36621.  Prostate cancer screening test: If you have a prostate, ask your care team if a prostate cancer screening test (PSA) at age 55 is right for you.  Lung cancer screening: If you are a current or former smoker ages 50 to 80, ask your care team if ongoing lung cancer screenings are right for you.  For informational purposes only. Not to replace the advice of your health care provider. Copyright   2023 [facility]. All rights reserved. Clinically reviewed by the [facility] Transitions Program. MMIS [id number] - REV 01/24.  Learning About Sleeping Well  What does sleeping well mean?     Sleeping well means getting enough sleep to feel good and stay healthy. How much sleep is enough varies among people.  The number of hours you  sleep and how you feel when you wake up are both important. If you do not feel refreshed, you probably need more sleep. Another sign of not getting enough sleep is feeling tired during the day.  Experts recommend that adults get at least 7 or more hours of sleep per day. Children and older adults need more sleep.  Why is getting enough sleep important?  Getting enough quality sleep is a basic part of good health. When your sleep suffers, your physical health, mood, and your thoughts can suffer too. You may find yourself feeling more grumpy or stressed. Not getting enough sleep also can lead to serious problems, including injury, accidents, anxiety, and depression.  What might cause poor sleeping?  Many things can cause sleep problems, including:  Changes to your sleep schedule.  Stress. Stress can be caused by fear about a single event, such as giving a speech. Or you may have ongoing stress, such as worry about work or school.  Depression, anxiety, and other mental or emotional conditions.  Changes in your sleep habits or surroundings. This includes changes that happen where you sleep, such as noise, light, or sleeping in a different bed. It also includes changes in your sleep pattern, such as having jet lag or working a late shift.  Health problems, such as pain, breathing problems, and restless legs syndrome.  Lack of regular exercise.  Using alcohol, nicotine, or caffeine before bed.  How can you help yourself?  Here are some tips that may help you sleep more soundly and wake up feeling more refreshed.  Your sleeping area   Use your bedroom only for sleeping and sex. A bit of light reading may help you fall asleep. But if it doesn't, do your reading elsewhere in the house. Try not to use your TV, computer, smartphone, or tablet while you are in bed.  Be sure your bed is big enough to stretch out comfortably, especially if you have a sleep partner.  Keep your bedroom quiet, dark, and cool. Use curtains, blinds,  "or a sleep mask to block out light. To block out noise, use earplugs, soothing music, or a \"white noise\" machine.  Your evening and bedtime routine   Create a relaxing bedtime routine. You might want to take a warm shower or bath, or listen to soothing music.  Go to bed at the same time every night. And get up at the same time every morning, even if you feel tired.  What to avoid   Limit caffeine (coffee, tea, caffeinated sodas) during the day, and don't have any for at least 6 hours before bedtime.  Avoid drinking alcohol before bedtime. Alcohol can cause you to wake up more often during the night.  Try not to smoke or use tobacco, especially in the evening. Nicotine can keep you awake.  Limit naps during the day, especially close to bedtime.  Avoid lying in bed awake for too long. If you can't fall asleep or if you wake up in the middle of the night and can't get back to sleep within about 20 minutes, get out of bed and go to another room until you feel sleepy.  Avoid taking medicine right before bed that may keep you awake or make you feel hyper or energized. Your doctor can tell you if your medicine may do this and if you can take it earlier in the day.  If you can't sleep   Imagine yourself in a peaceful, pleasant scene. Focus on the details and feelings of being in a place that is relaxing.  Get up and do a quiet or boring activity until you feel sleepy.  Avoid drinking any liquids before going to bed to help prevent waking up often to use the bathroom.  Where can you learn more?  Go to https://www.[employer].net/patiented  Enter J942 in the search box to learn more about \"Learning About Sleeping Well.\"  Current as of: July 31, 2024  Content Version: 14.4    2845-8833 [employer].   Care instructions adapted under license by your healthcare professional. If you have questions about a medical condition or this instruction, always ask your healthcare professional. [employer] disclaims any " warranty or liability for your use of this information.

## 2025-06-05 NOTE — PROGRESS NOTES
Preventive Care Visit  [facility]  Danish Land MD, Internal Medicine  Jun 5, 2025      Assessment & Plan     Routine general medical examination at a health care facility      Paroxysmal atrial fibrillation (H)  Unfortunately he has gone back into atrial fibrillation  Plans ablation with Dr. Ordonez this month  On Eliqus for prophylaxis     Type 2 diabetes mellitus with diabetic polyneuropathy, without long-term current use of insulin (H)  Recheck diabetes labs  - HEMOGLOBIN A1C; Future  - FOOT EXAM    Diabetic polyneuropathy associated with diabetes mellitus due to underlying condition (H)    - Albumin Random Urine Quantitative with Creat Ratio; Future    Cirrhosis of liver without ascites, unspecified hepatic cirrhosis type (H)  Continue follow up with hepatology     Coronary artery disease involving native coronary artery of native heart without angina pectoris  Stable symptoms     Benign essential hypertension  Well controlled     Hyperlipidemia LDL goal <70  On statin therapy; add lipid to existing blood sample   - Lipid panel reflex to direct LDL Fasting; Future    Screening for AAA (abdominal aortic aneurysm)  Aorta visualized on recent HCC screen ultrasound     Screening for prostate cancer  Add PSA to existing blood   - PROSTATE SPEC ANTIGEN SCREEN; Future    Screen for colon cancer  Prefers stool based screening strategy to colonoscopy after discussion   - PERRI(EXACT SCIENCES); Future    Patient has been advised of split billing requirements and indicates understanding: Yes        Counseling  Appropriate preventive services were addressed with this patient via screening, questionnaire, or discussion as appropriate for fall prevention, nutrition, physical activity, Tobacco-use cessation, social engagement, weight loss and cognition.  Checklist reviewing preventive services available has been given to the patient.  Reviewed patient's diet, addressing concerns and/or questions.    Discussed possible causes of fatigue.     Follow-up    Follow-up Visit   Expected date:  Jun 05, 2026 (Approximate)      Follow Up Appointment Details:     Follow-up with whom?: PCP    Follow-Up for what?: Adult Preventive    How?: In Person                 Subjective   Kelton is a 65 year old, presenting for the following:  Physical (Patient is here for Annual wellness visit.)        6/5/2025     8:41 AM   Additional Questions   Roomed by Miguel GARCIAS MA   Accompanied by Self          HPI           Advance Care Planning    Discussed advance care planning with patient; informed AVS has link to [facility].        6/5/2025   General Health   How would you rate your overall physical health? (!) FAIR   Feel stress (tense, anxious, or unable to sleep) Not at all         6/5/2025   Nutrition   Diet: Diabetic         6/5/2025   Exercise   Days per week of moderate/strenous exercise 6 days         6/5/2025   Social Factors   Frequency of gathering with friends or relatives Twice a week   Worry food won't last until get money to buy more No   Food not last or not have enough money for food? No   Do you have housing? (Housing is defined as stable permanent housing and does not include staying outside in a car, in a tent, in an abandoned building, in an overnight shelter, or couch-surfing.) Yes   Are you worried about losing your housing? No   Lack of transportation? No   Unable to get utilities (heat,electricity)? No         6/5/2025   Fall Risk   Fallen 2 or more times in the past year? No   Trouble with walking or balance? No          6/5/2025   Activities of Daily Living- Home Safety   Needs help with the following daily activites None of the above   Safety concerns in the home None of the above         6/5/2025   Dental   Dentist two times every year? Yes         6/5/2025   Hearing Screening   Hearing concerns? None of the above         6/5/2025   Driving Risk Screening   Patient/family members have concerns about  driving No         2025   General Alertness/Fatigue Screening   Have you been more tired than usual lately? (!) YES         2025   Urinary Incontinence Screening   Bothered by leaking urine in past 6 months No         Today's PHQ-2 Score:       2025     8:24 AM   PHQ-2 (  [employer])   Q1: Little interest or pleasure in doing things 0   Q2: Feeling down, depressed or hopeless 0   PHQ-2 Score 0    Q1: Little interest or pleasure in doing things Not at all   Q2: Feeling down, depressed or hopeless Not at all   PHQ-2 Score 0       Patient-reported           2025   Substance Use   Alcohol more than 3/day or more than 7/wk No   Do you have a current opioid prescription? No   How severe/bad is pain from 1 to 10? 4/10   Do you use any other substances recreationally? No     Social History     Tobacco Use    Smoking status: Former     Current packs/day: 0.00     Average packs/day: 1 pack/day for 25.1 years (25.1 ttl pk-yrs)     Types: Cigarettes     Start date:      Quit date: 2005     Years since quittin.3    Smokeless tobacco: Never   Vaping Use    Vaping status: Never Used   Substance Use Topics    Alcohol use: Yes     Comment: minimal 1 glass of wine per week    Drug use: No       Last PSA:   Prostate Specific Antigen Screen   Date Value Ref Range Status   2024 1.26 0.00 - 4.50 ng/mL Final   2022 1.29 0.00 - 4.00 ug/L Final     ASCVD Risk   The ASCVD Risk score (Rosalinda DAUGHERTY, et al., 2019) failed to calculate for the following reasons:    Risk score cannot be calculated because patient has a medical history suggesting prior/existing ASCVD            Reviewed and updated as needed this visit by Provider                    Past Medical History:   Diagnosis Date    Basal cell carcinoma     Carotid stenosis, left     s/p left CEA 2020    Coronary artery disease     4 vessel bypass 2010; LIMA ->LAD, SVG-> OM3, SVG -> D2, SVG -> PDA    Diabetes mellitus (H)      Generalized anxiety disorder 05/02/2014    Hepatitis C, chronic (H) 2005    Hyperlipidemia LDL goal < 70     Hypertension     Liver diseases     9/15 Liver is 10 cm in span without left lobe enlargement    Persistent microalbuminuria associated with type 2 diabetes mellitus (H) 05/06/2015     Past Surgical History:   Procedure Laterality Date    ARTHROSCOPY KNEE RT/LT      left    COLONOSCOPY      CORONARY ARTERY BYPASS  11/18/201    Coronary artery bypass grafting x 4 with placement of the left internal mammary artery to the distal midportion of the left anterior descending artery, saphenous vein graft from aorta to the third obtuse marginal branch of circumflex coronary artery, saphenous vein graft from aorta to the second diagonal branch, saphenous vein graft from aorta to the posterior descending artery.    ENDARTERECTOMY CAROTID Left 2/21/2020    Procedure: LEFT CAROTID ENDARTERECTOMY WITH EEG;  Surgeon: Semaj tSubbs MD;  Location:  OR    EP ABLATION ATRIAL FLUTTER N/A 9/9/2022    Procedure: Ablation Atrial Flutter;  Surgeon: Tyson Ordonez MD;  Location:  HEART CARDIAC CATH LAB    EP PACEMAKER DEVICE & LEAD IMPLANT- RIGHT ATRIAL & LEFT VENTRICULAR N/A 11/7/2022    Procedure: Pacemaker Device & Lead Implant- Right Atrial & Left Ventricular;  Surgeon: Tyson Ordonez MD;  Location:  HEART CARDIAC CATH LAB    ESOPHAGOSCOPY, GASTROSCOPY, DUODENOSCOPY (EGD), COMBINED  10/31/2011    Procedure:COMBINED ESOPHAGOSCOPY, GASTROSCOPY, DUODENOSCOPY (EGD); Surgeon:ALONSO ALDANA; Location: GI    ESOPHAGOSCOPY, GASTROSCOPY, DUODENOSCOPY (EGD), COMBINED N/A 3/8/2018    Procedure: COMBINED ESOPHAGOSCOPY, GASTROSCOPY, DUODENOSCOPY (EGD);  EGD;  Surgeon: Angel Luis Justice MD;  Location:  GI    HEART CATH CORONARY ANGIOGRAM W/LV GRAM  9-11-10    CV Surgery recommended    HEART CATH CORONARY ANGIOGRAM W/LV GRAM  2-28-14    Medical management    HERNIA REPAIR, INGUINAL RT/LT       left     BP Readings from Last 3 Encounters:   06/05/25 137/76   06/03/25 128/75   05/21/25 130/60    Wt Readings from Last 3 Encounters:   06/05/25 72.6 kg (160 lb)   05/21/25 73.5 kg (162 lb)   05/08/25 73 kg (161 lb)                  Patient Active Problem List   Diagnosis    Benign essential hypertension    Coronary artery disease involving native coronary artery of native heart without angina pectoris    Fatty liver disease, nonalcoholic    Hyperlipidemia LDL goal <70    Pulmonary nodules    Type 2 diabetes mellitus with diabetic polyneuropathy, without long-term current use of insulin (H)    Diabetic polyneuropathy associated with diabetes mellitus due to underlying condition (H)    HDL deficiency    Cirrhosis of liver without ascites, unspecified hepatic cirrhosis type (H)    Paroxysmal atrial fibrillation (H)     Past Surgical History:   Procedure Laterality Date    ARTHROSCOPY KNEE RT/LT      left    COLONOSCOPY      CORONARY ARTERY BYPASS  11/18/201    Coronary artery bypass grafting x 4 with placement of the left internal mammary artery to the distal midportion of the left anterior descending artery, saphenous vein graft from aorta to the third obtuse marginal branch of circumflex coronary artery, saphenous vein graft from aorta to the second diagonal branch, saphenous vein graft from aorta to the posterior descending artery.    ENDARTERECTOMY CAROTID Left 2/21/2020    Procedure: LEFT CAROTID ENDARTERECTOMY WITH EEG;  Surgeon: Semaj Stubbs MD;  Location:  OR    EP ABLATION ATRIAL FLUTTER N/A 9/9/2022    Procedure: Ablation Atrial Flutter;  Surgeon: Tyson Ordonez MD;  Location:  HEART CARDIAC CATH LAB    EP PACEMAKER DEVICE & LEAD IMPLANT- RIGHT ATRIAL & LEFT VENTRICULAR N/A 11/7/2022    Procedure: Pacemaker Device & Lead Implant- Right Atrial & Left Ventricular;  Surgeon: Tyson Ordonez MD;  Location:  HEART CARDIAC CATH LAB    ESOPHAGOSCOPY, GASTROSCOPY,  DUODENOSCOPY (EGD), COMBINED  10/31/2011    Procedure:COMBINED ESOPHAGOSCOPY, GASTROSCOPY, DUODENOSCOPY (EGD); Surgeon:ALONSO ALDANA; Location: GI    ESOPHAGOSCOPY, GASTROSCOPY, DUODENOSCOPY (EGD), COMBINED N/A 3/8/2018    Procedure: COMBINED ESOPHAGOSCOPY, GASTROSCOPY, DUODENOSCOPY (EGD);  EGD;  Surgeon: Angel Luis Justice MD;  Location:  GI    HEART CATH CORONARY ANGIOGRAM W/LV GRAM  9--10    CV Surgery recommended    HEART CATH CORONARY ANGIOGRAM W/LV GRAM  -14    Medical management    HERNIA REPAIR, INGUINAL RT/LT      left       Social History     Tobacco Use    Smoking status: Former     Current packs/day: 0.00     Average packs/day: 1 pack/day for 25.1 years (25.1 ttl pk-yrs)     Types: Cigarettes     Start date:      Quit date: 2005     Years since quittin.3    Smokeless tobacco: Never   Substance Use Topics    Alcohol use: Yes     Comment: minimal 1 glass of wine per week     Family History   Problem Relation Age of Onset    C.A.D. Father     Cancer Father         bladder    Heart Surgery Father         bypass surgery    Heart Disease Mother          Current Outpatient Medications   Medication Sig Dispense Refill    amLODIPine (NORVASC) 2.5 MG tablet Take 1 tablet (2.5 mg) by mouth 2 times daily. 180 tablet 3    apixaban ANTICOAGULANT (ELIQUIS) 5 MG tablet Take 1 tablet (5 mg) by mouth 2 times daily. 180 tablet 3    B-D U/F 31G X 8 MM insulin pen needle USE ONE PEN NEEDLES DAILY OR AS DIRECTED. 100 each 3    celecoxib (CELEBREX) 100 MG capsule TAKE 1 CAPSULE BY MOUTH ONE TIME DAILY AS NEEDED FOR MODERATE PAIN 90 capsule 0    cyanocobalamin 1000 MCG SUBL Place 1,000 mcg under the tongue daily      glucosamine-chondroitin 500-400 MG CAPS per capsule Take 1 capsule by mouth daily      lisinopril (ZESTRIL) 20 MG tablet Take 1 tablet (20 mg) by mouth 2 times daily. 180 tablet 3    MOUNJARO 5 MG/0.5ML SOAJ auto-injector pen Inject 5 mg subcutaneously once a week.      omeprazole  (PRILOSEC) 20 MG DR capsule Take 1 capsule (20 mg) by mouth daily 90 capsule 3    ONETOUCH VERIO IQ test strip       rosuvastatin (CRESTOR) 20 MG tablet Take 1 tablet (20 mg) by mouth daily. 90 tablet 3    sildenafil (VIAGRA) 50 MG tablet TAKE ONE TABLET BY MOUTH DAILY AS NEEDED 30 tablet 0    Vitamin D, Cholecalciferol, 1000 units TABS Take 1,000 Units by mouth daily      zolpidem (AMBIEN) 5 MG tablet Take 1 tablet (5 mg) by mouth nightly as needed for sleep 30 tablet 0     Current providers sharing in care for this patient include:  Patient Care Team:  Danish Land MD as PCP - General (Internal Medicine)  Kevin Bedolla MD as MD (Gastroenterology)  Mushtaq Lantigua MD as MD (Orthopaedic Surgery)  Sebastian Jean-Baptiste MD as MD (Neurology)  Kevin Bedolla MD as Assigned Surgical Provider  Danish Land MD as Assigned PCP  Geetha Alexander MD as Assigned Heart and Vascular Provider  Joyce Morales NP as Assigned Pediatric Specialist Provider    The following health maintenance items are reviewed in Epic and correct as of today:  Health Maintenance   Topic Date Due    HEPATITIS A VACCINE (1 of 2 - Risk 2-dose series) 02/[birth year]/1979    PNEUMOCOCCAL VACCINE 50+ YEARS (3 of 3 - PCV20 or PCV21) 09/15/2022    COLORECTAL CANCER SCREENING  01/24/2024    AORTIC ANEURYSM SCREENING (SYSTEM ASSIGNED)  Never done    A1C  04/02/2025    COVID-19 VACCINE (5 - 2024-25 season) 04/24/2025    LIPID  05/28/2025    MICROALBUMIN  05/28/2025    DIABETIC FOOT EXAM  05/28/2025    ANNUAL REVIEW OF HM ORDERS  05/28/2025    MEDICARE ANNUAL WELLNESS VISIT  05/28/2025    EYE EXAM  09/25/2025    BMP  06/03/2026    FALL RISK ASSESSMENT  06/05/2026    ADVANCE CARE PLANNING  05/28/2029    DTAP/TDAP/TD VACCINE (2 - Td or Tdap) 09/03/2029    HEPATITIS C SCREENING  Completed    HIV SCREENING  Completed    PHQ-2 (once per calendar year)  Completed    INFLUENZA VACCINE  Completed    ZOSTER VACCINE  Completed    RSV VACCINE  Completed    HPV  "VACCINE  Aged Out    MENINGITIS VACCINE  Aged Out    HEPATITIS B VACCINE  Discontinued       A 10 organ systems ROS is negative other than any pertinent positives or negatives previously stated.      Objective    Exam  /76 (BP Location: Right arm, Patient Position: Sitting, Cuff Size: Adult Regular)   Pulse 72   Temp 98.2  F (36.8  C) (Temporal)   Resp 20   Ht 1.803 m (5' 11\")   Wt 72.6 kg (160 lb)   HC 18 cm (7.09\")   SpO2 97%   BMI 22.32 kg/m     Estimated body mass index is 22.32 kg/m  as calculated from the following:    Height as of this encounter: 1.803 m (5' 11\").    Weight as of this encounter: 72.6 kg (160 lb).    Physical Exam  GENERAL: alert and no distress  EYES: Eyes grossly normal to inspection, PERRL and conjunctivae and sclerae normal  HENT: ear canals and TM's normal, nose and mouth without ulcers or lesions  NECK: no adenopathy, no asymmetry, masses, or scars  RESP: lungs clear to auscultation - no rales, rhonchi or wheezes  CV: The heart has an irregularly irregular rhythm with a normal rate.   ABDOMEN: soft, nontender, no hepatosplenomegaly, no masses and bowel sounds normal  MS: no gross musculoskeletal defects noted, no edema  SKIN: scaly papule on proximal right arm, he plans to see derm for this next week   NEURO: Normal strength and tone, mentation intact and speech normal  PSYCH: mentation appears normal, affect normal/bright        6/5/2025   Mini Cog   Clock Draw Score 2 Normal   3 Item Recall 3 objects recalled   Mini Cog Total Score 5             Signed Electronically by: Danish Land MD    "

## 2025-06-05 NOTE — RESULT ENCOUNTER NOTE
The following letter pertains to your most recent diagnostic tests:    -Your hemoglobin A1c test which averages your blood sugars over the last 3 months returned at 5.7 which is at your goal of hemoglobin A1c at least less than 7.  Excellent result!    -Your prostate specific antigen (PSA) test result returned normal.     -Your cholesterol panel looks healthy.         Sincerely,    Dr. Land

## 2025-06-09 ENCOUNTER — TELEPHONE (OUTPATIENT)
Dept: CARDIOLOGY | Facility: CLINIC | Age: 65
End: 2025-06-09
Payer: COMMERCIAL

## 2025-06-09 NOTE — TELEPHONE ENCOUNTER
JOSUE for pt to call back as his med list has changed since Ablation was scheduled. Pt now on Mounjaro. Will also make pt aware that he is to hold his Eliquis the morning of the procedure, but bring one tab with him. Sushila

## 2025-06-09 NOTE — TELEPHONE ENCOUNTER
Spoke to pt pt and made him aware to hold his Mounjaro for 7 days prior to his ablation on 6/20. Pt was recently started on Mounjaro when a study that he was on ended, where he was using Terzipatide.   Pt takes tonight, so will not take next week. Pt also made aware to hold his Eliquis the AM of procedure, but take a pill with in the case that he is in A fib on arrival. Pt is aware that he will get a call the day before the ablation. No other questions at this time. Sushila

## 2025-06-19 ENCOUNTER — TELEPHONE (OUTPATIENT)
Dept: CARDIOLOGY | Facility: CLINIC | Age: 65
End: 2025-06-19
Payer: COMMERCIAL

## 2025-06-19 DIAGNOSIS — I48.0 PAROXYSMAL ATRIAL FIBRILLATION (H): Primary | ICD-10-CM

## 2025-06-19 RX ORDER — SODIUM CHLORIDE, SODIUM LACTATE, POTASSIUM CHLORIDE, CALCIUM CHLORIDE 600; 310; 30; 20 MG/100ML; MG/100ML; MG/100ML; MG/100ML
INJECTION, SOLUTION INTRAVENOUS CONTINUOUS
OUTPATIENT
Start: 2025-06-19

## 2025-06-19 RX ORDER — LIDOCAINE 40 MG/G
CREAM TOPICAL
OUTPATIENT
Start: 2025-06-19

## 2025-06-19 NOTE — TELEPHONE ENCOUNTER
Spoke to patient to review instructions for afib ablation scheduled for 6/20.  Patient aware that he is to be NPO after midnight and may take sips of water with am medications. He can have clear liquids until 4:30am.  Patient instructed to hold eliquis the morning of the procedure and bring dose with him to the hospital.  Also instructed to hold supplements.  Last dose for mounjaro was on 6/9.  Patient to arrive at 6:30am.  Patient aware that he will NOT be staying overnight after procedure unless there are complications.  Patient has arranged for ride and someone to be with him for the first 24 hours.  Patient to call [facility] at [phone] to alert if they going to cancel d/t illness or family emergency.  Patient provided verbal understanding regarding above.  SACHIN Erwin

## 2025-06-19 NOTE — PROGRESS NOTES
CSE for scheduled A-fib ablation tomorrow. NKA. Orders in epic. Pre-procedure instructions completed by heart clinic RN.

## 2025-06-20 ENCOUNTER — HOSPITAL ENCOUNTER (OUTPATIENT)
Facility: CLINIC | Age: 65
Discharge: HOME | End: 2025-06-20
Attending: INTERNAL MEDICINE | Admitting: INTERNAL MEDICINE
Payer: COMMERCIAL

## 2025-06-20 VITALS
BODY MASS INDEX: 22.12 KG/M2 | TEMPERATURE: 97.7 F | OXYGEN SATURATION: 97 % | DIASTOLIC BLOOD PRESSURE: 61 MMHG | HEART RATE: 55 BPM | RESPIRATION RATE: 16 BRPM | HEIGHT: 71 IN | WEIGHT: 158 LBS | SYSTOLIC BLOOD PRESSURE: 137 MMHG

## 2025-06-20 DIAGNOSIS — M19.042 PRIMARY OSTEOARTHRITIS OF BOTH HANDS: ICD-10-CM

## 2025-06-20 DIAGNOSIS — I48.0 PAROXYSMAL ATRIAL FIBRILLATION (H): Primary | ICD-10-CM

## 2025-06-20 DIAGNOSIS — M19.041 PRIMARY OSTEOARTHRITIS OF BOTH HANDS: ICD-10-CM

## 2025-06-20 LAB
ACT BLD: 174 SECONDS (ref 74–150)
ACT BLD: 284 SECONDS (ref 74–150)
ACT BLD: 361 SECONDS (ref 74–150)
ACT BLD: 369 SECONDS (ref 74–150)
ANION GAP SERPL CALCULATED.3IONS-SCNC: 11 MMOL/L (ref 7–15)
ATRIAL RATE - MUSE: 66 BPM
BUN SERPL-MCNC: 18.9 MG/DL (ref 8–23)
CALCIUM SERPL-MCNC: 9.5 MG/DL (ref 8.8–10.4)
CHLORIDE SERPL-SCNC: 103 MMOL/L (ref 98–107)
CREAT SERPL-MCNC: 1.1 MG/DL (ref 0.67–1.17)
DIASTOLIC BLOOD PRESSURE - MUSE: NORMAL MMHG
EGFRCR SERPLBLD CKD-EPI 2021: 74 ML/MIN/1.73M2
ERYTHROCYTE [DISTWIDTH] IN BLOOD BY AUTOMATED COUNT: 13 % (ref 10–15)
GLUCOSE BLDC GLUCOMTR-MCNC: 131 MG/DL (ref 70–99)
GLUCOSE BLDC GLUCOMTR-MCNC: 149 MG/DL (ref 70–99)
GLUCOSE SERPL-MCNC: 124 MG/DL (ref 70–99)
HCO3 SERPL-SCNC: 22 MMOL/L (ref 22–29)
HCT VFR BLD AUTO: 44.5 % (ref 40–53)
HGB BLD-MCNC: 15.3 G/DL (ref 13.3–17.7)
INTERPRETATION ECG - MUSE: NORMAL
MCH RBC QN AUTO: 30.9 PG (ref 26.5–33)
MCHC RBC AUTO-ENTMCNC: 34.4 G/DL (ref 31.5–36.5)
MCV RBC AUTO: 90 FL (ref 78–100)
P AXIS - MUSE: NORMAL DEGREES
PLATELET # BLD AUTO: 136 10E3/UL (ref 150–450)
POTASSIUM SERPL-SCNC: 4.6 MMOL/L (ref 3.4–5.3)
PR INTERVAL - MUSE: 306 MS
QRS DURATION - MUSE: 156 MS
QT - MUSE: 462 MS
QTC - MUSE: 484 MS
R AXIS - MUSE: 159 DEGREES
RBC # BLD AUTO: 4.95 10E6/UL (ref 4.4–5.9)
SODIUM SERPL-SCNC: 136 MMOL/L (ref 135–145)
SYSTOLIC BLOOD PRESSURE - MUSE: NORMAL MMHG
T AXIS - MUSE: -2 DEGREES
VENTRICULAR RATE- MUSE: 66 BPM
WBC # BLD AUTO: 5.2 10E3/UL (ref 4–11)

## 2025-06-20 PROCEDURE — 93010 ELECTROCARDIOGRAM REPORT: CPT | Performed by: INTERNAL MEDICINE

## 2025-06-20 PROCEDURE — C1733 CATH, EP, OTHR THAN COOL-TIP: HCPCS | Performed by: INTERNAL MEDICINE

## 2025-06-20 PROCEDURE — 250N000025 HC SEVOFLURANE, PER MIN: Performed by: INTERNAL MEDICINE

## 2025-06-20 PROCEDURE — C1894 INTRO/SHEATH, NON-LASER: HCPCS | Performed by: INTERNAL MEDICINE

## 2025-06-20 PROCEDURE — 85027 COMPLETE CBC AUTOMATED: CPT | Performed by: INTERNAL MEDICINE

## 2025-06-20 PROCEDURE — 250N000009 HC RX 250: Performed by: INTERNAL MEDICINE

## 2025-06-20 PROCEDURE — C1760 CLOSURE DEV, VASC: HCPCS | Performed by: INTERNAL MEDICINE

## 2025-06-20 PROCEDURE — 85347 COAGULATION TIME ACTIVATED: CPT

## 2025-06-20 PROCEDURE — 272N000001 HC OR GENERAL SUPPLY STERILE: Performed by: INTERNAL MEDICINE

## 2025-06-20 PROCEDURE — 80048 BASIC METABOLIC PNL TOTAL CA: CPT | Performed by: INTERNAL MEDICINE

## 2025-06-20 PROCEDURE — 258N000003 HC RX IP 258 OP 636: Performed by: INTERNAL MEDICINE

## 2025-06-20 PROCEDURE — C1732 CATH, EP, DIAG/ABL, 3D/VECT: HCPCS | Performed by: INTERNAL MEDICINE

## 2025-06-20 PROCEDURE — 999N000071 HC STATISTIC HEART CATH LAB OR EP LAB

## 2025-06-20 PROCEDURE — 999N000184 HC STATISTIC TELEMETRY

## 2025-06-20 PROCEDURE — C1730 CATH, EP, 19 OR FEW ELECT: HCPCS | Performed by: INTERNAL MEDICINE

## 2025-06-20 PROCEDURE — 93656 COMPRE EP EVAL ABLTJ ATR FIB: CPT | Performed by: INTERNAL MEDICINE

## 2025-06-20 PROCEDURE — 370N000017 HC ANESTHESIA TECHNICAL FEE, PER MIN: Performed by: INTERNAL MEDICINE

## 2025-06-20 PROCEDURE — 999N000054 HC STATISTIC EKG NON-CHARGEABLE

## 2025-06-20 PROCEDURE — 93005 ELECTROCARDIOGRAM TRACING: CPT

## 2025-06-20 PROCEDURE — 250N000011 HC RX IP 250 OP 636: Performed by: INTERNAL MEDICINE

## 2025-06-20 PROCEDURE — C1769 GUIDE WIRE: HCPCS | Performed by: INTERNAL MEDICINE

## 2025-06-20 PROCEDURE — C1759 CATH, INTRA ECHOCARDIOGRAPHY: HCPCS | Performed by: INTERNAL MEDICINE

## 2025-06-20 PROCEDURE — 82962 GLUCOSE BLOOD TEST: CPT

## 2025-06-20 PROCEDURE — 36415 COLL VENOUS BLD VENIPUNCTURE: CPT | Performed by: INTERNAL MEDICINE

## 2025-06-20 RX ORDER — NALOXONE HYDROCHLORIDE 0.4 MG/ML
0.2 INJECTION, SOLUTION INTRAMUSCULAR; INTRAVENOUS; SUBCUTANEOUS
Status: DISCONTINUED | OUTPATIENT
Start: 2025-06-20 | End: 2025-06-20 | Stop reason: HOSPADM

## 2025-06-20 RX ORDER — NALOXONE HYDROCHLORIDE 0.4 MG/ML
0.4 INJECTION, SOLUTION INTRAMUSCULAR; INTRAVENOUS; SUBCUTANEOUS
Status: DISCONTINUED | OUTPATIENT
Start: 2025-06-20 | End: 2025-06-20 | Stop reason: HOSPADM

## 2025-06-20 RX ORDER — SODIUM CHLORIDE, SODIUM LACTATE, POTASSIUM CHLORIDE, CALCIUM CHLORIDE 600; 310; 30; 20 MG/100ML; MG/100ML; MG/100ML; MG/100ML
INJECTION, SOLUTION INTRAVENOUS CONTINUOUS
Status: DISCONTINUED | OUTPATIENT
Start: 2025-06-20 | End: 2025-06-20 | Stop reason: HOSPADM

## 2025-06-20 RX ORDER — ONDANSETRON 2 MG/ML
4 INJECTION INTRAMUSCULAR; INTRAVENOUS EVERY 30 MIN PRN
Status: DISCONTINUED | OUTPATIENT
Start: 2025-06-20 | End: 2025-06-20 | Stop reason: HOSPADM

## 2025-06-20 RX ORDER — OXYCODONE AND ACETAMINOPHEN 5; 325 MG/1; MG/1
1 TABLET ORAL EVERY 4 HOURS PRN
Status: DISCONTINUED | OUTPATIENT
Start: 2025-06-20 | End: 2025-06-20 | Stop reason: HOSPADM

## 2025-06-20 RX ORDER — ONDANSETRON 4 MG/1
4 TABLET, ORALLY DISINTEGRATING ORAL EVERY 30 MIN PRN
Status: DISCONTINUED | OUTPATIENT
Start: 2025-06-20 | End: 2025-06-20 | Stop reason: HOSPADM

## 2025-06-20 RX ORDER — LIDOCAINE 40 MG/G
CREAM TOPICAL
Status: DISCONTINUED | OUTPATIENT
Start: 2025-06-20 | End: 2025-06-20 | Stop reason: HOSPADM

## 2025-06-20 RX ORDER — FENTANYL CITRATE 50 UG/ML
50 INJECTION, SOLUTION INTRAMUSCULAR; INTRAVENOUS EVERY 5 MIN PRN
Status: DISCONTINUED | OUTPATIENT
Start: 2025-06-20 | End: 2025-06-20 | Stop reason: HOSPADM

## 2025-06-20 RX ORDER — KETOROLAC TROMETHAMINE 30 MG/ML
30 INJECTION, SOLUTION INTRAMUSCULAR; INTRAVENOUS ONCE
Status: COMPLETED | OUTPATIENT
Start: 2025-06-20 | End: 2025-06-20

## 2025-06-20 RX ORDER — ACETAMINOPHEN 325 MG/1
650 TABLET ORAL EVERY 4 HOURS PRN
Status: DISCONTINUED | OUTPATIENT
Start: 2025-06-20 | End: 2025-06-20 | Stop reason: HOSPADM

## 2025-06-20 RX ORDER — HEPARIN SODIUM 1000 [USP'U]/ML
INJECTION, SOLUTION INTRAVENOUS; SUBCUTANEOUS
Status: DISCONTINUED | OUTPATIENT
Start: 2025-06-20 | End: 2025-06-20 | Stop reason: HOSPADM

## 2025-06-20 RX ORDER — PANTOPRAZOLE SODIUM 40 MG/1
40 TABLET, DELAYED RELEASE ORAL DAILY
Qty: 30 TABLET | Refills: 0 | Status: SHIPPED | OUTPATIENT
Start: 2025-06-21

## 2025-06-20 RX ORDER — DEXAMETHASONE SODIUM PHOSPHATE 4 MG/ML
4 INJECTION, SOLUTION INTRA-ARTICULAR; INTRALESIONAL; INTRAMUSCULAR; INTRAVENOUS; SOFT TISSUE
Status: DISCONTINUED | OUTPATIENT
Start: 2025-06-20 | End: 2025-06-20 | Stop reason: HOSPADM

## 2025-06-20 RX ORDER — PROTAMINE SULFATE 10 MG/ML
INJECTION, SOLUTION INTRAVENOUS
Status: DISCONTINUED | OUTPATIENT
Start: 2025-06-20 | End: 2025-06-20 | Stop reason: HOSPADM

## 2025-06-20 RX ORDER — NALOXONE HYDROCHLORIDE 0.4 MG/ML
0.1 INJECTION, SOLUTION INTRAMUSCULAR; INTRAVENOUS; SUBCUTANEOUS
Status: DISCONTINUED | OUTPATIENT
Start: 2025-06-20 | End: 2025-06-20 | Stop reason: HOSPADM

## 2025-06-20 RX ORDER — HYDROMORPHONE HYDROCHLORIDE 1 MG/ML
0.4 INJECTION, SOLUTION INTRAMUSCULAR; INTRAVENOUS; SUBCUTANEOUS EVERY 5 MIN PRN
Status: DISCONTINUED | OUTPATIENT
Start: 2025-06-20 | End: 2025-06-20 | Stop reason: HOSPADM

## 2025-06-20 RX ORDER — FENTANYL CITRATE 50 UG/ML
25 INJECTION, SOLUTION INTRAMUSCULAR; INTRAVENOUS EVERY 5 MIN PRN
Status: DISCONTINUED | OUTPATIENT
Start: 2025-06-20 | End: 2025-06-20 | Stop reason: HOSPADM

## 2025-06-20 RX ORDER — HYDROMORPHONE HYDROCHLORIDE 1 MG/ML
0.2 INJECTION, SOLUTION INTRAMUSCULAR; INTRAVENOUS; SUBCUTANEOUS EVERY 5 MIN PRN
Status: DISCONTINUED | OUTPATIENT
Start: 2025-06-20 | End: 2025-06-20 | Stop reason: HOSPADM

## 2025-06-20 RX ADMIN — KETOROLAC TROMETHAMINE 30 MG: 30 INJECTION, SOLUTION INTRAMUSCULAR at 11:44

## 2025-06-20 RX ADMIN — SODIUM CHLORIDE, SODIUM LACTATE, POTASSIUM CHLORIDE, AND CALCIUM CHLORIDE: .6; .31; .03; .02 INJECTION, SOLUTION INTRAVENOUS at 07:27

## 2025-06-20 ASSESSMENT — ACTIVITIES OF DAILY LIVING (ADL)
ADLS_ACUITY_SCORE: 47

## 2025-06-20 NOTE — PROGRESS NOTES
Pt up - walked hallway > to restroom  > + urination  Groin site remains dry and intact   Denies any CP -SOB

## 2025-06-20 NOTE — DISCHARGE INSTRUCTIONS
A Fib Ablation Discharge Instructions    After you go home:  Have an adult stay with you until tomorrow.  You may eat your normal diet, unless your doctor tells you otherwise.  RELAX and take it easy for 3 days.        For 24-48 hours (due to the sedation you received):  DO NOT DRIVE FOR 2 DAYS!   Do NOT make any important or legal decisions.  Do NOT drive or operate machines at home or at work.  Do NOT drink alcohol.    Care of Puncture Site:  Check the puncture site severy 1-2 hours while awake.  For 2-3 days, when you cough, sneeze, laugh or move your bowels, hold your hand over the puncture sites and press firmly.  Please remove Dressing after 24 hours, works best to take off in shower.  Then apply a band aid daily for at least 3 days (if needed). If there is minor oozing, apply another band aid and remove it after 12 hours.   It is normal to have a bruising or a small lump that is present under the skin . This will go away on its own after 3-4 weeks.   You may shower. Do NOT take a bath, or use a hot tub or pool until groin site heals, which may take up to a week.  Do NOT scrub the site. Do NOT use anything like, lotion, alcohol or powder near/on the puncture site.    Activity:  NO bending, stooping over or squatting  for 3 days  Do NOT lift, push or pull more than 10 pounds (equal to a gallon of milk) for 3 days.  NO repetitive motions such as loading , vacuuming, raking, shoveling,   Limit going up and down the stairs repetitively, for the first 24 hours after procedure.     Bleeding:  If you start bleeding from the groin site, lie down flat and press firmly on the site for 10 minutes or until bleeding stops.   Once bleeding stops, lay flat for 1-2 hours.  Call your A Fib nurse if bleeding does not stop or after hours will need to go to ER.       Go to ER or Call 911 right away if you have heavy bleeding or bleeding that does not stop.    Medications:  Take your medications, including blood  thinners, unless your doctor tells you not to.  If you have stopped any other medicines, check with your nurse or provider about when to restart them.  If you have PAIN or a TIGHTNESS in your chest, you MAY take Tylenol (acetaminophen) and if this does not help, you MAY take Advil (ibuprofen-400 mg with food).  If you have constipation or prone to constipation,  take a fiber supplement, ie metamucil or stool softners.    Call the A Fib RN if:  Chest pain not relieved by acetaminophen or ibuprofen  Difficulty swallowing and/or coughing up blood  Shortness of breath  Increased groin pain or a large or growing hard lump around the site.  Groin site is red, swollen, hot or tender.  Blood or fluid is draining from the groin site.  You have chills or a fever greater than 101 F (38 C).  Your leg feels numb, cool or changes color.  If groin pain is not relieved by Tylenol or Ibuprofen.  Recurrent irregular or fast heart rate lasting over 2-3 hours.  Any questions or concerns.    Heart rhythms:  You may have some irregular heartbeats. These feel very strong. They may make you feel that the A Fib is going to start again.  Give it time. The irregular beats should occur less often.    Follow Up Appointments:  6/27/2025 with Chen Syed, REAGAN at 12:40am in [place]  9/15/2025 with Dr Ordonez at 8:15am in [facility]   A Fib clinic RN's Dee Gage Kathy, Madalyn  [phone] (Mon-Fri, 8:00-4:30)   [phone] Option 2 (7 days a week) after hours for on call Cardiologist.

## 2025-06-20 NOTE — PROGRESS NOTES
Dictated.    Patient in paced rhythm.    LA mapping showed extensive dense scar both in the anterior and posterior wall.    Given the anatomy and extensive low voltage areas with low amplitude signals, RFA was performed instead of planned PFA to allow for more precise mapping.    Successful PVI and posterior wall isolation.    EBL 50 ML.    Peclose X 2 placed. Bedrest for 4 hours.    Plan:  -discharge home this afternoon if doing well  -Protonix 40 mg for one month  -continue Eliquis another PTA medications  -follow-up in the EP clinic next week.

## 2025-06-20 NOTE — DISCHARGE SUMMARY
[facility] Discharge Nursing Note    Patient Information  Name: Vikash Burgos  Age: 65 year old    Discharge Education:  Discharge instructions reviewed: Yes  Additional education/resources provided: AVS  Patient/patient representative verbalizes understanding: Yes  Patient discharging on new medications: Yes  Medication education completed: Protonix Yes    Discharge Plans:   Discharge location: home  Discharge ride contacted: Yes  Approximate discharge time: 1700    Discharge Criteria:  Discharge criteria met and vital signs stable: Yes    Patient Belongs:  Patient belongings returned to patient: Yes    Wilder Guevara RN

## 2025-06-21 DIAGNOSIS — M19.041 PRIMARY OSTEOARTHRITIS OF BOTH HANDS: ICD-10-CM

## 2025-06-21 DIAGNOSIS — M19.042 PRIMARY OSTEOARTHRITIS OF BOTH HANDS: ICD-10-CM

## 2025-06-21 DIAGNOSIS — N52.9 MALE ERECTILE DISORDER: ICD-10-CM

## 2025-06-23 ENCOUNTER — TELEPHONE (OUTPATIENT)
Dept: CARDIOLOGY | Facility: CLINIC | Age: 65
End: 2025-06-23
Payer: COMMERCIAL

## 2025-06-23 RX ORDER — CELECOXIB 100 MG/1
CAPSULE ORAL
Qty: 90 CAPSULE | Refills: 0 | Status: SHIPPED | OUTPATIENT
Start: 2025-06-23

## 2025-06-23 RX ORDER — SILDENAFIL 50 MG/1
50 TABLET, FILM COATED ORAL DAILY
Qty: 30 TABLET | Refills: 1 | Status: SHIPPED | OUTPATIENT
Start: 2025-06-23

## 2025-06-23 NOTE — TELEPHONE ENCOUNTER
Spoke to patient in follow up to afib ablation completed on 6/20.  Denies CP, SOB or lightheadedness.  Did have heartburn evening of procedure that has improved.  He is on Protonix 40mg po every day for 30 days.  Currently has a dressing on groin site.  Discussed with patient if no drainage that it would be best to leave open to air.  Lifting restriction to be completed after today.  Eating, drinking and going to the bathroom with no difficulty.  Reviewed discharge instructions and follow up appointments.  Patient had no further questions or concerns at this time.  He has direct number to the clinic and will call when needed.  SACHIN Erwin

## 2025-06-24 ENCOUNTER — TELEPHONE (OUTPATIENT)
Dept: FAMILY MEDICINE | Facility: CLINIC | Age: 65
End: 2025-06-24
Payer: COMMERCIAL

## 2025-06-24 NOTE — TELEPHONE ENCOUNTER
Patient asking for refills of generic celebrex and viagra. Per chart review both medications refilled yesterday. Patient will contact pharmacy to discuss. Patient/Caller with no further questions or concerns.     Maryanne Vang RN

## 2025-06-30 ENCOUNTER — TELEPHONE (OUTPATIENT)
Dept: CARDIOLOGY | Facility: CLINIC | Age: 65
End: 2025-06-30
Payer: COMMERCIAL

## 2025-06-30 DIAGNOSIS — Z95.0 CARDIAC PACEMAKER IN SITU: Primary | ICD-10-CM

## 2025-06-30 DIAGNOSIS — I49.5 SICK SINUS SYNDROME (H): ICD-10-CM

## 2025-06-30 NOTE — TELEPHONE ENCOUNTER
Was asked my Chen Castropawel to adjust pt's rate response secondary to pt still being SOB post AVNA.        It sounds like pt is an avid biker and has a [employer] device. Chen requested to:       Increase LRL from 50bpm to 60bpm (to see if this would help)   Though medtronic only has accelerometer capabilities and not MV, she did ask to make the settings a little bit sensitive.   She also asked that the Pt have a stress ECHO that was ordered by Dr. Walters in September with follow-up           I did call pt to set up a courtesy check to adjust rate response settings, but did not answer.  Left a VM to call [facility] RN callback number: [phone]

## 2025-07-01 NOTE — TELEPHONE ENCOUNTER
KARIN received ~~ pt returning call    Courtesy check made for tomorrow 7/2/2025 @ 2:00    Braxton STEPHENSON

## 2025-07-02 ENCOUNTER — ANCILLARY PROCEDURE (OUTPATIENT)
Dept: CARDIOLOGY | Facility: CLINIC | Age: 65
End: 2025-07-02
Attending: INTERNAL MEDICINE
Payer: COMMERCIAL

## 2025-07-02 DIAGNOSIS — Z95.0 CARDIAC PACEMAKER IN SITU: ICD-10-CM

## 2025-07-02 NOTE — TELEPHONE ENCOUNTER
Patient seen in clinic today     Mode: DDDR   (changed to AAIR-DDDR  today)  Presenting Rhythm: AP- w/ frequent PVCs.  Changed to AP-VS w/ PVCs after mode changed to AAIR+  (AP-VS interval ~330ms)  Underlying Rhythm: no P waves at DDD 30, junctional escape at 30bpm. Intact conduction following AP (AP-VS interval ! 330ms). Frequent PVCs at times.    AP: 93%    : 86.1%    <<<  last echo obtained 12/2022 when % was in the 10-20% range  Heart Rate: in the 50bpm range ~65% of the time with some variability into the 90's; rarely into the 100-130bpm range. Compared to previous histograms, there is much less variability.   Patient endorsing significant SOB with minimal activity (ie winded after walking 50feet from the waiting area to the clinic room). He is unsure of how long this has been going on for but he thinks like a few months prior to his ablation. His symptoms have not improved at all since AF ablation on 6/20/25. Please refer to below for changes attempted today.       - INITIAL SETTINGS: Mode @ DDDR; LRL @ 50bpm; Paced & Sensed AVDs @ 300ms; ADL & Exertion Response @ 3; ADL Rate @ 95bpm; Activity Acceleration @ 30sec.       - Pt very SOB after walking 50feet. HRs in the 60-70's  - 1st change attempted: Mode changed to AAIR+ w/ a LRL of 60bpm (Max AVD turned ON at 400ms). Changed ADL Response to 4 and Exertion Response to 5.        - No improvement in SOB. Walked 200feet. HR in the 90s.  - 2nd change attempted: left all settings same as 1st change but changed mode to DDDR  w/ AVDs pulled in to 200s.      - No improvement in SOB. Walked 150ft. HR up to 105bpm.  - 3rd change attempted: changed mode back to AAIR+ . Increased ADL Response to 5; increased ADL Rate to 105bpm; changed Activity Acceleration to 15sec. ++THIS IS THE MOST SENSITIVE RATE RESPONSE CAN GO)      - No improvement in SOB. Walked 500ft. HR up to 100bpm.  - 4th change attempted: left rate response settings at most  sensitive and changed mode back to DDDR with AVDs pulled in.      - No improvement in SOB. Walked 200ft. HR up to 110bpm.  - 5th change attempted: turned off rate response to see if he is someone who needs a lower HR with activity.        - SOB was markedly worse after walking ~30feet.    Final Settings Changes made as below:    ^^ patient is aware that changed made above were very aggressive so we will like to see him back in ~1 week. If he still hasn't noticed any improvement in his symptoms, we will pull back some of the settings to make them less aggressive.    Will route an update to Chen Redlon on the above. Patient is aware that he needs a stress test but will wait to hear back from Chen regarding whether this should be a bridget seeing as he will likely not be able to tolerate a treadmill stress test.

## 2025-07-02 NOTE — TELEPHONE ENCOUNTER
Called patient and LVM letting him know that we are waiting on recommendations from Chen. When we hear from her we will call him back to set up in clinic device check if it is needed (aka if symptoms worse or unimproved) alongside her appointment as able.  ESTEPHANIE STEPHENSON

## 2025-07-03 NOTE — TELEPHONE ENCOUNTER
Patient had a stress echo.  Dr. Walters had one ordered and I thought we were going to get that scheduled but I do not see that it has been scheduled yet.  Lets try the stress echo and make sure he is not ischemic.  Thank you

## 2025-07-10 LAB
MDC_IDC_LEAD_CONNECTION_STATUS: NORMAL
MDC_IDC_LEAD_CONNECTION_STATUS: NORMAL
MDC_IDC_LEAD_IMPLANT_DT: NORMAL
MDC_IDC_LEAD_IMPLANT_DT: NORMAL
MDC_IDC_LEAD_LOCATION: NORMAL
MDC_IDC_LEAD_LOCATION: NORMAL
MDC_IDC_LEAD_LOCATION_DETAIL_1: NORMAL
MDC_IDC_LEAD_LOCATION_DETAIL_1: NORMAL
MDC_IDC_LEAD_MFG: NORMAL
MDC_IDC_LEAD_MFG: NORMAL
MDC_IDC_LEAD_MODEL: NORMAL
MDC_IDC_LEAD_MODEL: NORMAL
MDC_IDC_LEAD_POLARITY_TYPE: NORMAL
MDC_IDC_LEAD_POLARITY_TYPE: NORMAL
MDC_IDC_LEAD_SERIAL: NORMAL
MDC_IDC_LEAD_SERIAL: NORMAL
MDC_IDC_MSMT_BATTERY_DTM: NORMAL
MDC_IDC_MSMT_BATTERY_REMAINING_LONGEVITY: 119 MO
MDC_IDC_MSMT_BATTERY_RRT_TRIGGER: 2.62
MDC_IDC_MSMT_BATTERY_STATUS: NORMAL
MDC_IDC_MSMT_BATTERY_VOLTAGE: 3.01 V
MDC_IDC_MSMT_LEADCHNL_RA_IMPEDANCE_VALUE: 342 OHM
MDC_IDC_MSMT_LEADCHNL_RA_IMPEDANCE_VALUE: 456 OHM
MDC_IDC_MSMT_LEADCHNL_RA_PACING_THRESHOLD_AMPLITUDE: 0.38 V
MDC_IDC_MSMT_LEADCHNL_RA_PACING_THRESHOLD_PULSEWIDTH: 0.4 MS
MDC_IDC_MSMT_LEADCHNL_RA_SENSING_INTR_AMPL: 2.25 MV
MDC_IDC_MSMT_LEADCHNL_RA_SENSING_INTR_AMPL: 2.75 MV
MDC_IDC_MSMT_LEADCHNL_RV_IMPEDANCE_VALUE: 361 OHM
MDC_IDC_MSMT_LEADCHNL_RV_IMPEDANCE_VALUE: 437 OHM
MDC_IDC_MSMT_LEADCHNL_RV_PACING_THRESHOLD_AMPLITUDE: 0.62 V
MDC_IDC_MSMT_LEADCHNL_RV_PACING_THRESHOLD_PULSEWIDTH: 0.4 MS
MDC_IDC_MSMT_LEADCHNL_RV_SENSING_INTR_AMPL: 4.12 MV
MDC_IDC_MSMT_LEADCHNL_RV_SENSING_INTR_AMPL: 4.25 MV
MDC_IDC_PG_IMPLANT_DTM: NORMAL
MDC_IDC_PG_MFG: NORMAL
MDC_IDC_PG_MODEL: NORMAL
MDC_IDC_PG_SERIAL: NORMAL
MDC_IDC_PG_TYPE: NORMAL
MDC_IDC_SESS_CLINIC_NAME: NORMAL
MDC_IDC_SESS_DTM: NORMAL
MDC_IDC_SESS_TYPE: NORMAL
MDC_IDC_SET_BRADY_AT_MODE_SWITCH_RATE: 171 {BEATS}/MIN
MDC_IDC_SET_BRADY_HYSTRATE: NORMAL
MDC_IDC_SET_BRADY_LOWRATE: 60 {BEATS}/MIN
MDC_IDC_SET_BRADY_MAX_SENSOR_RATE: 130 {BEATS}/MIN
MDC_IDC_SET_BRADY_MAX_TRACKING_RATE: 130 {BEATS}/MIN
MDC_IDC_SET_BRADY_MODE: NORMAL
MDC_IDC_SET_BRADY_PAV_DELAY_LOW: 200 MS
MDC_IDC_SET_BRADY_SAV_DELAY_LOW: 180 MS
MDC_IDC_SET_LEADCHNL_RA_PACING_AMPLITUDE: 1.5 V
MDC_IDC_SET_LEADCHNL_RA_PACING_ANODE_ELECTRODE_1: NORMAL
MDC_IDC_SET_LEADCHNL_RA_PACING_ANODE_LOCATION_1: NORMAL
MDC_IDC_SET_LEADCHNL_RA_PACING_CAPTURE_MODE: NORMAL
MDC_IDC_SET_LEADCHNL_RA_PACING_CATHODE_ELECTRODE_1: NORMAL
MDC_IDC_SET_LEADCHNL_RA_PACING_CATHODE_LOCATION_1: NORMAL
MDC_IDC_SET_LEADCHNL_RA_PACING_POLARITY: NORMAL
MDC_IDC_SET_LEADCHNL_RA_PACING_PULSEWIDTH: 0.4 MS
MDC_IDC_SET_LEADCHNL_RA_SENSING_ANODE_ELECTRODE_1: NORMAL
MDC_IDC_SET_LEADCHNL_RA_SENSING_ANODE_LOCATION_1: NORMAL
MDC_IDC_SET_LEADCHNL_RA_SENSING_CATHODE_ELECTRODE_1: NORMAL
MDC_IDC_SET_LEADCHNL_RA_SENSING_CATHODE_LOCATION_1: NORMAL
MDC_IDC_SET_LEADCHNL_RA_SENSING_POLARITY: NORMAL
MDC_IDC_SET_LEADCHNL_RA_SENSING_SENSITIVITY: 0.3 MV
MDC_IDC_SET_LEADCHNL_RV_PACING_AMPLITUDE: 2 V
MDC_IDC_SET_LEADCHNL_RV_PACING_ANODE_ELECTRODE_1: NORMAL
MDC_IDC_SET_LEADCHNL_RV_PACING_ANODE_LOCATION_1: NORMAL
MDC_IDC_SET_LEADCHNL_RV_PACING_CAPTURE_MODE: NORMAL
MDC_IDC_SET_LEADCHNL_RV_PACING_CATHODE_ELECTRODE_1: NORMAL
MDC_IDC_SET_LEADCHNL_RV_PACING_CATHODE_LOCATION_1: NORMAL
MDC_IDC_SET_LEADCHNL_RV_PACING_POLARITY: NORMAL
MDC_IDC_SET_LEADCHNL_RV_PACING_PULSEWIDTH: 0.4 MS
MDC_IDC_SET_LEADCHNL_RV_SENSING_ANODE_ELECTRODE_1: NORMAL
MDC_IDC_SET_LEADCHNL_RV_SENSING_ANODE_LOCATION_1: NORMAL
MDC_IDC_SET_LEADCHNL_RV_SENSING_CATHODE_ELECTRODE_1: NORMAL
MDC_IDC_SET_LEADCHNL_RV_SENSING_CATHODE_LOCATION_1: NORMAL
MDC_IDC_SET_LEADCHNL_RV_SENSING_POLARITY: NORMAL
MDC_IDC_SET_LEADCHNL_RV_SENSING_SENSITIVITY: 0.9 MV
MDC_IDC_SET_ZONE_DETECTION_INTERVAL: 200 MS
MDC_IDC_SET_ZONE_DETECTION_INTERVAL: 350 MS
MDC_IDC_SET_ZONE_DETECTION_INTERVAL: 400 MS
MDC_IDC_SET_ZONE_STATUS: NORMAL
MDC_IDC_SET_ZONE_TYPE: NORMAL
MDC_IDC_SET_ZONE_VENDOR_TYPE: NORMAL
MDC_IDC_STAT_AT_BURDEN_PERCENT: 0 %
MDC_IDC_STAT_AT_DTM_END: NORMAL
MDC_IDC_STAT_AT_DTM_START: NORMAL
MDC_IDC_STAT_BRADY_AP_VP_PERCENT: 84.54 %
MDC_IDC_STAT_BRADY_AP_VS_PERCENT: 6.64 %
MDC_IDC_STAT_BRADY_AS_VP_PERCENT: 1.54 %
MDC_IDC_STAT_BRADY_AS_VS_PERCENT: 7.28 %
MDC_IDC_STAT_BRADY_DTM_END: NORMAL
MDC_IDC_STAT_BRADY_DTM_START: NORMAL
MDC_IDC_STAT_BRADY_RA_PERCENT_PACED: 93.02 %
MDC_IDC_STAT_BRADY_RV_PERCENT_PACED: 86.08 %
MDC_IDC_STAT_EPISODE_RECENT_COUNT: 0
MDC_IDC_STAT_EPISODE_RECENT_COUNT_DTM_END: NORMAL
MDC_IDC_STAT_EPISODE_RECENT_COUNT_DTM_START: NORMAL
MDC_IDC_STAT_EPISODE_TOTAL_COUNT: 0
MDC_IDC_STAT_EPISODE_TOTAL_COUNT: 0
MDC_IDC_STAT_EPISODE_TOTAL_COUNT: 1452
MDC_IDC_STAT_EPISODE_TOTAL_COUNT: 55
MDC_IDC_STAT_EPISODE_TOTAL_COUNT: 89
MDC_IDC_STAT_EPISODE_TOTAL_COUNT_DTM_END: NORMAL
MDC_IDC_STAT_EPISODE_TOTAL_COUNT_DTM_START: NORMAL
MDC_IDC_STAT_EPISODE_TYPE: NORMAL
MDC_IDC_STAT_TACHYTHERAPY_RECENT_DTM_END: NORMAL
MDC_IDC_STAT_TACHYTHERAPY_RECENT_DTM_START: NORMAL
MDC_IDC_STAT_TACHYTHERAPY_TOTAL_DTM_END: NORMAL
MDC_IDC_STAT_TACHYTHERAPY_TOTAL_DTM_START: NORMAL

## 2025-07-14 ENCOUNTER — HOSPITAL ENCOUNTER (OUTPATIENT)
Dept: CARDIOLOGY | Facility: CLINIC | Age: 65
Discharge: HOME | End: 2025-07-14
Attending: NURSE PRACTITIONER
Payer: COMMERCIAL

## 2025-07-14 VITALS
HEART RATE: 62 BPM | WEIGHT: 151 LBS | SYSTOLIC BLOOD PRESSURE: 120 MMHG | DIASTOLIC BLOOD PRESSURE: 70 MMHG | HEIGHT: 71 IN | OXYGEN SATURATION: 98 % | BODY MASS INDEX: 21.14 KG/M2

## 2025-07-14 DIAGNOSIS — I25.10 CORONARY ARTERY DISEASE INVOLVING NATIVE CORONARY ARTERY OF NATIVE HEART WITHOUT ANGINA PECTORIS: ICD-10-CM

## 2025-07-14 DIAGNOSIS — R06.02 EXERTIONAL SHORTNESS OF BREATH: ICD-10-CM

## 2025-07-14 LAB
CV BLOOD PRESSURE: 43 MMHG
CV STRESS MAX HR HE: 66
NUC STRESS EJECTION FRACTION: 43 %
RATE PRESSURE PRODUCT: 8052
STRESS ECHO BASELINE DIASTOLIC HE: 70
STRESS ECHO BASELINE HR: 75 BPM
STRESS ECHO BASELINE SYSTOLIC BP: 120
STRESS ECHO CALCULATED PERCENT HR: 43 %
STRESS ECHO LAST STRESS DIASTOLIC BP: 72
STRESS ECHO LAST STRESS SYSTOLIC BP: 122
STRESS ECHO TARGET HR: 155
STRESS/REST PERFUSION RATIO: 1.02

## 2025-07-14 PROCEDURE — 250N000011 HC RX IP 250 OP 636: Performed by: NURSE PRACTITIONER

## 2025-07-14 PROCEDURE — 93017 CV STRESS TEST TRACING ONLY: CPT

## 2025-07-14 PROCEDURE — 93018 CV STRESS TEST I&R ONLY: CPT | Performed by: INTERNAL MEDICINE

## 2025-07-14 PROCEDURE — 78452 HT MUSCLE IMAGE SPECT MULT: CPT | Mod: 26 | Performed by: INTERNAL MEDICINE

## 2025-07-14 PROCEDURE — 343N000001 HC RX 343 MED OP 636: Performed by: NURSE PRACTITIONER

## 2025-07-14 PROCEDURE — A9502 TC99M TETROFOSMIN: HCPCS | Performed by: NURSE PRACTITIONER

## 2025-07-14 PROCEDURE — 93016 CV STRESS TEST SUPVJ ONLY: CPT | Performed by: INTERNAL MEDICINE

## 2025-07-14 RX ORDER — AMINOPHYLLINE 25 MG/ML
50-100 INJECTION, SOLUTION INTRAVENOUS
Status: DISCONTINUED | OUTPATIENT
Start: 2025-07-14 | End: 2025-07-15 | Stop reason: HOSPADM

## 2025-07-14 RX ORDER — REGADENOSON 0.08 MG/ML
0.4 INJECTION, SOLUTION INTRAVENOUS ONCE
Status: COMPLETED | OUTPATIENT
Start: 2025-07-14 | End: 2025-07-14

## 2025-07-14 RX ORDER — CAFFEINE CITRATE 20 MG/ML
60 SOLUTION INTRAVENOUS
Status: DISCONTINUED | OUTPATIENT
Start: 2025-07-14 | End: 2025-07-14 | Stop reason: HOSPADM

## 2025-07-14 RX ORDER — NITROGLYCERIN 0.4 MG/1
0.4 TABLET SUBLINGUAL EVERY 5 MIN PRN
Status: DISCONTINUED | OUTPATIENT
Start: 2025-07-14 | End: 2025-07-14 | Stop reason: HOSPADM

## 2025-07-14 RX ORDER — DIAZEPAM 5 MG/1
5 TABLET ORAL EVERY 30 MIN PRN
Status: DISCONTINUED | OUTPATIENT
Start: 2025-07-14 | End: 2025-07-15 | Stop reason: HOSPADM

## 2025-07-14 RX ORDER — ALBUTEROL SULFATE 90 UG/1
2 INHALANT RESPIRATORY (INHALATION) EVERY 5 MIN PRN
Status: DISCONTINUED | OUTPATIENT
Start: 2025-07-14 | End: 2025-07-15 | Stop reason: HOSPADM

## 2025-07-14 RX ORDER — SODIUM CHLORIDE 9 MG/ML
INJECTION, SOLUTION INTRAVENOUS CONTINUOUS PRN
Status: DISCONTINUED | OUTPATIENT
Start: 2025-07-14 | End: 2025-07-14 | Stop reason: HOSPADM

## 2025-07-14 RX ORDER — CAFFEINE 200 MG
200 TABLET ORAL
Status: DISCONTINUED | OUTPATIENT
Start: 2025-07-14 | End: 2025-07-14 | Stop reason: HOSPADM

## 2025-07-14 RX ORDER — LORAZEPAM 0.5 MG/1
0.5 TABLET ORAL EVERY 30 MIN PRN
Status: DISCONTINUED | OUTPATIENT
Start: 2025-07-14 | End: 2025-07-15 | Stop reason: HOSPADM

## 2025-07-14 RX ORDER — LIDOCAINE 40 MG/G
CREAM TOPICAL
Status: DISCONTINUED | OUTPATIENT
Start: 2025-07-14 | End: 2025-07-15 | Stop reason: HOSPADM

## 2025-07-14 RX ADMIN — TETROFOSMIN 3.12 MILLICURIE: 1.38 INJECTION, POWDER, LYOPHILIZED, FOR SOLUTION INTRAVENOUS at 08:22

## 2025-07-14 RX ADMIN — REGADENOSON 0.4 MG: 0.08 INJECTION, SOLUTION INTRAVENOUS at 10:08

## 2025-07-14 RX ADMIN — TETROFOSMIN 9.02 MILLICURIE: 1.38 INJECTION, POWDER, LYOPHILIZED, FOR SOLUTION INTRAVENOUS at 10:11

## 2025-07-25 ENCOUNTER — CARE COORDINATION (OUTPATIENT)
Dept: CARDIOLOGY | Facility: CLINIC | Age: 65
End: 2025-07-25

## 2025-07-25 DIAGNOSIS — I25.10 CORONARY ARTERY DISEASE INVOLVING NATIVE CORONARY ARTERY OF NATIVE HEART WITHOUT ANGINA PECTORIS: Primary | ICD-10-CM

## 2025-07-25 DIAGNOSIS — R06.02 EXERTIONAL SHORTNESS OF BREATH: ICD-10-CM

## 2025-07-29 ENCOUNTER — HOSPITAL ENCOUNTER (OUTPATIENT)
Dept: CARDIOLOGY | Facility: CLINIC | Age: 65
Discharge: HOME | End: 2025-07-29
Attending: PHYSICIAN ASSISTANT
Payer: COMMERCIAL

## 2025-07-29 DIAGNOSIS — R06.02 EXERTIONAL SHORTNESS OF BREATH: ICD-10-CM

## 2025-07-29 DIAGNOSIS — I25.10 CORONARY ARTERY DISEASE INVOLVING NATIVE CORONARY ARTERY OF NATIVE HEART WITHOUT ANGINA PECTORIS: ICD-10-CM

## 2025-07-29 LAB — LVEF ECHO: NORMAL

## 2025-07-29 PROCEDURE — 93306 TTE W/DOPPLER COMPLETE: CPT

## 2025-07-29 PROCEDURE — 93306 TTE W/DOPPLER COMPLETE: CPT | Mod: 26 | Performed by: INTERNAL MEDICINE

## 2025-07-29 RX ORDER — LIDOCAINE 40 MG/G
CREAM TOPICAL
OUTPATIENT
Start: 2025-07-29

## 2025-07-29 RX ORDER — ASPIRIN 325 MG
325 TABLET ORAL ONCE
OUTPATIENT
Start: 2025-07-29 | End: 2025-07-29

## 2025-07-29 RX ORDER — POTASSIUM CHLORIDE 1500 MG/1
20 TABLET, EXTENDED RELEASE ORAL
OUTPATIENT
Start: 2025-07-29

## 2025-07-29 RX ORDER — SODIUM CHLORIDE 9 MG/ML
INJECTION, SOLUTION INTRAVENOUS CONTINUOUS
OUTPATIENT
Start: 2025-07-29

## 2025-07-29 RX ORDER — ASPIRIN 81 MG/1
243 TABLET, CHEWABLE ORAL ONCE
OUTPATIENT
Start: 2025-07-29

## 2025-07-29 NOTE — PROGRESS NOTES
I called pt and reviewed cath prep instructions. I also provided him with an update on his echo results, but said S.More will also review. Pt has tirzepatide on his med list, but said he no longer takes it. He was on it as part of a study so it was left on the clinic administered med list. Pt didn't have any questions at this time. Rosy STEPHENSON July 29, 2025, 2:35 PM

## 2025-07-29 NOTE — PROGRESS NOTES
Coronary angiogram/PCI/Right Heart Cath prep instructions.     Patient is scheduled for a Left and Right Heart Cath at [facility] - [address], [place] [address] - Main Entrance of the Hospital on 8/4/25.  Check in time is at 0630 AM and procedure to follow.    Performing Cardiologist: Dr. Godinez    Patient instructed to remain NPO for solid foods 8 hours prior to arrival and may have clear liquids up to 2 hours prior to arrival.    Patient does not require extra fluids prior to procedure.        Patient is on eliquis (Eliquis, Pradaxa, Xarelto) and has been advised to hold for 2 days prior to procedure starting 8/2.  Patient will be given instruction after the procedure as to when to resume.    Patient is not on diuretics.     Patient is having a Right Heart Cath and should continue Patient is not on a diuretic. as prescribed.    Patient is not currently taking ASA and has been advised to take one 325 mg tablet the day prior and morning of the procedure.    Pt is not on a SGLT2 inhibitor.    Pt is on Mounjaro and has been instructed to hold 7 days prior starting 7/28.  May be resumed after the procedure unless otherwise instructed.    Patient advised to take their other daily medications the morning of the procedure with small sips of water.     Patient advised to shower the night before and morning of their procedure with regular soap.    Verified patient does not have a contrast allergy.    Verified patient has someone available to drive them home from the hospital and can stay with them for 24 hours after the procedure.     Patient advised they will have bedrest post procedure.  Length of time is 2-6 hours and dependent on access site used for procedure.  This bedrest is to allow proper clotting of the access site to prevent bleeding.    Patient advised to notify care team with any new COVID like symptoms prior to procedure. Day of procedure phone number: Phil at  [phone]    Patient is aware of visitor policy.    Patient expresses understanding of above instructions and denies further questions at this time.      Alix Moreland RN  [facility]

## 2025-08-04 ENCOUNTER — HOSPITAL ENCOUNTER (OUTPATIENT)
Facility: CLINIC | Age: 65
Discharge: HOME | End: 2025-08-04
Attending: INTERNAL MEDICINE | Admitting: INTERNAL MEDICINE
Payer: COMMERCIAL

## 2025-08-04 VITALS
OXYGEN SATURATION: 98 % | HEART RATE: 66 BPM | TEMPERATURE: 97.7 F | SYSTOLIC BLOOD PRESSURE: 163 MMHG | HEIGHT: 71 IN | BODY MASS INDEX: 21.14 KG/M2 | WEIGHT: 151 LBS | DIASTOLIC BLOOD PRESSURE: 81 MMHG | RESPIRATION RATE: 9 BRPM

## 2025-08-04 DIAGNOSIS — R06.02 EXERTIONAL SHORTNESS OF BREATH: ICD-10-CM

## 2025-08-04 DIAGNOSIS — I25.10 CORONARY ARTERY DISEASE INVOLVING NATIVE CORONARY ARTERY OF NATIVE HEART WITHOUT ANGINA PECTORIS: ICD-10-CM

## 2025-08-04 LAB
ANION GAP SERPL CALCULATED.3IONS-SCNC: 7 MMOL/L (ref 7–15)
APTT PPP: 27 SECONDS (ref 22–38)
BASE EXCESS BLDV CALC-SCNC: -2 MMOL/L (ref -3–3)
BUN SERPL-MCNC: 16.9 MG/DL (ref 8–23)
CALCIUM SERPL-MCNC: 10.1 MG/DL (ref 8.8–10.4)
CHLORIDE SERPL-SCNC: 105 MMOL/L (ref 98–107)
CREAT SERPL-MCNC: 1.07 MG/DL (ref 0.67–1.17)
EGFRCR SERPLBLD CKD-EPI 2021: 77 ML/MIN/1.73M2
ERYTHROCYTE [DISTWIDTH] IN BLOOD BY AUTOMATED COUNT: 12.9 % (ref 10–15)
GLUCOSE BLDC GLUCOMTR-MCNC: 90 MG/DL (ref 70–99)
GLUCOSE SERPL-MCNC: 97 MG/DL (ref 70–99)
HCO3 BLDV-SCNC: 25 MMOL/L (ref 21–28)
HCO3 SERPL-SCNC: 26 MMOL/L (ref 22–29)
HCT VFR BLD AUTO: 43.1 % (ref 40–53)
HGB BLD-MCNC: 15.1 G/DL (ref 13.3–17.7)
INR PPP: 1.06 (ref 0.85–1.15)
LACTATE BLD-SCNC: 0.7 MMOL/L (ref 0.7–2)
MCH RBC QN AUTO: 31.5 PG (ref 26.5–33)
MCHC RBC AUTO-ENTMCNC: 35 G/DL (ref 31.5–36.5)
MCV RBC AUTO: 90 FL (ref 78–100)
PCO2 BLDV: 48 MM HG (ref 40–50)
PH BLDV: 7.32 [PH] (ref 7.32–7.43)
PLATELET # BLD AUTO: 141 10E3/UL (ref 150–450)
PO2 BLDV: 42 MM HG (ref 25–47)
POTASSIUM SERPL-SCNC: 5 MMOL/L (ref 3.4–5.3)
PROTHROMBIN TIME: 13.6 SECONDS (ref 11.8–14.8)
RBC # BLD AUTO: 4.79 10E6/UL (ref 4.4–5.9)
SAO2 % BLDV: 73 % (ref 70–75)
SODIUM SERPL-SCNC: 138 MMOL/L (ref 135–145)
WBC # BLD AUTO: 5 10E3/UL (ref 4–11)

## 2025-08-04 PROCEDURE — 80048 BASIC METABOLIC PNL TOTAL CA: CPT | Performed by: INTERNAL MEDICINE

## 2025-08-04 PROCEDURE — 258N000003 HC RX IP 258 OP 636: Performed by: INTERNAL MEDICINE

## 2025-08-04 PROCEDURE — 93005 ELECTROCARDIOGRAM TRACING: CPT

## 2025-08-04 PROCEDURE — 93457 R HRT ART/GRFT ANGIO: CPT | Performed by: INTERNAL MEDICINE

## 2025-08-04 PROCEDURE — 99152 MOD SED SAME PHYS/QHP 5/>YRS: CPT | Performed by: INTERNAL MEDICINE

## 2025-08-04 PROCEDURE — 85014 HEMATOCRIT: CPT | Performed by: INTERNAL MEDICINE

## 2025-08-04 PROCEDURE — 250N000011 HC RX IP 250 OP 636: Performed by: INTERNAL MEDICINE

## 2025-08-04 PROCEDURE — C1769 GUIDE WIRE: HCPCS | Performed by: INTERNAL MEDICINE

## 2025-08-04 PROCEDURE — 85610 PROTHROMBIN TIME: CPT | Performed by: INTERNAL MEDICINE

## 2025-08-04 PROCEDURE — 272N000001 HC OR GENERAL SUPPLY STERILE: Performed by: INTERNAL MEDICINE

## 2025-08-04 PROCEDURE — 99153 MOD SED SAME PHYS/QHP EA: CPT | Performed by: INTERNAL MEDICINE

## 2025-08-04 PROCEDURE — 93457 R HRT ART/GRFT ANGIO: CPT | Mod: 26 | Performed by: INTERNAL MEDICINE

## 2025-08-04 PROCEDURE — C1894 INTRO/SHEATH, NON-LASER: HCPCS | Performed by: INTERNAL MEDICINE

## 2025-08-04 PROCEDURE — 82962 GLUCOSE BLOOD TEST: CPT

## 2025-08-04 PROCEDURE — 83605 ASSAY OF LACTIC ACID: CPT

## 2025-08-04 PROCEDURE — 85730 THROMBOPLASTIN TIME PARTIAL: CPT | Performed by: INTERNAL MEDICINE

## 2025-08-04 PROCEDURE — 36415 COLL VENOUS BLD VENIPUNCTURE: CPT | Performed by: INTERNAL MEDICINE

## 2025-08-04 PROCEDURE — C1751 CATH, INF, PER/CENT/MIDLINE: HCPCS | Performed by: INTERNAL MEDICINE

## 2025-08-04 PROCEDURE — 999N000071 HC STATISTIC HEART CATH LAB OR EP LAB

## 2025-08-04 PROCEDURE — 999N000184 HC STATISTIC TELEMETRY

## 2025-08-04 PROCEDURE — 250N000009 HC RX 250: Performed by: INTERNAL MEDICINE

## 2025-08-04 RX ORDER — LIDOCAINE 40 MG/G
CREAM TOPICAL
Status: DISCONTINUED | OUTPATIENT
Start: 2025-08-04 | End: 2025-08-04 | Stop reason: HOSPADM

## 2025-08-04 RX ORDER — IOPAMIDOL 755 MG/ML
INJECTION, SOLUTION INTRAVASCULAR
Status: DISCONTINUED | OUTPATIENT
Start: 2025-08-04 | End: 2025-08-04 | Stop reason: HOSPADM

## 2025-08-04 RX ORDER — ASPIRIN 325 MG
325 TABLET ORAL ONCE
Status: COMPLETED | OUTPATIENT
Start: 2025-08-04 | End: 2025-08-04

## 2025-08-04 RX ORDER — NALOXONE HYDROCHLORIDE 0.4 MG/ML
0.4 INJECTION, SOLUTION INTRAMUSCULAR; INTRAVENOUS; SUBCUTANEOUS
Status: DISCONTINUED | OUTPATIENT
Start: 2025-08-04 | End: 2025-08-04 | Stop reason: HOSPADM

## 2025-08-04 RX ORDER — ATROPINE SULFATE 0.1 MG/ML
0.5 INJECTION INTRAVENOUS
Status: DISCONTINUED | OUTPATIENT
Start: 2025-08-04 | End: 2025-08-04 | Stop reason: HOSPADM

## 2025-08-04 RX ORDER — FENTANYL CITRATE 50 UG/ML
25 INJECTION, SOLUTION INTRAMUSCULAR; INTRAVENOUS
Refills: 0 | Status: DISCONTINUED | OUTPATIENT
Start: 2025-08-04 | End: 2025-08-04 | Stop reason: HOSPADM

## 2025-08-04 RX ORDER — POTASSIUM CHLORIDE 1500 MG/1
20 TABLET, EXTENDED RELEASE ORAL
Status: DISCONTINUED | OUTPATIENT
Start: 2025-08-04 | End: 2025-08-04 | Stop reason: HOSPADM

## 2025-08-04 RX ORDER — FLUMAZENIL 0.1 MG/ML
0.2 INJECTION, SOLUTION INTRAVENOUS
Status: DISCONTINUED | OUTPATIENT
Start: 2025-08-04 | End: 2025-08-04 | Stop reason: HOSPADM

## 2025-08-04 RX ORDER — OXYCODONE HYDROCHLORIDE 5 MG/1
10 TABLET ORAL EVERY 4 HOURS PRN
Status: DISCONTINUED | OUTPATIENT
Start: 2025-08-04 | End: 2025-08-04 | Stop reason: HOSPADM

## 2025-08-04 RX ORDER — ASPIRIN 81 MG/1
243 TABLET, CHEWABLE ORAL ONCE
Status: COMPLETED | OUTPATIENT
Start: 2025-08-04 | End: 2025-08-04

## 2025-08-04 RX ORDER — FENTANYL CITRATE 50 UG/ML
INJECTION, SOLUTION INTRAMUSCULAR; INTRAVENOUS
Status: DISCONTINUED | OUTPATIENT
Start: 2025-08-04 | End: 2025-08-04 | Stop reason: HOSPADM

## 2025-08-04 RX ORDER — SODIUM CHLORIDE 9 MG/ML
INJECTION, SOLUTION INTRAVENOUS CONTINUOUS
Status: DISCONTINUED | OUTPATIENT
Start: 2025-08-04 | End: 2025-08-04 | Stop reason: HOSPADM

## 2025-08-04 RX ORDER — OXYCODONE HYDROCHLORIDE 5 MG/1
5 TABLET ORAL EVERY 4 HOURS PRN
Status: DISCONTINUED | OUTPATIENT
Start: 2025-08-04 | End: 2025-08-04 | Stop reason: HOSPADM

## 2025-08-04 RX ORDER — ACETAMINOPHEN 325 MG/1
650 TABLET ORAL EVERY 4 HOURS PRN
Status: DISCONTINUED | OUTPATIENT
Start: 2025-08-04 | End: 2025-08-04 | Stop reason: HOSPADM

## 2025-08-04 RX ORDER — NALOXONE HYDROCHLORIDE 0.4 MG/ML
0.2 INJECTION, SOLUTION INTRAMUSCULAR; INTRAVENOUS; SUBCUTANEOUS
Status: DISCONTINUED | OUTPATIENT
Start: 2025-08-04 | End: 2025-08-04 | Stop reason: HOSPADM

## 2025-08-04 RX ADMIN — SODIUM CHLORIDE: 0.9 INJECTION, SOLUTION INTRAVENOUS at 10:16

## 2025-08-04 RX ADMIN — SODIUM CHLORIDE: 0.9 INJECTION, SOLUTION INTRAVENOUS at 07:08

## 2025-08-04 ASSESSMENT — ACTIVITIES OF DAILY LIVING (ADL)
ADLS_ACUITY_SCORE: 47

## 2025-08-06 ENCOUNTER — TELEPHONE (OUTPATIENT)
Dept: FAMILY MEDICINE | Facility: CLINIC | Age: 65
End: 2025-08-06
Payer: COMMERCIAL

## 2025-08-06 ENCOUNTER — TELEPHONE (OUTPATIENT)
Dept: CARDIOLOGY | Facility: CLINIC | Age: 65
End: 2025-08-06
Payer: COMMERCIAL

## 2025-08-06 LAB
ATRIAL RATE - MUSE: 71 BPM
DIASTOLIC BLOOD PRESSURE - MUSE: NORMAL MMHG
INTERPRETATION ECG - MUSE: NORMAL
P AXIS - MUSE: 46 DEGREES
PR INTERVAL - MUSE: 196 MS
QRS DURATION - MUSE: 154 MS
QT - MUSE: 426 MS
QTC - MUSE: 462 MS
R AXIS - MUSE: 214 DEGREES
SYSTOLIC BLOOD PRESSURE - MUSE: NORMAL MMHG
T AXIS - MUSE: 43 DEGREES
VENTRICULAR RATE- MUSE: 71 BPM

## 2025-08-07 ENCOUNTER — ANCILLARY PROCEDURE (OUTPATIENT)
Dept: GENERAL RADIOLOGY | Facility: CLINIC | Age: 65
End: 2025-08-07
Attending: INTERNAL MEDICINE
Payer: COMMERCIAL

## 2025-08-07 ENCOUNTER — OFFICE VISIT (OUTPATIENT)
Dept: FAMILY MEDICINE | Facility: CLINIC | Age: 65
End: 2025-08-07
Payer: COMMERCIAL

## 2025-08-07 VITALS
SYSTOLIC BLOOD PRESSURE: 116 MMHG | WEIGHT: 149 LBS | HEIGHT: 71 IN | HEART RATE: 76 BPM | BODY MASS INDEX: 20.86 KG/M2 | TEMPERATURE: 97.8 F | OXYGEN SATURATION: 98 % | DIASTOLIC BLOOD PRESSURE: 69 MMHG | RESPIRATION RATE: 12 BRPM

## 2025-08-07 DIAGNOSIS — R06.00 DYSPNEA, UNSPECIFIED TYPE: Primary | ICD-10-CM

## 2025-08-07 DIAGNOSIS — R06.00 DYSPNEA, UNSPECIFIED TYPE: ICD-10-CM

## 2025-08-07 ASSESSMENT — PAIN SCALES - GENERAL: PAINLEVEL_OUTOF10: NO PAIN (0)

## 2025-08-11 ENCOUNTER — TELEPHONE (OUTPATIENT)
Dept: CARDIOLOGY | Facility: CLINIC | Age: 65
End: 2025-08-11
Payer: COMMERCIAL

## 2025-08-14 ENCOUNTER — OFFICE VISIT (OUTPATIENT)
Dept: CARDIOLOGY | Facility: CLINIC | Age: 65
End: 2025-08-14
Payer: COMMERCIAL

## 2025-08-14 VITALS
SYSTOLIC BLOOD PRESSURE: 136 MMHG | DIASTOLIC BLOOD PRESSURE: 72 MMHG | BODY MASS INDEX: 20.58 KG/M2 | WEIGHT: 147 LBS | OXYGEN SATURATION: 99 % | HEART RATE: 70 BPM | HEIGHT: 71 IN

## 2025-08-14 DIAGNOSIS — Z95.0 CARDIAC PACEMAKER IN SITU: Primary | ICD-10-CM

## 2025-08-14 DIAGNOSIS — E11.40 TYPE 2 DIABETES MELLITUS WITH DIABETIC NEUROPATHY, WITHOUT LONG-TERM CURRENT USE OF INSULIN (H): ICD-10-CM

## 2025-08-14 DIAGNOSIS — I25.10 CORONARY ARTERY DISEASE INVOLVING NATIVE CORONARY ARTERY OF NATIVE HEART WITHOUT ANGINA PECTORIS: Chronic | ICD-10-CM

## 2025-08-14 DIAGNOSIS — E78.5 HYPERLIPIDEMIA LDL GOAL <70: ICD-10-CM

## 2025-08-14 DIAGNOSIS — R06.09 DOE (DYSPNEA ON EXERTION): ICD-10-CM

## 2025-08-14 DIAGNOSIS — I48.0 PAROXYSMAL ATRIAL FIBRILLATION (H): ICD-10-CM

## 2025-08-14 DIAGNOSIS — I10 BENIGN ESSENTIAL HYPERTENSION: ICD-10-CM

## 2025-08-20 ENCOUNTER — HOSPITAL ENCOUNTER (OUTPATIENT)
Dept: CARDIAC REHAB | Facility: CLINIC | Age: 65
Discharge: HOME | End: 2025-08-20
Attending: INTERNAL MEDICINE
Payer: COMMERCIAL

## 2025-08-20 DIAGNOSIS — R06.00 DYSPNEA, UNSPECIFIED TYPE: ICD-10-CM

## 2025-08-20 LAB
DLCOCOR-%PRED-PRE: 101 %
DLCOCOR-PRE: 27.78 ML/MIN/MMHG
DLCOUNC-%PRED-PRE: 102 %
DLCOUNC-PRE: 28.16 ML/MIN/MMHG
DLCOUNC-PRED: 27.41 ML/MIN/MMHG
ERV-%PRED-PRE: 141 %
ERV-PRE: 1.9 L
ERV-PRED: 1.34 L
EXPTIME-PRE: 11.27 SEC
FEF2575-%PRED-PRE: 83 %
FEF2575-PRE: 2.18 L/SEC
FEF2575-PRED: 2.6 L/SEC
FEFMAX-%PRED-PRE: 87 %
FEFMAX-PRE: 7.85 L/SEC
FEFMAX-PRED: 9 L/SEC
FEV1-%PRED-PRE: 103 %
FEV1-PRE: 3.44 L
FEV1FEV6-PRE: 74 %
FEV1FEV6-PRED: 78 %
FEV1FVC-PRE: 70 %
FEV1FVC-PRED: 77 %
FEV1SVC-PRE: 70 L
FEV1SVC-PRED: 66 L
FIFMAX-PRE: 3.11 L/SEC
FRCPLETH-%PRED-PRE: 121 %
FRCPLETH-PRE: 4.77 L
FRCPLETH-PRED: 3.91 L
FVC-%PRED-PRE: 113 %
FVC-PRE: 4.92 L
FVC-PRED: 4.35 L
GAW-PRED: 1.03 L/S/CMH2O
IC-%PRED-PRE: 83 %
IC-PRE: 2.99 L
IC-PRED: 3.6 L
Lab: 90 %
RVPLETH-%PRED-PRE: 115 %
RVPLETH-PRE: 2.86 L
RVPLETH-PRED: 2.47 L
SGAW-PRED: 0.2 1/CMH2O*S
SRAW-PRED: < 4.76 CMH2O*S
TLCPLETH-%PRED-PRE: 104 %
TLCPLETH-PRE: 7.76 L
TLCPLETH-PRED: 7.42 L
VA-%PRED-PRE: 101 %
VA-PRE: 6.79 L
VC-%PRED-PRE: 98 %
VC-PRE: 4.9 L
VC-PRED: 4.99 L

## 2025-08-20 PROCEDURE — 94375 RESPIRATORY FLOW VOLUME LOOP: CPT

## 2025-08-20 PROCEDURE — 94729 DIFFUSING CAPACITY: CPT

## 2025-08-20 PROCEDURE — 94726 PLETHYSMOGRAPHY LUNG VOLUMES: CPT

## 2025-08-21 ENCOUNTER — TELEPHONE (OUTPATIENT)
Dept: FAMILY MEDICINE | Facility: CLINIC | Age: 65
End: 2025-08-21
Payer: COMMERCIAL

## 2025-08-21 DIAGNOSIS — R06.00 DYSPNEA, UNSPECIFIED TYPE: Primary | ICD-10-CM

## 2025-09-02 ENCOUNTER — ANCILLARY PROCEDURE (OUTPATIENT)
Dept: CT IMAGING | Facility: CLINIC | Age: 65
End: 2025-09-02
Attending: INTERNAL MEDICINE
Payer: COMMERCIAL

## 2025-09-02 DIAGNOSIS — R06.00 DYSPNEA, UNSPECIFIED TYPE: ICD-10-CM

## 2025-09-02 PROCEDURE — 71250 CT THORAX DX C-: CPT

## (undated) DEVICE — CATH ANGIO INFINITI IM 4FRX100CM 538460

## (undated) DEVICE — SU SILK 2-0 TIE 24" SA75H

## (undated) DEVICE — INTRODUCER SHEATH FAST-CATH SWARTZ 8.5FRX63CM SL1 CVD 406849

## (undated) DEVICE — PACK PCMKR PERM SRG PROC LF SAN32PC573

## (undated) DEVICE — CABLE PACING L2.5 MR CONNECTOR ALLIGATOR CLIP 343586

## (undated) DEVICE — INTRO SHEATH 4FRX10CM PINNACLE RSS402

## (undated) DEVICE — DECANTER BAG 2002S

## (undated) DEVICE — CATH EP CATH EP REPRO DAIG RSPN FX CRV DX EP C

## (undated) DEVICE — SU VICRYL 3-0 CT-1 36" J338H

## (undated) DEVICE — SYR 05ML SLIP TIP W/O NDL 309647

## (undated) DEVICE — ESU GROUND PAD UNIVERSAL W/O CORD

## (undated) DEVICE — MANIFOLD KIT ANGIO AUTOMATED 014613

## (undated) DEVICE — DEFIB PRO-PADZ LVP LQD GEL ADULT 8900-2105-01

## (undated) DEVICE — RAD INTRODUCER KIT MICRO 5FRX10CM .018 NITINOL G/W

## (undated) DEVICE — SPONGE RAY-TEC 4X8" 7318

## (undated) DEVICE — DRSG STERI STRIP 1/2X4" R1547

## (undated) DEVICE — CATH ABLATION 8FR 115CM D-F CURVE BI-DIR QDOT MICRO D139505

## (undated) DEVICE — TUBE SET SMARKABLATE IRRIGATION

## (undated) DEVICE — INTRODUCER SHEATH GREEN 6.5FRX11CM .038IN PSI-6F-11-038ACT

## (undated) DEVICE — PATCH CARTO 3 EXTERNAL REFERENCE 3D MAPPING CREFP6

## (undated) DEVICE — SHEATH INTRODUCER VIZIGO 17 MM SMALL CURVE D138501

## (undated) DEVICE — CATH EP 6FR 2MM TIP 2-8-2 115C

## (undated) DEVICE — NDL BLUNT 19GA 1.5"

## (undated) DEVICE — KIT HAND CONTROL ANGIOTOUCH ACIST 65CM AT-P65

## (undated) DEVICE — TOTE ANGIO CORP PC15AT SAN32CC83O

## (undated) DEVICE — TUBING SUCTION 12"X1/4" N612

## (undated) DEVICE — GUIDEWIRE VASC SAFARI2 0.035X275CM H74939406XS1

## (undated) DEVICE — IONM EEG SUPPLIES

## (undated) DEVICE — CATH SOUNDSTAR 8FRX90CM 10439011

## (undated) DEVICE — Device

## (undated) DEVICE — SU VICRYL 2-0 CT-1 27" J339H

## (undated) DEVICE — GUIDEWIRE TRNSEPT 0.014 X35CM SAFE SE

## (undated) DEVICE — INTRO CATH 12CM 8.5FR FST-CATH

## (undated) DEVICE — BLADE KNIFE BEAVER MINI BEAVER6400

## (undated) DEVICE — CLOSURE DEVICE 6FR VASC PROGLIDE MEDICATED SUTURE 12673-03

## (undated) DEVICE — CATH DIAG 4FR JL 4.5 538417

## (undated) DEVICE — LINEN TOWEL PACK X5 5464

## (undated) DEVICE — NDL TRANSSEPTAL BRK 18GA 71CM BRK CVD 407200

## (undated) DEVICE — PREP CHLORAPREP W/ORANGE TINT 10.5ML 260715

## (undated) DEVICE — CATH ANGIO INFINITI MPA2 4FRX100CM 2 SH 538442

## (undated) DEVICE — SU PROLENE 6-0 C-1DA 30" M8706

## (undated) DEVICE — INTRO GLIDESHEATH SLENDER 6FR 10X45CM 60-1060

## (undated) DEVICE — CATH ANGIO INFINITI 3DRC 4FRX100CM 538476

## (undated) DEVICE — NDL TRANSSEPTAL BRK 18GA 71CM BRK-1 CVD 407201

## (undated) DEVICE — SU SILK 3-0 SH 30" K832H

## (undated) DEVICE — PACK VASCULAR SCV15VAFSB

## (undated) DEVICE — SUCTION CANISTER MEDIVAC LINER 3000ML W/LID 65651-530

## (undated) DEVICE — INTRO SHEATH 7FRX10CM PINNACLE RSS702

## (undated) DEVICE — SU PROLENE 7-0 BV-1DA 4X30" M8703

## (undated) DEVICE — SU SILK 3-0 TIE 24" SA74H

## (undated) DEVICE — CLIP ETHICON LIGACLIP SM BLUE LT100

## (undated) DEVICE — PACK UNIVERSAL SPLIT 29131

## (undated) DEVICE — PACK EP SRG PROC LF DISP SAN32EPFSR

## (undated) DEVICE — SU MONOCRYL 4-0 PS-2 18" UND Y496G

## (undated) DEVICE — SURGICEL HEMOSTAT 2X14" 1951

## (undated) DEVICE — INTRODUCER SHEATH FAST-CATH 9FRX12CM 406116

## (undated) DEVICE — SOL NACL 0.9% IRRIG 1000ML BOTTLE 2F7124

## (undated) DEVICE — DRAPE IOBAN INCISE 23X17" 6650EZ

## (undated) DEVICE — CATH BLAZER DX-20 DUO-DECAPOLA

## (undated) DEVICE — DRSG TELFA ISLAND 4X8" 7541

## (undated) DEVICE — DECANTER VIAL 2006S

## (undated) DEVICE — SHEATH PRELUDE SNAP 7FRX13CM PLS-1007

## (undated) DEVICE — SYR 03ML LL W/O NDL 309657

## (undated) DEVICE — SOL WATER IRRIG 1000ML BOTTLE 2F7114

## (undated) DEVICE — IONM EEG UP TO 7 HOURS

## (undated) DEVICE — INTRODUCER CATH VASC 5FRX10CM  MPIS-501-NT-U-SST

## (undated) DEVICE — NDL 19GA 1.5"

## (undated) DEVICE — SU VICRYL 2-0 SH 27" J317H

## (undated) DEVICE — GLOVE PROTEXIS POWDER FREE 8.0 ORTHOPEDIC 2D73ET80

## (undated) DEVICE — SOL NACL 0.9% INJ 250ML BAG 2B1322Q

## (undated) DEVICE — CATHETER OCTARAY LONG SPLINE CURVE F 3-3-3-3-3 D160906

## (undated) DEVICE — CATH THERMOCOOL SMARTTOUCH SF FJ CURVE

## (undated) DEVICE — SU PROLENE 6-0 C-1DA 30" 8706H

## (undated) RX ORDER — FLUMAZENIL 0.1 MG/ML
INJECTION, SOLUTION INTRAVENOUS
Status: DISPENSED
Start: 2022-09-09

## (undated) RX ORDER — LIDOCAINE HYDROCHLORIDE 10 MG/ML
INJECTION, SOLUTION EPIDURAL; INFILTRATION; INTRACAUDAL; PERINEURAL
Status: DISPENSED
Start: 2025-08-04

## (undated) RX ORDER — SIMETHICONE 20 MG/.3ML
EMULSION ORAL
Status: DISPENSED
Start: 2018-03-08

## (undated) RX ORDER — HEPARIN SODIUM 1000 [USP'U]/ML
INJECTION, SOLUTION INTRAVENOUS; SUBCUTANEOUS
Status: DISPENSED
Start: 2022-09-09

## (undated) RX ORDER — CLINDAMYCIN PHOSPHATE 900 MG/50ML
INJECTION, SOLUTION INTRAVENOUS
Status: DISPENSED
Start: 2020-02-21

## (undated) RX ORDER — PROPOFOL 10 MG/ML
INJECTION, EMULSION INTRAVENOUS
Status: DISPENSED
Start: 2020-02-21

## (undated) RX ORDER — FENTANYL CITRATE 50 UG/ML
INJECTION, SOLUTION INTRAMUSCULAR; INTRAVENOUS
Status: DISPENSED
Start: 2022-09-09

## (undated) RX ORDER — HEPARIN SODIUM 1000 [USP'U]/ML
INJECTION, SOLUTION INTRAVENOUS; SUBCUTANEOUS
Status: DISPENSED
Start: 2025-06-20

## (undated) RX ORDER — REGADENOSON 0.08 MG/ML
INJECTION, SOLUTION INTRAVENOUS
Status: DISPENSED
Start: 2025-07-14

## (undated) RX ORDER — HEPARIN SODIUM 1000 [USP'U]/ML
INJECTION, SOLUTION INTRAVENOUS; SUBCUTANEOUS
Status: DISPENSED
Start: 2020-02-21

## (undated) RX ORDER — ASPIRIN 600 MG/1
SUPPOSITORY RECTAL
Status: DISPENSED
Start: 2020-02-21

## (undated) RX ORDER — REGADENOSON 0.08 MG/ML
INJECTION, SOLUTION INTRAVENOUS
Status: DISPENSED
Start: 2023-12-12

## (undated) RX ORDER — FENTANYL CITRATE 50 UG/ML
INJECTION, SOLUTION INTRAMUSCULAR; INTRAVENOUS
Status: DISPENSED
Start: 2018-03-08

## (undated) RX ORDER — LIDOCAINE HYDROCHLORIDE 40 MG/ML
SOLUTION TOPICAL
Status: DISPENSED
Start: 2022-09-09

## (undated) RX ORDER — LIDOCAINE HYDROCHLORIDE 10 MG/ML
INJECTION, SOLUTION EPIDURAL; INFILTRATION; INTRACAUDAL; PERINEURAL
Status: DISPENSED
Start: 2020-02-21

## (undated) RX ORDER — HYDROMORPHONE HYDROCHLORIDE 1 MG/ML
INJECTION, SOLUTION INTRAMUSCULAR; INTRAVENOUS; SUBCUTANEOUS
Status: DISPENSED
Start: 2020-02-21

## (undated) RX ORDER — KETOROLAC TROMETHAMINE 30 MG/ML
INJECTION, SOLUTION INTRAMUSCULAR; INTRAVENOUS
Status: DISPENSED
Start: 2025-06-20

## (undated) RX ORDER — LIDOCAINE HYDROCHLORIDE 10 MG/ML
INJECTION, SOLUTION INFILTRATION; PERINEURAL
Status: DISPENSED
Start: 2020-02-21

## (undated) RX ORDER — FENTANYL CITRATE 50 UG/ML
INJECTION, SOLUTION INTRAMUSCULAR; INTRAVENOUS
Status: DISPENSED
Start: 2022-11-07

## (undated) RX ORDER — PROTAMINE SULFATE 10 MG/ML
INJECTION, SOLUTION INTRAVENOUS
Status: DISPENSED
Start: 2025-06-20

## (undated) RX ORDER — GLYCOPYRROLATE 0.2 MG/ML
INJECTION, SOLUTION INTRAMUSCULAR; INTRAVENOUS
Status: DISPENSED
Start: 2020-02-21

## (undated) RX ORDER — HEPARIN SODIUM 200 [USP'U]/100ML
INJECTION, SOLUTION INTRAVENOUS
Status: DISPENSED
Start: 2022-09-09

## (undated) RX ORDER — LIDOCAINE HYDROCHLORIDE 10 MG/ML
INJECTION, SOLUTION EPIDURAL; INFILTRATION; INTRACAUDAL; PERINEURAL
Status: DISPENSED
Start: 2022-09-09

## (undated) RX ORDER — HEPARIN SODIUM 200 [USP'U]/100ML
INJECTION, SOLUTION INTRAVENOUS
Status: DISPENSED
Start: 2025-06-20

## (undated) RX ORDER — ONDANSETRON 2 MG/ML
INJECTION INTRAMUSCULAR; INTRAVENOUS
Status: DISPENSED
Start: 2020-02-21

## (undated) RX ORDER — CLINDAMYCIN PHOSPHATE 900 MG/50ML
INJECTION, SOLUTION INTRAVENOUS
Status: DISPENSED
Start: 2022-11-07

## (undated) RX ORDER — GLYCOPYRROLATE 0.2 MG/ML
INJECTION, SOLUTION INTRAMUSCULAR; INTRAVENOUS
Status: DISPENSED
Start: 2022-09-09

## (undated) RX ORDER — FENTANYL CITRATE 50 UG/ML
INJECTION, SOLUTION INTRAMUSCULAR; INTRAVENOUS
Status: DISPENSED
Start: 2025-08-04

## (undated) RX ORDER — FENTANYL CITRATE 50 UG/ML
INJECTION, SOLUTION INTRAMUSCULAR; INTRAVENOUS
Status: DISPENSED
Start: 2025-06-20

## (undated) RX ORDER — LIDOCAINE HYDROCHLORIDE 10 MG/ML
INJECTION, SOLUTION EPIDURAL; INFILTRATION; INTRACAUDAL; PERINEURAL
Status: DISPENSED
Start: 2022-11-07

## (undated) RX ORDER — BUPIVACAINE HYDROCHLORIDE AND EPINEPHRINE 2.5; 5 MG/ML; UG/ML
INJECTION, SOLUTION EPIDURAL; INFILTRATION; INTRACAUDAL; PERINEURAL
Status: DISPENSED
Start: 2020-02-21

## (undated) RX ORDER — LIDOCAINE HYDROCHLORIDE 20 MG/ML
INJECTION, SOLUTION EPIDURAL; INFILTRATION; INTRACAUDAL; PERINEURAL
Status: DISPENSED
Start: 2020-02-21

## (undated) RX ORDER — NALOXONE HYDROCHLORIDE 0.4 MG/ML
INJECTION, SOLUTION INTRAMUSCULAR; INTRAVENOUS; SUBCUTANEOUS
Status: DISPENSED
Start: 2022-09-09

## (undated) RX ORDER — FENTANYL CITRATE 50 UG/ML
INJECTION, SOLUTION INTRAMUSCULAR; INTRAVENOUS
Status: DISPENSED
Start: 2020-02-21

## (undated) RX ORDER — HEPARIN SODIUM 1000 [USP'U]/ML
INJECTION, SOLUTION INTRAVENOUS; SUBCUTANEOUS
Status: DISPENSED
Start: 2025-08-04

## (undated) RX ORDER — DEXAMETHASONE SODIUM PHOSPHATE 4 MG/ML
INJECTION, SOLUTION INTRA-ARTICULAR; INTRALESIONAL; INTRAMUSCULAR; INTRAVENOUS; SOFT TISSUE
Status: DISPENSED
Start: 2020-02-21

## (undated) RX ORDER — ONDANSETRON 2 MG/ML
INJECTION INTRAMUSCULAR; INTRAVENOUS
Status: DISPENSED
Start: 2018-03-08

## (undated) RX ORDER — BUPIVACAINE HYDROCHLORIDE 2.5 MG/ML
INJECTION, SOLUTION EPIDURAL; INFILTRATION; INTRACAUDAL
Status: DISPENSED
Start: 2022-11-07

## (undated) RX ORDER — HEPARIN SODIUM 200 [USP'U]/100ML
INJECTION, SOLUTION INTRAVENOUS
Status: DISPENSED
Start: 2025-08-04

## (undated) RX ORDER — OXYCODONE AND ACETAMINOPHEN 5; 325 MG/1; MG/1
TABLET ORAL
Status: DISPENSED
Start: 2022-11-07